# Patient Record
Sex: FEMALE | Race: WHITE | Employment: OTHER | URBAN - METROPOLITAN AREA
[De-identification: names, ages, dates, MRNs, and addresses within clinical notes are randomized per-mention and may not be internally consistent; named-entity substitution may affect disease eponyms.]

---

## 2017-07-21 ENCOUNTER — OFFICE VISIT (OUTPATIENT)
Dept: ORTHOPEDIC SURGERY | Age: 32
End: 2017-07-21

## 2017-07-21 VITALS
DIASTOLIC BLOOD PRESSURE: 85 MMHG | WEIGHT: 149.91 LBS | HEART RATE: 100 BPM | HEIGHT: 65 IN | BODY MASS INDEX: 24.98 KG/M2 | SYSTOLIC BLOOD PRESSURE: 122 MMHG

## 2017-07-21 DIAGNOSIS — M25.372 ANKLE INSTABILITY, LEFT: ICD-10-CM

## 2017-07-21 DIAGNOSIS — M25.572 ACUTE LEFT ANKLE PAIN: Primary | ICD-10-CM

## 2017-07-21 PROBLEM — M25.373 ANKLE INSTABILITY: Status: ACTIVE | Noted: 2017-07-21

## 2017-07-21 PROCEDURE — 99204 OFFICE O/P NEW MOD 45 MIN: CPT | Performed by: ORTHOPAEDIC SURGERY

## 2017-07-21 PROCEDURE — 73610 X-RAY EXAM OF ANKLE: CPT | Performed by: ORTHOPAEDIC SURGERY

## 2017-07-31 ENCOUNTER — TELEPHONE (OUTPATIENT)
Dept: ORTHOPEDIC SURGERY | Age: 32
End: 2017-07-31

## 2019-01-21 ENCOUNTER — HOSPITAL ENCOUNTER (EMERGENCY)
Age: 34
Discharge: HOME | End: 2019-01-21
Attending: EMERGENCY MEDICINE
Payer: MEDICAID

## 2019-01-21 ENCOUNTER — APPOINTMENT (OUTPATIENT)
Dept: GENERAL RADIOLOGY | Age: 34
End: 2019-01-21
Payer: MEDICAID

## 2019-01-21 ENCOUNTER — APPOINTMENT (OUTPATIENT)
Dept: CT IMAGING | Age: 34
End: 2019-01-21
Payer: MEDICAID

## 2019-01-21 VITALS
TEMPERATURE: 98 F | BODY MASS INDEX: 35.53 KG/M2 | RESPIRATION RATE: 16 BRPM | HEIGHT: 60 IN | HEART RATE: 86 BPM | SYSTOLIC BLOOD PRESSURE: 119 MMHG | OXYGEN SATURATION: 95 % | DIASTOLIC BLOOD PRESSURE: 90 MMHG | WEIGHT: 181 LBS

## 2019-01-21 DIAGNOSIS — M54.6 ACUTE MIDLINE THORACIC BACK PAIN: ICD-10-CM

## 2019-01-21 DIAGNOSIS — M79.661 PAIN IN RIGHT LOWER LEG: ICD-10-CM

## 2019-01-21 DIAGNOSIS — S16.1XXA STRAIN OF NECK MUSCLE, INITIAL ENCOUNTER: ICD-10-CM
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
## 2019-01-21 DIAGNOSIS — S09.90XA CLOSED HEAD INJURY, INITIAL ENCOUNTER: ICD-10-CM

## 2019-01-21 LAB
BILIRUBIN URINE: NEGATIVE
BLOOD, URINE: NEGATIVE
CLARITY: CLEAR
COLOR: YELLOW
GLUCOSE URINE: NEGATIVE MG/DL
HCG(URINE) PREGNANCY TEST: NEGATIVE
KETONES, URINE: NEGATIVE MG/DL
LEUKOCYTE ESTERASE, URINE: NEGATIVE
MICROSCOPIC EXAMINATION: NORMAL
NITRITE, URINE: NEGATIVE
PH UA: 7.5
PROTEIN UA: NEGATIVE MG/DL
SPECIFIC GRAVITY UA: <=1.005
URINE TYPE: NORMAL
UROBILINOGEN, URINE: 0.2 E.U./DL

## 2019-01-21 PROCEDURE — 73590 X-RAY EXAM OF LOWER LEG: CPT

## 2019-01-21 PROCEDURE — 72125 CT NECK SPINE W/O DYE: CPT

## 2019-01-21 PROCEDURE — 84703 CHORIONIC GONADOTROPIN ASSAY: CPT

## 2019-01-21 PROCEDURE — 81003 URINALYSIS AUTO W/O SCOPE: CPT

## 2019-01-21 PROCEDURE — 72128 CT CHEST SPINE W/O DYE: CPT

## 2019-01-21 PROCEDURE — 99284 EMERGENCY DEPT VISIT MOD MDM: CPT

## 2019-01-21 PROCEDURE — 6370000000 HC RX 637 (ALT 250 FOR IP): Performed by: NURSE PRACTITIONER

## 2019-01-21 PROCEDURE — 6360000002 HC RX W HCPCS: Performed by: NURSE PRACTITIONER

## 2019-01-21 PROCEDURE — 70450 CT HEAD/BRAIN W/O DYE: CPT

## 2019-01-21 PROCEDURE — 73600 X-RAY EXAM OF ANKLE: CPT

## 2019-01-21 RX ORDER — CYCLOBENZAPRINE HCL 10 MG
10 TABLET ORAL ONCE
Status: COMPLETED | OUTPATIENT
Start: 2019-01-21 | End: 2019-01-21

## 2019-01-21 RX ORDER — IBUPROFEN 800 MG/1
800 TABLET ORAL ONCE
Status: COMPLETED | OUTPATIENT
Start: 2019-01-21 | End: 2019-01-21

## 2019-01-21 RX ORDER — CYCLOBENZAPRINE HCL 10 MG
10 TABLET ORAL 3 TIMES DAILY PRN
Qty: 21 TABLET | Refills: 0 | Status: SHIPPED | OUTPATIENT
Start: 2019-01-21 | End: 2019-01-28

## 2019-01-21 RX ORDER — ONDANSETRON 4 MG/1
4 TABLET, ORALLY DISINTEGRATING ORAL ONCE
Status: COMPLETED | OUTPATIENT
Start: 2019-01-21 | End: 2019-01-21

## 2019-01-21 RX ADMIN — ONDANSETRON 4 MG: 4 TABLET, ORALLY DISINTEGRATING ORAL at 12:49

## 2019-01-21 RX ADMIN — CYCLOBENZAPRINE HYDROCHLORIDE 10 MG: 10 TABLET, FILM COATED ORAL at 12:49

## 2019-01-21 RX ADMIN — IBUPROFEN 800 MG: 800 TABLET ORAL at 15:19

## 2019-01-21 ASSESSMENT — ENCOUNTER SYMPTOMS
ABDOMINAL PAIN: 0
ABDOMINAL DISTENTION: 0
DIARRHEA: 0
EYES NEGATIVE: 1
VOMITING: 0
BACK PAIN: 1
CONSTIPATION: 0
SHORTNESS OF BREATH: 0
COUGH: 0
ALLERGIC/IMMUNOLOGIC NEGATIVE: 1
NAUSEA: 1
WHEEZING: 0

## 2019-01-21 ASSESSMENT — PAIN DESCRIPTION - ORIENTATION: ORIENTATION: RIGHT

## 2019-01-21 ASSESSMENT — PAIN DESCRIPTION - PAIN TYPE: TYPE: ACUTE PAIN

## 2019-01-21 ASSESSMENT — PAIN SCALES - GENERAL
PAINLEVEL_OUTOF10: 8
PAINLEVEL_OUTOF10: 9
PAINLEVEL_OUTOF10: 9

## 2019-01-21 ASSESSMENT — PAIN DESCRIPTION - PROGRESSION: CLINICAL_PROGRESSION: GRADUALLY WORSENING

## 2019-05-14 ENCOUNTER — HOSPITAL ENCOUNTER (INPATIENT)
Age: 34
LOS: 15 days | Discharge: ANOTHER HOSPITAL | DRG: 710 | End: 2019-05-29
Attending: EMERGENCY MEDICINE | Admitting: INTERNAL MEDICINE
Payer: MEDICAID

## 2019-05-14 ENCOUNTER — APPOINTMENT (OUTPATIENT)
Dept: GENERAL RADIOLOGY | Age: 34
DRG: 710 | End: 2019-05-14
Payer: MEDICAID

## 2019-05-14 ENCOUNTER — HOSPITAL ENCOUNTER (EMERGENCY)
Age: 34
Discharge: HOME | End: 2019-05-14
Attending: EMERGENCY MEDICINE
Payer: OTHER MISCELLANEOUS

## 2019-05-14 ENCOUNTER — APPOINTMENT (OUTPATIENT)
Dept: CT IMAGING | Age: 34
DRG: 710 | End: 2019-05-14
Payer: MEDICAID

## 2019-05-14 DIAGNOSIS — I46.9 CARDIAC ARREST WITH VENTRICULAR FIBRILLATION (HCC): ICD-10-CM

## 2019-05-14 DIAGNOSIS — J96.01 ACUTE RESPIRATORY FAILURE WITH HYPOXIA (HCC): Primary | ICD-10-CM

## 2019-05-14 DIAGNOSIS — I49.01 CARDIAC ARREST WITH VENTRICULAR FIBRILLATION (HCC): ICD-10-CM

## 2019-05-14 PROBLEM — J96.00 ACUTE RESPIRATORY FAILURE (HCC): Status: ACTIVE | Noted: 2019-05-14

## 2019-05-14 LAB
A/G RATIO: 1.1 (ref 1.1–2.2)
ACETAMINOPHEN LEVEL: <5 UG/ML (ref 10–30)
ALBUMIN SERPL-MCNC: 4.2 G/DL (ref 3.4–5)
ALP BLD-CCNC: 94 U/L (ref 40–129)
ALT SERPL-CCNC: 114 U/L (ref 10–40)
AMORPHOUS: ABNORMAL /HPF
AMPHETAMINE SCREEN, URINE: ABNORMAL
ANION GAP SERPL CALCULATED.3IONS-SCNC: 19 MMOL/L (ref 3–16)
AST SERPL-CCNC: 129 U/L (ref 15–37)
ATYPICAL LYMPHOCYTE RELATIVE PERCENT: 1 % (ref 0–6)
BACTERIA: ABNORMAL /HPF
BARBITURATE SCREEN URINE: ABNORMAL
BASE EXCESS ARTERIAL: -4.8 MMOL/L (ref -3–3)
BASOPHILS ABSOLUTE: 0 K/UL (ref 0–0.2)
BASOPHILS RELATIVE PERCENT: 0 %
BENZODIAZEPINE SCREEN, URINE: ABNORMAL
BILIRUB SERPL-MCNC: 0.6 MG/DL (ref 0–1)
BILIRUBIN URINE: NEGATIVE
BLOOD, URINE: ABNORMAL
BUN BLDV-MCNC: 9 MG/DL (ref 7–20)
CALCIUM SERPL-MCNC: 9.6 MG/DL (ref 8.3–10.6)
CANNABINOID SCREEN URINE: POSITIVE
CARBOXYHEMOGLOBIN ARTERIAL: 2.2 % (ref 0–1.5)
CHLORIDE BLD-SCNC: 96 MMOL/L (ref 99–110)
CLARITY: ABNORMAL
CO2: 18 MMOL/L (ref 21–32)
COCAINE METABOLITE SCREEN URINE: ABNORMAL
COLOR: YELLOW
CREAT SERPL-MCNC: 0.7 MG/DL (ref 0.6–1.1)
EKG ATRIAL RATE: 141 BPM
EKG ATRIAL RATE: 159 BPM
EKG DIAGNOSIS: NORMAL
EKG DIAGNOSIS: NORMAL
EKG Q-T INTERVAL: 314 MS
EKG Q-T INTERVAL: 340 MS
EKG QRS DURATION: 78 MS
EKG QRS DURATION: 82 MS
EKG QTC CALCULATION (BAZETT): 512 MS
EKG QTC CALCULATION (BAZETT): 538 MS
EKG R AXIS: 31 DEGREES
EKG R AXIS: 32 DEGREES
EKG T AXIS: 55 DEGREES
EKG T AXIS: 58 DEGREES
EKG VENTRICULAR RATE: 151 BPM
EKG VENTRICULAR RATE: 160 BPM
EOSINOPHILS ABSOLUTE: 0.2 K/UL (ref 0–0.6)
EOSINOPHILS RELATIVE PERCENT: 1 %
EPITHELIAL CELLS, UA: ABNORMAL /HPF
ETHANOL: NORMAL MG/DL (ref 0–0.08)
GFR AFRICAN AMERICAN: >60
GFR NON-AFRICAN AMERICAN: >60
GLOBULIN: 3.8 G/DL
GLUCOSE BLD-MCNC: 161 MG/DL (ref 70–99)
GLUCOSE BLD-MCNC: 184 MG/DL (ref 70–99)
GLUCOSE URINE: 100 MG/DL
HCO3 ARTERIAL: 19.9 MMOL/L (ref 21–29)
HCT VFR BLD CALC: 48.9 % (ref 36–48)
HEMATOLOGY PATH CONSULT: YES
HEMOGLOBIN, ART, EXTENDED: 16.7 G/DL (ref 12–16)
HEMOGLOBIN: 16 G/DL (ref 12–16)
KETONES, URINE: 15 MG/DL
LACTIC ACID, SEPSIS: 2.9 MMOL/L (ref 0.4–1.9)
LACTIC ACID: 6.8 MMOL/L (ref 0.4–2)
LEUKOCYTE ESTERASE, URINE: NEGATIVE
LYMPHOCYTES ABSOLUTE: 6.1 K/UL (ref 1–5.1)
LYMPHOCYTES RELATIVE PERCENT: 30 %
Lab: ABNORMAL
MAGNESIUM: 1.4 MG/DL (ref 1.8–2.4)
MCH RBC QN AUTO: 29.6 PG (ref 26–34)
MCHC RBC AUTO-ENTMCNC: 32.6 G/DL (ref 31–36)
MCV RBC AUTO: 90.6 FL (ref 80–100)
METHADONE SCREEN, URINE: ABNORMAL
METHEMOGLOBIN ARTERIAL: 0.2 %
MICROSCOPIC EXAMINATION: YES
MONOCYTES ABSOLUTE: 1.2 K/UL (ref 0–1.3)
MONOCYTES RELATIVE PERCENT: 6 %
MUCUS: ABNORMAL /LPF
MYELOCYTE PERCENT: 3 %
NEUTROPHILS ABSOLUTE: 12.2 K/UL (ref 1.7–7.7)
NEUTROPHILS RELATIVE PERCENT: 59 %
NITRITE, URINE: NEGATIVE
O2 CONTENT ARTERIAL: 21 ML/DL
O2 SAT, ARTERIAL: 88.1 %
O2 THERAPY: ABNORMAL
OPIATE SCREEN URINE: ABNORMAL
OXYCODONE URINE: ABNORMAL
PCO2 ARTERIAL: 36.4 MMHG (ref 35–45)
PDW BLD-RTO: 12.4 % (ref 12.4–15.4)
PERFORMED ON: ABNORMAL
PH ARTERIAL: 7.36 (ref 7.35–7.45)
PH UA: 7
PH UA: 7 (ref 5–8)
PHENCYCLIDINE SCREEN URINE: ABNORMAL
PLATELET # BLD: 315 K/UL (ref 135–450)
PLATELET SLIDE REVIEW: ABNORMAL
PMV BLD AUTO: 7.6 FL (ref 5–10.5)
PO2 ARTERIAL: 55.9 MMHG (ref 75–108)
POTASSIUM SERPL-SCNC: 3 MMOL/L (ref 3.5–5.1)
PROPOXYPHENE SCREEN: ABNORMAL
PROTEIN UA: >=300 MG/DL
RBC # BLD: 5.4 M/UL (ref 4–5.2)
RBC UA: ABNORMAL /HPF (ref 0–2)
SALICYLATE, SERUM: <0.3 MG/DL (ref 15–30)
SLIDE REVIEW: ABNORMAL
SODIUM BLD-SCNC: 133 MMOL/L (ref 136–145)
SPECIFIC GRAVITY UA: 1.02 (ref 1–1.03)
TCO2 ARTERIAL: 21 MMOL/L
TOTAL PROTEIN: 8 G/DL (ref 6.4–8.2)
TRICYCLIC, URINE: NORMAL
TROPONIN: 0.57 NG/ML
TROPONIN: <0.01 NG/ML
URINE REFLEX TO CULTURE: YES
URINE TYPE: ABNORMAL
UROBILINOGEN, URINE: 1 E.U./DL
WBC # BLD: 19.7 K/UL (ref 4–11)
WBC UA: ABNORMAL /HPF (ref 0–5)

## 2019-05-14 PROCEDURE — 70450 CT HEAD/BRAIN W/O DYE: CPT

## 2019-05-14 PROCEDURE — 6360000002 HC RX W HCPCS

## 2019-05-14 PROCEDURE — 4500000026 HC ED CRITICAL CARE PROCEDURE

## 2019-05-14 PROCEDURE — 96365 THER/PROPH/DIAG IV INF INIT: CPT

## 2019-05-14 PROCEDURE — 0BH17EZ INSERTION OF ENDOTRACHEAL AIRWAY INTO TRACHEA, VIA NATURAL OR ARTIFICIAL OPENING: ICD-10-PCS | Performed by: INTERNAL MEDICINE

## 2019-05-14 PROCEDURE — 2700000000 HC OXYGEN THERAPY PER DAY

## 2019-05-14 PROCEDURE — 87086 URINE CULTURE/COLONY COUNT: CPT

## 2019-05-14 PROCEDURE — 96366 THER/PROPH/DIAG IV INF ADDON: CPT

## 2019-05-14 PROCEDURE — 83605 ASSAY OF LACTIC ACID: CPT

## 2019-05-14 PROCEDURE — G0480 DRUG TEST DEF 1-7 CLASSES: HCPCS

## 2019-05-14 PROCEDURE — 81001 URINALYSIS AUTO W/SCOPE: CPT

## 2019-05-14 PROCEDURE — 71045 X-RAY EXAM CHEST 1 VIEW: CPT

## 2019-05-14 PROCEDURE — 82803 BLOOD GASES ANY COMBINATION: CPT

## 2019-05-14 PROCEDURE — 96375 TX/PRO/DX INJ NEW DRUG ADDON: CPT

## 2019-05-14 PROCEDURE — 87070 CULTURE OTHR SPECIMN AEROBIC: CPT

## 2019-05-14 PROCEDURE — 71260 CT THORAX DX C+: CPT

## 2019-05-14 PROCEDURE — 99292 CRITICAL CARE ADDL 30 MIN: CPT

## 2019-05-14 PROCEDURE — 99291 CRITICAL CARE FIRST HOUR: CPT

## 2019-05-14 PROCEDURE — 6360000002 HC RX W HCPCS: Performed by: EMERGENCY MEDICINE

## 2019-05-14 PROCEDURE — 84484 ASSAY OF TROPONIN QUANT: CPT

## 2019-05-14 PROCEDURE — 4500000002 HC ER NO CHARGE

## 2019-05-14 PROCEDURE — 83735 ASSAY OF MAGNESIUM: CPT

## 2019-05-14 PROCEDURE — 5A1955Z RESPIRATORY VENTILATION, GREATER THAN 96 CONSECUTIVE HOURS: ICD-10-PCS | Performed by: EMERGENCY MEDICINE

## 2019-05-14 PROCEDURE — 87040 BLOOD CULTURE FOR BACTERIA: CPT

## 2019-05-14 PROCEDURE — 36600 WITHDRAWAL OF ARTERIAL BLOOD: CPT

## 2019-05-14 PROCEDURE — 94750 HC PULMONARY COMPLIANCE STUDY: CPT

## 2019-05-14 PROCEDURE — 2000000000 HC ICU R&B

## 2019-05-14 PROCEDURE — 74176 CT ABD & PELVIS W/O CONTRAST: CPT

## 2019-05-14 PROCEDURE — 85025 COMPLETE CBC W/AUTO DIFF WBC: CPT

## 2019-05-14 PROCEDURE — 2500000003 HC RX 250 WO HCPCS: Performed by: EMERGENCY MEDICINE

## 2019-05-14 PROCEDURE — 96368 THER/DIAG CONCURRENT INF: CPT

## 2019-05-14 PROCEDURE — 6360000004 HC RX CONTRAST MEDICATION: Performed by: EMERGENCY MEDICINE

## 2019-05-14 PROCEDURE — 80307 DRUG TEST PRSMV CHEM ANLYZR: CPT

## 2019-05-14 PROCEDURE — 2580000003 HC RX 258: Performed by: EMERGENCY MEDICINE

## 2019-05-14 PROCEDURE — 6370000000 HC RX 637 (ALT 250 FOR IP): Performed by: EMERGENCY MEDICINE

## 2019-05-14 PROCEDURE — 80053 COMPREHEN METABOLIC PANEL: CPT

## 2019-05-14 PROCEDURE — 94002 VENT MGMT INPAT INIT DAY: CPT

## 2019-05-14 PROCEDURE — 87205 SMEAR GRAM STAIN: CPT

## 2019-05-14 PROCEDURE — 51702 INSERT TEMP BLADDER CATH: CPT

## 2019-05-14 PROCEDURE — 2500000003 HC RX 250 WO HCPCS

## 2019-05-14 PROCEDURE — 89220 SPUTUM SPECIMEN COLLECTION: CPT

## 2019-05-14 RX ORDER — MAGNESIUM SULFATE 1 G/100ML
1 INJECTION INTRAVENOUS
Status: DISPENSED | OUTPATIENT
Start: 2019-05-14 | End: 2019-05-14

## 2019-05-14 RX ORDER — AMIODARONE HYDROCHLORIDE 50 MG/ML
150 INJECTION, SOLUTION INTRAVENOUS ONCE
Status: COMPLETED | OUTPATIENT
Start: 2019-05-14 | End: 2019-05-14

## 2019-05-14 RX ORDER — ROCURONIUM BROMIDE 10 MG/ML
70 INJECTION, SOLUTION INTRAVENOUS ONCE
Status: COMPLETED | OUTPATIENT
Start: 2019-05-14 | End: 2019-05-14

## 2019-05-14 RX ORDER — METOPROLOL TARTRATE 5 MG/5ML
5 INJECTION INTRAVENOUS ONCE
Status: COMPLETED | OUTPATIENT
Start: 2019-05-14 | End: 2019-05-14

## 2019-05-14 RX ORDER — ASPIRIN 300 MG/1
300 SUPPOSITORY RECTAL ONCE
Status: COMPLETED | OUTPATIENT
Start: 2019-05-14 | End: 2019-05-14

## 2019-05-14 RX ORDER — FUROSEMIDE 10 MG/ML
40 INJECTION INTRAMUSCULAR; INTRAVENOUS ONCE
Status: COMPLETED | OUTPATIENT
Start: 2019-05-14 | End: 2019-05-14

## 2019-05-14 RX ORDER — POTASSIUM CHLORIDE 7.45 MG/ML
10 INJECTION INTRAVENOUS
Status: DISPENSED | OUTPATIENT
Start: 2019-05-14 | End: 2019-05-14

## 2019-05-14 RX ORDER — PROPOFOL 10 MG/ML
INJECTION, EMULSION INTRAVENOUS
Status: COMPLETED
Start: 2019-05-14 | End: 2019-05-14

## 2019-05-14 RX ORDER — MIDAZOLAM HYDROCHLORIDE 1 MG/ML
10 INJECTION INTRAMUSCULAR; INTRAVENOUS ONCE
Status: DISCONTINUED | OUTPATIENT
Start: 2019-05-14 | End: 2019-05-15 | Stop reason: HOSPADM

## 2019-05-14 RX ORDER — ROCURONIUM BROMIDE 10 MG/ML
40 INJECTION, SOLUTION INTRAVENOUS ONCE
Status: COMPLETED | OUTPATIENT
Start: 2019-05-14 | End: 2019-05-14

## 2019-05-14 RX ORDER — ETOMIDATE 2 MG/ML
20 INJECTION, SOLUTION INTRAVENOUS ONCE
Status: COMPLETED | OUTPATIENT
Start: 2019-05-14 | End: 2019-05-14

## 2019-05-14 RX ORDER — 0.9 % SODIUM CHLORIDE 0.9 %
2500 INTRAVENOUS SOLUTION INTRAVENOUS ONCE
Status: COMPLETED | OUTPATIENT
Start: 2019-05-14 | End: 2019-05-15

## 2019-05-14 RX ORDER — SODIUM CHLORIDE 0.9 % (FLUSH) 0.9 %
10 SYRINGE (ML) INJECTION EVERY 12 HOURS SCHEDULED
Status: DISCONTINUED | OUTPATIENT
Start: 2019-05-14 | End: 2019-05-29 | Stop reason: HOSPADM

## 2019-05-14 RX ORDER — PROPOFOL 10 MG/ML
10 INJECTION, EMULSION INTRAVENOUS
Status: DISCONTINUED | OUTPATIENT
Start: 2019-05-14 | End: 2019-05-15

## 2019-05-14 RX ORDER — MIDAZOLAM HYDROCHLORIDE 5 MG/ML
INJECTION INTRAMUSCULAR; INTRAVENOUS
Status: COMPLETED
Start: 2019-05-14 | End: 2019-05-14

## 2019-05-14 RX ORDER — SODIUM CHLORIDE 0.9 % (FLUSH) 0.9 %
10 SYRINGE (ML) INJECTION PRN
Status: DISCONTINUED | OUTPATIENT
Start: 2019-05-14 | End: 2019-05-29 | Stop reason: HOSPADM

## 2019-05-14 RX ADMIN — MAGNESIUM SULFATE HEPTAHYDRATE 1 G: 1 INJECTION, SOLUTION INTRAVENOUS at 19:50

## 2019-05-14 RX ADMIN — PROPOFOL 30 MCG/KG/MIN: 10 INJECTION, EMULSION INTRAVENOUS at 23:56

## 2019-05-14 RX ADMIN — FUROSEMIDE 40 MG: 10 INJECTION, SOLUTION INTRAMUSCULAR; INTRAVENOUS at 19:38

## 2019-05-14 RX ADMIN — ASPIRIN 300 MG: 300 SUPPOSITORY RECTAL at 22:45

## 2019-05-14 RX ADMIN — ROCURONIUM BROMIDE 40 MG: 10 SOLUTION INTRAVENOUS at 23:08

## 2019-05-14 RX ADMIN — SODIUM CHLORIDE 2500 ML: 9 INJECTION, SOLUTION INTRAVENOUS at 22:38

## 2019-05-14 RX ADMIN — METOPROLOL TARTRATE 5 MG: 5 INJECTION, SOLUTION INTRAVENOUS at 21:33

## 2019-05-14 RX ADMIN — ROCURONIUM BROMIDE 70 MG: 10 INJECTION INTRAVENOUS at 18:06

## 2019-05-14 RX ADMIN — AMIODARONE HYDROCHLORIDE 150 MG: 50 INJECTION, SOLUTION INTRAVENOUS at 19:38

## 2019-05-14 RX ADMIN — POTASSIUM CHLORIDE 10 MEQ: 7.46 INJECTION, SOLUTION INTRAVENOUS at 20:03

## 2019-05-14 RX ADMIN — VANCOMYCIN HYDROCHLORIDE 1.5 G: 1 INJECTION, POWDER, LYOPHILIZED, FOR SOLUTION INTRAVENOUS at 23:24

## 2019-05-14 RX ADMIN — AMIODARONE HYDROCHLORIDE 1 MG/MIN: 50 INJECTION, SOLUTION INTRAVENOUS at 19:51

## 2019-05-14 RX ADMIN — PROPOFOL 15 MCG/KG/MIN: 10 INJECTION, EMULSION INTRAVENOUS at 19:13

## 2019-05-14 RX ADMIN — ROCURONIUM BROMIDE 70 MG: 10 SOLUTION INTRAVENOUS at 20:06

## 2019-05-14 RX ADMIN — MIDAZOLAM HYDROCHLORIDE 10 MG: 5 INJECTION, SOLUTION INTRAMUSCULAR; INTRAVENOUS at 19:33

## 2019-05-14 RX ADMIN — PIPERACILLIN AND TAZOBACTAM 3.38 G: 3; .375 INJECTION, POWDER, FOR SOLUTION INTRAVENOUS at 21:41

## 2019-05-14 RX ADMIN — PROPOFOL 10 MCG/KG/MIN: 10 INJECTION, EMULSION INTRAVENOUS at 18:57

## 2019-05-14 RX ADMIN — ETOMIDATE 20 MG: 2 INJECTION, SOLUTION INTRAVENOUS at 18:06

## 2019-05-14 RX ADMIN — IOPAMIDOL 85 ML: 755 INJECTION, SOLUTION INTRAVENOUS at 20:54

## 2019-05-14 ASSESSMENT — PULMONARY FUNCTION TESTS
PIF_VALUE: 25
PIF_VALUE: 32

## 2019-05-14 NOTE — ED PROVIDER NOTES
Intubation  Date/Time: 5/14/2019 6:27 PM  Performed by: JAYLYN Manning CNP  Authorized by: Monse Solano MD     Consent:     Consent obtained:  Emergent situation    Risks discussed:  Aspiration, brain injury, hypoxia and death  Pre-procedure details:     Patient status:  Unresponsive    Pretreatment meds: Etomidate. Paralytics:  Rocuronium  Procedure details:     Preoxygenation:  Bag valve mask    CPR in progress: no      Intubation method:  Oral    Oral intubation technique:  Video-assisted    Laryngoscope blade: Mac 3    Tube size (mm):  7.5    Tube type:  Cuffed    Number of attempts:  1    Ventilation between attempts: no      Cricoid pressure: no      Tube visualized through cords: yes    Placement assessment:     ETT to lip:  23    Tube secured with:  ETT forde    Breath sounds:  Equal and absent over the epigastrium    Placement verification: chest rise, equal breath sounds and ETCO2 detector    Post-procedure details:     Patient tolerance of procedure:   Tolerated well, no immediate complications         JAYLYN Manning CNP  05/14/19 1825

## 2019-05-14 NOTE — ED NOTES
[facility] cardiology at [facility] [address] and Dr. Sonia George returned page at [facility]  46/83/43 1937

## 2019-05-14 NOTE — ED TRIAGE NOTES
Pt is unresponsive at arrival.   Per squad pt had agonal breathing. Pt stopped breathing and became pulseless with intubation. Pt was in vfib shocked at 200J  Patient became tachy at 179-200. Per squad fsbs 195. Pt was at home and was smoking weed per squad. (info came from pts mother)  IO and 2IV per squad was attempted and unsuccessful. Per squad last rhythm was SR.   2mg of narcan was given nasal.   Per squad patient was tonio and went into vfib and CPR was started. 1802 fsbs 161  1805 pt is vomiting and moaning at this time. Pt is being bagged by ambu bag via RT.   1806 20mg etomidate given  1806 70mg rocuronium given. 1808 7.5 et tube placed at 23 lip line  1808 20G r AC placed by [facility].  Basic labs, vbg, lactic drawn, BC1

## 2019-05-14 NOTE — ED PROVIDER NOTES
[address] ED      CHIEF COMPLAINT  Respiratory arrest       HISTORY OF PRESENT ILLNESS  Rubi Leon is a 26 y.o. female  who presents to the ED complaining of cardiopulmonary arrest.  EMS states that when they got there she was having trouble breathing with agonal respirations. She then went unresponsive and they noted V. fib on the monitor. Patient was shocked ×1 with return of spontaneous circulation. She was spontaneously breathing at that time although was still somewhat labored. She did not regain consciousness. Per the mom, mom had come home from work and patient was her normal self and stated to mom that they needed some toilet paper. Mom ran to the grocery store which is a short distance away and upon returning about 10-15 minutes later the patient was slumped to the side on her couch and had throbbing at the mouth. The mom removed the patient's pulse teeth and noted that the patient was not breathing right and so called EMS at that point. Fingerstick blood sugar noted to be in the 190s per EMS. They did administer 2 mg Narcan without improvement. 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 States that she does not currently follow the cardiologist nor with any providers at [facility] for sometimes and follows up early for her medical care [facility] [place] [facility].  States that she has cardiomegaly. She also has hallucinations for which she is on Zyprexa per mom and also is on neurontin. She has a history of chronic kidney and liver disease as well per mom. No other complaints, modifying factors or associated symptoms. I have reviewed the following from the nursing documentation.     Past Medical History:   Diagnosis Date    Arrhythmia     Arthritis     Bipolar disorder ([facility]     Cancer ([facility]     Chronic kidney disease     Depression     E. coli infection     Hepatitis C     Hyperlipidemia     [facility]     Rhabdomyosarcoma ([facility]     Scoliosis     Tachycardia      Past Surgical History:   Procedure Laterality Date    ACHILLES TENDON SURGERY      BONE MARROW TRANSPLANT      CHOLECYSTECTOMY  4/15/2014    Laparascopic    ENDOSCOPY, COLON, DIAGNOSTIC      GROWTH PLATE SURGERY      LEG SURGERY       Family History   Problem Relation Age of Onset    Emphysema Mother     Mental Illness Mother     Substance Abuse Mother     Heart Attack Father     Mental Illness Father     Arthritis Father     Heart Disease Father     High Blood Pressure Father     High Cholesterol Father      Social History     Socioeconomic History    Marital status: Legally      Spouse name: Not on file    Number of children: Not on file    Years of education: Not on file    Highest education level: Not on file   Occupational History    Not on file   Social Needs    Financial resource strain: Not on file    Food insecurity:     Worry: Not on file     Inability: Not on file    Transportation needs:     Medical: Not on file     Non-medical: Not on file   Tobacco Use    Smoking status: Current Every Day Smoker     Packs/day: 0.50     Years: 5.00     Pack years: 2.50     Types: Cigarettes    Smokeless tobacco: Never Used   Substance and Sexual Activity    Alcohol use: No     Comment: none for years    Drug use: Yes     Types: Marijuana     Comment: once month    Sexual activity: Yes     Partners: Male   Lifestyle    Physical activity:     Days per week: Not on file     Minutes per session: Not on file    Stress: Not on file   Relationships    Social connections:     Talks on phone: Not on file     Gets together: Not on file     Attends Islam service: Not on file     Active member of club or organization: Not on file     Attends meetings of clubs or organizations: Not on K/uL    RBC 5.40 (H) 4.00 - 5.20 M/uL    Hemoglobin 16.0 12.0 - 16.0 g/dL    Hematocrit 48.9 (H) 36.0 - 48.0 %    MCV 90.6 80.0 - 100.0 fL    MCH 29.6 26.0 - 34.0 pg    MCHC 32.6 31.0 - 36.0 g/dL    RDW 12.4 12.4 - 15.4 %    Platelets 247 585 - 406 K/uL    MPV 7.6 5.0 - 10.5 fL    PLATELET SLIDE REVIEW Clumped     SLIDE REVIEW see below     Path Consult Yes     Neutrophils % 59.0 %    Lymphocytes % 30.0 %    Monocytes % 6.0 %    Eosinophils % 1.0 %    Basophils % 0.0 %    Neutrophils # 12.2 (H) 1.7 - 7.7 K/uL    Lymphocytes # 6.1 (H) 1.0 - 5.1 K/uL    Monocytes # 1.2 0.0 - 1.3 K/uL    Eosinophils # 0.2 0.0 - 0.6 K/uL    Basophils # 0.0 0.0 - 0.2 K/uL    Atypical Lymphocytes Relative 1 0 - 6 %    Myelocytes Relative 3 (A) %   Comprehensive metabolic panel   Result Value Ref Range    Sodium 133 (L) 136 - 145 mmol/L    Potassium 3.0 (L) 3.5 - 5.1 mmol/L    Chloride 96 (L) 99 - 110 mmol/L    CO2 18 (L) 21 - 32 mmol/L    Anion Gap 19 (H) 3 - 16    Glucose 184 (H) 70 - 99 mg/dL    BUN 9 7 - 20 mg/dL    CREATININE 0.7 0.6 - 1.1 mg/dL    GFR Non-African American >60 >60    GFR African American >60 >60    Calcium 9.6 8.3 - 10.6 mg/dL    Total Protein 8.0 6.4 - 8.2 g/dL    Alb 4.2 3.4 - 5.0 g/dL    Albumin/Globulin Ratio 1.1 1.1 - 2.2    Total Bilirubin 0.6 0.0 - 1.0 mg/dL    Alkaline Phosphatase 94 40 - 129 U/L     (H) 10 - 40 U/L     (H) 15 - 37 U/L    Globulin 3.8 g/dL   Urinalysis Reflex to Culture   Result Value Ref Range    Color, UA Yellow Straw/Yellow    Clarity, UA SL CLOUDY (A) Clear    Glucose, Ur 100 (A) Negative mg/dL    Bilirubin Urine Negative Negative    Ketones, Urine 15 (A) Negative mg/dL    Specific Gravity, UA 1.025 1.005 - 1.030    Blood, Urine TRACE-INTACT (A) Negative    pH, UA 7.0 5.0 - 8.0    Protein, UA >=300 (A) Negative mg/dL    Urobilinogen, Urine 1.0 <2.0 E.U./dL    Nitrite, Urine Negative Negative    Leukocyte Esterase, Urine Negative Negative    Microscopic Examination YES Urine Reflex to Culture Yes     Urine Type Not Specified    Ethanol   Result Value Ref Range    Ethanol Lvl None Detected mg/dL   Drug screen multi urine   Result Value Ref Range    Amphetamine Screen, Urine Neg Negative <1000ng/mL    Barbiturate Screen, Ur Neg Negative <200 ng/mL    Benzodiazepine Screen, Urine Neg Negative <200 ng/mL    Cannabinoid Scrn, Ur POSITIVE (A) Negative <50 ng/mL    Cocaine Metabolite Screen, Urine Neg Negative <300 ng/mL    Opiate Scrn, Ur Neg Negative <300 ng/mL    PCP Screen, Urine Neg Negative <25 ng/mL    Methadone Screen, Urine Neg Negative <300 ng/mL    Propoxyphene Scrn, Ur Neg Negative <300 ng/mL    pH, UA 7.0     Drug Screen Comment: see below     Oxycodone Urine Neg Negative <100 ng/ml   Acetaminophen level   Result Value Ref Range    Acetaminophen Level <5 (L) 10 - 30 ug/mL   Salicylate   Result Value Ref Range    Salicylate, Serum <6.5 (L) 15.0 - 30.0 mg/dL   Drug screen, tricyclic   Result Value Ref Range    Tricyclic Neg Negative <6437 ng/mL   Troponin   Result Value Ref Range    Troponin <0.01 <0.01 ng/mL   Blood gas, arterial   Result Value Ref Range    pH, Arterial 7.356 7.350 - 7.450    pCO2, Arterial 36.4 35.0 - 45.0 mmHg    pO2, Arterial 55.9 (L) 75.0 - 108.0 mmHg    HCO3, Arterial 19.9 (L) 21.0 - 29.0 mmol/L    Base Excess, Arterial -4.8 (L) -3.0 - 3.0 mmol/L    Hemoglobin, Art, Extended 16.7 (H) 12.0 - 16.0 g/dL    O2 Sat, Arterial 88.1 (L) >92 %    Carboxyhgb, Arterial 2.2 (H) 0.0 - 1.5 %    Methemoglobin, Arterial 0.2 <1.5 %    TCO2, Arterial 21.0 Not Established mmol/L    O2 Content, Arterial 21 Not Established mL/dL    O2 Therapy Unknown    Magnesium   Result Value Ref Range    Magnesium 1.40 (L) 1.80 - 2.40 mg/dL   Microscopic Urinalysis   Result Value Ref Range    Mucus, UA Rare (A) /LPF    WBC, UA 6-10 (A) 0 - 5 /HPF    RBC, UA  (A) 0 - 2 /HPF    Epi Cells  /HPF    Bacteria, UA 2+ (A) /HPF    Amorphous, UA 3+ (A) /HPF   POCT Glucose   Result Value Ref Range    POC Glucose 161 (H) 70 - 99 mg/dl    Performed on ACCU-CHEK    EKG 12 Lead   Result Value Ref Range    Ventricular Rate 151 BPM    Atrial Rate 141 BPM    QRS Duration 82 ms    Q-T Interval 340 ms    QTc Calculation (Bazett) 538 ms    R Axis 31 degrees    T Axis 58 degrees    Diagnosis       Atrial fibrillation with rapid ventricular responseSeptal infarct , age undeterminedAbnormal ECGNo previous ECGs available   EKG 12 Lead   Result Value Ref Range    Ventricular Rate 160 BPM    Atrial Rate 159 BPM    QRS Duration 78 ms    Q-T Interval 314 ms    QTc Calculation (Bazett) 512 ms    R Axis 32 degrees    T Axis 55 degrees    Diagnosis       Atrial fibrillation with rapid ventricular responseAbnormal ECGNo previous ECGs available         ECG  The Ekg interpreted by me shows  afib, uttt=[id number] with RVR  Axis is   Normal  QTc is  538ms  Intervals and Durations are unremarkable. ST Segments: nonspecific changes  No prior ECG available for comparison. The Ekg interpreted by me shows (repeat)  atrial fibrillation with a rate of 160 and RVR  Axis is   Normal  QTc is  512ms  Intervals and Durations are unremarkable. ST Segments: nonspecific changes  No significant change from prior EKG dated earlier today      RADIOLOGY  Ct Abdomen Pelvis Wo Contrast Additional Contrast? None    Result Date: 5/14/2019  EXAMINATION: CTA OF THE CHEST; CT OF THE ABDOMEN AND PELVIS WITHOUT CONTRAST 5/14/2019 8:55 pm; 5/14/2019 8:56 pm TECHNIQUE: CTA of the chest was performed after the administration of intravenous contrast.  Multiplanar reformatted images are provided for review. MIP images are provided for review.  Dose modulation, iterative reconstruction, and/or weight based adjustment of the mA/kV was utilized to reduce the radiation dose to as low as reasonably achievable.; CT of the abdomen and pelvis was performed without the administration of intravenous contrast. Multiplanar reformatted images are provided for review. Dose modulation, iterative reconstruction, and/or weight based adjustment of the mA/kV was utilized to reduce the radiation dose to as low as reasonably achievable. COMPARISON: 05/14/2019 HISTORY: ORDERING SYSTEM PROVIDED HISTORY: resp arrest, concern for PE TECHNOLOGIST PROVIDED HISTORY: Ordering Physician Provided Reason for Exam: resp arrest, concern for PE Acuity: Acute Type of Exam: Initial; ORDERING SYSTEM PROVIDED HISTORY: ABDOMINAL PAIN TECHNOLOGIST PROVIDED HISTORY: Additional Contrast?->None Ordering Physician Provided Reason for Exam: Abd pain Acuity: Acute Type of Exam: Initial FINDINGS: Chest: Pulmonary Arteries: Motion artifact degrades image quality. There is no acute pulmonary thromboembolus. Mediastinum: The heart is unremarkable. There are no enlarged thoracic lymph nodes. Lungs/pleura: An endotracheal tube is in situ. The airway is patent. There is no pneumothorax or pleural effusion. There is enhancing consolidation involving the bilateral lungs due to atelectasis with complete collapse of the right lower lobe and near complete collapse of the left lower lobe. Soft Tissues/Bones: Degenerative changes involve the thoracic spine. There are acute nondisplaced fractures involving the anterior 2nd through 4th right ribs and anterior 3rd through 5th left ribs. Abdomen/Pelvis: Organs: The liver, spleen, pancreas, adrenal glands and kidneys are unremarkable. Status post cholecystectomy. GI/Bowel: The nasogastric tube terminates in the gastric fundus. There is no bowel obstruction. The appendix is within normal limits. Pelvis: The pelvic viscera are within normal limits. Peritoneum/Retroperitoneum: There is no free fluid or adenopathy. Bones/Soft Tissues: Degenerative changes involve the lumbar spine. 1. Bilateral lower lobe atelectasis, right greater than left.  2. Acute right 2nd through 4th and left 3rd through 5th rib fractures     Ct Head Wo Contrast    Result Date: placement Acuity: Acute Type of Exam: Initial FINDINGS: NG tube with tip in the stomach. Endotracheal tube with tip approximately 2.5 cm from the andrew. Cardiac silhouette is enlarged. No pneumothorax or pleural effusion. Pulmonary vascular congestion interstitial prominence throughout the lungs. Gaseous distention of the stomach. Findings as above which may be related to congestive heart failure and edema. Ct Chest Pulmonary Embolism W Contrast    Result Date: 5/14/2019  EXAMINATION: CTA OF THE CHEST; CT OF THE ABDOMEN AND PELVIS WITHOUT CONTRAST 5/14/2019 8:55 pm; 5/14/2019 8:56 pm TECHNIQUE: CTA of the chest was performed after the administration of intravenous contrast.  Multiplanar reformatted images are provided for review. MIP images are provided for review. Dose modulation, iterative reconstruction, and/or weight based adjustment of the mA/kV was utilized to reduce the radiation dose to as low as reasonably achievable.; CT of the abdomen and pelvis was performed without the administration of intravenous contrast. Multiplanar reformatted images are provided for review. Dose modulation, iterative reconstruction, and/or weight based adjustment of the mA/kV was utilized to reduce the radiation dose to as low as reasonably achievable. COMPARISON: 05/14/2019 HISTORY: ORDERING SYSTEM PROVIDED HISTORY: resp arrest, concern for PE TECHNOLOGIST PROVIDED HISTORY: Ordering Physician Provided Reason for Exam: resp arrest, concern for PE Acuity: Acute Type of Exam: Initial; ORDERING SYSTEM PROVIDED HISTORY: ABDOMINAL PAIN TECHNOLOGIST PROVIDED HISTORY: Additional Contrast?->None Ordering Physician Provided Reason for Exam: Abd pain Acuity: Acute Type of Exam: Initial FINDINGS: Chest: Pulmonary Arteries: Motion artifact degrades image quality. There is no acute pulmonary thromboembolus. Mediastinum: The heart is unremarkable. There are no enlarged thoracic lymph nodes. Lungs/pleura:  An endotracheal tube is in situ. The airway is patent. There is no pneumothorax or pleural effusion. There is enhancing consolidation involving the bilateral lungs due to atelectasis with complete collapse of the right lower lobe and near complete collapse of the left lower lobe. Soft Tissues/Bones: Degenerative changes involve the thoracic spine. There are acute nondisplaced fractures involving the anterior 2nd through 4th right ribs and anterior 3rd through 5th left ribs. Abdomen/Pelvis: Organs: The liver, spleen, pancreas, adrenal glands and kidneys are unremarkable. Status post cholecystectomy. GI/Bowel: The nasogastric tube terminates in the gastric fundus. There is no bowel obstruction. The appendix is within normal limits. Pelvis: The pelvic viscera are within normal limits. Peritoneum/Retroperitoneum: There is no free fluid or adenopathy. Bones/Soft Tissues: Degenerative changes involve the lumbar spine. 1. Bilateral lower lobe atelectasis, right greater than left. 2. Acute right 2nd through 4th and left 3rd through 5th rib fractures     Procedure Note - Central Line:  The benefits, risks, and alternatives of central venous access were discussed with the mother and Verbal consent was obtained for the procedure. [facility] and [facility] in standard bedside fashion in the left IJ area. Physical landmarks withultrasound guidance was used to locate the central vein. Seldinger technique was used for placement of the CVC in a non-pulsatile vasculature. All ports velma and flushed. The patient tolerated the procedure well and no acute complications occurred. ED COURSE/MDM  Patient seen and evaluated. Old records reviewed. Labs and imaging reviewed and results discussed with patient. Patient presenting for evaluation of what appears to be in a V. fib arrest.  She did have CPR that was performed and does have multiple rib fractures noted on CT but no evidence of pneumothorax.   She has significant lower lobe atelectasis with near complete collapse of the right lower lobe. This explains the difficulty in ventilating the patient as she has remained hypoxic despite high PEEP on the ventilator. She is in A. fib with RVR and was started on amiodarone drip at 1 mL/min for the first 6 hours followed by 0.5 mL per minute. She was loaded with 150 mg of amiodarone. This was in consultation with Dr. Diana Alarcon with cardiology team.  After the amiodarone did not seem to be rate controlled and the patient he did suggest Lopressor 5 mg every 6 hours. We did administer this times one and unfortunately her blood pressure significant drop despite this actually obtaining fairly improved pulse rate in the 120s. She is significantly overbreathing the vent nearly 40 times per minute and is not responding well to Versed or propofol. Patient was paralyzed to be able to ventilate better as her oxygen saturations have been very marginal.  She was started on broad-spectrum antibiotics due to concern for possible aspiration. She is a leukocytosis but at this time no evidence of acute infection. Her troponin has risen which I would expect with a V. fib arrest as well as the persistent hypoxia that we have been battling. Mom and family have been updated at length regarding patient's management. I spoke with Dr. Ira Fields. We thoroughly discussed the history, physical exam, laboratory and imaging studies, as well as, emergency department course. Based upon that discussion, we've decided to admit Giorgio Boyle for further observation and evaluation of Rubi Robb's vfib arrest.  As I have deemed necessary from their history, physical, and studies, I have considered and evaluated Giorgio Andrewss for the following diagnoses:  ACUTE OD, CVA, ICH, SEPSIS, ACUTE CORONARY SYNDROME, PERICARDIAL TAMPONADE, PNEUMOTHORAX, PULMONARY EMBOLISM, and THORACIC DISSECTION.     The total Critical Care time is 120 minutes which excludes separately billable procedures. During the patient's ED course, the patient was given:  Medications - No data to display     CLINICAL IMPRESSION  Vfib arrest with respiratory failure. Blood pressure (!) 115/93, pulse 116, temperature 98.6 °F (37 °C), temperature source Rectal, resp. rate 22, weight 230 lb (104.3 kg), SpO2 100 %. Jama Okeefe was admitted in critical condition. DISCLAIMER: This chart was created using Dragon dictation software. Efforts were made by me to ensure accuracy, however some errors may be present due to limitations of this technology and occasionally words are not transcribed correctly.           Renetta Stanley MD  05/14/19 9885 [facility] Tres Jensen MD  05/15/19 2482

## 2019-05-14 NOTE — ED NOTES
Propofol increased tp 20mcg/kg/min per V.O. Dr Yazan Willis.       Jennifer Gerardo RN  05/14/19 1924

## 2019-05-14 NOTE — ED NOTES
Pt's O2 sat dropping while on vent. Dr Elodia Pineda, Dr Michele Oseguera at the bedside. Currently bagging pt. RT called.       Lebron Kirby RN  05/14/19 1921

## 2019-05-15 ENCOUNTER — APPOINTMENT (OUTPATIENT)
Dept: GENERAL RADIOLOGY | Age: 34
DRG: 710 | End: 2019-05-15
Payer: MEDICAID

## 2019-05-15 LAB
A/G RATIO: 0.8 (ref 1.1–2.2)
ALBUMIN SERPL-MCNC: 3.5 G/DL (ref 3.4–5)
ALP BLD-CCNC: 78 U/L (ref 40–129)
ALT SERPL-CCNC: 106 U/L (ref 10–40)
ANION GAP SERPL CALCULATED.3IONS-SCNC: 12 MMOL/L (ref 3–16)
ANION GAP SERPL CALCULATED.3IONS-SCNC: 13 MMOL/L (ref 3–16)
ANION GAP SERPL CALCULATED.3IONS-SCNC: 13 MMOL/L (ref 3–16)
ANION GAP SERPL CALCULATED.3IONS-SCNC: 14 MMOL/L (ref 3–16)
APTT: 29.4 SEC (ref 26–36)
APTT: 33.1 SEC (ref 26–36)
APTT: 67 SEC (ref 26–36)
APTT: 73.4 SEC (ref 26–36)
AST SERPL-CCNC: 119 U/L (ref 15–37)
BANDED NEUTROPHILS RELATIVE PERCENT: 6 % (ref 0–7)
BASE EXCESS ARTERIAL: -6.2 MMOL/L (ref -3–3)
BASOPHILS ABSOLUTE: 0 K/UL (ref 0–0.2)
BASOPHILS RELATIVE PERCENT: 0 %
BILIRUB SERPL-MCNC: 1.4 MG/DL (ref 0–1)
BUN BLDV-MCNC: 11 MG/DL (ref 7–20)
BUN BLDV-MCNC: 12 MG/DL (ref 7–20)
CALCIUM SERPL-MCNC: 7.6 MG/DL (ref 8.3–10.6)
CALCIUM SERPL-MCNC: 7.6 MG/DL (ref 8.3–10.6)
CALCIUM SERPL-MCNC: 7.7 MG/DL (ref 8.3–10.6)
CALCIUM SERPL-MCNC: 7.7 MG/DL (ref 8.3–10.6)
CARBOXYHEMOGLOBIN ARTERIAL: 1.2 % (ref 0–1.5)
CHLORIDE BLD-SCNC: 100 MMOL/L (ref 99–110)
CHLORIDE BLD-SCNC: 101 MMOL/L (ref 99–110)
CHLORIDE BLD-SCNC: 96 MMOL/L (ref 99–110)
CHLORIDE BLD-SCNC: 98 MMOL/L (ref 99–110)
CO2: 18 MMOL/L (ref 21–32)
CO2: 19 MMOL/L (ref 21–32)
CO2: 20 MMOL/L (ref 21–32)
CO2: 21 MMOL/L (ref 21–32)
CREAT SERPL-MCNC: 0.7 MG/DL (ref 0.6–1.1)
CREAT SERPL-MCNC: 0.8 MG/DL (ref 0.6–1.1)
CREAT SERPL-MCNC: 0.8 MG/DL (ref 0.6–1.1)
CREAT SERPL-MCNC: 0.9 MG/DL (ref 0.6–1.1)
EOSINOPHILS ABSOLUTE: 0 K/UL (ref 0–0.6)
EOSINOPHILS RELATIVE PERCENT: 0 %
ESTIMATED AVERAGE GLUCOSE: 99.7 MG/DL
GFR AFRICAN AMERICAN: >60
GFR NON-AFRICAN AMERICAN: >60
GLOBULIN: 4.2 G/DL
GLUCOSE BLD-MCNC: 111 MG/DL (ref 70–99)
GLUCOSE BLD-MCNC: 129 MG/DL (ref 70–99)
GLUCOSE BLD-MCNC: 133 MG/DL (ref 70–99)
GLUCOSE BLD-MCNC: 156 MG/DL (ref 70–99)
GLUCOSE BLD-MCNC: 158 MG/DL (ref 70–99)
GLUCOSE BLD-MCNC: 162 MG/DL (ref 70–99)
GLUCOSE BLD-MCNC: 165 MG/DL (ref 70–99)
GLUCOSE BLD-MCNC: 172 MG/DL (ref 70–99)
GLUCOSE BLD-MCNC: 205 MG/DL (ref 70–99)
GLUCOSE BLD-MCNC: 211 MG/DL (ref 70–99)
HBA1C MFR BLD: 5.1 %
HCG(URINE) PREGNANCY TEST: NEGATIVE
HCO3 ARTERIAL: 17.5 MMOL/L (ref 21–29)
HCT VFR BLD CALC: 47.1 % (ref 36–48)
HEMATOLOGY PATH CONSULT: NO
HEMATOLOGY PATH CONSULT: NORMAL
HEMOGLOBIN, ART, EXTENDED: 16.4 G/DL (ref 12–16)
HEMOGLOBIN: 15.7 G/DL (ref 12–16)
INR BLD: 1.1 (ref 0.86–1.14)
LACTIC ACID: 2.1 MMOL/L (ref 0.4–2)
LACTIC ACID: 2.5 MMOL/L (ref 0.4–2)
LACTIC ACID: 3.2 MMOL/L (ref 0.4–2)
LV EF: 28 %
LVEF MODALITY: NORMAL
LYMPHOCYTES ABSOLUTE: 2.5 K/UL (ref 1–5.1)
LYMPHOCYTES RELATIVE PERCENT: 7 %
MAGNESIUM: 1.3 MG/DL (ref 1.8–2.4)
MAGNESIUM: 1.3 MG/DL (ref 1.8–2.4)
MAGNESIUM: 3 MG/DL (ref 1.8–2.4)
MCH RBC QN AUTO: 29.8 PG (ref 26–34)
MCHC RBC AUTO-ENTMCNC: 33.4 G/DL (ref 31–36)
MCV RBC AUTO: 89.5 FL (ref 80–100)
METHEMOGLOBIN ARTERIAL: 0.4 %
MONOCYTES ABSOLUTE: 0.4 K/UL (ref 0–1.3)
MONOCYTES RELATIVE PERCENT: 1 %
NEUTROPHILS ABSOLUTE: 33.1 K/UL (ref 1.7–7.7)
NEUTROPHILS RELATIVE PERCENT: 86 %
O2 CONTENT ARTERIAL: 20 ML/DL
O2 SAT, ARTERIAL: 88.1 %
O2 THERAPY: ABNORMAL
PCO2 ARTERIAL: 30.7 MMHG (ref 35–45)
PDW BLD-RTO: 12.7 % (ref 12.4–15.4)
PERFORMED ON: ABNORMAL
PH ARTERIAL: 7.38 (ref 7.35–7.45)
PLATELET # BLD: 328 K/UL (ref 135–450)
PLATELET SLIDE REVIEW: ADEQUATE
PMV BLD AUTO: 7.8 FL (ref 5–10.5)
PO2 ARTERIAL: 54.4 MMHG (ref 75–108)
POTASSIUM REFLEX MAGNESIUM: 2.9 MMOL/L (ref 3.5–5.1)
POTASSIUM REFLEX MAGNESIUM: 3.4 MMOL/L (ref 3.5–5.1)
POTASSIUM SERPL-SCNC: 3.3 MMOL/L (ref 3.5–5.1)
POTASSIUM SERPL-SCNC: 3.4 MMOL/L (ref 3.5–5.1)
POTASSIUM SERPL-SCNC: 3.5 MMOL/L (ref 3.5–5.1)
PROTHROMBIN TIME: 12.5 SEC (ref 9.8–13)
RAPID INFLUENZA  B AGN: NEGATIVE
RAPID INFLUENZA A AGN: NEGATIVE
RBC # BLD: 5.27 M/UL (ref 4–5.2)
SLIDE REVIEW: ABNORMAL
SODIUM BLD-SCNC: 128 MMOL/L (ref 136–145)
SODIUM BLD-SCNC: 130 MMOL/L (ref 136–145)
SODIUM BLD-SCNC: 133 MMOL/L (ref 136–145)
SODIUM BLD-SCNC: 134 MMOL/L (ref 136–145)
TCO2 ARTERIAL: 18.5 MMOL/L
TOTAL PROTEIN: 7.7 G/DL (ref 6.4–8.2)
TROPONIN: 0.17 NG/ML
TROPONIN: 0.26 NG/ML
TROPONIN: 0.49 NG/ML
TROPONIN: 1.49 NG/ML
TSH REFLEX FT4: 0.64 UIU/ML (ref 0.27–4.2)
WBC # BLD: 36 K/UL (ref 4–11)

## 2019-05-15 PROCEDURE — 87804 INFLUENZA ASSAY W/OPTIC: CPT

## 2019-05-15 PROCEDURE — 84484 ASSAY OF TROPONIN QUANT: CPT

## 2019-05-15 PROCEDURE — 6360000002 HC RX W HCPCS: Performed by: INTERNAL MEDICINE

## 2019-05-15 PROCEDURE — 84443 ASSAY THYROID STIM HORMONE: CPT

## 2019-05-15 PROCEDURE — 83605 ASSAY OF LACTIC ACID: CPT

## 2019-05-15 PROCEDURE — 36591 DRAW BLOOD OFF VENOUS DEVICE: CPT

## 2019-05-15 PROCEDURE — 2580000003 HC RX 258: Performed by: INTERNAL MEDICINE

## 2019-05-15 PROCEDURE — 2500000003 HC RX 250 WO HCPCS: Performed by: INTERNAL MEDICINE

## 2019-05-15 PROCEDURE — 94761 N-INVAS EAR/PLS OXIMETRY MLT: CPT

## 2019-05-15 PROCEDURE — 36592 COLLECT BLOOD FROM PICC: CPT

## 2019-05-15 PROCEDURE — 6370000000 HC RX 637 (ALT 250 FOR IP): Performed by: INTERNAL MEDICINE

## 2019-05-15 PROCEDURE — 37799 UNLISTED PX VASCULAR SURGERY: CPT

## 2019-05-15 PROCEDURE — 2000000000 HC ICU R&B

## 2019-05-15 PROCEDURE — 94640 AIRWAY INHALATION TREATMENT: CPT

## 2019-05-15 PROCEDURE — 80053 COMPREHEN METABOLIC PANEL: CPT

## 2019-05-15 PROCEDURE — 84132 ASSAY OF SERUM POTASSIUM: CPT

## 2019-05-15 PROCEDURE — 6360000004 HC RX CONTRAST MEDICATION: Performed by: INTERNAL MEDICINE

## 2019-05-15 PROCEDURE — 85610 PROTHROMBIN TIME: CPT

## 2019-05-15 PROCEDURE — 99291 CRITICAL CARE FIRST HOUR: CPT | Performed by: INTERNAL MEDICINE

## 2019-05-15 PROCEDURE — 99255 IP/OBS CONSLTJ NEW/EST HI 80: CPT | Performed by: INTERNAL MEDICINE

## 2019-05-15 PROCEDURE — 36415 COLL VENOUS BLD VENIPUNCTURE: CPT

## 2019-05-15 PROCEDURE — 84703 CHORIONIC GONADOTROPIN ASSAY: CPT

## 2019-05-15 PROCEDURE — 83735 ASSAY OF MAGNESIUM: CPT

## 2019-05-15 PROCEDURE — 85025 COMPLETE CBC W/AUTO DIFF WBC: CPT

## 2019-05-15 PROCEDURE — C8929 TTE W OR WO FOL WCON,DOPPLER: HCPCS

## 2019-05-15 PROCEDURE — 85730 THROMBOPLASTIN TIME PARTIAL: CPT

## 2019-05-15 PROCEDURE — 94750 HC PULMONARY COMPLIANCE STUDY: CPT

## 2019-05-15 PROCEDURE — 94003 VENT MGMT INPAT SUBQ DAY: CPT

## 2019-05-15 PROCEDURE — 82803 BLOOD GASES ANY COMBINATION: CPT

## 2019-05-15 PROCEDURE — 2700000000 HC OXYGEN THERAPY PER DAY

## 2019-05-15 PROCEDURE — 99232 SBSQ HOSP IP/OBS MODERATE 35: CPT | Performed by: INTERNAL MEDICINE

## 2019-05-15 PROCEDURE — 71045 X-RAY EXAM CHEST 1 VIEW: CPT

## 2019-05-15 PROCEDURE — C1751 CATH, INF, PER/CENT/MIDLINE: HCPCS

## 2019-05-15 PROCEDURE — 83036 HEMOGLOBIN GLYCOSYLATED A1C: CPT

## 2019-05-15 PROCEDURE — 6360000002 HC RX W HCPCS: Performed by: EMERGENCY MEDICINE

## 2019-05-15 PROCEDURE — 2580000003 HC RX 258: Performed by: EMERGENCY MEDICINE

## 2019-05-15 RX ORDER — TRAZODONE HYDROCHLORIDE 50 MG/1
TABLET ORAL NIGHTLY
COMMUNITY
End: 2020-01-09

## 2019-05-15 RX ORDER — MIDAZOLAM HYDROCHLORIDE 1 MG/ML
2 INJECTION INTRAMUSCULAR; INTRAVENOUS ONCE
Status: COMPLETED | OUTPATIENT
Start: 2019-05-15 | End: 2019-05-15

## 2019-05-15 RX ORDER — FENTANYL CITRATE 50 UG/ML
25 INJECTION, SOLUTION INTRAMUSCULAR; INTRAVENOUS
Status: DISCONTINUED | OUTPATIENT
Start: 2019-05-15 | End: 2019-05-23

## 2019-05-15 RX ORDER — FUROSEMIDE 10 MG/ML
40 INJECTION INTRAMUSCULAR; INTRAVENOUS ONCE
Status: COMPLETED | OUTPATIENT
Start: 2019-05-15 | End: 2019-05-15

## 2019-05-15 RX ORDER — POTASSIUM CHLORIDE 29.8 MG/ML
20 INJECTION INTRAVENOUS PRN
Status: DISCONTINUED | OUTPATIENT
Start: 2019-05-15 | End: 2019-05-29 | Stop reason: HOSPADM

## 2019-05-15 RX ORDER — GABAPENTIN 400 MG/1
400 CAPSULE ORAL 3 TIMES DAILY
COMMUNITY
End: 2022-06-22 | Stop reason: DRUGHIGH

## 2019-05-15 RX ORDER — CHOLECALCIFEROL (VITAMIN D3) 125 MCG
5 CAPSULE ORAL NIGHTLY
COMMUNITY
End: 2020-04-16

## 2019-05-15 RX ORDER — PROPOFOL 10 MG/ML
10 INJECTION, EMULSION INTRAVENOUS
Status: DISCONTINUED | OUTPATIENT
Start: 2019-05-15 | End: 2019-05-23

## 2019-05-15 RX ORDER — HEPARIN SODIUM 1000 [USP'U]/ML
3700 INJECTION, SOLUTION INTRAVENOUS; SUBCUTANEOUS PRN
Status: DISCONTINUED | OUTPATIENT
Start: 2019-05-15 | End: 2019-05-16

## 2019-05-15 RX ORDER — FENTANYL CITRATE 50 UG/ML
100 INJECTION, SOLUTION INTRAMUSCULAR; INTRAVENOUS ONCE
Status: COMPLETED | OUTPATIENT
Start: 2019-05-15 | End: 2019-05-15

## 2019-05-15 RX ORDER — MAGNESIUM SULFATE 1 G/100ML
1 INJECTION INTRAVENOUS PRN
Status: DISCONTINUED | OUTPATIENT
Start: 2019-05-15 | End: 2019-05-29 | Stop reason: HOSPADM

## 2019-05-15 RX ORDER — MIDAZOLAM HYDROCHLORIDE 1 MG/ML
2 INJECTION INTRAMUSCULAR; INTRAVENOUS
Status: DISCONTINUED | OUTPATIENT
Start: 2019-05-15 | End: 2019-05-23

## 2019-05-15 RX ORDER — DEXTROSE MONOHYDRATE 50 MG/ML
100 INJECTION, SOLUTION INTRAVENOUS PRN
Status: DISCONTINUED | OUTPATIENT
Start: 2019-05-15 | End: 2019-05-29 | Stop reason: HOSPADM

## 2019-05-15 RX ORDER — NICOTINE POLACRILEX 4 MG
15 LOZENGE BUCCAL PRN
Status: DISCONTINUED | OUTPATIENT
Start: 2019-05-15 | End: 2019-05-29 | Stop reason: HOSPADM

## 2019-05-15 RX ORDER — HEPARIN SODIUM 1000 [USP'U]/ML
3700 INJECTION, SOLUTION INTRAVENOUS; SUBCUTANEOUS ONCE
Status: COMPLETED | OUTPATIENT
Start: 2019-05-15 | End: 2019-05-15

## 2019-05-15 RX ORDER — ALBUTEROL SULFATE 90 UG/1
4 AEROSOL, METERED RESPIRATORY (INHALATION) EVERY 4 HOURS
Status: DISCONTINUED | OUTPATIENT
Start: 2019-05-15 | End: 2019-05-23

## 2019-05-15 RX ORDER — ASPIRIN 325 MG
325 TABLET ORAL ONCE
Status: COMPLETED | OUTPATIENT
Start: 2019-05-15 | End: 2019-05-15

## 2019-05-15 RX ORDER — ASPIRIN 81 MG/1
81 TABLET, CHEWABLE ORAL DAILY
Status: DISCONTINUED | OUTPATIENT
Start: 2019-05-15 | End: 2019-05-29 | Stop reason: HOSPADM

## 2019-05-15 RX ORDER — DEXTROSE MONOHYDRATE 25 G/50ML
12.5 INJECTION, SOLUTION INTRAVENOUS PRN
Status: DISCONTINUED | OUTPATIENT
Start: 2019-05-15 | End: 2019-05-29 | Stop reason: HOSPADM

## 2019-05-15 RX ORDER — PROPOFOL 10 MG/ML
10 INJECTION, EMULSION INTRAVENOUS
Status: DISCONTINUED | OUTPATIENT
Start: 2019-05-15 | End: 2019-05-15

## 2019-05-15 RX ORDER — FUROSEMIDE 10 MG/ML
20 INJECTION INTRAMUSCULAR; INTRAVENOUS 2 TIMES DAILY
Status: DISCONTINUED | OUTPATIENT
Start: 2019-05-15 | End: 2019-05-15

## 2019-05-15 RX ORDER — SODIUM CHLORIDE 9 MG/ML
INJECTION, SOLUTION INTRAVENOUS CONTINUOUS
Status: DISCONTINUED | OUTPATIENT
Start: 2019-05-15 | End: 2019-05-22

## 2019-05-15 RX ORDER — HEPARIN SODIUM 1000 [USP'U]/ML
1800 INJECTION, SOLUTION INTRAVENOUS; SUBCUTANEOUS PRN
Status: DISCONTINUED | OUTPATIENT
Start: 2019-05-15 | End: 2019-05-16

## 2019-05-15 RX ORDER — OLANZAPINE 15 MG/1
TABLET ORAL
COMMUNITY
End: 2022-07-12 | Stop reason: SDUPTHER

## 2019-05-15 RX ORDER — FUROSEMIDE 10 MG/ML
40 INJECTION INTRAMUSCULAR; INTRAVENOUS 2 TIMES DAILY
Status: DISCONTINUED | OUTPATIENT
Start: 2019-05-15 | End: 2019-05-16

## 2019-05-15 RX ADMIN — Medication 4 PUFF: at 15:28

## 2019-05-15 RX ADMIN — POTASSIUM CHLORIDE 20 MEQ: 400 INJECTION, SOLUTION INTRAVENOUS at 07:17

## 2019-05-15 RX ADMIN — MIDAZOLAM HYDROCHLORIDE 2 MG: 2 INJECTION, SOLUTION INTRAMUSCULAR; INTRAVENOUS at 23:59

## 2019-05-15 RX ADMIN — FAMOTIDINE 20 MG: 10 INJECTION, SOLUTION INTRAVENOUS at 20:33

## 2019-05-15 RX ADMIN — PROPOFOL 40 MCG/KG/MIN: 10 INJECTION, EMULSION INTRAVENOUS at 19:39

## 2019-05-15 RX ADMIN — PROPOFOL 40 MCG/KG/MIN: 10 INJECTION, EMULSION INTRAVENOUS at 15:49

## 2019-05-15 RX ADMIN — Medication 4 PUFF: at 11:27

## 2019-05-15 RX ADMIN — NOREPINEPHRINE BITARTRATE 6 MCG/MIN: 1 INJECTION INTRAVENOUS at 17:21

## 2019-05-15 RX ADMIN — FUROSEMIDE 40 MG: 10 INJECTION, SOLUTION INTRAMUSCULAR; INTRAVENOUS at 07:17

## 2019-05-15 RX ADMIN — PROPOFOL 35 MCG/KG/MIN: 10 INJECTION, EMULSION INTRAVENOUS at 05:23

## 2019-05-15 RX ADMIN — ASPIRIN 325 MG: 325 TABLET, COATED ORAL at 03:00

## 2019-05-15 RX ADMIN — PIPERACILLIN SODIUM,TAZOBACTAM SODIUM 3.38 G: 3; .375 INJECTION, POWDER, FOR SOLUTION INTRAVENOUS at 13:21

## 2019-05-15 RX ADMIN — Medication 4 PUFF: at 19:10

## 2019-05-15 RX ADMIN — Medication 4 PUFF: at 22:55

## 2019-05-15 RX ADMIN — POTASSIUM CHLORIDE 20 MEQ: 400 INJECTION, SOLUTION INTRAVENOUS at 13:42

## 2019-05-15 RX ADMIN — MAGNESIUM SULFATE HEPTAHYDRATE 1 G: 1 INJECTION, SOLUTION INTRAVENOUS at 03:42

## 2019-05-15 RX ADMIN — Medication 10 ML: at 08:15

## 2019-05-15 RX ADMIN — Medication 10 ML: at 22:51

## 2019-05-15 RX ADMIN — POTASSIUM CHLORIDE 20 MEQ: 400 INJECTION, SOLUTION INTRAVENOUS at 20:33

## 2019-05-15 RX ADMIN — SODIUM CHLORIDE: 9 INJECTION, SOLUTION INTRAVENOUS at 06:40

## 2019-05-15 RX ADMIN — HEPARIN SODIUM 7.48 ML/HR: 10000 INJECTION, SOLUTION INTRAVENOUS at 03:24

## 2019-05-15 RX ADMIN — IBUPROFEN 600 MG: 100 SUSPENSION ORAL at 20:57

## 2019-05-15 RX ADMIN — PROPOFOL 30 MCG/KG/MIN: 10 INJECTION, EMULSION INTRAVENOUS at 08:01

## 2019-05-15 RX ADMIN — PERFLUTREN 1.2 MG: 6.52 INJECTION, SUSPENSION INTRAVENOUS at 11:22

## 2019-05-15 RX ADMIN — MIDAZOLAM HYDROCHLORIDE 2 MG: 2 INJECTION, SOLUTION INTRAMUSCULAR; INTRAVENOUS at 08:03

## 2019-05-15 RX ADMIN — AMIODARONE HYDROCHLORIDE 0.5 MG/MIN: 50 INJECTION, SOLUTION INTRAVENOUS at 15:28

## 2019-05-15 RX ADMIN — FENTANYL CITRATE 25 MCG/HR: 50 INJECTION, SOLUTION INTRAMUSCULAR; INTRAVENOUS at 10:30

## 2019-05-15 RX ADMIN — FUROSEMIDE 40 MG: 10 INJECTION, SOLUTION INTRAMUSCULAR; INTRAVENOUS at 18:03

## 2019-05-15 RX ADMIN — MAGNESIUM SULFATE HEPTAHYDRATE 1 G: 1 INJECTION, SOLUTION INTRAVENOUS at 04:46

## 2019-05-15 RX ADMIN — ASPIRIN 81 MG 81 MG: 81 TABLET ORAL at 08:14

## 2019-05-15 RX ADMIN — MIDAZOLAM HYDROCHLORIDE 2 MG: 2 INJECTION, SOLUTION INTRAMUSCULAR; INTRAVENOUS at 13:42

## 2019-05-15 RX ADMIN — NOREPINEPHRINE BITARTRATE 2 MCG/MIN: 1 INJECTION INTRAVENOUS at 01:12

## 2019-05-15 RX ADMIN — FAMOTIDINE 20 MG: 10 INJECTION, SOLUTION INTRAVENOUS at 13:06

## 2019-05-15 RX ADMIN — MIDAZOLAM HYDROCHLORIDE 2 MG: 2 INJECTION, SOLUTION INTRAMUSCULAR; INTRAVENOUS at 10:25

## 2019-05-15 RX ADMIN — HEPARIN SODIUM 3700 UNITS: 1000 INJECTION, SOLUTION INTRAVENOUS; SUBCUTANEOUS at 17:20

## 2019-05-15 RX ADMIN — Medication 10 ML: at 20:33

## 2019-05-15 RX ADMIN — MUPIROCIN: 20 OINTMENT TOPICAL at 20:33

## 2019-05-15 RX ADMIN — MAGNESIUM SULFATE HEPTAHYDRATE 1 G: 1 INJECTION, SOLUTION INTRAVENOUS at 06:03

## 2019-05-15 RX ADMIN — FENTANYL CITRATE 100 MCG: 50 INJECTION, SOLUTION INTRAMUSCULAR; INTRAVENOUS at 10:25

## 2019-05-15 RX ADMIN — PIPERACILLIN SODIUM,TAZOBACTAM SODIUM 3.38 G: 3; .375 INJECTION, POWDER, FOR SOLUTION INTRAVENOUS at 22:51

## 2019-05-15 RX ADMIN — Medication 4 PUFF: at 11:26

## 2019-05-15 RX ADMIN — PIPERACILLIN SODIUM,TAZOBACTAM SODIUM 3.38 G: 3; .375 INJECTION, POWDER, FOR SOLUTION INTRAVENOUS at 06:46

## 2019-05-15 RX ADMIN — HEPARIN SODIUM 3700 UNITS: 1000 INJECTION, SOLUTION INTRAVENOUS; SUBCUTANEOUS at 03:25

## 2019-05-15 RX ADMIN — POTASSIUM CHLORIDE 20 MEQ: 400 INJECTION, SOLUTION INTRAVENOUS at 21:40

## 2019-05-15 RX ADMIN — PROPOFOL 35 MCG/KG/MIN: 10 INJECTION, EMULSION INTRAVENOUS at 10:43

## 2019-05-15 RX ADMIN — POTASSIUM CHLORIDE 20 MEQ: 400 INJECTION, SOLUTION INTRAVENOUS at 09:20

## 2019-05-15 RX ADMIN — MAGNESIUM SULFATE HEPTAHYDRATE 1 G: 1 INJECTION, SOLUTION INTRAVENOUS at 02:06

## 2019-05-15 RX ADMIN — AMIODARONE HYDROCHLORIDE 0.5 MG/MIN: 50 INJECTION, SOLUTION INTRAVENOUS at 02:38

## 2019-05-15 RX ADMIN — POTASSIUM CHLORIDE 20 MEQ: 400 INJECTION, SOLUTION INTRAVENOUS at 08:14

## 2019-05-15 RX ADMIN — PROPOFOL 30 MCG/KG/MIN: 10 INJECTION, EMULSION INTRAVENOUS at 01:15

## 2019-05-15 RX ADMIN — FENTANYL CITRATE 25 MCG: 50 INJECTION INTRAMUSCULAR; INTRAVENOUS at 06:14

## 2019-05-15 RX ADMIN — MIDAZOLAM HYDROCHLORIDE 2 MG: 2 INJECTION, SOLUTION INTRAMUSCULAR; INTRAVENOUS at 22:50

## 2019-05-15 RX ADMIN — MUPIROCIN: 20 OINTMENT TOPICAL at 13:06

## 2019-05-15 RX ADMIN — Medication 4 PUFF: at 15:29

## 2019-05-15 RX ADMIN — MIDAZOLAM HYDROCHLORIDE 2 MG: 2 INJECTION, SOLUTION INTRAMUSCULAR; INTRAVENOUS at 06:12

## 2019-05-15 RX ADMIN — FENTANYL CITRATE 100 MCG/HR: 50 INJECTION, SOLUTION INTRAMUSCULAR; INTRAVENOUS at 22:45

## 2019-05-15 RX ADMIN — SODIUM CHLORIDE: 9 INJECTION, SOLUTION INTRAVENOUS at 15:51

## 2019-05-15 ASSESSMENT — PULMONARY FUNCTION TESTS
PIF_VALUE: 35
PIF_VALUE: 33
PIF_VALUE: 34
PIF_VALUE: 34
PIF_VALUE: 35
PIF_VALUE: 28
PIF_VALUE: 39
PIF_VALUE: 29
PIF_VALUE: 20
PIF_VALUE: 37
PIF_VALUE: 30

## 2019-05-15 ASSESSMENT — PAIN SCALES - GENERAL
PAINLEVEL_OUTOF10: 0
PAINLEVEL_OUTOF10: 3
PAINLEVEL_OUTOF10: 0
PAINLEVEL_OUTOF10: 4
PAINLEVEL_OUTOF10: 3
PAINLEVEL_OUTOF10: 0
PAINLEVEL_OUTOF10: 0

## 2019-05-15 NOTE — PLAN OF CARE
Nutrition Problem: Inadequate oral intake  Intervention: Food and/or Nutrient Delivery: Continue NPO, Start Tube Feeding  Nutritional Goals: patient will remain in NPO status until medically cleared to receive nutrition therapy; once EN can be started, patient will tolerate Vital High-Protein at goal rate of 40 ml/hr x 20 hours without GI distress, without s/s of aspiration, and without additional lab/fluid disturbances; weight will remain stable during remainder of admission

## 2019-05-15 NOTE — ED NOTES
Pt transported to CT per stretcher with writer RN, Jesse Richardson RN. Pt was in stable condition on portable monitor and vent.       [facility]  05/14/19 2368

## 2019-05-15 NOTE — PROGRESS NOTES
Nutrition Assessment    Type and Reason for Visit: Initial(patient is intubated and in ICU)    Nutrition Recommendations:   1. TF recommendations - Vital High-Protein (\"low calorie, high-protein\" formula in Epic) with a goal rate of 40 ml/hr x 20 hours. Start with 20 ml/hr and increase by 20 ml every 4-6 hours, as tolerated by patient, until goal rate can be achieved and maintained. Water flushes, 30 ml every 6 hours or per MD guidance. Please administer one proteinex 2go once daily via feeding tube. 2. Monitor vent status, whether TF is started, and plan of care. 3. Monitor for in-patient weight changes. 4. Monitor nutrition-related labs and bowel function/regimen. Nutrition Assessment: patient is nutritionally compromised AEB intubation status and she is at risk for further compromise d/t need for enteral nutrition support and cardiopulmonary arrest with CPR prior to arrival at the hospital (in squad); will provide enteral nutrition support recommendations    Malnutrition Assessment:  · Malnutrition Status: At risk for malnutrition  · Context: Acute illness or injury  · Findings of the 6 clinical characteristics of malnutrition (Minimum of 2 out of 6 clinical characteristics is required to make the diagnosis of moderate or severe Protein Calorie Malnutrition based on AND/ASPEN Guidelines):  1. Energy Intake-Unable to assess, Unable to assess    2. Weight Loss-Unable to assess, unable to assess  3. Fat Loss-No significant subcutaneous fat loss,    4. Muscle Loss-No significant muscle mass loss,    5. Fluid Accumulation-No significant fluid accumulation,    6.   Strength-Not measured    Nutrition Risk Level: High    Nutrient Needs:  · Estimated Daily Total Kcal: 968 - 1232 kcals based on 11-14 kcals/kg/CBW  · Estimated Daily Protein (g): 90 - 99 g protein based on 2.0-2.2 g/kg/IBW  · Estimated Daily Total Fluid (ml/day): 1000 - 1200 ml     Nutrition Diagnosis:   · Problem: Inadequate oral intake  · Etiology: related to Impaired respiratory function-inability to consume food, Insufficient energy/nutrient consumption, Acute injury/trauma     Signs and symptoms:  as evidenced by NPO status due to medical condition, Intubation    Objective Information:  · Nutrition-Focused Physical Findings: patient's skin was pale and clammy this am; she became agitated on the vent during rounds + was coughing a lot; + abdominal distention and hypoactive bowel sounds; patient has a hx of rhabdomyosarcoma  · Wound Type: None  · Current Nutrition Therapies:  · Oral Diet Orders: NPO   · Oral Diet intake: NPO  · Oral Nutrition Supplement (ONS) Orders: None  · ONS intake: NPO  · Anthropometric Measures:  · Ht: 5' (152.4 cm)   · Current Body Wt: 193 lb 2 oz (87.6 kg)  · Admission Body Wt: 193 lb 2 oz (87.6 kg)  · Usual Body Wt: (unknown)  · Weight Change:  unknown  · Ideal Body Wt: 100 lb (45.4 kg), % Ideal Body 193%  · BMI Classification: BMI 35.0 - 39.9 Obese Class II    Nutrition Interventions:   Continue NPO, Start Tube Feeding  Continued Inpatient Monitoring, Coordination of Care, Coordination of Community Care    Nutrition Evaluation:   · Evaluation: Goals set   · Goals: patient will remain in NPO status until medically cleared to receive nutrition therapy; once EN can be started, patient will tolerate Vital High-Protein at goal rate of 40 ml/hr x 20 hours without GI distress, without s/s of aspiration, and without additional lab/fluid disturbances; weight will remain stable during remainder of admission    · Monitoring: Nutrition Progression, TF Intake, TF Tolerance, Skin Integrity, Mental Status/Confusion, Weight, Pertinent Labs, Monitor Bowel Function      Electronically signed by Kia Siegel RD, LD on 5/15/19 at 2:31 PM    Contact Number: [phone]

## 2019-05-15 NOTE — ED NOTES
Family remains at the bedside.       [facility]  05/14/19 3871

## 2019-05-15 NOTE — PROGRESS NOTES
APTT (16:35) = 33.1. Give Heparin 3700 unit bolus and increase Heparin infusion to 10 ml/hr.   Jg Sorensen R.Ph.5/15/90827:12 PM

## 2019-05-15 NOTE — ED NOTES
Pt to CT per stretcher with writer RN and Jesse Richardson RT. Pt on portable monitor.       [facility]  05/14/19 4205

## 2019-05-15 NOTE — CONSULTS
Pharmacy Note  Vancomycin Consult    Afshin Santiago is a 26 y.o. female started on Vancomycin for V.Fib arrest/aspiration; consult received from Dr. Rosmery Dinero to manage therapy. Also receiving the following antibiotics: zosyn. Patient Active Problem List   Diagnosis    Acute respiratory failure (HCC)       Allergies:  Patient has no known allergies. Temp max:     Recent Labs     05/14/19  1817 05/15/19  0115   BUN 9 11       Recent Labs     05/14/19  1817 05/15/19  0115   CREATININE 0.7 0.9       Recent Labs     05/14/19  1817   WBC 19.7*       No intake or output data in the 24 hours ending 05/15/19 0351    Culture Date      Source                       Results      Ht Readings from Last 1 Encounters:   05/15/19 5' (1.524 m)        Wt Readings from Last 1 Encounters:   05/15/19 193 lb 2 oz (87.6 kg)         Body mass index is 37.72 kg/m². Estimated Creatinine Clearance: 86 mL/min (based on SCr of 0.9 mg/dL). Goal Trough Level: 15-20 mcg/mL    Assessment/Plan:  Patient received vancomycin 1.5 grams ivpb x1 in ER at 2324 on 5-14-19. Please start vancomycin 1 gram ivpb q12h at 1100 on 5-15-19. Please check vancomycin trough 5-16-19 at 1030. Thank you for the consult. Will continue to follow. [facility]. Ph.  5/15/2019 3:57 AM

## 2019-05-15 NOTE — ED NOTES
Dr Chiara Bass and Nicolas López rn at bedside for procedure, pt having vent alarm and ineffective respiratory pattern. Vent disconnected, ambu bag manual resps provided per camille gruber and MD requested meds pulled up, shamar administered by md.  rtd paged to come to bedside and check vent circuit/settings.      Mendoza Brown RN  05/14/19 5349

## 2019-05-15 NOTE — CONSULTS
Patient is being seen at the request of Inge Davidson MD  for a consultation for respiratory failure and V. fib arrest    HISTORY OF PRESENT ILLNESS:   26years old presented to the emergency room after a cardiopulmonary arrest.  Patient was having trouble breathing with agonal respiration then she went unresponsive upon EMS arrival, noted to have V. fib on the monitor required CPR and was shocked once. Patient was last seen by her mom 10-15 minutes earlier and she was normal self. No improvement with Narcan. She takes Zyprexa and Neurontin. History of rhabdomyosarcoma. Hypotensive requiring levophed. Worsening hypoxemia currently applying 100% FiO2 and PEEP of 14. CT showed multiple rib fractures with no pneumothorax and no PE. A. fib with RVR started on amiodarone. Agitated on the ventilator moving extremities requiring propofol drip for sedation. PAST MEDICAL HISTORY:  Past Medical History:   Diagnosis Date    Cancer [facility])     rhabdomyosarcoma    Hyperlipidemia      PAST SURGICAL HISTORY:  Past Surgical History:   Procedure Laterality Date    CHOLECYSTECTOMY      FOOT SURGERY      LEG SURGERY         FAMILY HISTORY:  Intubated sedated not able to obtain     SOCIAL HISTORY:   reports that she has been smoking. She has been smoking about 2.00 packs per day.  She has never used smokeless tobacco.    Scheduled Meds:   aspirin  81 mg Oral Daily    vancomycin  1,000 mg Intravenous Q12H    piperacillin-tazobactam  3.375 g Intravenous Q8H    insulin lispro  0-6 Units Subcutaneous Q4H    midazolam  10 mg Intravenous Once    sodium chloride flush  10 mL Intravenous 2 times per day    sodium chloride  2,500 mL Intravenous Once     Continuous Infusions:   norepinephrine 6 mcg/min (05/15/19 0214)    amiodarone 450mg/250ml D5W infusion 0.5 mg/min (05/15/19 0238)    heparin (porcine) 7.48 mL/hr (05/15/19 0324)    sodium chloride 100 mL/hr at 05/15/19 0640    propofol      dextrose       PRN Meds:  magnesium sulfate, potassium chloride, heparin (porcine), heparin (porcine), midazolam, fentanNYL, glucose, dextrose, glucagon (rDNA), dextrose, sodium chloride flush    ALLERGIES:  Patient has No Known Allergies. REVIEW OF SYSTEMS: Sedated and intubated not able to obtain      PHYSICAL EXAM:  Blood pressure 90/62, pulse 118, temperature 101.1 °F (38.4 °C), temperature source Oral, resp. rate (!) 35, height 5' (1.524 m), weight 193 lb 2 oz (87.6 kg), SpO2 93 %.' on 14/450/+5 100%   Gen: No distress. Sedated and intubated   Eyes: PERRL. No sclera icterus. No conjunctival injection. ENT: No discharge. Pharynx clear. Neck: Trachea midline. No obvious mass. Resp: No accessory muscle use. + crackles. No wheezes. + rhonchi. No dullness on percussion. CV: Sinus tachycardia. Regular rhythm. No murmur or rub. No edema. GI: Non-tender. Non-distended. No hernia. Skin: Warm and dry. No nodule on exposed extremities. Lymph: No cervical LAD. No supraclavicular LAD. M/S: No cyanosis. No joint deformity. No clubbing. L calf muscle wasting   Neuro: Sedated and intubated. Responsive to painful stimuli.   Psych: No agitation     LABS:  CBC:   Recent Labs     05/14/19 1817   WBC 19.7*   HGB 16.0   HCT 48.9*   MCV 90.6        BMP:   Recent Labs     05/14/19  1817 05/15/19  0115   * 130*   K 3.0* 3.4*  3.4*   CL 96* 98*   CO2 18* 19*   BUN 9 11   CREATININE 0.7 0.9     LIVER PROFILE:   Recent Labs     05/14/19 1817   *   *   BILITOT 0.6   ALKPHOS 94     PT/INR:   Recent Labs     05/15/19  0625   PROTIME 12.5   INR 1.10     APTT:   Recent Labs     05/15/19  0315   APTT 29.4     UA:  Recent Labs     05/14/19 1812   COLORU Yellow   PHUR 7.0  7.0   WBCUA 6-10*   RBCUA *   MUCUS Rare*   BACTERIA 2+*   CLARITYU SL CLOUDY*   SPECGRAV 1.025   LEUKOCYTESUR Negative   UROBILINOGEN 1.0   BILIRUBINUR Negative   BLOODU TRACE-INTACT*   GLUCOSEU 100*   AMORPHOUS 3+*     Recent Labs 05/14/19 1930   PHART 7.356   XGM5BXY 36.4   PO2ART 55.9*       Chest x-ray 5/15 imaging was reviewed by me and showed bilateral airspace disease with pleural effusion. Satisfactory ET tube and CVC positioning    ASSESSMENT:  · Acute hypoxemic respiratory failure. DDx primary cardiac arrhythmias, aspiration, drug overdose  · V. fib arrest   · Aspiration pneumonitis/pneumonia  · A. fib with RVR- now sinus tach  · Electrolytes disturbance  · Hemodynamic shock- cardiogenic versus septic  · Transaminitis  · Hyperglycemia  · Fever and severe Leukocytosis  · Cannabinoid abuse       PLAN:  Mechanical ventilation as per my orders. The ventilator was adjusted by me at the bedside for unstable, life threatening respiratory failure. Follow ABG  IV Propofol for sedation, target RASS -2, with daily spontaneous awakening trial.    Versed and Fentanyl PRN pain, gtt as needed  Head of bed 30 degrees or higher at all times  Daily spontaneous breathing trial once PEEP less than 8, FiO2 less than 55%. Blood culture, sputum culture and urine culture  Check CVP - 10 this am  Broad spectrum antibiotics to include Zosyn  IV Levophed to keep MAP > 65 mmHg or SBP>90.  BC, UC and sputum GS&C. Insulin sliding scale  Amiodarone drip   Cardiology following  Follow troponin  Echocardiogram  Follow electrolytes and LFTs  SSI   Trial of diuresis to help with the oxygenation given high PEEP and FiO2 requirements  GI prophylaxis: Pepcid  DVT prophylaxis: Heparin drip per cardiology   MRSA prophylaxis: Bactroban  Discussed with cardiology              Total critical care time caring for this patient with life threatening, unstable organ failure, including direct patient contact, management of life support systems, review of data including imaging and labs, discussions with other team members and physicians is 45 minutes, excluding procedures.

## 2019-05-15 NOTE — PROGRESS NOTES
Admission note. . Pt found unresponsive and foaming at mouth. at home by mother. EMS called pt ws breathing but became unresponsive with V Tach on monitor. Pt shocked one time transported to ER. In ER was AFib with RVR on monitor amioodorone bolus and infusion started. Unrespodsive and intubated. Report received from   ER RN and MD. PT transported per stretcher and portable mechanical ventilation to ICU bed 7. Placed on monitor, CHG bath performed and 4 eyes skin assessment completed. No orders as of yes received. Propofol @ 30 mcg/kg/min infusing. Vancomycin antibiotic and  NS bolus. Pt restrained to prevent from pulling at lines and tubes. R IJ TLC and PIV in RAC # 20. Vail catheter. BP low awaiting orders. Will continue to monitor.

## 2019-05-15 NOTE — PROGRESS NOTES
Dr Jasso Re updated on status, Temp, and current lab results, including Lactic acid and troponin. Order for Ibuprofen, see MAR.

## 2019-05-15 NOTE — ED NOTES
Report given to ICU RN's at the bedside.       [facility]  05/15/19 6941

## 2019-05-15 NOTE — CARE COORDINATION
Pt intubated and on propofol drip for agitation and levophed for hypotension post cardiopulmonary arrest. Family at bedside with RN. Call received from [facility] VESTA SMILEY/counselor Nolio. He can be reached at [phone]. CM will follow and complete initial assessment when appropriate.

## 2019-05-15 NOTE — PROGRESS NOTES
05/15/19 0035   Vent Information   Vent Type 840   Vent Mode AC/VC   Vt Ordered 450 mL   Rate Set 14 bmp   Peak Flow 70 L/min   FiO2  100 %   Sensitivity 3   PEEP/CPAP 14   Vent Patient Data   Peak Inspiratory Pressure 35 cmH2O   Mean Airway Pressure 23 cmH20   Rate Measured 13 br/min   Vt Exhaled 465 mL   Minute Volume 6.22 Liters   I:E Ratio 1:1.1   Plateau Pressure 31 UYF25   Total PEEP 14 cmH20   Auto PEEP 0.2 cmH20   Alarm Settings   High Pressure Alarm 45 cmH2O   Low Minute Volume Alarm 4 L/min   Apnea (secs) 20 secs   High Respiratory Rate 40 br/min   Low Exhaled Vt  300 mL   Non-Surgical Airway Endo Tracheal Tube   Placement Date/Time: 05/14/19 1808   Timeout: Patient; Appropriate Equipment  Placed By: In ED  Inserted by: Eloy Deng NP   Insertion attempts: 1  Airway Device: Endo Tracheal Tube  Size: 7.5  Placement Verified By[de-identified] Auscultation; Chest X-ray;Colorimetric E...    Secured at 23 cm   Measured From [address] tube forde   Site Condition Dry

## 2019-05-15 NOTE — PROGRESS NOTES
4 Eyes Skin Assessment     The patient is being assess for   Admission    I agree that 2 RN's have performed a thorough Head to Toe Skin Assessment on the patient. ALL assessment sites listed below have been assessed. Areas assessed by both nurses:   [x]   Head, Face, and Ears   [x]   Shoulders, Back, and Chest, Abdomen  [x]   Arms, Elbows, and Hands   [x]   Coccyx, Sacrum, and Ischium  [x]   Legs, Feet, and Heels            **SHARE this note so that the co-signing nurse is able to place an eSignature**    Co-signer eSignature: Electronically signed by Camille Mobley RN on 5/15/19 at 6:01 AM    Does the Patient have Skin Breakdown?   No          Travis Prevention initiated:  Yes   Wound Care Orders initiated:  NA      [facility] nurse consulted for Pressure Injury (Stage 3,4, Unstageable, DTI, NWPT, Complex wounds)and New or Established Ostomies:  NA      Primary Nurse eSignature: Electronically signed by Whit Vang RN on 5/15/19 at 5:48 AM

## 2019-05-15 NOTE — PROGRESS NOTES
Pharmacy- Low Dose Heparin Drip  Ptt= 73.4 secs, goal 54-90 secs, continue with same rate of 7.5ml/hr  Next Ptt at 1600, continue to adjust to goal  Shakira TOLEDO 5/15/090688:23 AM  .

## 2019-05-15 NOTE — ED NOTES
Dr Magy Isidro at the bedside speaking with family.       [facility]  05/14/19 8155

## 2019-05-15 NOTE — CONSULTS
aPTT < 36    Heparin 60 units/kg bolus  Increase infusion by 4 units/kg/hr  aPTT 36-53.9    Heparin 30 units/kg bolus Increase infusion by 2 units/kg/hr  aPTT 54-90    No bolus   No change   aPTT 90.1-94    No bolus   Decrease infusion by 2 units/kg/hr  aPTT 94.1-98    Hold heparin for 1 hour Decrease infusion by 3 units/kg/hr  aPTT 98.1-103   Hold heparin for 1 hour Decrease infusion by 4 units/kg/hr  aPTT > 103    Hold heparin for 1 hour Decrease infusion by 6 units/kg/hr    Obtain aPTT 6 hours after bolus and 6 hours after any dose change until two consecutive therapeutic aPTT are achieved- then daily. [address] [place] [address]. .  5/15/2019 2:32 AM

## 2019-05-15 NOTE — PROGRESS NOTES
RESPIRATORY THERAPY ASSESSMENT    Name:  Brian Record Number:  [id number]  Age: 26 y.o. Gender: female  : 1985  Today's Date:  5/15/2019  Room:  [facility]3007-01    Assessment     Is the patient being admitted for a COPD or Asthma exacerbation? No   (If yes the patient will be seen every 4 hours for the first 24 hours and then reassessed)    Patient Admission Diagnosis      Allergies  No Known Allergies    Minimum Predicted Vital Capacity:     n/a          Actual Vital Capacity:      Pt on vent              Pulmonary History:No history  Home Oxygen Therapy:  room air  Home Respiratory Therapy:None   Current Respiratory Therapy:  Alb/Atr MDI  Treatment Type: MDI       Respiratory Severity Index(RSI)   Patients with orders for inhalation medications, oxygen, or any therapeutic treatment modality will be placed on Respiratory Protocol. They will be assessed with the first treatment and at least every 72 hours thereafter. The following severity scale will be used to determine frequency of treatment intervention. Smoking History: Pulmonary Disease or Smoking History, Greater than 15 pack year = 2    Social History  Social History     Tobacco Use    Smoking status: Current Every Day Smoker     Packs/day: 2.00    Smokeless tobacco: Never Used   Substance Use Topics    Alcohol use:  Yes    Drug use: Yes     Types: Marijuana       Recent Surgical History: None = 0  Past Surgical History  Past Surgical History:   Procedure Laterality Date    CHOLECYSTECTOMY      FOOT SURGERY      LEG SURGERY         Level of Consciousness: Comatose = 4    Level of Activity: Bedridden, unresponsive or quadriplegic = 4    Respiratory Pattern: Use of accessory muscles;prolonged expiration; or RR greater than 30 = 3    Breath Sounds: Absent bilaterally and/or with wheezes = 3    Sputum  Sputum Color: Red, Tenacity: Thin, Sputum How Obtained: Suctioned  Cough: Strong, productive = 1    Vital Signs   BP 98/68 HHN at Home        c. Unable to demonstrate proper use of MDI with spacer     d. RR > 30 bpm   5. Bronchodilators will be delivered via Metered Dose Inhaler (MDI), HHN, Aerogen to intubated patients on mechanical ventilation. 6. Inhalation medication orders will be delivered and/or substituted as outlined below. Aerosolized Medications Ordering and Administration Guidelines:    1. All Medications will be ordered by a physician, and their frequency and/or modality will be adjusted as defined by the patients Respiratory Severity Index (RSI) score. 2. If the patient does not have documented COPD, consider discontinuing anticholinergics when RSI is less than 9.  3. If the bronchospasm worsens (increased RSI), then the bronchodilator frequency can be increased to a maximum of every 4 hours. If greater than every 4 hours is required, the physician will be contacted. 4. If the bronchospasm improves, the frequency of the bronchodilator can be decreased, based on the patient's RSI, but not less than home treatment regimen frequency. 5. Bronchodilator(s) will be discontinued if patient has a RSI less than 9 and has received no scheduled or as needed treatment for 72  Hrs. Patients Ordered on a Mucolytic Agent:    1. Must always be administered with a bronchodilator. 2. Discontinue if patient experiences worsened bronchospasm, or secretions have lessened to the point that the patient is able to clear them with a cough. Anti-inflammatory and Combination Medications:    1. If the patient lacks prior history of lung disease, is not using inhaled anti-inflammatory medication at home, and lacks wheezing by examination or by history for at least 24 hours, contact physician for possible discontinuation.

## 2019-05-15 NOTE — ED NOTES
350cc of urine obtained from rashid catheter.       [facility]  05/15/19 4292

## 2019-05-15 NOTE — PROGRESS NOTES
05/15/19 1910   Vent Information   $Ventilation $Subsequent Day   Vent Type 840   Vent Mode AC/VC   Vt Ordered 450 mL   Rate Set 14 bmp   Peak Flow 80 L/min   Pressure Support 0 cmH20   FiO2  100 %   Sensitivity 3   PEEP/CPAP 14   I Time/ I Time % 0 s   Humidification Source Heated wire   Humidification Temp 37   Humidification Temp Measured 35.4   Circuit Condensation Drained   Vent Patient Data   High Peep/I Pressure 0   Peak Inspiratory Pressure 20 cmH2O   Mean Airway Pressure 14 cmH20   Rate Measured 36 br/min   Vt Exhaled 481 mL   Minute Volume 17.4 Liters   I:E Ratio 1:1.60   Plateau Pressure 26 DTG97   Static Compliance 40 mL/cmH2O   Dynamic Compliance 60 mL/cmH2O   Cough/Sputum   Sputum How Obtained Suctioned;Endotracheal   Cough Productive   Sputum Amount Moderate   Sputum Color Creamy   Tenacity Thick   Spontaneous Breathing Trial (SBT) RT Doc   Pulse 136   SpO2 95 %   Breath Sounds   Right Upper Lobe Rhonchi   Right Middle Lobe Diminished   Right Lower Lobe Diminished   Left Upper Lobe Rhonchi   Left Lower Lobe Diminished   Additional Respiratory  Assessments   Resp (!) 34   Position Semi-Triplett's   Oral Care Completed? Yes   Oral Care Mouth swabbed;Lip moisturizer applied;Mouth moisturizer;Mouth suctioned   Subglottic Suction Done? Yes   Alarm Settings   High Pressure Alarm 45 cmH2O   Low Minute Volume Alarm 4 L/min   Apnea (secs) 20 secs   High Respiratory Rate 50 br/min   Low Exhaled Vt  300 mL   Non-Surgical Airway Endo Tracheal Tube   Placement Date/Time: 05/14/19 1808   Timeout: Patient; Appropriate Equipment  Placed By: In ED  Inserted by: Orlando Naranjo NP   Insertion attempts: 1  Airway Device: Endo Tracheal Tube  Size: 7.5  Placement Verified By[de-identified] Auscultation; Chest X-ray;Colorimetric E...    Secured at 23 cm   Measured From [facility] [address] [employer] tube forde   Site Condition Dry

## 2019-05-15 NOTE — ED NOTES
Lab called with panic troponin. Dr Laury Triana made aware.       Priscella Kanner, [facility]  05/14/19 2225

## 2019-05-15 NOTE — PROGRESS NOTES
Versed 2 mg IVP for severe agitation- thrashing in bed, trying to grab med tubing, undirectable- RR 40s. Propofol IV drip at 30 mcg/kg/min. Soft BUE wrist restraints remain in place due to attempts to grab tubing, unable to follow direction.

## 2019-05-15 NOTE — H&P
for sometimes and follows up early for her medical care at [facility] [place] [facility].  States that she has cardiomegaly. She also has hallucinations for which she is on Zyprexa per mom and also is on neurontin. She has a history of chronic kidney and liver disease as well per mom. 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 No hx in EMR. Allergies:  Patient has no known allergies. Social History:    TOBACCO:   reports that she has been smoking. She has been smoking about 2.00 packs per day. She has never used smokeless tobacco.  ETOH:   reports that she drinks alcohol. Family History:  Reviewed in detail and negative for DM, Early CAD, Cancer (except as below). Positive as follows:    History reviewed. No pertinent family history. REVIEW OF SYSTEMS:   Unable to obtain 2/2 pt condition, intubated and sedated. PHYSICAL EXAM PERFORMED:    BP 86/69   Pulse 137   Temp 98.6 °F (37 °C) (Rectal)   Resp (!) 31   Wt 230 lb (104.3 kg)   SpO2 92%     GEN:  Intubated and sedated, NAD. HEENT:  NC/AT,EOMI, dry MM, no erythema/exudates or visible masses. CVS:  Normal S1,S2. Tachy ii. Without M/G/R.   LUNG:   Bilat rhonchi. no wheezes, rales. ABD:  Soft, ND/NT, BS+ x4. Without G/R.  EXT: 2+ pulses, no c/c/e. Brisk cap refill. PSY:  Thought process sedated, affect sedated. LISETTE:  Grossly CN III-XII intact, does not retract ext to noxious stimuli. Does not follow commands 2/2 sedation and possibly underlying condition. SKIN: No rash or lesions on visible skin.     Chart review shows recent radiographs:  Ct Abdomen Pelvis Wo Contrast Additional Contrast? None    Result Date: 5/14/2019  EXAMINATION: CTA OF THE CHEST; CT OF THE ABDOMEN AND PELVIS WITHOUT CONTRAST 5/14/2019 8:55 pm; 5/14/2019 8:56 pm TECHNIQUE: CTA of the chest was performed after the administration of intravenous contrast.  Multiplanar reformatted images are provided for review. MIP images are provided for review. Dose modulation, iterative reconstruction, and/or weight based adjustment of the mA/kV was utilized to reduce the radiation dose to as low as reasonably achievable.; CT of the abdomen and pelvis was performed without the administration of intravenous contrast. Multiplanar reformatted images are provided for review. Dose modulation, iterative reconstruction, and/or weight based adjustment of the mA/kV was utilized to reduce the radiation dose to as low as reasonably achievable. COMPARISON: 05/14/2019 HISTORY: ORDERING SYSTEM PROVIDED HISTORY: resp arrest, concern for PE TECHNOLOGIST PROVIDED HISTORY: Ordering Physician Provided Reason for Exam: resp arrest, concern for PE Acuity: Acute Type of Exam: Initial; ORDERING SYSTEM PROVIDED HISTORY: ABDOMINAL PAIN TECHNOLOGIST PROVIDED HISTORY: Additional Contrast?->None Ordering Physician Provided Reason for Exam: Abd pain Acuity: Acute Type of Exam: Initial FINDINGS: Chest: Pulmonary Arteries: Motion artifact degrades image quality. There is no acute pulmonary thromboembolus. Mediastinum: The heart is unremarkable. There are no enlarged thoracic lymph nodes. Lungs/pleura: An endotracheal tube is in situ. The airway is patent. There is no pneumothorax or pleural effusion. There is enhancing consolidation involving the bilateral lungs due to atelectasis with complete collapse of the right lower lobe and near complete collapse of the left lower lobe. Soft Tissues/Bones: Degenerative changes involve the thoracic spine. There are acute nondisplaced fractures involving the anterior 2nd through 4th right ribs and anterior 3rd through 5th left ribs. Abdomen/Pelvis: Organs:  The liver, spleen, pancreas, adrenal glands and kidneys are unremarkable. Status post cholecystectomy. GI/Bowel: The nasogastric tube terminates in the gastric fundus. There is no bowel obstruction. The appendix is within normal limits. Pelvis: The pelvic viscera are within normal limits. Peritoneum/Retroperitoneum: There is no free fluid or adenopathy. Bones/Soft Tissues: Degenerative changes involve the lumbar spine. 1. Bilateral lower lobe atelectasis, right greater than left. 2. Acute right 2nd through 4th and left 3rd through 5th rib fractures     Ct Head Wo Contrast    Result Date: 5/14/2019  EXAMINATION: CT OF THE HEAD WITHOUT CONTRAST  5/14/2019 3:43 pm TECHNIQUE: CT of the head was performed without the administration of intravenous contrast. Dose modulation, iterative reconstruction, and/or weight based adjustment of the mA/kV was utilized to reduce the radiation dose to as low as reasonably achievable. COMPARISON: None. HISTORY: ORDERING SYSTEM PROVIDED HISTORY: code TECHNOLOGIST PROVIDED HISTORY: Has a \"code stroke\" or \"stroke alert\" been called? ->No Ordering Physician Provided Reason for Exam: Respiratory Distress, pt. unresponsive Acuity: Acute Type of Exam: Initial FINDINGS: BRAIN/VENTRICLES: There is mild motion degradation. No acute loss of the gray-white matter differentiation is identified to suggest acute or subacute infarct. No masses or hemorrhages within the brain parenchyma are found. The ventricles are midline. The intracranial vasculature, including the dural venous sinuses, is unremarkable. ORBITS: The visualized portion of the orbits demonstrate no acute abnormality. SINUSES: Moderate mucosal thickening within the left maxillary sinus is found. Air-fluid level in the sphenoid sinus noted. Mild ethmoid sinus opacification is seen. No mastoid disease. SOFT TISSUES/SKULL:  Small soft tissue defect is identified within the right periorbital region, probably from previous piercing.   Otherwise, the TECHNOLOGIST PROVIDED HISTORY: Ordering Physician Provided Reason for Exam: resp arrest, concern for PE Acuity: Acute Type of Exam: Initial; ORDERING SYSTEM PROVIDED HISTORY: ABDOMINAL PAIN TECHNOLOGIST PROVIDED HISTORY: Additional Contrast?->None Ordering Physician Provided Reason for Exam: Abd pain Acuity: Acute Type of Exam: Initial FINDINGS: Chest: Pulmonary Arteries: Motion artifact degrades image quality. There is no acute pulmonary thromboembolus. Mediastinum: The heart is unremarkable. There are no enlarged thoracic lymph nodes. Lungs/pleura: An endotracheal tube is in situ. The airway is patent. There is no pneumothorax or pleural effusion. There is enhancing consolidation involving the bilateral lungs due to atelectasis with complete collapse of the right lower lobe and near complete collapse of the left lower lobe. Soft Tissues/Bones: Degenerative changes involve the thoracic spine. There are acute nondisplaced fractures involving the anterior 2nd through 4th right ribs and anterior 3rd through 5th left ribs. Abdomen/Pelvis: Organs: The liver, spleen, pancreas, adrenal glands and kidneys are unremarkable. Status post cholecystectomy. GI/Bowel: The nasogastric tube terminates in the gastric fundus. There is no bowel obstruction. The appendix is within normal limits. Pelvis: The pelvic viscera are within normal limits. Peritoneum/Retroperitoneum: There is no free fluid or adenopathy. Bones/Soft Tissues: Degenerative changes involve the lumbar spine. 1. Bilateral lower lobe atelectasis, right greater than left.  2. Acute right 2nd through 4th and left 3rd through 5th rib fractures     5/14/2019  7:14 PM - Jaxson, Tjh Incoming Muse Results     Component Value Ref Range & Units Status Collected Lab   Ventricular Rate 151  BPM Preliminary 05/14/2019  6:55 PM 14   Atrial Rate 141  BPM Preliminary 05/14/2019  6:55 PM 14   QRS Duration 82  ms Preliminary 05/14/2019  6:55 PM 14   Q-T Interval 340  ms Preliminary 05/14/2019  6:55 PM 14   QTc Calculation (Bazett) 538  ms Preliminary 05/14/2019  6:55 PM 14   R Axis 31  degrees Preliminary 05/14/2019  6:55 PM 14   T [place] 58  degrees Preliminary 05/14/2019  6:55 PM 14   Diagnosis   Preliminary 05/14/2019  6:55 PM 14   Atrial fibrillation with rapid ventricular responseSeptal infarct , age undeterminedAbnormal ECGNo previous ECGs available     5/14/2019  7:59 PM - Jaxson, Tjh Incoming Muse Results     Component Value Ref Range & Units Status Collected Lab   Ventricular Rate 160  BPM Preliminary 05/14/2019  7:28 PM 14   Atrial Rate 159  BPM Preliminary 05/14/2019  7:28 PM 14   QRS Duration 78  ms Preliminary 05/14/2019  7:28 PM 14   Q-T Interval 314  ms Preliminary 05/14/2019  7:28 PM 14   QTc Calculation (Bazett) 512  ms Preliminary 05/14/2019  7:28 PM 14   R Axis 32  degrees Preliminary 05/14/2019  7:28 PM 14   T Axis 55  degrees Preliminary 05/14/2019  7:28 PM 14   Diagnosis   Preliminary 05/14/2019  7:28 PM 14   Atrial fibrillation with rapid ventricular responseAbnormal ECGNo previous ECGs available      CBC:  Recent Labs     05/14/19 1817   WBC 19.7*   HGB 16.0   HCT 48.9*         RENAL  Recent Labs     05/14/19 1817   *   K 3.0*   CL 96*   CO2 18*   BUN 9   CREATININE 0.7   GLUCOSE 184*     LFT'S:  Recent Labs     05/14/19 1817   *   *   BILITOT 0.6   ALKPHOS 94     CARDIAC ENZYMES:   Recent Labs     05/14/19 1817 05/14/19 2154   TROPONINI <0.01 0.57*     LACTIC ACID:  Recent Labs     05/14/19 2154   LACTSEPSIS 2.9*     U/A:  Recent Labs     05/14/19 1812   LEUKOCYTESUR Negative   BACTERIA 2+*   WBCUA 6-10*   COLORU Yellow   RBCUA *   MUCUS Rare*   CLARITYU SL CLOUDY*   SPECGRAV 1.025   BLOODU TRACE-INTACT*   GLUCOSEU 100*   AMORPHOUS 3+*     Urine Tox Screen:  Recent Labs     05/14/19 1812   LABAMPH Neg   BARBSCNU Neg   LABBENZ Neg   CANSU POSITIVE*   COCAIMETSCRU Neg   OPIATESCREENURINE Neg   PHENCYCLIDINESCREENURINE Neg for the opportunity to be involved in this patient's care. If you have any questions or concerns please feel free to contact me via the [employer] at [phone]. This chart was generated using the [address] [place] [address] [address] dictation system. I created this record but it may contain dictation errors given the limitations of this technology.

## 2019-05-15 NOTE — ED NOTES
Marcio Hull, mom of pt, [phone], please call with any messages.       Luda Stafford RN  05/14/19 [address] [facility] [address], RN  05/14/19 1382

## 2019-05-15 NOTE — PROGRESS NOTES
Family in at bedside, upset about patient condition and  pre-hospitalization events. Status and POC reviewed. Remains agitated, RR 40s, moving head back and forth and pulling up agains restraints, coughing. Suctioned mulitiple times for thick tan, red mucus. T 101.5 axillary, ordered labwork  Including BMP, troponin sent. 1239. Fentanyl IV drip increased to 50 mcg/hr. RASS +2.

## 2019-05-15 NOTE — ED NOTES
Dr Doll Memory at the bedside for emergent  central line placement. Time out conducted.       Al[place]ia Summers RN  05/14/19 3992 Jarad Willis RN  05/14/19 1672

## 2019-05-15 NOTE — PLAN OF CARE
Problem: Risk for Impaired Skin Integrity  Goal: Tissue integrity - skin and mucous membranes  Description  Structural intactness and normal physiological function of skin and  mucous membranes.   Outcome: Ongoing     Problem: Restraint Use - Nonviolent/Non-Self-Destructive Behavior:  Goal: Absence of restraint indications  Description  Absence of restraint indications  Outcome: Ongoing  Goal: Absence of restraint-related injury  Description  Absence of restraint-related injury  Outcome: Ongoing     Problem: Infection:  Goal: Will remain free from infection  Description  Will remain free from infection  Outcome: Ongoing     Problem: Gas Exchange - Impaired:  Goal: Levels of oxygenation will improve  Description  Levels of oxygenation will improve  Outcome: Ongoing     Problem: Infection, Septic Shock:  Goal: Will show no infection signs and symptoms  Description  Will show no infection signs and symptoms  Outcome: Ongoing

## 2019-05-15 NOTE — PROGRESS NOTES
Progress Note    Admit Date:  5/14/2019      75-year-old female with nonischemic cardiomyopathy, presented with V. fib arrest.  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 She is mostly homebound. She's been ill with a respiratory  symptoms for a couple of months. Mother found her down. CPR initiated,  S/P defibrillation twice en route to hospital.  Admitted  to ICU , on mechanical vent     echocardiogram with ejection fraction low at 25%     Head CT was negative. Subjective:  Ms. Boogie Marvin is currently intubated ,  on mechanical vent . Unresponsive . Septic shock , hypotensive on Levophed . On IV Lasix for CHF . Broad-spectrum IV antibiotics , having increased respiratory secretions . Cultures sent     Objective:   /77   Pulse 122   Temp 100.2 °F (37.9 °C) (Axillary)   Resp (!) 32   Ht 5' (1.524 m)   Wt 193 lb 2 oz (87.6 kg)   SpO2 97%   BMI 37.72 kg/m²       Intake/Output Summary (Last 24 hours) at 5/15/2019 1810  Last data filed at 5/15/2019 0919  Gross per 24 hour   Intake 1538 ml   Output 1650 ml   Net -112 ml         Physical Exam:  General: Intubated ,on mechanical ventilation . Unresponsive    Skin:  Warm and dry  Neck:  JVD absent. Neck supple  Chest:  Diminished breath sounds . No wheezes, rales or rhonchi. Cardiovascular:  RRR ,S1S2 normal  Abdomen:  Soft, non tender, non distended, BS +  Extremities:  No edema. . Left lower extremity atrophy  Intact peripheral pulses.  Brisk cap refill, < 2 secs  Neuro: Cannot assess      Medications:   Scheduled Meds:   aspirin  81 mg Oral Daily    piperacillin-tazobactam  3.375 g Intravenous Q8H    insulin lispro  0-6 Units Subcutaneous Q4H    mupirocin   Nasal BID    famotidine (PEPCID) injection  20 mg Intravenous BID    albuterol sulfate HFA  4 puff Inhalation Q4H    ipratropium  4 puff Inhalation Q4H    furosemide  40 mg Intravenous BID    sodium chloride flush  10 mL Intravenous 2 times per day       Continuous Infusions:   norepinephrine 6 mcg/min (05/15/19 1721)    amiodarone 450mg/250ml D5W infusion 0.5 mg/min (05/15/19 1528)    heparin (porcine) 1,000 Units/hr (05/15/19 1718)    sodium chloride 100 mL/hr at 05/15/19 1551    propofol 40 mcg/kg/min (05/15/19 1549)    dextrose      fentaNYL (SUBLIMAZE) infusion 75 mcg/hr (05/15/19 1528)       Data:  CBC:   Recent Labs     05/14/19  1817 05/15/19  0625   WBC 19.7* 36.0*   RBC 5.40* 5.27*   HGB 16.0 15.7   HCT 48.9* 47.1   MCV 90.6 89.5   RDW 12.4 12.7    328     BMP:   Recent Labs     05/15/19  0115 05/15/19  0625 05/15/19  1240   * 128* 134*   K 3.4*  3.4* 2.9* 3.5   CL 98* 96* 100   CO2 19* 18* 21   BUN 11 12 11   CREATININE 0.9 0.8 0.8     BNP: No results for input(s): BNP in the last 72 hours. PT/INR:   Recent Labs     05/15/19  0625   PROTIME 12.5   INR 1.10     APTT:   Recent Labs     05/15/19  0907 05/15/19  1011 05/15/19  1635   APTT 67.0* 73.4* 33.1     CARDIAC ENZYMES:   Recent Labs     05/15/19  0115 05/15/19  0625 05/15/19  1240   TROPONINI 1.49* 0.49* 0.26*     FASTING LIPID PANEL:No results found for: CHOL, HDL, TRIG  LIVER PROFILE:   Recent Labs     05/14/19  1817 05/15/19  0625   * 119*   * 106*   BILITOT 0.6 1.4*   ALKPHOS 94 78      Urine hCG negative     Rapid flu antigen negative         Radiology  XR CHEST PORTABLE   Final Result   Interval improvement in pulmonary edema. XR CHEST PORTABLE   Final Result   Appropriate left IJ central venous catheter positioning. No pneumothorax. Appropriate endotracheal tube positioning. Layering bilateral effusions. Equivocal for a component of superimposed   pulmonary edema. Atelectasis likely present. CT ABDOMEN PELVIS WO CONTRAST Additional Contrast? None   Final Result   1. Bilateral lower lobe atelectasis, right greater than left.    2. Acute right 2nd through 4th and left 3rd through 5th rib fractures CT Chest Pulmonary Embolism W Contrast   Final Result   1. Bilateral lower lobe atelectasis, right greater than left. 2. Acute right 2nd through 4th and left 3rd through 5th rib fractures         CT Head WO Contrast   Final Result   No acute intracranial abnormality. Left maxillary sinus mucosal thickening. Air-fluid level in the sphenoid   sinus. Correlate with any clinical evidence of sinusitis. XR CHEST PORTABLE   Final Result   Findings as above which may be related to congestive heart failure and edema. XR CHEST PORTABLE    (Results Pending)       Echo  Summary   Definity contrast administered.   Left ventricular systolic function is reduced with ejection fraction   estimated at 25-30 %.   Elevated left ventricular diastolic filling pressure: Septal E/e'' = 12.6   (for sinus tachycardia) .   Right ventricular systolic function is moderately reduced .   Mild mitral regurgitation.   Unable to obtain SPAP due to lack of tricuspid regurgitation.         Assessment:  Active Problems:    Acute respiratory failure (HCC)    Aspiration pneumonitis (HCC)    Atrial fibrillation with RVR (HCC)    Electrolyte disturbance    Shock ([address]    Transaminitis    Hyperglycemia  Resolved Problems:    * No resolved hospital problems. *      Plan:    Cardiac V fib arrest   - status post successful resuscitation   - admitted to ICU, on mech vent . Intensivist and cardiology consulted   - Tele and repeat trops    Acute on chronic systolic CHF  Patient with severe nonischemic cardiomyopathy  - secondary to adriamycin treatment as a child . she had  chemotherapy   for  rhabdomyosarcoma.   - repeat echo shows ejection fraction of 25%   -  on IV Lasix  - seen  by cardiology      Acute respiratory failure  - continue mechanical ventilation   - seen by intensivist     [facility] fib   - pt in afib following defib for V fib arrest. Loaded with amiodarone   - cont  amiodarone drip, and heparin drip   - seen by cardiology Septic shock   Pneumonia - ? Aspiration    probable anaerobic/  gram negative infection , risk for methicillin resistant Staph aureus as well  - pt had resp symptoms for 2 months   - Continue broad-spectrum IV antibiotics  vanc and zosyn   -continue pressors -on Levophed   - Check sputum cultures . Hypokalemia, Hypomagnesemia  - on replacement protocol. Hyperglycemia  -  place on low dose ssi. Elevated LFT's, mild  - possibly related to shock. Repeat. Leukocytosis  - monitor.           DVT Prophylaxis: Heparin gtt  Diet: NPO  Code Status: Full Code  PT/OT Eval Status:  Will order if needed and as patient condition allows  Dispo - Admit to inpatient ICU          Diamante Garcia MD 5/15/2019 6:10 PM

## 2019-05-15 NOTE — CONSULTS
[facility]  [phone]        Reason for Consultation/Chief Complaint:   The patient is not able to give detailed information about the events of hospitalization due to their underlying medical illness. The majority of the information is taken from the chart, medical staff, and available family. She had resp distress at home all of a sudden. She was hypoxic when squad arrived but responsive. She went in to vfib and there was ROSC immediately. History of Present Illness:  Brittney Archer is a 26 y.o. patient who presented  as above. She was found down by mother when [place]. Patient was having agonal breathing. Squad arrived did a brief CPR shocked her and transported her to ED. In ED her BP was high. One EKG had sinus tach another afib RVR. She was put on ventilator and was hard to ventilate. She was tachycardiac and was put on amiodarone drip. She continued to remain tachycardiac. She was given IV metoprolol and her BP started to drop. She was placed on levophed and she remains on that. Currently she is intubated sedated with propofol. She has low K and Low Mg at admission. I have been asked to provide consultation regarding further management and testing. Past Medical History:   has a past medical history of Cancer ([address] and Hyperlipidemia. Surgical History:   has a past surgical history that includes Leg Surgery; Foot surgery; and Cholecystectomy. Social History: REYES   reports that she has been smoking. She has been smoking about 2.00 packs per day. She has never used smokeless tobacco. She reports that she drinks alcohol. She reports that she has current or past drug history. Drug: Marijuana. Family History: none available no family around    No history  in [facility] Medications:  No meds on review of history    Allergies:  Patient has no known allergies.      Review of Systems: NA    Physical Examination:    Vitals:    05/15/19 0900 BP: 112/74   Pulse: 133   Resp: (!) 44   Temp:    SpO2: 94%    Weight: 193 lb 2 oz (87.6 kg)         General Appearance:  Alert, cooperative, no distress, appears stated age   Head:  Normocephalic, without obvious abnormality, atraumatic   Eyes:  PERRL, conjunctiva/corneas clear       Nose: Nares normal, no drainage or sinus tenderness   Throat: Lips, mucosa, and tongue normal   Neck: Supple, symmetrical, trachea midline, no adenopathy, thyroid: not enlarged, symmetric, no tenderness/mass/nodules, no carotid bruit or JVD       Lungs:   Clear to auscultation bilaterally, respirations unlabored   Chest Wall:  No tenderness or deformity   Heart:  Regular rate and rhythm, S1, S2 normal, no murmur, rub or gallop   Abdomen:   Soft, non-tender, bowel sounds active all four quadrants,  no masses, no organomegaly           Extremities: Extremities normal, atraumatic, no cyanosis or edema   Pulses: 2+ and symmetric   Skin: Skin color, texture, turgor normal, no rashes or lesions   Pysch: Normal mood and affect   Neurologic: Normal gross motor and sensory exam.         Labs  CBC:   Lab Results   Component Value Date    WBC 36.0 05/15/2019    RBC 5.27 05/15/2019    HGB 15.7 05/15/2019    HCT 47.1 05/15/2019    MCV 89.5 05/15/2019    RDW 12.7 05/15/2019     05/15/2019     CMP:    Lab Results   Component Value Date     05/15/2019    K 2.9 05/15/2019    CL 96 05/15/2019    CO2 18 05/15/2019    BUN 12 05/15/2019    CREATININE 0.8 05/15/2019    GFRAA >60 05/15/2019    AGRATIO 0.8 05/15/2019    LABGLOM >60 05/15/2019    GLUCOSE 211 05/15/2019    PROT 7.7 05/15/2019    CALCIUM 7.7 05/15/2019    BILITOT 1.4 05/15/2019    ALKPHOS 78 05/15/2019     05/15/2019     05/15/2019     PT/INR:  No results found for: PTINR  Lab Results   Component Value Date    TROPONINI 0.49 (H) 05/15/2019           EKG 5/14/19    Atrial fibrillation with rapid ventricular responseAbnormal ECGNo previous ECGs available     Chest xray

## 2019-05-15 NOTE — PROGRESS NOTES
Dr Rae Meek rounding- updated on status, medications and labs. See MAR for Heparin IV drip orders and bolus r/t PTT results.

## 2019-05-15 NOTE — PROGRESS NOTES
Pt oxygen level started to decrease Suction patient medicated with Versed 2 mg and fentanyl 25 mcg IVP. Called RT to assess. RT used Ambu bag oxygen sats only increased to 84- 86%. Placing call to Dr Raji Reddy.

## 2019-05-16 ENCOUNTER — APPOINTMENT (OUTPATIENT)
Dept: GENERAL RADIOLOGY | Age: 34
DRG: 710 | End: 2019-05-16
Payer: MEDICAID

## 2019-05-16 LAB
ACETAMINOPHEN LEVEL: <5 UG/ML (ref 10–30)
ALBUMIN SERPL-MCNC: 3.3 G/DL (ref 3.4–5)
ALP BLD-CCNC: 76 U/L (ref 40–129)
ALT SERPL-CCNC: 83 U/L (ref 10–40)
ANION GAP SERPL CALCULATED.3IONS-SCNC: 11 MMOL/L (ref 3–16)
ANION GAP SERPL CALCULATED.3IONS-SCNC: 11 MMOL/L (ref 3–16)
APTT: 57.9 SEC (ref 26–36)
APTT: 69.6 SEC (ref 26–36)
AST SERPL-CCNC: 74 U/L (ref 15–37)
BANDED NEUTROPHILS RELATIVE PERCENT: 5 % (ref 0–7)
BASE EXCESS ARTERIAL: -1.1 MMOL/L (ref -3–3)
BASE EXCESS ARTERIAL: -3.5 MMOL/L (ref -3–3)
BASOPHILS ABSOLUTE: 0 K/UL (ref 0–0.2)
BASOPHILS RELATIVE PERCENT: 0 %
BILIRUB SERPL-MCNC: 1.1 MG/DL (ref 0–1)
BILIRUBIN DIRECT: 0.7 MG/DL (ref 0–0.3)
BILIRUBIN, INDIRECT: 0.4 MG/DL (ref 0–1)
BUN BLDV-MCNC: 10 MG/DL (ref 7–20)
BUN BLDV-MCNC: 11 MG/DL (ref 7–20)
CALCIUM SERPL-MCNC: 7.2 MG/DL (ref 8.3–10.6)
CALCIUM SERPL-MCNC: 7.2 MG/DL (ref 8.3–10.6)
CARBOXYHEMOGLOBIN ARTERIAL: 0.6 % (ref 0–1.5)
CARBOXYHEMOGLOBIN ARTERIAL: 0.7 % (ref 0–1.5)
CHLORIDE BLD-SCNC: 100 MMOL/L (ref 99–110)
CHLORIDE BLD-SCNC: 100 MMOL/L (ref 99–110)
CO2: 21 MMOL/L (ref 21–32)
CO2: 21 MMOL/L (ref 21–32)
CREAT SERPL-MCNC: 0.6 MG/DL (ref 0.6–1.1)
CREAT SERPL-MCNC: 0.7 MG/DL (ref 0.6–1.1)
CULTURE, RESPIRATORY: NORMAL
EOSINOPHILS ABSOLUTE: 0 K/UL (ref 0–0.6)
EOSINOPHILS RELATIVE PERCENT: 0 %
GFR AFRICAN AMERICAN: >60
GFR AFRICAN AMERICAN: >60
GFR NON-AFRICAN AMERICAN: >60
GFR NON-AFRICAN AMERICAN: >60
GLUCOSE BLD-MCNC: 120 MG/DL (ref 70–99)
GLUCOSE BLD-MCNC: 124 MG/DL (ref 70–99)
GLUCOSE BLD-MCNC: 125 MG/DL (ref 70–99)
GLUCOSE BLD-MCNC: 129 MG/DL (ref 70–99)
GLUCOSE BLD-MCNC: 143 MG/DL (ref 70–99)
GLUCOSE BLD-MCNC: 143 MG/DL (ref 70–99)
GRAM STAIN RESULT: NORMAL
HCO3 ARTERIAL: 20.2 MMOL/L (ref 21–29)
HCO3 ARTERIAL: 21.7 MMOL/L (ref 21–29)
HCT VFR BLD CALC: 41.7 % (ref 36–48)
HEMOGLOBIN, ART, EXTENDED: 14.3 G/DL (ref 12–16)
HEMOGLOBIN, ART, EXTENDED: 14.4 G/DL (ref 12–16)
HEMOGLOBIN: 14 G/DL (ref 12–16)
LYMPHOCYTES ABSOLUTE: 2.3 K/UL (ref 1–5.1)
LYMPHOCYTES RELATIVE PERCENT: 7 %
MAGNESIUM: 1.6 MG/DL (ref 1.8–2.4)
MCH RBC QN AUTO: 29.9 PG (ref 26–34)
MCHC RBC AUTO-ENTMCNC: 33.5 G/DL (ref 31–36)
MCV RBC AUTO: 89.3 FL (ref 80–100)
METHEMOGLOBIN ARTERIAL: 0.2 %
METHEMOGLOBIN ARTERIAL: 0.2 %
MONOCYTES ABSOLUTE: 1 K/UL (ref 0–1.3)
MONOCYTES RELATIVE PERCENT: 3 %
NEUTROPHILS ABSOLUTE: 29.6 K/UL (ref 1.7–7.7)
NEUTROPHILS RELATIVE PERCENT: 85 %
O2 CONTENT ARTERIAL: 19 ML/DL
O2 CONTENT ARTERIAL: 19 ML/DL
O2 SAT, ARTERIAL: 95.4 %
O2 SAT, ARTERIAL: 96 %
O2 THERAPY: ABNORMAL
O2 THERAPY: ABNORMAL
PCO2 ARTERIAL: 31.2 MMHG (ref 35–45)
PCO2 ARTERIAL: 32.7 MMHG (ref 35–45)
PDW BLD-RTO: 12.4 % (ref 12.4–15.4)
PERFORMED ON: ABNORMAL
PH ARTERIAL: 7.41 (ref 7.35–7.45)
PH ARTERIAL: 7.46 (ref 7.35–7.45)
PHOSPHORUS: 1.8 MG/DL (ref 2.5–4.9)
PLATELET # BLD: 251 K/UL (ref 135–450)
PLATELET SLIDE REVIEW: ADEQUATE
PMV BLD AUTO: 7.7 FL (ref 5–10.5)
PO2 ARTERIAL: 75.6 MMHG (ref 75–108)
PO2 ARTERIAL: 76 MMHG (ref 75–108)
POTASSIUM SERPL-SCNC: 3.5 MMOL/L (ref 3.5–5.1)
POTASSIUM SERPL-SCNC: 3.6 MMOL/L (ref 3.5–5.1)
RBC # BLD: 4.67 M/UL (ref 4–5.2)
SLIDE REVIEW: ABNORMAL
SODIUM BLD-SCNC: 132 MMOL/L (ref 136–145)
SODIUM BLD-SCNC: 132 MMOL/L (ref 136–145)
TCO2 ARTERIAL: 21.2 MMOL/L
TCO2 ARTERIAL: 22.6 MMOL/L
TOTAL CK: 351 U/L (ref 26–192)
TOTAL PROTEIN: 7 G/DL (ref 6.4–8.2)
TROPONIN: 0.17 NG/ML
URINE CULTURE, ROUTINE: NORMAL
WBC # BLD: 32.9 K/UL (ref 4–11)

## 2019-05-16 PROCEDURE — 6360000002 HC RX W HCPCS: Performed by: INTERNAL MEDICINE

## 2019-05-16 PROCEDURE — 82803 BLOOD GASES ANY COMBINATION: CPT

## 2019-05-16 PROCEDURE — 31624 DX BRONCHOSCOPE/LAVAGE: CPT | Performed by: INTERNAL MEDICINE

## 2019-05-16 PROCEDURE — 2700000000 HC OXYGEN THERAPY PER DAY

## 2019-05-16 PROCEDURE — 3609011500 HC BRONCHOSCOPY DIAGNOSTIC OR CELL WASH ONLY: Performed by: INTERNAL MEDICINE

## 2019-05-16 PROCEDURE — 3609010900 HC BRONCHOSCOPY THERAPUTIC ASPIRATION INITIAL: Performed by: INTERNAL MEDICINE

## 2019-05-16 PROCEDURE — 0WCQ8ZZ EXTIRPATION OF MATTER FROM RESPIRATORY TRACT, VIA NATURAL OR ARTIFICIAL OPENING ENDOSCOPIC: ICD-10-PCS | Performed by: INTERNAL MEDICINE

## 2019-05-16 PROCEDURE — 2000000000 HC ICU R&B

## 2019-05-16 PROCEDURE — 2500000003 HC RX 250 WO HCPCS: Performed by: INTERNAL MEDICINE

## 2019-05-16 PROCEDURE — 94761 N-INVAS EAR/PLS OXIMETRY MLT: CPT

## 2019-05-16 PROCEDURE — 99152 MOD SED SAME PHYS/QHP 5/>YRS: CPT | Performed by: INTERNAL MEDICINE

## 2019-05-16 PROCEDURE — 94003 VENT MGMT INPAT SUBQ DAY: CPT

## 2019-05-16 PROCEDURE — 87206 SMEAR FLUORESCENT/ACID STAI: CPT

## 2019-05-16 PROCEDURE — 87015 SPECIMEN INFECT AGNT CONCNTJ: CPT

## 2019-05-16 PROCEDURE — 87070 CULTURE OTHR SPECIMN AEROBIC: CPT

## 2019-05-16 PROCEDURE — 36592 COLLECT BLOOD FROM PICC: CPT

## 2019-05-16 PROCEDURE — 2580000003 HC RX 258: Performed by: EMERGENCY MEDICINE

## 2019-05-16 PROCEDURE — 6370000000 HC RX 637 (ALT 250 FOR IP): Performed by: INTERNAL MEDICINE

## 2019-05-16 PROCEDURE — 87116 MYCOBACTERIA CULTURE: CPT

## 2019-05-16 PROCEDURE — 31622 DX BRONCHOSCOPE/WASH: CPT

## 2019-05-16 PROCEDURE — 99291 CRITICAL CARE FIRST HOUR: CPT | Performed by: INTERNAL MEDICINE

## 2019-05-16 PROCEDURE — 86403 PARTICLE AGGLUT ANTBDY SCRN: CPT

## 2019-05-16 PROCEDURE — 84100 ASSAY OF PHOSPHORUS: CPT

## 2019-05-16 PROCEDURE — 71045 X-RAY EXAM CHEST 1 VIEW: CPT

## 2019-05-16 PROCEDURE — 36600 WITHDRAWAL OF ARTERIAL BLOOD: CPT

## 2019-05-16 PROCEDURE — 87102 FUNGUS ISOLATION CULTURE: CPT

## 2019-05-16 PROCEDURE — 2580000003 HC RX 258: Performed by: INTERNAL MEDICINE

## 2019-05-16 PROCEDURE — 80076 HEPATIC FUNCTION PANEL: CPT

## 2019-05-16 PROCEDURE — 87077 CULTURE AEROBIC IDENTIFY: CPT

## 2019-05-16 PROCEDURE — 99232 SBSQ HOSP IP/OBS MODERATE 35: CPT | Performed by: INTERNAL MEDICINE

## 2019-05-16 PROCEDURE — 82550 ASSAY OF CK (CPK): CPT

## 2019-05-16 PROCEDURE — 99233 SBSQ HOSP IP/OBS HIGH 50: CPT | Performed by: INTERNAL MEDICINE

## 2019-05-16 PROCEDURE — 31645 BRNCHSC W/THER ASPIR 1ST: CPT | Performed by: INTERNAL MEDICINE

## 2019-05-16 PROCEDURE — 94640 AIRWAY INHALATION TREATMENT: CPT

## 2019-05-16 PROCEDURE — 2709999900 HC NON-CHARGEABLE SUPPLY: Performed by: INTERNAL MEDICINE

## 2019-05-16 PROCEDURE — 85730 THROMBOPLASTIN TIME PARTIAL: CPT

## 2019-05-16 PROCEDURE — 83735 ASSAY OF MAGNESIUM: CPT

## 2019-05-16 PROCEDURE — 84484 ASSAY OF TROPONIN QUANT: CPT

## 2019-05-16 PROCEDURE — 85025 COMPLETE CBC W/AUTO DIFF WBC: CPT

## 2019-05-16 PROCEDURE — 0B9D8ZX DRAINAGE OF RIGHT MIDDLE LUNG LOBE, VIA NATURAL OR ARTIFICIAL OPENING ENDOSCOPIC, DIAGNOSTIC: ICD-10-PCS | Performed by: INTERNAL MEDICINE

## 2019-05-16 PROCEDURE — 87186 SC STD MICRODIL/AGAR DIL: CPT

## 2019-05-16 PROCEDURE — 87205 SMEAR GRAM STAIN: CPT

## 2019-05-16 PROCEDURE — 3609010800 HC BRONCHOSCOPY ALVEOLAR LAVAGE: Performed by: INTERNAL MEDICINE

## 2019-05-16 PROCEDURE — 80048 BASIC METABOLIC PNL TOTAL CA: CPT

## 2019-05-16 PROCEDURE — G0480 DRUG TEST DEF 1-7 CLASSES: HCPCS

## 2019-05-16 RX ORDER — MIDAZOLAM HYDROCHLORIDE 5 MG/ML
INJECTION INTRAMUSCULAR; INTRAVENOUS PRN
Status: DISCONTINUED | OUTPATIENT
Start: 2019-05-16 | End: 2019-05-16 | Stop reason: ALTCHOICE

## 2019-05-16 RX ORDER — IBUPROFEN 200 MG
800 TABLET ORAL EVERY 6 HOURS PRN
Status: ON HOLD | COMMUNITY
End: 2019-06-05 | Stop reason: HOSPADM

## 2019-05-16 RX ORDER — MAGNESIUM SULFATE 1 G/100ML
1 INJECTION INTRAVENOUS ONCE
Status: COMPLETED | OUTPATIENT
Start: 2019-05-16 | End: 2019-05-16

## 2019-05-16 RX ORDER — FUROSEMIDE 10 MG/ML
20 INJECTION INTRAMUSCULAR; INTRAVENOUS DAILY
Status: DISCONTINUED | OUTPATIENT
Start: 2019-05-16 | End: 2019-05-18

## 2019-05-16 RX ORDER — FENTANYL CITRATE 50 UG/ML
INJECTION, SOLUTION INTRAMUSCULAR; INTRAVENOUS PRN
Status: DISCONTINUED | OUTPATIENT
Start: 2019-05-16 | End: 2019-05-16 | Stop reason: ALTCHOICE

## 2019-05-16 RX ORDER — ALBUTEROL SULFATE 90 UG/1
2 AEROSOL, METERED RESPIRATORY (INHALATION) 3 TIMES DAILY
COMMUNITY
Start: 2019-05-09 | End: 2022-05-31 | Stop reason: ALTCHOICE

## 2019-05-16 RX ADMIN — Medication 4 PUFF: at 19:58

## 2019-05-16 RX ADMIN — NOREPINEPHRINE BITARTRATE 10 MCG/MIN: 1 INJECTION INTRAVENOUS at 05:36

## 2019-05-16 RX ADMIN — Medication 10 ML: at 20:17

## 2019-05-16 RX ADMIN — ENOXAPARIN SODIUM 40 MG: 40 INJECTION SUBCUTANEOUS at 08:25

## 2019-05-16 RX ADMIN — MIDAZOLAM HYDROCHLORIDE 2 MG: 2 INJECTION, SOLUTION INTRAMUSCULAR; INTRAVENOUS at 03:56

## 2019-05-16 RX ADMIN — PROPOFOL 50 MCG/KG/MIN: 10 INJECTION, EMULSION INTRAVENOUS at 13:46

## 2019-05-16 RX ADMIN — Medication 4 PUFF: at 11:59

## 2019-05-16 RX ADMIN — SODIUM CHLORIDE: 9 INJECTION, SOLUTION INTRAVENOUS at 17:31

## 2019-05-16 RX ADMIN — PROPOFOL 50 MCG/KG/MIN: 10 INJECTION, EMULSION INTRAVENOUS at 17:01

## 2019-05-16 RX ADMIN — FUROSEMIDE 20 MG: 10 INJECTION, SOLUTION INTRAVENOUS at 08:32

## 2019-05-16 RX ADMIN — FENTANYL CITRATE 125 MCG/HR: 50 INJECTION, SOLUTION INTRAMUSCULAR; INTRAVENOUS at 17:32

## 2019-05-16 RX ADMIN — Medication 4 PUFF: at 15:30

## 2019-05-16 RX ADMIN — NOREPINEPHRINE BITARTRATE 10 MCG/MIN: 1 INJECTION INTRAVENOUS at 17:02

## 2019-05-16 RX ADMIN — FENTANYL CITRATE 125 MCG/HR: 50 INJECTION, SOLUTION INTRAMUSCULAR; INTRAVENOUS at 08:33

## 2019-05-16 RX ADMIN — PROPOFOL 50 MCG/KG/MIN: 10 INJECTION, EMULSION INTRAVENOUS at 21:03

## 2019-05-16 RX ADMIN — DOXYCYCLINE 100 MG: 100 INJECTION, POWDER, LYOPHILIZED, FOR SOLUTION INTRAVENOUS at 20:16

## 2019-05-16 RX ADMIN — MIDAZOLAM HYDROCHLORIDE 2 MG: 2 INJECTION, SOLUTION INTRAMUSCULAR; INTRAVENOUS at 18:02

## 2019-05-16 RX ADMIN — MIDAZOLAM HYDROCHLORIDE 2 MG: 2 INJECTION, SOLUTION INTRAMUSCULAR; INTRAVENOUS at 11:04

## 2019-05-16 RX ADMIN — SODIUM CHLORIDE: 9 INJECTION, SOLUTION INTRAVENOUS at 02:33

## 2019-05-16 RX ADMIN — MIDAZOLAM HYDROCHLORIDE 2 MG: 2 INJECTION, SOLUTION INTRAMUSCULAR; INTRAVENOUS at 17:01

## 2019-05-16 RX ADMIN — PIPERACILLIN SODIUM,TAZOBACTAM SODIUM 3.38 G: 3; .375 INJECTION, POWDER, FOR SOLUTION INTRAVENOUS at 20:16

## 2019-05-16 RX ADMIN — PIPERACILLIN SODIUM,TAZOBACTAM SODIUM 3.38 G: 3; .375 INJECTION, POWDER, FOR SOLUTION INTRAVENOUS at 05:35

## 2019-05-16 RX ADMIN — MUPIROCIN: 20 OINTMENT TOPICAL at 20:19

## 2019-05-16 RX ADMIN — IBUPROFEN 600 MG: 100 SUSPENSION ORAL at 20:25

## 2019-05-16 RX ADMIN — Medication 4 PUFF: at 03:12

## 2019-05-16 RX ADMIN — ASPIRIN 81 MG 81 MG: 81 TABLET ORAL at 08:26

## 2019-05-16 RX ADMIN — FAMOTIDINE 20 MG: 10 INJECTION, SOLUTION INTRAVENOUS at 20:16

## 2019-05-16 RX ADMIN — PROPOFOL 40 MCG/KG/MIN: 10 INJECTION, EMULSION INTRAVENOUS at 05:36

## 2019-05-16 RX ADMIN — MIDAZOLAM HYDROCHLORIDE 2 MG: 2 INJECTION, SOLUTION INTRAMUSCULAR; INTRAVENOUS at 20:50

## 2019-05-16 RX ADMIN — MAGNESIUM SULFATE HEPTAHYDRATE 1 G: 1 INJECTION, SOLUTION INTRAVENOUS at 10:56

## 2019-05-16 RX ADMIN — AMIODARONE HYDROCHLORIDE 0.5 MG/MIN: 50 INJECTION, SOLUTION INTRAVENOUS at 05:36

## 2019-05-16 RX ADMIN — FAMOTIDINE 20 MG: 10 INJECTION, SOLUTION INTRAVENOUS at 08:26

## 2019-05-16 RX ADMIN — MIDAZOLAM HYDROCHLORIDE 2 MG: 2 INJECTION, SOLUTION INTRAMUSCULAR; INTRAVENOUS at 09:57

## 2019-05-16 RX ADMIN — Medication 4 PUFF: at 07:19

## 2019-05-16 RX ADMIN — Medication 10 ML: at 08:26

## 2019-05-16 RX ADMIN — MIDAZOLAM HYDROCHLORIDE 2 MG: 2 INJECTION, SOLUTION INTRAMUSCULAR; INTRAVENOUS at 06:00

## 2019-05-16 RX ADMIN — DOXYCYCLINE 100 MG: 100 INJECTION, POWDER, LYOPHILIZED, FOR SOLUTION INTRAVENOUS at 10:56

## 2019-05-16 RX ADMIN — MUPIROCIN: 20 OINTMENT TOPICAL at 08:25

## 2019-05-16 RX ADMIN — MAGNESIUM SULFATE HEPTAHYDRATE 1 G: 1 INJECTION, SOLUTION INTRAVENOUS at 07:23

## 2019-05-16 RX ADMIN — PIPERACILLIN SODIUM,TAZOBACTAM SODIUM 3.38 G: 3; .375 INJECTION, POWDER, FOR SOLUTION INTRAVENOUS at 13:46

## 2019-05-16 RX ADMIN — MIDAZOLAM HYDROCHLORIDE 2 MG: 2 INJECTION, SOLUTION INTRAMUSCULAR; INTRAVENOUS at 07:12

## 2019-05-16 RX ADMIN — PROPOFOL 40 MCG/KG/MIN: 10 INJECTION, EMULSION INTRAVENOUS at 09:56

## 2019-05-16 RX ADMIN — Medication 10 ML: at 20:18

## 2019-05-16 RX ADMIN — PROPOFOL 40 MCG/KG/MIN: 10 INJECTION, EMULSION INTRAVENOUS at 00:40

## 2019-05-16 RX ADMIN — POTASSIUM PHOSPHATE, MONOBASIC AND POTASSIUM PHOSPHATE, DIBASIC 30 MMOL: 224; 236 INJECTION, SOLUTION INTRAVENOUS at 10:56

## 2019-05-16 ASSESSMENT — PULMONARY FUNCTION TESTS
PIF_VALUE: 32
PIF_VALUE: 28
PIF_VALUE: 33
PIF_VALUE: 35
PIF_VALUE: 25
PIF_VALUE: 31
PIF_VALUE: 33
PIF_VALUE: 19
PIF_VALUE: 23
PIF_VALUE: 37
PIF_VALUE: 32
PIF_VALUE: 29
PIF_VALUE: 20
PIF_VALUE: 34
PIF_VALUE: 24
PIF_VALUE: 28
PIF_VALUE: 24
PIF_VALUE: 33
PIF_VALUE: 26
PIF_VALUE: 37
PIF_VALUE: 23
PIF_VALUE: 28
PIF_VALUE: 37

## 2019-05-16 ASSESSMENT — PAIN - FUNCTIONAL ASSESSMENT: PAIN_FUNCTIONAL_ASSESSMENT: CPOT

## 2019-05-16 ASSESSMENT — PAIN SCALES - GENERAL: PAINLEVEL_OUTOF10: 0

## 2019-05-16 NOTE — PROGRESS NOTES
Patient became very restless. Coughing against ventilator support. Medicated with versed 2 mg IVP and increased fentanyl infusion to 100 mcg/hr.  Petros Plasencia RN

## 2019-05-16 NOTE — PROGRESS NOTES
05/16/19 0720   Vent Information   Vent Type 840   Vent Mode AC/VC   Vt Ordered 450 mL   Rate Set 14 bmp   Peak Flow 80 L/min   Pressure Support 0 cmH20   FiO2  80 %   Sensitivity 3   PEEP/CPAP 14   I Time/ I Time % 0 s   Humidification Source Heated wire   Humidification Temp 37.5   Circuit Condensation Drained   Vent Patient Data   High Peep/I Pressure 0   Peak Inspiratory Pressure 26 cmH2O   Mean Airway Pressure 17 cmH20   Rate Measured 23 br/min   Vt Exhaled 491 mL   Minute Volume 11.4 Liters   I:E Ratio 1:3.10   Plateau Pressure 25 EXM20   Static Compliance 28 mL/cmH2O   Dynamic Compliance 41 mL/cmH2O   Cough/Sputum   Sputum How Obtained Suctioned;Endotracheal   Sputum Amount Small   Sputum Color Creamy;Green   Tenacity Thick   Spontaneous Breathing Trial (SBT) RT Doc   Pulse 112   SpO2 91 %   Breath Sounds   Right Upper Lobe Rhonchi   Right Middle Lobe Diminished   Right Lower Lobe Diminished   Left Upper Lobe Rhonchi   Left Lower Lobe Diminished   Additional Respiratory  Assessments   Resp 22   Position Semi-Triplett's   Oral Care Completed? Yes   Oral Care Mouth suctioned   Subglottic Suction Done? Yes   Alarm Settings   High Pressure Alarm 45 cmH2O   Low Minute Volume Alarm 4 L/min   Apnea (secs) 20 secs   High Respiratory Rate 50 br/min   Low Exhaled Vt  300 mL   Patient Observation   Observations water level good   Non-Surgical Airway Endo Tracheal Tube   Placement Date/Time: 05/14/19 1808   Timeout: Patient; Appropriate Equipment  Placed By: In ED  Inserted by: Ellis Galeana NP   Insertion attempts: 1  Airway Device: Endo Tracheal Tube  Size: 7.5  Placement Verified By[de-identified] Auscultation; Chest X-ray;Colorimetric E...    Secured at 23 cm   Measured From Lips   Secured Location Right   Secured By Commercial tube forde   Site Condition Dry

## 2019-05-16 NOTE — PROGRESS NOTES
Patient becoming very agitated and restless. She is coughing against ventilator support and not tolerating well. She is raising upper torso off bed causing increased tension on lines and tubing. Medicated with versed 2mg IVP. After 15 minutes patient is now resting more quietly and tolerating ventilator.  Damaso Mckinley RN

## 2019-05-16 NOTE — PROGRESS NOTES
Mother reports that patient takes approx'ly 4000 mg  Ibuprofen daily for leg pain. Also, patient is a recovering Heroin addict with prior history of last use 7 yrs ago. Medical summary letter from Oncologist received from family. Will place in chart for scan into medical records.

## 2019-05-16 NOTE — PROGRESS NOTES
Pharmacy - RE:  Low-dose Heparin drip  Current rate = 10 ml/h  (1000 units/h)  aPTT drawn @ 0000 = 69.6 sec. Goal aPTT = 54 - 90 sec. Per protocol, continue same rate and obtain another aPTT with AM lab draws.

## 2019-05-16 NOTE — PROGRESS NOTES
05/16/19 1534   Vent Information   Vent Type 840   Vent Mode AC/VC   Vt Ordered 410 mL   Rate Set 18 bmp   Peak Flow 80 L/min   Pressure Support 0 cmH20   FiO2  60 %   Sensitivity 3   PEEP/CPAP 10   I Time/ I Time % 0 s   Humidification Source Heated wire   Humidification Temp 36   Circuit Condensation Drained   Vent Patient Data   High Peep/I Pressure 0   Peak Inspiratory Pressure 25 cmH2O   Mean Airway Pressure 12 cmH20   Rate Measured 24 br/min   Vt Exhaled 449 mL   Minute Volume 10.9 Liters   I:E Ratio 1:3.50   Cough/Sputum   Sputum How Obtained Suctioned;Endotracheal   Sputum Amount Moderate   Sputum Color Creamy;Green   Tenacity Thick   Spontaneous Breathing Trial (SBT) RT Doc   Pulse 123   SpO2 95 %   Breath Sounds   Right Upper Lobe Rhonchi   Right Middle Lobe Diminished   Right Lower Lobe Diminished   Left Upper Lobe Rhonchi   Left Lower Lobe Diminished   Additional Respiratory  Assessments   Resp 23   Position Semi-Triplett's   Oral Care Completed? Yes   Alarm Settings   High Pressure Alarm 45 cmH2O   Low Minute Volume Alarm 4 L/min   Apnea (secs) 20 secs   High Respiratory Rate 50 br/min   Low Exhaled Vt  300 mL   Patient Observation   Observations water level good   Non-Surgical Airway Endo Tracheal Tube   Placement Date/Time: 05/14/19 2798   Timeout: Patient; Appropriate Equipment  Placed By: In ED  Inserted by: Monie Dickson NP   Insertion attempts: 1  Airway Device: Endo Tracheal Tube  Size: 7.5  Placement Verified By[de-identified] Auscultation; Chest X-ray;Colorimetric E...    Secured at 23 cm   Measured From Lips   Secured Location Right   Secured By Commercial tube forde   Site Condition Dry

## 2019-05-16 NOTE — PROGRESS NOTES
Poison control updated on LFT levels, tylenol level, chemotherapy hx. RN stated Poison Control closing case due to lack of toxin component.

## 2019-05-16 NOTE — PROGRESS NOTES
[facility] Daily Progress Note      Admit Date:  5/14/2019    Subjective:  Ms. Wilfred Contreras is seen for follow up  She is s/p resp arrest in field followed by cardiac arrest as it seems. She was shocked at home by EMS brought to ED in acute resp failure. She has h/o cardiomyopathy probably adriamycin. No cardiac follow up. She has been on vent. Heparin drip. Amio and levophed drips in last 36hrs. Overnight she has been trying to wake up when sedation is lowered. Fever last night. [place]:    History of Present Illness:  Doyle Torres is a 26 y.o. patient who presented  as above. She was found down by mother [place]. Patient was having agonal breathing. Squad arrived did a brief CPR shocked her and transported her to ED. In ED her BP was high. One EKG had sinus tach another afib RVR. She was put on ventilator and was hard to ventilate. She was tachycardiac and was put on amiodarone drip. She continued to remain tachycardiac. She was given IV metoprolol and her BP started to drop. She was placed on levophed and she remains on that. Currently she is intubated sedated with propofol. She has low K and Low Mg at admission. I have been asked to provide consultation regarding further management and testing. 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    Eyes:  Sclera nonicteric  Nose/Sinuses:  negative findings: nose shows no deformity, asymmetry, or inflammation, nasal mucosa normal, septum midline with no perforation or bleeding  Back:  no pain to palpation  Joint:  no active joint inflammation  Musculoskeletal:  negative  Skin:  Warm and dry  Neck:  Negative for JVD and Carotid Bruits. Chest:  Clear to auscultation, respiration easy  Cardiovascular:  RRR, S1S2 diminished, no murmur, no rub or thrill.   Abdomen:  Soft normal liver and spleen  Extremities:   No edema, clubbing, cyanosis,  Pulses: pedal pulses are normal.  Neuro: neg babinski    Medications:    aspirin  81 mg Oral Daily    piperacillin-tazobactam  3.375 g Intravenous Q8H    mupirocin   Nasal BID    famotidine (PEPCID) injection  20 mg Intravenous BID    albuterol sulfate HFA  4 puff Inhalation Q4H    ipratropium  4 puff Inhalation Q4H    furosemide  40 mg Intravenous BID    insulin lispro  0-6 Units Subcutaneous Q4H    sodium chloride flush  10 mL Intravenous 2 times per day      norepinephrine 8 mcg/min (05/16/19 0304)    amiodarone 450mg/250ml D5W infusion 0.5 mg/min (05/15/19 1528)    heparin (porcine) 1,000 Units/hr (05/15/19 1718)    sodium chloride 100 mL/hr at 05/16/19 0233    propofol 40 mcg/kg/min (05/16/19 0040)    dextrose      fentaNYL (SUBLIMAZE) infusion 100 mcg/hr (05/15/19 2245)     magnesium sulfate, potassium chloride, heparin (porcine), heparin (porcine), midazolam, fentanNYL, glucose, dextrose, glucagon (rDNA), dextrose, ibuprofen, sodium chloride flush    Lab Data:  CBC:   Recent Labs     05/14/19  1817 05/15/19  0625   WBC 19.7* 36.0*   HGB 16.0 15.7   HCT 48.9* 47.1   MCV 90.6 89.5    328     BMP:   Recent Labs     05/15/19  1240 05/15/19  1800 05/16/19  0000   * 133* 132*   K 3.5 3.3* 3.5    101 100   CO2 21 20* 21   BUN 11 11 10   CREATININE 0.8 0.7 0.7     LIVER PROFILE:   Recent Labs     05/14/19  1817 05/15/19  0625   * 119*   * 106*

## 2019-05-16 NOTE — PROGRESS NOTES
05/16/19 0313   Vent Information   Vent Type 840   Vent Mode AC/VC   Vt Ordered 450 mL   Rate Set 14 bmp   Pressure Support 0 cmH20   FiO2  90 %  (decreased fio2 to 80%)   Sensitivity 3   PEEP/CPAP 14   I Time/ I Time % 0 s   Vent Patient Data   High Peep/I Pressure 0   Peak Inspiratory Pressure 28 cmH2O   Mean Airway Pressure 19 cmH20   Rate Measured 21 br/min   Vt Exhaled 554 mL   Minute Volume 10.2 Liters   I:E Ratio 1:4.60   Cough/Sputum   Sputum How Obtained Endotracheal;Suctioned   Cough Productive   Sputum Amount Moderate   Sputum Color Creamy   Tenacity Thick   Spontaneous Breathing Trial (SBT) RT Doc   Pulse 104   SpO2 97 %   Breath Sounds   Right Upper Lobe Rhonchi   Right Middle Lobe Diminished   Right Lower Lobe Diminished   Left Upper Lobe Rhonchi   Left Lower Lobe Diminished   Additional Respiratory  Assessments   Resp 18   Position Semi-Triplett's   Alarm Settings   High Pressure Alarm 45 cmH2O   Low Minute Volume Alarm 4 L/min   Apnea (secs) 20 secs   High Respiratory Rate 50 br/min   Low Exhaled Vt  300 mL   Non-Surgical Airway Endo Tracheal Tube   Placement Date/Time: 05/14/19 1808   Timeout: Patient; Appropriate Equipment  Placed By: In ED  Inserted by: Maite Gross NP   Insertion attempts: 1  Airway Device: Endo Tracheal Tube  Size: 7.5  Placement Verified By[de-identified] Auscultation; Chest X-ray;Colorimetric E...    Secured at 23 cm   Measured From Lips   Secured Location Right   Secured By Commercial tube forde   Site Condition Dry

## 2019-05-16 NOTE — PROGRESS NOTES
While IV infusions held for blood draw, patient became extremely agitated with eyes opening, pulling up out of bed, and coughing against ventilator. Infusions were immediately restarted following blood draw. Patient was administered versed 2 mg IVP and after about 10 minutes she is resting more quietly with eyes closed.  Miguel Richardson RN

## 2019-05-16 NOTE — H&P
updated as needed in Epic     reports that she has been smoking. She has been smoking about 2.00 packs per day. She has never used smokeless tobacco.    family history is not on file. HENT: Airway patent and reviewed. Mallampati IV  Cardiovascular: Normal rate, regular rhythm, normal heart sounds. Pulmonary/Chest: No wheezes. Bilateral rhonchi. No rales. Abdominal: Soft. Bowel sounds are normal. No distension. ASA CLASS    I. Normal, healthy adult    II. Mild systemic disease  X  III. Severe Systemic Disease    IV. Severe Systemic Disease which is a threat to life. [place] Moribund      Sedation plan:   No sedation   Minimal   Moderate  X Sedation per anesthesia   Continue ICU sedation      Post Procedure Plan   Return to same level of care   ______________________       The risks and benefits of procedure, alternatives to the procedure and doing nothing have been discussed with patient including complications (collapse lung, bleed, mechanical ventilation and cardiopulmonary arrest). The patient understands and agrees to proceed.

## 2019-05-16 NOTE — PROGRESS NOTES
Spoke with Justine per phone [employer]). Suggestion to get prior history from family and possibly treat with acetylcysteine x 2 bags. Will update Dr. Gia De Santiago in rounds shortly.

## 2019-05-16 NOTE — PROGRESS NOTES
Advanced ETT to 23cm. Per Dr. Sorensen Nearing verbal order. Assisted with bedside bronchoscopy, placed in % fio2 during procedure. Tolerated well. Placed back to previous settings of A/C post procedure.

## 2019-05-16 NOTE — PROGRESS NOTES
05/15/19 2255   Vent Information   Vent Type 840   Vent Mode AC/VC   Vt Ordered 450 mL   Rate Set 14 bmp   Peak Flow 80 L/min   Pressure Support 0 cmH20   FiO2  100 %   Sensitivity 3   PEEP/CPAP 14   I Time/ I Time % 0 s   Humidification Source Heated wire   Humidification Temp 37   Humidification Temp Measured 34.4   Vent Patient Data   High Peep/I Pressure 0   Peak Inspiratory Pressure 30 cmH2O   Mean Airway Pressure 18 cmH20   Rate Measured 25 br/min   Vt Exhaled 483 mL   Minute Volume 12.2 Liters   I:E Ratio 1:2.50   Plateau Pressure 26 UBK89   Cough/Sputum   Sputum How Obtained Suctioned;Endotracheal   Cough Non-productive   Spontaneous Breathing Trial (SBT) RT Doc   Pulse 124   SpO2 96 %   Breath Sounds   Right Upper Lobe Rhonchi   Right Middle Lobe Diminished   Right Lower Lobe Diminished   Left Upper Lobe Rhonchi   Left Lower Lobe Diminished   Additional Respiratory  Assessments   Resp 25   Position Semi-Triplett's   Oral Care Completed? Yes   Oral Care Mouth suctioned   Subglottic Suction Done? Yes   Alarm Settings   High Pressure Alarm 45 cmH2O   Low Minute Volume Alarm 4 L/min   Apnea (secs) 20 secs   High Respiratory Rate 50 br/min   Low Exhaled Vt  300 mL   Non-Surgical Airway Endo Tracheal Tube   Placement Date/Time: 05/14/19 9308   Timeout: Patient; Appropriate Equipment  Placed By: In ED  Inserted by: Brayan Wesley NP   Insertion attempts: 1  Airway Device: Endo Tracheal Tube  Size: 7.5  Placement Verified By[de-identified] Auscultation; Chest X-ray;Colorimetric E...    Secured at 23 cm   Measured From Lips   Secured Location Right   Secured By Commercial tube forde   Site Condition Dry

## 2019-05-16 NOTE — PROGRESS NOTES
Pulmonary & Critical Care Medicine ICU Progress Note    CC: ARF    Events of Last 24 hours:   Propofol 32 mcg/kg/min   Fentanyl 125 mcg/hr   Levophed 10 mcg/min   CVP 11  Fever overnight 102   Large thick secretions    Invasive Lines: IV: L IJ     MV:    Vent Mode: AC/VC Rate Set: 14 bmp/Vt Ordered: 450 mL/ /FiO2 : 80 %  Recent Labs     05/15/19  0640 19  0530   PHART 7.375 7.408   [id number] 30.7* 32.7*   PO2ART 54.4* 75.6       IV:   norepinephrine 10 mcg/min (19)    sodium chloride 100 mL/hr at 19 0233    propofol 40 mcg/kg/min (19)    dextrose      fentaNYL (SUBLIMAZE) infusion 100 mcg/hr (05/15/19 2245)       Vitals:  Blood pressure 93/68, pulse 112, temperature 98.7 °F (37.1 °C), temperature source Axillary, resp. rate 22, height 5' (1.524 m), weight 193 lb 2 oz (87.6 kg), SpO2 91 %. on AC 14/450/+14 80%   Temp  Av.5 °F (38.1 °C)  Min: 98.7 °F (37.1 °C)  Max: 102.6 °F (39.2 °C)    Intake/Output Summary (Last 24 hours) at 2019 0749  Last data filed at 2019 0700  Gross per 24 hour   Intake 4729 ml   Output 3260 ml   Net 1469 ml     EXAM:  Gen: No distress. Sedated and intubated   Eyes: PERRL. No sclera icterus. No conjunctival injection. ENT: No discharge. Pharynx clear. Neck: Trachea midline. No obvious mass. Resp: No accessory muscle use. + crackles. No wheezes. + rhonchi. No dullness on percussion. CV: Sinus tachycardia. Regular rhythm. No murmur or rub. No edema. GI: Non-tender. Non-distended. No hernia. Skin: Warm and dry. No nodule on exposed extremities. Lymph: No cervical LAD. No supraclavicular LAD. M/S: No cyanosis. No joint deformity. No clubbing. L calf muscle wasting   Neuro: Sedated and intubated. Responsive to painful stimuli.  opened eyes to verbal commands   Psych: No agitation       Scheduled Meds:   furosemide  20 mg Intravenous Daily    enoxaparin  40 mg Subcutaneous Daily    aspirin  81 mg Oral Daily    piperacillin-tazobactam  3.375 g Intravenous Q8H    mupirocin   Nasal BID    famotidine (PEPCID) injection  20 mg Intravenous BID    albuterol sulfate HFA  4 puff Inhalation Q4H    ipratropium  4 puff Inhalation Q4H    insulin lispro  0-6 Units Subcutaneous Q4H    sodium chloride flush  10 mL Intravenous 2 times per day     PRN Meds:  magnesium sulfate, potassium chloride, heparin (porcine), heparin (porcine), midazolam, fentanNYL, glucose, dextrose, glucagon (rDNA), dextrose, ibuprofen, sodium chloride flush    Results:  CBC:   Recent Labs     05/14/19  1817 05/15/19  0625 05/16/19  0605   WBC 19.7* 36.0* 32.9*   HGB 16.0 15.7 14.0   HCT 48.9* 47.1 41.7   MCV 90.6 89.5 89.3    328 251     BMP:   Recent Labs     05/15/19  1800 05/16/19  0000 05/16/19  0605   * 132* 132*   K 3.3* 3.5 3.6    100 100   CO2 20* 21 21   PHOS  --   --  1.8*   BUN 11 10 11   CREATININE 0.7 0.7 0.6     LIVER PROFILE:   Recent Labs     05/14/19  1817 05/15/19  0625   * 119*   * 106*   BILITOT 0.6 1.4*   ALKPHOS 94 78       Cultures:  4/14 BCx NGTD  4/14 Resp Pending     Films:  Chest x-ray 5/16 imaging was reviewed by me and showed bilateral airspace disease with pleural effusion. High ET tube and satisfactory CVC positioning     ASSESSMENT:  · Acute hypoxemic respiratory failure. DDx primary cardiac arrhythmias, aspiration, drug overdose  · ARDS   · V. fib arrest   · Aspiration pneumonitis/pneumonia  · A. fib with RVR- now sinus tach  · Electrolytes disturbance  · Hemodynamic shock- cardiogenic versus septic  · Cardiomyopathy EF 25%   · Transaminitis  · Hyperglycemia  · Fever and severe Leukocytosis  · Cannabinoid abuse         PLAN:  · Mechanical ventilation as per my orders. The ventilator was adjusted by me at the bedside for unstable, life threatening respiratory failure.    · Decrease TV to 410 and increase RR 18 and ABG in 2 hrs   · Advance ETT 1.5 cm down   · Target tidal volume to 4-6 ml/kg

## 2019-05-16 NOTE — PROGRESS NOTES
Progress Note    Admit Date:  5/14/2019      49-year-old female with nonischemic cardiomyopathy, presented with V. fib arrest.  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 She is mostly homebound. She's been ill with a respiratory  symptoms for a couple of months. Mother found her down. CPR initiated,  S/P defibrillation twice en route to hospital.  Admitted  to ICU , on mechanical vent     echocardiogram with ejection fraction low at 25%     Head CT was negative. Subjective:  Ms. Shanell Thomason on mechanical ventilation  - patient is waking up now and following commands   -oxygenation improved FiO2 is down to 70% PEEP at 10   - status post bronchoscopy today, patient had significant amount of resp  secretions that were aspirated  - Off amiodarone drip and heparin drip,  currently on Lovenox. Objective:   BP 92/68   Pulse 119   Temp 98.3 °F (36.8 °C) (Axillary)   Resp 24   Ht 5' (1.524 m)   Wt 193 lb 2 oz (87.6 kg)   SpO2 95%   BMI 37.72 kg/m²       Intake/Output Summary (Last 24 hours) at 5/16/2019 1533  Last data filed at 5/16/2019 0700  Gross per 24 hour   Intake 1480 ml   Output 1800 ml   Net -320 ml         Physical Exam:  General: Intubated ,on mechanical ventilation . Sedated now    Skin:  Warm and dry  Neck:  JVD absent. Neck supple  Chest:  Diminished breath sounds . No wheezes, rales or rhonchi. Cardiovascular:  RRR ,S1S2 normal  Abdomen:  Soft, non tender, non distended, BS +  Extremities:  No edema. . Left lower extremity atrophy  Intact peripheral pulses.  Brisk cap refill, < 2 secs  Neuro:  Non focal . Moving all extremities spontaneosly when sedation decreased       Medications:   Scheduled Meds:   furosemide  20 mg Intravenous Daily    enoxaparin  40 mg Subcutaneous Daily    doxycycline (VIBRAMYCIN) IV  100 mg Intravenous Q12H    aspirin  81 mg Oral Daily    piperacillin-tazobactam  3.375 g Intravenous Q8H    mupirocin   Nasal BID    famotidine (PEPCID) injection  20 mg Intravenous BID    albuterol sulfate HFA  4 puff Inhalation Q4H    ipratropium  4 puff Inhalation Q4H    insulin lispro  0-6 Units Subcutaneous Q4H    sodium chloride flush  10 mL Intravenous 2 times per day       Continuous Infusions:   norepinephrine 10 mcg/min (05/16/19 0536)    sodium chloride 75 mL/hr at 05/16/19 0823    propofol 50 mcg/kg/min (05/16/19 1346)    dextrose      fentaNYL (SUBLIMAZE) infusion 125 mcg/hr (05/16/19 0833)       Data:  CBC:   Recent Labs     05/14/19  1817 05/15/19  0625 05/16/19  0605   WBC 19.7* 36.0* 32.9*   RBC 5.40* 5.27* 4.67   HGB 16.0 15.7 14.0   HCT 48.9* 47.1 41.7   MCV 90.6 89.5 89.3   RDW 12.4 12.7 12.4    328 251     BMP:   Recent Labs     05/15/19  1800 05/16/19  0000 05/16/19  0605   * 132* 132*   K 3.3* 3.5 3.6    100 100   CO2 20* 21 21   PHOS  --   --  1.8*   BUN 11 10 11   CREATININE 0.7 0.7 0.6     BNP: No results for input(s): BNP in the last 72 hours. PT/INR:   Recent Labs     05/15/19  0625   PROTIME 12.5   INR 1.10     APTT:   Recent Labs     05/15/19  1635 05/16/19  0000 05/16/19  0605   APTT 33.1 69.6* 57.9*     CARDIAC ENZYMES:   Recent Labs     05/15/19  1240 05/15/19  1800 05/16/19  0000   TROPONINI 0.26* 0.17* 0.17*     FASTING LIPID PANEL:No results found for: CHOL, HDL, TRIG  LIVER PROFILE:   Recent Labs     05/14/19  1817 05/15/19  0625 05/16/19  1120   * 119* 74*   * 106* 83*   BILIDIR  --   --  0.7*   BILITOT 0.6 1.4* 1.1*   ALKPHOS 94 78 76      Urine hCG negative     Rapid flu antigen negative         Radiology  XR CHEST PORTABLE   Final Result   Stable life support device positioning. Persistent perihilar predominant edema and small effusions. XR CHEST PORTABLE   Final Result   Interval improvement in pulmonary edema. XR CHEST PORTABLE   Final Result   Appropriate left IJ central venous catheter positioning.   No pneumothorax. Appropriate endotracheal tube positioning. Layering bilateral effusions. Equivocal for a component of superimposed   pulmonary edema. Atelectasis likely present. CT ABDOMEN PELVIS WO CONTRAST Additional Contrast? None   Final Result   1. Bilateral lower lobe atelectasis, right greater than left. 2. Acute right 2nd through 4th and left 3rd through 5th rib fractures         CT Chest Pulmonary Embolism W Contrast   Final Result   1. Bilateral lower lobe atelectasis, right greater than left. 2. Acute right 2nd through 4th and left 3rd through 5th rib fractures         CT Head WO Contrast   Final Result   No acute intracranial abnormality. Left maxillary sinus mucosal thickening. Air-fluid level in the sphenoid   sinus. Correlate with any clinical evidence of sinusitis. XR CHEST PORTABLE   Final Result   Findings as above which may be related to congestive heart failure and edema. Echo  Summary   Definity contrast administered.   Left ventricular systolic function is reduced with ejection fraction   estimated at 25-30 %.   Elevated left ventricular diastolic filling pressure: Septal E/e'' = 12.6   (for sinus tachycardia) .   Right ventricular systolic function is moderately reduced .   Mild mitral regurgitation.   Unable to obtain SPAP due to lack of tricuspid regurgitation.         Assessment:  Active Problems:    Acute respiratory failure (HCC)    Aspiration pneumonitis (HCC)    Atrial fibrillation with RVR (HCC)    Electrolyte disturbance    Shock ([address]    Transaminitis    Hyperglycemia    Pneumonia due to infectious organism    ARDS (adult respiratory distress syndrome) ([facility])    Mucus plugging of bronchi  Resolved Problems:    * No resolved hospital problems. *      Plan:    Cardiac V fib arrest   - status post successful resuscitation   - [facility] . Intensivist and cardiology consulted   - Tele and repeat trops   Ref.  Range 5/14/2019 18:17 5/14/2019 21:54 5/15/2019 01:15 5/15/2019 06:25 5/15/2019 12:40 5/15/2019 18:00 5/16/2019 00:00   Troponin Latest Ref Range: <0.01 ng/mL <0.01 0.57 (HH) 1.49 (HH) 0.49 (H) 0.26 (H) 0.17 (H) 0.17 (H)       Acute on chronic systolic CHF  Patient with severe nonischemic cardiomyopathy  - secondary to adriamycin treatment as a child . she had  chemotherapy   for  rhabdomyosarcoma. - repeat echo shows ejection fraction of 25%   -  on IV Lasix  - seen  by cardiology      Acute respiratory failure  - continue mechanical ventilation   - seen by intensivist   - hopefully SBT in AM    A. fib   - pt in afib following defib for V fib arrest. Loaded with amiodarone   - continued on  amiodarone drip, and heparin drip   - seen by cardiology  - off amio and hep gtt now . Septic shock   Pneumonia - ? Aspiration    probable anaerobic/  gram negative infection , risk for methicillin resistant Staph aureus as well  - pt had resp symptoms for 2 months   - Continue broad-spectrum IV antibiotics  vanc and zosyn   -continue pressors -on Levophed   - Check sputum cultures . Hypokalemia, Hypomagnesemia  - on replacement protocol. Hyperglycemia  -  place on low dose ssi. Elevated LFT's, mild  - possibly related to shock. Repeat.      Leukocytosis  - monitor.           DVT Prophylaxis: lovenox  Diet NPO Effective Now  Code Status: Full Code          Dayana Doan MD 5/16/2019 3:33 PM

## 2019-05-16 NOTE — PROGRESS NOTES
Dr Mason lockhart, see notes. Medical document received from family regarding  Pediatric Cardiology history from Cardiologist office NP at [facility] documents given to Dr Erica Wilder for review and placed in chart for scan into medical records.

## 2019-05-16 NOTE — PROGRESS NOTES
Oral temperature 102.6F. HR sinus tachycardia. Medicated with Ibuprofen 600 mg per OG.  Lia Cerna RN   Vitals:    05/15/19 2100   BP: 96/65   Pulse: 129   Resp: (!) 34   Temp:    SpO2: 96%

## 2019-05-17 ENCOUNTER — APPOINTMENT (OUTPATIENT)
Dept: GENERAL RADIOLOGY | Age: 34
DRG: 710 | End: 2019-05-17
Payer: MEDICAID

## 2019-05-17 LAB
ALBUMIN SERPL-MCNC: 2.8 G/DL (ref 3.4–5)
ALP BLD-CCNC: 73 U/L (ref 40–129)
ALT SERPL-CCNC: 71 U/L (ref 10–40)
ANION GAP SERPL CALCULATED.3IONS-SCNC: 9 MMOL/L (ref 3–16)
AST SERPL-CCNC: 52 U/L (ref 15–37)
BASE EXCESS ARTERIAL: -0.9 MMOL/L (ref -3–3)
BASE EXCESS ARTERIAL: -1.4 MMOL/L (ref -3–3)
BASOPHILS ABSOLUTE: 0 K/UL (ref 0–0.2)
BASOPHILS RELATIVE PERCENT: 0.2 %
BILIRUB SERPL-MCNC: 1.2 MG/DL (ref 0–1)
BILIRUBIN DIRECT: 0.8 MG/DL (ref 0–0.3)
BILIRUBIN, INDIRECT: 0.4 MG/DL (ref 0–1)
BUN BLDV-MCNC: 9 MG/DL (ref 7–20)
CALCIUM SERPL-MCNC: 7 MG/DL (ref 8.3–10.6)
CARBOXYHEMOGLOBIN ARTERIAL: 0.6 % (ref 0–1.5)
CARBOXYHEMOGLOBIN ARTERIAL: 0.7 % (ref 0–1.5)
CHLORIDE BLD-SCNC: 102 MMOL/L (ref 99–110)
CO2: 23 MMOL/L (ref 21–32)
CREAT SERPL-MCNC: <0.5 MG/DL (ref 0.6–1.1)
EKG ATRIAL RATE: 105 BPM
EKG DIAGNOSIS: NORMAL
EKG P AXIS: 44 DEGREES
EKG P-R INTERVAL: 156 MS
EKG Q-T INTERVAL: 302 MS
EKG QRS DURATION: 76 MS
EKG QTC CALCULATION (BAZETT): 399 MS
EKG R AXIS: 19 DEGREES
EKG T AXIS: 30 DEGREES
EKG VENTRICULAR RATE: 105 BPM
EOSINOPHILS ABSOLUTE: 0.1 K/UL (ref 0–0.6)
EOSINOPHILS RELATIVE PERCENT: 0.5 %
GFR AFRICAN AMERICAN: >60
GFR NON-AFRICAN AMERICAN: >60
GLUCOSE BLD-MCNC: 101 MG/DL (ref 70–99)
GLUCOSE BLD-MCNC: 113 MG/DL (ref 70–99)
GLUCOSE BLD-MCNC: 114 MG/DL (ref 70–99)
GLUCOSE BLD-MCNC: 114 MG/DL (ref 70–99)
GLUCOSE BLD-MCNC: 118 MG/DL (ref 70–99)
GLUCOSE BLD-MCNC: 120 MG/DL (ref 70–99)
GLUCOSE BLD-MCNC: 137 MG/DL (ref 70–99)
HCO3 ARTERIAL: 22.7 MMOL/L (ref 21–29)
HCO3 ARTERIAL: 24.3 MMOL/L (ref 21–29)
HCT VFR BLD CALC: 36.9 % (ref 36–48)
HEMOGLOBIN, ART, EXTENDED: 13 G/DL (ref 12–16)
HEMOGLOBIN, ART, EXTENDED: 13.7 G/DL (ref 12–16)
HEMOGLOBIN: 12.3 G/DL (ref 12–16)
LYMPHOCYTES ABSOLUTE: 1.2 K/UL (ref 1–5.1)
LYMPHOCYTES RELATIVE PERCENT: 4.7 %
MAGNESIUM: 1.7 MG/DL (ref 1.8–2.4)
MCH RBC QN AUTO: 29.3 PG (ref 26–34)
MCHC RBC AUTO-ENTMCNC: 33.3 G/DL (ref 31–36)
MCV RBC AUTO: 87.9 FL (ref 80–100)
METHEMOGLOBIN ARTERIAL: 0.1 %
METHEMOGLOBIN ARTERIAL: 0.2 %
MONOCYTES ABSOLUTE: 1.2 K/UL (ref 0–1.3)
MONOCYTES RELATIVE PERCENT: 4.9 %
NEUTROPHILS ABSOLUTE: 22.3 K/UL (ref 1.7–7.7)
NEUTROPHILS RELATIVE PERCENT: 89.7 %
O2 CONTENT ARTERIAL: 12 ML/DL
O2 CONTENT ARTERIAL: 17 ML/DL
O2 SAT, ARTERIAL: 60.8 %
O2 SAT, ARTERIAL: 95.2 %
O2 THERAPY: ABNORMAL
O2 THERAPY: ABNORMAL
PCO2 ARTERIAL: 34.3 MMHG (ref 35–45)
PCO2 ARTERIAL: 44.6 MMHG (ref 35–45)
PDW BLD-RTO: 12.9 % (ref 12.4–15.4)
PERFORMED ON: ABNORMAL
PH ARTERIAL: 7.35 (ref 7.35–7.45)
PH ARTERIAL: 7.44 (ref 7.35–7.45)
PHOSPHORUS: 1.7 MG/DL (ref 2.5–4.9)
PLATELET # BLD: 239 K/UL (ref 135–450)
PMV BLD AUTO: 7.5 FL (ref 5–10.5)
PO2 ARTERIAL: 33.2 MMHG (ref 75–108)
PO2 ARTERIAL: 72.6 MMHG (ref 75–108)
POTASSIUM SERPL-SCNC: 3.3 MMOL/L (ref 3.5–5.1)
RBC # BLD: 4.2 M/UL (ref 4–5.2)
SODIUM BLD-SCNC: 134 MMOL/L (ref 136–145)
TCO2 ARTERIAL: 23.8 MMOL/L
TCO2 ARTERIAL: 25.7 MMOL/L
TOTAL CK: 164 U/L (ref 26–192)
TOTAL PROTEIN: 6.4 G/DL (ref 6.4–8.2)
WBC # BLD: 24.9 K/UL (ref 4–11)

## 2019-05-17 PROCEDURE — 83735 ASSAY OF MAGNESIUM: CPT

## 2019-05-17 PROCEDURE — 87086 URINE CULTURE/COLONY COUNT: CPT

## 2019-05-17 PROCEDURE — 87040 BLOOD CULTURE FOR BACTERIA: CPT

## 2019-05-17 PROCEDURE — 6360000002 HC RX W HCPCS: Performed by: INTERNAL MEDICINE

## 2019-05-17 PROCEDURE — 2580000003 HC RX 258: Performed by: INTERNAL MEDICINE

## 2019-05-17 PROCEDURE — 99232 SBSQ HOSP IP/OBS MODERATE 35: CPT | Performed by: INTERNAL MEDICINE

## 2019-05-17 PROCEDURE — 36415 COLL VENOUS BLD VENIPUNCTURE: CPT

## 2019-05-17 PROCEDURE — 71045 X-RAY EXAM CHEST 1 VIEW: CPT

## 2019-05-17 PROCEDURE — 85025 COMPLETE CBC W/AUTO DIFF WBC: CPT

## 2019-05-17 PROCEDURE — 2500000003 HC RX 250 WO HCPCS: Performed by: INTERNAL MEDICINE

## 2019-05-17 PROCEDURE — 94761 N-INVAS EAR/PLS OXIMETRY MLT: CPT

## 2019-05-17 PROCEDURE — 82550 ASSAY OF CK (CPK): CPT

## 2019-05-17 PROCEDURE — 99291 CRITICAL CARE FIRST HOUR: CPT | Performed by: INTERNAL MEDICINE

## 2019-05-17 PROCEDURE — 80074 ACUTE HEPATITIS PANEL: CPT

## 2019-05-17 PROCEDURE — 2000000000 HC ICU R&B

## 2019-05-17 PROCEDURE — 84100 ASSAY OF PHOSPHORUS: CPT

## 2019-05-17 PROCEDURE — 6370000000 HC RX 637 (ALT 250 FOR IP): Performed by: INTERNAL MEDICINE

## 2019-05-17 PROCEDURE — 2700000000 HC OXYGEN THERAPY PER DAY

## 2019-05-17 PROCEDURE — 94003 VENT MGMT INPAT SUBQ DAY: CPT

## 2019-05-17 PROCEDURE — 82803 BLOOD GASES ANY COMBINATION: CPT

## 2019-05-17 PROCEDURE — 80076 HEPATIC FUNCTION PANEL: CPT

## 2019-05-17 PROCEDURE — 80048 BASIC METABOLIC PNL TOTAL CA: CPT

## 2019-05-17 PROCEDURE — 94640 AIRWAY INHALATION TREATMENT: CPT

## 2019-05-17 PROCEDURE — 2580000003 HC RX 258: Performed by: EMERGENCY MEDICINE

## 2019-05-17 PROCEDURE — 94750 HC PULMONARY COMPLIANCE STUDY: CPT

## 2019-05-17 PROCEDURE — 36600 WITHDRAWAL OF ARTERIAL BLOOD: CPT

## 2019-05-17 PROCEDURE — 93010 ELECTROCARDIOGRAM REPORT: CPT | Performed by: INTERNAL MEDICINE

## 2019-05-17 PROCEDURE — 93005 ELECTROCARDIOGRAM TRACING: CPT | Performed by: INTERNAL MEDICINE

## 2019-05-17 PROCEDURE — 2580000003 HC RX 258

## 2019-05-17 RX ORDER — SODIUM CHLORIDE 9 MG/ML
INJECTION, SOLUTION INTRAVENOUS
Status: COMPLETED
Start: 2019-05-17 | End: 2019-05-17

## 2019-05-17 RX ORDER — POTASSIUM CHLORIDE 29.8 MG/ML
20 INJECTION INTRAVENOUS
Status: COMPLETED | OUTPATIENT
Start: 2019-05-17 | End: 2019-05-17

## 2019-05-17 RX ORDER — MAGNESIUM SULFATE IN WATER 40 MG/ML
2 INJECTION, SOLUTION INTRAVENOUS ONCE
Status: COMPLETED | OUTPATIENT
Start: 2019-05-17 | End: 2019-05-17

## 2019-05-17 RX ADMIN — Medication 4 PUFF: at 11:23

## 2019-05-17 RX ADMIN — Medication 4 PUFF: at 07:15

## 2019-05-17 RX ADMIN — SODIUM CHLORIDE: 9 INJECTION, SOLUTION INTRAVENOUS at 08:13

## 2019-05-17 RX ADMIN — PROPOFOL 50 MCG/KG/MIN: 10 INJECTION, EMULSION INTRAVENOUS at 04:42

## 2019-05-17 RX ADMIN — NOREPINEPHRINE BITARTRATE 8 MCG/MIN: 1 INJECTION INTRAVENOUS at 19:19

## 2019-05-17 RX ADMIN — Medication 10 ML: at 08:02

## 2019-05-17 RX ADMIN — PROPOFOL 50 MCG/KG/MIN: 10 INJECTION, EMULSION INTRAVENOUS at 08:17

## 2019-05-17 RX ADMIN — MUPIROCIN: 20 OINTMENT TOPICAL at 21:45

## 2019-05-17 RX ADMIN — Medication 4 PUFF: at 00:04

## 2019-05-17 RX ADMIN — PIPERACILLIN SODIUM,TAZOBACTAM SODIUM 3.38 G: 3; .375 INJECTION, POWDER, FOR SOLUTION INTRAVENOUS at 21:45

## 2019-05-17 RX ADMIN — Medication 4 PUFF: at 23:16

## 2019-05-17 RX ADMIN — Medication 4 PUFF: at 03:59

## 2019-05-17 RX ADMIN — POTASSIUM CHLORIDE 20 MEQ: 400 INJECTION, SOLUTION INTRAVENOUS at 11:44

## 2019-05-17 RX ADMIN — Medication 4 PUFF: at 15:45

## 2019-05-17 RX ADMIN — ASPIRIN 81 MG 81 MG: 81 TABLET ORAL at 08:01

## 2019-05-17 RX ADMIN — POTASSIUM PHOSPHATE, MONOBASIC AND POTASSIUM PHOSPHATE, DIBASIC 10 MMOL: 224; 236 INJECTION, SOLUTION INTRAVENOUS at 12:41

## 2019-05-17 RX ADMIN — SODIUM CHLORIDE 250 ML: 9 INJECTION, SOLUTION INTRAVENOUS at 22:08

## 2019-05-17 RX ADMIN — FENTANYL CITRATE 150 MCG/HR: 50 INJECTION, SOLUTION INTRAMUSCULAR; INTRAVENOUS at 08:51

## 2019-05-17 RX ADMIN — FENTANYL CITRATE 200 MCG/HR: 50 INJECTION, SOLUTION INTRAMUSCULAR; INTRAVENOUS at 22:02

## 2019-05-17 RX ADMIN — FENTANYL CITRATE 175 MCG/HR: 50 INJECTION, SOLUTION INTRAMUSCULAR; INTRAVENOUS at 15:39

## 2019-05-17 RX ADMIN — DOXYCYCLINE 100 MG: 100 INJECTION, POWDER, LYOPHILIZED, FOR SOLUTION INTRAVENOUS at 21:44

## 2019-05-17 RX ADMIN — Medication 10 ML: at 21:44

## 2019-05-17 RX ADMIN — PROPOFOL 50 MCG/KG/MIN: 10 INJECTION, EMULSION INTRAVENOUS at 19:39

## 2019-05-17 RX ADMIN — MIDAZOLAM HYDROCHLORIDE 2 MG: 2 INJECTION, SOLUTION INTRAMUSCULAR; INTRAVENOUS at 19:51

## 2019-05-17 RX ADMIN — VANCOMYCIN HYDROCHLORIDE 1250 MG: 1 INJECTION, POWDER, LYOPHILIZED, FOR SOLUTION INTRAVENOUS at 22:46

## 2019-05-17 RX ADMIN — FUROSEMIDE 20 MG: 10 INJECTION, SOLUTION INTRAVENOUS at 08:02

## 2019-05-17 RX ADMIN — ENOXAPARIN SODIUM 40 MG: 40 INJECTION SUBCUTANEOUS at 08:02

## 2019-05-17 RX ADMIN — MIDAZOLAM HYDROCHLORIDE 2 MG: 2 INJECTION, SOLUTION INTRAMUSCULAR; INTRAVENOUS at 20:59

## 2019-05-17 RX ADMIN — FENTANYL CITRATE 150 MCG/HR: 50 INJECTION, SOLUTION INTRAMUSCULAR; INTRAVENOUS at 00:20

## 2019-05-17 RX ADMIN — FAMOTIDINE 20 MG: 10 INJECTION, SOLUTION INTRAVENOUS at 08:01

## 2019-05-17 RX ADMIN — PIPERACILLIN SODIUM,TAZOBACTAM SODIUM 3.38 G: 3; .375 INJECTION, POWDER, FOR SOLUTION INTRAVENOUS at 06:46

## 2019-05-17 RX ADMIN — MIDAZOLAM HYDROCHLORIDE 2 MG: 2 INJECTION, SOLUTION INTRAMUSCULAR; INTRAVENOUS at 04:43

## 2019-05-17 RX ADMIN — Medication 4 PUFF: at 19:37

## 2019-05-17 RX ADMIN — PIPERACILLIN SODIUM,TAZOBACTAM SODIUM 3.38 G: 3; .375 INJECTION, POWDER, FOR SOLUTION INTRAVENOUS at 15:01

## 2019-05-17 RX ADMIN — PROPOFOL 50 MCG/KG/MIN: 10 INJECTION, EMULSION INTRAVENOUS at 22:46

## 2019-05-17 RX ADMIN — DOXYCYCLINE 100 MG: 100 INJECTION, POWDER, LYOPHILIZED, FOR SOLUTION INTRAVENOUS at 08:02

## 2019-05-17 RX ADMIN — MIDAZOLAM HYDROCHLORIDE 1 MG/HR: 5 INJECTION, SOLUTION INTRAMUSCULAR; INTRAVENOUS at 23:35

## 2019-05-17 RX ADMIN — PROPOFOL 50 MCG/KG/MIN: 10 INJECTION, EMULSION INTRAVENOUS at 12:02

## 2019-05-17 RX ADMIN — FAMOTIDINE 20 MG: 10 INJECTION, SOLUTION INTRAVENOUS at 21:45

## 2019-05-17 RX ADMIN — VANCOMYCIN HYDROCHLORIDE 1250 MG: 1 INJECTION, POWDER, LYOPHILIZED, FOR SOLUTION INTRAVENOUS at 12:50

## 2019-05-17 RX ADMIN — POTASSIUM CHLORIDE 20 MEQ: 400 INJECTION, SOLUTION INTRAVENOUS at 10:30

## 2019-05-17 RX ADMIN — NOREPINEPHRINE BITARTRATE 10 MCG/MIN: 1 INJECTION INTRAVENOUS at 04:43

## 2019-05-17 RX ADMIN — MAGNESIUM SULFATE HEPTAHYDRATE 2 G: 40 INJECTION, SOLUTION INTRAVENOUS at 10:40

## 2019-05-17 RX ADMIN — MIDAZOLAM HYDROCHLORIDE 2 MG: 2 INJECTION, SOLUTION INTRAMUSCULAR; INTRAVENOUS at 22:30

## 2019-05-17 RX ADMIN — IBUPROFEN 600 MG: 100 SUSPENSION ORAL at 06:48

## 2019-05-17 RX ADMIN — PROPOFOL 50 MCG/KG/MIN: 10 INJECTION, EMULSION INTRAVENOUS at 17:15

## 2019-05-17 ASSESSMENT — PULMONARY FUNCTION TESTS
PIF_VALUE: 33
PIF_VALUE: 40
PIF_VALUE: 37
PIF_VALUE: 36
PIF_VALUE: 37
PIF_VALUE: 33
PIF_VALUE: 32
PIF_VALUE: 33
PIF_VALUE: 21
PIF_VALUE: 35
PIF_VALUE: 38
PIF_VALUE: 34
PIF_VALUE: 34
PIF_VALUE: 33
PIF_VALUE: 32
PIF_VALUE: 32
PIF_VALUE: 36
PIF_VALUE: 37
PIF_VALUE: 34
PIF_VALUE: 27
PIF_VALUE: 35
PIF_VALUE: 37
PIF_VALUE: 32
PIF_VALUE: 34
PIF_VALUE: 9.1
PIF_VALUE: 31
PIF_VALUE: 36

## 2019-05-17 ASSESSMENT — PAIN SCALES - GENERAL
PAINLEVEL_OUTOF10: 0
PAINLEVEL_OUTOF10: 0

## 2019-05-17 NOTE — PROGRESS NOTES
05/16/19 2245   Vent Information   Vent Type 840   Vent Mode AC/VC   Vt Ordered 410 mL   Rate Set 18 bmp   Peak Flow 80 L/min   Pressure Support 0 cmH20   FiO2  70 %   Sensitivity 3   PEEP/CPAP 10   I Time/ I Time % 0 s   Humidification Source Heated wire   Humidification Temp 37   Humidification Temp Measured 36.8   Circuit Condensation Drained   Vent Patient Data   High Peep/I Pressure 0   Peak Inspiratory Pressure 37 cmH2O   Mean Airway Pressure 18 cmH20   Rate Measured 25 br/min   Vt Exhaled 63 mL   Minute Volume 10.9 Liters   I:E Ratio 2.00:1   Plateau Pressure 22 DEI20   Cough/Sputum   Sputum Amount None   Spontaneous Breathing Trial (SBT) RT Doc   Pulse 115   SpO2 95 %   Breath Sounds   Right Upper Lobe Expiratory Wheezes   Right Middle Lobe Expiratory Wheezes   Right Lower Lobe Expiratory Wheezes   Left Upper Lobe Expiratory Wheezes   Left Lower Lobe Expiratory Wheezes   Additional Respiratory  Assessments   Resp 17   Position Semi-Triplett's   Alarm Settings   High Pressure Alarm 45 cmH2O   Low Minute Volume Alarm 4 L/min   High Respiratory Rate 50 br/min   Non-Surgical Airway Endo Tracheal Tube   Placement Date/Time: 05/14/19 1808   Timeout: Patient; Appropriate Equipment  Placed By: In ED  Inserted by: Rhona Diaz NP   Insertion attempts: 1  Airway Device: Endo Tracheal Tube  Size: 7.5  Placement Verified By[de-identified] Auscultation; Chest X-ray;Colorimetric E...    Secured at 23 cm   Measured From Lips   Secured Location Right   Secured By Commercial tube forde   Site Condition Dry

## 2019-05-17 NOTE — PROGRESS NOTES
Progress Note    Admit Date:  5/14/2019      25-year-old female with nonischemic cardiomyopathy, presented with V. fib arrest.  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 Hep C + . She is mostly homebound. She's been ill with  respiratory symptoms for a couple of months. Mother found her down. CPR initiated,  S/P defibrillation twice en route to hospital.  Admitted  to ICU , on mechanical vent     echocardiogram with ejection fraction low at 25%   Head CT was negative. Subjective:  Ms. Pushpa Goel on mechanical ventilation  - patient is waking up now and following commands   - oxygenation improved FiO2 is down to 60% PEEP at 8  - status post bronchoscopy 5/16. patient had significant amount of resp secretions that were aspirated  - Off amiodarone drip and heparin drip,  currently on Lovenox. - Remains on levophed. Objective:   BP 93/65   Pulse 101   Temp 98.8 °F (37.1 °C) (Axillary)   Resp 17   Ht 5' (1.524 m)   Wt 199 lb 12.8 oz (90.6 kg)   SpO2 94%   BMI 39.02 kg/m²       Intake/Output Summary (Last 24 hours) at 5/17/2019 1757  Last data filed at 5/17/2019 1749  Gross per 24 hour   Intake 4565.96 ml   Output 2015 ml   Net 2550.96 ml         Physical Exam:  General: Intubated ,on mechanical ventilation . Sedated now    Skin:  Warm and dry  Neck:  JVD absent. Neck supple  Chest:  Diminished breath sounds . No wheezes, rales or rhonchi. Cardiovascular:  RRR ,S1S2 normal  Abdomen:  Soft, non tender, non distended, BS +  Extremities:  No edema. . Left lower extremity atrophy  Intact peripheral pulses.  Brisk cap refill, < 2 secs  Neuro:  Non focal . Moving all extremities spontaneosly when sedation decreased       Medications:   Scheduled Meds:   vancomycin  1,250 mg Intravenous Q12H    furosemide  20 mg Intravenous Daily    enoxaparin  40 mg Subcutaneous Daily    doxycycline (VIBRAMYCIN) IV  100 mg Intravenous Q12H    aspirin  81

## 2019-05-17 NOTE — PROGRESS NOTES
Pts mother at bedside- updated on pt status. No changes noted since previous assessment. All lines WNL. Pt remains on Fentanyl, Propofol & levophed.   Jc Grewal RN, BSN

## 2019-05-17 NOTE — PROGRESS NOTES
Pharmacy Note  Vancomycin Consult    Tarah Rowley is a 26 y.o. female started on Vancomycin for PNA; consult received from Dr. Sugar Davis to manage therapy. Also receiving the following antibiotics: Zosyn, Doxy. Patient Active Problem List   Diagnosis    Acute respiratory failure (HCC)    Aspiration pneumonitis (HCC)    Atrial fibrillation with RVR (HCC)    Electrolyte disturbance    Shock ([address]    Transaminitis    Hyperglycemia    Pneumonia due to infectious organism    ARDS (adult respiratory distress syndrome) ([facility])    Mucus plugging of bronchi       Allergies:  Patient has no known allergies. Temp max:     Recent Labs     05/16/19  0605 05/17/19  0620   BUN 11 9       Recent Labs     05/16/19  0605 05/17/19  0620   CREATININE 0.6 <0.5*       Recent Labs     05/16/19  0605 05/17/19  0620   WBC 32.9* 24.9*         Intake/Output Summary (Last 24 hours) at 5/17/2019 1118  Last data filed at 5/17/2019 1040  Gross per 24 hour   Intake 4941.96 ml   Output 1540 ml   Net 3401.96 ml       Culture Date      Source                       Results  BAL= gm positive cocci    Ht Readings from Last 1 Encounters:   05/15/19 5' (1.524 m)        Wt Readings from Last 1 Encounters:   05/17/19 199 lb 12.8 oz (90.6 kg)         Body mass index is 39.02 kg/m². CrCl cannot be calculated (This lab value cannot be used to calculate CrCl because it is not a number: <0.5). Goal Trough Level: 15-18 mcg/mL    Assessment/Plan:  Will initiate vancomycin 1250 mg IV every 12 hours. Trough to be on 18th at 2230    Thank you for the consult. Will continue to follow.   Mira Correa Pharm D 1/28/087110:61 AM  .

## 2019-05-17 NOTE — PROGRESS NOTES
05/16/19 1956   Vent Information   $Ventilation $Subsequent Day   Vent Type 840   Vent Mode AC/VC   Vt Ordered 410 mL   Rate Set 18 bmp   Peak Flow 80 L/min   Pressure Support 0 cmH20   FiO2  70 %   Sensitivity 3   PEEP/CPAP 10   I Time/ I Time % 0 s   Humidification Source Heated wire   Humidification Temp 37   Humidification Temp Measured 37   Circuit Condensation Drained   Vent Patient Data   High Peep/I Pressure 0   Peak Inspiratory Pressure 35 cmH2O   Mean Airway Pressure 18 cmH20   Rate Measured 25 br/min   Vt Exhaled 509 mL   Minute Volume 11.3 Liters   I:E Ratio 1:5.00   Cough/Sputum   Sputum Amount None   Spontaneous Breathing Trial (SBT) RT Doc   Pulse 123   SpO2 97 %   Breath Sounds   Right Upper Lobe Rhonchi   Right Middle Lobe Rhonchi   Right Lower Lobe Rhonchi   Left Upper Lobe Rhonchi   Left Lower Lobe Rhonchi   Additional Respiratory  Assessments   Resp 19   Position Semi-Triplett's   Alarm Settings   High Pressure Alarm 45 cmH2O   Low Minute Volume Alarm 4 L/min   High Respiratory Rate 50 br/min   Patient Observation   Observations water level good, ambu bag present    Non-Surgical Airway Endo Tracheal Tube   Placement Date/Time: 05/14/19 1808   Timeout: Patient; Appropriate Equipment  Placed By: In ED  Inserted by: Mynor Turcios NP   Insertion attempts: 1  Airway Device: Endo Tracheal Tube  Size: 7.5  Placement Verified By[de-identified] Auscultation; Chest X-ray;Colorimetric E...    Secured at 23 cm   Measured From Lips   Secured Location Right   Secured By Commercial tube forde   Site Condition Dry

## 2019-05-17 NOTE — PROGRESS NOTES
05/17/19 1545   Vent Information   Vent Type 840   Vent Mode AC/VC   Vt Ordered 410 mL   Rate Set 18 bmp   Peak Flow 80 L/min   Pressure Support 0 cmH20   FiO2  60 %   Sensitivity 3   PEEP/CPAP 10   I Time/ I Time % 0 s   Humidification Source Heated wire   Humidification Temp 35.7   Circuit Condensation Drained   Vent Patient Data   High Peep/I Pressure 0   Peak Inspiratory Pressure 33 cmH2O   Mean Airway Pressure 15 cmH20   Rate Measured 20 br/min   Vt Exhaled 461 mL   Minute Volume 8.84 Liters   I:E Ratio 1:5.00   Cough/Sputum   Sputum How Obtained Endotracheal   Cough Productive   Sputum Amount Moderate   Sputum Color Brown   Tenacity Thick   Spontaneous Breathing Trial (SBT) RT Doc   Pulse 97   SpO2 96 %   Breath Sounds   Right Upper Lobe Inspiratory Wheezes   Right Middle Lobe Inspiratory Wheezes   Right Lower Lobe Inspiratory Wheezes   Left Upper Lobe Inspiratory Wheezes   Left Lower Lobe Inspiratory Wheezes   Additional Respiratory  Assessments   Resp 22   Position Semi-Triplett's   Subglottic Suction Done? Yes   Alarm Settings   High Pressure Alarm 45 cmH2O   Low Minute Volume Alarm 4 L/min   High Respiratory Rate 50 br/min   Patient Observation   Observations #7.5 ETT @ 24 lip line   Non-Surgical Airway Endo Tracheal Tube   Placement Date/Time: 05/14/19 1073   Timeout: Patient; Appropriate Equipment  Placed By: In ED  Inserted by: Laine Bryan NP   Insertion attempts: 1  Airway Device: Endo Tracheal Tube  Size: 7.5  Placement Verified By[de-identified] Auscultation; Chest X-ray;Colorimetric E...    Secured at 24 cm   Measured From Lips   Secured Location Right   Secured By Commercial tube forde   Site Condition Dry

## 2019-05-17 NOTE — PLAN OF CARE
Nutrition Problem: Inadequate oral intake  Intervention: Food and/or Nutrient Delivery: Continue NPO, Start Tube Feeding  Nutritional Goals: patient will tolerate Vital High-Protein at goal rate of 40 ml/hr x 20 hours without GI distress, without s/s of aspiration, and without additional lab/fluid disturbances; weight will remain stable during remainder of admission

## 2019-05-17 NOTE — FLOWSHEET NOTE
Met with Pt's grandmother at bedside. Listened and offered emotional support. Grandmother hopeful for Pt. Shared about Pt's history of illnesses. Prayed with grandmother for Pt's care and family's support. Will continue to follow as needed. [id number] [facility]       05/17/19 1129   Encounter Summary   Services provided to: Family   Referral/Consult From: [facility] Family members   Continue Visiting Yes  (5/17 initial, sppt and prayer w/ grandmother, FOLLOW)   Complexity of Encounter Moderate   Length of Encounter 15 minutes   Spiritual Assessment Completed Yes   Spiritual/Pentecostal   Type Spiritual support   Assessment Calm; Approachable; Anxious; Hopeful;Coping   Intervention Active listening;Explored feelings, thoughts, concerns;Prayer;Discussed illness/injury and it's impact   Outcome Comfort;Expressed gratitude;Engaged in conversation;Expressed feelings/needs/concerns

## 2019-05-17 NOTE — PROGRESS NOTES
Patient's  Mother called in for update. She informed this nurse that she and the patient are both recovering drug addicts. She said her drug of choice was crack and the patient's drug of choice was heroin and that they both have been drug free for seven years.  Reji Leone RN

## 2019-05-17 NOTE — PROGRESS NOTES
AM assessment completed. VSS. Pt on levophed, propofol & fentanyl- see MAR for titration. Vail in place for strict I/o & pt is on lasix. PIV WNL. LJ CVC WNL. Pt in bilateral soft wrist restraints for safety. AM labs reviewed. No new orders at present time. Awaiting care rounds.    Kaity Rosa RN, BSN

## 2019-05-17 NOTE — CARE COORDINATION
Case Management Assessment  Initial Evaluation    Date/Time of Evaluation: 5/17/2019 10:40 AM  Assessment Completed by: Dinora Perla    Patient Name: Michelle Hampton  YOB: 1985  Diagnosis: Acute respiratory failure ([address] [J96.00]  Date / Time: 5/14/2019  5:59 PM  Admission status/Date: Inpatient 5/14/2019  Chart Reviewed: Yes      Patient Interviewed: No- intubated/sedated  Family Interviewed:  Yes - mother/grandmother at bedside      Hospitalization in the last 30 days:  No    Contacts  :   Musa De Jesus  Relationship to Patient:  mother  Phone Number:   [phone]  Alternate Contact:   Anand Brown  Relationship to Patient:   spouse  Phone Number:   [phone]    Met with: mother/grandmother    [facility]: [facility] [address] required for SNF : Y, N        3 night stay required - Y, N    ADLS  Support Systems:    Transportation: family    Meal Preparation: self/family    Housing  Home Environment: Lives with mother  Steps:   Plans to Return to Present Housing: Yes  Other Identified Issues: Mother and grand mother express concerns for not having access to 24/7 supervision as mother works and other family members do not live nearby. Home Care Information  Currently active with [address] : No     Passport/Waiver : No  :                      Phone Number:    Passport/Waiver Services: NA          Durable Medical Equipment   DME Provider:   Equipment:   Walker__Cane__RTS__ BSC__Shower Chair__  02__ HHN__ CPAP__  BiPap__  Hospital Bed__ W/C___ Other__________      Has Home O2 in place on admit:  No  Informed of need to bring portable home O2 tank on day of discharge for nursing to connect prior to leaving:   No  Verbalized agreement/Understanding:   No    Community Service Affiliation  Dialysis:  No    · Name:  · Location  · Dialysis Schedule:  · Phone:   · Fax:     Outpatient PT/OT: No    Cancer Center: No     CHF Clinic: No Pulmonary Rehab: No  Pain Clinic: No  [facility]: No    Wound Clinic: No     Other: NA    DISCHARGE PLAN: Chart reviewed. Met with mother and grandmother at bedside and explained the role of the CM. Plan: TBD. Concerns for not having access to 24/7 supervision when pt is released from the hospital to home. Mother works and other family member live 1-2 hours away. + Active with [facility] and has at [facility] there, Nathaly Lindsey [phone]. Update called to Mindi Flowers 44. He will explore home support options. +CM following. Explained Case Management role/services.

## 2019-05-17 NOTE — PROGRESS NOTES
100 mg Intravenous Q12H    aspirin  81 mg Oral Daily    piperacillin-tazobactam  3.375 g Intravenous Q8H    mupirocin   Nasal BID    famotidine (PEPCID) injection  20 mg Intravenous BID    albuterol sulfate HFA  4 puff Inhalation Q4H    ipratropium  4 puff Inhalation Q4H    insulin lispro  0-6 Units Subcutaneous Q4H    sodium chloride flush  10 mL Intravenous 2 times per day     PRN Meds:  magnesium sulfate, potassium chloride, midazolam, fentanNYL, glucose, dextrose, glucagon (rDNA), dextrose, ibuprofen, sodium chloride flush    Results:  CBC:   Recent Labs     05/15/19  0625 05/16/19  0605 05/17/19  0620   WBC 36.0* 32.9* 24.9*   HGB 15.7 14.0 12.3   HCT 47.1 41.7 36.9   MCV 89.5 89.3 87.9    251 239     BMP:   Recent Labs     05/16/19  0000 05/16/19  0605 05/17/19  0620   * 132* 134*   K 3.5 3.6 3.3*    100 102   CO2 21 21 23   PHOS  --  1.8* 1.7*   BUN 10 11 9   CREATININE 0.7 0.6 <0.5*     LIVER PROFILE:   Recent Labs     05/15/19  0625 05/16/19  1120 05/17/19  0620   * 74* 52*   * 83* 71*   BILIDIR  --  0.7* 0.8*   BILITOT 1.4* 1.1* 1.2*   ALKPHOS 78 76 73       Cultures:  4/14 BCx NGTD  4/14 Resp Pending   4/16 BAL Pending       Films:  Chest x-ray 5/17 imaging was reviewed by me and showed low lung volume with worse R sided ASD with satisfactory ETT and CVC positions.        ASSESSMENT:  · Acute hypoxemic respiratory failure. DDx primary cardiac arrhythmias, aspiration, drug overdose  · ARDS   · V. fib arrest   · Aspiration pneumonitis/pneumonia  · A. fib with RVR- now sinus tach  · Electrolytes disturbance  · Hemodynamic shock- cardiogenic versus septic  · Cardiomyopathy EF 25%   · Transaminitis  · Hyperglycemia  · Fever and severe Leukocytosis  · Cannabinoid abuse - history of heroin abuse       PLAN:  · Mechanical ventilation as per my orders. The ventilator was adjusted by me at the bedside for unstable, life threatening respiratory failure.    · Target tidal volume to 4-6 ml/kg   · Target plateau pressures <75 cm H2O   · Follow ABG  · IV Propofol for sedation, target RASS -2, with daily spontaneous awakening trial.    · Versed and Fentanyl PRN pain, gtt as needed  · Head of bed 30 degrees or higher at all times  · Daily spontaneous breathing trial once PEEP less than 8, FiO2 less than 55%. · Follow up Cx   · Broad spectrum antibiotics to include Zosyn D#3 and Doxy D#2. Add Vanc IV  · Repeat Cx   · IV Levophed to keep MAP > 65 mmHg or SBP>90.  · Insulin sliding scale  · Amiodarone and Heparin drip - off per cardiology   · Follow electrolytes and LFTs  · Acute hepatitis panel   · SSI   · Lasix 20 IV daily   · Electrolytes replacement   · Off Zyprexa, Trazodone and Neurontin   · Tube feed today   · GI prophylaxis: Pepcid  · DVT prophylaxis: Lovenox    · MRSA prophylaxis: Bactroban  · Discussed with family at the bedside and multiple good questions were answered              Total critical care time caring for this patient with life threatening, unstable organ failure, including direct patient contact, management of life support systems, review of data including imaging and labs, discussions with other team members and physicians is 33 minutes so far today, excluding procedures.

## 2019-05-17 NOTE — PROGRESS NOTES
High risk vesicant drug infusing:  _____yes_____    Multiple incompatible medications infusing:  ____no_____    CVP Monitoring:  ___no______    Extremely difficult IV access challenge:  ____yes____    Continued need for central line access:  ____yes______    Addressed with physician:  __yes______    RIGHT PATIENT, RIGHT TIME, RIGHT LINE

## 2019-05-17 NOTE — PROGRESS NOTES
Summary (Last 24 hours) at 5/17/2019 0526  Last data filed at 5/17/2019 0018  Gross per 24 hour   Intake 4108.03 ml   Output 1900 ml   Net 2208.03 ml       TELEMETRY:sinus tachy    Physical Exam:  General: No Respiratory distress, appears well developed and well nourished. Eyes:  Sclera nonicteric  Nose/Sinuses:  negative findings: nose shows no deformity, asymmetry, or inflammation, nasal mucosa normal, septum midline with no perforation or bleeding  Back:  no pain to palpation  Joint:  no active joint inflammation  Musculoskeletal:  negative  Skin:  Warm and dry  Neck:  Negative for JVD and Carotid Bruits. Chest:  Clear to auscultation, respiration easy  Cardiovascular:  RRR, S1S2 diminished, no murmur, no rub or thrill.   Abdomen:  Soft normal liver and spleen, normal bowel sounds  Extremities:   No edema, clubbing, cyanosis,  Pulses: pedal pulses are normal.  Neuro: neg babinski    Medications:    furosemide  20 mg Intravenous Daily    enoxaparin  40 mg Subcutaneous Daily    doxycycline (VIBRAMYCIN) IV  100 mg Intravenous Q12H    aspirin  81 mg Oral Daily    piperacillin-tazobactam  3.375 g Intravenous Q8H    mupirocin   Nasal BID    famotidine (PEPCID) injection  20 mg Intravenous BID    albuterol sulfate HFA  4 puff Inhalation Q4H    ipratropium  4 puff Inhalation Q4H    insulin lispro  0-6 Units Subcutaneous Q4H    sodium chloride flush  10 mL Intravenous 2 times per day      norepinephrine 10 mcg/min (05/17/19 0443)    sodium chloride 75 mL/hr at 05/16/19 1731    propofol 50 mcg/kg/min (05/17/19 0442)    dextrose      fentaNYL (SUBLIMAZE) infusion 150 mcg/hr (05/17/19 0020)     magnesium sulfate, potassium chloride, midazolam, fentanNYL, glucose, dextrose, glucagon (rDNA), dextrose, ibuprofen, sodium chloride flush    Lab Data:  CBC:   Recent Labs     05/14/19  1817 05/15/19  0625 05/16/19  0605   WBC 19.7* 36.0* 32.9*   HGB 16.0 15.7 14.0   HCT 48.9* 47.1 41.7   MCV 90.6 89.5 89.3   PLT 315 328 251     BMP:   Recent Labs     05/15/19  1800 05/16/19  0000 05/16/19  0605   * 132* 132*   K 3.3* 3.5 3.6    100 100   CO2 20* 21 21   PHOS  --   --  1.8*   BUN 11 10 11   CREATININE 0.7 0.7 0.6     LIVER PROFILE:   Recent Labs     05/14/19  1817 05/15/19  0625 05/16/19  1120   * 119* 74*   * 106* 83*   BILIDIR  --   --  0.7*   BILITOT 0.6 1.4* 1.1*   ALKPHOS 94 78 76     PT/INR:   Recent Labs     05/15/19  0625   PROTIME 12.5   INR 1.10     APTT:   Recent Labs     05/15/19  1635 05/16/19  0000 05/16/19  0605   APTT 33.1 69.6* 57.9*     BNP:  No results for input(s): BNP in the last 72 hours. IMAGING:   Chest xray 5.17.19 bilateral diffuse infiltrates probable ARDS per radiology report   Chest xray 5.16.19 acute pulmonary edema and LLL infiltrate  Echo doppler of heart 5.15.19   Summary   Definity contrast administered.   Left ventricular systolic function is reduced with ejection fraction   estimated at 25-30 %.   Elevated left ventricular diastolic filling pressure: Septal E/e'' = 12.6   (for sinus tachycardia) .   Right ventricular systolic function is moderately reduced .   Mild mitral regurgitation.   Unable to obtain SPAP due to lack of tricuspid regurgitation.     Assessment:  S/p cardiopulmonary arrest  Hypotension shock   S/p arrest shock CPR bilat broken ribs  Fever likely LLL aspiration pneumonia gram +ve cocci in bronchoscopy specimen, Neg blood culture from admission. Acute pulmonary edema  Dilated cardiomyopathy    Patient Active Problem List    Diagnosis Date Noted    ARDS (adult respiratory distress syndrome) (HCC)     Mucus plugging of bronchi     Aspiration pneumonitis (HCC)     Atrial fibrillation with RVR (HCC)     Electrolyte disturbance     Shock ([address]     Transaminitis     Hyperglycemia     Pneumonia due to infectious organism     Acute respiratory failure ([facility] [address] 05/14/2019       Plan:  Consider repeat blood culture  Consider tube feedings  1.  Stop heparin  On 5.16.19 Troponin declining  2. EKG none recent  3. Stop amio no arrhythmia noted since admission done 5.16.19  4. Continue antibiotics  5. Ventilatory support  6. DVT prophylaxis  7. Asa  8. Beta blockers and ace inhibitors  when off levo  9.  Once off vent and no longer febrile  Arrange cath and EP eval    Core Measures:  · Discharge instructions:   · LVEF documented:   · ACEI for LV dysfunction:   · Smoking Cessation:  I have spent 25   minutes of face to face time with the patient with more than 50% spent counseling and coordinating care for this patient    [address] Kiya Hutton MD 5/17/2019 5:26 AM

## 2019-05-17 NOTE — PROGRESS NOTES
Hand off report given to DORIAN Diaz. Pt is in stable condition at this time. Care transferred.  Electronically signed by Marco Cuba RN on 5/17/2019 at 7:36 AM

## 2019-05-17 NOTE — PROGRESS NOTES
05/17/19 0300   Vent Information   Vent Type 840   Vent Mode AC/VC   Vt Ordered 410 mL   Rate Set 18 bmp   Peak Flow 80 L/min   Pressure Support 0 cmH20   FiO2  70 %   Sensitivity 3   PEEP/CPAP 10   I Time/ I Time % 0 s   Humidification Source Heated wire   Humidification Temp 37   Humidification Temp Measured 37   Circuit Condensation Drained   Vent Patient Data   High Peep/I Pressure 0   Peak Inspiratory Pressure 36 cmH2O   Mean Airway Pressure 17 cmH20   Rate Measured 22 br/min   Vt Exhaled 84 mL   Minute Volume 8.99 Liters   I:E Ratio 1.80:1   Cough/Sputum   Sputum Amount Moderate   Sputum Color Brown;Creamy;Dark red   Tenacity Thick   Spontaneous Breathing Trial (SBT) RT Doc   Pulse 110   SpO2 94 %   Breath Sounds   Right Upper Lobe Rhonchi   Right Middle Lobe Rhonchi   Right Lower Lobe Rhonchi   Left Upper Lobe Rhonchi   Left Lower Lobe Rhonchi   Additional Respiratory  Assessments   Resp 19   Position Semi-Triplett's   Alarm Settings   High Pressure Alarm 45 cmH2O   Low Minute Volume Alarm 4 L/min   High Respiratory Rate 50 br/min   Non-Surgical Airway Endo Tracheal Tube   Placement Date/Time: 05/14/19 1808   Timeout: Patient; Appropriate Equipment  Placed By: In ED  Inserted by: Varun Lawrence NP   Insertion attempts: 1  Airway Device: Endo Tracheal Tube  Size: 7.5  Placement Verified By[de-identified] Auscultation; Chest X-ray;Colorimetric E...    Secured at 23 cm   Measured From Lips   Secured Location Right   Secured By Commercial tube forde   Site Condition Dry

## 2019-05-17 NOTE — PROGRESS NOTES
05/17/19 1830   Vent Information   $Ventilation $Subsequent Day   Vent Type 840   Vent Mode AC/VC   Vt Ordered 410 mL   Rate Set 18 bmp   Peak Flow 80 L/min   Pressure Support 0 cmH20   FiO2  60 %   Sensitivity 3   PEEP/CPAP 10   I Time/ I Time % 0 s   Humidification Source Heated wire   Humidification Temp 37   Humidification Temp Measured 36.9   Circuit Condensation Drained   Vent Patient Data   High Peep/I Pressure 0   Peak Inspiratory Pressure 32 cmH2O   Mean Airway Pressure 14 cmH20   Rate Measured 18 br/min   Vt Exhaled 448 mL   Minute Volume 8.09 Liters   I:E Ratio 1:5.00   Cough/Sputum   Sputum Amount Moderate   Sputum Color Creamy;Dark red   Tenacity Thick   Spontaneous Breathing Trial (SBT) RT Doc   Pulse 96   SpO2 97 %   Breath Sounds   Right Upper Lobe Rhonchi   Right Middle Lobe Rhonchi   Right Lower Lobe Rhonchi   Left Upper Lobe Rhonchi   Left Lower Lobe Rhonchi   Additional Respiratory  Assessments   Resp 17   Position Semi-Triplett's   Oral Care Completed? Yes   Oral Care Mouth suctioned   Alarm Settings   High Pressure Alarm 45 cmH2O   Low Minute Volume Alarm 4 L/min   High Respiratory Rate 50 br/min   Patient Observation   Observations water level good    Non-Surgical Airway Endo Tracheal Tube   Placement Date/Time: 05/14/19 1808   Timeout: Patient; Appropriate Equipment  Placed By: In ED  Inserted by: Juan Marley NP   Insertion attempts: 1  Airway Device: Endo Tracheal Tube  Size: 7.5  Placement Verified By[de-identified] Auscultation; Chest X-ray;Colorimetric E...    Secured at 25 cm   Measured From Lips   Secured Location Right   Secured By Commercial tube forde   Site Condition Dry

## 2019-05-17 NOTE — PROGRESS NOTES
199 lb 13 oz (90.6 kg)  · Admission Body Wt: 193 lb 2 oz (87.6 kg)  · Usual Body Wt: (unknown)  · Weight Change: + 6# weight gain x 3 days admission   · Ideal Body Wt: 100 lb (45.4 kg), % Ideal Body 199%  · BMI Classification: BMI 35.0 - 39.9 Obese Class II    Nutrition Interventions:   Continue NPO, Start Tube Feeding  Continued Inpatient Monitoring, Coordination of Care, Coordination of Community Care    Nutrition Evaluation:   · Evaluation: Goals set   · Goals: patient will tolerate Vital High-Protein at goal rate of 40 ml/hr x 20 hours without GI distress, without s/s of aspiration, and without additional lab/fluid disturbances; weight will remain stable during remainder of admission   · Monitoring: TF Intake, TF Tolerance, Skin Integrity, Mental Status/Confusion, Weight, Pertinent Labs, Monitor Bowel Function      Electronically signed by Summer Ocampo RD, LD on 5/17/19 at 2:05 PM    Contact Number: [phone]

## 2019-05-17 NOTE — PROGRESS NOTES
Pt coughing & alarming the ventilator. O2 sat 79 % RT at bedside PRN versed given & pt suctioned large thick amounts.    Janell Navarro RN, BSN

## 2019-05-18 ENCOUNTER — APPOINTMENT (OUTPATIENT)
Dept: GENERAL RADIOLOGY | Age: 34
DRG: 710 | End: 2019-05-18
Payer: MEDICAID

## 2019-05-18 LAB
ALBUMIN SERPL-MCNC: 2.7 G/DL (ref 3.4–5)
ALP BLD-CCNC: 107 U/L (ref 40–129)
ALT SERPL-CCNC: 56 U/L (ref 10–40)
ANION GAP SERPL CALCULATED.3IONS-SCNC: 9 MMOL/L (ref 3–16)
ANION GAP SERPL CALCULATED.3IONS-SCNC: 9 MMOL/L (ref 3–16)
ANISOCYTOSIS: ABNORMAL
AST SERPL-CCNC: 35 U/L (ref 15–37)
BANDED NEUTROPHILS RELATIVE PERCENT: 2 % (ref 0–7)
BASE EXCESS ARTERIAL: 1.1 MMOL/L (ref -3–3)
BASOPHILS ABSOLUTE: 0.2 K/UL (ref 0–0.2)
BASOPHILS RELATIVE PERCENT: 1 %
BILIRUB SERPL-MCNC: 1 MG/DL (ref 0–1)
BILIRUBIN DIRECT: 0.6 MG/DL (ref 0–0.3)
BILIRUBIN, INDIRECT: 0.4 MG/DL (ref 0–1)
BUN BLDV-MCNC: 11 MG/DL (ref 7–20)
BUN BLDV-MCNC: 6 MG/DL (ref 7–20)
CALCIUM SERPL-MCNC: 7.5 MG/DL (ref 8.3–10.6)
CALCIUM SERPL-MCNC: 8 MG/DL (ref 8.3–10.6)
CARBOXYHEMOGLOBIN ARTERIAL: 0.7 % (ref 0–1.5)
CHLORIDE BLD-SCNC: 103 MMOL/L (ref 99–110)
CHLORIDE BLD-SCNC: 99 MMOL/L (ref 99–110)
CO2: 25 MMOL/L (ref 21–32)
CO2: 30 MMOL/L (ref 21–32)
CREAT SERPL-MCNC: <0.5 MG/DL (ref 0.6–1.1)
CREAT SERPL-MCNC: <0.5 MG/DL (ref 0.6–1.1)
CULTURE, RESPIRATORY: ABNORMAL
EOSINOPHILS ABSOLUTE: 0.4 K/UL (ref 0–0.6)
EOSINOPHILS RELATIVE PERCENT: 2 %
GFR AFRICAN AMERICAN: >60
GFR AFRICAN AMERICAN: >60
GFR NON-AFRICAN AMERICAN: >60
GFR NON-AFRICAN AMERICAN: >60
GLUCOSE BLD-MCNC: 101 MG/DL (ref 70–99)
GLUCOSE BLD-MCNC: 103 MG/DL (ref 70–99)
GLUCOSE BLD-MCNC: 106 MG/DL (ref 70–99)
GLUCOSE BLD-MCNC: 108 MG/DL (ref 70–99)
GLUCOSE BLD-MCNC: 115 MG/DL (ref 70–99)
GLUCOSE BLD-MCNC: 120 MG/DL (ref 70–99)
GLUCOSE BLD-MCNC: 133 MG/DL (ref 70–99)
GLUCOSE BLD-MCNC: 87 MG/DL (ref 70–99)
GRAM STAIN RESULT: ABNORMAL
GRAM STAIN RESULT: ABNORMAL
HAV IGM SER IA-ACNC: ABNORMAL
HCO3 ARTERIAL: 26.3 MMOL/L (ref 21–29)
HCT VFR BLD CALC: 37.6 % (ref 36–48)
HEMATOLOGY PATH CONSULT: NO
HEMOGLOBIN, ART, EXTENDED: 12.6 G/DL (ref 12–16)
HEMOGLOBIN: 12.5 G/DL (ref 12–16)
HEPATITIS B CORE IGM ANTIBODY: ABNORMAL
HEPATITIS B SURFACE ANTIGEN INTERPRETATION: ABNORMAL
HEPATITIS C ANTIBODY INTERPRETATION: REACTIVE
LYMPHOCYTES ABSOLUTE: 1 K/UL (ref 1–5.1)
LYMPHOCYTES RELATIVE PERCENT: 5 %
MAGNESIUM: 1.8 MG/DL (ref 1.8–2.4)
MCH RBC QN AUTO: 29.8 PG (ref 26–34)
MCHC RBC AUTO-ENTMCNC: 33.2 G/DL (ref 31–36)
MCV RBC AUTO: 89.6 FL (ref 80–100)
METHEMOGLOBIN ARTERIAL: 0.3 %
MONOCYTES ABSOLUTE: 1.9 K/UL (ref 0–1.3)
MONOCYTES RELATIVE PERCENT: 9 %
NEUTROPHILS ABSOLUTE: 17.1 K/UL (ref 1.7–7.7)
NEUTROPHILS RELATIVE PERCENT: 81 %
O2 CONTENT ARTERIAL: 17 ML/DL
O2 SAT, ARTERIAL: 95.5 %
O2 THERAPY: NORMAL
ORGANISM: ABNORMAL
ORGANISM: ABNORMAL
PCO2 ARTERIAL: 44 MMHG (ref 35–45)
PDW BLD-RTO: 12.5 % (ref 12.4–15.4)
PERFORMED ON: ABNORMAL
PERFORMED ON: NORMAL
PH ARTERIAL: 7.39 (ref 7.35–7.45)
PHOSPHORUS: 2 MG/DL (ref 2.5–4.9)
PLATELET # BLD: 238 K/UL (ref 135–450)
PLATELET SLIDE REVIEW: ADEQUATE
PMV BLD AUTO: 7.8 FL (ref 5–10.5)
PO2 ARTERIAL: 78.7 MMHG (ref 75–108)
POIKILOCYTES: ABNORMAL
POTASSIUM SERPL-SCNC: 2.6 MMOL/L (ref 3.5–5.1)
POTASSIUM SERPL-SCNC: 3.7 MMOL/L (ref 3.5–5.1)
RBC # BLD: 4.2 M/UL (ref 4–5.2)
SLIDE REVIEW: ABNORMAL
SODIUM BLD-SCNC: 137 MMOL/L (ref 136–145)
SODIUM BLD-SCNC: 138 MMOL/L (ref 136–145)
TCO2 ARTERIAL: 27.6 MMOL/L
TOTAL CK: 118 U/L (ref 26–192)
TOTAL PROTEIN: 6.5 G/DL (ref 6.4–8.2)
TRIGL SERPL-MCNC: 129 MG/DL (ref 0–150)
URINE CULTURE, ROUTINE: NORMAL
VACUOLATED NEUTROPHILS: PRESENT
WBC # BLD: 20.6 K/UL (ref 4–11)

## 2019-05-18 PROCEDURE — 94640 AIRWAY INHALATION TREATMENT: CPT

## 2019-05-18 PROCEDURE — 71045 X-RAY EXAM CHEST 1 VIEW: CPT

## 2019-05-18 PROCEDURE — 99291 CRITICAL CARE FIRST HOUR: CPT | Performed by: INTERNAL MEDICINE

## 2019-05-18 PROCEDURE — 83735 ASSAY OF MAGNESIUM: CPT

## 2019-05-18 PROCEDURE — 2700000000 HC OXYGEN THERAPY PER DAY

## 2019-05-18 PROCEDURE — 2580000003 HC RX 258: Performed by: EMERGENCY MEDICINE

## 2019-05-18 PROCEDURE — 82803 BLOOD GASES ANY COMBINATION: CPT

## 2019-05-18 PROCEDURE — 2500000003 HC RX 250 WO HCPCS: Performed by: INTERNAL MEDICINE

## 2019-05-18 PROCEDURE — 94761 N-INVAS EAR/PLS OXIMETRY MLT: CPT

## 2019-05-18 PROCEDURE — 2580000003 HC RX 258: Performed by: INTERNAL MEDICINE

## 2019-05-18 PROCEDURE — 6360000002 HC RX W HCPCS: Performed by: INTERNAL MEDICINE

## 2019-05-18 PROCEDURE — 36592 COLLECT BLOOD FROM PICC: CPT

## 2019-05-18 PROCEDURE — 84100 ASSAY OF PHOSPHORUS: CPT

## 2019-05-18 PROCEDURE — 6370000000 HC RX 637 (ALT 250 FOR IP): Performed by: INTERNAL MEDICINE

## 2019-05-18 PROCEDURE — 80076 HEPATIC FUNCTION PANEL: CPT

## 2019-05-18 PROCEDURE — 94750 HC PULMONARY COMPLIANCE STUDY: CPT

## 2019-05-18 PROCEDURE — 80048 BASIC METABOLIC PNL TOTAL CA: CPT

## 2019-05-18 PROCEDURE — 85025 COMPLETE CBC W/AUTO DIFF WBC: CPT

## 2019-05-18 PROCEDURE — 84478 ASSAY OF TRIGLYCERIDES: CPT

## 2019-05-18 PROCEDURE — 99232 SBSQ HOSP IP/OBS MODERATE 35: CPT | Performed by: INTERNAL MEDICINE

## 2019-05-18 PROCEDURE — 82550 ASSAY OF CK (CPK): CPT

## 2019-05-18 PROCEDURE — 94003 VENT MGMT INPAT SUBQ DAY: CPT

## 2019-05-18 PROCEDURE — 2000000000 HC ICU R&B

## 2019-05-18 PROCEDURE — 36600 WITHDRAWAL OF ARTERIAL BLOOD: CPT

## 2019-05-18 RX ORDER — FUROSEMIDE 10 MG/ML
20 INJECTION INTRAMUSCULAR; INTRAVENOUS ONCE
Status: COMPLETED | OUTPATIENT
Start: 2019-05-18 | End: 2019-05-18

## 2019-05-18 RX ORDER — FUROSEMIDE 10 MG/ML
40 INJECTION INTRAMUSCULAR; INTRAVENOUS 2 TIMES DAILY
Status: DISCONTINUED | OUTPATIENT
Start: 2019-05-18 | End: 2019-05-21

## 2019-05-18 RX ORDER — GABAPENTIN 100 MG/1
200 CAPSULE ORAL 3 TIMES DAILY
Status: DISCONTINUED | OUTPATIENT
Start: 2019-05-18 | End: 2019-05-24

## 2019-05-18 RX ADMIN — IBUPROFEN 600 MG: 100 SUSPENSION ORAL at 16:11

## 2019-05-18 RX ADMIN — POTASSIUM CHLORIDE 20 MEQ: 400 INJECTION, SOLUTION INTRAVENOUS at 21:52

## 2019-05-18 RX ADMIN — Medication 4 PUFF: at 15:22

## 2019-05-18 RX ADMIN — PROPOFOL 50 MCG/KG/MIN: 10 INJECTION, EMULSION INTRAVENOUS at 02:16

## 2019-05-18 RX ADMIN — Medication 4 PUFF: at 07:56

## 2019-05-18 RX ADMIN — OLANZAPINE 15 MG: 10 TABLET, FILM COATED ORAL at 20:12

## 2019-05-18 RX ADMIN — NOREPINEPHRINE BITARTRATE 2 MCG/MIN: 1 INJECTION INTRAVENOUS at 19:17

## 2019-05-18 RX ADMIN — POTASSIUM PHOSPHATE, MONOBASIC AND POTASSIUM PHOSPHATE, DIBASIC 15 MMOL: 224; 236 INJECTION, SOLUTION INTRAVENOUS at 10:08

## 2019-05-18 RX ADMIN — FENTANYL CITRATE 175 MCG/HR: 50 INJECTION, SOLUTION INTRAMUSCULAR; INTRAVENOUS at 09:05

## 2019-05-18 RX ADMIN — DOXYCYCLINE 100 MG: 100 INJECTION, POWDER, LYOPHILIZED, FOR SOLUTION INTRAVENOUS at 20:11

## 2019-05-18 RX ADMIN — MUPIROCIN: 20 OINTMENT TOPICAL at 08:46

## 2019-05-18 RX ADMIN — PROPOFOL 50 MCG/KG/MIN: 10 INJECTION, EMULSION INTRAVENOUS at 10:09

## 2019-05-18 RX ADMIN — MUPIROCIN: 20 OINTMENT TOPICAL at 20:14

## 2019-05-18 RX ADMIN — FAMOTIDINE 20 MG: 10 INJECTION, SOLUTION INTRAVENOUS at 08:45

## 2019-05-18 RX ADMIN — PROPOFOL 45 MCG/KG/MIN: 10 INJECTION, EMULSION INTRAVENOUS at 19:23

## 2019-05-18 RX ADMIN — Medication 4 PUFF: at 11:44

## 2019-05-18 RX ADMIN — PIPERACILLIN SODIUM,TAZOBACTAM SODIUM 3.38 G: 3; .375 INJECTION, POWDER, FOR SOLUTION INTRAVENOUS at 06:00

## 2019-05-18 RX ADMIN — ASPIRIN 81 MG 81 MG: 81 TABLET ORAL at 08:45

## 2019-05-18 RX ADMIN — Medication 4 PUFF: at 19:41

## 2019-05-18 RX ADMIN — ENOXAPARIN SODIUM 40 MG: 40 INJECTION SUBCUTANEOUS at 08:45

## 2019-05-18 RX ADMIN — Medication 10 ML: at 20:11

## 2019-05-18 RX ADMIN — DOXYCYCLINE 100 MG: 100 INJECTION, POWDER, LYOPHILIZED, FOR SOLUTION INTRAVENOUS at 08:44

## 2019-05-18 RX ADMIN — Medication 4 PUFF: at 23:57

## 2019-05-18 RX ADMIN — FUROSEMIDE 20 MG: 10 INJECTION, SOLUTION INTRAVENOUS at 08:45

## 2019-05-18 RX ADMIN — Medication 10 ML: at 08:45

## 2019-05-18 RX ADMIN — POTASSIUM CHLORIDE 20 MEQ: 400 INJECTION, SOLUTION INTRAVENOUS at 22:55

## 2019-05-18 RX ADMIN — PROPOFOL 45 MCG/KG/MIN: 10 INJECTION, EMULSION INTRAVENOUS at 22:54

## 2019-05-18 RX ADMIN — PROPOFOL 50 MCG/KG/MIN: 10 INJECTION, EMULSION INTRAVENOUS at 06:45

## 2019-05-18 RX ADMIN — FAMOTIDINE 20 MG: 10 INJECTION, SOLUTION INTRAVENOUS at 20:11

## 2019-05-18 RX ADMIN — PIPERACILLIN SODIUM,TAZOBACTAM SODIUM 3.38 G: 3; .375 INJECTION, POWDER, FOR SOLUTION INTRAVENOUS at 20:13

## 2019-05-18 RX ADMIN — Medication 4 PUFF: at 11:45

## 2019-05-18 RX ADMIN — SODIUM CHLORIDE: 9 INJECTION, SOLUTION INTRAVENOUS at 02:16

## 2019-05-18 RX ADMIN — OLANZAPINE 15 MG: 10 TABLET, FILM COATED ORAL at 10:09

## 2019-05-18 RX ADMIN — GABAPENTIN 200 MG: 100 CAPSULE ORAL at 14:41

## 2019-05-18 RX ADMIN — Medication 4 PUFF: at 03:10

## 2019-05-18 RX ADMIN — GABAPENTIN 200 MG: 100 CAPSULE ORAL at 10:09

## 2019-05-18 RX ADMIN — FENTANYL CITRATE 175 MCG/HR: 50 INJECTION, SOLUTION INTRAMUSCULAR; INTRAVENOUS at 21:41

## 2019-05-18 RX ADMIN — FENTANYL CITRATE 175 MCG/HR: 50 INJECTION, SOLUTION INTRAMUSCULAR; INTRAVENOUS at 03:29

## 2019-05-18 RX ADMIN — MIDAZOLAM HYDROCHLORIDE 2 MG: 2 INJECTION, SOLUTION INTRAMUSCULAR; INTRAVENOUS at 15:24

## 2019-05-18 RX ADMIN — PIPERACILLIN SODIUM,TAZOBACTAM SODIUM 3.38 G: 3; .375 INJECTION, POWDER, FOR SOLUTION INTRAVENOUS at 14:40

## 2019-05-18 RX ADMIN — GABAPENTIN 200 MG: 100 CAPSULE ORAL at 20:12

## 2019-05-18 RX ADMIN — PROPOFOL 45 MCG/KG/MIN: 10 INJECTION, EMULSION INTRAVENOUS at 14:40

## 2019-05-18 RX ADMIN — FUROSEMIDE 20 MG: 10 INJECTION, SOLUTION INTRAVENOUS at 10:09

## 2019-05-18 RX ADMIN — FENTANYL CITRATE 175 MCG/HR: 50 INJECTION, SOLUTION INTRAMUSCULAR; INTRAVENOUS at 15:32

## 2019-05-18 ASSESSMENT — PULMONARY FUNCTION TESTS
PIF_VALUE: 32
PIF_VALUE: 30
PIF_VALUE: 30
PIF_VALUE: 28
PIF_VALUE: 34
PIF_VALUE: 29
PIF_VALUE: 36
PIF_VALUE: 36
PIF_VALUE: 37
PIF_VALUE: 32
PIF_VALUE: 29
PIF_VALUE: 33
PIF_VALUE: 33
PIF_VALUE: 35
PIF_VALUE: 36
PIF_VALUE: 35
PIF_VALUE: 32
PIF_VALUE: 17
PIF_VALUE: 32
PIF_VALUE: 32
PIF_VALUE: 38
PIF_VALUE: 28

## 2019-05-18 ASSESSMENT — PAIN SCALES - GENERAL
PAINLEVEL_OUTOF10: 0
PAINLEVEL_OUTOF10: 0

## 2019-05-18 NOTE — PROGRESS NOTES
Patient report given to DORIAN Smith. Patient resting comfortably at present, Propofol at 50mcg/kg/min, Fentanyl at 175mcg/hr, and Versed at 1mg/hr. Levophed has been weaned to 3mcg/min overnight. Care transferred.

## 2019-05-18 NOTE — PROGRESS NOTES
Vitals:    05/18/19 0400 05/18/19 0500 05/18/19 0600 05/18/19 0700   BP: 87/60 (!) 81/58 111/78 97/67   Pulse: 96 108 110 111   Resp: 18 20 24 28   Temp: 98.5 °F (36.9 °C)      TempSrc: Oral      SpO2: 98% 93% 97% 98%   Weight: 192 lb 11.2 oz (87.4 kg)      Height:           BMP  Recent Labs     05/16/19  0605 05/17/19  0620 05/18/19  0530   * 134* 137   K 3.6 3.3* 3.7   CO2 21 23 25   BUN 11 9 6*   CREATININE 0.6 <0.5* <0.5*   MG 1.60* 1.70* 1.80   PHOS 1.8* 1.7* 2.0*   CALCIUM 7.2* 7.0* 7.5*   GLUCOSE 129* 118* 106*        CBC  Recent Labs     05/16/19  0605 05/17/19  0620 05/18/19  0530   WBC 32.9* 24.9* 20.6*   HGB 14.0 12.3 12.5   HCT 41.7 36.9 37.6    239 238          Intake/Output Summary (Last 24 hours) at 5/18/2019 0756  Last data filed at 5/18/2019 0530  Gross per 24 hour   Intake 3925 ml   Output 2345 ml   Net 1580 ml         midazolam Last Rate: 1 mg/hr (05/17/19 2335)    norepinephrine Last Rate: 3 mcg/min (05/18/19 0600)    sodium chloride Last Rate: 75 mL/hr at 05/18/19 0216    propofol Last Rate: 50 mcg/kg/min (05/18/19 0645)    dextrose    fentaNYL (SUBLIMAZE) infusion Last Rate: 175 mcg/hr (05/18/19 0329)    Pt is seen lying in bed. They are sedated on a ventilator settings rate 18, , FIO2 60% and 10 PEEP. Speech is REYES. RASS is -1  Pain is rated as 0/10. Pt's tolerable level of pain is 0/10. The pt stated their pain is located REYES. Pain is described as REYES  IV access is located in the pt's CVC R IJ. See EMAR for current infusions and rates. The pt is using [place]  to void with STAT lock in place. See flowsheets for assessment. AM assessment completed at this time. Bilateral soft wrist restraints in place, bed in low locked position with rails 3/4 in place.  Door open with ICU monitoring in place, will continue to monitor closely Robert Baldwin RN

## 2019-05-18 NOTE — PROGRESS NOTES
Dr. Lloyd Almonte @ bedside. Discussed labs, meds, VS, I/O's, vent settings, U/O, assessment, & plan of care for today. Please see progress note & new orders for details.    Janee Reyez RN on 5/18/19 at 9:22 AM

## 2019-05-18 NOTE — PROGRESS NOTES
Daily    enoxaparin  40 mg Subcutaneous Daily    doxycycline (VIBRAMYCIN) IV  100 mg Intravenous Q12H    aspirin  81 mg Oral Daily    piperacillin-tazobactam  3.375 g Intravenous Q8H    mupirocin   Nasal BID    famotidine (PEPCID) injection  20 mg Intravenous BID    albuterol sulfate HFA  4 puff Inhalation Q4H    ipratropium  4 puff Inhalation Q4H    insulin lispro  0-6 Units Subcutaneous Q4H    sodium chloride flush  10 mL Intravenous 2 times per day     PRN Meds:  magnesium sulfate, potassium chloride, midazolam, fentanNYL, glucose, dextrose, glucagon (rDNA), dextrose, ibuprofen, sodium chloride flush    Results:  CBC:   Recent Labs     05/16/19  0605 05/17/19  0620 05/18/19  0530   WBC 32.9* 24.9* 20.6*   HGB 14.0 12.3 12.5   HCT 41.7 36.9 37.6   MCV 89.3 87.9 89.6    239 238     BMP:   Recent Labs     05/16/19  0605 05/17/19  0620 05/18/19  0530   * 134* 137   K 3.6 3.3* 3.7    102 103   CO2 21 23 25   PHOS 1.8* 1.7* 2.0*   BUN 11 9 6*   CREATININE 0.6 <0.5* <0.5*     LIVER PROFILE:   Recent Labs     05/16/19  1120 05/17/19  0620 05/18/19  0530   AST 74* 52* 35   ALT 83* 71* 56*   BILIDIR 0.7* 0.8* 0.6*   BILITOT 1.1* 1.2* 1.0   ALKPHOS 76 73 107       Cultures:  4/14 BCx NGTD  4/16 BAL Staphylococcus aureus      Films:  Chest x-ray 5/18 imaging was reviewed by me and showed RML/RLL consolidation with satisfactory ETT and CVC positions.        ASSESSMENT:  · Acute hypoxemic respiratory failure. DDx primary cardiac arrhythmias, aspiration, drug overdose  · ARDS   · V. fib arrest   · Aspiration/MSSA pneumonia  · A. fib with RVR- now sinus tach  · Electrolytes disturbance  · Hemodynamic shock- cardiogenic versus septic  · Cardiomyopathy EF 25%   · Transaminitis- improving   · Hyperglycemia  · Fever and severe Leukocytosis  · Cannabinoid abuse - history of heroin abuse       PLAN:  · Mechanical ventilation as per my orders.   The ventilator was adjusted by me at the bedside for unstable, life threatening respiratory failure. · Target tidal volume to 4-6 ml/kg   · Target plateau pressures <47 cm H2O   · Follow ABG  · IV Propofol for sedation, target RASS -2, with daily spontaneous awakening trial.    · Versed and Fentanyl PRN pain, gtt as needed  · Head of bed 30 degrees or higher at all times  · Daily spontaneous breathing trial once PEEP less than 8, FiO2 less than 55%. · Broad spectrum antibiotics to include Zosyn D#4 and Doxy D#3  · Repeat Cx   · IV Levophed to keep MAP > 65 mmHg or SBP>90.  · Insulin sliding scale  · Amiodarone and Heparin drip - off per cardiology   · Follow electrolytes and LFTs  · Acute hepatitis panel   · SSI   · Increase Lasix 40 IV BID   · BMP Q 12 hrs   · Electrolytes replacement   · Resume  Zyprexa and Neurontin at lower doses   · Resume   · Tube feed   · GI prophylaxis: Pepcid  · DVT prophylaxis: Lovenox    · MRSA prophylaxis: Bactroban  · Discussed with family at the bedside and multiple good questions were answered              Total critical care time caring for this patient with life threatening, unstable organ failure, including direct patient contact, management of life support systems, review of data including imaging and labs, discussions with other team members and physicians is 34 minutes so far today, excluding procedures.

## 2019-05-18 NOTE — PROGRESS NOTES
Afternoon assessment completed at this time (see flowsheets) - Sedation gradually decreased to check pt's agitation/response (taking a lot of pain/pressure for extremities to move). Pt has corneal and gag reflex intact (gag engaged with deep suction), PERRLA. Breath Sounds improved with Lasix dose increased. Will continue to monitor closely and notify Dr. Suzanne Christiansen of any concerns.

## 2019-05-18 NOTE — PROGRESS NOTES
Patient care assumed, assessment completed as charted. Patient continues on ventilator, #7.5 at the 24 lip line. FiO2 60%, PEEP 10, , A/C 18. Patient tachycardic, tachypneic, coughing, alarming vent, SpO2 to 77%. Versed 2mg administered per PRN order, with immediate calming effect. Patient ET suctioned for large amount thick creamy pink secretions, with improvement in SpO2 to 91%. Propofol infusing at 50mcg/kg/min, Fentanyl at 175mcg/hr, Levophed at 6mcg/min, and NS at 75mL/hr through intact left IJ CVC. OG in place, Vail catheter patent, bilateral soft wrist restraints continued for patient safety. No further needs assessed at this time. Will monitor.

## 2019-05-18 NOTE — PROGRESS NOTES
05/18/19 1600   Vent Information   $Ventilation $Subsequent Day   Vent Type 840   Vent Mode AC/VC   Vt Ordered 410 mL   Rate Set 18 bmp   Peak Flow 80 L/min   Pressure Support 0 cmH20   FiO2  50 %   Sensitivity 3   PEEP/CPAP 5   Humidification Source Heated wire   Humidification Temp 37   Humidification Temp Measured 37.2   Circuit Condensation Drained   Vent Patient Data   Peak Inspiratory Pressure 30 cmH2O   Mean Airway Pressure 9.6 cmH20   Rate Measured 20 br/min   Vt Exhaled 631 mL   Minute Volume 8.84 Liters   I:E Ratio 1:5.0   Cough/Sputum   Sputum How Obtained Endotracheal;Suctioned   Cough Productive   Sputum Amount Moderate   Sputum Color Dark red;Yellow   Tenacity Thick   Spontaneous Breathing Trial (SBT) RT Doc   Pulse 122   SpO2 94 %   Breath Sounds   Right Upper Lobe Rhonchi   Right Middle Lobe Rhonchi   Right Lower Lobe Diminished   Left Upper Lobe Rhonchi   Left Lower Lobe Diminished   Additional Respiratory  Assessments   Resp 24   Oral Care Mouth suctioned;Mouth moisturizer;Mouth swabbed;Lip moisturizer applied   Alarm Settings   High Pressure Alarm 45 cmH2O   Low Minute Volume Alarm 4 L/min   Apnea (secs) 20 secs   High Respiratory Rate 50 br/min   Non-Surgical Airway Endo Tracheal Tube   Placement Date/Time: 05/14/19 0698   Timeout: Patient; Appropriate Equipment  Placed By: In ED  Inserted by: Ricco Hammonds NP   Insertion attempts: 1  Airway Device: Endo Tracheal Tube  Size: 7.5  Placement Verified By[de-identified] Auscultation; Chest X-ray;Colorimetric E...    Secured at 24 cm   Measured From Lips   Secured Location Right   Secured By Commercial tube forde   Site Condition Dry

## 2019-05-18 NOTE — PLAN OF CARE
Patient's EF (Ejection Fraction) is less than 40%    Patient has a past medical history of Cancer ([address] and Hyperlipidemia. Comorbidities reviewed and education provided as appropriate. Patient and/or family's stated goal of care: reduce shortness of breath prior to discharge, increase activity tolerance prior to discharge, better understand heart failure and disease management prior to discharge, reduce lower extremity edema prior to discharge, comfort care measures only and unable to assess at this time    Pt is currently intubated on a ventilator. Pt without lower extremity edema. Patient's weights and intake/output reviewed:    Patient Vitals for the past 96 hrs (Last 3 readings):   Weight   05/18/19 0400 192 lb 11.2 oz (87.4 kg)   05/17/19 0648 199 lb 12.8 oz (90.6 kg)   05/15/19 0035 193 lb 2 oz (87.6 kg)       Intake/Output Summary (Last 24 hours) at 5/18/2019 0541  Last data filed at 5/18/2019 0530  Gross per 24 hour   Intake 4758.93 ml   Output 2595 ml   Net 2163.93 ml         Patient's current functional capacity:  Sedated on ventilator      >> For CHF and Comorbidity Education Time and Topics, please see Education Tab.

## 2019-05-18 NOTE — PROGRESS NOTES
05/18/19 0312   Vent Information   Vent Type 840   Vent Mode AC/VC   Vt Ordered 410 mL   Rate Set 18 bmp   Peak Flow 80 L/min   Pressure Support 0 cmH20   FiO2  60 %   Sensitivity 3   PEEP/CPAP 10   I Time/ I Time % 0 s   Humidification Source Heated wire   Humidification Temp 37   Humidification Temp Measured 37   Circuit Condensation Drained   Vent Patient Data   High Peep/I Pressure 0   Peak Inspiratory Pressure 32 cmH2O   Mean Airway Pressure 14 cmH20   Rate Measured 18 br/min   Vt Exhaled 450 mL   Minute Volume 8.09 Liters   I:E Ratio 1:5.00   Cough/Sputum   Sputum Amount None   Spontaneous Breathing Trial (SBT) RT Doc   Pulse 97   SpO2 98 %   Breath Sounds   Right Upper Lobe Rhonchi   Right Middle Lobe Rhonchi   Right Lower Lobe Rhonchi   Left Upper Lobe Rhonchi   Left Lower Lobe Rhonchi   Additional Respiratory  Assessments   Resp 21   Position Semi-Triplett's   Alarm Settings   High Pressure Alarm 45 cmH2O   Low Minute Volume Alarm 4 L/min   High Respiratory Rate 50 br/min   Non-Surgical Airway Endo Tracheal Tube   Placement Date/Time: 05/14/19 1808   Timeout: Patient; Appropriate Equipment  Placed By: In ED  Inserted by: Lucia Gregg NP   Insertion attempts: 1  Airway Device: Endo Tracheal Tube  Size: 7.5  Placement Verified By[de-identified] Auscultation; Chest X-ray;Colorimetric E...    Secured at 25 cm   Measured From [address] By Commercial tube forde   Site Condition Dry

## 2019-05-18 NOTE — PROGRESS NOTES
05/17/19 2215   Vent Information   Vent Type 840   Vent Mode AC/VC   Vt Ordered 410 mL   Rate Set 18 bmp   Peak Flow 80 L/min   Pressure Support 0 cmH20   FiO2  60 %   Sensitivity 3   PEEP/CPAP 10   I Time/ I Time % 0 s   Cuff Pressure (cm H2O) 34 cm H2O   Humidification Source Heated wire   Humidification Temp 37   Humidification Temp Measured 37   Circuit Condensation Drained   Vent Patient Data   High Peep/I Pressure 0   Peak Inspiratory Pressure 27 cmH2O   Mean Airway Pressure 18 cmH20   Rate Measured 29 br/min   Vt Exhaled 921 mL   Minute Volume 13.5 Liters   I:E Ratio 1:5.00   Cough/Sputum   Sputum How Obtained Endotracheal;Suctioned   Cough Productive   Sputum Amount Small   Sputum Color Creamy;Dark red   Tenacity Thick   Spontaneous Breathing Trial (SBT) RT Doc   Pulse 117   SpO2 96 %   Breath Sounds   Right Upper Lobe Rhonchi   Right Middle Lobe Rhonchi   Right Lower Lobe Rhonchi   Left Upper Lobe Rhonchi   Left Lower Lobe Rhonchi   Additional Respiratory  Assessments   Resp 20   Position Semi-Triplett's   Alarm Settings   High Pressure Alarm 45 cmH2O   Low Minute Volume Alarm 4 L/min   High Respiratory Rate 50 br/min   Non-Surgical Airway Endo Tracheal Tube   Placement Date/Time: 05/14/19 1915   Timeout: Patient; Appropriate Equipment  Placed By: In ED  Inserted by: Mynor Turcios NP   Insertion attempts: 1  Airway Device: Endo Tracheal Tube  Size: 7.5  Placement Verified By[de-identified] Auscultation; Chest X-ray;Colorimetric E...    Secured at 24 cm   Measured From Lips   Secured Location Right   Secured By Commercial tube forde   Site Condition Dry

## 2019-05-18 NOTE — PROGRESS NOTES
Progress Note    Admit Date:  5/14/2019      64-year-old female with nonischemic dilated cardiomyopathy, presented with V. fib arrest.  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 Hep C + . She is mostly homebound. She has been ill with  respiratory symptoms for a couple of months. Mother found her down. CPR initiated,  S/P defibrillation twice en route to hospital.  Admitted  to ICU , on mechanical vent   Echocardiogram with ejection fraction low at 25%   Head CT was negative. Subjective:  Ms. Marly Peter on mechanical ventilation  - patient is waking up now and following commands   - oxygenation improved,  FiO2 is down to 40% PEEP at 10 - > 8  - status post bronchoscopy 5/16. Patient had significant amount of resp secretions that were aspirated  - Off amiodarone drip and heparin drip,  currently on Lovenox. - off levophed. Objective:   BP 99/74   Pulse 123   Temp 99.6 °F (37.6 °C) (Oral)   Resp 18   Ht 5' (1.524 m)   Wt 192 lb 11.2 oz (87.4 kg)   SpO2 95%   BMI 37.63 kg/m²       Intake/Output Summary (Last 24 hours) at 5/18/2019 1413  Last data filed at 5/18/2019 1200  Gross per 24 hour   Intake 4286 ml   Output 5005 ml   Net -719 ml         Physical Exam:  General: Intubated ,on mechanical ventilation .   - Sedation has been decreased patient is opening eyes and responding a little now . Skin:  Warm and dry  Neck:  JVD absent. Neck supple  Chest:  Diminished breath sounds . No wheezes, rales or rhonchi. Cardiovascular:  RRR ,S1S2 normal  Abdomen:  Soft, non tender, non distended, BS +  Extremities:  Trace edema of upper extremity . No edema of lower extremity. . Left lower extremity atrophy present  Intact peripheral pulses.  Brisk cap refill, < 2 secs  Neuro:  Non focal . Moving all extremities spontaneosly when sedation decreased       Medications:   Scheduled Meds:   furosemide  40 mg Intravenous BID    OLANZapine  15 mg Oral BID    gabapentin  200 mg Oral TID    enoxaparin  40 mg Subcutaneous Daily    doxycycline (VIBRAMYCIN) IV  100 mg Intravenous Q12H    aspirin  81 mg Oral Daily    piperacillin-tazobactam  3.375 g Intravenous Q8H    mupirocin   Nasal BID    famotidine (PEPCID) injection  20 mg Intravenous BID    albuterol sulfate HFA  4 puff Inhalation Q4H    ipratropium  4 puff Inhalation Q4H    insulin lispro  0-6 Units Subcutaneous Q4H    sodium chloride flush  10 mL Intravenous 2 times per day       Continuous Infusions:   midazolam Stopped (05/18/19 1005)    norepinephrine Stopped (05/18/19 1003)    sodium chloride 75 mL/hr at 05/18/19 0216    propofol 45 mcg/kg/min (05/18/19 1243)    dextrose      fentaNYL (SUBLIMAZE) infusion 175 mcg/hr (05/18/19 0905)       Data:  CBC:   Recent Labs     05/16/19  0605 05/17/19  0620 05/18/19  0530   WBC 32.9* 24.9* 20.6*   RBC 4.67 4.20 4.20   HGB 14.0 12.3 12.5   HCT 41.7 36.9 37.6   MCV 89.3 87.9 89.6   RDW 12.4 12.9 12.5    239 238     BMP:   Recent Labs     05/16/19  0605 05/17/19  0620 05/18/19  0530   * 134* 137   K 3.6 3.3* 3.7    102 103   CO2 21 23 25   PHOS 1.8* 1.7* 2.0*   BUN 11 9 6*   CREATININE 0.6 <0.5* <0.5*     BNP: No results for input(s): BNP in the last 72 hours. PT/INR:   No results for input(s): PROTIME, INR in the last 72 hours.   APTT:   Recent Labs     05/15/19  1635 05/16/19  0000 05/16/19  0605   APTT 33.1 69.6* 57.9*     CARDIAC ENZYMES:   Recent Labs     05/15/19  1800 05/16/19  0000   TROPONINI 0.17* 0.17*     FASTING LIPID PANEL:No results found for: CHOL, HDL, TRIG  LIVER PROFILE:   Recent Labs     05/16/19  1120 05/17/19  0620 05/18/19  0530   AST 74* 52* 35   ALT 83* 71* 56*   BILIDIR 0.7* 0.8* 0.6*   BILITOT 1.1* 1.2* 1.0   ALKPHOS 76 73 107          Urine hCG negative     Rapid flu antigen negative         Radiology  XR CHEST PORTABLE   Final Result      XR CHEST PORTABLE   Final Result   Worsening bilateral airspace opacities in a pattern that would favor ARDS. XR CHEST PORTABLE   Final Result   Stable life support device positioning. Persistent perihilar predominant edema and small effusions. XR CHEST PORTABLE   Final Result   Interval improvement in pulmonary edema. XR CHEST PORTABLE   Final Result   Appropriate left IJ central venous catheter positioning. No pneumothorax. Appropriate endotracheal tube positioning. Layering bilateral effusions. Equivocal for a component of superimposed   pulmonary edema. Atelectasis likely present. CT ABDOMEN PELVIS WO CONTRAST Additional Contrast? None   Final Result   1. Bilateral lower lobe atelectasis, right greater than left. 2. Acute right 2nd through 4th and left 3rd through 5th rib fractures         CT Chest Pulmonary Embolism W Contrast   Final Result   1. Bilateral lower lobe atelectasis, right greater than left. 2. Acute right 2nd through 4th and left 3rd through 5th rib fractures         CT Head WO Contrast   Final Result   No acute intracranial abnormality. Left maxillary sinus mucosal thickening. Air-fluid level in the sphenoid   sinus. Correlate with any clinical evidence of sinusitis. XR CHEST PORTABLE   Final Result   Findings as above which may be related to congestive heart failure and edema.          XR CHEST PORTABLE    (Results Pending)       Echo  Summary   Definity contrast administered.   Left ventricular systolic function is reduced with ejection fraction   estimated at 25-30 %.   Elevated left ventricular diastolic filling pressure: Septal E/e'' = 12.6   (for sinus tachycardia) .   Right ventricular systolic function is moderately reduced .   Mild mitral regurgitation.   Unable to obtain SPAP due to lack of tricuspid regurgitation.       Cultures  Respiratory cultures heavy growth staph - MSSA      Assessment:  Active Problems:    Acute respiratory failure (HCC)    Aspiration pneumonitis (HCC)

## 2019-05-18 NOTE — PROGRESS NOTES
Spoke with Mother about home meds - she had provided bottles to RN (?ER) who wrote down and confirmed - observed completed med list listed in Epic.

## 2019-05-18 NOTE — PROGRESS NOTES
[address] Daily Progress Note      Admit Date:  5/14/2019    Subjective:  Ms. Adriana Santoro is seen for follow up  She is s/p resp arrest in field followed by cardiac arrest as it seems. She was shocked at home by EMS brought to ED in acute resp failure. She has h/o cardiomyopathy probably adriamycin. No cardiac follow up. She has been on vent. Heparin drip. Amio and levophed drips in last 36hrs. Off amio no arrhythmia but remains on levophed. She is on propofol and versed drips. Wakes up when sedation lightened. She remains on ventilator  Bronchial secretions is growing staph aureus. History of Present Illness:  Kala Alcocer is a 26 y.o. patient who presented  as above. She was found down by [place]. Patient was having agonal breathing. Squad arrived did a brief CPR shocked her and transported her to ED. In ED her BP was high. One EKG had sinus tach another afib RVR. She was put on ventilator and was hard to ventilate. She was tachycardiac and was put on amiodarone drip. She continued to remain tachycardiac. She was given IV metoprolol and her BP started to drop. She was placed on levophed and she remains on that. Currently she is intubated sedated with propofol. She has low K and Low Mg at admission. I have been asked to provide consultation regarding further management and testing.         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 [address]      LEG SURGERY         Objective:   BP 97/67   Pulse 111   Temp 98.5 °F (36.9 °C) (Oral)   Resp 28   Ht 5' (1.524 m)   Wt 192 lb 11.2 oz (87.4 kg)   SpO2 98%   BMI 37.63 kg/m²       Intake/Output Summary (Last 24 hours) at 5/18/2019 0718  Last data filed at 5/18/2019 0530  Gross per 24 hour   Intake 3925 ml   Output 2345 ml   Net 1580 ml       TELEMETRY:sinus tachy    Physical Exam:  General: No Respiratory distress, appears well developed and well nourished. Eyes:  Sclera nonicteric  Nose/Sinuses:  negative findings: nose shows no deformity, asymmetry, or inflammation, nasal mucosa normal, septum midline with no perforation or bleeding  Back:  no pain to palpation  Joint:  no active joint inflammation  Musculoskeletal:  negative  Skin:  Warm and dry  Neck:  Negative for JVD and Carotid Bruits. Chest:  Clear to auscultation, respiration easy  Cardiovascular:  RRR, S1S2 diminished, no murmur, no rub or thrill.   Abdomen:  Soft normal liver and spleen, normal bowel sounds  Extremities:   No edema, clubbing, cyanosis,  Pulses: pedal pulses are normal.  Neuro: neg babinski    Medications:    furosemide  20 mg Intravenous Daily    enoxaparin  40 mg Subcutaneous Daily    doxycycline (VIBRAMYCIN) IV  100 mg Intravenous Q12H    aspirin  81 mg Oral Daily    piperacillin-tazobactam  3.375 g Intravenous Q8H    mupirocin   Nasal BID    famotidine (PEPCID) injection  20 mg Intravenous BID    albuterol sulfate HFA  4 puff Inhalation Q4H    ipratropium  4 puff Inhalation Q4H    insulin lispro  0-6 Units Subcutaneous Q4H    sodium chloride flush  10 mL Intravenous 2 times per day      midazolam 1 mg/hr (05/17/19 2335)    norepinephrine 3 mcg/min (05/18/19 0600)    sodium chloride 75 mL/hr at 05/18/19 0216    propofol 50 mcg/kg/min (05/18/19 0645)    dextrose      fentaNYL (SUBLIMAZE) infusion 175 mcg/hr (05/18/19 0329)     magnesium sulfate, potassium chloride, midazolam, fentanNYL, glucose, dextrose, glucagon (rDNA), dextrose, ibuprofen, sodium chloride flush    Lab Data:  CBC:   Recent Labs     05/16/19  0605 05/17/19  0620 05/18/19  0530   WBC 32.9* 24.9* 20.6*   HGB 14.0 12.3 12.5   HCT 41.7 36.9 37.6   MCV 89.3 87.9 89.6    239 238     BMP:   Recent Labs     05/16/19  0605 05/17/19  0620 05/18/19  0530   * 134* 137   K 3.6 3.3* 3.7    102 103   CO2 21 23 25   PHOS 1.8* 1.7* 2.0*   BUN 11 9 6*   CREATININE 0.6 <0.5* <0.5*     LIVER PROFILE:   Recent Labs     05/16/19  1120 05/17/19  0620 05/18/19  0530   AST 74* 52* 35   ALT 83* 71* 56*   BILIDIR 0.7* 0.8* 0.6*   BILITOT 1.1* 1.2* 1.0   ALKPHOS 76 73 107     PT/INR:   No results for input(s): PROTIME, INR in the last 72 hours. APTT:   Recent Labs     05/15/19  1635 05/16/19  0000 05/16/19 0605   APTT 33.1 69.6* 57.9*     BNP:  No results for input(s): BNP in the last 72 hours. IMAGING:   Chest xray 5.17.19 bilateral diffuse infiltrates probable ARDS per radiology report   Chest xray 5.16.19 acute pulmonary edema and LLL infiltrate  Echo doppler of heart 5.15.19   Summary   Definity contrast administered.   Left ventricular systolic function is reduced with ejection fraction   estimated at 25-30 %.   Elevated left ventricular diastolic filling pressure: Septal E/e'' = 12.6   (for sinus tachycardia) .   Right ventricular systolic function is moderately reduced .   Mild mitral regurgitation.   Unable to obtain SPAP due to lack of tricuspid regurgitation.     Assessment:  No fever in last 24 hrs  S/p cardiopulmonary arrest  Hypotension shock   S/p arrest shock CPR bilat broken ribs  Fever likely LLL aspiration pneumonia gram +ve cocci in bronchoscopy specimen, growing  Staph aureus  Neg blood culture from admission.   Acute pulmonary edema  Dilated cardiomyopathy    Patient Active Problem List    Diagnosis Date Noted    ARDS (adult respiratory distress syndrome) (HCC)     Mucus plugging of bronchi     Aspiration pneumonitis (HCC)     [facility] RVR ([facility])     Electrolyte disturbance     Shock ([address]     Transaminitis     Hyperglycemia     Pneumonia due to infectious

## 2019-05-18 NOTE — PROGRESS NOTES
05/18/19 0756   Vent Information   Vt Ordered 410 mL   Rate Set 18 bmp   Peak Flow 80 L/min   FiO2  60 %   PEEP/CPAP 10   Vent Patient Data   Peak Inspiratory Pressure 36 cmH2O   Mean Airway Pressure 14 cmH20   Rate Measured 18 br/min   Vt Exhaled 448 mL   Minute Volume 8.69 Liters   I:E Ratio 1:5.00   Plateau Pressure 23 NVX61   Static Compliance 34 mL/cmH2O   Dynamic Compliance 17 mL/cmH2O   Cough/Sputum   Cough Productive   Sputum Amount Small   Sputum Color Creamy   Tenacity Thick   Spontaneous Breathing Trial (SBT) RT Doc   Pulse 113   SpO2 98 %

## 2019-05-18 NOTE — PROGRESS NOTES
Report given to [facility] for transfer of pt care.   Janell Navarro RN, BSN

## 2019-05-18 NOTE — PROGRESS NOTES
4    Respiratory Pattern: Regular Pattern; RR 8-20 = 0    Breath Sounds: Absent bilaterally and/or with wheezes = 3    Sputum  Sputum Color: Yellow, Red, Tenacity: Thick, Sputum How Obtained: Suctioned  Cough: Strong, productive = 1    Vital Signs   BP 97/70   Pulse 122   Temp 100.4 °F (38 °C) (Axillary)   Resp 24   Ht 5' (1.524 m)   Wt 192 lb 11.2 oz (87.4 kg)   SpO2 94%   BMI 37.63 kg/m²   SPO2 (COPD values may differ): Less than 86% on room air or greater than 92% on FiO2 greater than 50% = 4    Peak Flow (asthma only): not applicable = 0    RSI: Greater than 17 = Contact physician        Plan       Goals:  Medication delivery    Patient/caregiver was educated on the proper method of use for Respiratory Care Devices:  No: on vent      Level of patient/caregiver understanding able to:   ? Verbalize understanding   ? Demonstrate understanding       ? Teach back        ? Needs reinforcement       ? No available caregiver               ? Other:     Response to education:  NA     Is patient being placed on Home Treatment Regimen? No     Does the patient have everything they need prior to discharge? NA     Comments:  Chart reviewed, patient assessed    Plan of Care: Albuterol/ Atrovent Q4    Electronically signed by Anne Baca RCP on 5/18/2019 at 4:49 PM    Respiratory Protocol Guidelines     1. Assessment and treatment by Respiratory Therapy will be initiated for medication and therapeutic interventions upon initiation of aerosolized medication. 2. Physician will be contacted for respiratory rate (RR) greater than 35 breaths per minute. Therapy will be held for heart rate (HR) greater than 140 beats per minute, pending direction from physician. 3. Bronchodilators will be administered via Metered Dose Inhaler (MDI) with spacer when the following criteria are met:  a.  Alert and cooperative     b. HR < 140 bpm  c. RR < 30 bpm                d. Can demonstrate a 2-3 second inspiratory hold  4. Bronchodilators will be administered via Hand Held Nebulizer [facility]) to patients when ANY of the following criteria are met  a. Incognizant or uncooperative          b. Patients treated with HHN at Home        c. Unable to demonstrate proper use of MDI with spacer     d. RR > 30 bpm   5. Bronchodilators will be delivered via Metered Dose Inhaler (MDI), HHN, Aerogen to intubated patients on mechanical ventilation. 6. Inhalation medication orders will be delivered and/or substituted as outlined below. Aerosolized Medications Ordering and Administration Guidelines:    1. All Medications will be ordered by a physician, and their frequency and/or modality will be adjusted as defined by the patients Respiratory Severity Index (RSI) score. 2. If the patient does not have documented COPD, consider discontinuing anticholinergics when RSI is less than 9.  3. If the bronchospasm worsens (increased RSI), then the bronchodilator frequency can be increased to a maximum of every 4 hours. If greater than every 4 hours is required, the physician will be contacted. 4. If the bronchospasm improves, the frequency of the bronchodilator can be decreased, based on the patient's RSI, but not less than home treatment regimen frequency. 5. Bronchodilator(s) will be discontinued if patient has a RSI less than 9 and has received no scheduled or as needed treatment for 72  Hrs. Patients Ordered on a Mucolytic Agent:    1. Must always be administered with a bronchodilator. 2. Discontinue if patient experiences worsened bronchospasm, or secretions have lessened to the point that the patient is able to clear them with a cough. Anti-inflammatory and Combination Medications:    1. If the patient lacks prior history of lung disease, is not using inhaled anti-inflammatory medication at home, and lacks wheezing by examination or by history for at least 24 hours, contact physician for possible discontinuation.

## 2019-05-18 NOTE — PLAN OF CARE
Problem: Risk for Impaired Skin Integrity  Goal: Tissue integrity - skin and mucous membranes  Description  Structural intactness and normal physiological function of skin and  mucous membranes. Outcome: Ongoing  Note:   Bilateral soft wrist restraints remain in place for patient safety, and to protect all lines and airways. Problem: Restraint Use - Nonviolent/Non-Self-Destructive Behavior:  Goal: Absence of restraint indications  Description  Absence of restraint indications  Outcome: Ongoing  Note:   Bilateral soft wrist restraints remain in place for patient safety, and to protect all lines and airways.    Goal: Absence of restraint-related injury  Description  Absence of restraint-related injury  Outcome: Ongoing     Problem: Infection:  Goal: Will remain free from infection  Description  Will remain free from infection  Outcome: Ongoing     Problem: Gas Exchange - Impaired:  Goal: Levels of oxygenation will improve  Description  Levels of oxygenation will improve  Outcome: Ongoing     Problem: Infection, Septic Shock:  Goal: Will show no infection signs and symptoms  Description  Will show no infection signs and symptoms  Outcome: Ongoing     Problem: Airway Clearance - Ineffective:  Goal: Clear lung sounds  Description  Clear lung sounds  Outcome: Ongoing  Goal: Ability to maintain a clear airway will improve  Description  Ability to maintain a clear airway will improve  Outcome: Ongoing     Problem: Fluid Volume - Deficit:  Goal: Achieves intake and output within specified parameters  Description  Achieves intake and output within specified parameters  Outcome: Ongoing     Problem: Nutrition  Goal: Optimal nutrition therapy  5/18/2019 0230 by Jarocho Flowers RN  Outcome: Ongoing  5/17/2019 1405 by Tristen Harrington RD, LD  Outcome: Not Met This Shift  Goal: Understanding of nutritional guidelines  5/18/2019 0230 by Jarocho Flowers RN  Outcome: Ongoing  5/17/2019 1405 by Tristen Harrington RD, LD  Outcome: Not Met This Shift     Problem: Falls - Risk of:  Goal: Will remain free from falls  Description  Will remain free from falls  Outcome: Ongoing  Note:   [place] fall-risk light on outside door, fall precautions in place.    Goal: Absence of physical injury  Description  Absence of physical injury  Outcome: Ongoing

## 2019-05-19 ENCOUNTER — APPOINTMENT (OUTPATIENT)
Dept: GENERAL RADIOLOGY | Age: 34
DRG: 710 | End: 2019-05-19
Payer: MEDICAID

## 2019-05-19 LAB
ALBUMIN SERPL-MCNC: 2.4 G/DL (ref 3.4–5)
ALP BLD-CCNC: 108 U/L (ref 40–129)
ALT SERPL-CCNC: 38 U/L (ref 10–40)
ANION GAP SERPL CALCULATED.3IONS-SCNC: 7 MMOL/L (ref 3–16)
ANISOCYTOSIS: ABNORMAL
AST SERPL-CCNC: 32 U/L (ref 15–37)
BANDED NEUTROPHILS RELATIVE PERCENT: 1 % (ref 0–7)
BASE EXCESS ARTERIAL: 5.4 MMOL/L (ref -3–3)
BASE EXCESS ARTERIAL: 7.7 MMOL/L (ref -3–3)
BASOPHILS ABSOLUTE: 0 K/UL (ref 0–0.2)
BASOPHILS RELATIVE PERCENT: 0 %
BILIRUB SERPL-MCNC: 1 MG/DL (ref 0–1)
BILIRUBIN DIRECT: 0.6 MG/DL (ref 0–0.3)
BILIRUBIN, INDIRECT: 0.4 MG/DL (ref 0–1)
BLOOD CULTURE, ROUTINE: NORMAL
BUN BLDV-MCNC: 9 MG/DL (ref 7–20)
CALCIUM SERPL-MCNC: 7.1 MG/DL (ref 8.3–10.6)
CARBOXYHEMOGLOBIN ARTERIAL: 0.7 % (ref 0–1.5)
CARBOXYHEMOGLOBIN ARTERIAL: 1 % (ref 0–1.5)
CHLORIDE BLD-SCNC: 104 MMOL/L (ref 99–110)
CO2: 26 MMOL/L (ref 21–32)
CREAT SERPL-MCNC: <0.5 MG/DL (ref 0.6–1.1)
EOSINOPHILS ABSOLUTE: 0.2 K/UL (ref 0–0.6)
EOSINOPHILS RELATIVE PERCENT: 1 %
GFR AFRICAN AMERICAN: >60
GFR NON-AFRICAN AMERICAN: >60
GLUCOSE BLD-MCNC: 100 MG/DL (ref 70–99)
GLUCOSE BLD-MCNC: 102 MG/DL (ref 70–99)
GLUCOSE BLD-MCNC: 104 MG/DL (ref 70–99)
GLUCOSE BLD-MCNC: 114 MG/DL (ref 70–99)
GLUCOSE BLD-MCNC: 115 MG/DL (ref 70–99)
GLUCOSE BLD-MCNC: 118 MG/DL (ref 70–99)
GLUCOSE BLD-MCNC: 95 MG/DL (ref 70–99)
HCO3 ARTERIAL: 29.5 MMOL/L (ref 21–29)
HCO3 ARTERIAL: 31.5 MMOL/L (ref 21–29)
HCT VFR BLD CALC: 35.5 % (ref 36–48)
HEMATOLOGY PATH CONSULT: NO
HEMOGLOBIN, ART, EXTENDED: 12.5 G/DL (ref 12–16)
HEMOGLOBIN, ART, EXTENDED: 13.1 G/DL (ref 12–16)
HEMOGLOBIN: 11.8 G/DL (ref 12–16)
LYMPHOCYTES ABSOLUTE: 1.2 K/UL (ref 1–5.1)
LYMPHOCYTES RELATIVE PERCENT: 8 %
MAGNESIUM: 1.3 MG/DL (ref 1.8–2.4)
MCH RBC QN AUTO: 29.6 PG (ref 26–34)
MCHC RBC AUTO-ENTMCNC: 33.3 G/DL (ref 31–36)
MCV RBC AUTO: 88.8 FL (ref 80–100)
METAMYELOCYTES RELATIVE PERCENT: 1 %
METHEMOGLOBIN ARTERIAL: 0.2 %
METHEMOGLOBIN ARTERIAL: 0.2 %
MONOCYTES ABSOLUTE: 1.4 K/UL (ref 0–1.3)
MONOCYTES RELATIVE PERCENT: 9 %
NEUTROPHILS ABSOLUTE: 12.5 K/UL (ref 1.7–7.7)
NEUTROPHILS RELATIVE PERCENT: 80 %
O2 CONTENT ARTERIAL: 17 ML/DL
O2 CONTENT ARTERIAL: 17 ML/DL
O2 SAT, ARTERIAL: 94.3 %
O2 SAT, ARTERIAL: 96.2 %
O2 THERAPY: ABNORMAL
O2 THERAPY: ABNORMAL
PCO2 ARTERIAL: 40.6 MMHG (ref 35–45)
PCO2 ARTERIAL: 41.4 MMHG (ref 35–45)
PDW BLD-RTO: 12.7 % (ref 12.4–15.4)
PERFORMED ON: ABNORMAL
PERFORMED ON: NORMAL
PH ARTERIAL: 7.47 (ref 7.35–7.45)
PH ARTERIAL: 7.51 (ref 7.35–7.45)
PHOSPHORUS: 2 MG/DL (ref 2.5–4.9)
PLATELET # BLD: 235 K/UL (ref 135–450)
PLATELET SLIDE REVIEW: ADEQUATE
PMV BLD AUTO: 7.6 FL (ref 5–10.5)
PO2 ARTERIAL: 64.7 MMHG (ref 75–108)
PO2 ARTERIAL: 77.7 MMHG (ref 75–108)
POIKILOCYTES: ABNORMAL
POTASSIUM SERPL-SCNC: 3.3 MMOL/L (ref 3.5–5.1)
POTASSIUM SERPL-SCNC: 3.4 MMOL/L (ref 3.5–5.1)
RBC # BLD: 3.99 M/UL (ref 4–5.2)
SLIDE REVIEW: ABNORMAL
SODIUM BLD-SCNC: 137 MMOL/L (ref 136–145)
TCO2 ARTERIAL: 30.8 MMOL/L
TCO2 ARTERIAL: 32.7 MMOL/L
TOTAL CK: 82 U/L (ref 26–192)
TOTAL PROTEIN: 6.2 G/DL (ref 6.4–8.2)
VACUOLATED NEUTROPHILS: PRESENT
WBC # BLD: 15.2 K/UL (ref 4–11)

## 2019-05-19 PROCEDURE — 80048 BASIC METABOLIC PNL TOTAL CA: CPT

## 2019-05-19 PROCEDURE — 2580000003 HC RX 258: Performed by: INTERNAL MEDICINE

## 2019-05-19 PROCEDURE — 71045 X-RAY EXAM CHEST 1 VIEW: CPT

## 2019-05-19 PROCEDURE — 31645 BRNCHSC W/THER ASPIR 1ST: CPT | Performed by: INTERNAL MEDICINE

## 2019-05-19 PROCEDURE — 99291 CRITICAL CARE FIRST HOUR: CPT | Performed by: INTERNAL MEDICINE

## 2019-05-19 PROCEDURE — 3609010900 HC BRONCHOSCOPY THERAPUTIC ASPIRATION INITIAL: Performed by: INTERNAL MEDICINE

## 2019-05-19 PROCEDURE — 87205 SMEAR GRAM STAIN: CPT

## 2019-05-19 PROCEDURE — 99232 SBSQ HOSP IP/OBS MODERATE 35: CPT | Performed by: INTERNAL MEDICINE

## 2019-05-19 PROCEDURE — 82550 ASSAY OF CK (CPK): CPT

## 2019-05-19 PROCEDURE — 99152 MOD SED SAME PHYS/QHP 5/>YRS: CPT | Performed by: INTERNAL MEDICINE

## 2019-05-19 PROCEDURE — 2580000003 HC RX 258: Performed by: EMERGENCY MEDICINE

## 2019-05-19 PROCEDURE — 82803 BLOOD GASES ANY COMBINATION: CPT

## 2019-05-19 PROCEDURE — 94003 VENT MGMT INPAT SUBQ DAY: CPT

## 2019-05-19 PROCEDURE — 6360000002 HC RX W HCPCS: Performed by: INTERNAL MEDICINE

## 2019-05-19 PROCEDURE — 84132 ASSAY OF SERUM POTASSIUM: CPT

## 2019-05-19 PROCEDURE — 6370000000 HC RX 637 (ALT 250 FOR IP): Performed by: INTERNAL MEDICINE

## 2019-05-19 PROCEDURE — 84100 ASSAY OF PHOSPHORUS: CPT

## 2019-05-19 PROCEDURE — 2000000000 HC ICU R&B

## 2019-05-19 PROCEDURE — 83735 ASSAY OF MAGNESIUM: CPT

## 2019-05-19 PROCEDURE — 0B9F8ZX DRAINAGE OF RIGHT LOWER LUNG LOBE, VIA NATURAL OR ARTIFICIAL OPENING ENDOSCOPIC, DIAGNOSTIC: ICD-10-PCS | Performed by: INTERNAL MEDICINE

## 2019-05-19 PROCEDURE — 0BH17EZ INSERTION OF ENDOTRACHEAL AIRWAY INTO TRACHEA, VIA NATURAL OR ARTIFICIAL OPENING: ICD-10-PCS | Performed by: INTERNAL MEDICINE

## 2019-05-19 PROCEDURE — 94750 HC PULMONARY COMPLIANCE STUDY: CPT

## 2019-05-19 PROCEDURE — 2700000000 HC OXYGEN THERAPY PER DAY

## 2019-05-19 PROCEDURE — 0BC68ZZ EXTIRPATION OF MATTER FROM RIGHT LOWER LOBE BRONCHUS, VIA NATURAL OR ARTIFICIAL OPENING ENDOSCOPIC: ICD-10-PCS | Performed by: INTERNAL MEDICINE

## 2019-05-19 PROCEDURE — 31624 DX BRONCHOSCOPE/LAVAGE: CPT | Performed by: INTERNAL MEDICINE

## 2019-05-19 PROCEDURE — 94761 N-INVAS EAR/PLS OXIMETRY MLT: CPT

## 2019-05-19 PROCEDURE — 94640 AIRWAY INHALATION TREATMENT: CPT

## 2019-05-19 PROCEDURE — 85025 COMPLETE CBC W/AUTO DIFF WBC: CPT

## 2019-05-19 PROCEDURE — 2500000003 HC RX 250 WO HCPCS: Performed by: INTERNAL MEDICINE

## 2019-05-19 PROCEDURE — 2709999900 HC NON-CHARGEABLE SUPPLY: Performed by: INTERNAL MEDICINE

## 2019-05-19 PROCEDURE — 87070 CULTURE OTHR SPECIMN AEROBIC: CPT

## 2019-05-19 PROCEDURE — 3609010800 HC BRONCHOSCOPY ALVEOLAR LAVAGE: Performed by: INTERNAL MEDICINE

## 2019-05-19 PROCEDURE — 80076 HEPATIC FUNCTION PANEL: CPT

## 2019-05-19 RX ORDER — POTASSIUM CHLORIDE 750 MG/1
40 TABLET, EXTENDED RELEASE ORAL ONCE
Status: COMPLETED | OUTPATIENT
Start: 2019-05-19 | End: 2019-05-19

## 2019-05-19 RX ORDER — ACETAZOLAMIDE 500 MG/5ML
500 INJECTION, POWDER, LYOPHILIZED, FOR SOLUTION INTRAVENOUS DAILY
Status: COMPLETED | OUTPATIENT
Start: 2019-05-19 | End: 2019-05-21

## 2019-05-19 RX ADMIN — MUPIROCIN: 20 OINTMENT TOPICAL at 21:56

## 2019-05-19 RX ADMIN — Medication 4 PUFF: at 15:51

## 2019-05-19 RX ADMIN — PROPOFOL 50 MCG/KG/MIN: 10 INJECTION, EMULSION INTRAVENOUS at 20:51

## 2019-05-19 RX ADMIN — PIPERACILLIN SODIUM,TAZOBACTAM SODIUM 3.38 G: 3; .375 INJECTION, POWDER, FOR SOLUTION INTRAVENOUS at 05:36

## 2019-05-19 RX ADMIN — Medication 4 PUFF: at 19:38

## 2019-05-19 RX ADMIN — MAGNESIUM SULFATE HEPTAHYDRATE 1 G: 1 INJECTION, SOLUTION INTRAVENOUS at 10:05

## 2019-05-19 RX ADMIN — MIDAZOLAM HYDROCHLORIDE 2 MG: 2 INJECTION, SOLUTION INTRAMUSCULAR; INTRAVENOUS at 08:57

## 2019-05-19 RX ADMIN — PROPOFOL 50 MCG/KG/MIN: 10 INJECTION, EMULSION INTRAVENOUS at 21:30

## 2019-05-19 RX ADMIN — FUROSEMIDE 40 MG: 10 INJECTION, SOLUTION INTRAMUSCULAR; INTRAVENOUS at 10:34

## 2019-05-19 RX ADMIN — PROPOFOL 45 MCG/KG/MIN: 10 INJECTION, EMULSION INTRAVENOUS at 03:46

## 2019-05-19 RX ADMIN — Medication 4 PUFF: at 03:35

## 2019-05-19 RX ADMIN — POTASSIUM CHLORIDE 40 MEQ: 10 TABLET, EXTENDED RELEASE ORAL at 12:24

## 2019-05-19 RX ADMIN — FENTANYL CITRATE 25 MCG: 50 INJECTION INTRAMUSCULAR; INTRAVENOUS at 15:51

## 2019-05-19 RX ADMIN — POTASSIUM CHLORIDE 20 MEQ: 400 INJECTION, SOLUTION INTRAVENOUS at 00:15

## 2019-05-19 RX ADMIN — PIPERACILLIN SODIUM,TAZOBACTAM SODIUM 3.38 G: 3; .375 INJECTION, POWDER, FOR SOLUTION INTRAVENOUS at 15:15

## 2019-05-19 RX ADMIN — MIDAZOLAM HYDROCHLORIDE 2 MG: 2 INJECTION, SOLUTION INTRAMUSCULAR; INTRAVENOUS at 03:34

## 2019-05-19 RX ADMIN — MUPIROCIN: 20 OINTMENT TOPICAL at 10:34

## 2019-05-19 RX ADMIN — MIDAZOLAM HYDROCHLORIDE 2 MG: 2 INJECTION, SOLUTION INTRAMUSCULAR; INTRAVENOUS at 02:21

## 2019-05-19 RX ADMIN — MAGNESIUM SULFATE HEPTAHYDRATE 1 G: 1 INJECTION, SOLUTION INTRAVENOUS at 09:11

## 2019-05-19 RX ADMIN — POTASSIUM PHOSPHATE, MONOBASIC AND POTASSIUM PHOSPHATE, DIBASIC 15 MMOL: 224; 236 INJECTION, SOLUTION INTRAVENOUS at 11:47

## 2019-05-19 RX ADMIN — POTASSIUM CHLORIDE 20 MEQ: 400 INJECTION, SOLUTION INTRAVENOUS at 03:50

## 2019-05-19 RX ADMIN — FUROSEMIDE 40 MG: 10 INJECTION, SOLUTION INTRAMUSCULAR; INTRAVENOUS at 18:46

## 2019-05-19 RX ADMIN — ASPIRIN 81 MG 81 MG: 81 TABLET ORAL at 10:28

## 2019-05-19 RX ADMIN — MIDAZOLAM HYDROCHLORIDE 2 MG: 2 INJECTION, SOLUTION INTRAMUSCULAR; INTRAVENOUS at 11:34

## 2019-05-19 RX ADMIN — OLANZAPINE 15 MG: 10 TABLET, FILM COATED ORAL at 21:29

## 2019-05-19 RX ADMIN — MAGNESIUM SULFATE HEPTAHYDRATE 1 G: 1 INJECTION, SOLUTION INTRAVENOUS at 08:07

## 2019-05-19 RX ADMIN — Medication 4 PUFF: at 23:44

## 2019-05-19 RX ADMIN — Medication 10 ML: at 10:34

## 2019-05-19 RX ADMIN — PROPOFOL 50 MCG/KG/MIN: 10 INJECTION, EMULSION INTRAVENOUS at 16:33

## 2019-05-19 RX ADMIN — Medication 10 ML: at 21:57

## 2019-05-19 RX ADMIN — Medication 4 PUFF: at 11:56

## 2019-05-19 RX ADMIN — GABAPENTIN 200 MG: 100 CAPSULE ORAL at 15:15

## 2019-05-19 RX ADMIN — PROPOFOL 50 MCG/KG/MIN: 10 INJECTION, EMULSION INTRAVENOUS at 08:45

## 2019-05-19 RX ADMIN — ACETAZOLAMIDE 500 MG: 500 INJECTION, POWDER, LYOPHILIZED, FOR SOLUTION INTRAVENOUS at 22:12

## 2019-05-19 RX ADMIN — ENOXAPARIN SODIUM 40 MG: 40 INJECTION SUBCUTANEOUS at 10:17

## 2019-05-19 RX ADMIN — Medication 4 PUFF: at 07:30

## 2019-05-19 RX ADMIN — OLANZAPINE 15 MG: 10 TABLET, FILM COATED ORAL at 10:28

## 2019-05-19 RX ADMIN — MIDAZOLAM HYDROCHLORIDE 2 MG: 2 INJECTION, SOLUTION INTRAMUSCULAR; INTRAVENOUS at 04:52

## 2019-05-19 RX ADMIN — SODIUM CHLORIDE 0.2 MCG/KG/HR: 9 INJECTION, SOLUTION INTRAVENOUS at 11:42

## 2019-05-19 RX ADMIN — DOXYCYCLINE 100 MG: 100 INJECTION, POWDER, LYOPHILIZED, FOR SOLUTION INTRAVENOUS at 10:18

## 2019-05-19 RX ADMIN — IBUPROFEN 600 MG: 100 SUSPENSION ORAL at 04:03

## 2019-05-19 RX ADMIN — POTASSIUM CHLORIDE 20 MEQ: 400 INJECTION, SOLUTION INTRAVENOUS at 02:18

## 2019-05-19 RX ADMIN — POTASSIUM CHLORIDE 20 MEQ: 400 INJECTION, SOLUTION INTRAVENOUS at 09:14

## 2019-05-19 RX ADMIN — PIPERACILLIN SODIUM,TAZOBACTAM SODIUM 3.38 G: 3; .375 INJECTION, POWDER, FOR SOLUTION INTRAVENOUS at 21:45

## 2019-05-19 RX ADMIN — DOXYCYCLINE 100 MG: 100 INJECTION, POWDER, LYOPHILIZED, FOR SOLUTION INTRAVENOUS at 21:29

## 2019-05-19 RX ADMIN — POTASSIUM CHLORIDE 20 MEQ: 400 INJECTION, SOLUTION INTRAVENOUS at 08:04

## 2019-05-19 RX ADMIN — Medication 4 PUFF: at 19:37

## 2019-05-19 RX ADMIN — FENTANYL CITRATE 175 MCG/HR: 50 INJECTION, SOLUTION INTRAMUSCULAR; INTRAVENOUS at 23:17

## 2019-05-19 RX ADMIN — GABAPENTIN 200 MG: 100 CAPSULE ORAL at 21:45

## 2019-05-19 RX ADMIN — MAGNESIUM SULFATE HEPTAHYDRATE 1 G: 1 INJECTION, SOLUTION INTRAVENOUS at 11:00

## 2019-05-19 RX ADMIN — FAMOTIDINE 20 MG: 10 INJECTION, SOLUTION INTRAVENOUS at 10:18

## 2019-05-19 RX ADMIN — FENTANYL CITRATE 175 MCG/HR: 50 INJECTION, SOLUTION INTRAMUSCULAR; INTRAVENOUS at 10:21

## 2019-05-19 RX ADMIN — GABAPENTIN 200 MG: 100 CAPSULE ORAL at 10:28

## 2019-05-19 RX ADMIN — FENTANYL CITRATE 175 MCG/HR: 50 INJECTION, SOLUTION INTRAMUSCULAR; INTRAVENOUS at 03:53

## 2019-05-19 RX ADMIN — PROPOFOL 50 MCG/KG/MIN: 10 INJECTION, EMULSION INTRAVENOUS at 12:24

## 2019-05-19 RX ADMIN — MIDAZOLAM HYDROCHLORIDE 2 MG: 2 INJECTION, SOLUTION INTRAMUSCULAR; INTRAVENOUS at 15:51

## 2019-05-19 RX ADMIN — FENTANYL CITRATE 25 MCG: 50 INJECTION INTRAMUSCULAR; INTRAVENOUS at 12:02

## 2019-05-19 RX ADMIN — FAMOTIDINE 20 MG: 10 INJECTION, SOLUTION INTRAVENOUS at 21:30

## 2019-05-19 ASSESSMENT — PULMONARY FUNCTION TESTS
PIF_VALUE: 30
PIF_VALUE: 24
PIF_VALUE: 31
PIF_VALUE: 30
PIF_VALUE: 28
PIF_VALUE: 26
PIF_VALUE: 27
PIF_VALUE: 31
PIF_VALUE: 29
PIF_VALUE: 24
PIF_VALUE: 30
PIF_VALUE: 41
PIF_VALUE: 28
PIF_VALUE: 28
PIF_VALUE: 29
PIF_VALUE: 27
PIF_VALUE: 33
PIF_VALUE: 28
PIF_VALUE: 29

## 2019-05-19 ASSESSMENT — PAIN - FUNCTIONAL ASSESSMENT: PAIN_FUNCTIONAL_ASSESSMENT: CPOT

## 2019-05-19 ASSESSMENT — PAIN SCALES - GENERAL
PAINLEVEL_OUTOF10: 0

## 2019-05-19 NOTE — PROGRESS NOTES
[address] Daily Progress Note      Admit Date:  5/14/2019    Subjective:  Ms. Courtney Poole is seen for follow up. Running low grade fever in last 24 hrs. She is on lasix IV. On levophed propofol Fentanyl drips. She is s/p resp arrest in field followed by cardiac arrest as it seems. She was shocked at home by EMS brought to ED in acute resp failure. She has h/o cardiomyopathy probably adriamycin. No cardiac follow up. She has been on vent. Wakes up when sedation lightened. Bronchial secretions is growing staph aureus. History of Present Illness:  Liz Oropeza is a 26 y.o. patient who presented  as above. She was found down by mother when [place]. Patient was having agonal breathing. Squad arrived did a brief CPR shocked her and transported her to ED. In ED her BP was high. One EKG had sinus tach another afib RVR. She was put on ventilator and was hard to ventilate. She was tachycardiac and was put on amiodarone drip. She continued to remain tachycardiac. She was given IV metoprolol and her BP started to drop. She was placed on levophed and she remains on that. Currently she is intubated sedated with propofol. She has low K and Low Mg at admission. I have been asked to provide consultation regarding further management and testing.         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      LEG SURGERY         Objective:   BP (!) 92/59   Pulse 121   Temp 100.7 °F (38.2 °C) (Oral)   Resp 20   Ht 5' (1.524 m)   Wt 199 lb (90.3 kg)   SpO2 92%   BMI 38.86 kg/m²       Intake/Output Summary (Last 24 hours) at 5/19/2019 0817  Last data filed at 5/19/2019 0400  Gross per 24 hour   Intake 2725.33 ml   Output 4480 ml   Net -1754.67 ml       TELEMETRY:sinus tachy    Physical Exam:  General: No Respiratory distress, appears well developed and well nourished. Eyes:  Sclera nonicteric  Nose/Sinuses:  negative findings: nose shows no deformity, asymmetry, or inflammation, nasal mucosa normal, septum midline with no perforation or bleeding  Back:  no pain to palpation  Joint:  no active joint inflammation  Musculoskeletal:  negative  Skin:  Warm and dry  Neck:  Negative for JVD and Carotid Bruits. Chest:  Clear to auscultation, respiration easy  Cardiovascular:  RRR, S1S2 diminished, no murmur, no rub or thrill. 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 Pulses: pedal pulses are normal in rt foot neg in left foot.   Neuro: neg babinski    Medications:    furosemide  40 mg Intravenous BID    OLANZapine  15 mg Oral BID    gabapentin  200 mg Oral TID    enoxaparin  40 mg Subcutaneous Daily    doxycycline (VIBRAMYCIN) IV  100 mg Intravenous Q12H    aspirin  81 mg Oral Daily    piperacillin-tazobactam  3.375 g Intravenous Q8H    mupirocin   Nasal BID    famotidine (PEPCID) injection  20 mg Intravenous BID    albuterol sulfate HFA  4 puff Inhalation Q4H    ipratropium  4 puff Inhalation Q4H    insulin lispro  0-6 Units Subcutaneous Q4H    sodium chloride flush  10 mL Intravenous 2 times per day      midazolam Stopped (05/18/19 1005)    norepinephrine 2 mcg/min (05/18/19 1917)    sodium chloride 75 mL/hr at 05/18/19 0216    propofol 45 mcg/kg/min (05/19/19 0346)    dextrose      fentaNYL (SUBLIMAZE) infusion 175 mcg/hr (05/19/19 0353)     magnesium sulfate, potassium chloride, midazolam, fentanNYL, glucose, dextrose, glucagon (rDNA), dextrose, ibuprofen, sodium chloride flush    Lab Data:  CBC:   Recent Labs     05/17/19 0620 05/18/19  0530 05/19/19  0528   WBC 24.9* 20.6* 15.2*   HGB 12.3 12.5 11.8*   HCT 36.9 37.6 35.5*   MCV 87.9 89.6 88.8    238 235     BMP:   Recent Labs     05/17/19 0620 05/18/19  0530 05/18/19  2000 05/19/19  0135 05/19/19  0528 05/19/19  0600   * 137 138  --  137  --    K 3.3* 3.7 2.6* 3.4* 3.3*  --     103 99  --  104  --    CO2 23 25 30  --  26  --    PHOS 1.7* 2.0*  --   --   --  2.0*   BUN 9 6* 11  --  9  --    CREATININE <0.5* <0.5* <0.5*  --  <0.5*  --      LIVER PROFILE:   Recent Labs     05/17/19 0620 05/18/19  0530 05/19/19  0600   AST 52* 35 32   ALT 71* 56* 38   BILIDIR 0.8* 0.6* 0.6*   BILITOT 1.2* 1.0 1.0   ALKPHOS 73 107 108     PT/INR:   No results for input(s): PROTIME, INR in the last 72 hours. APTT:   No results for input(s): APTT in the last 72 hours. BNP:  No results for input(s): BNP in the last 72 hours. IMAGING:   Chest xray 5.17.19 bilateral diffuse infiltrates probable ARDS per radiology report   Chest xray 5.16.19 acute pulmonary edema and LLL infiltrate  Echo doppler of heart 5.15.19   Summary   Definity contrast administered.   Left ventricular systolic function is reduced with ejection fraction   estimated at 25-30 %.   Elevated left ventricular diastolic filling pressure: Septal E/e'' = 12.6   (for sinus tachycardia) .   Right ventricular systolic function is moderately reduced .   Mild mitral regurgitation.   Unable to obtain SPAP due to lack of tricuspid regurgitation.     Assessment:  S/p cardiopulmonary arrest  Hypotension shock   S/p arrest shock CPR bilat broken ribs  Fever likely LLL aspiration pneumonia gram +ve cocci in bronchoscopy specimen, growing  Staph aureus  Neg blood culture from admission. ARDS most likely  Acute pulmonary edema  Dilated cardiomyopathy    Patient Active Problem List    Diagnosis Date Noted    [facility] [address]     ARDS (adult respiratory distress syndrome) (HCC)     Mucus plugging of bronchi     Aspiration pneumonitis (HCC)     Atrial fibrillation with RVR (HCC)     Electrolyte disturbance     Shock ([facility] [address]     Transaminitis     Hyperglycemia     Pneumonia due to infectious organism     Acute respiratory failure ([facility] [address] 05/14/2019       Plan:  1. She is being diuresed   2. EKG on 5.17.19 no ischemia or acute infarction  3. Continue antibiotics  4. Ventilatory support  5. DVT prophylaxis  6. Asa  7. Beta blockers and ace inhibitors  when off levo  8.  Once off vent and no longer infected   Arrange cath and EP eval    Core Measures:  · Discharge instructions:   · LVEF documented:   · ACEI for LV dysfunction:   · Smoking Cessation:  I have spent 25   minutes of face to face time with the patient with more than 50% spent counseling and coordinating care for this patient    Vidya King MD 5/19/2019 8:17 AM

## 2019-05-19 NOTE — PROGRESS NOTES
05/19/19 1935   Vent Information   $Ventilation $Subsequent Day   Vent Type 840   Vent Mode AC/VC   Vt Ordered 410 mL   Rate Set 18 bmp   Peak Flow 80 L/min   Pressure Support 0 cmH20   FiO2  60 %   Sensitivity 3   PEEP/CPAP 12   I Time/ I Time % 0 s   Vent Patient Data   Peak Inspiratory Pressure 24 cmH2O   Mean Airway Pressure 15 cmH20   Rate Measured 19 br/min   Vt Exhaled 442 mL   Minute Volume 8.55 Liters   I:E Ratio 1:5.0   Cough/Sputum   Sputum How Obtained Endotracheal;Suctioned   Sputum Amount None   Spontaneous Breathing Trial (SBT) RT Doc   Pulse 99   SpO2 92 %   Breath Sounds   Right Upper Lobe Rhonchi   Right Middle Lobe Rhonchi   Right Lower Lobe Rhonchi   Left Upper Lobe Rhonchi   Left Lower Lobe Rhonchi   Additional Respiratory  Assessments   Resp 17   Position Semi-Triplett's   Alarm Settings   High Pressure Alarm 45 cmH2O   Low Minute Volume Alarm 4 L/min   High Respiratory Rate 50 br/min   Non-Surgical Airway Endo Tracheal Tube   Placement Date/Time: 05/14/19 1808   Timeout: Patient; Appropriate Equipment  Placed By: In ED  [facility]: [facility]   Insertion attempts: 1  Airway Device: Endo Tracheal Tube  Size: 7.5  Placement Verified By[de-identified] Auscultation; Chest X-ray;Colorimetric E...    Secured at 23 cm   Measured From [address] By [employer] tube [address]de   Site Condition Dry

## 2019-05-19 NOTE — PROGRESS NOTES
05/18/19 8329   Vent Information   Vent Type 840   Vent Mode AC/VC   Vt Ordered 410 mL   Rate Set 18 bmp   Peak Flow 80 L/min   Pressure Support 0 cmH20   FiO2  50 %   Sensitivity 3   PEEP/CPAP 5   I Time/ I Time % 0 s   Vent Patient Data   Peak Inspiratory Pressure 28 cmH2O   Mean Airway Pressure 9.5 cmH20   Rate Measured 18 br/min   Vt Exhaled 435 mL   Minute Volume 8.05 Liters   I:E Ratio 1:5   Cough/Sputum   Sputum Amount Moderate   Sputum Color Brown;Yellow   Tenacity Thick   Spontaneous Breathing Trial (SBT) RT Doc   SpO2 91 %   Breath Sounds   Right Upper Lobe Rhonchi   Right Middle Lobe Rhonchi   Right Lower Lobe Rhonchi   Left Upper Lobe Rhonchi   Left Lower Lobe Rhonchi   Additional Respiratory  Assessments   Resp 18   Alarm Settings   High Pressure Alarm 45 cmH2O   Low Minute Volume Alarm 4 L/min   High Respiratory Rate 50 br/min   Non-Surgical Airway Endo Tracheal Tube   Placement Date/Time: 05/14/19 9281   Timeout: Patient; Appropriate Equipment  Placed By: In ED  Inserted by: Juan Marley NP   Insertion attempts: 1  Airway Device: Endo Tracheal Tube  Size: 7.5  Placement Verified By[de-identified] Auscultation; Chest X-ray;Colorimetric E...    Secured at 24 cm   Measured From Lips   Secured Location Right   Secured By [employer] tube for[place]   Site Condition Dry

## 2019-05-19 NOTE — PROGRESS NOTES
Progress Note    Admit Date:  5/14/2019      59-year-old female with nonischemic dilated cardiomyopathy, presented with V. fib arrest.  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 Hep C + . She is mostly homebound. She has been ill with  respiratory symptoms for a couple of months. Mother found her down. CPR initiated,  S/P defibrillation twice en route to hospital.  Admitted  to ICU , on mechanical vent   Echocardiogram with ejection fraction low at 25%   Head CT was negative. Subjective:  Ms. Shanell Thomason on mechanical ventilation  - status post bronchoscopy 5/16. Patient had significant amount of resp secretions that were aspirated   - patient is waking up now and following commands  - Her oxygenation had improved yesterday  - Increased respiratory secretions again today status post repeat bronchoscopy- 5/19. Again she had increased respiratory secretions that were aspirated. Hypoxia worse now FiO2 up to 70% PEEP is up to 12  - Febrile  - Off amiodarone drip and heparin drip,  currently on Lovenox.  -Had been off  levophed. the back on Levophed at 6 mics     Objective:   BP (!) 91/59   Pulse 93   Temp 98.6 °F (37 °C) (Core)   Resp 23   Ht 5' (1.524 m)   Wt 199 lb (90.3 kg)   SpO2 91%   BMI 38.86 kg/m²       Intake/Output Summary (Last 24 hours) at 5/19/2019 1630  Last data filed at 5/19/2019 1527  Gross per 24 hour   Intake 3380.33 ml   Output 4325 ml   Net -944.67 ml         Physical Exam:  General: Intubated ,on mechanical ventilation .   - Sedation has been decreased patient is opening eyes and responding a little now . Skin:  Warm and dry  Neck:  JVD absent. Neck supple  Chest:  Diminished breath sounds . No wheezes, rales or rhonchi. Cardiovascular:  RRR ,S1S2 normal  Abdomen:  Soft, non tender, non distended, BS +  Extremities:  Trace edema of upper extremity . No edema of lower extremity. . Left lower extremity atrophy present  Intact peripheral pulses. Brisk cap refill, < 2 secs  Neuro:  Non focal . Moving all extremities spontaneosly when sedation decreased       Medications:   Scheduled Meds:   furosemide  40 mg Intravenous BID    OLANZapine  15 mg Oral BID    gabapentin  200 mg Oral TID    enoxaparin  40 mg Subcutaneous Daily    doxycycline (VIBRAMYCIN) IV  100 mg Intravenous Q12H    aspirin  81 mg Oral Daily    piperacillin-tazobactam  3.375 g Intravenous Q8H    mupirocin   Nasal BID    famotidine (PEPCID) injection  20 mg Intravenous BID    albuterol sulfate HFA  4 puff Inhalation Q4H    ipratropium  4 puff Inhalation Q4H    insulin lispro  0-6 Units Subcutaneous Q4H    sodium chloride flush  10 mL Intravenous 2 times per day       Continuous Infusions:   dexmedetomidine (PRECEDEX) IV infusion Stopped (05/19/19 1559)    midazolam Stopped (05/18/19 1005)    norepinephrine 6 mcg/kg/min (05/19/19 1527)    sodium chloride 75 mL/hr at 05/18/19 0216    propofol 50 mcg/kg/min (05/19/19 1625)    dextrose      fentaNYL (SUBLIMAZE) infusion 175 mcg/hr (05/19/19 1628)       Data:  CBC:   Recent Labs     05/17/19  0620 05/18/19  0530 05/19/19  0528   WBC 24.9* 20.6* 15.2*   RBC 4.20 4.20 3.99*   HGB 12.3 12.5 11.8*   HCT 36.9 37.6 35.5*   MCV 87.9 89.6 88.8   RDW 12.9 12.5 12.7    238 235     BMP:   Recent Labs     05/17/19  0620 05/18/19  0530 05/18/19 2000 05/19/19  0135 05/19/19  0528 05/19/19  0600   * 137 138  --  137  --    K 3.3* 3.7 2.6* 3.4* 3.3*  --     103 99  --  104  --    CO2 23 25 30  --  26  --    PHOS 1.7* 2.0*  --   --   --  2.0*   BUN 9 6* 11  --  9  --    CREATININE <0.5* <0.5* <0.5*  --  <0.5*  --      BNP: No results for input(s): BNP in the last 72 hours. PT/INR:   No results for input(s): PROTIME, INR in the last 72 hours. APTT:   No results for input(s): APTT in the last 72 hours.   CARDIAC ENZYMES:   No results for input(s): CKMB, CKMBINDEX, TROPONINI in the last 72 hours.    Invalid input(s): CKTOTAL;3  FASTING LIPID PANEL:  Lab Results   Component Value Date    TRIG 129 05/18/2019     LIVER PROFILE:   Recent Labs     05/17/19  0620 05/18/19  0530 05/19/19  0600   AST 52* 35 32   ALT 71* 56* 38   BILIDIR 0.8* 0.6* 0.6*   BILITOT 1.2* 1.0 1.0   ALKPHOS 73 107 108          Urine hCG negative     Rapid flu antigen negative         Radiology  XR CHEST PORTABLE   Final Result   Bilateral pulmonary congestion with right basilar consolidation concerning   for pneumonia. Stable support tubes. XR CHEST PORTABLE   Final Result   Grossly stable bilateral airspace disease. XR CHEST PORTABLE   Final Result      XR CHEST PORTABLE   Final Result   Worsening bilateral airspace opacities in a pattern that would favor ARDS. XR CHEST PORTABLE   Final Result   Stable life support device positioning. Persistent perihilar predominant edema and small effusions. XR CHEST PORTABLE   Final Result   Interval improvement in pulmonary edema. XR CHEST PORTABLE   Final Result   Appropriate left IJ central venous catheter positioning. No pneumothorax. Appropriate endotracheal tube positioning. Layering bilateral effusions. Equivocal for a component of superimposed   pulmonary edema. Atelectasis likely present. CT ABDOMEN PELVIS WO CONTRAST Additional Contrast? None   Final Result   1. Bilateral lower lobe atelectasis, right greater than left. 2. Acute right 2nd through 4th and left 3rd through 5th rib fractures         CT Chest Pulmonary Embolism W Contrast   Final Result   1. Bilateral lower lobe atelectasis, right greater than left. 2. Acute right 2nd through 4th and left 3rd through 5th rib fractures         CT Head WO Contrast   Final Result   No acute intracranial abnormality. Left maxillary sinus mucosal thickening. Air-fluid level in the sphenoid   sinus. Correlate with any clinical evidence of sinusitis.          XR CHEST PORTABLE   Final Result   Findings as above which may be related to congestive heart failure and edema. XR CHEST PORTABLE    (Results Pending)       Echo  Summary   Definity contrast administered.   Left ventricular systolic function is reduced with ejection fraction   estimated at 25-30 %.   Elevated left ventricular diastolic filling pressure: Septal E/e'' = 12.6   (for sinus tachycardia) .   Right ventricular systolic function is moderately reduced .   Mild mitral regurgitation.   Unable to obtain SPAP due to lack of tricuspid regurgitation.       Cultures  Respiratory cultures heavy growth staph - MSSA      Assessment:  Active Problems:    Acute respiratory failure (HCC)    Aspiration pneumonitis (HCC)    Atrial fibrillation with RVR ([facility])    Electrolyte disturbance    Shock ([facility] [address]    Transaminitis    Hyperglycemia    Pneumonia due to infectious organism    ARDS (adult respiratory distress syndrome) ([facility])    Mucus plugging of bronchi    Cardiac arrest with ventricular fibrillation ([facility])    Multiple tracheobronchial mucus plugs  Resolved Problems:    * No resolved hospital problems. *      Plan:    Cardiac V fib arrest   - status post successful resuscitation   - admitted to ICU, on mech vent . Intensivist and cardiology consulted   - Tele and repeat trops. Initial troponin less than 0.0 1 repeat troponin up to 0.57, and 1.49.  now trending down 0.49, 0.26 0. 17. Acute on chronic systolic CHF  Patient with severe nonischemic dilated cardiomyopathy  - secondary to adriamycin treatment as a child . she had chemotherapy for rhabdomyosarcoma.   - repeat echo shows ejection fraction of 25%   -  on IV Lasix - dose increased on 5/18/19 to 40 mg IV twice daily   - seen  by cardiology      Acute respiratory failure  - continue mechanical ventilation   - seen by intensivist   - Status post bronchoscopy  twice due to increased respiratory secretions  -Worsening hypoxia today    A. fib   - pt in afib following defib for V fib arrest. Loaded with amiodarone   - continued on  amiodarone drip, and heparin drip   - seen by cardiology  - off amio and hep gtt now . In Normal sinus rhythm    Septic shock   Pneumonia   - ? Aspiration - pt had resp symptoms for 2 months   - Secondary to staph aureus infection.   - Status post bronchoscopy cultures positive for MSSA  - Continue  IV antibiotics , was on vanc and zosyn . Off Vanco now , continue Zosyn  - she was weaned off of pressors transiently back on Levophed now       Hypokalemia, Hypomagnesemia  - on replacement protocol. Hyperglycemia  -  on low dose ssi. Elevated LFT's, mild  - possibly related to shock. Repeat. Monitor. LFTs trending down now     Leukocytosis  - monitor.   - White count improving .  19.7-36-32.9-24.9-20.6-15.2  today          DVT Prophylaxis:  Lovenox  DIET TUBE FEED CONTINUOUS/CYCLIC NPO; Low Calorie High Protein (Vital High-Protein ); Orogastric; Continuous; 20; 40; 20  Dietary Nutrition Supplements: Protein Modular  Code Status: Full Code          Fred Hernandez MD 5/19/2019 4:30 PM

## 2019-05-19 NOTE — PROGRESS NOTES
Pt failed SBT HR increased to 140-150, SP02 87-90%, constantly coughing d/t secretions, temp elevated to 100.2 with cooling blanket in place.  Propofol and Precedex started at this time

## 2019-05-19 NOTE — PROGRESS NOTES
90 mL of residual Versed gtt collected measured and wasted with Pebbles Glynn RN per hospital policy

## 2019-05-19 NOTE — PROGRESS NOTES
Pt coughing, alarming vent, suctioned with large amount of secretions and mucus plug out. Pt continues to cough and alarm vent. Gave prn versed per order, see MAR. Pt now resting comfortably .

## 2019-05-19 NOTE — PROCEDURES
PROCEDURE:  THERAPEUTIC BRONCHOSCOPY BRONCHOALVEOLAR LAVAGE    PRE-PROCEDURE EVALUATION:  Nursing History and Physical reviewed and agreed upon     Allergies and medications have been reviewed    HENT: ETT in place. Mallampati appears to be class IV  Cardiovascular: Normal rate, regular rhythm, normal heart sounds. Pulmonary/Chest: No wheezes. + rhonchi. No rales. Abdominal: Soft. Bowel sounds are normal. No distension. ASA CLASS    IV. Severe Systemic Disease which is a threat to life. Sedation plan:   Continue ICU sedation    Post Procedure Plan   Continue ICU care     The risks and benefits as well as alternatives to the procedure have been discussed with the patient's family. The patient's next of kin understands and agrees to proceed. DESCRIPTION OF PROCEDURE: A time out was taken. ICU sedation continued. The scope was passed with ease via the endotracheal tube. A complete airway inspection was performed. No endobronchial lesions were identified. There were moderate thick, more like clear with some purulent secretions, moderate mucous plugs in the lower lobes. Saline injection, hypertonic saline injection followed by therapeutic aspiration were performed. Washings were obtained throughout the airways. A Bronchoalveolar lavage was obtained from the right lower lobe with good return. The patient tolerated the procedure well. Estimated blood loss was less than 5 ml. Recovery will be per endoscopy protocol.     FOLLOW UP:  Cultures

## 2019-05-19 NOTE — PROGRESS NOTES
Assisted M.D. with bronchoscopy. Pt placed on PCV 18 and 100% Fio2 during procedure. Pt. shelia well and without complication. Returned to previous settings after procedure.

## 2019-05-19 NOTE — PROGRESS NOTES
Shift assessment complete. Pt continues on current vent settings; tolerating well. Medications given per order. Moderate amounts of thick brown/yellow secretions noted during suctioning. Propofol infusing at 45 mcg/kg/min. Fentanyl infusing at 175 mcg/h. Patient currently intubated with ETT at 24. Vent settings are 18 RR, 410 VT, 5 of PEEP and  50% FiO2.        Vitals:    05/18/19 2000   BP: (!) 89/57   Pulse: 115   Resp: 18   Temp: 100.5 °F (38.1 °C)   SpO2: 90%       Electronically signed by Kathrine Partida RN on 5/18/2019 at 8:46 PM

## 2019-05-19 NOTE — PLAN OF CARE
Problem: Restraint Use - Nonviolent/Non-Self-Destructive Behavior:  Goal: Absence of restraint indications  Description  Absence of restraint indications  5/18/2019 2158 by Brina Spann RN  Outcome: Met This Shift  5/18/2019 0935 by Marly Saldana RN  Outcome: Ongoing  Goal: Absence of restraint-related injury  Description  Absence of restraint-related injury  5/18/2019 2158 by Brina Spann RN  Outcome: Met This Shift  5/18/2019 0935 by Marly Saldana RN  Outcome: Ongoing     Problem: Nutrition  Goal: Optimal nutrition therapy  5/18/2019 2158 by Brina Spann RN  Outcome: Met This Shift  5/18/2019 0935 by Marly Saldana RN  Outcome: Ongoing  Goal: Understanding of nutritional guidelines  5/18/2019 2158 by Brina Spann RN  Outcome: Met This Shift  5/18/2019 0935 by Marly Saldana RN  Outcome: Ongoing     Problem: Falls - Risk of:  Goal: Will remain free from falls  Description  Will remain free from falls  5/18/2019 2158 by Brina Spann RN  Outcome: Met This Shift  5/18/2019 0935 by Marly Saldana RN  Outcome: Ongoing  Goal: Absence of physical injury  Description  Absence of physical injury  5/18/2019 2158 by Brina Spann RN  Outcome: Met This Shift  5/18/2019 0935 by Marly Saldana RN  Outcome: Ongoing     Problem: Risk for Impaired Skin Integrity  Goal: Tissue integrity - skin and mucous membranes  Description  Structural intactness and normal physiological function of skin and  mucous membranes.   5/18/2019 2158 by Brina Spann RN  Outcome: Ongoing  5/18/2019 0935 by Marly Saldana RN  Outcome: Ongoing     Problem: Infection:  Goal: Will remain free from infection  Description  Will remain free from infection  5/18/2019 2158 by Brina Spann RN  Outcome: Ongoing  5/18/2019 0935 by Marly Saldana RN  Outcome: Ongoing     Problem: Gas Exchange - Impaired:  Goal: Levels of oxygenation will improve  Description  Levels of oxygenation will improve  5/18/2019 2158 by Brina Spann RN  Outcome: Ongoing  5/18/2019 0935 by Arden Sanchez RN  Outcome: Ongoing     Problem: Infection, Septic Shock:  Goal: Will show no infection signs and symptoms  Description  Will show no infection signs and symptoms  5/18/2019 2158 by Andra Pierre RN  Outcome: Ongoing  5/18/2019 0935 by Arden Sanchez RN  Outcome: Ongoing     Problem: Airway Clearance - Ineffective:  Goal: Clear lung sounds  Description  Clear lung sounds  5/18/2019 2158 by Andra Pierre RN  Outcome: Ongoing  5/18/2019 0935 by Arden Sanchez RN  Outcome: Ongoing  Goal: Ability to maintain a clear airway will improve  Description  Ability to maintain a clear airway will improve  5/18/2019 2158 by Andra Pierre RN  Outcome: Ongoing  5/18/2019 0935 by Arden Sanchez RN  Outcome: Ongoing     Problem: Fluid Volume - Deficit:  Goal: Achieves intake and output within specified parameters  Description  Achieves intake and output within specified parameters  5/18/2019 2158 by Andra Pierre RN  Outcome: Ongoing  5/18/2019 0935 by Arden Sanchez RN  Outcome: Ongoing

## 2019-05-19 NOTE — PROGRESS NOTES
Dr. Lloyd Almonte @ bedside. Discussed labs, meds, VS, I/O's, vent settings, U/O, assessment, & plan of care for today. Please see progress note & new orders for details. Janee Reyez RN on 5/19/19 at 10:40 AM      10:50 Jacobo Fulton RT at bedside - SBT started, sedation and TF held at this time.

## 2019-05-19 NOTE — PROGRESS NOTES
Vitals:    05/19/19 1000 05/19/19 1100 05/19/19 1156 05/19/19 1200   BP: (!) 86/58 111/78  110/78   Pulse: 105 146 114 125   Resp: 20 17 20 22   Temp:  99.3 °F (37.4 °C)     TempSrc:  Rectal     SpO2: 93% (!) 89% (!) 89% 98%   Weight:       Height:           BMP  Recent Labs     05/17/19  0620 05/18/19  0530 05/18/19  2000 05/19/19  0135 05/19/19  0528 05/19/19  0600   * 137 138  --  137  --    K 3.3* 3.7 2.6* 3.4* 3.3*  --    CO2 23 25 30  --  26  --    BUN 9 6* 11  --  9  --    CREATININE <0.5* <0.5* <0.5*  --  <0.5*  --    MG 1.70* 1.80  --   --   --  1.30*   PHOS 1.7* 2.0*  --   --   --  2.0*   CALCIUM 7.0* 7.5* 8.0*  --  7.1*  --    GLUCOSE 118* 106* 103*  --  102*  --         CBC  Recent Labs     05/17/19  0620 05/18/19  0530 05/19/19  0528   WBC 24.9* 20.6* 15.2*   HGB 12.3 12.5 11.8*   HCT 36.9 37.6 35.5*    238 235          Intake/Output Summary (Last 24 hours) at 5/19/2019 1306  Last data filed at 5/19/2019 1156  Gross per 24 hour   Intake 3413.33 ml   Output 3670 ml   Net -256.67 ml       dexmedetomidine (PRECEDEX) IV infusion Last Rate: 0.4 mcg/kg/hr (05/19/19 1206)    midazolam Last Rate: Stopped (05/18/19 1005)    norepinephrine Last Rate: 1 mcg/min (05/19/19 1305)    sodium chloride Last Rate: 75 mL/hr at 05/18/19 0216    propofol Last Rate: 50 mcg/kg/min (05/19/19 1224)    dextrose    fentaNYL (SUBLIMAZE) infusion Last Rate: 175 mcg/hr (05/19/19 1021)    Pt is seen lying in bed. They are sedated on a ventilator settings rate 18, , FIO2 50% and 5 PEEP. Speech is REYES. RASS is -1  IV access is located in the pt's Left CVC. See EMAR for current infusions and rates. The pt is using [place]  to void. See flowsheets for assessment.  Bilateral soft wrist restraints in place, bed in low locked position, ICU monitoring in place - will continue to monitor closely Yvonne Fung RN

## 2019-05-19 NOTE — PROGRESS NOTES
Switched back to assist control per Dr. Gia De Santiago due to increased HR, increased WOB and agitation.

## 2019-05-19 NOTE — PROGRESS NOTES
Pulmonary & Critical Care Medicine ICU Progress Note    CC: ARF    Events of Last 24 hours:   Propofol 50 mcg/kg/min   Fentanyl 175 mcg/hr   Versed drip is off   Levophed 1 mcg/min   CVP 11  Moderate thick secretions- worse    FiO2 50%  PEEP down to 5   Waking up and following commands  Agitated off sedation      Invasive Lines: IV: L IJ     MV:    Vent Mode: AC/VC Rate Set: 18 bmp/Vt Ordered: 410 mL/ /FiO2 : 50 %  Recent Labs     19  0557 19  0530   PHART 7.394 7.471*   GUK5JLC 44.0 41.4   PO2ART 78.7 77.7       IV:   midazolam Stopped (19 100)    norepinephrine 2 mcg/min (19)    sodium chloride 75 mL/hr at 19 0216    propofol 45 mcg/kg/min (19 034)    dextrose      fentaNYL (SUBLIMAZE) infusion 175 mcg/hr (19 0353)       Vitals:  Blood pressure (!) 92/59, pulse 114, temperature 100.7 °F (38.2 °C), temperature source Oral, resp. rate 23, height 5' (1.524 m), weight 199 lb (90.3 kg), SpO2 90 %. on AC 18/410/+10 50%   Temp  Av °F (37.8 °C)  Min: 98.5 °F (36.9 °C)  Max: 100.8 °F (38.2 °C)    Intake/Output Summary (Last 24 hours) at 2019 0734  Last data filed at 2019 0400  Gross per 24 hour   Intake 3200.33 ml   Output 4670 ml   Net -1469.67 ml     EXAM:  Gen: No distress. Sedated and intubated   Eyes: PERRL. No sclera icterus. No conjunctival injection. ENT: No discharge. Pharynx clear. Neck: Trachea midline. No obvious mass. Resp: No accessory muscle use. Few crackles. No wheezes. Bilateral rhonchi. No dullness on percussion. CV: Sinus tachycardia. Regular rhythm. No murmur or rub. No edema. GI: Non-tender. Non-distended. No hernia. Skin: Warm and dry. No nodule on exposed extremities. Lymph: No cervical LAD. No supraclavicular LAD. M/S: No cyanosis. No joint deformity. No clubbing. L calf muscle wasting   Neuro: Alert and awake. Followed commands. Moves extremities. Responsive to painful stimuli.    Psych: No agitation Scheduled Meds:   furosemide  40 mg Intravenous BID    OLANZapine  15 mg Oral BID    gabapentin  200 mg Oral TID    enoxaparin  40 mg Subcutaneous Daily    doxycycline (VIBRAMYCIN) IV  100 mg Intravenous Q12H    aspirin  81 mg Oral Daily    piperacillin-tazobactam  3.375 g Intravenous Q8H    mupirocin   Nasal BID    famotidine (PEPCID) injection  20 mg Intravenous BID    albuterol sulfate HFA  4 puff Inhalation Q4H    ipratropium  4 puff Inhalation Q4H    insulin lispro  0-6 Units Subcutaneous Q4H    sodium chloride flush  10 mL Intravenous 2 times per day     PRN Meds:  magnesium sulfate, potassium chloride, midazolam, fentanNYL, glucose, dextrose, glucagon (rDNA), dextrose, ibuprofen, sodium chloride flush    Results:  CBC:   Recent Labs     05/17/19 0620 05/18/19 0530 05/19/19  0528   WBC 24.9* 20.6* 15.2*   HGB 12.3 12.5 11.8*   HCT 36.9 37.6 35.5*   MCV 87.9 89.6 88.8    238 235     BMP:   Recent Labs     05/17/19 0620 05/18/19 0530 05/18/19  2000 05/19/19  0135 05/19/19  0528 05/19/19  0600   * 137 138  --  137  --    K 3.3* 3.7 2.6* 3.4* 3.3*  --     103 99  --  104  --    CO2 23 25 30  --  26  --    PHOS 1.7* 2.0*  --   --   --  2.0*   BUN 9 6* 11  --  9  --    CREATININE <0.5* <0.5* <0.5*  --  <0.5*  --      LIVER PROFILE:   Recent Labs     05/17/19 0620 05/18/19 0530 05/19/19  0600   AST 52* 35 32   ALT 71* 56* 38   BILIDIR 0.8* 0.6* 0.6*   BILITOT 1.2* 1.0 1.0   ALKPHOS 73 107 108       Cultures:  5/14 BCx NGTD  5/16 BAL Staphylococcus aureus  5/17 BCx NGTD   5/17 UCx NGTD     Films:  Chest x-ray 5/19 imaging was reviewed by me and showed RML/RLL consolidation with high ETT and satisfactory CVC positions.        ASSESSMENT:  · Acute hypoxemic respiratory failure.  DDx primary cardiac arrhythmias, aspiration, drug overdose  · ARDS   · V. fib arrest   · Aspiration/MSSA pneumonia  · A. fib with RVR- now sinus tach  · Electrolytes disturbance  · Hemodynamic shock- cardiogenic versus septic  · Cardiomyopathy EF 25%   · Transaminitis- improving   · Hyperglycemia  · Fever and severe Leukocytosis  · Cannabinoid abuse - history of heroin abuse  · Hepatitis C        PLAN:  · Mechanical ventilation as per my orders. The ventilator was adjusted by me at the bedside for unstable, life threatening respiratory failure. · Target tidal volume to 4-6 ml/kg   · Target plateau pressures <63 cm H2O   · Follow ABG  · IV Propofol for sedation, target RASS -2, with daily spontaneous awakening trial.    · Versed and Fentanyl PRN pain, gtt as needed  · Head of bed 30 degrees or higher at all times  · Daily spontaneous breathing trial - could use Precedex for the trial  · Broad spectrum antibiotics to include Zosyn D#5 and Doxy D#4  · Therapeutic bronchoscopy today given worsening secretions. · IV Levophed to keep MAP > 65 mmHg or SBP>90.  · Insulin sliding scale  · Amiodarone and Heparin drip - off per cardiology   · Follow electrolytes and LFTs  · Acute hepatitis panel - Positive Hep C   · SSI   · Continue Lasix 40 IV BID   · BMP Q 12 hrs   · Electrolytes replacement   · Resumed  Zyprexa and Neurontin at lower doses   · Tube feed   · GI prophylaxis: Pepcid  · DVT prophylaxis: Lovenox    · MRSA prophylaxis: Bactroban  · Discussed with mother over the bedside                Total critical care time caring for this patient with life threatening, unstable organ failure, including direct patient contact, management of life support systems, review of data including imaging and labs, discussions with other team members and physicians is 34 minutes so far today, excluding procedures.

## 2019-05-20 ENCOUNTER — APPOINTMENT (OUTPATIENT)
Dept: GENERAL RADIOLOGY | Age: 34
DRG: 710 | End: 2019-05-20
Payer: MEDICAID

## 2019-05-20 LAB
ALBUMIN SERPL-MCNC: 2.9 G/DL (ref 3.4–5)
ALP BLD-CCNC: 125 U/L (ref 40–129)
ALT SERPL-CCNC: 34 U/L (ref 10–40)
ANION GAP SERPL CALCULATED.3IONS-SCNC: 13 MMOL/L (ref 3–16)
ANION GAP SERPL CALCULATED.3IONS-SCNC: 14 MMOL/L (ref 3–16)
ANION GAP SERPL CALCULATED.3IONS-SCNC: 15 MMOL/L (ref 3–16)
AST SERPL-CCNC: 35 U/L (ref 15–37)
BANDED NEUTROPHILS RELATIVE PERCENT: 2 % (ref 0–7)
BASE EXCESS ARTERIAL: 1.4 MMOL/L (ref -3–3)
BASOPHILS ABSOLUTE: 0 K/UL (ref 0–0.2)
BASOPHILS RELATIVE PERCENT: 0 %
BILIRUB SERPL-MCNC: 0.9 MG/DL (ref 0–1)
BILIRUBIN DIRECT: 0.5 MG/DL (ref 0–0.3)
BILIRUBIN, INDIRECT: 0.4 MG/DL (ref 0–1)
BUN BLDV-MCNC: 13 MG/DL (ref 7–20)
BUN BLDV-MCNC: 14 MG/DL (ref 7–20)
BUN BLDV-MCNC: 16 MG/DL (ref 7–20)
CALCIUM SERPL-MCNC: 9 MG/DL (ref 8.3–10.6)
CALCIUM SERPL-MCNC: 9.3 MG/DL (ref 8.3–10.6)
CALCIUM SERPL-MCNC: 9.7 MG/DL (ref 8.3–10.6)
CARBOXYHEMOGLOBIN ARTERIAL: 1.1 % (ref 0–1.5)
CHLORIDE BLD-SCNC: 101 MMOL/L (ref 99–110)
CHLORIDE BLD-SCNC: 95 MMOL/L (ref 99–110)
CHLORIDE BLD-SCNC: 97 MMOL/L (ref 99–110)
CO2: 25 MMOL/L (ref 21–32)
CO2: 27 MMOL/L (ref 21–32)
CO2: 31 MMOL/L (ref 21–32)
CREAT SERPL-MCNC: <0.5 MG/DL (ref 0.6–1.1)
CULTURE, BLOOD 2: NORMAL
EOSINOPHILS ABSOLUTE: 0.8 K/UL (ref 0–0.6)
EOSINOPHILS RELATIVE PERCENT: 4 %
GFR AFRICAN AMERICAN: >60
GFR NON-AFRICAN AMERICAN: >60
GLUCOSE BLD-MCNC: 101 MG/DL (ref 70–99)
GLUCOSE BLD-MCNC: 106 MG/DL (ref 70–99)
GLUCOSE BLD-MCNC: 109 MG/DL (ref 70–99)
GLUCOSE BLD-MCNC: 113 MG/DL (ref 70–99)
GLUCOSE BLD-MCNC: 115 MG/DL (ref 70–99)
GLUCOSE BLD-MCNC: 120 MG/DL (ref 70–99)
GLUCOSE BLD-MCNC: 123 MG/DL (ref 70–99)
GLUCOSE BLD-MCNC: 93 MG/DL (ref 70–99)
GLUCOSE BLD-MCNC: 94 MG/DL (ref 70–99)
GLUCOSE BLD-MCNC: 95 MG/DL (ref 70–99)
HCO3 ARTERIAL: 26.1 MMOL/L (ref 21–29)
HCT VFR BLD CALC: 38.7 % (ref 36–48)
HEMATOLOGY PATH CONSULT: NO
HEMOGLOBIN, ART, EXTENDED: 13.8 G/DL (ref 12–16)
HEMOGLOBIN: 13 G/DL (ref 12–16)
LYMPHOCYTES ABSOLUTE: 3.1 K/UL (ref 1–5.1)
LYMPHOCYTES RELATIVE PERCENT: 16 %
MAGNESIUM: 0.9 MG/DL (ref 1.8–2.4)
MAGNESIUM: 1.1 MG/DL (ref 1.8–2.4)
MCH RBC QN AUTO: 30.1 PG (ref 26–34)
MCHC RBC AUTO-ENTMCNC: 33.6 G/DL (ref 31–36)
MCV RBC AUTO: 89.5 FL (ref 80–100)
METHEMOGLOBIN ARTERIAL: 0.2 %
MONOCYTES ABSOLUTE: 1.6 K/UL (ref 0–1.3)
MONOCYTES RELATIVE PERCENT: 8 %
NEUTROPHILS ABSOLUTE: 14.1 K/UL (ref 1.7–7.7)
NEUTROPHILS RELATIVE PERCENT: 70 %
O2 CONTENT ARTERIAL: 19 ML/DL
O2 SAT, ARTERIAL: 96.2 %
O2 THERAPY: NORMAL
PCO2 ARTERIAL: 41.3 MMHG (ref 35–45)
PDW BLD-RTO: 12.9 % (ref 12.4–15.4)
PERFORMED ON: ABNORMAL
PERFORMED ON: NORMAL
PH ARTERIAL: 7.42 (ref 7.35–7.45)
PHOSPHORUS: 5.5 MG/DL (ref 2.5–4.9)
PLATELET # BLD: 306 K/UL (ref 135–450)
PLATELET SLIDE REVIEW: ADEQUATE
PMV BLD AUTO: 8.1 FL (ref 5–10.5)
PO2 ARTERIAL: 82 MMHG (ref 75–108)
POTASSIUM SERPL-SCNC: 2.7 MMOL/L (ref 3.5–5.1)
POTASSIUM SERPL-SCNC: 3 MMOL/L (ref 3.5–5.1)
POTASSIUM SERPL-SCNC: 3.2 MMOL/L (ref 3.5–5.1)
RBC # BLD: 4.32 M/UL (ref 4–5.2)
SLIDE REVIEW: ABNORMAL
SODIUM BLD-SCNC: 137 MMOL/L (ref 136–145)
SODIUM BLD-SCNC: 140 MMOL/L (ref 136–145)
SODIUM BLD-SCNC: 141 MMOL/L (ref 136–145)
TCO2 ARTERIAL: 27.3 MMOL/L
TOTAL CK: 55 U/L (ref 26–192)
TOTAL PROTEIN: 7.4 G/DL (ref 6.4–8.2)
WBC # BLD: 19.6 K/UL (ref 4–11)

## 2019-05-20 PROCEDURE — 36600 WITHDRAWAL OF ARTERIAL BLOOD: CPT

## 2019-05-20 PROCEDURE — 94750 HC PULMONARY COMPLIANCE STUDY: CPT

## 2019-05-20 PROCEDURE — 2580000003 HC RX 258: Performed by: INTERNAL MEDICINE

## 2019-05-20 PROCEDURE — 83735 ASSAY OF MAGNESIUM: CPT

## 2019-05-20 PROCEDURE — 2580000003 HC RX 258

## 2019-05-20 PROCEDURE — 71045 X-RAY EXAM CHEST 1 VIEW: CPT

## 2019-05-20 PROCEDURE — 36592 COLLECT BLOOD FROM PICC: CPT

## 2019-05-20 PROCEDURE — 2700000000 HC OXYGEN THERAPY PER DAY

## 2019-05-20 PROCEDURE — 99232 SBSQ HOSP IP/OBS MODERATE 35: CPT | Performed by: INTERNAL MEDICINE

## 2019-05-20 PROCEDURE — 84100 ASSAY OF PHOSPHORUS: CPT

## 2019-05-20 PROCEDURE — 6370000000 HC RX 637 (ALT 250 FOR IP): Performed by: INTERNAL MEDICINE

## 2019-05-20 PROCEDURE — 6360000002 HC RX W HCPCS: Performed by: INTERNAL MEDICINE

## 2019-05-20 PROCEDURE — 2500000003 HC RX 250 WO HCPCS: Performed by: INTERNAL MEDICINE

## 2019-05-20 PROCEDURE — 2580000003 HC RX 258: Performed by: EMERGENCY MEDICINE

## 2019-05-20 PROCEDURE — 82550 ASSAY OF CK (CPK): CPT

## 2019-05-20 PROCEDURE — 82803 BLOOD GASES ANY COMBINATION: CPT

## 2019-05-20 PROCEDURE — 99291 CRITICAL CARE FIRST HOUR: CPT | Performed by: INTERNAL MEDICINE

## 2019-05-20 PROCEDURE — 94640 AIRWAY INHALATION TREATMENT: CPT

## 2019-05-20 PROCEDURE — 85025 COMPLETE CBC W/AUTO DIFF WBC: CPT

## 2019-05-20 PROCEDURE — 80076 HEPATIC FUNCTION PANEL: CPT

## 2019-05-20 PROCEDURE — 94003 VENT MGMT INPAT SUBQ DAY: CPT

## 2019-05-20 PROCEDURE — 94761 N-INVAS EAR/PLS OXIMETRY MLT: CPT

## 2019-05-20 PROCEDURE — 2000000000 HC ICU R&B

## 2019-05-20 PROCEDURE — 80048 BASIC METABOLIC PNL TOTAL CA: CPT

## 2019-05-20 RX ORDER — SODIUM CHLORIDE 9 MG/ML
INJECTION, SOLUTION INTRAVENOUS
Status: COMPLETED
Start: 2019-05-20 | End: 2019-05-20

## 2019-05-20 RX ADMIN — POTASSIUM CHLORIDE 20 MEQ: 400 INJECTION, SOLUTION INTRAVENOUS at 06:32

## 2019-05-20 RX ADMIN — PROPOFOL 50 MCG/KG/MIN: 10 INJECTION, EMULSION INTRAVENOUS at 09:42

## 2019-05-20 RX ADMIN — Medication 10 ML: at 09:05

## 2019-05-20 RX ADMIN — NOREPINEPHRINE BITARTRATE 6 MCG/MIN: 1 INJECTION INTRAVENOUS at 09:43

## 2019-05-20 RX ADMIN — MAGNESIUM SULFATE HEPTAHYDRATE 1 G: 1 INJECTION, SOLUTION INTRAVENOUS at 21:51

## 2019-05-20 RX ADMIN — FAMOTIDINE 20 MG: 10 INJECTION, SOLUTION INTRAVENOUS at 09:56

## 2019-05-20 RX ADMIN — MIDAZOLAM HYDROCHLORIDE 2 MG: 2 INJECTION, SOLUTION INTRAMUSCULAR; INTRAVENOUS at 09:06

## 2019-05-20 RX ADMIN — CEFTRIAXONE 2 G: 2 INJECTION, POWDER, FOR SOLUTION INTRAMUSCULAR; INTRAVENOUS at 10:56

## 2019-05-20 RX ADMIN — FUROSEMIDE 40 MG: 10 INJECTION, SOLUTION INTRAMUSCULAR; INTRAVENOUS at 18:18

## 2019-05-20 RX ADMIN — Medication 10 ML: at 07:53

## 2019-05-20 RX ADMIN — MAGNESIUM SULFATE HEPTAHYDRATE 1 G: 1 INJECTION, SOLUTION INTRAVENOUS at 20:46

## 2019-05-20 RX ADMIN — PROPOFOL 50 MCG/KG/MIN: 10 INJECTION, EMULSION INTRAVENOUS at 15:44

## 2019-05-20 RX ADMIN — MIDAZOLAM HYDROCHLORIDE 2 MG: 2 INJECTION, SOLUTION INTRAMUSCULAR; INTRAVENOUS at 01:11

## 2019-05-20 RX ADMIN — MIDAZOLAM HYDROCHLORIDE 2 MG: 2 INJECTION, SOLUTION INTRAMUSCULAR; INTRAVENOUS at 07:53

## 2019-05-20 RX ADMIN — Medication 10 ML: at 21:11

## 2019-05-20 RX ADMIN — POTASSIUM CHLORIDE 20 MEQ: 400 INJECTION, SOLUTION INTRAVENOUS at 23:27

## 2019-05-20 RX ADMIN — Medication 4 PUFF: at 11:39

## 2019-05-20 RX ADMIN — Medication 4 PUFF: at 07:44

## 2019-05-20 RX ADMIN — POTASSIUM CHLORIDE 20 MEQ: 400 INJECTION, SOLUTION INTRAVENOUS at 10:11

## 2019-05-20 RX ADMIN — OLANZAPINE 15 MG: 10 TABLET, FILM COATED ORAL at 10:07

## 2019-05-20 RX ADMIN — PIPERACILLIN SODIUM,TAZOBACTAM SODIUM 3.38 G: 3; .375 INJECTION, POWDER, FOR SOLUTION INTRAVENOUS at 07:30

## 2019-05-20 RX ADMIN — POTASSIUM CHLORIDE 20 MEQ: 400 INJECTION, SOLUTION INTRAVENOUS at 09:46

## 2019-05-20 RX ADMIN — POTASSIUM CHLORIDE 20 MEQ: 400 INJECTION, SOLUTION INTRAVENOUS at 21:50

## 2019-05-20 RX ADMIN — Medication 4 PUFF: at 19:10

## 2019-05-20 RX ADMIN — DOXYCYCLINE 100 MG: 100 INJECTION, POWDER, LYOPHILIZED, FOR SOLUTION INTRAVENOUS at 21:11

## 2019-05-20 RX ADMIN — OLANZAPINE 15 MG: 10 TABLET, FILM COATED ORAL at 21:11

## 2019-05-20 RX ADMIN — FUROSEMIDE 40 MG: 10 INJECTION, SOLUTION INTRAMUSCULAR; INTRAVENOUS at 10:13

## 2019-05-20 RX ADMIN — Medication 4 PUFF: at 11:40

## 2019-05-20 RX ADMIN — POTASSIUM CHLORIDE 20 MEQ: 400 INJECTION, SOLUTION INTRAVENOUS at 03:40

## 2019-05-20 RX ADMIN — ENOXAPARIN SODIUM 40 MG: 40 INJECTION SUBCUTANEOUS at 09:56

## 2019-05-20 RX ADMIN — MIDAZOLAM HYDROCHLORIDE 2 MG: 2 INJECTION, SOLUTION INTRAMUSCULAR; INTRAVENOUS at 11:12

## 2019-05-20 RX ADMIN — MIDAZOLAM HYDROCHLORIDE 2 MG: 2 INJECTION, SOLUTION INTRAMUSCULAR; INTRAVENOUS at 04:11

## 2019-05-20 RX ADMIN — PROPOFOL 50 MCG/KG/MIN: 10 INJECTION, EMULSION INTRAVENOUS at 04:58

## 2019-05-20 RX ADMIN — PROPOFOL 50 MCG/KG/MIN: 10 INJECTION, EMULSION INTRAVENOUS at 12:46

## 2019-05-20 RX ADMIN — Medication 4 PUFF: at 03:27

## 2019-05-20 RX ADMIN — WATER 5 ML: 1 INJECTION INTRAMUSCULAR; INTRAVENOUS; SUBCUTANEOUS at 10:13

## 2019-05-20 RX ADMIN — DOXYCYCLINE 100 MG: 100 INJECTION, POWDER, LYOPHILIZED, FOR SOLUTION INTRAVENOUS at 10:07

## 2019-05-20 RX ADMIN — MIDAZOLAM HYDROCHLORIDE 2 MG: 2 INJECTION, SOLUTION INTRAMUSCULAR; INTRAVENOUS at 20:38

## 2019-05-20 RX ADMIN — ACETAZOLAMIDE 500 MG: 500 INJECTION, POWDER, LYOPHILIZED, FOR SOLUTION INTRAVENOUS at 09:56

## 2019-05-20 RX ADMIN — PROPOFOL 50 MCG/KG/MIN: 10 INJECTION, EMULSION INTRAVENOUS at 18:20

## 2019-05-20 RX ADMIN — Medication 4 PUFF: at 23:14

## 2019-05-20 RX ADMIN — PROPOFOL 50 MCG/KG/MIN: 10 INJECTION, EMULSION INTRAVENOUS at 00:39

## 2019-05-20 RX ADMIN — Medication 4 PUFF: at 15:46

## 2019-05-20 RX ADMIN — GABAPENTIN 200 MG: 100 CAPSULE ORAL at 15:02

## 2019-05-20 RX ADMIN — MIDAZOLAM HYDROCHLORIDE 2 MG: 2 INJECTION, SOLUTION INTRAMUSCULAR; INTRAVENOUS at 18:18

## 2019-05-20 RX ADMIN — MIDAZOLAM HYDROCHLORIDE 2 MG: 2 INJECTION, SOLUTION INTRAMUSCULAR; INTRAVENOUS at 02:28

## 2019-05-20 RX ADMIN — MIDAZOLAM HYDROCHLORIDE 2 MG: 2 INJECTION, SOLUTION INTRAMUSCULAR; INTRAVENOUS at 10:03

## 2019-05-20 RX ADMIN — MAGNESIUM SULFATE HEPTAHYDRATE 1 G: 1 INJECTION, SOLUTION INTRAVENOUS at 23:27

## 2019-05-20 RX ADMIN — SODIUM CHLORIDE 250 ML: 9 INJECTION, SOLUTION INTRAVENOUS at 20:55

## 2019-05-20 RX ADMIN — POTASSIUM CHLORIDE 20 MEQ: 400 INJECTION, SOLUTION INTRAVENOUS at 20:46

## 2019-05-20 RX ADMIN — FENTANYL CITRATE 200 MCG/HR: 50 INJECTION, SOLUTION INTRAMUSCULAR; INTRAVENOUS at 09:40

## 2019-05-20 RX ADMIN — FAMOTIDINE 20 MG: 10 INJECTION, SOLUTION INTRAVENOUS at 21:11

## 2019-05-20 RX ADMIN — FENTANYL CITRATE 200 MCG/HR: 50 INJECTION, SOLUTION INTRAMUSCULAR; INTRAVENOUS at 05:35

## 2019-05-20 RX ADMIN — FENTANYL CITRATE 200 MCG/HR: 50 INJECTION, SOLUTION INTRAMUSCULAR; INTRAVENOUS at 21:10

## 2019-05-20 RX ADMIN — FENTANYL CITRATE 200 MCG/HR: 50 INJECTION, SOLUTION INTRAMUSCULAR; INTRAVENOUS at 15:04

## 2019-05-20 RX ADMIN — ASPIRIN 81 MG 81 MG: 81 TABLET ORAL at 10:06

## 2019-05-20 RX ADMIN — MIDAZOLAM HYDROCHLORIDE 2 MG: 2 INJECTION, SOLUTION INTRAMUSCULAR; INTRAVENOUS at 00:07

## 2019-05-20 RX ADMIN — GABAPENTIN 200 MG: 100 CAPSULE ORAL at 10:07

## 2019-05-20 RX ADMIN — GABAPENTIN 200 MG: 100 CAPSULE ORAL at 21:11

## 2019-05-20 ASSESSMENT — PULMONARY FUNCTION TESTS
PIF_VALUE: 38
PIF_VALUE: 38
PIF_VALUE: 33
PIF_VALUE: 23
PIF_VALUE: 26
PIF_VALUE: 40
PIF_VALUE: 45
PIF_VALUE: 29
PIF_VALUE: 41
PIF_VALUE: 25
PIF_VALUE: 45
PIF_VALUE: 28
PIF_VALUE: 27
PIF_VALUE: 36
PIF_VALUE: 27
PIF_VALUE: 42
PIF_VALUE: 26
PIF_VALUE: 37
PIF_VALUE: 34
PIF_VALUE: 45
PIF_VALUE: 31
PIF_VALUE: 28
PIF_VALUE: 25
PIF_VALUE: 35
PIF_VALUE: 38
PIF_VALUE: 38
PIF_VALUE: 37
PIF_VALUE: 37
PIF_VALUE: 29

## 2019-05-20 ASSESSMENT — PAIN SCALES - GENERAL
PAINLEVEL_OUTOF10: 6
PAINLEVEL_OUTOF10: 5

## 2019-05-20 NOTE — FLOWSHEET NOTE
05/20/19 1600   Assessment   Charting Type Reassessment   Neurological   Neuro (WDL) X  (Sedated on ventilator)   Level of Consciousness 1   HEENT   HEENT (WDL) X   Throat Intact; Other (Comment)  (ETT/OGT in place)   Teeth Missing teeth   Respiratory   Respiratory (WDL) X  (Mechanical ventilation)   Respiratory Pattern Regular; Tachypneic   Respiratory Depth Normal   Respiratory Quality/Effort Unlabored   Chest Assessment Chest expansion symmetrical   Breath Sounds   Right Upper Lobe Rhonchi   Right Middle Lobe Rhonchi   Right Lower Lobe Diminished   Left Upper Lobe Rhonchi   Left Lower Lobe Diminished   Cough/Sputum   Cough Productive   Cough Description   Sputum Amount Small   Sputum Color Yellow   Tenacity Thick   Sputum How Obtained Endotracheal   Non-Surgical Airway Endo Tracheal Tube   Placement Date/Time: 05/14/19 1808   Timeout: Patient; Appropriate Equipment  Placed By: In ED  Inserted by: Jane Ballesteros NP   Insertion attempts: 1  Airway Device: Endo Tracheal Tube  Size: 7.5  Placement Verified By[de-identified] Auscultation; Chest X-ray;Colorimetric E...    Secured at 23 cm   Measured From Lips   Secured Location Left   Secured By Commercial tube forde   Site Condition Dry   Cardiac   Cardiac (WDL) X   Cardiac Regularity Regular   Heart Sounds S1, S2   Cardiac Rhythm ST   Rhythm Interpretation   Pulse 109   Cardiac Monitor   Telemetry Monitor On Yes   Telemetry Audible Yes   Telemetry Alarms Set Yes   Telemetry Box Number ICU 7   Telemetry Monitor Alarm Parameters Bedside   Gastrointestinal   Abdominal (WDL) WDL   Abdomen Inspection Soft;Rounded   Tenderness Nontender   RUQ Bowel Sounds Active   LUQ Bowel Sounds Active   RLQ Bowel Sounds Active   LLQ Bowel Sounds Active   Peripheral Vascular   Peripheral Vascular (WDL) WDL   Edema Left upper extremity;Right upper extremity   RUE Edema Non-pitting   LUE Edema Non-pitting   Skin Color/Condition   Skin Color/Condition (WDL) WDL   Skin Integrity   Skin Integrity (WDL) X Musculoskeletal   Musculoskeletal (WDL) X  (PT not following commands)   Genitourinary   Genitourinary (WDL) X  ([place], menses)   Flank Tenderness REYES   Suprapubic Tenderness REYES   Dysuria REYES   Anus/Rectum   Anus/Rectum (WDL) WDL   Urethral Catheter Straight-tip 16 fr   Placement Date/Time: 05/14/19 1818   Urethral Catheter Timeout: Patient; Appropriate Equipment;Sterile technique  Inserted by: camille gruber  Catheter Type: Straight-tip  Tube Size (fr): 16 fr  Catheter Balloon Size: 10 mL  Collection Container: Standard  Sec. .. Catheter Indications Need for fluid management in critically ill patients in a critical care setting not able to be managed by other means such as BSC with hat, bedpan, urinal, condom catheter, or short term intermittent urethral catherization   Site Assessment Pink;Moist   Urine Color Yellow   Urine Appearance Clear   Psychosocial   Psychosocial (WDL) X  (REYES - sedated on ventilator)     Resting well, Has not required additional prn versed IVP. RASS -2. No changes in status.

## 2019-05-20 NOTE — PROGRESS NOTES
05/20/19 1911   Vent Information   Vt Ordered 410 mL   Rate Set 18 bmp   Peak Flow 80 L/min   FiO2  50 %   PEEP/CPAP 8   Vent Patient Data   Peak Inspiratory Pressure 28 cmH2O   Mean Airway Pressure 13 cmH20   Rate Measured 22 br/min   Vt Exhaled 741 mL   Minute Volume 10.6 Liters   I:E Ratio 1:5.00   Plateau Pressure 18 GPC94   Static Compliance 74 mL/cmH2O   Dynamic Compliance 37 mL/cmH2O   Cough/Sputum   Cough Productive   Sputum Amount Small   Sputum Color Yellow   Tenacity Thick   Spontaneous Breathing Trial (SBT) RT Doc   Pulse 103   SpO2 94 %

## 2019-05-20 NOTE — FLOWSHEET NOTE
Color/Condition   Skin Color/Condition (WDL) WDL   Skin Integrity   Skin Integrity (WDL) X   Musculoskeletal   Musculoskeletal (WDL) X  (PT not following commands)   Genitourinary   Genitourinary (WDL) X  ([place], menses)   Flank Tenderness REYES   Suprapubic Tenderness REYES   Dysuria REYES   Anus/Rectum   Anus/Rectum (WDL) WDL   Urethral Catheter Straight-tip 16 fr   Placement Date/Time: 05/14/19 1818   Urethral Catheter Timeout: Patient; Appropriate Equipment;Sterile technique  Inserted by: camille gruber  Catheter Type: Straight-tip  Tube Size (fr): 16 fr  Catheter Balloon Size: 10 mL  Collection Container: Standard  Sec. .. Catheter Indications Need for fluid management in critically ill patients in a critical care setting not able to be managed by other means such as BSC with hat, bedpan, urinal, condom catheter, or short term intermittent urethral catherization   Site Assessment Pink;Moist   Urine Color Yellow   Urine Appearance Clear   Output (mL) 50 mL   Psychosocial   Psychosocial (WDL) X  (REYES - sedated on ventilator)   Pain Assessment   RASS Score (Ventilated) +3    Agitated, RASS +3. Trying to grab ETT, OG tubes- unable to verbally redirect. Versed 2 mg IVP. PHysical exam completed, ALL ICU monitoring lines intact. [place] patent, LIJ CVC site WNL. Repositioned up in bed,. Call light in reach, see flowsheet data through shift . Propofol and Fentanyl IV drips continue for sedation, RASS goal -2. Levophed IV drip for BP support. See MAR for med admin record.

## 2019-05-20 NOTE — PROGRESS NOTES
ABG drawn from left radial. x1 attempt(s). Site prepped per policy and procedure. Modified Saleem's test positive. Pressure held to site after procedure for five minutes. No hematoma present. Pulse present after procedure. Pt tolerated procedure well. Specimen sent to lab.

## 2019-05-20 NOTE — PROGRESS NOTES
Patient assessment completed, see flow sheet. Medications being  administered per MAR. All equipment checked and functioning appropriately at this time. Patient in bed, bed in lowest, locked position. Patient resting comfortably at this time. Will continue to monitor and assess.

## 2019-05-20 NOTE — PROGRESS NOTES
Patient has been frequently agitated throughout the night. She has been observed attempting to sit up in bed and pulling on her restraints in attempt to reach for her ETT. Verbal redirection has been unsuccessful and fentanyl drip increased to 200mcs/hr per orders and is now maxed along with her propofol at 50mcs/kg/hr (see MAR). Hourly doses of versed since midnight have been administered due to patients agitation and regular double stacking of the vent, mild to minimal effect has been seen with each dose. Vital signs remain stable and monitoring and assessment of the patient needs are ongoing at this time.   Electronically signed by Concepción Richardson RN on 5/20/2019 at 2:51 AM

## 2019-05-20 NOTE — PROGRESS NOTES
[address] Daily Progress Note      Admit Date:  5/14/2019    Subjective:  Ms. Crow Mosher is seen for follow up. Running low grade fever in last 24 hrs. She is on lasix IV. On levophed propofol Fentanyl drips. She is s/p resp arrest in field followed by cardiac arrest as it seems. She was shockedx2 at home by EMS brought to ED in acute resp failure. She has h/o cardiomyopathy probably adriamycin. No cardiac follow up. She has been on vent. Wakes up when sedation lightened. Bronchial secretions is growing staph aureus. History of Present Illness:  Thelma Rodriguez is a 26 y.o. patient who presented  as above. She was found down by [place] when [place]. Patient was having agonal breathing. Squad arrived did a brief CPR shocked her and transported her to ED. In ED her BP was high. One EKG had sinus tach another afib RVR. She was put on ventilator and was hard to ventilate. She was tachycardiac and was put on amiodarone drip. She continued to remain tachycardiac. She was given IV metoprolol and her BP started to drop. She was placed on levophed and she remains on that. Currently she is intubated sedated with propofol. She has low K and Low Mg at admission. I have been asked to provide consultation regarding further management and testing.         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      LEG SURGERY         Objective:   BP (!) 91/59   Pulse 109   Temp 98.9 °F (37.2 °C) (Core)   Resp 16   Ht 5' (1.524 m)   Wt 199 lb (90.3 kg)   SpO2 97% BMI 38.86 kg/m²       Intake/Output Summary (Last 24 hours) at 5/20/2019 0505  Last data filed at 5/20/2019 0100  Gross per 24 hour   Intake 3898 ml   Output 7035 ml   Net -3137 ml       TELEMETRY:sinus tachy    Physical Exam:  General: No Respiratory distress, appears well developed and well nourished. Eyes:  Sclera nonicteric  Nose/Sinuses:  negative findings: nose shows no deformity, asymmetry, or inflammation, nasal mucosa normal, septum midline with no perforation or bleeding  Back:  no pain to palpation  Joint:  no active joint inflammation  Musculoskeletal:  negative  Skin:  Warm and dry  Neck:  Negative for JVD and Carotid Bruits. Chest:  Clear to auscultation, respiration easy  Cardiovascular:  RRR, S1S2 diminished, no murmur, no rub or thrill. 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 Pulses: pedal pulses are normal in rt foot neg in left foot.   Neuro: neg babinski    Medications:    acetaZOLAMIDE  500 mg Intravenous Daily    furosemide  40 mg Intravenous BID    OLANZapine  15 mg Oral BID    gabapentin  200 mg Oral TID    enoxaparin  40 mg Subcutaneous Daily    doxycycline (VIBRAMYCIN) IV  100 mg Intravenous Q12H    aspirin  81 mg Oral Daily    piperacillin-tazobactam  3.375 g Intravenous Q8H    mupirocin   Nasal BID    famotidine (PEPCID) injection  20 mg Intravenous BID    albuterol sulfate HFA  4 puff Inhalation Q4H    ipratropium  4 puff Inhalation Q4H    insulin lispro  0-6 Units Subcutaneous Q4H    sodium chloride flush  10 mL Intravenous 2 times per day      dexmedetomidine (PRECEDEX) IV infusion Stopped (05/19/19 8279)    midazolam Stopped (05/18/19 1005)    norepinephrine 6 mcg/kg/min (05/19/19 1527)    sodium chloride 75 mL/hr at 05/18/19 0216    propofol 50 mcg/kg/min (05/20/19 0458)    dextrose      fentaNYL (SUBLIMAZE) infusion 200 mcg/hr Staph aureus  Neg blood culture from admission  Acute pulmonary edema  Dilated cardiomyopathy    Patient Active Problem List    Diagnosis Date Noted    Multiple tracheobronchial mucus plugs     Cardiac arrest with ventricular fibrillation (HCC)     [facility] distress [facility]     Mucus plugging of bronchi     Aspiration pneumonitis (HCC)     Atrial fibrillation with RVR (HCC)     Electrolyte disturbance     Shock ([facility] [address]     Transaminitis     Hyperglycemia     Pneumonia due to infectious organism     [facility] failure ([facility] 05/14/2019       Plan:  1. She is being diuresed   2. EKG on 5.17.19 no ischemia or acute infarction  3. Continue antibiotics  4. Ventilatory support  5. DVT prophylaxis  6. Asa  7. Beta blockers and ace inhibitors  when off levo  8.  Once off vent and no longer infected   Arrange cath and EP eval    Core Measures:  · Discharge instructions:   · LVEF documented:   · ACEI for LV dysfunction:   · Smoking Cessation:  I have spent 25   minutes of face to face time with the family with more than 50% spent counseling and coordinating care for this patient    Vidya King MD 5/20/2019 5:05 AM

## 2019-05-20 NOTE — PROGRESS NOTES
Progress Note    Admit Date:  5/14/2019      68-year-old female with nonischemic dilated cardiomyopathy, presented with V. fib arrest.  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 Hep C + . She is mostly homebound. She has been ill with  respiratory symptoms for a couple of months. Mother found her down. CPR initiated,  S/P defibrillation twice en route to hospital.  Admitted  to ICU , on mechanical vent   Echocardiogram with ejection fraction low at 25%   Head CT was negative. status post bronchoscopy 5/16. Patient had significant amount of resp secretions that were aspirated       Subjective:  Ms. Katia Clancy on mechanical ventilation, sedated   - Her oxygenation had improved       Objective:   /65   Pulse 129   Temp 98.9 °F (37.2 °C) (Core)   Resp 12   Ht 5' (1.524 m)   Wt 195 lb 14.4 oz (88.9 kg)   SpO2 91%   BMI 38.26 kg/m²       Intake/Output Summary (Last 24 hours) at 5/20/2019 0755  Last data filed at 5/20/2019 0700  Gross per 24 hour   Intake 3898 ml   Output 7485 ml   Net -3587 ml         Physical Exam:  General: young female,  Intubated ,on mechanical ventilation . Oral ETT and OG noted  Skin:  Warm and dry  Neck:  JVD absent. Neck supple  Chest:  Diminished breath sounds . No wheezes, rales or rhonchi. Cardiovascular:  RRR ,S1S2 normal  Abdomen:  Soft, non tender, non distended, BS +  Extremities:  Trace edema of upper extremity . No edema of lower extremity. . Left lower extremity atrophy present  Intact peripheral pulses.  Brisk cap refill, < 2 secs  Neuro:  Non focal . Moving all extremities spontaneosly when sedation decreased       Medications:   Scheduled Meds:   sterile water        acetaZOLAMIDE  500 mg Intravenous Daily    furosemide  40 mg Intravenous BID    OLANZapine  15 mg Oral BID    gabapentin  200 mg Oral TID    enoxaparin  40 mg Subcutaneous Daily    doxycycline (VIBRAMYCIN) IV  100 mg Intravenous Q12H    aspirin  81 mg Oral Daily    piperacillin-tazobactam  3.375 g Intravenous Q8H    mupirocin   Nasal BID    famotidine (PEPCID) injection  20 mg Intravenous BID    albuterol sulfate HFA  4 puff Inhalation Q4H    ipratropium  4 puff Inhalation Q4H    insulin lispro  0-6 Units Subcutaneous Q4H    sodium chloride flush  10 mL Intravenous 2 times per day       Continuous Infusions:   dexmedetomidine (PRECEDEX) IV infusion Stopped (05/19/19 1559)    midazolam Stopped (05/18/19 1005)    norepinephrine 6 mcg/kg/min (05/19/19 1527)    sodium chloride 75 mL/hr at 05/18/19 0216    propofol 50 mcg/kg/min (05/20/19 0458)    dextrose      fentaNYL (SUBLIMAZE) infusion 200 mcg/hr (05/20/19 0535)       Data:  CBC:   Recent Labs     05/18/19  0530 05/19/19  0528 05/20/19  0640   WBC 20.6* 15.2* 19.6*   RBC 4.20 3.99* 4.32   HGB 12.5 11.8* 13.0   HCT 37.6 35.5* 38.7   MCV 89.6 88.8 89.5   RDW 12.5 12.7 12.9    235 306     BMP:   Recent Labs     05/18/19  0530  05/19/19  0528 05/19/19  0600 05/20/19  0015 05/20/19  0640      < > 137  --  141 140   K 3.7   < > 3.3*  --  3.0* 3.2*      < > 104  --  97* 101   CO2 25   < > 26  --  31 25   PHOS 2.0*  --   --  2.0*  --  5.5*   BUN 6*   < > 9  --  13 14   CREATININE <0.5*   < > <0.5*  --  <0.5* <0.5*    < > = values in this interval not displayed. LIVER PROFILE:   Recent Labs     05/18/19  0530 05/19/19  0600 05/20/19  0640   AST 35 32 35   ALT 56* 38 34   BILIDIR 0.6* 0.6* 0.5*   BILITOT 1.0 1.0 0.9   ALKPHOS 107 108 125          Urine hCG negative     Rapid flu antigen negative         Radiology  XR CHEST PORTABLE   Final Result   Stable right basilar and improving left basilar atelectasis and/or pneumonia. XR CHEST PORTABLE   Final Result   Bilateral pulmonary congestion with right basilar consolidation concerning   for pneumonia. Stable support tubes.          XR CHEST PORTABLE   Final Result   Grossly stable bilateral growth staph - MSSA      Assessment:  Active Problems:    Acute respiratory failure (HCC)    Aspiration pneumonitis (HCC)    Atrial fibrillation with RVR ([facility])    Electrolyte disturbance    Shock ([address]    Transaminitis    Hyperglycemia    Pneumonia due to infectious organism    ARDS (adult respiratory distress syndrome) (HCC)    Mucus plugging of bronchi    Cardiac arrest with ventricular fibrillation (HCC)    Multiple tracheobronchial mucus plugs  Resolved Problems:    * No resolved hospital problems. *      Plan:    Cardiac V fib arrest   - status post successful resuscitation   - admitted to ICU, on mech vent . Intensivist and cardiology consulted   - Tele and repeat trops. Initial troponin less than 0.0 1 repeat troponin up to 0.57, and 1.49.  now trending down 0.49, 0.26 0. 17. Acute on chronic systolic CHF  Patient with severe nonischemic dilated cardiomyopathy  - secondary to adriamycin treatment as a child . she had chemotherapy for rhabdomyosarcoma. - repeat echo shows ejection fraction of 25%   -  on IV Lasix - dose increased on 5/18/19 to 40 mg IV twice daily   - seen  by cardiology      Acute respiratory failure  - continue mechanical ventilation   - seen by intensivist   - Status post bronchoscopy  twice due to increased respiratory secretions  -Worsening hypoxia today    A. fib   - pt in afib following defib for V fib arrest. Loaded with amiodarone   - continued on  amiodarone drip, and heparin drip   - seen by cardiology  - off amio and hep gtt now . In Normal sinus rhythm    Septic shock   Pneumonia   - ? Aspiration - pt had resp symptoms for 2 months   - Secondary to staph aureus infection.   - Status post bronchoscopy cultures positive for MSSA  - Continue  IV antibiotics , was on vanc and zosyn . Off Vanco now , continue Zosyn  - she was weaned off of pressors transiently back on Levophed now       Hypokalemia, Hypomagnesemia  - on replacement protocol.     Hyperglycemia  -  on low dose

## 2019-05-20 NOTE — PROGRESS NOTES
Handoff report to Stacey Sanchez RN for transfer of care. All ICU monitoring lines in place, call light in reach. IV drips reviewed for handoff.

## 2019-05-20 NOTE — PROGRESS NOTES
05/20/19 0325   Vent Information   Vent Type 840   Vent Mode AC/VC   Vt Ordered 410 mL   Rate Set 18 bmp   Pressure Support 0 cmH20   FiO2  50 %   Sensitivity 3   PEEP/CPAP 10   Humidification Source Heated wire   Humidification Temp 37   Humidification Temp Measured 37.3   Circuit Condensation Drained   Vent Patient Data   Peak Inspiratory Pressure 38 cmH2O   Mean Airway Pressure 19 cmH20   Rate Measured 24 br/min   Vt Exhaled 680 mL   Minute Volume 10.7 Liters   I:E Ratio 1:4.7   Cough/Sputum   Sputum Amount None   Spontaneous Breathing Trial (SBT) RT Doc   Pulse 109   SpO2 97 %   Breath Sounds   Right Upper Lobe Rhonchi   Right Middle Lobe Rhonchi   Right Lower Lobe Rhonchi   Left Upper Lobe Rhonchi   Left Lower Lobe Rhonchi   Additional Respiratory  Assessments   Resp 18   Position Semi-Triplett's   Alarm Settings   High Pressure Alarm 45 cmH2O   Low Minute Volume Alarm 4 L/min   High Respiratory Rate 50 br/min   Non-Surgical Airway Endo Tracheal Tube   Placement Date/Time: 05/14/19 0929   Timeout: Patient; Appropriate Equipment  Placed By: In ED  Inserted by: Carlos Lagunas NP   Insertion attempts: 1  Airway Device: Endo Tracheal Tube  Size: 7.5  Placement Verified By[de-identified] Auscultation; Chest X-ray;Colorimetric E...    Secured at 24 cm   Measured From Lips   Secured Location Left   Secured By Commercial tube forde   Site Condition Dry

## 2019-05-20 NOTE — FLOWSHEET NOTE
Musculoskeletal   Musculoskeletal (WDL) X  (PT not following commands)   Genitourinary   Genitourinary (WDL) X  ([place], menses)   Flank Tenderness REYES   Suprapubic Tenderness REYES   Dysuria REYES   Anus/Rectum   Anus/Rectum (WDL) WDL   Urethral Catheter Straight-tip 16 fr   Placement Date/Time: 05/14/19 1818   Urethral Catheter Timeout: Patient; Appropriate Equipment;Sterile technique  Inserted by: camille gruber  Catheter Type: Straight-tip  Tube Size (fr): 16 fr  Catheter Balloon Size: 10 mL  Collection Container: Standard  Sec. .. Catheter Indications Need for fluid management in critically ill patients in a critical care setting not able to be managed by other means such as BSC with hat, bedpan, urinal, condom catheter, or short term intermittent urethral catherization   Site Assessment Pink;Moist   Urine Color Yellow   Urine Appearance Clear   Psychosocial   Psychosocial (WDL) X  (REYES - sedated on ventilator)   Reassessed, family at bedside, continues to wake and requires frequent suctioning of thick tan secretions.

## 2019-05-21 ENCOUNTER — APPOINTMENT (OUTPATIENT)
Dept: GENERAL RADIOLOGY | Age: 34
DRG: 710 | End: 2019-05-21
Payer: MEDICAID

## 2019-05-21 LAB
ALBUMIN SERPL-MCNC: 2.8 G/DL (ref 3.4–5)
ALP BLD-CCNC: 118 U/L (ref 40–129)
ALT SERPL-CCNC: 25 U/L (ref 10–40)
ANION GAP SERPL CALCULATED.3IONS-SCNC: 12 MMOL/L (ref 3–16)
ANION GAP SERPL CALCULATED.3IONS-SCNC: 12 MMOL/L (ref 3–16)
ANISOCYTOSIS: ABNORMAL
AST SERPL-CCNC: 31 U/L (ref 15–37)
BASE EXCESS ARTERIAL: 3.3 MMOL/L (ref -3–3)
BASOPHILS ABSOLUTE: 0 K/UL (ref 0–0.2)
BASOPHILS RELATIVE PERCENT: 0 %
BILIRUB SERPL-MCNC: 0.6 MG/DL (ref 0–1)
BILIRUBIN DIRECT: 0.3 MG/DL (ref 0–0.3)
BILIRUBIN, INDIRECT: 0.3 MG/DL (ref 0–1)
BUN BLDV-MCNC: 15 MG/DL (ref 7–20)
BUN BLDV-MCNC: 17 MG/DL (ref 7–20)
CALCIUM SERPL-MCNC: 9.2 MG/DL (ref 8.3–10.6)
CALCIUM SERPL-MCNC: 9.4 MG/DL (ref 8.3–10.6)
CARBOXYHEMOGLOBIN ARTERIAL: 1.3 % (ref 0–1.5)
CHLORIDE BLD-SCNC: 91 MMOL/L (ref 99–110)
CHLORIDE BLD-SCNC: 97 MMOL/L (ref 99–110)
CO2: 26 MMOL/L (ref 21–32)
CO2: 30 MMOL/L (ref 21–32)
CREAT SERPL-MCNC: <0.5 MG/DL (ref 0.6–1.1)
CREAT SERPL-MCNC: <0.5 MG/DL (ref 0.6–1.1)
CULTURE, RESPIRATORY: NORMAL
EOSINOPHILS ABSOLUTE: 0.7 K/UL (ref 0–0.6)
EOSINOPHILS RELATIVE PERCENT: 4 %
GFR AFRICAN AMERICAN: >60
GFR AFRICAN AMERICAN: >60
GFR NON-AFRICAN AMERICAN: >60
GFR NON-AFRICAN AMERICAN: >60
GLUCOSE BLD-MCNC: 100 MG/DL (ref 70–99)
GLUCOSE BLD-MCNC: 109 MG/DL (ref 70–99)
GLUCOSE BLD-MCNC: 114 MG/DL (ref 70–99)
GLUCOSE BLD-MCNC: 117 MG/DL (ref 70–99)
GLUCOSE BLD-MCNC: 120 MG/DL (ref 70–99)
GLUCOSE BLD-MCNC: 121 MG/DL (ref 70–99)
GLUCOSE BLD-MCNC: 94 MG/DL (ref 70–99)
GRAM STAIN RESULT: NORMAL
HCO3 ARTERIAL: 28.3 MMOL/L (ref 21–29)
HCT VFR BLD CALC: 36.7 % (ref 36–48)
HEMOGLOBIN, ART, EXTENDED: 14.6 G/DL (ref 12–16)
HEMOGLOBIN: 12.4 G/DL (ref 12–16)
LYMPHOCYTES ABSOLUTE: 2.8 K/UL (ref 1–5.1)
LYMPHOCYTES RELATIVE PERCENT: 17 %
MAGNESIUM: 1.4 MG/DL (ref 1.8–2.4)
MAGNESIUM: 1.5 MG/DL (ref 1.8–2.4)
MCH RBC QN AUTO: 30 PG (ref 26–34)
MCHC RBC AUTO-ENTMCNC: 33.8 G/DL (ref 31–36)
MCV RBC AUTO: 88.7 FL (ref 80–100)
METAMYELOCYTES RELATIVE PERCENT: 1 %
METHEMOGLOBIN ARTERIAL: 0.2 %
MONOCYTES ABSOLUTE: 1.6 K/UL (ref 0–1.3)
MONOCYTES RELATIVE PERCENT: 10 %
MYELOCYTE PERCENT: 1 %
NEUTROPHILS ABSOLUTE: 11.2 K/UL (ref 1.7–7.7)
NEUTROPHILS RELATIVE PERCENT: 67 %
O2 CONTENT ARTERIAL: 20 ML/DL
O2 SAT, ARTERIAL: 97 %
O2 THERAPY: ABNORMAL
PCO2 ARTERIAL: 44.4 MMHG (ref 35–45)
PDW BLD-RTO: 12.8 % (ref 12.4–15.4)
PERFORMED ON: ABNORMAL
PERFORMED ON: NORMAL
PH ARTERIAL: 7.42 (ref 7.35–7.45)
PHOSPHORUS: 4.9 MG/DL (ref 2.5–4.9)
PLATELET # BLD: 360 K/UL (ref 135–450)
PLATELET SLIDE REVIEW: ADEQUATE
PMV BLD AUTO: 7.9 FL (ref 5–10.5)
PO2 ARTERIAL: 89.5 MMHG (ref 75–108)
POIKILOCYTES: ABNORMAL
POTASSIUM SERPL-SCNC: 3.1 MMOL/L (ref 3.5–5.1)
POTASSIUM SERPL-SCNC: 3.1 MMOL/L (ref 3.5–5.1)
RBC # BLD: 4.13 M/UL (ref 4–5.2)
SLIDE REVIEW: ABNORMAL
SODIUM BLD-SCNC: 133 MMOL/L (ref 136–145)
SODIUM BLD-SCNC: 135 MMOL/L (ref 136–145)
TCO2 ARTERIAL: 29.6 MMOL/L
TOTAL CK: 32 U/L (ref 26–192)
TOTAL PROTEIN: 7 G/DL (ref 6.4–8.2)
WBC # BLD: 16.3 K/UL (ref 4–11)

## 2019-05-21 PROCEDURE — 83735 ASSAY OF MAGNESIUM: CPT

## 2019-05-21 PROCEDURE — 82803 BLOOD GASES ANY COMBINATION: CPT

## 2019-05-21 PROCEDURE — 2580000003 HC RX 258: Performed by: INTERNAL MEDICINE

## 2019-05-21 PROCEDURE — 99291 CRITICAL CARE FIRST HOUR: CPT | Performed by: INTERNAL MEDICINE

## 2019-05-21 PROCEDURE — 2500000003 HC RX 250 WO HCPCS: Performed by: INTERNAL MEDICINE

## 2019-05-21 PROCEDURE — 82550 ASSAY OF CK (CPK): CPT

## 2019-05-21 PROCEDURE — 99232 SBSQ HOSP IP/OBS MODERATE 35: CPT | Performed by: INTERNAL MEDICINE

## 2019-05-21 PROCEDURE — 71045 X-RAY EXAM CHEST 1 VIEW: CPT

## 2019-05-21 PROCEDURE — 94750 HC PULMONARY COMPLIANCE STUDY: CPT

## 2019-05-21 PROCEDURE — 6360000002 HC RX W HCPCS: Performed by: INTERNAL MEDICINE

## 2019-05-21 PROCEDURE — 2700000000 HC OXYGEN THERAPY PER DAY

## 2019-05-21 PROCEDURE — 94761 N-INVAS EAR/PLS OXIMETRY MLT: CPT

## 2019-05-21 PROCEDURE — 2000000000 HC ICU R&B

## 2019-05-21 PROCEDURE — 80076 HEPATIC FUNCTION PANEL: CPT

## 2019-05-21 PROCEDURE — 85025 COMPLETE CBC W/AUTO DIFF WBC: CPT

## 2019-05-21 PROCEDURE — 94003 VENT MGMT INPAT SUBQ DAY: CPT

## 2019-05-21 PROCEDURE — 80048 BASIC METABOLIC PNL TOTAL CA: CPT

## 2019-05-21 PROCEDURE — 6370000000 HC RX 637 (ALT 250 FOR IP): Performed by: INTERNAL MEDICINE

## 2019-05-21 PROCEDURE — 2580000003 HC RX 258: Performed by: EMERGENCY MEDICINE

## 2019-05-21 PROCEDURE — 94640 AIRWAY INHALATION TREATMENT: CPT

## 2019-05-21 PROCEDURE — 84100 ASSAY OF PHOSPHORUS: CPT

## 2019-05-21 RX ORDER — FUROSEMIDE 10 MG/ML
40 INJECTION INTRAMUSCULAR; INTRAVENOUS 3 TIMES DAILY
Status: DISCONTINUED | OUTPATIENT
Start: 2019-05-21 | End: 2019-05-29 | Stop reason: HOSPADM

## 2019-05-21 RX ADMIN — MIDAZOLAM HYDROCHLORIDE 2 MG: 2 INJECTION, SOLUTION INTRAMUSCULAR; INTRAVENOUS at 19:19

## 2019-05-21 RX ADMIN — FENTANYL CITRATE 25 MCG: 50 INJECTION INTRAMUSCULAR; INTRAVENOUS at 17:36

## 2019-05-21 RX ADMIN — MIDAZOLAM HYDROCHLORIDE 2 MG: 2 INJECTION, SOLUTION INTRAMUSCULAR; INTRAVENOUS at 03:09

## 2019-05-21 RX ADMIN — Medication 4 PUFF: at 15:07

## 2019-05-21 RX ADMIN — VANCOMYCIN HYDROCHLORIDE 1000 MG: 1 INJECTION, POWDER, LYOPHILIZED, FOR SOLUTION INTRAVENOUS at 12:22

## 2019-05-21 RX ADMIN — GABAPENTIN 200 MG: 100 CAPSULE ORAL at 13:59

## 2019-05-21 RX ADMIN — FENTANYL CITRATE 25 MCG: 50 INJECTION INTRAMUSCULAR; INTRAVENOUS at 20:12

## 2019-05-21 RX ADMIN — Medication 4 PUFF: at 22:58

## 2019-05-21 RX ADMIN — FAMOTIDINE 20 MG: 10 INJECTION, SOLUTION INTRAVENOUS at 20:17

## 2019-05-21 RX ADMIN — MAGNESIUM SULFATE HEPTAHYDRATE 1 G: 1 INJECTION, SOLUTION INTRAVENOUS at 09:57

## 2019-05-21 RX ADMIN — Medication 10 ML: at 03:09

## 2019-05-21 RX ADMIN — MAGNESIUM SULFATE HEPTAHYDRATE 1 G: 1 INJECTION, SOLUTION INTRAVENOUS at 21:33

## 2019-05-21 RX ADMIN — VANCOMYCIN HYDROCHLORIDE 1000 MG: 1 INJECTION, POWDER, LYOPHILIZED, FOR SOLUTION INTRAVENOUS at 20:23

## 2019-05-21 RX ADMIN — Medication 4 PUFF: at 07:03

## 2019-05-21 RX ADMIN — Medication 4 PUFF: at 18:54

## 2019-05-21 RX ADMIN — GABAPENTIN 200 MG: 100 CAPSULE ORAL at 07:27

## 2019-05-21 RX ADMIN — PROPOFOL 50 MCG/KG/MIN: 10 INJECTION, EMULSION INTRAVENOUS at 07:39

## 2019-05-21 RX ADMIN — MIDAZOLAM HYDROCHLORIDE 2 MG: 2 INJECTION, SOLUTION INTRAMUSCULAR; INTRAVENOUS at 20:28

## 2019-05-21 RX ADMIN — Medication 10 ML: at 05:38

## 2019-05-21 RX ADMIN — MIDAZOLAM HYDROCHLORIDE 2 MG: 2 INJECTION, SOLUTION INTRAMUSCULAR; INTRAVENOUS at 05:38

## 2019-05-21 RX ADMIN — FENTANYL CITRATE 200 MCG/HR: 50 INJECTION, SOLUTION INTRAMUSCULAR; INTRAVENOUS at 08:25

## 2019-05-21 RX ADMIN — MIDAZOLAM HYDROCHLORIDE 2 MG: 2 INJECTION, SOLUTION INTRAMUSCULAR; INTRAVENOUS at 04:39

## 2019-05-21 RX ADMIN — MAGNESIUM SULFATE HEPTAHYDRATE 1 G: 1 INJECTION, SOLUTION INTRAVENOUS at 11:05

## 2019-05-21 RX ADMIN — Medication 4 PUFF: at 11:18

## 2019-05-21 RX ADMIN — MIDAZOLAM HYDROCHLORIDE 2 MG: 2 INJECTION, SOLUTION INTRAMUSCULAR; INTRAVENOUS at 11:20

## 2019-05-21 RX ADMIN — POTASSIUM CHLORIDE 20 MEQ: 400 INJECTION, SOLUTION INTRAVENOUS at 07:32

## 2019-05-21 RX ADMIN — Medication 10 ML: at 07:27

## 2019-05-21 RX ADMIN — FUROSEMIDE 40 MG: 10 INJECTION, SOLUTION INTRAMUSCULAR; INTRAVENOUS at 20:22

## 2019-05-21 RX ADMIN — FENTANYL CITRATE 200 MCG/HR: 50 INJECTION, SOLUTION INTRAMUSCULAR; INTRAVENOUS at 20:03

## 2019-05-21 RX ADMIN — ACETAZOLAMIDE 500 MG: 500 INJECTION, POWDER, LYOPHILIZED, FOR SOLUTION INTRAVENOUS at 07:27

## 2019-05-21 RX ADMIN — OLANZAPINE 15 MG: 10 TABLET, FILM COATED ORAL at 20:17

## 2019-05-21 RX ADMIN — DOXYCYCLINE 100 MG: 100 INJECTION, POWDER, LYOPHILIZED, FOR SOLUTION INTRAVENOUS at 08:58

## 2019-05-21 RX ADMIN — PROPOFOL 50 MCG/KG/MIN: 10 INJECTION, EMULSION INTRAVENOUS at 11:23

## 2019-05-21 RX ADMIN — GABAPENTIN 200 MG: 100 CAPSULE ORAL at 20:21

## 2019-05-21 RX ADMIN — ENOXAPARIN SODIUM 40 MG: 40 INJECTION SUBCUTANEOUS at 07:28

## 2019-05-21 RX ADMIN — MIDAZOLAM HYDROCHLORIDE 2 MG: 2 INJECTION, SOLUTION INTRAMUSCULAR; INTRAVENOUS at 07:27

## 2019-05-21 RX ADMIN — POTASSIUM CHLORIDE 20 MEQ: 400 INJECTION, SOLUTION INTRAVENOUS at 08:58

## 2019-05-21 RX ADMIN — MEROPENEM 1 G: 1 INJECTION, POWDER, FOR SOLUTION INTRAVENOUS at 09:56

## 2019-05-21 RX ADMIN — FUROSEMIDE 40 MG: 10 INJECTION, SOLUTION INTRAMUSCULAR; INTRAVENOUS at 13:59

## 2019-05-21 RX ADMIN — NOREPINEPHRINE BITARTRATE 4 MCG/MIN: 1 INJECTION INTRAVENOUS at 06:38

## 2019-05-21 RX ADMIN — FUROSEMIDE 40 MG: 10 INJECTION, SOLUTION INTRAMUSCULAR; INTRAVENOUS at 07:28

## 2019-05-21 RX ADMIN — MIDAZOLAM HYDROCHLORIDE 2 MG: 2 INJECTION, SOLUTION INTRAMUSCULAR; INTRAVENOUS at 17:09

## 2019-05-21 RX ADMIN — Medication 10 ML: at 04:39

## 2019-05-21 RX ADMIN — POTASSIUM CHLORIDE 20 MEQ: 400 INJECTION, SOLUTION INTRAVENOUS at 20:53

## 2019-05-21 RX ADMIN — PROPOFOL 50 MCG/KG/MIN: 10 INJECTION, EMULSION INTRAVENOUS at 00:33

## 2019-05-21 RX ADMIN — FENTANYL CITRATE 200 MCG/HR: 50 INJECTION, SOLUTION INTRAMUSCULAR; INTRAVENOUS at 03:09

## 2019-05-21 RX ADMIN — FENTANYL CITRATE 200 MCG/HR: 50 INJECTION, SOLUTION INTRAMUSCULAR; INTRAVENOUS at 14:19

## 2019-05-21 RX ADMIN — MIDAZOLAM HYDROCHLORIDE 2 MG: 2 INJECTION, SOLUTION INTRAMUSCULAR; INTRAVENOUS at 18:08

## 2019-05-21 RX ADMIN — MIDAZOLAM HYDROCHLORIDE 2 MG: 2 INJECTION, SOLUTION INTRAMUSCULAR; INTRAVENOUS at 12:28

## 2019-05-21 RX ADMIN — FAMOTIDINE 20 MG: 10 INJECTION, SOLUTION INTRAVENOUS at 07:27

## 2019-05-21 RX ADMIN — OLANZAPINE 15 MG: 10 TABLET, FILM COATED ORAL at 07:27

## 2019-05-21 RX ADMIN — MEROPENEM 1 G: 1 INJECTION, POWDER, FOR SOLUTION INTRAVENOUS at 17:05

## 2019-05-21 RX ADMIN — Medication 4 PUFF: at 02:40

## 2019-05-21 RX ADMIN — ASPIRIN 81 MG 81 MG: 81 TABLET ORAL at 07:27

## 2019-05-21 RX ADMIN — PROPOFOL 50 MCG/KG/MIN: 10 INJECTION, EMULSION INTRAVENOUS at 18:12

## 2019-05-21 RX ADMIN — PROPOFOL 50 MCG/KG/MIN: 10 INJECTION, EMULSION INTRAVENOUS at 03:22

## 2019-05-21 RX ADMIN — PROPOFOL 60 MCG/KG/MIN: 10 INJECTION, EMULSION INTRAVENOUS at 15:06

## 2019-05-21 RX ADMIN — Medication 10 ML: at 20:18

## 2019-05-21 ASSESSMENT — PULMONARY FUNCTION TESTS
PIF_VALUE: 38
PIF_VALUE: 32
PIF_VALUE: 29
PIF_VALUE: 33
PIF_VALUE: 26
PIF_VALUE: 27
PIF_VALUE: 28
PIF_VALUE: 33
PIF_VALUE: 45

## 2019-05-21 ASSESSMENT — PAIN SCALES - GENERAL
PAINLEVEL_OUTOF10: 5
PAINLEVEL_OUTOF10: 6
PAINLEVEL_OUTOF10: 3
PAINLEVEL_OUTOF10: 7

## 2019-05-21 NOTE — PROGRESS NOTES
[facility] Daily Progress Note      Admit Date:  5/14/2019    Subjective:  Ms. Beba Williamson is seen for follow up. Running low grade fever in last 24 hrs. She is on lasix IV  40 mg TID. On levophed propofol Fentanyl drips. She is s/p resp arrest in field followed by cardiac arrest as it seems. She was shockedx2 at home by EMS brought to ED in acute resp failure. She has h/o cardiomyopathy probably adriamycin. No cardiac follow up. She has been on vent. Wakes up when sedation lightened. Bronchial secretions is growing staph aureus. History of Present Illness:  Afshin Santiago is a 26 y.o. patient who presented  as above. She was found down by [place]. Patient was having agonal breathing. Squad arrived did a brief CPR shocked her and transported her to ED. In ED her BP was high. One EKG had sinus tach another afib RVR. She was put on ventilator and was hard to ventilate. She was tachycardiac and was put on amiodarone drip. She continued to remain tachycardiac. She was given IV metoprolol and her BP started to drop. She was placed on levophed and she remains on that. Currently she is intubated sedated with propofol. She has low K and Low Mg at admission. I have been asked to provide consultation regarding further management and testing.         ROS:NA    Past Medical History:   Diagnosis Date    Cancer [facility])     rhabdomyosarcoma    Hepatitis C antibody positive in blood 05/17/2019    Hyperlipidemia      Past Surgical History:   Procedure Laterality Date    BRONCHOSCOPY N/A 5/16/2019    BRONCHOSCOPY ALVEOLAR LAVAGE performed by Ruslan El MD at [address]  5/16/2019    BRONCHOSCOPY THERAPUTIC ASPIRATION INITIAL performed by Ruslan El MD at [address] N/A 5/19/2019    BRONCHOSCOPY ALVEOLAR LAVAGE performed by Ruslan El MD at [address]  5/19/2019    BRONCHOSCOPY THERAPUTIC ASPIRATION INITIAL performed by Jd Lozada MD at . [facility]      LEG SURGERY         Objective:   BP 86/61   Pulse 104   Temp 98.6 °F (37 °C) (Oral)   Resp 18   Ht 5' (1.524 m)   Wt 186 lb 6.4 oz (84.6 kg)   SpO2 92%   BMI 36.40 kg/m²       Intake/Output Summary (Last 24 hours) at 5/21/2019 1055  Last data filed at 5/21/2019 0933  Gross per 24 hour   Intake 4322 ml   Output 4045 ml   Net 277 ml       TELEMETRY:sinus tachy    Physical Exam:  General: No Respiratory distress, appears well developed and well nourished. Eyes:  Sclera nonicteric  Nose/Sinuses:  negative findings: nose shows no deformity, asymmetry, or inflammation, nasal mucosa normal, septum midline with no perforation or bleeding  Back:  no pain to palpation  Joint:  no active joint inflammation  Musculoskeletal:  negative  Skin:  Warm and dry  Neck:  Negative for JVD and Carotid Bruits. Chest:  Clear to auscultation, respiration easy  Cardiovascular:  RRR, S1S2 diminished, no murmur, no rub or thrill. 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 Pulses: pedal pulses are normal in rt foot neg in left foot.   Neuro: neg babinski    Medications:    furosemide  40 mg Intravenous TID    meropenem (MERREM) 1 g IVPB (mini-bag)  1 g Intravenous Q8H    vancomycin  1,000 mg Intravenous Q8H    OLANZapine  15 mg Oral BID    gabapentin  200 mg Oral TID    enoxaparin  40 mg Subcutaneous Daily    aspirin  81 mg Oral Daily    famotidine (PEPCID) injection  20 mg Intravenous BID    albuterol sulfate HFA  4 puff Inhalation Q4H    ipratropium  4 puff Inhalation Q4H    insulin lispro  0-6 Units Subcutaneous Q4H    sodium chloride flush  10 mL Intravenous 2 times per day      norepinephrine 2 mcg/min (05/21/19 0741)    sodium chloride 75 mL/hr at 05/18/19 0216    propofol 50 mcg/kg/min (05/21/19 6820)    dextrose      fentaNYL (SUBLIMAZE) infusion 200 mcg/hr (05/21/19 0864)     magnesium sulfate, potassium chloride, midazolam, fentanNYL, glucose, dextrose, glucagon (rDNA), dextrose, ibuprofen, sodium chloride flush    Lab Data:  CBC:   Recent Labs     05/19/19  0528 05/20/19  0640 05/21/19  0620   WBC 15.2* 19.6* 16.3*   HGB 11.8* 13.0 12.4   HCT 35.5* 38.7 36.7   MCV 88.8 89.5 88.7    306 360     BMP:   Recent Labs     05/19/19  0600 05/20/19  0640 05/20/19 2012 05/21/19  0620   NA  --    < > 140 137 135*   K  --    < > 3.2* 2.7* 3.1*   CL  --    < > 101 95* 97*   CO2  --    < > 25 27 26   PHOS 2.0*  --  5.5*  --  4.9   BUN  --    < > 14 16 17   CREATININE  --    < > <0.5* <0.5* <0.5*    < > = values in this interval not displayed. LIVER PROFILE:   Recent Labs     05/19/19  0600 05/20/19 0640 05/21/19  0620   AST 32 35 31   ALT 38 34 25   BILIDIR 0.6* 0.5* 0.3   BILITOT 1.0 0.9 0.6   ALKPHOS 108 125 118     PT/INR:   No results for input(s): PROTIME, INR in the last 72 hours. APTT:   No results for input(s): APTT in the last 72 hours. BNP:  No results for input(s): BNP in the last 72 hours.   IMAGING:   Cxray 5.20.19  RLL infiltrate cardiomegaly  Chest xray 5.17.19 bilateral diffuse infiltrates probable ARDS per radiology report   Chest xray 5.16.19 acute pulmonary edema and LLL infiltrate  Echo doppler of heart 5.15.19   Summary   Definity contrast administered.   Left ventricular systolic function is reduced with ejection fraction   estimated at 25-30 %.   Elevated left ventricular diastolic filling pressure: Septal E/e'' = 12.6   (for sinus tachycardia) .   Right ventricular systolic function is moderately reduced .   Mild mitral regurgitation.   Unable to obtain SPAP due to lack of tricuspid regurgitation.     Assessment:  S/p cardiopulmonary arrest  Hypotension shock   S/p arrest shock CPR bilat broken ribs  Fever likely LLL aspiration pneumonia gram +ve cocci in bronchoscopy specimen, growing  Staph aureus  Neg blood culture from admission  Acute pulmonary edema less likely  Infiltrate is unilateral just on right side  Dilated cardiomyopathy    Patient Active Problem List    Diagnosis Date Noted    Multiple tracheobronchial mucus plugs     Cardiac arrest with ventricular fibrillation (HCC)     ARDS (adult respiratory distress syndrome) (HCC)     Mucus plugging of bronchi     Aspiration pneumonitis (HCC)     Atrial fibrillation with RVR (HCC)     Electrolyte disturbance     [facility]     Transaminitis     Hyperglycemia     Pneumonia due to infectious organism     Acute respiratory failure ([facility] 05/14/2019       Plan:  1. She is being diuresed I dont think this is acute pulmonary edema BP is borderline low while on levophed. 2. Recommend not diuresing her too much  3. EKG on 5.17.19 no ischemia or acute infarction  4. Continue antibiotics  5. Ventilatory support  6. DVT prophylaxis  7. Asa  8. Beta blockers and ace inhibitors  when off levo  9.  Once off vent and no longer infected   Arrange cath and EP eval    Core Measures:  · Discharge instructions:   · LVEF documented:   · ACEI for LV dysfunction:   · Smoking Cessation:  I have spent 25   minutes of face to face time with the family with more than 50% spent counseling and coordinating care for this patient    [address] Kiya Hutton MD 5/21/2019 10:55 AM

## 2019-05-21 NOTE — PROGRESS NOTES
Progress Note    Admit Date:  5/14/2019      56-year-old female with nonischemic dilated cardiomyopathy, presented with V. fib arrest.  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 Hep C + . She is mostly homebound. She has been ill with  respiratory symptoms for a couple of months. Mother found her down. CPR initiated,  S/P defibrillation twice en route to hospital.  Admitted  to ICU , on mechanical vent   Echocardiogram with ejection fraction low at 25%   Head CT was negative. status post bronchoscopy 5/16. Patient had significant amount of resp secretions that were aspirated     Still with increased secretions, failed SBT        Subjective:    Ms. Cerrato Record on mechanical ventilation, sedated   - off pressors       Objective:   BP 97/65   Pulse 101   Temp 98.6 °F (37 °C) (Oral)   Resp 20   Ht 5' (1.524 m)   Wt 186 lb 6.4 oz (84.6 kg)   SpO2 94%   BMI 36.40 kg/m²       Intake/Output Summary (Last 24 hours) at 5/21/2019 3845  Last data filed at 5/21/2019 0600  Gross per 24 hour   Intake 4352 ml   Output 3115 ml   Net 1237 ml       Physical Exam:-  General: young female,  Intubated ,on mechanical ventilation . Oral ETT and OG noted  Skin:  Warm and dry  Neck:  JVD absent. Neck supple  Chest:  Diminished breath sounds . No wheezes, rales or rhonchi. Cardiovascular:  RRR ,S1S2 normal  Abdomen:  Soft, non tender, non distended, BS +  Extremities:  Trace edema of upper extremity . No edema of lower extremity. . Left lower extremity atrophy present  Intact peripheral pulses.  Brisk cap refill, < 2 secs  Neuro:  Non focal . Moving all extremities spontaneosly when sedation decreased       Medications:   Scheduled Meds:   cefTRIAXone (ROCEPHIN) IV  2 g Intravenous Q24H    furosemide  40 mg Intravenous BID    OLANZapine  15 mg Oral BID    gabapentin  200 mg Oral TID    enoxaparin  40 mg Subcutaneous Daily    doxycycline (VIBRAMYCIN) IV 100 mg Intravenous Q12H    aspirin  81 mg Oral Daily    famotidine (PEPCID) injection  20 mg Intravenous BID    albuterol sulfate HFA  4 puff Inhalation Q4H    ipratropium  4 puff Inhalation Q4H    insulin lispro  0-6 Units Subcutaneous Q4H    sodium chloride flush  10 mL Intravenous 2 times per day       Continuous Infusions:   norepinephrine 4 mcg/min (05/21/19 8361)    sodium chloride 75 mL/hr at 05/18/19 0216    propofol 50 mcg/kg/min (05/21/19 0322)    dextrose      fentaNYL (SUBLIMAZE) infusion 200 mcg/hr (05/21/19 0309)       Data:  CBC:   Recent Labs     05/19/19  0528 05/20/19 0640 05/21/19 0620   WBC 15.2* 19.6* 16.3*   RBC 3.99* 4.32 4.13   HGB 11.8* 13.0 12.4   HCT 35.5* 38.7 36.7   MCV 88.8 89.5 88.7   RDW 12.7 12.9 12.8    306 360     BMP:   Recent Labs     05/19/19  0600 05/20/19  0640 05/20/19 2012 05/21/19  0620   NA  --    < > 140 137 135*   K  --    < > 3.2* 2.7* 3.1*   CL  --    < > 101 95* 97*   CO2  --    < > 25 27 26   PHOS 2.0*  --  5.5*  --  4.9   BUN  --    < > 14 16 17   CREATININE  --    < > <0.5* <0.5* <0.5*    < > = values in this interval not displayed. LIVER PROFILE:   Recent Labs     05/19/19  0600 05/20/19 0640 05/21/19 0620   AST 32 35 31   ALT 38 34 25   BILIDIR 0.6* 0.5* 0.3   BILITOT 1.0 0.9 0.6   ALKPHOS 108 125 118          Urine hCG negative     Rapid flu antigen negative       Cultures  Respiratory cultures heavy growth staph - MSSA    Blood - NGTD       Radiology  XR CHEST PORTABLE   Final Result   Worsening right lung airspace disease likely represents pneumonia. XR CHEST PORTABLE   Final Result   Stable right basilar and improving left basilar atelectasis and/or pneumonia. XR CHEST PORTABLE   Final Result   Bilateral pulmonary congestion with right basilar consolidation concerning   for pneumonia. Stable support tubes. XR CHEST PORTABLE   Final Result   Grossly stable bilateral airspace disease.          XR CHEST PORTABLE   Final Result      XR CHEST PORTABLE   Final Result   Worsening bilateral airspace opacities in a pattern that would favor ARDS. XR CHEST PORTABLE   Final Result   Stable life support device positioning. Persistent perihilar predominant edema and small effusions. XR CHEST PORTABLE   Final Result   Interval improvement in pulmonary edema. XR CHEST PORTABLE   Final Result   Appropriate left IJ central venous catheter positioning. No pneumothorax. Appropriate endotracheal tube positioning. Layering bilateral effusions. Equivocal for a component of superimposed   pulmonary edema. Atelectasis likely present. CT ABDOMEN PELVIS WO CONTRAST Additional Contrast? None   Final Result   1. Bilateral lower lobe atelectasis, right greater than left. 2. Acute right 2nd through 4th and left 3rd through 5th rib fractures         CT Chest Pulmonary Embolism W Contrast   Final Result   1. Bilateral lower lobe atelectasis, right greater than left. 2. Acute right 2nd through 4th and left 3rd through 5th rib fractures         CT Head WO Contrast   Final Result   No acute intracranial abnormality. Left maxillary sinus mucosal thickening. Air-fluid level in the sphenoid   sinus. Correlate with any clinical evidence of sinusitis. XR CHEST PORTABLE   Final Result   Findings as above which may be related to congestive heart failure and edema. XR CHEST PORTABLE    (Results Pending)       Echo  Summary   Definity contrast administered.   Left ventricular systolic function is reduced with ejection fraction   estimated at 25-30 %.   Elevated left ventricular diastolic filling pressure: Septal E/e'' = 12.6   (for sinus tachycardia) .   Right ventricular systolic function is moderately reduced .   Mild mitral regurgitation.   Unable to obtain SPAP due to lack of tricuspid regurgitation.         Assessment:  Active Problems:    Acute respiratory failure ([address] Aspiration pneumonitis (HCC)    Atrial fibrillation with RVR (HCC)    Electrolyte disturbance    Shock ([address]    Transaminitis    Hyperglycemia    Pneumonia due to infectious organism    ARDS (adult respiratory distress syndrome) (HCC)    Mucus plugging of bronchi    Cardiac arrest with ventricular fibrillation (HCC)    Multiple tracheobronchial mucus plugs  Resolved Problems:    * No resolved hospital problems. *      Plan:    Cardiac V fib arrest   - status post successful resuscitation   - admitted to ICU, on mech vent . Intensivist and cardiology consulted   - Tele and repeat trops. Initial troponin less than 0.0 1 repeat troponin up to 0.57, and 1.49.  now trending down 0.49, 0.26 0.17.   - EP eval once stable and off vent      Acute on chronic systolic CHF  Patient with severe nonischemic dilated cardiomyopathy  - secondary to adriamycin treatment as a child . she had chemotherapy for rhabdomyosarcoma. - repeat echo shows ejection fraction of 25%   -  on IV Lasix -TID by cardiology  - consider digoxin or low dose coreg       Acute respiratory failure  - continue mechanical ventilation   - seen by intensivist   - Status post bronchoscopy  twice due to increased respiratory secretions  - unable to wean SBT    A. fib   - pt in afib following defib for V fib arrest. Loaded with amiodarone   - continued on  amiodarone drip, and heparin drip   - seen by cardiology  - off amio and hep gtt now . In Normal sinus rhythm    Septic shock   Pneumonia   - ? Aspiration - pt had resp symptoms for 2 months   - Secondary to staph aureus infection.   - Status post bronchoscopy cultures positive for MSSA  - Continue  IV antibiotics , was on vanc and merrem . - she was weaned off of pressors       Hypokalemia, Hypomagnesemia  - on replacement protocol. Hyperglycemia  -  on low dose ssi. Elevated LFT's, mild  - possibly related to shock. Repeat. Monitor.   LFTs trending down now     Leukocytosis  - monitor.   - White count improving .  19.7-36-32.9-24.9-20.6-15.2  today       Angiogram and EP studies when hemodynamically stable and off vent     DVT Prophylaxis:  Lovenox  DIET TUBE FEED CONTINUOUS/CYCLIC NPO; Low Calorie High Protein (Vital High-Protein ); Orogastric; Continuous; 20; 40; 20  Dietary Nutrition Supplements: Protein Modular  Code Status: Full Code          Tim Delgado MD 5/21/2019 7:33 AM

## 2019-05-21 NOTE — PROGRESS NOTES
Pulmonary & Critical Care Medicine ICU Progress Note  CC:Acute respiratory failure with hypoxemia    Events of Last 24 hours: Levophed at 2. Agitated at times. She is on fentanyl and propofol. PEEP is 5. Thick secretions. Invasive Lines:   IV Line: [facility] 5/14    MV:  5/14  Vent Mode: AC/VC Rate Set: 18 bmp/Vt Ordered: 410 mL/ /FiO2 : 50 %  Recent Labs     05/20/19  0650 05/21/19  0620   PHART 7.418 7.422   [id number] 41.3 44.4   PO2ART 82.0 89.5       IV:   norepinephrine 2 mcg/min (05/21/19 0741)    sodium chloride 75 mL/hr at 05/18/19 0216    propofol 50 mcg/kg/min (05/21/19 0739)    dextrose      fentaNYL (SUBLIMAZE) infusion 200 mcg/hr (05/21/19 0825)       EXAM:  BP 92/64   Pulse 106   Temp 98.6 °F (37 °C) (Oral)   Resp 17   Ht 5' (1.524 m)   Wt 186 lb 6.4 oz (84.6 kg)   SpO2 94%   BMI 36.40 kg/m²  on vent  Tmax: 100.5  CVP: CVP (Mean): 11 mmHg    Intake/Output Summary (Last 24 hours) at 5/21/2019 0926  Last data filed at 5/21/2019 0828  Gross per 24 hour   Intake 4322 ml   Output 3645 ml   Net 677 ml     General: No distress. Normocephalic, atraumatic. Eyes: PERRL. No sclera icterus. No conjunctival injection. ENT: No discharge. Pharynx clear. Neck: Trachea midline. Normal thyroid. Resp: No accessory muscle use. No crackles. No wheezing. bilateral rhonchi. No dullness on percussion. CV: Regular rate. Regular rhythm. No mumur or rub. No edema. GI: Non-tender. Non-distended. No masses. No organmegaly. Normal bowel sounds. No hernia. Skin: Warm and dry. No nodule on exposed extremities. No rash on exposed extremities. Lymph: No cervical LAD. No supraclavicular LAD. M/S: No cyanosis. No joint deformity. No clubbing.    Neuro:  Somnolent, not moving extremities, + pupillary response      Medications:   cefTRIAXone (ROCEPHIN) IV  2 g Intravenous Q24H    furosemide  40 mg Intravenous BID    OLANZapine  15 mg Oral BID    gabapentin  200 mg Oral TID    enoxaparin  40 mg Subcutaneous Daily    doxycycline (VIBRAMYCIN) IV  100 mg Intravenous Q12H    aspirin  81 mg Oral Daily    famotidine (PEPCID) injection  20 mg Intravenous BID    albuterol sulfate HFA  4 puff Inhalation Q4H    ipratropium  4 puff Inhalation Q4H    insulin lispro  0-6 Units Subcutaneous Q4H    sodium chloride flush  10 mL Intravenous 2 times per day     PRN Meds:  magnesium sulfate, potassium chloride, midazolam, fentanNYL, glucose, dextrose, glucagon (rDNA), dextrose, ibuprofen, sodium chloride flush    Results:  CBC:   Recent Labs     05/19/19  0528 05/20/19  0640 05/21/19  0620   WBC 15.2* 19.6* 16.3*   HGB 11.8* 13.0 12.4   HCT 35.5* 38.7 36.7   MCV 88.8 89.5 88.7    306 360     BMP:   Recent Labs     05/19/19  0600 05/20/19 0640 05/20/19 2012 05/21/19  0620   NA  --    < > 140 137 135*   K  --    < > 3.2* 2.7* 3.1*   CL  --    < > 101 95* 97*   CO2  --    < > 25 27 26   PHOS 2.0*  --  5.5*  --  4.9   BUN  --    < > 14 16 17   CREATININE  --    < > <0.5* <0.5* <0.5*    < > = values in this interval not displayed. LIVER PROFILE:   Recent Labs     05/19/19  0600 05/20/19 0640 05/21/19 0620   AST 32 35 31   ALT 38 34 25   BILIDIR 0.6* 0.5* 0.3   BILITOT 1.0 0.9 0.6   ALKPHOS 108 125 118     PT/INR: No results for input(s): PROTIME, INR in the last 72 hours. APTT: No results for input(s): APTT in the last 72 hours. UA:No results for input(s): NITRITE, COLORU, PHUR, LABCAST, WBCUA, RBCUA, MUCUS, TRICHOMONAS, YEAST, BACTERIA, CLARITYU, SPECGRAV, LEUKOCYTESUR, UROBILINOGEN, BILIRUBINUR, BLOODU, GLUCOSEU, AMORPHOUS in the last 72 hours. Invalid input(s): Pippa Myron    Cultures:  5/14 BCx NGTD  5/16 BAL Staphylococcus aureus  5/17 BCx NGTD   5/17 UCx NGTD   5/19 BAL pending      Films:  CXR 5/21 reviewed by me and it showed : Increase in dense right sided pneumonia; left side stable    Assessment:  · Acute respiratory failure with hypoxemia  · ARDS  · V fib arrest  · Aspiration pneumonia  · Hx of paroxysmal Afib  · Shock  · Cardiomyopathy EF 25%  · Hx of polysubstance abuse  · Hepatitis C  · Probable Healthcare associated pneumonia - probable gram negative, risk for methicillin resistant Staph aureus as well       Plan:  Mechanical ventilation per my orders. The ventilator was adjusted by me at the bedside for unstable, life threatening respiratory failure. HOB greater than 30 degrees at all times  Daily SBT once FIO2<60% and PEEP = 5, patient arousable, hemodynamically stable  IV Sedation with propofol, targeted RASS -2  Abx:  CTX D2; had Zosyn D6, Doxy D5- broaden to vanc and merrem  levophed gtt adjust as needed for MAP <65   · F/u bronch results  · SSI  · Lasix 40 mg iv change to tid  · Replace electrolytes  · Tube feed at goal  · ICU care: lovenox, pepcid and bactroban  · D/w mother and grandmother at bedside      Critical care time spent reviewing labs/films, examining patient, collaborating with other physicians but excluding procedures for life threatening organ failure is 33 minutes.

## 2019-05-21 NOTE — PROGRESS NOTES
Pt awake & coughing.  RR 44- suctioning pt frequently- large amounts. Pt given PRN versed. Propofol gtt at 50 mcg & fentanyl gtt at 200 mcg. Pt remains in bilateral wrist restraints.   Argelia Mayer RN, BSN

## 2019-05-21 NOTE — PROGRESS NOTES
05/21/19 0705   Vent Information   Vent Type 840   Vent Mode AC/VC   Vt Ordered 410 mL   Rate Set 18 bmp   Peak Flow 80 L/min   Pressure Support 0 cmH20   FiO2  50 %   Sensitivity 3   PEEP/CPAP 5   I Time/ I Time % 0 s   Humidification Source Heated wire   Humidification Temp 36.4   Circuit Condensation Drained   Vent Patient Data   High Peep/I Pressure 0   Peak Inspiratory Pressure 45 cmH2O   Mean Airway Pressure 14 cmH20   Rate Measured 46 br/min   Vt Exhaled 9 mL   Minute Volume 4.84 Liters   I:E Ratio 1:1.20   Plateau Pressure 19 [id number]   Static Compliance 35 mL/cmH2O   Dynamic Compliance 22 mL/cmH2O   Cough/Sputum   Sputum How Obtained Endotracheal   Cough Productive   Sputum Amount Small   Sputum Color Creamy   Tenacity Thick   Spontaneous Breathing Trial (SBT) RT Doc   Pulse 101   SpO2 94 %   Breath Sounds   Right Upper Lobe Rhonchi   Right Middle Lobe Diminished   Right Lower Lobe Diminished   Left Upper Lobe Rhonchi   Left Lower Lobe Diminished   Additional Respiratory  Assessments   Resp 20   Position Semi-Triplett's   Subglottic Suction Done? Yes   Alarm Settings   High Pressure Alarm 45 cmH2O   Low Minute Volume Alarm 4 L/min   High Respiratory Rate 50 br/min   Patient Observation   Observations #7.5 ETT @ 23 lip line   Non-Surgical Airway Endo Tracheal Tube   Placement Date/Time: 05/14/19 1808   Timeout: Patient; Appropriate Equipment  Placed By: In ED  Inserted by: GALI ALANIS NP   Insertion attempts: 1  Airway Device: Endo Tracheal Tube  Size: 7.5  Placement Verified By[de-identified] Auscultation; Chest X-ray;Colorimetric E...    Secured at 23 cm   Measured From Lips   Secured Location Left   Secured By Commercial tube forde   Site Condition Dry

## 2019-05-21 NOTE — PLAN OF CARE
Nutrition Problem: Inadequate oral intake  Intervention: Food and/or Nutrient Delivery: Continue NPO, Continue current Tube Feeding  Nutritional Goals: patient will tolerate Vital High-Protein at goal rate of 40 ml/hr x 20 hours without GI distress, without s/s of aspiration, and without lab/fluid disturbances; weight will remain stable during remainder of admission

## 2019-05-21 NOTE — PROGRESS NOTES
Pt continues to alarm ventilator despite PRN fentanyl given /74 (86) 's RR 38 Will administer PRN versed.

## 2019-05-21 NOTE — PROGRESS NOTES
05/21/19 1507   Vent Information   Vent Type 840   Vent Mode AC/VC   Vt Ordered 410 mL   Rate Set 18 bmp   Peak Flow 80 L/min   Pressure Support 0 cmH20   FiO2  50 %   Sensitivity 3   PEEP/CPAP 5   I Time/ I Time % 0 s   Humidification Source Heated wire   Humidification Temp 37   Circuit Condensation Drained   Vent Patient Data   High Peep/I Pressure 0   Peak Inspiratory Pressure 26 cmH2O   Mean Airway Pressure 8.6 cmH20   Rate Measured 18 br/min   Vt Exhaled 438 mL   Minute Volume 7.87 Liters   I:E Ratio 1:5.00   Cough/Sputum   Sputum How Obtained Endotracheal   Cough Productive   Sputum Amount Moderate   Sputum Color Creamy   Tenacity Thick   Spontaneous Breathing Trial (SBT) RT Doc   Pulse 98   SpO2 93 %   Breath Sounds   Right Upper Lobe Rhonchi   Right Middle Lobe Diminished   Right Lower Lobe Diminished   Left Upper Lobe Rhonchi   Left Lower Lobe Diminished   Additional Respiratory  Assessments   Resp 18   Position Semi-Triplett's   Subglottic Suction Done? Yes   Alarm Settings   High Pressure Alarm 45 cmH2O   Low Minute Volume Alarm 4 L/min   High Respiratory Rate 50 br/min   Patient Observation   Observations #7.5 ETT @ 23 lip line   Non-Surgical Airway Endo Tracheal Tube   Placement Date/Time: 05/14/19 1808   Timeout: Patient; Appropriate Equipment  Placed By: In ED  Inserted by: Wesly Andrew NP   Insertion attempts: 1  Airway Device: Endo Tracheal Tube  Size: 7.5  Placement Verified By[de-identified] Auscultation; Chest X-ray;Colorimetric E...    Secured at 23 cm   Measured From Lips   Secured Location Left   Secured By Commercial tube forde   Site Condition Dry

## 2019-05-21 NOTE — PROGRESS NOTES
Patient coughing against ventilator, trying to sit up, and attempting to bite on ETT. Propofol max at 50 mcg/kg/min and fentanyl max at 200 mcg/hr. Medicated with versed 2 mg IVP. After about 20 minutes patient was more calm and resting quietly.  Brooklynn Alvares RN

## 2019-05-21 NOTE — PROGRESS NOTES
High risk vesicant drug infusing:  _____yes_____    Multiple incompatible medications infusing:  _____yes____    CVP Monitoring:  ___no______    Extremely difficult IV access challenge:  ____yes____    Continued need for central line access:  ___yes_______    Addressed with physician:  ___yes_____    RIGHT PATIENT, RIGHT TIME, RIGHT LINE

## 2019-05-21 NOTE — PROGRESS NOTES
Patient has been requiring PRN sedation hourly since 3 am this morning. She wakes up suddenly and begins coughing forcefully. She has require ETT suction often through night with moderate amount thick, yellow secretions returned. At this time she is resting more quietly with eyes close.  Lo Hartmann RN

## 2019-05-21 NOTE — PROGRESS NOTES
Nutrition Assessment (Enteral Nutrition)    Type and Reason for Visit: Reassess    Nutrition Recommendations:   1. Continue Vital High-Protein at goal rate of 40 ml/hr x 20 hours + administer one proteinex 2go once daily via feeding tube + 30 ml water flushes every 6 hours. 2. Monitor TF rate, intake, and tolerance + administration of one proteinex 2go once daily + water flushes of 30 ml every 6 hours. 3. Monitor vent status, sedation type/amount, and TG results on 5/22/19. 4. Patient has had weight loss during this admission - will monitor for additional in-patient weight changes. 5. Monitor nutrition-related labs and bowel function/regimen. Nutrition Assessment: patient is improving from a nutritional standpoint AEB tolerating TF and TF meeting 100% of patient's current nutrition needs, however, she remains at risk for further compromise d/t possible pneumonia in R lung and excessive secretions, mental status is slow to improve, and weight loss during this admission; will continue Vital High-Protein TF at goal rate of 40 ml/hr x 20 hours + one proteinex 2go once daily + continue water flushes of 30 ml every 6 hours    Malnutrition Assessment:  · Malnutrition Status: At risk for malnutrition  · Context: Acute illness or injury  · Findings of the 6 clinical characteristics of malnutrition (Minimum of 2 out of 6 clinical characteristics is required to make the diagnosis of moderate or severe Protein Calorie Malnutrition based on AND/ASPEN Guidelines):  1. Energy Intake-(> 75% of estimated nutrition needs), (x 4 days admission)    2. Weight Loss-2% loss or greater(- 7# or 3.6% weight loss ), in 1 week  3. Fat Loss-No significant subcutaneous fat loss,    4. Muscle Loss-No significant muscle mass loss,    5. Fluid Accumulation-No significant fluid accumulation,    6.  Strength-Not measured    Nutrition Risk Level:  Moderate    Nutrition Needs:  · Estimated Daily Total Kcal: 968 - 1232 kcals based on 11-14 kcals/kg/CBW  · Estimated Daily Protein (g): 90 - 99 g protein based on 2.0-2.2 g/kg/IBW  · Estimated Daily Fluid (ml/day): 1000 - 1200 ml     Nutrition Diagnosis:   · Problem: Inadequate oral intake  · Etiology: related to Impaired respiratory function-inability to consume food, Insufficient energy/nutrient consumption, Acute injury/trauma     Signs and symptoms:  as evidenced by NPO status due to medical condition, Intubation    Objective Information:  · Nutrition-Focused Physical Findings: patient was calm and not in any acute distress this am; she remains intubated and sedated on 50 mcg propofol; patient did not arouse when Dr. Wes Harris performed his assessment this am; patient's mother and grandmother were at bedside during rounds; + TG check on 5/22/19; last bronch was on 5/18 - awaiting results  · Wound Type: None  · Current Nutrition Therapies:  · Oral Diet Orders: NPO   · Oral Diet intake: NPO  · Oral Nutrition Supplement (ONS) Orders: None  · ONS intake: NPO  · Tube Feeding (TF) Orders:   · Feeding Route: Orogastric  · Formula: Low Calorie, High Protein  · Rate (ml/hr):40 ml/hr     · Volume (ml/day): 800 ml   · Duration: Continuous  · Additives/Modulars: Protein(one proteinex 2go once daily)  · Water Flushes: RD recommended water flushes of 30 ml every 6 hours; TF pump has water flushes set at 40 ml every 6 hours  · Current TF & Flush Orders Provides: TF infusing at goal rate  · Goal TF & Flush Orders Provides: Vital High-Protein with a goal rate of 40 ml/hr x 20 hours = 800 ml TV, 800 kcals, 70 g protein, and 669 ml free water + 26 g protein and 104 kcals from one proteinex 2go once daily (96 g protein and 904 kcals total) + 30 ml water flushes every 6 hours   · Additional Calories: propofol at 50 mcg x 24 hours = 672 kcals from lipids  · Anthropometric Measures:  · Ht: 5' (152.4 cm)   · Current Body Wt: 186 lb 6 oz (84.5 kg)  · Admission Body Wt: 193 lb 2 oz (87.6 kg)  · Usual Body Wt: (unknown)  · Weight Change: - 7# or 3.6% weight loss x 7 days admission   · Ideal Body Wt: 100 lb (45.4 kg), % Ideal Body 186%  · BMI Classification: BMI 35.0 - 39.9 Obese Class II    Nutrition Interventions:   Continue NPO, Continue current Tube Feeding  Continued Inpatient Monitoring, Coordination of Care, Coordination of Community Care    Nutrition Evaluation:   · Evaluation: Goals set   · Goals: patient will tolerate Vital High-Protein at goal rate of 40 ml/hr x 20 hours without GI distress, without s/s of aspiration, and without lab/fluid disturbances; weight will remain stable during remainder of admission   · Monitoring: TF Intake, TF Tolerance, Skin Integrity, Mental Status/Confusion, Weight, Pertinent Labs, Monitor Bowel Function      Electronically signed by Sidney Umaña RD, LD on 5/21/19 at 11:24 AM    Contact Number: [phone]

## 2019-05-21 NOTE — PROGRESS NOTES
Pharmacy note:  Vancomycin Day #  1  Patient is a 27 yo female with pneumonia. Her antibiotics were broadened today. Pt was started on Merrem to cover gram negative organisms, including Pseudomonas. Pt was also started on Vancomycin to cover gram positive organisms, including MRSA. WBC           BUN/SCr       Ht. Wt.   16.3           17/<0.5           5'              84.6 kg. Adjusted dosing weight = 61.1 kg. Based on patient's age, weight, and renal function will start Vancomycin 1000 mg IVPB q8h and check trough at steady state. Will adjust as necessary. Shaun Trejo Pharm. D. 5/21/2019 10:47 AM

## 2019-05-21 NOTE — PROGRESS NOTES
05/21/19 1854   Vent Information   Vt Ordered 410 mL   Rate Set 18 bmp   Peak Flow 80 L/min   FiO2  50 %   PEEP/CPAP 8   Vent Patient Data   Peak Inspiratory Pressure 33 cmH2O   Mean Airway Pressure 12 cmH20   Rate Measured 18 br/min   Vt Exhaled 433 mL   Minute Volume 7.56 Liters   I:E Ratio 1:5.00   Plateau Pressure 25 [id number]   Static Compliance 25 mL/cmH2O   Dynamic Compliance 17 mL/cmH2O   Cough/Sputum   Cough Productive   Sputum Amount Small   Sputum Color Yellow;Creamy   Tenacity Thick   Spontaneous Breathing Trial (SBT) RT Doc   Pulse 109   SpO2 91 %

## 2019-05-21 NOTE — PROGRESS NOTES
performed by Kyle Garcia MD at . [address]      LEG SURGERY         Level of Consciousness: Comatose = 4    Level of Activity: Bedridden, unresponsive or quadriplegic = 4    Respiratory Pattern: Regular Pattern; RR 8-20 = 0    Breath Sounds: Absent bilaterally and/or with wheezes = 3    Sputum  Sputum Color: Cloudy, Tenacity: Thick, Sputum How Obtained: Endotracheal  Cough: Strong, productive = 1    Vital Signs   /67   Pulse 110   Temp 98.8 °F (37.1 °C) (Oral)   Resp 18   Ht 5' (1.524 m)   Wt 186 lb 6.4 oz (84.6 kg)   SpO2 92%   BMI 36.40 kg/m²   SPO2 (COPD values may differ): 86-87% on room air or greater than 92% on FiO2 35- 50% = 3    Peak Flow (asthma only): not applicable = 0    RSI: 68-56 = Q4 (every four hours)        Plan       Goals: medication delivery    Patient/caregiver was educated on the proper method of use for Respiratory Care Devices:  Yes      Level of patient/caregiver understanding able to:   ? Verbalize understanding   ? Demonstrate understanding       ? Teach back        ? Needs reinforcement       ? No available caregiver               ? Other:     Response to education:  Excellent     Is patient being placed on Home Treatment Regimen? No     Does the patient have everything they need prior to discharge? NA     Comments: Chart and home meds reviewed    Plan of Care: Cont the patient on the current therapy    Electronically signed by Brad Frazier RCP on 5/21/2019 at 1:15 PM    Respiratory Protocol Guidelines     1. Assessment and treatment by Respiratory Therapy will be initiated for medication and therapeutic interventions upon initiation of aerosolized medication. 2. Physician will be contacted for respiratory rate (RR) greater than 35 breaths per minute. Therapy will be held for heart rate (HR) greater than 140 beats per minute, pending direction from physician.   3. Bronchodilators will be administered via Metered Dose inhaled anti-inflammatory medication at home, and lacks wheezing by examination or by history for at least 24 hours, contact physician for possible discontinuation.

## 2019-05-22 ENCOUNTER — APPOINTMENT (OUTPATIENT)
Dept: GENERAL RADIOLOGY | Age: 34
DRG: 710 | End: 2019-05-22
Payer: MEDICAID

## 2019-05-22 LAB
ALBUMIN SERPL-MCNC: 2.8 G/DL (ref 3.4–5)
ALP BLD-CCNC: 104 U/L (ref 40–129)
ALT SERPL-CCNC: 23 U/L (ref 10–40)
ANION GAP SERPL CALCULATED.3IONS-SCNC: 11 MMOL/L (ref 3–16)
ANION GAP SERPL CALCULATED.3IONS-SCNC: 14 MMOL/L (ref 3–16)
ANISOCYTOSIS: ABNORMAL
AST SERPL-CCNC: 37 U/L (ref 15–37)
BANDED NEUTROPHILS RELATIVE PERCENT: 1 % (ref 0–7)
BASE EXCESS ARTERIAL: 6.2 MMOL/L (ref -3–3)
BASOPHILS ABSOLUTE: 0 K/UL (ref 0–0.2)
BASOPHILS RELATIVE PERCENT: 0 %
BILIRUB SERPL-MCNC: 0.4 MG/DL (ref 0–1)
BILIRUBIN DIRECT: <0.2 MG/DL (ref 0–0.3)
BILIRUBIN, INDIRECT: ABNORMAL MG/DL (ref 0–1)
BUN BLDV-MCNC: 16 MG/DL (ref 7–20)
BUN BLDV-MCNC: 22 MG/DL (ref 7–20)
CALCIUM SERPL-MCNC: 9 MG/DL (ref 8.3–10.6)
CALCIUM SERPL-MCNC: 9.8 MG/DL (ref 8.3–10.6)
CARBOXYHEMOGLOBIN ARTERIAL: 0.7 % (ref 0–1.5)
CHLORIDE BLD-SCNC: 90 MMOL/L (ref 99–110)
CHLORIDE BLD-SCNC: 93 MMOL/L (ref 99–110)
CO2: 28 MMOL/L (ref 21–32)
CO2: 30 MMOL/L (ref 21–32)
CREAT SERPL-MCNC: <0.5 MG/DL (ref 0.6–1.1)
CREAT SERPL-MCNC: <0.5 MG/DL (ref 0.6–1.1)
CULTURE, BLOOD 2: NORMAL
EOSINOPHILS ABSOLUTE: 0.8 K/UL (ref 0–0.6)
EOSINOPHILS RELATIVE PERCENT: 5 %
GFR AFRICAN AMERICAN: >60
GFR AFRICAN AMERICAN: >60
GFR NON-AFRICAN AMERICAN: >60
GFR NON-AFRICAN AMERICAN: >60
GLUCOSE BLD-MCNC: 109 MG/DL (ref 70–99)
GLUCOSE BLD-MCNC: 118 MG/DL (ref 70–99)
GLUCOSE BLD-MCNC: 122 MG/DL (ref 70–99)
GLUCOSE BLD-MCNC: 124 MG/DL (ref 70–99)
GLUCOSE BLD-MCNC: 129 MG/DL (ref 70–99)
GLUCOSE BLD-MCNC: 132 MG/DL (ref 70–99)
GLUCOSE BLD-MCNC: 167 MG/DL (ref 70–99)
GLUCOSE BLD-MCNC: 98 MG/DL (ref 70–99)
HCO3 ARTERIAL: 29.7 MMOL/L (ref 21–29)
HCT VFR BLD CALC: 37 % (ref 36–48)
HEMOGLOBIN, ART, EXTENDED: 12.9 G/DL (ref 12–16)
HEMOGLOBIN: 12.4 G/DL (ref 12–16)
LV EF: 40 %
LVEF MODALITY: NORMAL
LYMPHOCYTES ABSOLUTE: 2.6 K/UL (ref 1–5.1)
LYMPHOCYTES RELATIVE PERCENT: 16 %
MAGNESIUM: 1.5 MG/DL (ref 1.8–2.4)
MAGNESIUM: 1.6 MG/DL (ref 1.8–2.4)
MCH RBC QN AUTO: 29.1 PG (ref 26–34)
MCHC RBC AUTO-ENTMCNC: 33.6 G/DL (ref 31–36)
MCV RBC AUTO: 86.6 FL (ref 80–100)
METHEMOGLOBIN ARTERIAL: 0.1 %
MONOCYTES ABSOLUTE: 1.4 K/UL (ref 0–1.3)
MONOCYTES RELATIVE PERCENT: 9 %
MYELOCYTE PERCENT: 2 %
NEUTROPHILS ABSOLUTE: 11.3 K/UL (ref 1.7–7.7)
NEUTROPHILS RELATIVE PERCENT: 67 %
O2 CONTENT ARTERIAL: 18 ML/DL
O2 SAT, ARTERIAL: 97.7 %
O2 THERAPY: ABNORMAL
PCO2 ARTERIAL: 39.1 MMHG (ref 35–45)
PDW BLD-RTO: 12.7 % (ref 12.4–15.4)
PERFORMED ON: ABNORMAL
PERFORMED ON: NORMAL
PH ARTERIAL: 7.5 (ref 7.35–7.45)
PHOSPHORUS: 2.8 MG/DL (ref 2.5–4.9)
PLATELET # BLD: 400 K/UL (ref 135–450)
PLATELET SLIDE REVIEW: ADEQUATE
PMV BLD AUTO: 7.2 FL (ref 5–10.5)
PO2 ARTERIAL: 94.5 MMHG (ref 75–108)
POTASSIUM SERPL-SCNC: 2.9 MMOL/L (ref 3.5–5.1)
POTASSIUM SERPL-SCNC: 3 MMOL/L (ref 3.5–5.1)
POTASSIUM SERPL-SCNC: 3 MMOL/L (ref 3.5–5.1)
RBC # BLD: 4.27 M/UL (ref 4–5.2)
SLIDE REVIEW: ABNORMAL
SODIUM BLD-SCNC: 132 MMOL/L (ref 136–145)
SODIUM BLD-SCNC: 134 MMOL/L (ref 136–145)
TCO2 ARTERIAL: 30.9 MMOL/L
TOTAL CK: 46 U/L (ref 26–192)
TOTAL PROTEIN: 7.1 G/DL (ref 6.4–8.2)
TRIGL SERPL-MCNC: 232 MG/DL (ref 0–150)
VANCOMYCIN TROUGH: 14.9 UG/ML (ref 10–20)
WBC # BLD: 16.1 K/UL (ref 4–11)

## 2019-05-22 PROCEDURE — 94761 N-INVAS EAR/PLS OXIMETRY MLT: CPT

## 2019-05-22 PROCEDURE — 2500000003 HC RX 250 WO HCPCS: Performed by: INTERNAL MEDICINE

## 2019-05-22 PROCEDURE — 82550 ASSAY OF CK (CPK): CPT

## 2019-05-22 PROCEDURE — 84100 ASSAY OF PHOSPHORUS: CPT

## 2019-05-22 PROCEDURE — 71045 X-RAY EXAM CHEST 1 VIEW: CPT

## 2019-05-22 PROCEDURE — 2580000003 HC RX 258: Performed by: EMERGENCY MEDICINE

## 2019-05-22 PROCEDURE — 36415 COLL VENOUS BLD VENIPUNCTURE: CPT

## 2019-05-22 PROCEDURE — 84478 ASSAY OF TRIGLYCERIDES: CPT

## 2019-05-22 PROCEDURE — 94640 AIRWAY INHALATION TREATMENT: CPT

## 2019-05-22 PROCEDURE — 6360000002 HC RX W HCPCS: Performed by: INTERNAL MEDICINE

## 2019-05-22 PROCEDURE — 2700000000 HC OXYGEN THERAPY PER DAY

## 2019-05-22 PROCEDURE — 2000000000 HC ICU R&B

## 2019-05-22 PROCEDURE — 93308 TTE F-UP OR LMTD: CPT

## 2019-05-22 PROCEDURE — 80076 HEPATIC FUNCTION PANEL: CPT

## 2019-05-22 PROCEDURE — 80202 ASSAY OF VANCOMYCIN: CPT

## 2019-05-22 PROCEDURE — 6370000000 HC RX 637 (ALT 250 FOR IP): Performed by: INTERNAL MEDICINE

## 2019-05-22 PROCEDURE — 2580000003 HC RX 258: Performed by: INTERNAL MEDICINE

## 2019-05-22 PROCEDURE — 99291 CRITICAL CARE FIRST HOUR: CPT | Performed by: INTERNAL MEDICINE

## 2019-05-22 PROCEDURE — 80048 BASIC METABOLIC PNL TOTAL CA: CPT

## 2019-05-22 PROCEDURE — 83735 ASSAY OF MAGNESIUM: CPT

## 2019-05-22 PROCEDURE — 85025 COMPLETE CBC W/AUTO DIFF WBC: CPT

## 2019-05-22 PROCEDURE — 84132 ASSAY OF SERUM POTASSIUM: CPT

## 2019-05-22 PROCEDURE — 82803 BLOOD GASES ANY COMBINATION: CPT

## 2019-05-22 PROCEDURE — 36592 COLLECT BLOOD FROM PICC: CPT

## 2019-05-22 PROCEDURE — 94003 VENT MGMT INPAT SUBQ DAY: CPT

## 2019-05-22 PROCEDURE — 99232 SBSQ HOSP IP/OBS MODERATE 35: CPT | Performed by: INTERNAL MEDICINE

## 2019-05-22 RX ORDER — METOLAZONE 2.5 MG/1
5 TABLET ORAL DAILY
Status: COMPLETED | OUTPATIENT
Start: 2019-05-22 | End: 2019-05-24

## 2019-05-22 RX ORDER — POTASSIUM CHLORIDE 750 MG/1
40 TABLET, EXTENDED RELEASE ORAL 2 TIMES DAILY WITH MEALS
Status: DISCONTINUED | OUTPATIENT
Start: 2019-05-22 | End: 2019-05-29 | Stop reason: HOSPADM

## 2019-05-22 RX ORDER — SENNA AND DOCUSATE SODIUM 50; 8.6 MG/1; MG/1
2 TABLET, FILM COATED ORAL DAILY PRN
Status: DISCONTINUED | OUTPATIENT
Start: 2019-05-22 | End: 2019-05-29 | Stop reason: HOSPADM

## 2019-05-22 RX ADMIN — PROPOFOL 50 MCG/KG/MIN: 10 INJECTION, EMULSION INTRAVENOUS at 08:30

## 2019-05-22 RX ADMIN — MAGNESIUM SULFATE HEPTAHYDRATE 1 G: 1 INJECTION, SOLUTION INTRAVENOUS at 00:31

## 2019-05-22 RX ADMIN — MAGNESIUM SULFATE HEPTAHYDRATE 1 G: 1 INJECTION, SOLUTION INTRAVENOUS at 08:48

## 2019-05-22 RX ADMIN — POTASSIUM CHLORIDE 20 MEQ: 400 INJECTION, SOLUTION INTRAVENOUS at 23:18

## 2019-05-22 RX ADMIN — Medication 4 PUFF: at 02:57

## 2019-05-22 RX ADMIN — PROPOFOL 50 MCG/KG/MIN: 10 INJECTION, EMULSION INTRAVENOUS at 01:31

## 2019-05-22 RX ADMIN — MEROPENEM 1 G: 1 INJECTION, POWDER, FOR SOLUTION INTRAVENOUS at 02:26

## 2019-05-22 RX ADMIN — MIDAZOLAM HYDROCHLORIDE 2 MG: 2 INJECTION, SOLUTION INTRAMUSCULAR; INTRAVENOUS at 05:38

## 2019-05-22 RX ADMIN — GABAPENTIN 200 MG: 100 CAPSULE ORAL at 08:01

## 2019-05-22 RX ADMIN — ENOXAPARIN SODIUM 40 MG: 40 INJECTION SUBCUTANEOUS at 08:01

## 2019-05-22 RX ADMIN — Medication 4 PUFF: at 19:23

## 2019-05-22 RX ADMIN — FAMOTIDINE 20 MG: 10 INJECTION, SOLUTION INTRAVENOUS at 08:01

## 2019-05-22 RX ADMIN — PROPOFOL 40 MCG/KG/MIN: 10 INJECTION, EMULSION INTRAVENOUS at 14:57

## 2019-05-22 RX ADMIN — PROPOFOL 50 MCG/KG/MIN: 10 INJECTION, EMULSION INTRAVENOUS at 23:20

## 2019-05-22 RX ADMIN — MAGNESIUM SULFATE HEPTAHYDRATE 1 G: 1 INJECTION, SOLUTION INTRAVENOUS at 06:52

## 2019-05-22 RX ADMIN — MEROPENEM 1 G: 1 INJECTION, POWDER, FOR SOLUTION INTRAVENOUS at 16:53

## 2019-05-22 RX ADMIN — FUROSEMIDE 40 MG: 10 INJECTION, SOLUTION INTRAMUSCULAR; INTRAVENOUS at 08:01

## 2019-05-22 RX ADMIN — POTASSIUM CHLORIDE 20 MEQ: 400 INJECTION, SOLUTION INTRAVENOUS at 05:38

## 2019-05-22 RX ADMIN — PROPOFOL 175 MCG/KG/MIN: 10 INJECTION, EMULSION INTRAVENOUS at 20:05

## 2019-05-22 RX ADMIN — SODIUM CHLORIDE 1 MCG/KG/HR: 9 INJECTION, SOLUTION INTRAVENOUS at 10:45

## 2019-05-22 RX ADMIN — PROPOFOL 50 MCG/KG/MIN: 10 INJECTION, EMULSION INTRAVENOUS at 04:54

## 2019-05-22 RX ADMIN — MIDAZOLAM HYDROCHLORIDE 2 MG: 2 INJECTION, SOLUTION INTRAMUSCULAR; INTRAVENOUS at 06:52

## 2019-05-22 RX ADMIN — MIDAZOLAM HYDROCHLORIDE 2 MG: 2 INJECTION, SOLUTION INTRAMUSCULAR; INTRAVENOUS at 15:41

## 2019-05-22 RX ADMIN — FENTANYL CITRATE 25 MCG: 50 INJECTION INTRAMUSCULAR; INTRAVENOUS at 20:05

## 2019-05-22 RX ADMIN — FENTANYL CITRATE 25 MCG: 50 INJECTION INTRAMUSCULAR; INTRAVENOUS at 16:02

## 2019-05-22 RX ADMIN — FENTANYL CITRATE 175 MCG/HR: 50 INJECTION, SOLUTION INTRAMUSCULAR; INTRAVENOUS at 07:10

## 2019-05-22 RX ADMIN — MIDAZOLAM HYDROCHLORIDE 2 MG: 2 INJECTION, SOLUTION INTRAMUSCULAR; INTRAVENOUS at 04:07

## 2019-05-22 RX ADMIN — MIDAZOLAM HYDROCHLORIDE 2 MG: 2 INJECTION, SOLUTION INTRAMUSCULAR; INTRAVENOUS at 02:43

## 2019-05-22 RX ADMIN — MIDAZOLAM HYDROCHLORIDE 2 MG: 2 INJECTION, SOLUTION INTRAMUSCULAR; INTRAVENOUS at 12:29

## 2019-05-22 RX ADMIN — POTASSIUM CHLORIDE 40 MEQ: 10 TABLET, EXTENDED RELEASE ORAL at 16:54

## 2019-05-22 RX ADMIN — OLANZAPINE 15 MG: 10 TABLET, FILM COATED ORAL at 20:28

## 2019-05-22 RX ADMIN — Medication 4 PUFF: at 15:53

## 2019-05-22 RX ADMIN — IBUPROFEN 600 MG: 100 SUSPENSION ORAL at 20:28

## 2019-05-22 RX ADMIN — FAMOTIDINE 20 MG: 10 INJECTION, SOLUTION INTRAVENOUS at 20:28

## 2019-05-22 RX ADMIN — MIDAZOLAM HYDROCHLORIDE 2 MG: 2 INJECTION, SOLUTION INTRAMUSCULAR; INTRAVENOUS at 16:36

## 2019-05-22 RX ADMIN — FUROSEMIDE 40 MG: 10 INJECTION, SOLUTION INTRAMUSCULAR; INTRAVENOUS at 13:42

## 2019-05-22 RX ADMIN — MIDAZOLAM HYDROCHLORIDE 2 MG: 2 INJECTION, SOLUTION INTRAMUSCULAR; INTRAVENOUS at 18:54

## 2019-05-22 RX ADMIN — MAGNESIUM SULFATE HEPTAHYDRATE 1 G: 1 INJECTION, SOLUTION INTRAVENOUS at 22:09

## 2019-05-22 RX ADMIN — MIDAZOLAM HYDROCHLORIDE 2 MG: 2 INJECTION, SOLUTION INTRAMUSCULAR; INTRAVENOUS at 17:52

## 2019-05-22 RX ADMIN — Medication 10 ML: at 20:29

## 2019-05-22 RX ADMIN — Medication 4 PUFF: at 11:45

## 2019-05-22 RX ADMIN — OLANZAPINE 15 MG: 10 TABLET, FILM COATED ORAL at 08:00

## 2019-05-22 RX ADMIN — FENTANYL CITRATE 25 MCG: 50 INJECTION INTRAMUSCULAR; INTRAVENOUS at 17:52

## 2019-05-22 RX ADMIN — GABAPENTIN 200 MG: 100 CAPSULE ORAL at 13:42

## 2019-05-22 RX ADMIN — FENTANYL CITRATE 25 MCG: 50 INJECTION INTRAMUSCULAR; INTRAVENOUS at 05:38

## 2019-05-22 RX ADMIN — POTASSIUM CHLORIDE 20 MEQ: 400 INJECTION, SOLUTION INTRAVENOUS at 00:30

## 2019-05-22 RX ADMIN — FUROSEMIDE 40 MG: 10 INJECTION, SOLUTION INTRAMUSCULAR; INTRAVENOUS at 20:29

## 2019-05-22 RX ADMIN — MAGNESIUM SULFATE HEPTAHYDRATE 1 G: 1 INJECTION, SOLUTION INTRAVENOUS at 23:18

## 2019-05-22 RX ADMIN — MIDAZOLAM HYDROCHLORIDE 2 MG: 2 INJECTION, SOLUTION INTRAMUSCULAR; INTRAVENOUS at 13:58

## 2019-05-22 RX ADMIN — Medication 4 PUFF: at 23:20

## 2019-05-22 RX ADMIN — Medication 4 PUFF: at 07:45

## 2019-05-22 RX ADMIN — FENTANYL CITRATE 200 MCG/HR: 50 INJECTION, SOLUTION INTRAMUSCULAR; INTRAVENOUS at 01:08

## 2019-05-22 RX ADMIN — FENTANYL CITRATE 25 MCG: 50 INJECTION INTRAMUSCULAR; INTRAVENOUS at 08:20

## 2019-05-22 RX ADMIN — VANCOMYCIN HYDROCHLORIDE 1000 MG: 1 INJECTION, POWDER, LYOPHILIZED, FOR SOLUTION INTRAVENOUS at 13:00

## 2019-05-22 RX ADMIN — MIDAZOLAM HYDROCHLORIDE 2 MG: 2 INJECTION, SOLUTION INTRAMUSCULAR; INTRAVENOUS at 08:05

## 2019-05-22 RX ADMIN — GABAPENTIN 200 MG: 100 CAPSULE ORAL at 20:29

## 2019-05-22 RX ADMIN — POTASSIUM CHLORIDE 20 MEQ: 400 INJECTION, SOLUTION INTRAVENOUS at 08:06

## 2019-05-22 RX ADMIN — Medication 10 ML: at 08:01

## 2019-05-22 RX ADMIN — POTASSIUM CHLORIDE 40 MEQ: 10 TABLET, EXTENDED RELEASE ORAL at 08:01

## 2019-05-22 RX ADMIN — POTASSIUM CHLORIDE 20 MEQ: 400 INJECTION, SOLUTION INTRAVENOUS at 22:09

## 2019-05-22 RX ADMIN — VANCOMYCIN HYDROCHLORIDE 1000 MG: 1 INJECTION, POWDER, LYOPHILIZED, FOR SOLUTION INTRAVENOUS at 05:04

## 2019-05-22 RX ADMIN — ASPIRIN 81 MG 81 MG: 81 TABLET ORAL at 08:01

## 2019-05-22 RX ADMIN — MEROPENEM 1 G: 1 INJECTION, POWDER, FOR SOLUTION INTRAVENOUS at 09:56

## 2019-05-22 RX ADMIN — Medication 4 PUFF: at 15:54

## 2019-05-22 RX ADMIN — FENTANYL CITRATE 175 MCG/HR: 50 INJECTION, SOLUTION INTRAMUSCULAR; INTRAVENOUS at 21:33

## 2019-05-22 RX ADMIN — METOLAZONE 5 MG: 2.5 TABLET ORAL at 08:01

## 2019-05-22 RX ADMIN — FENTANYL CITRATE 150 MCG/HR: 50 INJECTION, SOLUTION INTRAMUSCULAR; INTRAVENOUS at 15:44

## 2019-05-22 RX ADMIN — Medication 4 PUFF: at 07:46

## 2019-05-22 RX ADMIN — VANCOMYCIN HYDROCHLORIDE 1000 MG: 1 INJECTION, POWDER, LYOPHILIZED, FOR SOLUTION INTRAVENOUS at 20:29

## 2019-05-22 RX ADMIN — FENTANYL CITRATE 25 MCG: 50 INJECTION INTRAMUSCULAR; INTRAVENOUS at 04:09

## 2019-05-22 RX ADMIN — FENTANYL CITRATE 25 MCG: 50 INJECTION INTRAMUSCULAR; INTRAVENOUS at 06:52

## 2019-05-22 RX ADMIN — MIDAZOLAM HYDROCHLORIDE 2 MG: 2 INJECTION, SOLUTION INTRAMUSCULAR; INTRAVENOUS at 20:05

## 2019-05-22 RX ADMIN — POTASSIUM CHLORIDE 20 MEQ: 400 INJECTION, SOLUTION INTRAVENOUS at 06:52

## 2019-05-22 ASSESSMENT — PULMONARY FUNCTION TESTS
PIF_VALUE: 37
PIF_VALUE: 45
PIF_VALUE: 29
PIF_VALUE: 32
PIF_VALUE: 28
PIF_VALUE: 29
PIF_VALUE: 34
PIF_VALUE: 40
PIF_VALUE: 26
PIF_VALUE: 29
PIF_VALUE: 28
PIF_VALUE: 30
PIF_VALUE: 39
PIF_VALUE: 46
PIF_VALUE: .9
PIF_VALUE: 10
PIF_VALUE: 28
PIF_VALUE: 27
PIF_VALUE: 30
PIF_VALUE: 32
PIF_VALUE: 27
PIF_VALUE: 28
PIF_VALUE: 29
PIF_VALUE: 11
PIF_VALUE: 32

## 2019-05-22 ASSESSMENT — PAIN SCALES - GENERAL
PAINLEVEL_OUTOF10: 7
PAINLEVEL_OUTOF10: 7
PAINLEVEL_OUTOF10: 0
PAINLEVEL_OUTOF10: 6
PAINLEVEL_OUTOF10: 0
PAINLEVEL_OUTOF10: 7
PAINLEVEL_OUTOF10: 0
PAINLEVEL_OUTOF10: 7

## 2019-05-22 NOTE — PROGRESS NOTES
Pulmonary & Critical Care Medicine ICU Progress Note  CC:Acute respiratory failure with hypoxemia    Events of Last 24 hours: Levophed weaned off. PEEP went from 8 to 5. She is net positive 2.3 L over 24 hours. Invasive Lines:   IV Line: [facility] 5/14    MV:  5/14  Vent Mode: AC/VC Rate Set: 18 bmp/Vt Ordered: 410 mL/ /FiO2 : 50 %  Recent Labs     05/21/19  0620 05/22/19  0600   PHART 7.422 7.499*   JOA8CIC 44.4 39.1   PO2ART 89.5 94.5       IV:   norepinephrine Stopped (05/21/19 1402)    sodium chloride 75 mL/hr at 05/18/19 0216    propofol 50 mcg/kg/min (05/22/19 0454)    dextrose      fentaNYL (SUBLIMAZE) infusion 175 mcg/hr (05/22/19 0212)       EXAM:  BP (!) 82/55   Pulse 116   Temp 99.5 °F (37.5 °C) (Oral)   Resp 17   Ht 5' (1.524 m)   Wt 184 lb 14.4 oz (83.9 kg)   SpO2 94%   BMI 36.11 kg/m²  on vent  Tmax: 99.5  CVP: CVP (Mean): 11 mmHg    Intake/Output Summary (Last 24 hours) at 5/22/2019 0636  Last data filed at 5/22/2019 0600  Gross per 24 hour   Intake 5340.73 ml   Output 3085 ml   Net 2255.73 ml     General: Mild distress. Normocephalic, atraumatic. Eyes: PERRL. No sclera icterus. No conjunctival injection. ENT: No discharge. Pharynx clear. Neck: Trachea midline. Normal thyroid. Resp: No accessory muscle use. No crackles. No wheezing. bilateral rhonchi. No dullness on percussion. CV: Regular rate. Regular rhythm. No mumur or rub. No edema. GI: Non-tender. Non-distended. No masses. No organmegaly. Normal bowel sounds. No hernia. Skin: Warm and dry. No nodule on exposed extremities. No rash on exposed extremities. Lymph: No cervical LAD. No supraclavicular LAD. M/S: No cyanosis. No joint deformity. No clubbing.  L leg muscle wasting  Neuro:  awake, she is moving extremities, + pupillary response      Medications:   potassium chloride  40 mEq Oral BID WC    furosemide  40 mg Intravenous TID    meropenem (MERREM) 1 g IVPB (mini-bag)  1 g Intravenous Q8H    vancomycin  1,000 mg Intravenous Q8H    OLANZapine  15 mg Oral BID    gabapentin  200 mg Oral TID    enoxaparin  40 mg Subcutaneous Daily    aspirin  81 mg Oral Daily    famotidine (PEPCID) injection  20 mg Intravenous BID    albuterol sulfate HFA  4 puff Inhalation Q4H    ipratropium  4 puff Inhalation Q4H    insulin lispro  0-6 Units Subcutaneous Q4H    sodium chloride flush  10 mL Intravenous 2 times per day     PRN Meds:  magnesium sulfate, potassium chloride, midazolam, fentanNYL, glucose, dextrose, glucagon (rDNA), dextrose, ibuprofen, sodium chloride flush    Results:  CBC:   Recent Labs     05/20/19 0640 05/21/19 0620   WBC 19.6* 16.3*   HGB 13.0 12.4   HCT 38.7 36.7   MCV 89.5 88.7    360     BMP:   Recent Labs     05/20/19 0640 05/21/19 0620 05/21/19  2000 05/22/19  0230 05/22/19  0553      < > 135* 133*  --  132*   K 3.2*   < > 3.1* 3.1* 3.0* 2.9*      < > 97* 91*  --  93*   CO2 25   < > 26 30  --  28   PHOS 5.5*  --  4.9  --   --  2.8   BUN 14   < > 17 15  --  16   CREATININE <0.5*   < > <0.5* <0.5*  --  <0.5*    < > = values in this interval not displayed. LIVER PROFILE:   Recent Labs     05/20/19 0640 05/21/19 0620 05/22/19  0553   AST 35 31 37   ALT 34 25 23   BILIDIR 0.5* 0.3 <0.2   BILITOT 0.9 0.6 0.4   ALKPHOS 125 118 104     PT/INR: No results for input(s): PROTIME, INR in the last 72 hours. APTT: No results for input(s): APTT in the last 72 hours. UA:No results for input(s): NITRITE, COLORU, PHUR, LABCAST, WBCUA, RBCUA, MUCUS, TRICHOMONAS, YEAST, BACTERIA, CLARITYU, SPECGRAV, LEUKOCYTESUR, UROBILINOGEN, BILIRUBINUR, BLOODU, GLUCOSEU, AMORPHOUS in the last 72 hours.     Invalid input(s): [place]    Cultures:  5/14 BCx NGTD  5/16 BAL Staphylococcus aureus  5/17 BCx NGTD   5/17 UCx NGTD   5/19 BAL NGTD      Films:  CXR 5/22 reviewed by me and it showed: increase in dense right sided pneumonia; left side stable    Assessment:  · Acute respiratory failure with

## 2019-05-22 NOTE — PLAN OF CARE
Problem: Risk for Impaired Skin Integrity  Goal: Tissue integrity - skin and mucous membranes  Description  Structural intactness and normal physiological function of skin and  mucous membranes.   Outcome: Ongoing     Problem: Restraint Use - Nonviolent/Non-Self-Destructive Behavior:  Goal: Absence of restraint indications  Description  Absence of restraint indications  Outcome: Ongoing  Goal: Absence of restraint-related injury  Description  Absence of restraint-related injury  Outcome: Ongoing     Problem: Gas Exchange - Impaired:  Goal: Levels of oxygenation will improve  Description  Levels of oxygenation will improve  Outcome: Ongoing

## 2019-05-22 NOTE — PROGRESS NOTES
Shift assessment complete. Patient currently intubated with ETT at 22 LL. Vent settings are 18 RR, 410 VT, 5 of PEEP and 50% FiO2. RT reduced PEEP from 8 to 5 this am.   RN holding on SBT until assessed by Dr. Amandeep Brizuela. Lung sounds appear to have some rhonchi in upper and diminished in lowers. Tolerating VENT right now. Breathing over set RR. Approximately 24 RR. PRN VERSED and FENTANYL given regularly throughout the night. Fentanyl infusing at 175 mcg//h. Propofol infusing at 50 mcg/kg/min. She is alert with a RASS of -1. Follows commands as evidenced by hand grasps and eye tracking. Bowels active x4. Abdomen is rounded but soft. TF infusing at goal of 40 ml/h. Central line dressing is clean, dry and intact with no signs of drainage. All tubing is current and dated. IPA caps applied to all access hubs. [place] in place with good urine output. BUE soft wrist restraints in place due to attempts of pulling at ETT and lines. No signs of injury noted and PROM performed.      Electronically signed by Tracy Carpio RN on 5/22/2019 at 8:41 AM    Vitals:    05/22/19 0800   BP: 102/85   Pulse: 124   Resp: 24   Temp: 100 °F (37.8 °C)   SpO2: 96%

## 2019-05-22 NOTE — PROGRESS NOTES
Perfect serve sent to MD regarding outgoing potassium and magnesium levels. Replacements being given. See new orders.  Electronically signed by Kaveh England RN on 5/22/2019 at 7:26 AM

## 2019-05-22 NOTE — PROGRESS NOTES
RN asked RT to switch patient back to [facility] on vent. Patient frequently had HR up to 150-160. RR 30-34. Severe coughing episodes. Low VT at times with High RSBI. Copious amounts of secretions. SBT lasted 45 minutes. SAT was 65 minutes. RN to address with Dr. Krsihna Boogie whether he prefers PRECEDEX over Propofol.      Electronically signed by Santhosh Vickers RN on 5/22/2019 at 10:25 AM

## 2019-05-22 NOTE — PROGRESS NOTES
Shift assessment complete. Pt continues on current vent settings; has required multiple PRN's to be able to tolerate. Patient currently intubated with ETT at 23 @ lip line. Vent settings are 18 RR, 410 VT, 8 of PEEP and 50% FiO2. Fentanyl infusing at 200 mcg//h. Propofol infusing at 50 mcg/kg/min. TF running at goal with 60 residual; tolerating well. Potassium 3.1 at this time; this RN will follow up with replacement protocol.  Electronically signed by Chacho Malin RN on 5/21/2019 at 8:48 PM

## 2019-05-22 NOTE — PROGRESS NOTES
Patient reassessed. Patient currently intubated with ETT at 22 LL. Vent settings are 18 RR, 410 VT, 5 of PEEP and 50% FiO2. Patient is now on precedex. Precedex infusing at 1.2 mcg/kg/h. PROPOFOL and FENTANYL off again. RN and RT to trial patient for the second time today per Dr. Amandeep Brizuela. She appears anxious and restless frequently as evidenced by coughing and raising arms. Still copious amounts of secretions through ETT and patient is coughing up deandre amounts onto gown as well. ST on monitor. Bowels active x4. TF off for SBT. Central line dressing is clean, dry and intact with no signs of drainage. All tubing is current and dated. IPA caps applied to all access hubs. BUE soft wrist restraints in place due to attempts of pulling at ETT and lines. No signs of injury noted and PROM performed.      Electronically signed by Tracy Carpio RN on 5/22/2019 at 11:41 AM      Vitals:    05/22/19 1100   BP: 98/69   Pulse: 131   Resp: 18   Temp: 100.5 °F (38.1 °C)   SpO2: 94%

## 2019-05-22 NOTE — PROGRESS NOTES
Hand off report given to DORIAN Ying. Pt is in stable condition at this time. Care transferred.  Electronically signed by Lyssa Manzano RN on 5/22/2019 at 7:26 AM

## 2019-05-22 NOTE — PROGRESS NOTES
CHG bath provided for patient.      Electronically signed by Dewayne Lind RN on 5/22/2019 at 4:35 PM

## 2019-05-22 NOTE — CARE COORDINATION
INTERDISCIPLINARY PLAN OF CARE CONFERENCE    Date/Time: 5/22/2019 1:35 PM  Completed by: Mica Gongora, Case Management      Patient Name:  Liz Oropeza  YOB: 1985  Admitting Diagnosis: Acute respiratory failure ([facility] [address] [J96.00]     Admit Date/Time:  5/14/2019  5:59 PM    Chart reviewed. Interdisciplinary team met to discuss patient progress and discharge plans. Disciplines included Case Management, Nursing, and Dietitian. Current Status:Inpt status    PT/OT recommendation:n/a    Anticipated Discharge Date: TBD  Expected D/C Disposition:  ?  Confirmed plan with patient and/or family Yes  Discharge Plan Comments:   On vent. Plan: TBD. Concerns for not having access to 24/7 supervision when pt is released from the hospital to home. Mother works and other family member live 1-2 hours away. + Active with [facility] and has at [facility], Josiah Addis [phone]. Update called to [facility]. He will explore home support options.  +CM following.       Home O2 in place on admit: No  Pt informed of need to bring portable home O2 tank on day of discharge for nursing to connect prior to leaving:  Not Indicated  Verbalized agreement/Understanding:  Not Indicated

## 2019-05-22 NOTE — PLAN OF CARE
Problem: Nutrition  Goal: Optimal nutrition therapy  5/21/2019 2124 by Sukh Duque RN  Outcome: Met This Shift  5/21/2019 1123 by Tristen Harrington RD, GRACIELA  Outcome: Met This Shift     Problem: Falls - Risk of:  Goal: Will remain free from falls  Description  Will remain free from falls  Outcome: Met This Shift  Goal: Absence of physical injury  Description  Absence of physical injury  Outcome: Met This Shift     Problem: Risk for Impaired Skin Integrity  Goal: Tissue integrity - skin and mucous membranes  Description  Structural intactness and normal physiological function of skin and  mucous membranes.   Outcome: Ongoing     Problem: Restraint Use - Nonviolent/Non-Self-Destructive Behavior:  Goal: Absence of restraint indications  Description  Absence of restraint indications  Outcome: Ongoing  Goal: Absence of restraint-related injury  Description  Absence of restraint-related injury  Outcome: Ongoing     Problem: Infection:  Goal: Will remain free from infection  Description  Will remain free from infection  Outcome: Ongoing     Problem: Gas Exchange - Impaired:  Goal: Levels of oxygenation will improve  Description  Levels of oxygenation will improve  Outcome: Ongoing     Problem: Infection, Septic Shock:  Goal: Will show no infection signs and symptoms  Description  Will show no infection signs and symptoms  Outcome: Ongoing     Problem: Airway Clearance - Ineffective:  Goal: Clear lung sounds  Description  Clear lung sounds  Outcome: Ongoing     Problem: Fluid Volume - Deficit:  Goal: Achieves intake and output within specified parameters  Description  Achieves intake and output within specified parameters  Outcome: Ongoing     Problem: Nutrition  Goal: Understanding of nutritional guidelines  5/21/2019 2124 by Sukh Duque RN  Outcome: Ongoing  5/21/2019 1123 by Tristen Harrington RD, GRACIELA  Outcome: Met This Shift

## 2019-05-22 NOTE — PROGRESS NOTES
Changed patient back to A/C previous settings d/t increased RR of 32, RSBI's mostly in the 100's and large amounts of secretions. sp02 hanging around 90%.

## 2019-05-22 NOTE — PROGRESS NOTES
SAT ongoing for approximately 20 minutes. Several coughing episodes.  right now. FENTANYL off and PROPOFOL off.

## 2019-05-22 NOTE — PLAN OF CARE
Problem: OXYGENATION/RESPIRATORY FUNCTION  Goal: Patient will maintain patent airway  Outcome: Ongoing     Patient's EF (Ejection Fraction) is less than 40%    Patient's weights and intake/output reviewed:    Patient's Last Weight: 83.9 kg obtained by bed scale. Today's weight is noted to be same  than last documented weight. Intake/Output Summary (Last 24 hours) at 5/22/2019 1429  Last data filed at 5/22/2019 1425  Gross per 24 hour   Intake 4701.73 ml   Output 3930 ml   Net 771.73 ml       Patient's current functional capacity:  Unable to carry on any physical activity without discomfort. Symptoms of heart failure at rest.    Pt resting in bed at this time on VENT . Pt with complaints of shortness of breath. Pt without lower extremity edema. Patient and family's stated goal of care: reduce shortness of breath, increase activity tolerance, be more comfortable and reduce lower extremity edema prior to discharge        Patient has a past medical history of Cancer ([address] Hepatitis C antibody positive in blood, and Hyperlipidemia. Comorbidities reviewed and education provided.     >>For CHF and Comorbidity documentation on Education Time and Topics, please see Education Tab

## 2019-05-22 NOTE — PROGRESS NOTES
Progress Note    Admit Date:  5/14/2019      40-year-old female with nonischemic dilated cardiomyopathy, presented with V. fib arrest.  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 Hep C + . She is mostly homebound. She has been ill with  respiratory symptoms for a couple of months. Mother found her down. CPR initiated,  S/P defibrillation twice en route to hospital.  Admitted  to ICU , on mechanical vent   Echocardiogram with ejection fraction low at 25%   Head CT was negative. status post bronchoscopy 5/16. Patient had significant amount of resp secretions that were aspirated     Good diuresis with lasix, off pressors  Improved EF on echo       Subjective:    Ms. Walker Ni on mechanical ventilation,arousable today  Ongoing weaning      Objective:   BP 98/62   Pulse 120   Temp 99.5 °F (37.5 °C) (Oral)   Resp 18   Ht 5' (1.524 m)   Wt 184 lb 14.4 oz (83.9 kg)   SpO2 95%   BMI 36.11 kg/m²       Intake/Output Summary (Last 24 hours) at 5/22/2019 3462  Last data filed at 5/22/2019 0600  Gross per 24 hour   Intake 5340.73 ml   Output 3085 ml   Net 2255.73 ml       Physical Exam:-  General: young female,  Intubated ,arousable on mechanical ventilation . Oral ETT and OG noted  Skin:  Warm and dry  Neck:  JVD absent. Neck supple  Chest:  Diminished breath sounds . No wheezes, rales or rhonchi. Cardiovascular:  RRR ,S1S2 normal  Abdomen:  Soft, non tender, non distended, BS +  Extremities:  Trace edema of upper extremity . No edema of lower extremity. . Left lower extremity atrophy present  Intact peripheral pulses.  Brisk cap refill, < 2 secs  Neuro:  Non focal . Moving all extremities spontaneosly when sedation decreased       Medications:   Scheduled Meds:   potassium chloride  40 mEq Oral BID WC    metolazone  5 mg Oral Daily    furosemide  40 mg Intravenous TID    meropenem (MERREM) 1 g IVPB (mini-bag)  1 g Intravenous Q8H    concerning   for pneumonia. Stable support tubes. XR CHEST PORTABLE   Final Result   Grossly stable bilateral airspace disease. XR CHEST PORTABLE   Final Result      XR CHEST PORTABLE   Final Result   Worsening bilateral airspace opacities in a pattern that would favor ARDS. XR CHEST PORTABLE   Final Result   Stable life support device positioning. Persistent perihilar predominant edema and small effusions. XR CHEST PORTABLE   Final Result   Interval improvement in pulmonary edema. XR CHEST PORTABLE   Final Result   Appropriate left IJ central venous catheter positioning. No pneumothorax. Appropriate endotracheal tube positioning. Layering bilateral effusions. Equivocal for a component of superimposed   pulmonary edema. Atelectasis likely present. CT ABDOMEN PELVIS WO CONTRAST Additional Contrast? None   Final Result   1. Bilateral lower lobe atelectasis, right greater than left. 2. Acute right 2nd through 4th and left 3rd through 5th rib fractures         CT Chest Pulmonary Embolism W Contrast   Final Result   1. Bilateral lower lobe atelectasis, right greater than left. 2. Acute right 2nd through 4th and left 3rd through 5th rib fractures         CT Head WO Contrast   Final Result   No acute intracranial abnormality. Left maxillary sinus mucosal thickening. Air-fluid level in the sphenoid   sinus. Correlate with any clinical evidence of sinusitis. XR CHEST PORTABLE   Final Result   Findings as above which may be related to congestive heart failure and edema.          XR CHEST PORTABLE    (Results Pending)   XR CHEST PORTABLE    (Results Pending)       Echo  Summary   Definity contrast administered.   Left ventricular systolic function is reduced with ejection fraction   estimated at 25-30 %.   Elevated left ventricular diastolic filling pressure: Septal E/e'' = 12.6   (for sinus tachycardia) .   Right ventricular systolic function is moderately reduced .   Mild mitral regurgitation.   Unable to obtain SPAP due to lack of tricuspid regurgitation. Repeat echo 5/22       This is a limited study for EF follow up.   Left ventricular systolic function is reduced with ejection fraction   estimated at 40 %.   There is mild to moderate global hypokinesis present.  Doreene Darby is mild concentric left ventricular hypertrophy. Assessment:  Active Problems:    Acute respiratory failure (HCC)    Aspiration pneumonitis (HCC)    Atrial fibrillation with RVR (HCC)    Electrolyte disturbance    Shock (HCC)    Transaminitis    Hyperglycemia    Pneumonia due to infectious organism    ARDS (adult respiratory distress syndrome) (HCC)    Mucus plugging of bronchi    Cardiac arrest with ventricular fibrillation (HCC)    Multiple tracheobronchial mucus plugs  Resolved Problems:    * No resolved hospital problems. *      Plan:    Cardiac V fib arrest   - status post successful resuscitation   - admitted to ICU, on mech vent . Intensivist and cardiology consulted   - Tele and repeat trops. Initial troponin less than 0.0 1 repeat troponin up to 0.57, and 1.49.   trending down 0.49, 0.26 0.17.   - EP eval once stable and off vent      Acute on chronic systolic CHF  Patient with severe nonischemic dilated cardiomyopathy  - secondary to adriamycin treatment as a child . she had chemotherapy for rhabdomyosarcoma. - repeat echo shows ejection fraction of 25%   -  on IV Lasix -TID by cardiology  - consider digoxin or low dose coreg   - improving EF with diuresis and medical mx on repeat echo       Acute respiratory failure  - continue mechanical ventilation   - seen by intensivist   - Status post bronchoscopy  twice due to increased respiratory secretions  - ongoing weaning. A. fib   - pt in afib following defib for V fib arrest. Loaded with amiodarone   - continued on  amiodarone drip, and heparin drip   - seen by cardiology  - off amio and hep gtt now . In Normal sinus rhythm    Septic shock   Pneumonia   - ? Aspiration - pt had resp symptoms for 2 months   - Secondary to staph aureus infection.   - Status post bronchoscopy cultures positive for MSSA  - Continue  IV antibiotics , was on vanc and merrem . - she was weaned off of pressors       Hypokalemia, Hypomagnesemia  - on replacement protocol. Hyperglycemia  -  on low dose ssi. Elevated LFT's, mild  - possibly related to shock. Repeat. Monitor. LFTs trending down now     Leukocytosis  - monitor.   - White count improving .  19.7-36-32.9-24.9-20.6-15.2  today       Angiogram and EP studies when hemodynamically stable and off vent     DVT Prophylaxis:  Lovenox  DIET TUBE FEED CONTINUOUS/CYCLIC NPO; Low Calorie High Protein (Vital High-Protein ); Orogastric; Continuous; 20; 40; 20  Dietary Nutrition Supplements: Protein Modular  Code Status: Full Code          Chandrika Smith MD 5/22/2019 7:23 AM

## 2019-05-22 NOTE — PROGRESS NOTES
05/22/19 1555   Vent Information   Vent Type 840   Vent Mode AC/VC   Vt Ordered 410 mL   Rate Set 18 bmp   Peak Flow 80 L/min   Pressure Support 0 cmH20   FiO2  50 %   Sensitivity 3   PEEP/CPAP 5   I Time/ I Time % 0 s   Humidification Source Heated wire   Humidification Temp 37   Circuit Condensation Drained   Vent Patient Data   High Peep/I Pressure 0   Peak Inspiratory Pressure 30 cmH2O   Mean Airway Pressure 9 cmH20   Rate Measured 18 br/min   Vt Exhaled 436 mL   Minute Volume 7.8 Liters   I:E Ratio 1:5.00   Spontaneous Breathing Trial (SBT) RT Doc   Pulse 100   SpO2 93 %   Breath Sounds   Right Upper Lobe Diminished   Right Middle Lobe Diminished   Right Lower Lobe Diminished   Left Upper Lobe Diminished   Left Lower Lobe Diminished   Additional Respiratory  Assessments   Resp 16   Position Semi-Triplett's   Alarm Settings   High Pressure Alarm 45 cmH2O   Low Minute Volume Alarm 4 L/min   Apnea (secs) 20 secs   High Respiratory Rate 50 br/min   Low Exhaled Vt  300 mL   Patient Observation   Observations #7.5 ETT @ 22 lip line   Non-Surgical Airway Endo Tracheal Tube   Placement Date/Time: 05/14/19 1808   Timeout: Patient; Appropriate Equipment  Placed By: In ED  Inserted by: Fatmata Rivers NP   Insertion attempts: 1  Airway Device: Endo Tracheal Tube  Size: 7.5  Placement Verified By[de-[email or web address]] Auscultation; Chest X-ray;Colorimetric E...    Secured at 22 cm   Measured From Lips   Secured Location Right   Site Condition Dry

## 2019-05-22 NOTE — PROGRESS NOTES
Pharmacy Vancomycin Consult     Vancomycin Day: 2  Current Dosinmg q8h    Temp max: 100.5    Recent Labs     19  0553   BUN 15 16       Recent Labs     19  0553   CREATININE <0.5* <0.5*       Recent Labs     19  0620 19  0630   WBC 16.3* 16.1*         Intake/Output Summary (Last 24 hours) at 2019 1200  Last data filed at 2019 0945  Gross per 24 hour   Intake 4730.73 ml   Output 3380 ml   Net 1350.73 ml       Culture Date      Source                       Results  Resp: Staph Aur    Ht Readings from Last 1 Encounters:   19 5' (1.524 m)        Wt Readings from Last 1 Encounters:   19 184 lb 14.4 oz (83.9 kg)         Body mass index is 36.11 kg/m². CrCl cannot be calculated (This lab value cannot be used to calculate CrCl because it is not a number: <0.5).     Trough: 14.9mcg/ml    Assessment/Plan:  Continue with same rx  eBlle TOLEDO [id number] PM  .

## 2019-05-22 NOTE — PROGRESS NOTES
05/22/19 1148   Vent Information   Vent Type 840   Vent Mode PS   Vt Ordered 410 mL   Rate Set 0 bmp   Peak Flow 80 L/min   Pressure Support 5 cmH20   FiO2  50 %   Sensitivity 3   PEEP/CPAP 5   I Time/ I Time % 0 s   Humidification Source Heated wire   Humidification Temp 37   Circuit Condensation Drained   Vent Patient Data   High Peep/I Pressure 0   Peak Inspiratory Pressure 10 cmH2O   Mean Airway Pressure 7.4 cmH20   Rate Measured 24 br/min   Vt Exhaled 105 mL   Spontaneous  mL   Minute Volume 6.86 Liters   I:E Ratio 1:5.30   Cough/Sputum   Sputum How Obtained Endotracheal   Cough Productive   Frequency Frequent   Sputum Amount Large   Sputum Color Yellow;Dark red   Tenacity Thick   Spontaneous Breathing Trial (SBT) RT Doc   Pulse 107   SpO2 94 %   RSBI Calculated 74.07   Additional Respiratory  Assessments   Resp 21   Position Semi-Triplett's   Oral Care Mouth suctioned   Alarm Settings   High Pressure Alarm 45 cmH2O   Low Minute Volume Alarm 4 L/min   High Respiratory Rate 50 br/min

## 2019-05-22 NOTE — PROGRESS NOTES
05/22/19 0258   Vent Information   Vent Type 840   Vent Mode AC/VC   Vt Ordered 410 mL   Rate Set 18 bmp   Peak Flow 80 L/min   Pressure Support 0 cmH20   FiO2  50 %   Sensitivity 3   PEEP/CPAP 8   I Time/ I Time % 0 s   Vent Patient Data   High Peep/I Pressure 0   Peak Inspiratory Pressure 26 cmH2O   Mean Airway Pressure 13 cmH20   Rate Measured 24 br/min   Vt Exhaled 568 mL   Minute Volume 10.3 Liters   I:E Ratio 1:3.10   Cough/Sputum   Sputum How Obtained Endotracheal;Suctioned   Cough Productive   Sputum Amount Small   Sputum Color Yellow   Tenacity Thick   Spontaneous Breathing Trial (SBT) RT Doc   Pulse 116   SpO2 94 %   Breath Sounds   Right Upper Lobe Diminished   Right Middle Lobe Diminished   Right Lower Lobe Diminished   Left Upper Lobe Diminished   Left Lower Lobe Diminished   Additional Respiratory  Assessments   Resp 17   Position Semi-Triplett's   Alarm Settings   High Pressure Alarm 45 cmH2O   Low Minute Volume Alarm 4 L/min   Apnea (secs) 20 secs   High Respiratory Rate 50 br/min   Low Exhaled Vt  300 mL   Non-Surgical Airway Endo Tracheal Tube   Placement Date/Time: 05/14/19 1808   Timeout: Patient; Appropriate Equipment  Placed By: In ED  Inserted by: Gabriella JASON   Insertion attempts: 1  Airway Device: Endo Tracheal Tube  Size: 7.5  Placement Verified By[de-[email or web address]] Auscultation; Chest X-ray;Colorimetric E...    Secured at 23 cm   Measured From Lips   Secured Location Left   Secured By Commercial tube forde   Site Condition Dry

## 2019-05-23 ENCOUNTER — APPOINTMENT (OUTPATIENT)
Dept: GENERAL RADIOLOGY | Age: 34
DRG: 710 | End: 2019-05-23
Payer: MEDICAID

## 2019-05-23 PROBLEM — E44.1 MILD PROTEIN-CALORIE MALNUTRITION (HCC): Status: ACTIVE | Noted: 2019-05-23

## 2019-05-23 LAB
ALBUMIN SERPL-MCNC: 3.4 G/DL (ref 3.4–5)
ALP BLD-CCNC: 115 U/L (ref 40–129)
ALT SERPL-CCNC: 31 U/L (ref 10–40)
ANION GAP SERPL CALCULATED.3IONS-SCNC: 13 MMOL/L (ref 3–16)
ANION GAP SERPL CALCULATED.3IONS-SCNC: 14 MMOL/L (ref 3–16)
ANISOCYTOSIS: ABNORMAL
AST SERPL-CCNC: 51 U/L (ref 15–37)
BASE EXCESS ARTERIAL: 7.2 MMOL/L (ref -3–3)
BASOPHILS ABSOLUTE: 0 K/UL (ref 0–0.2)
BASOPHILS RELATIVE PERCENT: 0 %
BILIRUB SERPL-MCNC: 0.5 MG/DL (ref 0–1)
BILIRUBIN DIRECT: <0.2 MG/DL (ref 0–0.3)
BILIRUBIN, INDIRECT: ABNORMAL MG/DL (ref 0–1)
BUN BLDV-MCNC: 16 MG/DL (ref 7–20)
BUN BLDV-MCNC: 19 MG/DL (ref 7–20)
CALCIUM SERPL-MCNC: 8.8 MG/DL (ref 8.3–10.6)
CALCIUM SERPL-MCNC: 9.9 MG/DL (ref 8.3–10.6)
CARBOXYHEMOGLOBIN ARTERIAL: 0.9 % (ref 0–1.5)
CHLORIDE BLD-SCNC: 90 MMOL/L (ref 99–110)
CHLORIDE BLD-SCNC: 94 MMOL/L (ref 99–110)
CO2: 28 MMOL/L (ref 21–32)
CO2: 30 MMOL/L (ref 21–32)
CREAT SERPL-MCNC: <0.5 MG/DL (ref 0.6–1.1)
CREAT SERPL-MCNC: <0.5 MG/DL (ref 0.6–1.1)
EOSINOPHILS ABSOLUTE: 0 K/UL (ref 0–0.6)
EOSINOPHILS RELATIVE PERCENT: 0 %
GFR AFRICAN AMERICAN: >60
GFR AFRICAN AMERICAN: >60
GFR NON-AFRICAN AMERICAN: >60
GFR NON-AFRICAN AMERICAN: >60
GLUCOSE BLD-MCNC: 109 MG/DL (ref 70–99)
GLUCOSE BLD-MCNC: 112 MG/DL (ref 70–99)
GLUCOSE BLD-MCNC: 113 MG/DL (ref 70–99)
GLUCOSE BLD-MCNC: 134 MG/DL (ref 70–99)
GLUCOSE BLD-MCNC: 146 MG/DL (ref 70–99)
GLUCOSE BLD-MCNC: 151 MG/DL (ref 70–99)
HCO3 ARTERIAL: 30.5 MMOL/L (ref 21–29)
HCT VFR BLD CALC: 37.4 % (ref 36–48)
HEMATOLOGY PATH CONSULT: NO
HEMOGLOBIN, ART, EXTENDED: 13 G/DL (ref 12–16)
HEMOGLOBIN: 12.7 G/DL (ref 12–16)
LYMPHOCYTES ABSOLUTE: 2.4 K/UL (ref 1–5.1)
LYMPHOCYTES RELATIVE PERCENT: 15 %
MAGNESIUM: 1.8 MG/DL (ref 1.8–2.4)
MCH RBC QN AUTO: 29.6 PG (ref 26–34)
MCHC RBC AUTO-ENTMCNC: 34 G/DL (ref 31–36)
MCV RBC AUTO: 87 FL (ref 80–100)
METAMYELOCYTES RELATIVE PERCENT: 1 %
METHEMOGLOBIN ARTERIAL: 0.1 %
MONOCYTES ABSOLUTE: 1.3 K/UL (ref 0–1.3)
MONOCYTES RELATIVE PERCENT: 8 %
MYELOCYTE PERCENT: 2 %
NEUTROPHILS ABSOLUTE: 12.2 K/UL (ref 1.7–7.7)
NEUTROPHILS RELATIVE PERCENT: 73 %
O2 CONTENT ARTERIAL: 18 ML/DL
O2 SAT, ARTERIAL: 97.4 %
O2 THERAPY: ABNORMAL
PCO2 ARTERIAL: 38.3 MMHG (ref 35–45)
PDW BLD-RTO: 12.7 % (ref 12.4–15.4)
PERFORMED ON: ABNORMAL
PH ARTERIAL: 7.52 (ref 7.35–7.45)
PHOSPHORUS: 3.7 MG/DL (ref 2.5–4.9)
PLATELET # BLD: 421 K/UL (ref 135–450)
PLATELET SLIDE REVIEW: ABNORMAL
PMV BLD AUTO: 7.7 FL (ref 5–10.5)
PO2 ARTERIAL: 87.2 MMHG (ref 75–108)
POIKILOCYTES: ABNORMAL
POTASSIUM SERPL-SCNC: 2.8 MMOL/L (ref 3.5–5.1)
POTASSIUM SERPL-SCNC: 3.1 MMOL/L (ref 3.5–5.1)
POTASSIUM SERPL-SCNC: 3.1 MMOL/L (ref 3.5–5.1)
PROMYELOCYTES PERCENT: 1 %
RBC # BLD: 4.31 M/UL (ref 4–5.2)
SLIDE REVIEW: ABNORMAL
SODIUM BLD-SCNC: 134 MMOL/L (ref 136–145)
SODIUM BLD-SCNC: 135 MMOL/L (ref 136–145)
TCO2 ARTERIAL: 31.7 MMOL/L
TOTAL CK: 116 U/L (ref 26–192)
TOTAL PROTEIN: 8.6 G/DL (ref 6.4–8.2)
VACUOLATED NEUTROPHILS: PRESENT
WBC # BLD: 15.8 K/UL (ref 4–11)

## 2019-05-23 PROCEDURE — 6370000000 HC RX 637 (ALT 250 FOR IP): Performed by: INTERNAL MEDICINE

## 2019-05-23 PROCEDURE — 80076 HEPATIC FUNCTION PANEL: CPT

## 2019-05-23 PROCEDURE — 2500000003 HC RX 250 WO HCPCS: Performed by: INTERNAL MEDICINE

## 2019-05-23 PROCEDURE — 2580000003 HC RX 258: Performed by: EMERGENCY MEDICINE

## 2019-05-23 PROCEDURE — 2000000000 HC ICU R&B

## 2019-05-23 PROCEDURE — 94761 N-INVAS EAR/PLS OXIMETRY MLT: CPT

## 2019-05-23 PROCEDURE — 6360000002 HC RX W HCPCS: Performed by: INTERNAL MEDICINE

## 2019-05-23 PROCEDURE — 85025 COMPLETE CBC W/AUTO DIFF WBC: CPT

## 2019-05-23 PROCEDURE — 84132 ASSAY OF SERUM POTASSIUM: CPT

## 2019-05-23 PROCEDURE — 84100 ASSAY OF PHOSPHORUS: CPT

## 2019-05-23 PROCEDURE — 2580000003 HC RX 258: Performed by: INTERNAL MEDICINE

## 2019-05-23 PROCEDURE — 82550 ASSAY OF CK (CPK): CPT

## 2019-05-23 PROCEDURE — 99291 CRITICAL CARE FIRST HOUR: CPT | Performed by: INTERNAL MEDICINE

## 2019-05-23 PROCEDURE — 89220 SPUTUM SPECIMEN COLLECTION: CPT

## 2019-05-23 PROCEDURE — 94003 VENT MGMT INPAT SUBQ DAY: CPT

## 2019-05-23 PROCEDURE — 99232 SBSQ HOSP IP/OBS MODERATE 35: CPT | Performed by: INTERNAL MEDICINE

## 2019-05-23 PROCEDURE — 80048 BASIC METABOLIC PNL TOTAL CA: CPT

## 2019-05-23 PROCEDURE — 94640 AIRWAY INHALATION TREATMENT: CPT

## 2019-05-23 PROCEDURE — 36415 COLL VENOUS BLD VENIPUNCTURE: CPT

## 2019-05-23 PROCEDURE — 82803 BLOOD GASES ANY COMBINATION: CPT

## 2019-05-23 PROCEDURE — 94750 HC PULMONARY COMPLIANCE STUDY: CPT

## 2019-05-23 PROCEDURE — 87205 SMEAR GRAM STAIN: CPT

## 2019-05-23 PROCEDURE — 2700000000 HC OXYGEN THERAPY PER DAY

## 2019-05-23 PROCEDURE — 71045 X-RAY EXAM CHEST 1 VIEW: CPT

## 2019-05-23 PROCEDURE — 83735 ASSAY OF MAGNESIUM: CPT

## 2019-05-23 PROCEDURE — 87070 CULTURE OTHR SPECIMN AEROBIC: CPT

## 2019-05-23 RX ORDER — LANOLIN ALCOHOL/MO/W.PET/CERES
3 CREAM (GRAM) TOPICAL NIGHTLY PRN
Status: DISCONTINUED | OUTPATIENT
Start: 2019-05-23 | End: 2019-05-29 | Stop reason: HOSPADM

## 2019-05-23 RX ORDER — MAGNESIUM SULFATE IN WATER 40 MG/ML
2 INJECTION, SOLUTION INTRAVENOUS ONCE
Status: COMPLETED | OUTPATIENT
Start: 2019-05-23 | End: 2019-05-23

## 2019-05-23 RX ORDER — IPRATROPIUM BROMIDE AND ALBUTEROL SULFATE 2.5; .5 MG/3ML; MG/3ML
1 SOLUTION RESPIRATORY (INHALATION) EVERY 4 HOURS
Status: DISCONTINUED | OUTPATIENT
Start: 2019-05-23 | End: 2019-05-24

## 2019-05-23 RX ORDER — TRAZODONE HYDROCHLORIDE 50 MG/1
50 TABLET ORAL NIGHTLY PRN
Status: DISCONTINUED | OUTPATIENT
Start: 2019-05-23 | End: 2019-05-29 | Stop reason: HOSPADM

## 2019-05-23 RX ADMIN — OLANZAPINE 15 MG: 10 TABLET, FILM COATED ORAL at 21:17

## 2019-05-23 RX ADMIN — VANCOMYCIN HYDROCHLORIDE 1000 MG: 1 INJECTION, POWDER, LYOPHILIZED, FOR SOLUTION INTRAVENOUS at 04:46

## 2019-05-23 RX ADMIN — ENOXAPARIN SODIUM 40 MG: 40 INJECTION SUBCUTANEOUS at 08:53

## 2019-05-23 RX ADMIN — VANCOMYCIN HYDROCHLORIDE 1000 MG: 1 INJECTION, POWDER, LYOPHILIZED, FOR SOLUTION INTRAVENOUS at 13:40

## 2019-05-23 RX ADMIN — Medication 4 PUFF: at 03:20

## 2019-05-23 RX ADMIN — OLANZAPINE 15 MG: 10 TABLET, FILM COATED ORAL at 08:52

## 2019-05-23 RX ADMIN — MAGNESIUM SULFATE HEPTAHYDRATE 2 G: 40 INJECTION, SOLUTION INTRAVENOUS at 13:40

## 2019-05-23 RX ADMIN — IPRATROPIUM BROMIDE AND ALBUTEROL SULFATE 1 AMPULE: .5; 3 SOLUTION RESPIRATORY (INHALATION) at 14:58

## 2019-05-23 RX ADMIN — Medication 10 ML: at 03:17

## 2019-05-23 RX ADMIN — Medication 10 ML: at 08:52

## 2019-05-23 RX ADMIN — MIDAZOLAM HYDROCHLORIDE 2 MG: 2 INJECTION, SOLUTION INTRAMUSCULAR; INTRAVENOUS at 02:17

## 2019-05-23 RX ADMIN — FAMOTIDINE 20 MG: 10 INJECTION, SOLUTION INTRAVENOUS at 21:17

## 2019-05-23 RX ADMIN — METOLAZONE 5 MG: 2.5 TABLET ORAL at 08:52

## 2019-05-23 RX ADMIN — FUROSEMIDE 40 MG: 10 INJECTION, SOLUTION INTRAMUSCULAR; INTRAVENOUS at 21:17

## 2019-05-23 RX ADMIN — MIDAZOLAM HYDROCHLORIDE 2 MG: 2 INJECTION, SOLUTION INTRAMUSCULAR; INTRAVENOUS at 04:46

## 2019-05-23 RX ADMIN — FENTANYL CITRATE 25 MCG: 50 INJECTION INTRAMUSCULAR; INTRAVENOUS at 07:16

## 2019-05-23 RX ADMIN — IPRATROPIUM BROMIDE AND ALBUTEROL SULFATE 1 AMPULE: .5; 3 SOLUTION RESPIRATORY (INHALATION) at 19:20

## 2019-05-23 RX ADMIN — POTASSIUM CHLORIDE 20 MEQ: 400 INJECTION, SOLUTION INTRAVENOUS at 00:45

## 2019-05-23 RX ADMIN — DEXMEDETOMIDINE HYDROCHLORIDE 0.6 MCG/KG/HR: 100 INJECTION, SOLUTION INTRAVENOUS at 21:52

## 2019-05-23 RX ADMIN — FAMOTIDINE 20 MG: 10 INJECTION, SOLUTION INTRAVENOUS at 08:52

## 2019-05-23 RX ADMIN — VANCOMYCIN HYDROCHLORIDE 1000 MG: 1 INJECTION, POWDER, LYOPHILIZED, FOR SOLUTION INTRAVENOUS at 21:18

## 2019-05-23 RX ADMIN — POTASSIUM CHLORIDE 20 MEQ: 400 INJECTION, SOLUTION INTRAVENOUS at 17:59

## 2019-05-23 RX ADMIN — IPRATROPIUM BROMIDE AND ALBUTEROL SULFATE 1 AMPULE: .5; 3 SOLUTION RESPIRATORY (INHALATION) at 11:32

## 2019-05-23 RX ADMIN — FENTANYL CITRATE 175 MCG/HR: 50 INJECTION, SOLUTION INTRAMUSCULAR; INTRAVENOUS at 04:29

## 2019-05-23 RX ADMIN — PROPOFOL 50 MCG/KG/MIN: 10 INJECTION, EMULSION INTRAVENOUS at 03:36

## 2019-05-23 RX ADMIN — POTASSIUM CHLORIDE 20 MEQ: 400 INJECTION, SOLUTION INTRAVENOUS at 16:20

## 2019-05-23 RX ADMIN — POTASSIUM CHLORIDE 20 MEQ: 400 INJECTION, SOLUTION INTRAVENOUS at 06:18

## 2019-05-23 RX ADMIN — FUROSEMIDE 40 MG: 10 INJECTION, SOLUTION INTRAMUSCULAR; INTRAVENOUS at 13:40

## 2019-05-23 RX ADMIN — Medication 10 ML: at 21:18

## 2019-05-23 RX ADMIN — ASPIRIN 81 MG 81 MG: 81 TABLET ORAL at 08:52

## 2019-05-23 RX ADMIN — POTASSIUM CHLORIDE 40 MEQ: 10 TABLET, EXTENDED RELEASE ORAL at 16:20

## 2019-05-23 RX ADMIN — FUROSEMIDE 40 MG: 10 INJECTION, SOLUTION INTRAMUSCULAR; INTRAVENOUS at 08:52

## 2019-05-23 RX ADMIN — IPRATROPIUM BROMIDE AND ALBUTEROL SULFATE 1 AMPULE: .5; 3 SOLUTION RESPIRATORY (INHALATION) at 22:41

## 2019-05-23 RX ADMIN — MELATONIN 3 MG ORAL TABLET 3 MG: 3 TABLET ORAL at 21:17

## 2019-05-23 RX ADMIN — Medication 4 PUFF: at 07:19

## 2019-05-23 RX ADMIN — MIDAZOLAM HYDROCHLORIDE 2 MG: 2 INJECTION, SOLUTION INTRAMUSCULAR; INTRAVENOUS at 07:16

## 2019-05-23 RX ADMIN — MEROPENEM 1 G: 1 INJECTION, POWDER, FOR SOLUTION INTRAVENOUS at 02:26

## 2019-05-23 RX ADMIN — GABAPENTIN 200 MG: 100 CAPSULE ORAL at 21:17

## 2019-05-23 RX ADMIN — FENTANYL CITRATE 25 MCG: 50 INJECTION INTRAMUSCULAR; INTRAVENOUS at 02:17

## 2019-05-23 RX ADMIN — GABAPENTIN 200 MG: 100 CAPSULE ORAL at 08:52

## 2019-05-23 RX ADMIN — DEXMEDETOMIDINE HYDROCHLORIDE 0.2 MCG/KG/HR: 100 INJECTION, SOLUTION INTRAVENOUS at 17:59

## 2019-05-23 RX ADMIN — POTASSIUM CHLORIDE 20 MEQ: 400 INJECTION, SOLUTION INTRAVENOUS at 08:34

## 2019-05-23 RX ADMIN — MEROPENEM 1 G: 1 INJECTION, POWDER, FOR SOLUTION INTRAVENOUS at 10:51

## 2019-05-23 RX ADMIN — SODIUM CHLORIDE 1 MCG/KG/HR: 9 INJECTION, SOLUTION INTRAVENOUS at 08:19

## 2019-05-23 RX ADMIN — INSULIN LISPRO 1 UNITS: 100 INJECTION, SOLUTION INTRAVENOUS; SUBCUTANEOUS at 21:19

## 2019-05-23 RX ADMIN — MIDAZOLAM HYDROCHLORIDE 2 MG: 2 INJECTION, SOLUTION INTRAMUSCULAR; INTRAVENOUS at 03:17

## 2019-05-23 RX ADMIN — POTASSIUM CHLORIDE 40 MEQ: 10 TABLET, EXTENDED RELEASE ORAL at 08:52

## 2019-05-23 RX ADMIN — MEROPENEM 1 G: 1 INJECTION, POWDER, FOR SOLUTION INTRAVENOUS at 18:36

## 2019-05-23 RX ADMIN — POTASSIUM CHLORIDE 20 MEQ: 400 INJECTION, SOLUTION INTRAVENOUS at 07:30

## 2019-05-23 RX ADMIN — FENTANYL CITRATE 25 MCG: 50 INJECTION INTRAMUSCULAR; INTRAVENOUS at 04:46

## 2019-05-23 ASSESSMENT — PULMONARY FUNCTION TESTS
PIF_VALUE: 15
PIF_VALUE: 31
PIF_VALUE: 31
PIF_VALUE: 11
PIF_VALUE: 26
PIF_VALUE: 28

## 2019-05-23 ASSESSMENT — PAIN SCALES - GENERAL
PAINLEVEL_OUTOF10: 7
PAINLEVEL_OUTOF10: 6
PAINLEVEL_OUTOF10: 6

## 2019-05-23 NOTE — PROGRESS NOTES
[address] Daily Progress Note      Admit Date:  5/14/2019    Subjective:  Ms. Eloy Lorenzo is seen for follow up. Running low grade fever in last 24 hrs. She is on lasix IV  40 mg TID. On  propofol Fentanyl drips. She is s/p resp arrest in field followed by cardiac arrest as it seems. She was shockedx2 at home by EMS brought to ED in acute resp failure. She has h/o cardiomyopathy probably adriamycin. No cardiac follow up. She has been on vent. Wakes up when sedation lightened. Bronchial secretions is growing staph aureus. She appears in general swollen all over. History of Present Illness:  Melissa Rodriguez is a 26 y.o. patient who presented  as above. She was found down by mother when [place]. Patient was having agonal breathing. Squad arrived did a brief CPR shocked her and transported her to ED. In ED her BP was high. One EKG had sinus tach another afib RVR. She was put on ventilator and was hard to ventilate. She was tachycardiac and was put on amiodarone drip. She continued to remain tachycardiac. She was given IV metoprolol and her BP started to drop. She was placed on levophed and she remains on that. Currently she is intubated sedated with propofol. She has low K and Low Mg at admission. I have been asked to provide consultation regarding further management and testing.         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      LEG SURGERY         Objective:   /69   Pulse 102   Temp 99.6 °F (37.6 °C) (Axillary)   Resp 18   Ht 5' (1.524 m)   Wt 180 lb 8 oz (81.9 kg)   SpO2 95%   BMI 35.25 kg/m²       Intake/Output Summary (Last 24 hours) at 5/23/2019 0716  Last data filed at 5/23/2019 0600  Gross per 24 hour   Intake 3824 ml   Output 3950 ml   Net -126 ml       TELEMETRY:sinus tachy    Physical Exam:  General: No Respiratory distress, appears well developed and well nourished. Eyes:  Sclera nonicteric  Nose/Sinuses:  negative findings: nose shows no deformity, asymmetry, or inflammation, nasal mucosa normal, septum midline with no perforation or bleeding  Back:  no pain to palpation  Joint:  no active joint inflammation  Musculoskeletal:  negative  Skin:  Warm and dry  Neck:  Negative for JVD and Carotid Bruits. Chest:  Clear to auscultation, respiration easy  Cardiovascular:  RRR, S1S2 diminished, no murmur, no rub or thrill. 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 Pulses: pedal pulses are normal in rt foot neg in left foot.   Neuro: neg babinski    Medications:    potassium chloride  40 mEq Oral BID WC    metolazone  5 mg Oral Daily    furosemide  40 mg Intravenous TID    meropenem (MERREM) 1 g IVPB (mini-bag)  1 g Intravenous Q8H    vancomycin  1,000 mg Intravenous Q8H    OLANZapine  15 mg Oral BID    gabapentin  200 mg Oral TID    enoxaparin  40 mg Subcutaneous Daily    aspirin  81 mg Oral Daily    famotidine (PEPCID) injection  20 mg Intravenous BID    albuterol sulfate HFA  4 puff Inhalation Q4H    ipratropium  4 puff Inhalation Q4H    insulin lispro  0-6 Units Subcutaneous Q4H    sodium chloride flush  10 mL Intravenous 2 times per day      dexmedetomidine (PRECEDEX) IV infusion Stopped (05/22/19 1234)    norepinephrine Stopped (05/21/19 1402)    propofol 50 mcg/kg/min (05/23/19 4226)    dextrose      fentaNYL (SUBLIMAZE) infusion 175 mcg/hr (05/23/19 9399)     sennosides-docusate sodium, magnesium sulfate, potassium chloride, midazolam, fentanNYL, glucose, dextrose, glucagon (rDNA), dextrose, ibuprofen, sodium chloride flush    Lab Data:  CBC:   Recent Labs     05/21/19 0620 05/22/19  0630 05/23/19  0500   WBC 16.3* 16.1* 15.8*   HGB 12.4 12.4 12.7   HCT 36.7 37.0 37.4   MCV 88.7 86.6 87.0    400 421     BMP:   Recent Labs     05/21/19 0620 05/21/19 2000 05/22/19  0553 05/22/19  2030 05/23/19  0500   * 133*  --  132* 134*  --    K 3.1* 3.1*   < > 2.9* 3.0* 2.8*   CL 97* 91*  --  93* 90*  --    CO2 26 30  --  28 30  --    PHOS 4.9  --   --  2.8  --  3.7   BUN 17 15  --  16 22*  --    CREATININE <0.5* <0.5*  --  <0.5* <0.5*  --     < > = values in this interval not displayed. LIVER PROFILE:   Recent Labs     05/21/19 0620 05/22/19  0553   AST 31 37   ALT 25 23   BILIDIR 0.3 <0.2   BILITOT 0.6 0.4   ALKPHOS 118 104     PT/INR:   No results for input(s): PROTIME, INR in the last 72 hours. APTT:   No results for input(s): APTT in the last 72 hours. BNP:  No results for input(s): BNP in the last 72 hours.   IMAGING:   Cxray 5.20.19  RLL infiltrate cardiomegaly  Chest xray 5.17.19 bilateral diffuse infiltrates probable ARDS per radiology report   Chest xray 5.16.19 acute pulmonary edema and LLL infiltrate  Echo doppler of heart 5.15.19   Summary   Definity contrast administered.   Left ventricular systolic function is reduced with ejection fraction   estimated at 25-30 %.   Elevated left ventricular diastolic filling pressure: Septal E/e'' = 12.6   (for sinus tachycardia) .   Right ventricular systolic function is moderately reduced .   Mild mitral regurgitation.   Unable to obtain SPAP due to lack of tricuspid

## 2019-05-23 NOTE — PROGRESS NOTES
AM assessment completed. See flowsheet. Sedated on vent. Agitated prior to assessment. Lungs sounds rhonchi throughout. ST on bedside monitor. Bowel sounds active. Trace BUE edema noted. Urinary catheter in place draining clear yellow urine. Bilateral soft wrist restraints in place for safety. Labs reviewed. Cont to monitor.

## 2019-05-23 NOTE — PROGRESS NOTES
Nutrition Assessment    Type and Reason for Visit: Reassess    Nutrition Recommendations:   1. Discontinued EN regimen since patient was extubated this am after rounds. 2. Monitor results of swallow evaluation (if it can be completed today), nutrition progression, and appetite/desire to consume po intake. 3. Monitor respiratory function, mental status, and plan of care. 4. Monitor for additional in-patient weight changes - she has lost some weight during this admission. 5. Monitor nutrition-related labs and bowel function/regimen (no BM documented x 9 days admission - Senokot started on 5/22). Nutrition Assessment: patient's nutritional status remains unchanged at this time except for she was intubated this am and may be appropriate for swallow evaluation later this afternoon and she remains at risk for further compromise d/t weight loss during admission, long intubation period, and she is no longer receiving EN; will continue NPO status and monitor whether patient's diet is advanced this afternoon    Malnutrition Assessment:  · Malnutrition Status: Mild Malnutrition  · Context: Acute illness or injury  · Findings of the 6 clinical characteristics of malnutrition (Minimum of 2 out of 6 clinical characteristics is required to make the diagnosis of moderate or severe Protein Calorie Malnutrition based on AND/ASPEN Guidelines):  1. Energy Intake-Less than or equal to 75% of estimated energy requirement, Greater than or equal to 5 days    2. Weight Loss-5% loss or greater(- 13# or 6.7% weight loss ), in 1 week(x 9 days admission)  3. Fat Loss-No significant subcutaneous fat loss,    4. Muscle Loss-No significant muscle mass loss,    5. Fluid Accumulation-No significant fluid accumulation,    6.   Strength-Not measured    Nutrition Risk Level: High    Nutrient Needs:  · Estimated Daily Total Kcal: 968 - 1232 kcals based on 11-14 kcals/kg/CBW  · Estimated Daily Protein (g): 90 - 99 g protein based on 2.0-2.2 g/kg/IBW  · Estimated Daily Total Fluid (ml/day): 1000 - 1200 ml     Nutrition Diagnosis:   · Problem: Inadequate oral intake  · Etiology: related to Impaired respiratory function-inability to consume food, Insufficient energy/nutrient consumption, Psychological cause/life stress, Nutrition support - EN     Signs and symptoms:  as evidenced by NPO status due to medical condition, Weight loss greater than or equal to 2% in 1 week, Lab values    Objective Information:  · Nutrition-Focused Physical Findings: patient was awake this am and calmer on the vent than previous admission days; she had glasses on this am; patient's mother and grand-mother at bedside during rounds; patient was extubated after rounds today and is on 8L high flow nasal cannula at this time; patient may be able to have a swallow evaluation later today, depending on her respiratory status; no BM x 9 days, per flow sheets - Senokot was started yesterday  · Wound Type: None  · Current Nutrition Therapies:  · Oral Diet Orders: NPO   · Oral Diet intake: NPO  · Oral Nutrition Supplement (ONS) Orders: None  · ONS intake: NPO  · Anthropometric Measures:  · Ht: 5' (152.4 cm)   · Current Body Wt: 180 lb 8 oz (81.9 kg)  · Admission Body Wt: 193 lb 2 oz (87.6 kg)  · Usual Body Wt: (unknown)  · Weight Change:  - 13# or 6.7% weight loss (using admission weight and CBW)  · Ideal Body Wt: 100 lb (45.4 kg), % Ideal Body 180%  · BMI Classification: BMI 35.0 - 39.9 Obese Class II    Nutrition Interventions:   Continue NPO, Discontinue Tube Feeding  Continued Inpatient Monitoring, Coordination of Care, Coordination of Community Care, Swallow Evaluation, Speech Therapy    Nutrition Evaluation:   · Evaluation: Goals set   · Goals: patient will adhere to NPO status until medically cleared/evaluated by SLP and appropriate po consistencies are recommended; weight will remain stable during remainder of admission    · Monitoring: Nutrition Progression, Skin Integrity, Mental Status/Confusion, Weight, Pertinent Labs, Chewing/Swallowing, Monitor Bowel Function, Constipation      Electronically signed by Talisha Martinez RD, GRACIELA on 5/23/19 at 12:35 PM    Contact Number: [phone]

## 2019-05-23 NOTE — PROGRESS NOTES
Hand off report given to Alfredo Telles RN. Pt is in stable condition at this time. Care transferred.  Electronically signed by Graciela Elizondo RN on 5/23/2019 at 7:20 AM

## 2019-05-23 NOTE — PROGRESS NOTES
Reassessment complete. Pt has required multiple PRN medication throughout the night r/t copious amounts of secretions, restlessness, and agitation.

## 2019-05-23 NOTE — PLAN OF CARE
Nutrition Problem: Inadequate oral intake  Intervention: Food and/or Nutrient Delivery: Continue NPO, Discontinue Tube Feeding  Nutritional Goals: patient will adhere to NPO status until medically cleared/evaluated by SLP and appropriate po consistencies are recommended; weight will remain stable during remainder of admission

## 2019-05-23 NOTE — PROGRESS NOTES
Pulmonary & Critical Care Medicine ICU Progress Note    CC: Acute respiratory failure, V. fib arrest, worsening dilated cardiomyopathy    Events of Last 24 hours:   Copious secretions  Failed SBT with increased respiratory rate, increased heart rate, low tidal volumes  Fever  Propofol/fentanyl    Invasive Lines:  Left IJ CVC 19    MV: 19  Vent Mode: AC/VC Rate Set: 18 bmp/Vt Ordered: 410 mL/ /FiO2 : 50 %  Recent Labs     19  0600 19  0500   PHART 7.499* 7.519*   [id number] 39.1 38.3   PO2ART 94.5 87.2       IV:   dexmedetomidine (PRECEDEX) IV infusion Stopped (19 1234)    norepinephrine Stopped (19 1402)    propofol 50 mcg/kg/min (19 0336)    dextrose      fentaNYL (SUBLIMAZE) infusion 175 mcg/hr (19 0429)       Vitals:  Blood pressure 103/69, pulse 102, temperature 99.6 °F (37.6 °C), temperature source Axillary, resp. rate 18, height 5' (1.524 m), weight 180 lb 8 oz (81.9 kg), SpO2 95 %. on 50%  Temp  Av.1 °F (37.8 °C)  Min: 98.9 °F (37.2 °C)  Max: 101.5 °F (38.6 °C)    Intake/Output Summary (Last 24 hours) at 2019 8411  Last data filed at 2019 0600  Gross per 24 hour   Intake 3824 ml   Output 3950 ml   Net -126 ml     EXAM:  General: intubated, ill appearing    Eyes: PERRL. No sclera icterus. No conjunctival injection. ENT: No discharge. Pharynx with ETT. Neck: Trachea midline. Normal thyroid. Resp: No accessory muscle use. No crackles. No wheezing. No rhonchi. No dullness on percussion. CV: Regular rate. Regular rhythm. No mumur or rub. No edema. Peripheral pulses are 2+. Capillary refill is less than 3 seconds. GI: Non-tender. Non-distended. No masses. No organomegaly. Normal bowel sounds. No hernia. Skin: Warm and dry. No nodule on exposed extremities. No rash on exposed extremities. Lymph: No cervical LAD. No supraclavicular LAD. M/S: No cyanosis. No joint deformity. No clubbing. Neuro: Awake and following commands.  Patellar reflexes are symmetric  Psych: Unable to obtain because the patient is non-communicative      Scheduled Meds:   potassium chloride  40 mEq Oral BID WC    metolazone  5 mg Oral Daily    furosemide  40 mg Intravenous TID    meropenem (MERREM) 1 g IVPB (mini-bag)  1 g Intravenous Q8H    vancomycin  1,000 mg Intravenous Q8H    OLANZapine  15 mg Oral BID    gabapentin  200 mg Oral TID    enoxaparin  40 mg Subcutaneous Daily    aspirin  81 mg Oral Daily    famotidine (PEPCID) injection  20 mg Intravenous BID    albuterol sulfate HFA  4 puff Inhalation Q4H    ipratropium  4 puff Inhalation Q4H    insulin lispro  0-6 Units Subcutaneous Q4H    sodium chloride flush  10 mL Intravenous 2 times per day     PRN Meds:  sennosides-docusate sodium, magnesium sulfate, potassium chloride, midazolam, fentanNYL, glucose, dextrose, glucagon (rDNA), dextrose, ibuprofen, sodium chloride flush    Results:  CBC:   Recent Labs     05/21/19  0620 05/22/19  0630 05/23/19  0500   WBC 16.3* 16.1* 15.8*   HGB 12.4 12.4 12.7   HCT 36.7 37.0 37.4   MCV 88.7 86.6 87.0    400 421     BMP:   Recent Labs     05/21/19  0620 05/21/19 2000 05/22/19  0553 05/22/19  2030 05/23/19  0500   * 133*  --  132* 134*  --    K 3.1* 3.1*   < > 2.9* 3.0* 2.8*   CL 97* 91*  --  93* 90*  --    CO2 26 30  --  28 30  --    PHOS 4.9  --   --  2.8  --  3.7   BUN 17 15  --  16 22*  --    CREATININE <0.5* <0.5*  --  <0.5* <0.5*  --     < > = values in this interval not displayed.      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 The ventilator was adjusted by me at the bedside for unstable, life threatening respiratory failure. IV Propofol for sedation, target RASS -2, with daily spontaneous awakening trial.  Fentanyl PRN pain, gtt as needed  Head of bed 30 degrees or higher at all times  · Daily spontaneous breathing trial once PEEP less than 8, FiO2 less than 55%. · Merrem and IV vancomycin day #3, received 6 days Zosyn and doxycycline  · Conservative fluid strategy, goal is to keep even to slightly negative (1 L)  · We discussed possible trial of extubation and possible need for reintubation with mother and other family was at the bedside. They understand the rationale, risks and benefits  · Prophylaxis: Lovenox and Pepcid  · Speech therapy to see later today     Total critical care time caring for this patient with life threatening, unstable organ failure, including direct patient contact, management of life support systems, review of data including imaging and labs, discussions with other team members and physicians is 33 minutes so far today, excluding procedures.

## 2019-05-23 NOTE — PROGRESS NOTES
Evening assessment completed. See flowsheet. A/O x 2 at this time. Mother bedside. Lungs sounds rhonchi, diminished in bases. ST on bedside monitor. Bowel sounds active. Trace BUE edema noted. Kayla Car in place draining clear yellow urine. Labs reviewed.  Cont to monitor.

## 2019-05-23 NOTE — PROGRESS NOTES
05/22/19 2320   Vent Information   Vent Type 840   Vent Mode AC/VC   Vt Ordered 410 mL   Rate Set 18 bmp   Peak Flow 80 L/min   Pressure Support 0 cmH20   FiO2  50 %   Sensitivity 3   PEEP/CPAP 5   I Time/ I Time % 0 s   Humidification Source Heated wire   Humidification Temp 37   Vent Patient Data   High Peep/I Pressure 0   Peak Inspiratory Pressure 28 cmH2O   Mean Airway Pressure 9.6 cmH20   Rate Measured 18 br/min   Vt Exhaled 443 mL   Minute Volume 7.69 Liters   I:E Ratio 1:5.0   Plateau Pressure 22 IMQ14   Static Compliance 26 mL/cmH2O   Dynamic Compliance 17 mL/cmH2O   Cough/Sputum   Sputum How Obtained Endotracheal   Sputum Amount None   Spontaneous Breathing Trial (SBT) RT Doc   Pulse 95  (Simultaneous filing. User may not have seen previous data.)   SpO2 95 %   Breath Sounds   Right Upper Lobe Rhonchi   Right Middle Lobe Rhonchi   Right Lower Lobe Rhonchi   Left Upper Lobe Rhonchi   Left Lower Lobe Rhonchi   Additional Respiratory  Assessments   Resp 24   Position Semi-Triplett's   Alarm Settings   High Pressure Alarm 45 cmH2O   Low Minute Volume Alarm 4 L/min   High Respiratory Rate 50 br/min   Non-Surgical Airway Endo Tracheal Tube   Placement Date/Time: 05/14/19 5640   Timeout: Patient; Appropriate Equipment  Placed By: In ED  Inserted by: Maite Gross NP   Insertion attempts: 1  Airway Device: Endo Tracheal Tube  Size: 7.5  Placement Verified By[[email or web address]] Auscultation; Chest X-ray;Colorimetric E...    Secured at 22 cm   Measured From Lips   Secured Location Right   Secured By Commercial tube forde   Site Condition Dry

## 2019-05-23 NOTE — PROGRESS NOTES
05/23/19 0320   Vent Information   Vent Type 840   Vent Mode AC/VC   Vt Ordered 410 mL   Rate Set 18 bmp   Peak Flow 80 L/min   Pressure Support 0 cmH20   FiO2  50 %   Sensitivity 3   PEEP/CPAP 5   I Time/ I Time % 0 s   Humidification Source Heated wire   Humidification Temp 37   Circuit Condensation Drained   Vent Patient Data   Peak Inspiratory Pressure 26 cmH2O   Mean Airway Pressure 10 cmH20   Rate Measured 22 br/min   Vt Exhaled 369 mL   Minute Volume 11.3 Liters   I:E Ratio 1:5.0   Cough/Sputum   Sputum How Obtained Endotracheal;Suctioned   Sputum Amount Small   Sputum Color Yellow   Tenacity Thick   Spontaneous Breathing Trial (SBT) RT Doc   Pulse 114   SpO2 97 %   Breath Sounds   Right Upper Lobe Rhonchi   Right Middle Lobe Rhonchi   Right Lower Lobe Rhonchi   Left Upper Lobe Rhonchi   Left Lower Lobe Rhonchi   Additional Respiratory  Assessments   Resp 19   Position Semi-Triplett's   Alarm Settings   High Pressure Alarm 45 cmH2O   Low Minute Volume Alarm 4 L/min   High Respiratory Rate 50 br/min   Non-Surgical Airway Endo Tracheal Tube   Placement Date/Time: 05/14/19 1808   Timeout: Patient; Appropriate Equipment  Placed By: In ED  Inserted by: Lucia Gregg NP   Insertion attempts: 1  Airway Device: Endo Tracheal Tube  Size: 7.5  Placement Verified By[[email or web address]-identified] Auscultation; Chest X-ray;Colorimetric E...    Secured at 22 cm   Measured From [address] By Commercial tube forde   Site Condition Dry

## 2019-05-24 ENCOUNTER — APPOINTMENT (OUTPATIENT)
Dept: GENERAL RADIOLOGY | Age: 34
DRG: 710 | End: 2019-05-24
Payer: MEDICAID

## 2019-05-24 LAB
ANION GAP SERPL CALCULATED.3IONS-SCNC: 14 MMOL/L (ref 3–16)
BANDED NEUTROPHILS RELATIVE PERCENT: 1 % (ref 0–7)
BASOPHILS ABSOLUTE: 0 K/UL (ref 0–0.2)
BASOPHILS RELATIVE PERCENT: 0 %
BUN BLDV-MCNC: 21 MG/DL (ref 7–20)
CALCIUM SERPL-MCNC: 10 MG/DL (ref 8.3–10.6)
CHLORIDE BLD-SCNC: 92 MMOL/L (ref 99–110)
CO2: 29 MMOL/L (ref 21–32)
CREAT SERPL-MCNC: <0.5 MG/DL (ref 0.6–1.1)
EOSINOPHILS ABSOLUTE: 0 K/UL (ref 0–0.6)
EOSINOPHILS RELATIVE PERCENT: 0 %
GFR AFRICAN AMERICAN: >60
GFR NON-AFRICAN AMERICAN: >60
GLUCOSE BLD-MCNC: 123 MG/DL (ref 70–99)
GLUCOSE BLD-MCNC: 140 MG/DL (ref 70–99)
GLUCOSE BLD-MCNC: 140 MG/DL (ref 70–99)
GLUCOSE BLD-MCNC: 150 MG/DL (ref 70–99)
HCT VFR BLD CALC: 40.1 % (ref 36–48)
HEMOGLOBIN: 13.7 G/DL (ref 12–16)
INR BLD: 1.14 (ref 0.86–1.14)
LYMPHOCYTES ABSOLUTE: 3.1 K/UL (ref 1–5.1)
LYMPHOCYTES RELATIVE PERCENT: 13 %
MAGNESIUM: 1.6 MG/DL (ref 1.8–2.4)
MCH RBC QN AUTO: 29.9 PG (ref 26–34)
MCHC RBC AUTO-ENTMCNC: 34.2 G/DL (ref 31–36)
MCV RBC AUTO: 87.6 FL (ref 80–100)
MONOCYTES ABSOLUTE: 1.2 K/UL (ref 0–1.3)
MONOCYTES RELATIVE PERCENT: 5 %
NEUTROPHILS ABSOLUTE: 19.7 K/UL (ref 1.7–7.7)
NEUTROPHILS RELATIVE PERCENT: 81 %
PDW BLD-RTO: 13.1 % (ref 12.4–15.4)
PERFORMED ON: ABNORMAL
PHOSPHORUS: 3.6 MG/DL (ref 2.5–4.9)
PLATELET # BLD: 503 K/UL (ref 135–450)
PLATELET SLIDE REVIEW: ABNORMAL
PMV BLD AUTO: 8 FL (ref 5–10.5)
POTASSIUM SERPL-SCNC: 2.9 MMOL/L (ref 3.5–5.1)
PROTHROMBIN TIME: 13 SEC (ref 9.8–13)
RBC # BLD: 4.58 M/UL (ref 4–5.2)
SLIDE REVIEW: ABNORMAL
SODIUM BLD-SCNC: 135 MMOL/L (ref 136–145)
WBC # BLD: 24 K/UL (ref 4–11)

## 2019-05-24 PROCEDURE — 02HV33Z INSERTION OF INFUSION DEVICE INTO SUPERIOR VENA CAVA, PERCUTANEOUS APPROACH: ICD-10-PCS | Performed by: INTERNAL MEDICINE

## 2019-05-24 PROCEDURE — 36573 INSJ PICC RS&I 5 YR+: CPT

## 2019-05-24 PROCEDURE — 2700000000 HC OXYGEN THERAPY PER DAY

## 2019-05-24 PROCEDURE — 6360000002 HC RX W HCPCS: Performed by: INTERNAL MEDICINE

## 2019-05-24 PROCEDURE — 2580000003 HC RX 258: Performed by: INTERNAL MEDICINE

## 2019-05-24 PROCEDURE — 99232 SBSQ HOSP IP/OBS MODERATE 35: CPT | Performed by: INTERNAL MEDICINE

## 2019-05-24 PROCEDURE — 2500000003 HC RX 250 WO HCPCS: Performed by: INTERNAL MEDICINE

## 2019-05-24 PROCEDURE — 99233 SBSQ HOSP IP/OBS HIGH 50: CPT | Performed by: INTERNAL MEDICINE

## 2019-05-24 PROCEDURE — 6370000000 HC RX 637 (ALT 250 FOR IP): Performed by: INTERNAL MEDICINE

## 2019-05-24 PROCEDURE — C1751 CATH, INF, PER/CENT/MIDLINE: HCPCS

## 2019-05-24 PROCEDURE — 2580000003 HC RX 258: Performed by: EMERGENCY MEDICINE

## 2019-05-24 PROCEDURE — 85025 COMPLETE CBC W/AUTO DIFF WBC: CPT

## 2019-05-24 PROCEDURE — 2000000000 HC ICU R&B

## 2019-05-24 PROCEDURE — 80048 BASIC METABOLIC PNL TOTAL CA: CPT

## 2019-05-24 PROCEDURE — 84100 ASSAY OF PHOSPHORUS: CPT

## 2019-05-24 PROCEDURE — 71045 X-RAY EXAM CHEST 1 VIEW: CPT

## 2019-05-24 PROCEDURE — 92610 EVALUATE SWALLOWING FUNCTION: CPT

## 2019-05-24 PROCEDURE — 83735 ASSAY OF MAGNESIUM: CPT

## 2019-05-24 PROCEDURE — 94761 N-INVAS EAR/PLS OXIMETRY MLT: CPT

## 2019-05-24 PROCEDURE — 94640 AIRWAY INHALATION TREATMENT: CPT

## 2019-05-24 PROCEDURE — 85610 PROTHROMBIN TIME: CPT

## 2019-05-24 PROCEDURE — 92526 ORAL FUNCTION THERAPY: CPT

## 2019-05-24 RX ORDER — GABAPENTIN 400 MG/1
400 CAPSULE ORAL 3 TIMES DAILY
Status: DISCONTINUED | OUTPATIENT
Start: 2019-05-24 | End: 2019-05-29 | Stop reason: HOSPADM

## 2019-05-24 RX ORDER — SODIUM CHLORIDE 0.9 % (FLUSH) 0.9 %
10 SYRINGE (ML) INJECTION PRN
Status: DISCONTINUED | OUTPATIENT
Start: 2019-05-24 | End: 2019-05-26 | Stop reason: SDUPTHER

## 2019-05-24 RX ORDER — OLANZAPINE 10 MG/1
10 INJECTION, POWDER, LYOPHILIZED, FOR SOLUTION INTRAMUSCULAR EVERY 4 HOURS PRN
Status: DISCONTINUED | OUTPATIENT
Start: 2019-05-24 | End: 2019-05-29 | Stop reason: HOSPADM

## 2019-05-24 RX ORDER — IPRATROPIUM BROMIDE AND ALBUTEROL SULFATE 2.5; .5 MG/3ML; MG/3ML
1 SOLUTION RESPIRATORY (INHALATION) 3 TIMES DAILY
Status: DISCONTINUED | OUTPATIENT
Start: 2019-05-24 | End: 2019-05-29 | Stop reason: HOSPADM

## 2019-05-24 RX ORDER — MAGNESIUM SULFATE IN WATER 40 MG/ML
2 INJECTION, SOLUTION INTRAVENOUS ONCE
Status: COMPLETED | OUTPATIENT
Start: 2019-05-24 | End: 2019-05-24

## 2019-05-24 RX ORDER — SODIUM CHLORIDE 0.9 % (FLUSH) 0.9 %
10 SYRINGE (ML) INJECTION EVERY 12 HOURS SCHEDULED
Status: DISCONTINUED | OUTPATIENT
Start: 2019-05-24 | End: 2019-05-26 | Stop reason: SDUPTHER

## 2019-05-24 RX ORDER — LIDOCAINE HYDROCHLORIDE 10 MG/ML
5 INJECTION, SOLUTION EPIDURAL; INFILTRATION; INTRACAUDAL; PERINEURAL ONCE
Status: DISCONTINUED | OUTPATIENT
Start: 2019-05-24 | End: 2019-05-26 | Stop reason: SDUPTHER

## 2019-05-24 RX ADMIN — VANCOMYCIN HYDROCHLORIDE 1000 MG: 1 INJECTION, POWDER, LYOPHILIZED, FOR SOLUTION INTRAVENOUS at 12:07

## 2019-05-24 RX ADMIN — Medication 10 ML: at 10:44

## 2019-05-24 RX ADMIN — MAGNESIUM SULFATE HEPTAHYDRATE 2 G: 40 INJECTION, SOLUTION INTRAVENOUS at 11:59

## 2019-05-24 RX ADMIN — GABAPENTIN 200 MG: 100 CAPSULE ORAL at 08:11

## 2019-05-24 RX ADMIN — DEXMEDETOMIDINE HYDROCHLORIDE 1.6 MCG/KG/HR: 100 INJECTION, SOLUTION INTRAVENOUS at 12:14

## 2019-05-24 RX ADMIN — IPRATROPIUM BROMIDE AND ALBUTEROL SULFATE 1 AMPULE: .5; 3 SOLUTION RESPIRATORY (INHALATION) at 11:21

## 2019-05-24 RX ADMIN — GABAPENTIN 400 MG: 400 CAPSULE ORAL at 20:35

## 2019-05-24 RX ADMIN — OLANZAPINE 15 MG: 10 TABLET, FILM COATED ORAL at 20:35

## 2019-05-24 RX ADMIN — OLANZAPINE 10 MG: 10 INJECTION, POWDER, FOR SOLUTION INTRAMUSCULAR at 10:36

## 2019-05-24 RX ADMIN — FUROSEMIDE 40 MG: 10 INJECTION, SOLUTION INTRAMUSCULAR; INTRAVENOUS at 14:32

## 2019-05-24 RX ADMIN — POTASSIUM CHLORIDE 20 MEQ: 400 INJECTION, SOLUTION INTRAVENOUS at 16:16

## 2019-05-24 RX ADMIN — FAMOTIDINE 20 MG: 10 INJECTION, SOLUTION INTRAVENOUS at 20:35

## 2019-05-24 RX ADMIN — POTASSIUM BICARBONATE 60 MEQ: 782 TABLET, EFFERVESCENT ORAL at 11:59

## 2019-05-24 RX ADMIN — FUROSEMIDE 40 MG: 10 INJECTION, SOLUTION INTRAMUSCULAR; INTRAVENOUS at 20:35

## 2019-05-24 RX ADMIN — DEXMEDETOMIDINE HYDROCHLORIDE 2 MCG/KG/HR: 100 INJECTION, SOLUTION INTRAVENOUS at 16:58

## 2019-05-24 RX ADMIN — Medication 10 ML: at 21:00

## 2019-05-24 RX ADMIN — METOLAZONE 5 MG: 2.5 TABLET ORAL at 08:11

## 2019-05-24 RX ADMIN — DEXMEDETOMIDINE HYDROCHLORIDE 1.2 MCG/KG/HR: 100 INJECTION, SOLUTION INTRAVENOUS at 07:44

## 2019-05-24 RX ADMIN — IPRATROPIUM BROMIDE AND ALBUTEROL SULFATE 1 AMPULE: .5; 3 SOLUTION RESPIRATORY (INHALATION) at 07:08

## 2019-05-24 RX ADMIN — ASPIRIN 81 MG 81 MG: 81 TABLET ORAL at 08:11

## 2019-05-24 RX ADMIN — MEROPENEM 1 G: 1 INJECTION, POWDER, FOR SOLUTION INTRAVENOUS at 10:43

## 2019-05-24 RX ADMIN — IPRATROPIUM BROMIDE AND ALBUTEROL SULFATE 1 AMPULE: .5; 3 SOLUTION RESPIRATORY (INHALATION) at 19:24

## 2019-05-24 RX ADMIN — Medication 10 ML: at 20:36

## 2019-05-24 RX ADMIN — OLANZAPINE 10 MG: 10 INJECTION, POWDER, FOR SOLUTION INTRAMUSCULAR at 21:45

## 2019-05-24 RX ADMIN — Medication 10 ML: at 08:13

## 2019-05-24 RX ADMIN — POTASSIUM CHLORIDE 20 MEQ: 400 INJECTION, SOLUTION INTRAVENOUS at 00:47

## 2019-05-24 RX ADMIN — GABAPENTIN 400 MG: 400 CAPSULE ORAL at 14:33

## 2019-05-24 RX ADMIN — Medication 10 ML: at 08:12

## 2019-05-24 RX ADMIN — DEXMEDETOMIDINE HYDROCHLORIDE 2 MCG/KG/HR: 100 INJECTION, SOLUTION INTRAVENOUS at 14:58

## 2019-05-24 RX ADMIN — ENOXAPARIN SODIUM 40 MG: 40 INJECTION SUBCUTANEOUS at 08:12

## 2019-05-24 RX ADMIN — MEROPENEM 1 G: 1 INJECTION, POWDER, FOR SOLUTION INTRAVENOUS at 02:35

## 2019-05-24 RX ADMIN — POTASSIUM CHLORIDE 20 MEQ: 400 INJECTION, SOLUTION INTRAVENOUS at 09:27

## 2019-05-24 RX ADMIN — TRAZODONE HYDROCHLORIDE 50 MG: 50 TABLET ORAL at 20:35

## 2019-05-24 RX ADMIN — DEXMEDETOMIDINE HYDROCHLORIDE 1 MCG/KG/HR: 100 INJECTION, SOLUTION INTRAVENOUS at 03:18

## 2019-05-24 RX ADMIN — FUROSEMIDE 40 MG: 10 INJECTION, SOLUTION INTRAMUSCULAR; INTRAVENOUS at 08:12

## 2019-05-24 RX ADMIN — FAMOTIDINE 20 MG: 10 INJECTION, SOLUTION INTRAVENOUS at 08:12

## 2019-05-24 RX ADMIN — MEROPENEM 1 G: 1 INJECTION, POWDER, FOR SOLUTION INTRAVENOUS at 17:05

## 2019-05-24 RX ADMIN — VANCOMYCIN HYDROCHLORIDE 1000 MG: 1 INJECTION, POWDER, LYOPHILIZED, FOR SOLUTION INTRAVENOUS at 05:58

## 2019-05-24 RX ADMIN — DEXMEDETOMIDINE HYDROCHLORIDE 2 MCG/KG/HR: 100 INJECTION, SOLUTION INTRAVENOUS at 19:15

## 2019-05-24 RX ADMIN — VANCOMYCIN HYDROCHLORIDE 1000 MG: 1 INJECTION, POWDER, LYOPHILIZED, FOR SOLUTION INTRAVENOUS at 20:36

## 2019-05-24 RX ADMIN — INSULIN LISPRO 1 UNITS: 100 INJECTION, SOLUTION INTRAVENOUS; SUBCUTANEOUS at 20:37

## 2019-05-24 RX ADMIN — OLANZAPINE 15 MG: 10 TABLET, FILM COATED ORAL at 08:11

## 2019-05-24 RX ADMIN — POTASSIUM CHLORIDE 20 MEQ: 400 INJECTION, SOLUTION INTRAVENOUS at 08:12

## 2019-05-24 ASSESSMENT — PAIN SCALES - GENERAL: PAINLEVEL_OUTOF10: 0

## 2019-05-24 NOTE — PROGRESS NOTES
Speech Language Pathology  SPEECH BRIEF NOTE: Suggest Dysphagia Level I and Nectar thick liquid as tolerated, no straw. She is not a candidate for instrumental swallow assessment secondary to her behavior. She should have 1:1 supervision and assist with meals, feeding only when awake and alert. Hold oral feeding if there is worsening respiratory status and/or if signs of aspiration develop. Meds crushed with puree. Case discussed with patient, her mother, and attending nurse. HR, BP, 02 sat WNL on nasal cannula. Time 7528-4789. Full report to follow. Zakia Arceo CCC-SLP. D BCS-S  5-24-19

## 2019-05-24 NOTE — PROGRESS NOTES
Speech Language Pathology  Facility/Department: [facility]   CLINICAL BEDSIDE SWALLOW EVALUATION    NAME: Giorgio Boyle  : 1985  MRN: [id number]    ADMISSION DATE: 2019  ADMITTING DIAGNOSIS: has Acute respiratory failure ([address] Aspiration pneumonitis ([address] Atrial fibrillation with RVR ([address] Electrolyte disturbance; Shock ([address] Transaminitis; Hyperglycemia; Pneumonia due to infectious organism; ARDS (adult respiratory distress syndrome) ([address] Mucus plugging of bronchi; Cardiac arrest with ventricular fibrillation ([address] Multiple tracheobronchial mucus plugs; and Mild protein-calorie malnutrition ([address] on their problem list.  ONSET DATE:  19        Date of Eval: 2019  Evaluating Therapist: Dre Emerson CCC-SLP. [place] [employer]    Current Diet level:  Current Diet : Regular  Current Liquid Diet : Thin      Primary Complaint       Pain:  NONE REPORTED     Reason for Referral  Giorgio Boyle was referred for a bedside swallow evaluation to assess the efficiency of her swallow function, identify signs and symptoms of aspiration and make recommendations regarding safe dietary consistencies, effective compensatory strategies, and safe eating environment. Impression  Dysphagia Impression : Suggest Dysphagia Level I and Nectar thick liquid as tolerated, no straw. She is not a candidate for instrumental swallow assessment secondary to her behavior. She should have 1:1 supervision and assist with meals, feeding only when awake and alert. Hold oral feeding if there is worsening respiratory status and/or if signs of aspiration develop. Meds crushed with puree. Case discussed with patient, her mother, and attending nurse. Treatment Plan  Requires SLP Intervention: Yes  Duration/Frequency of Treatment: 3-5x/wk LOS  D/C Recommendations:  To be determined       Recommended Diet and Intervention  Diet Solids Recommendation: Dysphagia I Pureed as tolerated  Liquid Consistency Recommendation: Nectar Simple  Dentition: Some missing teeth  Patient Positioning: Upright in bed  Baseline Vocal Quality: Dysphonic;Weak  Volitional Cough: Weak  Volitional Swallow: Delayed    ASSESSMENT: She was confused, tearful at times, off task, has temporal and spatial disorientation. Frequent re-direction was provided. Neurology follow up suggested as she has cognitive communication deficits. She had 5-19-19 CXR per MD reporting bilateral pulmonary congestion with right basilar consolidation concerning for pneumonia. She was intubated from 5-14 until 5-23. She has hoarse and breathy phonation. Nurse notes from yesterday afternoon report stridor. She has weak cough on cue, more of a hard glottal attack. Suggest ENT airway assessment. She has regular consistency diet orders. Both she and her mother reports that the patient has no pre-hospital dysphagia. She had fair oral ROM on command. She has reduction to dentition at baseline. She went to place her dentures on maxillary arch. She reports that she does not like the denture adhesive and won't use it. It is then noted that her dentures are loose and on the verge of falling out. SLP asked for her to remove them which was done. The patient had no oral asymmetry. She had reduction to gag reflex response. There was swallow weakness and delay throughout as per palpable assessment of the areas of the mandible, hyoid bone, and thyroid cartilage. She had heart rate 88-95, 02 sat 92-93% and /71 throughout. There was no increased work of breathing and her color remained good. Speech was intelligible with slow rate at times. She denies having throat discomfort with voice and swallow. 5-14-19 CT Chest per MD reports bilateral lower lobe atelectasis right greater then left. 5-16-19 bronchoscopy reports per MD of thick secretions. She takes Pepcid. She was given thin liquids 4 oz, nectar thick 4 oz, puree 4 tsp, and mech soft 2 tsp.  She reports mech soft was difficult for her to chew and making her full. There was prolonged mastication noted with mech soft. She had audible bolus loss to throat with thin liquids, 2-3 rapid repeat swallows per sip of thin liquid. She had best tolerance of puree and nectar thick liquid. She reports feeling of the NTL being \"easier to swallow\". She had no cough, no choke, no wet voice, no throat clearing, no increased work of breathing, her color remained good, no clinical signs of aspiration noted. She denies aerophagia, odynophagia, and globus sensation. Attending nurse reports patient was consuming ice chips and meds crushed in pudding. SLP explained scope of practice to the patient and her mother, along with safe feeding precautions, and aspiration precautions. SLP explained the purpose of the visit. SLP explained limitations of clinical assessment. SLP explained proposed plan of care. Patient and her mother voiced agreement with the dietary consistency recommendations. Sticky note left to MD. SLP explained all findings to the attending nurse. Prognosis  Prognosis  Prognosis for safe diet advancement: fair  Individuals consulted  Consulted and agree with results and recommendations: Patient; Family member;RN    Education  Patient Education Response: Verbalizes understanding;Demonstrated understanding;Needs reinforcement  Safety Devices in place: Yes  Type of devices: Left in bed;Call light within reach;Nurse notified                Therapy Time  SLP Individual Minutes  Time In: 5944  Time Out: 7848  Minutes: 1520 [facility] SLPD BCSS  5/24/2019 10:10 AM

## 2019-05-24 NOTE — PROGRESS NOTES
Pt continues on Precedex drip. Much more calm since her mother is at bedside but still exhibiting behaviors. Monitoring closely.

## 2019-05-24 NOTE — PROGRESS NOTES
[address] Daily Progress Note      Admit Date:  5/14/2019    Subjective:  Ms. Wilfred Contreras is seen for follow up s/p cardiac arrest at home. C/o chest pain soreness. Mother at bedside says patient has mental illness and needs to be on meds. She may have aspirated at home. Running low grade fever in last 24 hrs. Highest 100.5 F  She was shockedx2 at home by EMS brought to ED in acute resp failure. She has h/o cardiomyopathy probably adriamycin. No cardiac follow up. She has been on vent until yesterday now on nasal O2. History of Present Illness:  Doyle Torres is a 26 y.o. patient who presented  as above. She was found down by [place]. Patient was having agonal breathing. Squad arrived did a brief CPR shocked her and transported her to ED. In ED her BP was high. One EKG had sinus tach another afib RVR. She was put on ventilator and was hard to ventilate. She was tachycardiac and was put on amiodarone drip. She continued to remain tachycardiac. She was given IV metoprolol and her BP started to drop. She was placed on levophed and she remains on that. Currently she is intubated sedated with propofol. She has low K and Low Mg at admission. I have been asked to provide consultation regarding further management and testing.         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    Hepatitis C antibody positive in blood 05/17/2019    Hyperlipidemia      Past Surgical History:   Procedure Laterality Date    BRONCHOSCOPY N/A 5/16/2019    BRONCHOSCOPY ALVEOLAR LAVAGE performed by Calli Velásquez MD at [address]  5/16/2019    BRONCHOSCOPY THERAPUTIC ASPIRATION INITIAL performed by Calli Velásquez MD at [address] [address] insulin lispro  0-6 Units Subcutaneous Q4H    sodium chloride flush  10 mL Intravenous 2 times per day      dexmedetomidine (PRECEDEX) IV infusion 1 mcg/kg/hr (05/24/19 0318)    dextrose       melatonin, traZODone, sennosides-docusate sodium, magnesium sulfate, potassium chloride, glucose, dextrose, glucagon (rDNA), dextrose, ibuprofen, sodium chloride flush    Lab Data:  CBC:   Recent Labs     05/21/19  0620 05/22/19  0630 05/23/19  0500   WBC 16.3* 16.1* 15.8*   HGB 12.4 12.4 12.7   HCT 36.7 37.0 37.4   MCV 88.7 86.6 87.0    400 421     BMP:   Recent Labs     05/21/19  0620  05/22/19  0553 05/22/19  2030 05/23/19  0500 05/23/19  1222 05/23/19 2025   *   < > 132* 134*  --  134* 135*   K 3.1*   < > 2.9* 3.0* 2.8* 3.1* 3.1*   CL 97*   < > 93* 90*  --  90* 94*   CO2 26   < > 28 30  --  30 28   PHOS 4.9  --  2.8  --  3.7  --   --    BUN 17   < > 16 22*  --  19 16   CREATININE <0.5*   < > <0.5* <0.5*  --  <0.5* <0.5*    < > = values in this interval not displayed. LIVER PROFILE:   Recent Labs     05/21/19  0620 05/22/19  0553 05/23/19  1222   AST 31 37 51*   ALT 25 23 31   BILIDIR 0.3 <0.2 <0.2   BILITOT 0.6 0.4 0.5   ALKPHOS 118 104 115     PT/INR:   No results for input(s): PROTIME, INR in the last 72 hours. APTT:   No results for input(s): APTT in the last 72 hours. BNP:  No results for input(s): BNP in the last 72 hours. IMAGING:   I have reviewed the following tests and documented in this encounter as follows:     Chest Xray 5/23/19  1. Improving multifocal right lung airspace disease. 2. Increasing left basilar atelectasis or pneumonia.    Summary echo 5.22.19   This is a limited study for EF follow up.   Left ventricular systolic function is reduced with ejection fraction   estimated at 40 %.   There is mild to moderate global hypokinesis present.   There is mild concentric left ventricular hypertrophy.   Cxray 5.20.19  RLL infiltrate cardiomegaly  Chest xray 5.17.19 bilateral diffuse infiltrates probable ARDS per radiology report   Chest xray 5.16.19 acute pulmonary edema and LLL infiltrate  Echo doppler of heart 5.15.19   Summary   Definity contrast administered.   Left ventricular systolic function is reduced with ejection fraction   estimated at 25-30 %.   Elevated left ventricular diastolic filling pressure: Septal E/e'' = 12.6   (for sinus tachycardia) .   Right ventricular systolic function is moderately reduced .   Mild mitral regurgitation.   Unable to obtain SPAP due to lack of tricuspid regurgitation.     Assessment:  S/p cardiopulmonary arrest  Hypotension shock recovered EF improved from 25-30 % to 40%  S/p arrest shock CPR bilat broken ribs  Fever likely LLL aspiration pneumonia gram +ve cocci in bronchoscopy specimen, growing  Staph aureus  Neg blood culture from admission  Acute pulmonary edema less likely  Infiltrate is unilateral just on right side  Dilated cardiomyopathy repeat echo shows improved EF now 40% as per echo 5.22.19     Patient Active Problem List    Diagnosis Date Noted    Mild protein-calorie malnutrition ([facility] 05/23/2019    Multiple tracheobronchial mucus plugs     Cardiac arrest with ventricular fibrillation (HCC)     [facility]DS (adult [facility] distress syndrome) [facility]     Mucus plugging of bronchi     Aspiration pneumonitis (HCC)     Atrial fibrillation with RVR (HCC)     Electrolyte disturbance     Shock ([address]     Transaminitis     Hyperglycemia     Pneumonia due to infectious organism     Acute respiratory failure ([facility] 05/14/2019       Plan:  1. Replacement of electrolytes  2. Continue antibiotics  3. DVT prophylaxis  4. Asa  5. Beta blockers and ace inhibitors  when BP supports  6.  When no longer infected  And pneumonia is cleared  Arrange cath and EP eval    Core Measures:  · Discharge instructions:   · LVEF documented: 40%  · ACEI for LV dysfunction:   · Smoking Cessation:  I have spent 35   minutes of face to face time with the family with more than 50% spent counseling and coordinating care for this patient  Discussed with mother  And updated her progress. Nicko Valdes MD 5/24/2019 5:21 AM

## 2019-05-24 NOTE — PROGRESS NOTES
Pulmonary & Critical Care Medicine ICU Progress Note    CC: Acute respiratory failure, V. fib arrest, worsening dilated cardiomyopathy    Events of Last 24 hours:   Extubated  Delirium with agitation  Precedex infusion    Invasive Lines:  Left IJ CVC 19    MV: 19  Vent Mode: AC/VC Rate Set: 0 bmp/Vt Ordered: 410 mL/ /FiO2 : 50 %  Recent Labs     19  0600 19  0500   PHART 7.499* 7.519*   SEP4ESW 39.1 38.3   PO2ART 94.5 87.2       IV:   dexmedetomidine (PRECEDEX) IV infusion 1 mcg/kg/hr (19 0318)    dextrose         Vitals:  Blood pressure 94/64, pulse 91, temperature 97.9 °F (36.6 °C), temperature source Oral, resp. rate 19, height 5' (1.524 m), weight 180 lb 8 oz (81.9 kg), SpO2 93 %. on 4 L  Temp  Av.7 °F (37.1 °C)  Min: 97.5 °F (36.4 °C)  Max: 100.5 °F (38.1 °C)    Intake/Output Summary (Last 24 hours) at 2019 0734  Last data filed at 2019 7400  Gross per 24 hour   Intake 1280 ml   Output 3400 ml   Net -2120 ml     EXAM:  General: intubated, ill appearing    Eyes: PERRL. No sclera icterus. No conjunctival injection. ENT: No discharge. Pharynx with ETT. Neck: Trachea midline. Normal thyroid. Resp: No accessory muscle use. No crackles. No wheezing. No rhonchi. No dullness on percussion. CV: Regular rate. Regular rhythm. No mumur or rub. ++ edema. GI: Non-tender. Non-distended. No masses. No organomegaly. Normal bowel sounds. No hernia. Skin: Warm and dry. No nodule on exposed extremities. No rash on exposed extremities. Lymph: No cervical LAD. No supraclavicular LAD. M/S: No cyanosis. No joint deformity. No clubbing. Neuro: Awake and following commands.  Patellar reflexes are symmetric  Psych: Unable to obtain because the patient is non-communicative      Scheduled Meds:   ipratropium-albuterol  1 ampule Inhalation TID    potassium chloride  40 mEq Oral BID WC    metolazone  5 mg Oral Daily    furosemide  40 mg Intravenous TID    meropenem (MERREM) 1 g IVPB (mini-bag)  1 g Intravenous Q8H    vancomycin  1,000 mg Intravenous Q8H    OLANZapine  15 mg Oral BID    gabapentin  200 mg Oral TID    enoxaparin  40 mg Subcutaneous Daily    aspirin  81 mg Oral Daily    famotidine (PEPCID) injection  20 mg Intravenous BID    insulin lispro  0-6 Units Subcutaneous Q4H    sodium chloride flush  10 mL Intravenous 2 times per day     PRN Meds:  melatonin, traZODone, sennosides-docusate sodium, magnesium sulfate, potassium chloride, glucose, dextrose, glucagon (rDNA), dextrose, ibuprofen, sodium chloride flush    Results:  CBC:   Recent Labs     05/22/19  0630 05/23/19  0500 05/24/19  0600   WBC 16.1* 15.8* 24.0*   HGB 12.4 12.7 13.7   HCT 37.0 37.4 40.1   MCV 86.6 87.0 87.6    421 503*     BMP:   Recent Labs     05/22/19  0553 05/22/19  2030 05/23/19  0500 05/23/19  1222 05/23/19 2025   * 134*  --  134* 135*   K 2.9* 3.0* 2.8* 3.1* 3.1*   CL 93* 90*  --  90* 94*   CO2 28 30  --  30 28   PHOS 2.8  --  3.7  --   --    BUN 16 22*  --  19 16   CREATININE <0.5* <0.5*  --  <0.5* <0.5*     LIVER PROFILE:   Recent Labs     05/22/19  0553 05/23/19  1222   AST 37 51*   ALT 23 31   BILIDIR <0.2 <0.2   BILITOT 0.4 0.5   ALKPHOS 104 115       Cultures:  5/23/19 tracheal aspirate pending   5/19/19 BAL no growth  5/17/19 blood no growth  5/17/19 urine no growth  5/16/19 BAL and washings pansensitive Staph aureus    Films:  CXR was reviewed by me and it showed   5/23/19 worsening left basilar airspace disease, improving airspace disease on the right    ASSESSMENT:  · Acute hypoxemic respiratory failure   · ARDS  · Aspiration pneumonia  · 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

## 2019-05-24 NOTE — PROGRESS NOTES
shift assessment complete. Patient is alert. However, she appears to be experiencing hallucinations and delusions as evidenced by stating \" you're trying to kill me\" and \"the [employer] is outside looking for me, I saw them\". Patient has attempted to get out of bed multiple times. Nearly successful. She has been attempting to roll over top of the rail. Otherwise, she is on 4L NC. Lungs are clear in uppers and diminished in lowers. NSR on monitor. Precedex infusing at 1.6 mcg/kg/h. Patient refused lunch tray.      Electronically signed by Sonali Tyler RN on 5/24/2019 at 12:27 PM

## 2019-05-24 NOTE — PROGRESS NOTES
Shift assessment completed, see flowsheet. Pt A to person. Lung sounds coarse diminished through the bases, Heart Sounds s1s2, NSR  on the monitor, Bowel Sounds +x4 , Pulses +2x4 , rashid patent to bsd, incontinent of bowel. Denies pain or discomfort. IV's infusing per md order . Call light within reach, bed in lowest position, side rails x 3 . Will continue to monitor. Will titrate Precedex for effect.

## 2019-05-24 NOTE — PROGRESS NOTES
5/24/19 8751 informed Michelle Oliva RN of needing a PICC.  Verdia Schlatter [facility]

## 2019-05-24 NOTE — PROGRESS NOTES
RESPIRATORY THERAPY ASSESSMENT    Name:  Brian Record Number:  [id number]  Age: 26 y.o. Gender: female  : 1985  Today's Date:  2019  Room:  [facility]    Assessment     Is the patient being admitted for a COPD or Asthma exacerbation? No   (If yes the patient will be seen every 4 hours for the first 24 hours and then reassessed)    Patient Admission Diagnosis      Allergies  No Known Allergies    Minimum Predicted Vital Capacity:     850          Actual Vital Capacity:      Pt unable to preform              Pulmonary History:Smoker  Home Oxygen Therapy:  room air  Home Respiratory Therapy:Albuterol   Current Respiratory Therapy:  Duoneb Q4H  Treatment Type: HHN  Medications: Albuterol/Ipratropium    Respiratory Severity Index(RSI)   Patients with orders for inhalation medications, oxygen, or any therapeutic treatment modality will be placed on Respiratory Protocol. They will be assessed with the first treatment and at least every 72 hours thereafter. The following severity scale will be used to determine frequency of treatment intervention. Smoking History: Smoking History Less than 1ppd or less than 15 pack year = 1    Social History  Social History     Tobacco Use    Smoking status: Current Every Day Smoker     Packs/day: 2.00    Smokeless tobacco: Never Used   Substance Use Topics    Alcohol use:  Yes    Drug use: Yes     Types: Marijuana       Recent Surgical History: None = 0  Past Surgical History  Past Surgical History:   Procedure Laterality Date    BRONCHOSCOPY N/A 2019    BRONCHOSCOPY ALVEOLAR LAVAGE performed by Monique Narvaez MD at  [address]  2019    BRONCHOSCOPY THERAPUTIC ASPIRATION INITIAL performed by Monique Narvaez MD at  [address] N/A 2019    BRONCHOSCOPY ALVEOLAR LAVAGE performed by Monique Narvaez MD at  [address]  2019    BRONCHOSCOPY THERAPUTIC ASPIRATION INITIAL performed by Jd Lozada MD at [address]      LEG SURGERY         Level of Consciousness: Alert, Follows Commands but Disoriented = 1    Level of Activity: Mostly sedentary, minimal walking = 2    Respiratory Pattern: Regular Pattern; RR 8-20 = 0    Breath Sounds: Diminshed bilaterally and/or crackles = 2    Sputum  Sputum Color: Dark red, Dark Yellow, Tenacity: Thick, Sputum How Obtained: None  Cough: Strong, spontaneous, non-productive = 0    Vital Signs   BP 98/64   Pulse 111   Temp 100.5 °F (38.1 °C) (Oral)   Resp 20   Ht 5' (1.524 m)   Wt 180 lb 8 oz (81.9 kg)   SpO2 94%   BMI 35.25 kg/m²   SPO2 (COPD values may differ): 88-89% on room air or greater than 92% on FiO2 28- 35% = 2    Peak Flow (asthma only): not applicable = 0    RSI: 7-8 = BID and Q4HPRN (every four hours as needed) for dyspnea        Plan       Goals: medication delivery, mobilize retained secretions and improve oxygenation    Patient/caregiver was educated on the proper method of use for Respiratory Care Devices:  Yes      Level of patient/caregiver understanding able to:   ? Verbalize understanding   ? Demonstrate understanding       ? Teach back        ? Needs reinforcement       ? No available caregiver               ? Other:     Response to education:  [address]     Is patient being placed on Home Treatment Regimen? NA     Does the patient have everything they need prior to discharge? NA     Comments: chart reviewed and pt assessed    Plan of Care: duoneb changed to tid    Electronically signed by Segundo Noe RCP on 5/24/2019 at 12:07 AM    Respiratory Protocol Guidelines     1. Assessment and treatment by Respiratory Therapy will be initiated for medication and therapeutic interventions upon initiation of aerosolized medication. 2. Physician will be contacted for respiratory rate (RR) greater than 35 breaths per minute.  Therapy will be held for heart rate (HR) greater than 140 beats per minute, pending direction from physician. 3. Bronchodilators will be administered via Metered Dose Inhaler (MDI) with spacer when the following criteria are met:  a. Alert and cooperative     b. HR < 140 bpm  c. RR < 30 bpm                d. Can demonstrate a 2-3 second inspiratory hold  4. Bronchodilators will be administered via Hand Held Nebulizer [facility]) to patients when ANY of the following criteria are met  a. Incognizant or uncooperative          b. Patients treated with HHN at Home        c. Unable to demonstrate proper use of MDI with spacer     d. RR > 30 bpm   5. Bronchodilators will be delivered via Metered Dose Inhaler (MDI), HHN, Aerogen to intubated patients on mechanical ventilation. 6. Inhalation medication orders will be delivered and/or substituted as outlined below. Aerosolized Medications Ordering and Administration Guidelines:    1. All Medications will be ordered by a physician, and their frequency and/or modality will be adjusted as defined by the patients Respiratory Severity Index (RSI) score. 2. If the patient does not have documented COPD, consider discontinuing anticholinergics when RSI is less than 9.  3. If the bronchospasm worsens (increased RSI), then the bronchodilator frequency can be increased to a maximum of every 4 hours. If greater than every 4 hours is required, the physician will be contacted. 4. If the bronchospasm improves, the frequency of the bronchodilator can be decreased, based on the patient's RSI, but not less than home treatment regimen frequency. 5. Bronchodilator(s) will be discontinued if patient has a RSI less than 9 and has received no scheduled or as needed treatment for 72  Hrs. Patients Ordered on a Mucolytic Agent:    1. Must always be administered with a bronchodilator.     2. Discontinue if patient experiences worsened bronchospasm, or secretions have lessened to the point that the patient is able to clear them with a cough.    Anti-inflammatory and Combination Medications:    1. If the patient lacks prior history of lung disease, is not using inhaled anti-inflammatory medication at home, and lacks wheezing by examination or by history for at least 24 hours, contact physician for possible discontinuation.

## 2019-05-24 NOTE — PROGRESS NOTES
Progress Note    Admit Date:  5/14/2019      67-year-old female with nonischemic dilated cardiomyopathy, presented with V. fib arrest.  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 Hep C + . She is mostly homebound. She has been ill with  respiratory symptoms for a couple of months. Mother found her down. CPR initiated,  S/P defibrillation twice en route to hospital.  Admitted  to ICU , on mechanical vent   Echocardiogram with ejection fraction low at 25%   Head CT was negative. status post bronchoscopy 5/16. Patient had significant amount of resp secretions that were aspirated     Good diuresis with lasix, off pressors  Improved EF on echo   Extubated on 5/23    More anxious and had a fall       Subjective:    Ms. Bradford Luna seen up in bed, very anxious and emotional   Asking for her mother      Objective:   BP 94/64   Pulse 91   Temp 97.9 °F (36.6 °C) (Oral)   Resp 19   Ht 5' (1.524 m)   Wt 180 lb 8 oz (81.9 kg)   SpO2 93%   BMI 35.25 kg/m²       Intake/Output Summary (Last 24 hours) at 5/24/2019 0741  Last data filed at 5/24/2019 6070  Gross per 24 hour   Intake 1280 ml   Output 3400 ml   Net -2120 ml       Physical Exam:-  General: young female,  Awake alert and anxious  Skin:  Warm and dry  Neck:  JVD absent. Neck supple  Chest:  Diminished breath sounds . No wheezes, rales or rhonchi. Cardiovascular:  RRR ,S1S2 normal  Abdomen:  Soft, non tender, non distended, BS +  Extremities:  Trace edema of upper extremity . No edema of lower extremity. . Left lower extremity atrophy present  Intact peripheral pulses.  Brisk cap refill, < 2 secs  Neuro:  Non focal . Moving all extremities      Medications:   Scheduled Meds:   ipratropium-albuterol  1 ampule Inhalation TID    potassium chloride  40 mEq Oral BID WC    metolazone  5 mg Oral Daily    furosemide  40 mg Intravenous TID    meropenem (MERREM) 1 g IVPB (mini-bag)  1 g Intravenous Q8H    vancomycin  1,000 mg Intravenous Q8H    OLANZapine  15 mg Oral BID    gabapentin  200 mg Oral TID    enoxaparin  40 mg Subcutaneous Daily    aspirin  81 mg Oral Daily    famotidine (PEPCID) injection  20 mg Intravenous BID    insulin lispro  0-6 Units Subcutaneous Q4H    sodium chloride flush  10 mL Intravenous 2 times per day       Continuous Infusions:   dexmedetomidine (PRECEDEX) IV infusion 1 mcg/kg/hr (05/24/19 0318)    dextrose         Data:  CBC:   Recent Labs     05/22/19  0630 05/23/19  0500 05/24/19  0600   WBC 16.1* 15.8* 24.0*   RBC 4.27 4.31 4.58   HGB 12.4 12.7 13.7   HCT 37.0 37.4 40.1   MCV 86.6 87.0 87.6   RDW 12.7 12.7 13.1    421 503*     BMP:   Recent Labs     05/22/19  0553 05/22/19  2030 05/23/19  0500 05/23/19  1222 05/23/19 2025   * 134*  --  134* 135*   K 2.9* 3.0* 2.8* 3.1* 3.1*   CL 93* 90*  --  90* 94*   CO2 28 30  --  30 28   PHOS 2.8  --  3.7  --   --    BUN 16 22*  --  19 16   CREATININE <0.5* <0.5*  --  <0.5* <0.5*     LIVER PROFILE:   Recent Labs     05/22/19  0553 05/23/19  1222   AST 37 51*   ALT 23 31   BILIDIR <0.2 <0.2   BILITOT 0.4 0.5   ALKPHOS 104 115          Urine hCG negative     Rapid flu antigen negative       Cultures  Respiratory cultures heavy growth staph - MSSA    Blood - NGTD       Radiology  XR CHEST PORTABLE   Final Result   1. Improving multifocal right lung airspace disease. 2. Increasing left basilar atelectasis or pneumonia. XR CHEST PORTABLE   Final Result   Stable support apparatus. New left perihilar airspace disease and persistent airspace disease   throughout the right lung. Findings may be related to edema and/or pneumonia. XR CHEST PORTABLE   Final Result   Worsening right lung airspace disease likely represents pneumonia. XR CHEST PORTABLE   Final Result   Stable right basilar and improving left basilar atelectasis and/or pneumonia.          XR CHEST PORTABLE   Final Result   Bilateral pulmonary congestion with right basilar consolidation concerning   for pneumonia. Stable support tubes. XR CHEST PORTABLE   Final Result   Grossly stable bilateral airspace disease. XR CHEST PORTABLE   Final Result      XR CHEST PORTABLE   Final Result   Worsening bilateral airspace opacities in a pattern that would favor ARDS. XR CHEST PORTABLE   Final Result   Stable life support device positioning. Persistent perihilar predominant edema and small effusions. XR CHEST PORTABLE   Final Result   Interval improvement in pulmonary edema. XR CHEST PORTABLE   Final Result   Appropriate left IJ central venous catheter positioning. No pneumothorax. Appropriate endotracheal tube positioning. Layering bilateral effusions. Equivocal for a component of superimposed   pulmonary edema. Atelectasis likely present. CT ABDOMEN PELVIS WO CONTRAST Additional Contrast? None   Final Result   1. Bilateral lower lobe atelectasis, right greater than left. 2. Acute right 2nd through 4th and left 3rd through 5th rib fractures         CT Chest Pulmonary Embolism W Contrast   Final Result   1. Bilateral lower lobe atelectasis, right greater than left. 2. Acute right 2nd through 4th and left 3rd through 5th rib fractures         CT Head WO Contrast   Final Result   No acute intracranial abnormality. Left maxillary sinus mucosal thickening. Air-fluid level in the sphenoid   sinus. Correlate with any clinical evidence of sinusitis. XR CHEST PORTABLE   Final Result   Findings as above which may be related to congestive heart failure and edema.              Echo  Summary   Definity contrast administered.   Left ventricular systolic function is reduced with ejection fraction   estimated at 25-30 %.   Elevated left ventricular diastolic filling pressure: Septal E/e'' = 12.6   (for sinus tachycardia) .   Right ventricular systolic function is moderately reduced .  Anh Lindsey following defib for V fib arrest. Loaded with amiodarone   - continued on  amiodarone drip, and heparin drip   - seen by cardiology  - off amio and hep gtt now . In Normal sinus rhythm    Septic shock   Pneumonia   - ? Aspiration - pt had resp symptoms for 2 months   - Secondary to staph aureus infection.   - Status post bronchoscopy cultures positive for MSSA  - Continue  IV antibiotics , was on vanc and merrem . - she was weaned off of pressors   - can down grade abx      Hypokalemia, Hypomagnesemia  - on replacement protocol. Hyperglycemia  -  on low dose ssi. Elevated LFT's, mild  - possibly related to shock. Repeat. Monitor.   LFTs trending down now     Leukocytosis  - monitor.   - White count improving .      Angiogram and EP studies when hemodynamically stable and off vent     DVT Prophylaxis:  Lovenox  DIET GENERAL;  Code Status: Full Code          Enio Jean MD 5/24/2019 7:41 AM

## 2019-05-24 NOTE — PROGRESS NOTES
AM assessment completed. VSS. Pt on precedex gtt- see MAR. Pts mother at bedside. L IJ CVC WNL. Vancomycin completed. No needs expressed. Will monitor.   Georgia Pedraza RN, BSN

## 2019-05-24 NOTE — CARE COORDINATION
INTERDISCIPLINARY PLAN OF CARE CONFERENCE    Date/Time: 5/24/2019 9:40 AM  Completed by: Renea Lindsay Case Management      Patient Name:  Thelma Floor  YOB: 1985  Admitting Diagnosis: Acute respiratory failure ([address] [J96.00]     Admit Date/Time:  5/14/2019  5:59 PM    Chart reviewed. Interdisciplinary team met to discuss patient progress and discharge plans. Disciplines included Case Management, Nursing, and Dietitian. Current Status:Inpt status    PT/OT recommendation:n/a    Anticipated Discharge Date: TBD  Expected D/C Disposition:  Transfer for Cath and EP eval.   Confirmed plan with patient and/or family Yes  Discharge Plan Comments: Plan is to transfer for Cath and EP evaluation.  CM following    Home O2 in place on admit: No  Pt informed of need to bring portable home O2 tank on day of discharge for nursing to connect prior to leaving:  Not Indicated  Verbalized agreement/Understanding:  Not Indicated

## 2019-05-24 NOTE — PROGRESS NOTES
Vancomycin Day: 4    Patient's labs, cultures, vitals, and vancomycin regimen reviewed. No changes today.   Yogesh TOLEDO 8/25/829303:87 PM  .

## 2019-05-24 NOTE — PROGRESS NOTES
1945 - Pt was found on her knees by bed, pt stated \"there is a man in my bed and im scared. ... There is a man. .. In my bed!!! I was trying to get away he scared me. \" Redirected pt, assisted pt back to bed with the ast of other staff. Pt denied hitting her head. Perfect serve Dr Fatou Bowling, came to assess pt, no further orders noted. Pt has no injuries noted. Pt is having hallucinates and needs to be reoriented frequently. VSS No signs of injury.

## 2019-05-24 NOTE — PROGRESS NOTES
Reassessment completed as documented. No changes, pt continues with behaviors, continues to want to leave facility. Redirection unsuccessful. Pt having loose stool.

## 2019-05-24 NOTE — PLAN OF CARE
Problem: Risk for Impaired Skin Integrity  Goal: Tissue integrity - skin and mucous membranes  Description  Structural intactness and normal physiological function of skin and  mucous membranes.   Outcome: Ongoing     Problem: Infection:  Goal: Will remain free from infection  Description  Will remain free from infection  Outcome: Ongoing     Problem: Infection, Septic Shock:  Goal: Will show no infection signs and symptoms  Description  Will show no infection signs and symptoms  Outcome: Ongoing     Problem: Nutrition  Goal: Optimal nutrition therapy  Outcome: Ongoing  Goal: Understanding of nutritional guidelines  Outcome: Ongoing

## 2019-05-24 NOTE — PROGRESS NOTES
While pt was taking a drink of water she became chocked. Meds given by crushing and placing in pudding.

## 2019-05-25 PROBLEM — G93.40 ACUTE ENCEPHALOPATHY: Status: ACTIVE | Noted: 2019-05-25

## 2019-05-25 LAB
ALBUMIN SERPL-MCNC: 3.7 G/DL (ref 3.4–5)
ANION GAP SERPL CALCULATED.3IONS-SCNC: 16 MMOL/L (ref 3–16)
ANION GAP SERPL CALCULATED.3IONS-SCNC: 17 MMOL/L (ref 3–16)
ANISOCYTOSIS: ABNORMAL
ATYPICAL LYMPHOCYTE RELATIVE PERCENT: 2 % (ref 0–6)
BASOPHILS ABSOLUTE: 0 K/UL (ref 0–0.2)
BASOPHILS RELATIVE PERCENT: 0 %
BUN BLDV-MCNC: 20 MG/DL (ref 7–20)
BUN BLDV-MCNC: 22 MG/DL (ref 7–20)
CALCIUM SERPL-MCNC: 10.3 MG/DL (ref 8.3–10.6)
CALCIUM SERPL-MCNC: 9.8 MG/DL (ref 8.3–10.6)
CHLORIDE BLD-SCNC: 91 MMOL/L (ref 99–110)
CHLORIDE BLD-SCNC: 94 MMOL/L (ref 99–110)
CO2: 26 MMOL/L (ref 21–32)
CO2: 27 MMOL/L (ref 21–32)
CREAT SERPL-MCNC: <0.5 MG/DL (ref 0.6–1.1)
CREAT SERPL-MCNC: <0.5 MG/DL (ref 0.6–1.1)
CULTURE, RESPIRATORY: NORMAL
EOSINOPHILS ABSOLUTE: 0 K/UL (ref 0–0.6)
EOSINOPHILS RELATIVE PERCENT: 0 %
GFR AFRICAN AMERICAN: >60
GFR AFRICAN AMERICAN: >60
GFR NON-AFRICAN AMERICAN: >60
GFR NON-AFRICAN AMERICAN: >60
GLUCOSE BLD-MCNC: 103 MG/DL (ref 70–99)
GLUCOSE BLD-MCNC: 108 MG/DL (ref 70–99)
GLUCOSE BLD-MCNC: 112 MG/DL (ref 70–99)
GLUCOSE BLD-MCNC: 121 MG/DL (ref 70–99)
GLUCOSE BLD-MCNC: 124 MG/DL (ref 70–99)
GLUCOSE BLD-MCNC: 128 MG/DL (ref 70–99)
GLUCOSE BLD-MCNC: 151 MG/DL (ref 70–99)
GRAM STAIN RESULT: NORMAL
HCT VFR BLD CALC: 39.3 % (ref 36–48)
HEMOGLOBIN: 13.4 G/DL (ref 12–16)
LYMPHOCYTES ABSOLUTE: 3.3 K/UL (ref 1–5.1)
LYMPHOCYTES RELATIVE PERCENT: 12 %
MAGNESIUM: 1.5 MG/DL (ref 1.8–2.4)
MCH RBC QN AUTO: 29.9 PG (ref 26–34)
MCHC RBC AUTO-ENTMCNC: 34.1 G/DL (ref 31–36)
MCV RBC AUTO: 87.6 FL (ref 80–100)
MONOCYTES ABSOLUTE: 2.3 K/UL (ref 0–1.3)
MONOCYTES RELATIVE PERCENT: 10 %
NEUTROPHILS ABSOLUTE: 17.8 K/UL (ref 1.7–7.7)
NEUTROPHILS RELATIVE PERCENT: 76 %
PDW BLD-RTO: 12.6 % (ref 12.4–15.4)
PERFORMED ON: ABNORMAL
PHOSPHORUS: 3.7 MG/DL (ref 2.5–4.9)
PHOSPHORUS: 4.7 MG/DL (ref 2.5–4.9)
PLATELET # BLD: 517 K/UL (ref 135–450)
PLATELET SLIDE REVIEW: ABNORMAL
PMV BLD AUTO: 7.9 FL (ref 5–10.5)
POIKILOCYTES: ABNORMAL
POTASSIUM SERPL-SCNC: 2.9 MMOL/L (ref 3.5–5.1)
POTASSIUM SERPL-SCNC: 3.1 MMOL/L (ref 3.5–5.1)
RBC # BLD: 4.49 M/UL (ref 4–5.2)
SLIDE REVIEW: ABNORMAL
SODIUM BLD-SCNC: 134 MMOL/L (ref 136–145)
SODIUM BLD-SCNC: 137 MMOL/L (ref 136–145)
VANCOMYCIN TROUGH: 15.2 UG/ML (ref 10–20)
WBC # BLD: 23.4 K/UL (ref 4–11)

## 2019-05-25 PROCEDURE — 36415 COLL VENOUS BLD VENIPUNCTURE: CPT

## 2019-05-25 PROCEDURE — 6370000000 HC RX 637 (ALT 250 FOR IP): Performed by: INTERNAL MEDICINE

## 2019-05-25 PROCEDURE — 2580000003 HC RX 258: Performed by: EMERGENCY MEDICINE

## 2019-05-25 PROCEDURE — 6370000000 HC RX 637 (ALT 250 FOR IP): Performed by: PSYCHIATRY & NEUROLOGY

## 2019-05-25 PROCEDURE — 94640 AIRWAY INHALATION TREATMENT: CPT

## 2019-05-25 PROCEDURE — 97530 THERAPEUTIC ACTIVITIES: CPT

## 2019-05-25 PROCEDURE — 6360000002 HC RX W HCPCS: Performed by: INTERNAL MEDICINE

## 2019-05-25 PROCEDURE — 2500000003 HC RX 250 WO HCPCS: Performed by: INTERNAL MEDICINE

## 2019-05-25 PROCEDURE — 83735 ASSAY OF MAGNESIUM: CPT

## 2019-05-25 PROCEDURE — 97166 OT EVAL MOD COMPLEX 45 MIN: CPT

## 2019-05-25 PROCEDURE — 80202 ASSAY OF VANCOMYCIN: CPT

## 2019-05-25 PROCEDURE — 2000000000 HC ICU R&B

## 2019-05-25 PROCEDURE — 84100 ASSAY OF PHOSPHORUS: CPT

## 2019-05-25 PROCEDURE — 97162 PT EVAL MOD COMPLEX 30 MIN: CPT

## 2019-05-25 PROCEDURE — 80069 RENAL FUNCTION PANEL: CPT

## 2019-05-25 PROCEDURE — 94761 N-INVAS EAR/PLS OXIMETRY MLT: CPT

## 2019-05-25 PROCEDURE — 2700000000 HC OXYGEN THERAPY PER DAY

## 2019-05-25 PROCEDURE — 85025 COMPLETE CBC W/AUTO DIFF WBC: CPT

## 2019-05-25 PROCEDURE — 99255 IP/OBS CONSLTJ NEW/EST HI 80: CPT | Performed by: PSYCHIATRY & NEUROLOGY

## 2019-05-25 PROCEDURE — 2580000003 HC RX 258: Performed by: INTERNAL MEDICINE

## 2019-05-25 PROCEDURE — 99291 CRITICAL CARE FIRST HOUR: CPT | Performed by: INTERNAL MEDICINE

## 2019-05-25 PROCEDURE — 99233 SBSQ HOSP IP/OBS HIGH 50: CPT | Performed by: INTERNAL MEDICINE

## 2019-05-25 RX ORDER — POTASSIUM CHLORIDE 7.45 MG/ML
10 INJECTION INTRAVENOUS PRN
Status: DISCONTINUED | OUTPATIENT
Start: 2019-05-25 | End: 2019-05-29 | Stop reason: HOSPADM

## 2019-05-25 RX ORDER — OLANZAPINE 5 MG/1
7.5 TABLET ORAL 2 TIMES DAILY
Status: DISCONTINUED | OUTPATIENT
Start: 2019-05-25 | End: 2019-05-29 | Stop reason: HOSPADM

## 2019-05-25 RX ORDER — IBUPROFEN 600 MG/1
600 TABLET ORAL EVERY 6 HOURS PRN
Status: DISCONTINUED | OUTPATIENT
Start: 2019-05-25 | End: 2019-05-29 | Stop reason: HOSPADM

## 2019-05-25 RX ADMIN — MEROPENEM 1 G: 1 INJECTION, POWDER, FOR SOLUTION INTRAVENOUS at 03:02

## 2019-05-25 RX ADMIN — Medication 10 MEQ: at 06:14

## 2019-05-25 RX ADMIN — IPRATROPIUM BROMIDE AND ALBUTEROL SULFATE 1 AMPULE: .5; 3 SOLUTION RESPIRATORY (INHALATION) at 13:32

## 2019-05-25 RX ADMIN — DEXMEDETOMIDINE HYDROCHLORIDE 2 MCG/KG/HR: 100 INJECTION, SOLUTION INTRAVENOUS at 05:15

## 2019-05-25 RX ADMIN — MAGNESIUM SULFATE HEPTAHYDRATE 1 G: 1 INJECTION, SOLUTION INTRAVENOUS at 05:51

## 2019-05-25 RX ADMIN — POTASSIUM CHLORIDE 40 MEQ: 10 TABLET, EXTENDED RELEASE ORAL at 09:33

## 2019-05-25 RX ADMIN — TRAZODONE HYDROCHLORIDE 50 MG: 50 TABLET ORAL at 20:39

## 2019-05-25 RX ADMIN — Medication 10 ML: at 20:39

## 2019-05-25 RX ADMIN — MEROPENEM 1 G: 1 INJECTION, POWDER, FOR SOLUTION INTRAVENOUS at 09:35

## 2019-05-25 RX ADMIN — VANCOMYCIN HYDROCHLORIDE 1000 MG: 1 INJECTION, POWDER, LYOPHILIZED, FOR SOLUTION INTRAVENOUS at 22:58

## 2019-05-25 RX ADMIN — FUROSEMIDE 40 MG: 10 INJECTION, SOLUTION INTRAMUSCULAR; INTRAVENOUS at 20:39

## 2019-05-25 RX ADMIN — IPRATROPIUM BROMIDE AND ALBUTEROL SULFATE 1 AMPULE: .5; 3 SOLUTION RESPIRATORY (INHALATION) at 07:06

## 2019-05-25 RX ADMIN — FUROSEMIDE 40 MG: 10 INJECTION, SOLUTION INTRAMUSCULAR; INTRAVENOUS at 09:33

## 2019-05-25 RX ADMIN — Medication 10 MEQ: at 17:34

## 2019-05-25 RX ADMIN — Medication 10 MEQ: at 11:30

## 2019-05-25 RX ADMIN — MEROPENEM 1 G: 1 INJECTION, POWDER, FOR SOLUTION INTRAVENOUS at 17:33

## 2019-05-25 RX ADMIN — Medication 10 MEQ: at 09:15

## 2019-05-25 RX ADMIN — DEXMEDETOMIDINE HYDROCHLORIDE 2 MCG/KG/HR: 100 INJECTION, SOLUTION INTRAVENOUS at 07:47

## 2019-05-25 RX ADMIN — IPRATROPIUM BROMIDE AND ALBUTEROL SULFATE 1 AMPULE: .5; 3 SOLUTION RESPIRATORY (INHALATION) at 19:28

## 2019-05-25 RX ADMIN — Medication 10 MEQ: at 10:37

## 2019-05-25 RX ADMIN — FAMOTIDINE 20 MG: 10 INJECTION, SOLUTION INTRAVENOUS at 09:34

## 2019-05-25 RX ADMIN — VANCOMYCIN HYDROCHLORIDE 1000 MG: 1 INJECTION, POWDER, LYOPHILIZED, FOR SOLUTION INTRAVENOUS at 05:40

## 2019-05-25 RX ADMIN — POTASSIUM CHLORIDE 20 MEQ: 400 INJECTION, SOLUTION INTRAVENOUS at 05:50

## 2019-05-25 RX ADMIN — Medication 10 MEQ: at 20:39

## 2019-05-25 RX ADMIN — OLANZAPINE 15 MG: 10 TABLET, FILM COATED ORAL at 10:35

## 2019-05-25 RX ADMIN — FAMOTIDINE 20 MG: 10 INJECTION, SOLUTION INTRAVENOUS at 20:39

## 2019-05-25 RX ADMIN — GABAPENTIN 400 MG: 400 CAPSULE ORAL at 14:45

## 2019-05-25 RX ADMIN — FUROSEMIDE 40 MG: 10 INJECTION, SOLUTION INTRAMUSCULAR; INTRAVENOUS at 14:45

## 2019-05-25 RX ADMIN — ENOXAPARIN SODIUM 40 MG: 40 INJECTION SUBCUTANEOUS at 09:32

## 2019-05-25 RX ADMIN — VANCOMYCIN HYDROCHLORIDE 1000 MG: 1 INJECTION, POWDER, LYOPHILIZED, FOR SOLUTION INTRAVENOUS at 12:59

## 2019-05-25 RX ADMIN — Medication 10 ML: at 09:34

## 2019-05-25 RX ADMIN — Medication 10 MEQ: at 19:05

## 2019-05-25 RX ADMIN — GABAPENTIN 400 MG: 400 CAPSULE ORAL at 20:38

## 2019-05-25 RX ADMIN — IBUPROFEN 600 MG: 600 TABLET ORAL at 14:19

## 2019-05-25 RX ADMIN — GABAPENTIN 400 MG: 400 CAPSULE ORAL at 09:33

## 2019-05-25 RX ADMIN — ASPIRIN 81 MG 81 MG: 81 TABLET ORAL at 09:32

## 2019-05-25 RX ADMIN — Medication 10 MEQ: at 07:22

## 2019-05-25 RX ADMIN — Medication 10 ML: at 21:00

## 2019-05-25 RX ADMIN — OLANZAPINE 10 MG: 10 INJECTION, POWDER, FOR SOLUTION INTRAMUSCULAR at 04:00

## 2019-05-25 RX ADMIN — OLANZAPINE 7.5 MG: 5 TABLET, FILM COATED ORAL at 17:35

## 2019-05-25 ASSESSMENT — PAIN SCALES - GENERAL: PAINLEVEL_OUTOF10: 5

## 2019-05-25 NOTE — PROGRESS NOTES
Speech Language Pathology  Attempt Note    SLP reviewed chart and spoke with RN who ok'd entry of SLP. Upon entry, pt agreeable to session, then stating she was in terrible pain from recent shot. Pt refused PO trials at this time d/t pain. RN alerted and she reported pt is tolerating current diet level of dysphagia 1 with nectar thick liquids at this time. SLP to continue to follow for dysphagia tx. Thank you. No charge.     Marcial Diaz, [facility], [address] [facility]  Speech-Language Pathologist

## 2019-05-25 NOTE — PROGRESS NOTES
Progress Note    Date:  2019   Patient: Wyatt Mortensen  Age:  26 y.o. Admission:  2019  5:59 PM  Admit DX: Acute respiratory failure ([address] [J96.00]   LOS: 11 days     Reason for follow up:Cardiac arrest     SUBJECTIVE:    The patient was seen and examined. Notes reviewed. There were not complications over night. The patient is being seen for arrhythmia and cardiac arrest.  Patient's cardiac review of systems: positive for fatigue. The patient is generally feeling unchanged. Review of Systems    OBJECTIVE:    Telemetry: Sinus  BP 98/74   Pulse 92   Temp 97.1 °F (36.2 °C) (Axillary)   Resp 16   Ht 5' (1.524 m)   Wt 179 lb 9.6 oz (81.5 kg)   SpO2 93%   BMI 35.08 kg/m²   Vital Signs:           Blood pressure 98/74, pulse 92, temperature 97.1 °F (36.2 °C), temperature source Axillary, resp. rate 16, height 5' (1.524 m), weight 179 lb 9.6 oz (81.5 kg), SpO2 93 %. Temp  Av.5 °F (36.9 °C)  Min: 97.1 °F (36.2 °C)  Max: 100 °F (37.8 °C)  Pulse  Av.2  Min: 80  Max: 121  BP  Min: 83/60  Max: 130/118  SpO2  Av.8 %  Min: 90 %  Max: 97 %      Admission Weight:  Weight: 230 lb (104.3 kg)      I/O:     Intake/Output Summary (Last 24 hours) at 2019 1048  Last data filed at 2019 0706  Gross per 24 hour   Intake 1660 ml   Output 2110 ml   Net -450 ml       Vent Settings:  Vent Information  $Ventilation: $Subsequent Day  Vent Type: 840  Vent Mode: AC/VC  Vt Ordered: 410 mL  Rate Set: 0 bmp  Peak Flow: 80 L/min  Pressure Support: 5 cmH20  FiO2 : 50 %  Sensitivity: 3  PEEP/CPAP: 5  I Time/ I Time %: 0 s  Cuff Pressure (cm H2O): 25 cm H2O  Humidification Source: Heated wire  Humidification Temp: 37.4  Humidification Temp Measured: 34.8  Circuit Condensation: Drained     EXAM:   CONSTITUTIONAL:  awake, alert, cooperative, no apparent distress, and appears stated age. Slightly confused  HEENT:Normal jugular venous pulsations, no carotid bruits. Head is atraumatic, normocephalic. 05/23/19 2025 05/24/19  0655 05/25/19  0430   NA  --    < > 135* 135* 137   K 2.8*   < > 3.1* 2.9* 2.9*   CL  --    < > 94* 92* 94*   CO2  --    < > 28 29 27   PHOS 3.7  --   --  3.6 4.7   BUN  --    < > 16 21* 22*   CREATININE  --    < > <0.5* <0.5* <0.5*    < > = values in this interval not displayed. BNP: No results for input(s): BNP in the last 72 hours. PT/INR:   Recent Labs     05/23/19  1222 05/24/19  1128   PROT 8.6*  --    INR  --  1.14     APTT:No results for input(s): APTT in the last 72 hours. CARDIAC ENZYMES:No results for input(s): CKMB, CKMBINDEX, TROPONINI in the last 72 hours. Invalid input(s): CKTOTAL;3  FASTING LIPID PANEL:  Lab Results   Component Value Date    TRIG 232 05/22/2019     LIVER PROFILE:  Recent Labs     05/23/19  1222   AST 51*   ALT 31   BILIDIR <0.2   BILITOT 0.5   ALKPHOS 115       ASSESSMENT AND PLAN:    Patient Active Problem List   Diagnosis    Acute respiratory failure (HCC)    Aspiration pneumonitis (HCC)    Atrial fibrillation with RVR (HCC)    Electrolyte disturbance    [address]    Transaminitis    Hyperglycemia    Pneumonia due to infectious organism    ARDS (adult respiratory distress syndrome) (HCC)    Mucus plugging of bronchi    Cardiac arrest with ventricular fibrillation (HCC)    Multiple tracheobronchial mucus plugs    Mild protein-calorie malnutrition ([address]       1.  Cardiac arrest   -patient will need secondary prevention Implantable cardioverter defibrillator     -Risk, benefit, alternative, rationale of the procedure were discussed with the patient which included but were not limited to allergic reaction to the medications, pain, bleeding, infection, nerve injury, injury to diaphragm(breathing muscle), pulmonary embolus(blood clot in lungs), deep vein blood clot, pneumothorax, hemothorax, acute renal failure, cardiac perforation,  tamponade, need for emergent surgery (open heart), need for any future surgery, pulmonary vein stenosis requiring stent or angioplasty, left atrial to esophageal fistula requiring emergent surgery, stroke, myocardial infarction, need for permanent pacemaker, lead dislodgement and death.     -will plan once neurologically stable and infection free    2. Aspiration pneumonia   -as per primary team      Please see orders. Discussed with patient and nursing. Thank you for asking me to assist in the care of this patient. Please contact me if you have any questions regarding her evaluation. All questions and concerns were addressed to the patient/family. Alternatives to my treatment were discussed. The note was completed using EMR. Every effortwas made to ensure accuracy; however, inadvertent computerized transcription errors may be present. YUN Mccord F.A.C.C.   [address]  ([address] office

## 2019-05-25 NOTE — PROGRESS NOTES
Inpatient Occupational Therapy  Evaluation and Treatment    Unit: ICU  Date:  5/25/2019  Patient Name:    Ernestina Elena  Admitting diagnosis:  Acute respiratory failure ([facility] [address] [J96.00]  Admit Date:  5/14/2019  Precautions/Restrictions/WB Status/ Lines/ Wounds/ Oxygen: fall risk, IV, bed/chair alarm, rashid catheter , supplemental o2 (3L), confusion, ICU monitoring and telesitter      RN cleared pt to sit at EOB only. Treatment Time:  9427-4160  Treatment Number: 1   Billable Treatment Time: 30 minutes   Total Treatment Time:   40   minutes    Patient Goals for Therapy:  None stated at this time      Discharge Recommendations: Acute Rehab (ARU)/ Inpatient [facility] ([facility])  DME needs for discharge: defer to facility       Therapy recommendations for staff:   Assist of 2 to sit at EOB, as pt tolerates      Home Health S4 Level Recommendation:  NA  AM-PAC Score: 16    Preadmission Environment    Pt. Lives With Family, mother  Home environment:            apartment   Steps to enter first floor: No steps  Steps to second floor:         N/A  Bathroom: Bath Tub Shower and Grab bars  Equipment owned: [facility], lifts for shoe (doesn't wear)     Preadmission Status:  Pt. Able to drive: Unknown  Pt Fully independent with ADLs: Yes  Pt. Required assistance from family for: Independent PTA  Pt. Fully independent for transfers and gait and walked with HEXIO inconsistently  History of falls          None recently    Pain   Yes  Location: neck, back,leg  Rating: severe /10  Pain Medicine Status: RN notified    Cognition    A&O Person    Able to follow 1 step commands    Subjective  Patient lying supine in bed with family present (mom)   Pt agreeable to this OT eval & tx. Upper Extremity ROM:    WFL,  pt able to perform all bed mobility, transfers, and gait without ROM limitation.     Upper Extremity Strength:    BUE strength WFL, but not formally assessed w/ MMT    Upper Extremity Sensation    [facility]    Upper Extremity Proprioception:   WFL    Coordination and Tone  WFL    Balance  Functional Sitting Balance:  Impaired  Functional Standing Balance:NT    Bed mobility:    Supine to sit:   Mod A of 2  Sit to supine: Mod A of 2  Scooting to head of bed:   Max A of 2  Scooting in sitting: Mod A of 2  Rolling:   Not Tested  Bridging:   Not Tested    Transfers:    Sit to stand:  Not Tested  Stand to sit:  Not Tested  Bed to [facility]:  Not Tested  Bed to chair:   Not Tested  Standard toilet: Not Tested    Activity Tolerance   Pt completed therapy session with No nausea, dizziness, pain or SOB noted w/activity  Elevated HR     Pt reports ADL needs are met at this time. Continue to assess. Positioning Needs: In bed, call light and needs in reach. Exercise / Activities Initiated:   N/A    Patient/Family Education:   Role of OT    Assessment of Deficits: Pt seen for Occupational therapy evaluation in acute care setting. Pt demonstrated decreased Activity Tolerance, ADL's, Balance, Bed Mobility, Safety Awareness and Transfers. Pt functioning below baseline and will likely benefit from skilled occupational therapy services to maximize safety and independence. Goal(s) : To be met in 3 Visits:  1). Bed to toilet/BSC: Mod A    To be met in 5 Visits:  1). Supine to Sit: Min A  2). Upper Body Bathing:  Min A  3). Lower Body Bathing: Mod A  4). Upper Body Dressing: Min A  5). Lower Body Dressing: Mod A  6). Pt to shelia UE exs x 15 reps    Rehabilitation Potential:  Good for goals listed above. Strengths for achieving goals include: PLOF  Barriers to achieving goals include:  Complexity of condition and Pain      Plan: To be seen 5 x/wk while in acute care setting for therapeutic exercises, bed mobility, transfers, dressing, bathing, family/patient education with adaptive equipment, breathing technique instruction.      Debra Lee, GISSELL, OTR/L   [id number]            If patient discharges from this facility prior to next visit, this note will serve as the Discharge Summary

## 2019-05-25 NOTE — PROGRESS NOTES
Staff sitting in room with patient, continuing with frequent hallucinations. POC reviewed, reoriented. Cooperative with care. Call light in reach. HR 140s. ST. Denies chest pain currently. 02 on @ 2lpm/NC.

## 2019-05-25 NOTE — PROGRESS NOTES
Pulmonary & Critical Care Medicine ICU Progress Note  CC:Acute respiratory failure with hypoxemia    Events of Last 24 hours: Precedex at 2. Delirium with agitation. PICC line partially pulled out. Invasive Lines:   IV Line: PICC 5/24     MV:  none  Vent Mode: AC/VC Rate Set: 0 bmp/Vt Ordered: 410 mL/ /FiO2 : 50 %  Recent Labs     05/23/19  0500   PHART 7.519*   ZAB3MVI 38.3   PO2ART 87.2       IV:   dexmedetomidine (PRECEDEX) IV infusion 2 mcg/kg/hr (05/25/19 0515)    dextrose         EXAM:  /74   Pulse 86   Temp 100 °F (37.8 °C) (Oral)   Resp 23   Ht 5' (1.524 m)   Wt 179 lb 9.6 oz (81.5 kg)   SpO2 93%   BMI 35.08 kg/m²  on 4l NC  Tmax: 100  CVP: CVP (Mean): 11 mmHg    Intake/Output Summary (Last 24 hours) at 5/25/2019 0706  Last data filed at 5/24/2019 1742  Gross per 24 hour   Intake 1360 ml   Output 910 ml   Net 450 ml     General: Mild distress. Normocephalic, atraumatic. Eyes: PERRL. No sclera icterus. No conjunctival injection. ENT: No discharge. Pharynx clear. Neck: Trachea midline. Normal thyroid. Resp: No accessory muscle use. Few crackles. No wheezing. bilateral rhonchi. No dullness on percussion. CV: Regular rate. Regular rhythm. No mumur or rub. No edema. GI: Non-tender. Non-distended. No masses. No organmegaly. Normal bowel sounds. No hernia. Skin: Warm and dry. No nodule on exposed extremities. No rash on exposed extremities. Lymph: No cervical LAD. No supraclavicular LAD. M/S: No cyanosis. No joint deformity. No clubbing.  L leg muscle wasting  Neuro:  awake, she is moving extremities, + pupillary response, follows some commands, disoriented      Medications:   ipratropium-albuterol  1 ampule Inhalation TID    lidocaine 1 % injection  5 mL Intradermal Once    sodium chloride flush  10 mL Intravenous 2 times per day    gabapentin  400 mg Oral TID    potassium chloride  40 mEq Oral BID WC    furosemide  40 mg Intravenous TID    meropenem (MERREM) 1 g IVPB (mini-bag)  1 g Intravenous Q8H    vancomycin  1,000 mg Intravenous Q8H    OLANZapine  15 mg Oral BID    enoxaparin  40 mg Subcutaneous Daily    aspirin  81 mg Oral Daily    famotidine (PEPCID) injection  20 mg Intravenous BID    insulin lispro  0-6 Units Subcutaneous Q4H    sodium chloride flush  10 mL Intravenous 2 times per day     PRN Meds:  potassium chloride, sodium chloride flush, OLANZapine, melatonin, traZODone, sennosides-docusate sodium, magnesium sulfate, potassium chloride, glucose, dextrose, glucagon (rDNA), dextrose, ibuprofen, sodium chloride flush    Results:  CBC:   Recent Labs     05/23/19  0500 05/24/19  0600 05/25/19  0430   WBC 15.8* 24.0* 23.4*   HGB 12.7 13.7 13.4   HCT 37.4 40.1 39.3   MCV 87.0 87.6 87.6    503* 517*     BMP:   Recent Labs     05/23/19  0500  05/23/19  2025 05/24/19  0655 05/25/19  0430   NA  --    < > 135* 135* 137   K 2.8*   < > 3.1* 2.9* 2.9*   CL  --    < > 94* 92* 94*   CO2  --    < > 28 29 27   PHOS 3.7  --   --  3.6 4.7   BUN  --    < > 16 21* 22*   CREATININE  --    < > <0.5* <0.5* <0.5*    < > = values in this interval not displayed. LIVER PROFILE:   Recent Labs     05/23/19  1222   AST 51*   ALT 31   BILIDIR <0.2   BILITOT 0.5   ALKPHOS 115     PT/INR:   Recent Labs     05/24/19  1128   PROTIME 13.0   INR 1.14     APTT: No results for input(s): APTT in the last 72 hours. UA:No results for input(s): NITRITE, COLORU, PHUR, LABCAST, WBCUA, RBCUA, MUCUS, TRICHOMONAS, YEAST, BACTERIA, CLARITYU, SPECGRAV, LEUKOCYTESUR, UROBILINOGEN, BILIRUBINUR, BLOODU, GLUCOSEU, AMORPHOUS in the last 72 hours.     Invalid input(s): Thalia Cohen    Cultures:  5/14 BCx NGTD  5/16 BAL Staphylococcus aureus  5/17 BCx NGTD   5/17 UCx NGTD   5/19 BAL NGTD  5/23 trach asp pending    Films:  CXR 5/24 reviewed by me and it showed:Right upper extremity PICC line is seen, extending to the mid right infraclavicular region.  Heterogeneous bilateral pulmonary opacity is again

## 2019-05-25 NOTE — PROGRESS NOTES
Inpatient Physical Therapy Evaluation and Treatment    Unit: ICU  Date:  5/25/2019  Patient Name:    Lolly Councilman  Admitting diagnosis:  Acute respiratory failure ([facility] [address] [J96.00]  Admit Date:  5/14/2019  Precautions/Restrictions/WB Status/ Lines/ Wounds/ Oxygen: fall risk, IV, bed/chair alarm, rashid catheter , supplemental o2 (3 LL), confusion and ICU monitoring, Intubated 5/14-5/23    Treatment Time:  11:35-12:13   Treatment Number:  1   Timed Code Treatment Minutes: 28 minutes  Total Treatment Minutes:  38  minutes    Patient Goals for Therapy: \" to get moving \"          Discharge Recommendations: Acute Rehab (ARU)/ Inpatient [facility] ([facility])  DME needs for discharge: defer to facility       Therapy recommendations for staff:   Assist of 2 to sit at [address] S4 Level Recommendation:  NA  AM-PAC Mobility Score    AM-PAC Inpatient Mobility Raw Score : 9     Patient was admitted 5/14 in cardiopulmonary arrest (v.. Fib). 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 Lives With Family, mother  Home environment:  apartment   Steps to enter first floor: No steps  Steps to second floor: N/A  Bathroom: Bath Tub Shower and Grab bars  Equipment owned: [employer], lifts for shoe (doesn't wear)    Preadmission Status:  Pt. Able to drive: Unknown  Pt Fully independent with ADLs: Yes  Pt. Required assistance from family for: Independent PTA  Pt. Fully independent for transfers and gait and walked with [employer] inconsistently  History of falls None recently    Pain   Yes  Location: neck, back,leg  Rating: severe /10  Pain Medicine Status: RN notified    Cognition    A&O Person    Able to follow 1 step commands inconsistently    Subjective  Patient lying supine in bed with family at bedside (mother)  Pt agreeable to this PT eval & tx. Upper Extremity ROM/Strength  Please see OT evaluation. Lower Extremity ROM / Strength    AROM WFL: Yes  ROM limitations: L foot    Grossly 3/5 BLE    Lower Extremity Sensation    NT    Lower Extremity Proprioception:   NT    Coordination and Tone  NT    Balance  Static Sitting; Poor-fair, heavy posterior lean, decreased safety awareness, no foot support   Tolerance: sat ~3 minutes  Dynamic Sitting:  Poor  Static Standing: Not tested   Tolerance:   Dynamic Standing: Not tested    Bed Mobility   Supine to Sit:   Mod A of 2  Sit to Supine: Mod A of 2  Rolling: Mod A  Scooting at EOB: Mod A of 2  Scooting to [facility]:  Total A of 2    Transfer Training  DNT,cleared for EOB activity only     Gait   Cleared for EOB activities only  Activity Tolerance   Pt completed therapy session with Pain  SpO2: 98% on 3 L  HR:110-130  BP: 113/  sitting    Positioning Needs   Pt returned to bed, call light and needs in reach    Exercises Initiated    N/A    Other  None. Patient/Family Education   Pt educated on role of inpatient PT, POC, importance of continued activity, calling for assist with mobility. Assessment  Pt seen for Physical Therapy evaluation in acute care setting. Pt demonstrated decreased Activity tolerance, Balance, Safety and Strength and decreased independence with Ambulation, Bed Mobility  and Transfers. Goals : To be met in 3 visits:  1). Independent with LE Ex x 10 reps    To be met in 6 visits:  1). Supine to/from sit: Min A  2). Sit to/from stand: tolerate assessment  3). Bed to chair: tolerate assessment  4). Gait: Ambulate tolerate assessment  5).   Tolerate B LE exercises 3 sets of 10-15 reps      Rehabilitation Potential: Good  Strengths for achieving goals include:   PLOF and Family Support  Barriers to achieving goals include:    Complexity of condition    Plan    To be seen 3-5 x / week  while in acute care setting for therapeutic exercises, bed mobility, transfers, progressive gait

## 2019-05-25 NOTE — PROGRESS NOTES
Juan Daviday called and stated PICC was not in place.  PICC was secured and will request IR to alysha PICC in am.

## 2019-05-25 NOTE — PROGRESS NOTES
Remains in bed, requiring reorientation d/t frequent hallucinations. Mother updated per phone. Call light in reach.

## 2019-05-25 NOTE — PROGRESS NOTES
Pt behaviors have continued to escalate. Pt is not easily redirected. In attempt of getting out of bed, pt rolled  In her posey, pulling rashid cath and causing hematuria, Pt also pulled her picc line at this time. IV fluids placed on hold, Xray was obtained to determine placement. When staff was trying to straighten pt up, she started hitting and kicking at the staff. Pt could not be redirected. Ronen wrist restraints applied, Clinical  notified.

## 2019-05-25 NOTE — PROGRESS NOTES
Progress Note    Admit Date:  5/14/2019      64-year-old female with nonischemic dilated cardiomyopathy, presented with V. fib arrest.  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 Hep C + . She is mostly homebound. She has been ill with  respiratory symptoms for a couple of months. Mother found her down. CPR initiated,  S/P defibrillation twice en route to hospital.  Admitted  to ICU , on mechanical vent   Echocardiogram with ejection fraction low at 25%   Head CT was negative. status post bronchoscopy 5/16. Patient had significant amount of resp secretions that were aspirated       Diuresed Improved EF on echo   Extubated on 5/23    Doing better   Sat up at the side of bed  Subjective:    Ms. Yaw Montejo seen up in bed,  No new c/o  Cough ,throat is sore   Has afoley    Objective:   /89   Pulse 117   Temp 100.2 °F (37.9 °C) (Axillary)   Resp 23   Ht 5' (1.524 m)   Wt 179 lb 9.6 oz (81.5 kg)   SpO2 94%   BMI 35.08 kg/m²       Intake/Output Summary (Last 24 hours) at 5/25/2019 1455  Last data filed at 5/25/2019 1400  Gross per 24 hour   Intake 2640 ml   Output 3235 ml   Net -595 ml       Physical Exam:-  General: young female,  Awake alert and anxious  Skin:  Warm and dry  Neck:  JVD absent. Neck supple  Chest:  Diminished breath sounds . No wheezes, rales or rhonchi. Cardiovascular:  RRR ,S1S2 normal  Abdomen:  Soft, non tender, non distended, BS +  Extremities:  Trace edema of upper extremity . No edema of lower extremity. . Left lower extremity atrophy present  Intact peripheral pulses.  Brisk cap refill, < 2 secs  Neuro:  Non focal . Moving all extremities      Medications:   Scheduled Meds:   ipratropium-albuterol  1 ampule Inhalation TID    lidocaine 1 % injection  5 mL Intradermal Once    sodium chloride flush  10 mL Intravenous 2 times per day    gabapentin  400 mg Oral TID    potassium chloride  40 mEq Oral BID WC    furosemide 40 mg Intravenous TID    meropenem (MERREM) 1 g IVPB (mini-bag)  1 g Intravenous Q8H    vancomycin  1,000 mg Intravenous Q8H    OLANZapine  15 mg Oral BID    enoxaparin  40 mg Subcutaneous Daily    aspirin  81 mg Oral Daily    famotidine (PEPCID) injection  20 mg Intravenous BID    insulin lispro  0-6 Units Subcutaneous Q4H    sodium chloride flush  10 mL Intravenous 2 times per day       Continuous Infusions:   dexmedetomidine (PRECEDEX) IV infusion 2 mcg/kg/hr (05/25/19 0762)    dextrose         Data:  CBC:   Recent Labs     05/23/19  0500 05/24/19  0600 05/25/19  0430   WBC 15.8* 24.0* 23.4*   RBC 4.31 4.58 4.49   HGB 12.7 13.7 13.4   HCT 37.4 40.1 39.3   MCV 87.0 87.6 87.6   RDW 12.7 13.1 12.6    503* 517*     BMP:   Recent Labs     05/24/19  0655 05/25/19  0430 05/25/19  1421   * 137 134*   K 2.9* 2.9* 3.1*   CL 92* 94* 91*   CO2 29 27 26   PHOS 3.6 4.7 3.7   BUN 21* 22* 20   CREATININE <0.5* <0.5* <0.5*     LIVER PROFILE:   Recent Labs     05/23/19  1222   AST 51*   ALT 31   BILIDIR <0.2   BILITOT 0.5   ALKPHOS 115          Urine hCG negative     Rapid flu antigen negative       Cultures  Respiratory cultures heavy growth staph - MSSA    Blood - NGTD       Radiology  XR CHEST PORTABLE   Final Result   Right upper extremity PICC line has not yet reached the inferior vena cava. Its tip is at the expected location of the mid right subclavian vein. Bilateral airspace disease, improving on the right as compared to prior. Findings were called by the radiology call center. IR PICC WO SQ PORT/PUMP > 5 YEARS   Final Result   Successful placement of PICC line. XR CHEST PORTABLE   Final Result   1. Improving multifocal right lung airspace disease. 2. Increasing left basilar atelectasis or pneumonia. XR CHEST PORTABLE   Final Result   Stable support apparatus. New left perihilar airspace disease and persistent airspace disease   throughout the right lung. Findings may be related to edema and/or pneumonia. XR CHEST PORTABLE   Final Result   Worsening right lung airspace disease likely represents pneumonia. XR CHEST PORTABLE   Final Result   Stable right basilar and improving left basilar atelectasis and/or pneumonia. XR CHEST PORTABLE   Final Result   Bilateral pulmonary congestion with right basilar consolidation concerning   for pneumonia. Stable support tubes. XR CHEST PORTABLE   Final Result   Grossly stable bilateral airspace disease. XR CHEST PORTABLE   Final Result      XR CHEST PORTABLE   Final Result   Worsening bilateral airspace opacities in a pattern that would favor ARDS. XR CHEST PORTABLE   Final Result   Stable life support device positioning. Persistent perihilar predominant edema and small effusions. XR CHEST PORTABLE   Final Result   Interval improvement in pulmonary edema. XR CHEST PORTABLE   Final Result   Appropriate left IJ central venous catheter positioning. No pneumothorax. Appropriate endotracheal tube positioning. Layering bilateral effusions. Equivocal for a component of superimposed   pulmonary edema. Atelectasis likely present. CT ABDOMEN PELVIS WO CONTRAST Additional Contrast? None   Final Result   1. Bilateral lower lobe atelectasis, right greater than left. 2. Acute right 2nd through 4th and left 3rd through 5th rib fractures         CT Chest Pulmonary Embolism W Contrast   Final Result   1. Bilateral lower lobe atelectasis, right greater than left. 2. Acute right 2nd through 4th and left 3rd through 5th rib fractures         CT Head WO Contrast   Final Result   No acute intracranial abnormality. Left maxillary sinus mucosal thickening. Air-fluid level in the sphenoid   sinus. Correlate with any clinical evidence of sinusitis.          XR CHEST PORTABLE   Final Result   Findings as above which may be related to congestive heart yesterdaydoing well  - Status post bronchoscopy  twice due to increased respiratory secretions  - sputum with MSSA        A. fib   - pt in afib following defib for V fib arrest. Loaded with amiodarone   - continued on  amiodarone drip, and heparin drip off AMIO/heparin      Septic shock   Pneumonia   - ? Aspiration - pt had resp symptoms for 2 months   - Secondary to staph aureus infection.   - Status post bronchoscopy cultures positive for MSSA  - Continue  IV antibiotics , D5 on vanc and merrem . [facility] Pt/ot     Angiogram and EP studies and AICDper cards whe n stable       DVT Prophylaxis:  Lovenox  DIET GENERAL; Dysphagia I Pureed;  Nectar Thick  Code Status: Full Code          Lia Garrett MD 5/25/2019 2:55 PM
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
## 2019-05-26 ENCOUNTER — APPOINTMENT (OUTPATIENT)
Dept: GENERAL RADIOLOGY | Age: 34
DRG: 710 | End: 2019-05-26
Payer: MEDICAID

## 2019-05-26 PROBLEM — E44.0 MODERATE PROTEIN-CALORIE MALNUTRITION (HCC): Status: ACTIVE | Noted: 2019-05-23

## 2019-05-26 LAB
ANION GAP SERPL CALCULATED.3IONS-SCNC: 17 MMOL/L (ref 3–16)
BANDED NEUTROPHILS RELATIVE PERCENT: 5 % (ref 0–7)
BASOPHILS ABSOLUTE: 0.3 K/UL (ref 0–0.2)
BASOPHILS RELATIVE PERCENT: 1 %
BUN BLDV-MCNC: 21 MG/DL (ref 7–20)
CALCIUM SERPL-MCNC: 10.2 MG/DL (ref 8.3–10.6)
CHLORIDE BLD-SCNC: 90 MMOL/L (ref 99–110)
CO2: 26 MMOL/L (ref 21–32)
CREAT SERPL-MCNC: <0.5 MG/DL (ref 0.6–1.1)
EOSINOPHILS ABSOLUTE: 0 K/UL (ref 0–0.6)
EOSINOPHILS RELATIVE PERCENT: 0 %
GFR AFRICAN AMERICAN: >60
GFR NON-AFRICAN AMERICAN: >60
GLUCOSE BLD-MCNC: 130 MG/DL (ref 70–99)
GLUCOSE BLD-MCNC: 138 MG/DL (ref 70–99)
HCT VFR BLD CALC: 41.4 % (ref 36–48)
HEMOGLOBIN: 13.9 G/DL (ref 12–16)
INR BLD: 1.3 (ref 0.86–1.14)
LYMPHOCYTES ABSOLUTE: 1.6 K/UL (ref 1–5.1)
LYMPHOCYTES RELATIVE PERCENT: 6 %
MAGNESIUM: 1.4 MG/DL (ref 1.8–2.4)
MCH RBC QN AUTO: 29.3 PG (ref 26–34)
MCHC RBC AUTO-ENTMCNC: 33.6 G/DL (ref 31–36)
MCV RBC AUTO: 87.3 FL (ref 80–100)
MONOCYTES ABSOLUTE: 1.3 K/UL (ref 0–1.3)
MONOCYTES RELATIVE PERCENT: 5 %
NEUTROPHILS ABSOLUTE: 22.9 K/UL (ref 1.7–7.7)
NEUTROPHILS RELATIVE PERCENT: 83 %
NUCLEATED RED BLOOD CELLS: 1 /100 WBC
PDW BLD-RTO: 13.1 % (ref 12.4–15.4)
PERFORMED ON: ABNORMAL
PHOSPHORUS: 4.7 MG/DL (ref 2.5–4.9)
PLATELET # BLD: 626 K/UL (ref 135–450)
PLATELET SLIDE REVIEW: ABNORMAL
PMV BLD AUTO: 8 FL (ref 5–10.5)
POTASSIUM SERPL-SCNC: 2.9 MMOL/L (ref 3.5–5.1)
PROTHROMBIN TIME: 14.8 SEC (ref 9.8–13)
RBC # BLD: 4.75 M/UL (ref 4–5.2)
SLIDE REVIEW: ABNORMAL
SODIUM BLD-SCNC: 133 MMOL/L (ref 136–145)
WBC # BLD: 26 K/UL (ref 4–11)

## 2019-05-26 PROCEDURE — 2580000003 HC RX 258: Performed by: INTERNAL MEDICINE

## 2019-05-26 PROCEDURE — 6360000002 HC RX W HCPCS: Performed by: INTERNAL MEDICINE

## 2019-05-26 PROCEDURE — 2700000000 HC OXYGEN THERAPY PER DAY

## 2019-05-26 PROCEDURE — 36415 COLL VENOUS BLD VENIPUNCTURE: CPT

## 2019-05-26 PROCEDURE — 2500000003 HC RX 250 WO HCPCS: Performed by: INTERNAL MEDICINE

## 2019-05-26 PROCEDURE — 6370000000 HC RX 637 (ALT 250 FOR IP): Performed by: INTERNAL MEDICINE

## 2019-05-26 PROCEDURE — 85025 COMPLETE CBC W/AUTO DIFF WBC: CPT

## 2019-05-26 PROCEDURE — 6370000000 HC RX 637 (ALT 250 FOR IP): Performed by: PSYCHIATRY & NEUROLOGY

## 2019-05-26 PROCEDURE — 94761 N-INVAS EAR/PLS OXIMETRY MLT: CPT

## 2019-05-26 PROCEDURE — 99291 CRITICAL CARE FIRST HOUR: CPT | Performed by: INTERNAL MEDICINE

## 2019-05-26 PROCEDURE — 02HV33Z INSERTION OF INFUSION DEVICE INTO SUPERIOR VENA CAVA, PERCUTANEOUS APPROACH: ICD-10-PCS | Performed by: INTERNAL MEDICINE

## 2019-05-26 PROCEDURE — 94150 VITAL CAPACITY TEST: CPT

## 2019-05-26 PROCEDURE — 2000000000 HC ICU R&B

## 2019-05-26 PROCEDURE — 84100 ASSAY OF PHOSPHORUS: CPT

## 2019-05-26 PROCEDURE — C1769 GUIDE WIRE: HCPCS

## 2019-05-26 PROCEDURE — 83735 ASSAY OF MAGNESIUM: CPT

## 2019-05-26 PROCEDURE — 80048 BASIC METABOLIC PNL TOTAL CA: CPT

## 2019-05-26 PROCEDURE — 94640 AIRWAY INHALATION TREATMENT: CPT

## 2019-05-26 PROCEDURE — 2580000003 HC RX 258: Performed by: EMERGENCY MEDICINE

## 2019-05-26 PROCEDURE — 36573 INSJ PICC RS&I 5 YR+: CPT

## 2019-05-26 PROCEDURE — 99233 SBSQ HOSP IP/OBS HIGH 50: CPT | Performed by: INTERNAL MEDICINE

## 2019-05-26 PROCEDURE — 85610 PROTHROMBIN TIME: CPT

## 2019-05-26 RX ORDER — SODIUM CHLORIDE 0.9 % (FLUSH) 0.9 %
10 SYRINGE (ML) INJECTION EVERY 12 HOURS SCHEDULED
Status: DISCONTINUED | OUTPATIENT
Start: 2019-05-26 | End: 2019-05-29 | Stop reason: HOSPADM

## 2019-05-26 RX ORDER — SODIUM CHLORIDE 0.9 % (FLUSH) 0.9 %
10 SYRINGE (ML) INJECTION PRN
Status: DISCONTINUED | OUTPATIENT
Start: 2019-05-26 | End: 2019-05-29 | Stop reason: HOSPADM

## 2019-05-26 RX ORDER — METOPROLOL SUCCINATE 25 MG/1
25 TABLET, EXTENDED RELEASE ORAL DAILY
Status: DISCONTINUED | OUTPATIENT
Start: 2019-05-26 | End: 2019-05-27

## 2019-05-26 RX ORDER — ONDANSETRON 2 MG/ML
4 INJECTION INTRAMUSCULAR; INTRAVENOUS EVERY 6 HOURS PRN
Status: DISCONTINUED | OUTPATIENT
Start: 2019-05-26 | End: 2019-05-29 | Stop reason: HOSPADM

## 2019-05-26 RX ORDER — METOLAZONE 2.5 MG/1
5 TABLET ORAL DAILY
Status: DISCONTINUED | OUTPATIENT
Start: 2019-05-26 | End: 2019-05-29 | Stop reason: HOSPADM

## 2019-05-26 RX ORDER — LIDOCAINE HYDROCHLORIDE 10 MG/ML
5 INJECTION, SOLUTION EPIDURAL; INFILTRATION; INTRACAUDAL; PERINEURAL ONCE
Status: DISCONTINUED | OUTPATIENT
Start: 2019-05-26 | End: 2019-05-29 | Stop reason: HOSPADM

## 2019-05-26 RX ADMIN — MEROPENEM 1 G: 1 INJECTION, POWDER, FOR SOLUTION INTRAVENOUS at 17:36

## 2019-05-26 RX ADMIN — Medication 10 ML: at 08:19

## 2019-05-26 RX ADMIN — OLANZAPINE 10 MG: 10 INJECTION, POWDER, FOR SOLUTION INTRAMUSCULAR at 10:10

## 2019-05-26 RX ADMIN — VANCOMYCIN HYDROCHLORIDE 1000 MG: 1 INJECTION, POWDER, LYOPHILIZED, FOR SOLUTION INTRAVENOUS at 12:38

## 2019-05-26 RX ADMIN — ENOXAPARIN SODIUM 40 MG: 40 INJECTION SUBCUTANEOUS at 08:52

## 2019-05-26 RX ADMIN — MEROPENEM 1 G: 1 INJECTION, POWDER, FOR SOLUTION INTRAVENOUS at 02:28

## 2019-05-26 RX ADMIN — FAMOTIDINE 20 MG: 10 INJECTION, SOLUTION INTRAVENOUS at 08:47

## 2019-05-26 RX ADMIN — MEROPENEM 1 G: 1 INJECTION, POWDER, FOR SOLUTION INTRAVENOUS at 08:51

## 2019-05-26 RX ADMIN — METOPROLOL SUCCINATE 25 MG: 25 TABLET, FILM COATED, EXTENDED RELEASE ORAL at 14:20

## 2019-05-26 RX ADMIN — Medication 10 MEQ: at 14:19

## 2019-05-26 RX ADMIN — ONDANSETRON 4 MG: 2 INJECTION INTRAMUSCULAR; INTRAVENOUS at 09:50

## 2019-05-26 RX ADMIN — VANCOMYCIN HYDROCHLORIDE 1000 MG: 1 INJECTION, POWDER, LYOPHILIZED, FOR SOLUTION INTRAVENOUS at 04:03

## 2019-05-26 RX ADMIN — Medication 10 MEQ: at 06:19

## 2019-05-26 RX ADMIN — GABAPENTIN 400 MG: 400 CAPSULE ORAL at 20:58

## 2019-05-26 RX ADMIN — Medication 10 ML: at 20:59

## 2019-05-26 RX ADMIN — FUROSEMIDE 40 MG: 10 INJECTION, SOLUTION INTRAMUSCULAR; INTRAVENOUS at 20:58

## 2019-05-26 RX ADMIN — IPRATROPIUM BROMIDE AND ALBUTEROL SULFATE 1 AMPULE: .5; 3 SOLUTION RESPIRATORY (INHALATION) at 07:00

## 2019-05-26 RX ADMIN — OLANZAPINE 15 MG: 10 TABLET, FILM COATED ORAL at 20:58

## 2019-05-26 RX ADMIN — IPRATROPIUM BROMIDE AND ALBUTEROL SULFATE 1 AMPULE: .5; 3 SOLUTION RESPIRATORY (INHALATION) at 13:52

## 2019-05-26 RX ADMIN — FAMOTIDINE 20 MG: 10 INJECTION, SOLUTION INTRAVENOUS at 20:59

## 2019-05-26 RX ADMIN — FUROSEMIDE 40 MG: 10 INJECTION, SOLUTION INTRAMUSCULAR; INTRAVENOUS at 14:20

## 2019-05-26 RX ADMIN — Medication 10 MEQ: at 16:07

## 2019-05-26 RX ADMIN — FUROSEMIDE 40 MG: 10 INJECTION, SOLUTION INTRAMUSCULAR; INTRAVENOUS at 08:51

## 2019-05-26 RX ADMIN — Medication 10 MEQ: at 17:38

## 2019-05-26 RX ADMIN — GABAPENTIN 400 MG: 400 CAPSULE ORAL at 14:19

## 2019-05-26 RX ADMIN — VANCOMYCIN HYDROCHLORIDE 1000 MG: 1 INJECTION, POWDER, LYOPHILIZED, FOR SOLUTION INTRAVENOUS at 20:59

## 2019-05-26 RX ADMIN — Medication 10 MEQ: at 13:05

## 2019-05-26 RX ADMIN — Medication 10 ML: at 21:02

## 2019-05-26 RX ADMIN — IPRATROPIUM BROMIDE AND ALBUTEROL SULFATE 1 AMPULE: .5; 3 SOLUTION RESPIRATORY (INHALATION) at 19:54

## 2019-05-26 RX ADMIN — OLANZAPINE 7.5 MG: 5 TABLET, FILM COATED ORAL at 17:35

## 2019-05-26 RX ADMIN — IBUPROFEN 600 MG: 600 TABLET ORAL at 02:27

## 2019-05-26 ASSESSMENT — PAIN SCALES - GENERAL
PAINLEVEL_OUTOF10: 0
PAINLEVEL_OUTOF10: 3
PAINLEVEL_OUTOF10: 0
PAINLEVEL_OUTOF10: 0

## 2019-05-26 NOTE — PROGRESS NOTES
[facility]   Cardiology Progress Note     Admit Date: 2019     Reason for follow up: Cardiac arrest     HPI and Interval History: 26 y.o. female presented with cardiac arrest. Extubated. History of  Adriamycin therapy as a child for sarcoma of the left leg with resultant cardiomyopathy. Hep C + . She is mostly homebound. She has had hallucinations. Complains of some right shoulder pain. Patient seen and examined. Clinical notes reviewed. Telemetry reviewed. No new complaint today. No major events overnight. Review of System:  All other systems reviewed except for that noted above. Pertinent negatives and positives are:     · General: negative for fever, chills   · Ophthalmic ROS: negative for - eye pain or loss of vision  · ENT ROS: negative for - headaches, sore throat   · Respiratory: negative for - cough, sputum  · Cardiovascular: Reviewed in HPI  · Gastrointestinal: negative for - abdominal pain, diarrhea, N/V  · Hematology: negative for - bleeding, blood clots, bruising or jaundice  · Genito-Urinary:  negative for - Dysuria or incontinence  · Musculoskeletal: + shoulder pain. · Neurological: negative for - confusion, dizziness, headaches   · Psychiatric: No anxiety, no depression. · Dermatological: negative for - rash      Physical Examination:  Vitals:    19 0702   BP:    Pulse:    Resp: 20   Temp:    SpO2: 93%      In: 2250   Out: -    Wt Readings from Last 3 Encounters:   19 179 lb 9.6 oz (81.5 kg)     Temp  Av.5 °F (37.5 °C)  Min: 98.9 °F (37.2 °C)  Max: 100.2 °F (37.9 °C)  Pulse  Av.1  Min: 81  Max: 143  BP  Min: 93/75  Max: 136/86  SpO2  Av.3 %  Min: 88 %  Max: 98 %    Intake/Output Summary (Last 24 hours) at 2019 0761  Last data filed at 2019 0503  Gross per 24 hour   Intake 3810 ml   Output 1350 ml   Net 2460 ml       · Telemetry: Sinus tachycardia   · Constitutional: No distress. · Head: Normocephalic and atraumatic. · Mouth/Throat: Oropharynx is clear and moist.   · Eyes: Conjunctivae normal. EOM are normal.   · Neck: Neck supple. No rigidity. No JVD present. · Cardiovascular: Tachycardic, regular rhythm, S1&S2. · Pulmonary/Chest: Scattered crackles. · Abdominal: Soft. Bowel sounds present. · Musculoskeletal: No tenderness. No edema    · Lymphadenopathy: Has no cervical adenopathy. · Neurological: Alert and oriented. No Gross deficit   · Skin: Skin is warm and dry. No rash noted. · Psychiatric: Appears anxious     Labs, diagnostic and imaging results reviewed. Reviewed. Recent Labs     05/25/19  0430 05/25/19  1421 05/26/19  0417    134* 133*   K 2.9* 3.1* 2.9*   CL 94* 91* 90*   CO2 27 26 26   PHOS 4.7 3.7 4.7   BUN 22* 20 21*   CREATININE <0.5* <0.5* <0.5*     Recent Labs     05/24/19  0600 05/25/19  0430 05/26/19  0417   WBC 24.0* 23.4* 26.0*   HGB 13.7 13.4 13.9   HCT 40.1 39.3 41.4   MCV 87.6 87.6 87.3   * 517* 626*     Lab Results   Component Value Date    CKTOTAL 116 05/23/2019    TROPONINI 0.17 05/16/2019     CrCl cannot be calculated (This lab value cannot be used to calculate CrCl because it is not a number: <0.5).    No results found for: BNP  Lab Results   Component Value Date    PROTIME 13.0 05/24/2019    PROTIME 12.5 05/15/2019    INR 1.14 05/24/2019    INR 1.10 05/15/2019     Lab Results   Component Value Date    TRIG 232 05/22/2019       Scheduled Meds:   metolazone  5 mg Oral Daily    OLANZapine  15 mg Oral Nightly    OLANZapine  7.5 mg Oral BID    ipratropium-albuterol  1 ampule Inhalation TID    lidocaine 1 % injection  5 mL Intradermal Once    sodium chloride flush  10 mL Intravenous 2 times per day    gabapentin  400 mg Oral TID    potassium chloride  40 mEq Oral BID WC    furosemide  40 mg Intravenous TID    meropenem (MERREM) 1 g IVPB (mini-bag)  1 g Intravenous Q8H    vancomycin  1,000 mg Intravenous Q8H    enoxaparin  40 mg Subcutaneous Daily    aspirin  81 mg Oral Daily    famotidine (PEPCID) injection  20 mg Intravenous BID    insulin lispro  0-6 Units Subcutaneous Q4H    sodium chloride flush  10 mL Intravenous 2 times per day     Continuous Infusions:   dexmedetomidine (PRECEDEX) IV infusion 2 mcg/kg/hr (05/25/19 0747)    dextrose       PRN Meds:potassium chloride, ibuprofen, sodium chloride flush, OLANZapine, melatonin, traZODone, sennosides-docusate sodium, magnesium sulfate, potassium chloride, glucose, dextrose, glucagon (rDNA), dextrose, sodium chloride flush     Patient Active Problem List    Diagnosis Date Noted    Acute encephalopathy 05/25/2019    Mild protein-calorie malnutrition ([facility] [address] 05/23/2019    Multiple tracheobronchial mucus plugs     Cardiac arrest with ventricular fibrillation (HCC)     ARDS (adult respiratory distress syndrome) (HCC)     Mucus plugging of bronchi     Aspiration pneumonitis (HCC)     Atrial fibrillation with RVR (HCC)     Electrolyte disturbance     Shock ([address]     Transaminitis     Hyperglycemia     Pneumonia due to infectious organism     Acute respiratory failure ([address] 05/14/2019      Active Hospital Problems    Diagnosis Date Noted    Acute encephalopathy [G93.40] 05/25/2019    Mild protein-calorie malnutrition ([address] [E44.1] 05/23/2019    Multiple tracheobronchial mucus plugs [J98.09]     Cardiac arrest with ventricular fibrillation (HCC) [I46.9, I49.01]     ARDS (adult respiratory distress syndrome) (HCC) [J80]     Mucus plugging of bronchi [J98.09]     Aspiration pneumonitis (HCC) [J69.0]     Atrial fibrillation with RVR (HCC) [I48.91]     Electrolyte disturbance [E87.8]     Shock ([address] [R57.9]     Transaminitis [R74.0]     Hyperglycemia [R73.9]     Pneumonia due to infectious organism [J18.9]     Acute respiratory failure ([address] [J96.00] 05/14/2019     Echo:    This is a limited study for EF follow up.   Left ventricular systolic function is reduced with ejection fraction   estimated at 40 %.   There is mild to moderate global hypokinesis present.   There is mild concentric left ventricular hypertrophy. Assessment:   - V- fib arrest    Rhythm stable. Keep K > 4 and Mg > 2    Avoid QT prolongation medications. Needs secondary prevention ICD when condition improves. - Non-ischemic cardiomyopathy:    Start low dose Toprol XL today. Will add ACE-I  If BP tolerates. On IV diuretics. I/Os and weight daily       - Aspiration pneumonia: on antibiotic therapy. - Hypokalemia: Aggressive K replacement. Keep > 4        Multiple medical conditions with risk of decompensation. All questions and concerns were addressed to the patient/family. Alternatives to my treatment were discussed. I have discussed the above stated plan and the patient verbalized understanding and agreed with the plan. NOTE: This report was transcribed using voice recognition software. Every effort was made to ensure accuracy, however, inadvertent computerized transcription errors may be present.      Nasir Dominguez MD, MPH  [place] [id number]   Office: [phone]

## 2019-05-26 NOTE — PROGRESS NOTES
C/O nausea, then had emesis of zyprexa pills and mac pudding. L hand IV pulled out on accident by patient, dressing applied. LFA IV placed, IVP famotidine administered. Unable to tolerated po meds currently, other meds not given, Dr Krishna Boogie made aware. Will need PICC line place for IVPB K+ replacements due to sensitivity of veins to K+ IV.

## 2019-05-26 NOTE — PROGRESS NOTES
Pt resting with eyes closed. VSS pt has mild hallucinations but not having the behaviors she has had earlier in the shift. Pt seems to be very calm and not attempting to get out of bed. Call light in reach.

## 2019-05-26 NOTE — PROGRESS NOTES
zofran 4 mg IVP for nausea. Bathed with linen change. Mother here at bedside to assist. Repositioned up in bed for comfort.
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
## 2019-05-26 NOTE — PROGRESS NOTES
Nutrition Assessment    Type and Reason for Visit: Reassess    Nutrition Recommendations:   1. Continue general, dysphagia I, and NTL diet order - consistency changes, per SLP. 2. Added Ensure Compact with meals - this ONS is NT consistency when chilled. 3. Monitor appetite, po intake, and for s/s of GI distress. 4. Monitor mental status and plan of care. 5. Monitor for additional in-patient weight changes. 6. Monitor nutrition-related labs and bowel function. Nutrition Assessment: patient is declining from a nutritional standpoint AEB N/V, refusal of solid food/meals, and intermittent hallucinations/anxiety/delirium and she remains at risk for further compromise d/t only consuming sips of soda, need for altered po consistencies, and altered mental status; will continue dysphagia I + NTL diet order and add Ensure Compact with meals (has to be chilled for NT consistency)    Malnutrition Assessment:  · Malnutrition Status: Meets the criteria for moderate malnutrition  · Context: Acute illness or injury  · Findings of the 6 clinical characteristics of malnutrition (Minimum of 2 out of 6 clinical characteristics is required to make the diagnosis of moderate or severe Protein Calorie Malnutrition based on AND/ASPEN Guidelines):  1. Energy Intake-Less than or equal to 75% of estimated energy requirement, Greater than or equal to 7 days    2. Weight Loss-5% loss or greater(- 14# or 7.3% weight loss ), (x 12 days admission)  3. Fat Loss-No significant subcutaneous fat loss,    4. Muscle Loss-No significant muscle mass loss,    5. Fluid Accumulation-No significant fluid accumulation,    6.   Strength-Not measured    Nutrition Risk Level: High    Nutrient Needs:  · Estimated Daily Total Kcal: 968 - 1232 kcals based on 11-14 kcals/kg/CBW  · Estimated Daily Protein (g): 90 - 99 g protein based on 2.0-2.2 g/kg/IBW  · Estimated Daily Total Fluid (ml/day): 1000 - 1200 ml     Nutrition Diagnosis:   · Problem: PM    Contact Number: [phone]

## 2019-05-26 NOTE — PROGRESS NOTES
Progress Note    Admit Date:  5/14/2019      38-year-old female with nonischemic dilated cardiomyopathy, presented with V. fib arrest.  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 Hep C + . She is mostly homebound. She has been ill with  respiratory symptoms for a couple of months. Mother found her down. CPR initiated,  S/P defibrillation twice en route to hospital.  Admitted  to ICU , on mechanical vent   Echocardiogram with ejection fraction low at 25%   Head CT was negative. status post bronchoscopy 5/16. Patient had significant amount of resp secretions that were aspirated       Diuresed on IV diuretics and zaroxylyn  Extubated on 5/23    Doing better    Subjective:    Ms. Nickolas Manueler seen up in bed,  No new c/o  Family at bedode    Objective:   /85   Pulse 117   Temp 98.7 °F (37.1 °C) (Oral)   Resp 18   Ht 5' (1.524 m)   Wt 179 lb 9.6 oz (81.5 kg)   SpO2 93%   BMI 35.08 kg/m²       Intake/Output Summary (Last 24 hours) at 5/26/2019 1523  Last data filed at 5/26/2019 1000  Gross per 24 hour   Intake 2250 ml   Output 700 ml   Net 1550 ml       Physical Exam:-  General: young female,  Awake alert and anxious  Skin:  Warm and dry  Neck:  JVD absent. Neck supple  Chest:  Diminished breath sounds . No wheezes, rales or rhonchi. Cardiovascular:  RRR ,S1S2 normal  Abdomen:  Soft, non tender, non distended, BS +  Extremities:  Trace edema of upper extremity . No edema of lower extremity. . Left lower extremity atrophy present  Intact peripheral pulses.  Brisk cap refill, < 2 secs  Neuro:  Non focal . Moving all extremities      Medications:   Scheduled Meds:   metolazone  5 mg Oral Daily    metoprolol succinate  25 mg Oral Daily    lidocaine 1 % injection  5 mL Intradermal Once    sodium chloride flush  10 mL Intravenous 2 times per day    OLANZapine  15 mg Oral Nightly    OLANZapine  7.5 mg Oral BID    ipratropium-albuterol  1 ampule Inhalation TID    gabapentin  400 mg Oral TID    potassium chloride  40 mEq Oral BID WC    furosemide  40 mg Intravenous TID    meropenem (MERREM) 1 g IVPB (mini-bag)  1 g Intravenous Q8H    vancomycin  1,000 mg Intravenous Q8H    enoxaparin  40 mg Subcutaneous Daily    aspirin  81 mg Oral Daily    famotidine (PEPCID) injection  20 mg Intravenous BID    sodium chloride flush  10 mL Intravenous 2 times per day       Continuous Infusions:   dexmedetomidine (PRECEDEX) IV infusion 2 mcg/kg/hr (05/25/19 0714)    dextrose         Data:  CBC:   Recent Labs     05/24/19  0600 05/25/19  0430 05/26/19  0417   WBC 24.0* 23.4* 26.0*   RBC 4.58 4.49 4.75   HGB 13.7 13.4 13.9   HCT 40.1 39.3 41.4   MCV 87.6 87.6 87.3   RDW 13.1 12.6 13.1   * 517* 626*     BMP:   Recent Labs     05/25/19  0430 05/25/19  1421 05/26/19 0417    134* 133*   K 2.9* 3.1* 2.9*   CL 94* 91* 90*   CO2 27 26 26   PHOS 4.7 3.7 4.7   BUN 22* 20 21*   CREATININE <0.5* <0.5* <0.5*     LIVER PROFILE:   No results for input(s): AST, ALT, ALB, BILIDIR, BILITOT, ALKPHOS in the last 72 hours. Urine hCG negative     Rapid flu antigen negative       Cultures  Respiratory cultures heavy growth staph - MSSA    Blood - NGTD       Radiology  XR CHEST PORTABLE   Final Result   Right upper extremity PICC line has not yet reached the inferior vena cava. Its tip is at the expected location of the mid right subclavian vein. Bilateral airspace disease, improving on the right as compared to prior. Findings were called by the radiology call center. IR PICC WO SQ PORT/PUMP > 5 YEARS   Final Result   Successful placement of PICC line. XR CHEST PORTABLE   Final Result   1. Improving multifocal right lung airspace disease. 2. Increasing left basilar atelectasis or pneumonia. XR CHEST PORTABLE   Final Result   Stable support apparatus.       New left perihilar airspace disease and persistent airspace disease throughout the right lung. Findings may be related to edema and/or pneumonia. XR CHEST PORTABLE   Final Result   Worsening right lung airspace disease likely represents pneumonia. XR CHEST PORTABLE   Final Result   Stable right basilar and improving left basilar atelectasis and/or pneumonia. XR CHEST PORTABLE   Final Result   Bilateral pulmonary congestion with right basilar consolidation concerning   for pneumonia. Stable support tubes. XR CHEST PORTABLE   Final Result   Grossly stable bilateral airspace disease. XR CHEST PORTABLE   Final Result      XR CHEST PORTABLE   Final Result   Worsening bilateral airspace opacities in a pattern that would favor ARDS. XR CHEST PORTABLE   Final Result   Stable life support device positioning. Persistent perihilar predominant edema and small effusions. XR CHEST PORTABLE   Final Result   Interval improvement in pulmonary edema. XR CHEST PORTABLE   Final Result   Appropriate left IJ central venous catheter positioning. No pneumothorax. Appropriate endotracheal tube positioning. Layering bilateral effusions. Equivocal for a component of superimposed   pulmonary edema. Atelectasis likely present. CT ABDOMEN PELVIS WO CONTRAST Additional Contrast? None   Final Result   1. Bilateral lower lobe atelectasis, right greater than left. 2. Acute right 2nd through 4th and left 3rd through 5th rib fractures         CT Chest Pulmonary Embolism W Contrast   Final Result   1. Bilateral lower lobe atelectasis, right greater than left. 2. Acute right 2nd through 4th and left 3rd through 5th rib fractures         CT Head WO Contrast   Final Result   No acute intracranial abnormality. Left maxillary sinus mucosal thickening. Air-fluid level in the sphenoid   sinus. Correlate with any clinical evidence of sinusitis.          XR CHEST PORTABLE   Final Result   Findings as above which may be related to congestive heart failure and edema. IR PICC WO SQ PORT/PUMP > 5 YEARS    (Results Pending)       Echo  Summary   Definity contrast administered.   Left ventricular systolic function is reduced with ejection fraction   estimated at 25-30 %.   Elevated left ventricular diastolic filling pressure: Septal E/e'' = 12.6   (for sinus tachycardia) .   Right ventricular systolic function is moderately reduced .   Mild mitral regurgitation.   Unable to obtain SPAP due to lack of tricuspid regurgitation. Repeat echo 5/22       This is a limited study for EF follow up.   Left ventricular systolic function is reduced with ejection fraction   estimated at 40 %.   There is mild to moderate global hypokinesis present.  Genie Cal is mild concentric left ventricular hypertrophy. Assessment:  Active Problems:    Acute respiratory failure (HCC)    Aspiration pneumonitis (HCC)    Atrial fibrillation with RVR (HCC)    Electrolyte disturbance    Shock ([address]    Transaminitis    Hyperglycemia    Pneumonia due to infectious organism    ARDS (adult respiratory distress syndrome) (HCC)    Mucus plugging of bronchi    Cardiac arrest with ventricular fibrillation (HCC)    Multiple tracheobronchial mucus plugs    Mild protein-calorie malnutrition (HCC)    Acute encephalopathy  Resolved Problems:    * No resolved hospital problems. *      Plan:    Cardiac V fib arrest   - status post successful resuscitation ,trop elevation  - likely related to CPR and demand ischemia  - EP eval once stable and off vent      Acute on chronic systolic CHF  Patient with severe nonischemic dilated cardiomyopathy    - secondary to adriamycin treatment as a child . she had chemotherapy for rhabdomyosarcoma.  Supposedly missed f/w from [facility]     - echo shows ejection fraction of 25%   - getting zaroxylyn  IV Lasix -TID   - consider digoxin or low dose coreg   - improving EF with diuresis and medical mx on repeat echo to 40 %  - need close f/w as outpt    Acute respiratory failure  - sec to cardiac arrest   - extubated 5/24/19  doing well  - Status post bronchoscopy  twice due to increased respiratory secretions  - sputum with MSSA        A. fib   - pt in afib following defib for V fib arrest. Loaded with amiodarone   - continued on  amiodarone drip, and heparin drip off AMIO/heparin      Septic shock   Pneumonia   - ? Aspiration - pt had resp symptoms for 2 months   - Secondary to staph aureus infection.   - Status post bronchoscopy cultures positive for MSSA  - Continue  IV antibiotics , D6 on vanc and merrem . Christianne Lerma Pt/ot     Angiogram and EP studies and AICDper cards whe n stable       DVT Prophylaxis:  Lovenox  DIET GENERAL; Dysphagia I Pureed;  Nectar Thick  Code Status: Full Code          Tonia Justin MD 5/26/2019 3:23 PM

## 2019-05-26 NOTE — FLOWSHEET NOTE
05/26/19 1130   Vitals   Temp 98.7 °F (37.1 °C)   Temp Source Oral   Pulse 117   Resp 23   /89   MAP (mmHg) 102   Level of Consciousness 0   MEWS Score 4   Cardiac Rhythm ST   Oxygen Therapy   SpO2 94 %   Pain Assessment   Pain Assessment 0-10   Pain Level 0   Reassessed. No changes in status, other than nausea has subsided, had xlg soft brn BM on bedpan. Repositioned up in bed. Mom at bedside, soft ball for hand strengthening. Has loss of Fine dexterity Bilateral hands. IV site L FA WNL, dressing reinforced with coban wrap. Consent for PICC line signed per mother per patient request at bedside,    Call light in reach, all ICU lines in place. Continues to require Rollbelt restraint due to intermittent hallucinations and anxiety. Refusing food still, taking sips of soda.

## 2019-05-26 NOTE — PROGRESS NOTES
Shift assessment completed, see flowsheet. Pt A/O X person, pt having hallucinations but is easily redirected. Lung sounds cta, diminished, Heart Sounds s1s2,  NS rhythm on the monitor, Bowel Sounds active , Pulses +2x4 , rashid patent to bsd. Denies pain or discomfort. IV's infusing . Call light within reach, bed in lowest position, side rails x 3 . Will continue to monitor.

## 2019-05-26 NOTE — PROGRESS NOTES
Pulmonary & Critical Care Medicine ICU Progress Note    CC:Acute respiratory failure with hypoxemia    Events of Last 24 hours: Precedex remains at 2. Delirium with agitation- having hallucinations. Had some nausea and vomiting this am. Difficulty taking oral meds. Invasive Lines:   IV Line: Pivs    MV:  none  Vent Mode: AC/VC Rate Set: 0 bmp/Vt Ordered: 410 mL/ /FiO2 : 50 %  No results for input(s): PHART, DEK7FWR, PO2ART in the last 72 hours. IV:   dexmedetomidine (PRECEDEX) IV infusion 2 mcg/kg/hr (05/25/19 0747)    dextrose         EXAM:  /75   Pulse 117   Temp 98.9 °F (37.2 °C) (Axillary)   Resp (!) 33   Ht 5' (1.524 m)   Wt 179 lb 9.6 oz (81.5 kg)   SpO2 91%   BMI 35.08 kg/m²  on 4l NC  Tmax: 100  CVP: CVP (Mean): 11 mmHg    Intake/Output Summary (Last 24 hours) at 5/26/2019 0654  Last data filed at 5/26/2019 0503  Gross per 24 hour   Intake 4110 ml   Output 2550 ml   Net 1560 ml     General: Mild distress. Normocephalic, atraumatic. Eyes: PERRL. No sclera icterus. No conjunctival injection. ENT: No discharge. Pharynx clear. Neck: Trachea midline. Normal thyroid. Resp: No accessory muscle use. Few crackles. No wheezing. bilateral rhonchi. No dullness on percussion. CV: Regular rate. Regular rhythm. No mumur or rub. No edema. GI: Non-tender. Non-distended. No masses. No organmegaly. Normal bowel sounds. No hernia. Skin: Warm and dry. No nodule on exposed extremities. No rash on exposed extremities. Lymph: No cervical LAD. No supraclavicular LAD. M/S: No cyanosis. No joint deformity. No clubbing.  L leg muscle wasting  Neuro:  awake, she is moving extremities, + pupillary response, follows commands, agitated      Medications:   OLANZapine  15 mg Oral Nightly    OLANZapine  7.5 mg Oral BID    ipratropium-albuterol  1 ampule Inhalation TID    lidocaine 1 % injection  5 mL Intradermal Once    sodium chloride flush  10 mL Intravenous 2 times per day    gabapentin  400 mg Oral TID    potassium chloride  40 mEq Oral BID WC    furosemide  40 mg Intravenous TID    meropenem (MERREM) 1 g IVPB (mini-bag)  1 g Intravenous Q8H    vancomycin  1,000 mg Intravenous Q8H    enoxaparin  40 mg Subcutaneous Daily    aspirin  81 mg Oral Daily    famotidine (PEPCID) injection  20 mg Intravenous BID    insulin lispro  0-6 Units Subcutaneous Q4H    sodium chloride flush  10 mL Intravenous 2 times per day     PRN Meds:  potassium chloride, ibuprofen, sodium chloride flush, OLANZapine, melatonin, traZODone, sennosides-docusate sodium, magnesium sulfate, potassium chloride, glucose, dextrose, glucagon (rDNA), dextrose, sodium chloride flush    Results:  CBC:   Recent Labs     05/24/19  0600 05/25/19  0430 05/26/19  0417   WBC 24.0* 23.4* 26.0*   HGB 13.7 13.4 13.9   HCT 40.1 39.3 41.4   MCV 87.6 87.6 87.3   * 517* 626*     BMP:   Recent Labs     05/25/19  0430 05/25/19  1421 05/26/19  0417    134* 133*   K 2.9* 3.1* 2.9*   CL 94* 91* 90*   CO2 27 26 26   PHOS 4.7 3.7 4.7   BUN 22* 20 21*   CREATININE <0.5* <0.5* <0.5*     LIVER PROFILE:   Recent Labs     05/23/19  1222   AST 51*   ALT 31   BILIDIR <0.2   BILITOT 0.5   ALKPHOS 115     PT/INR:   Recent Labs     05/24/19  1128   PROTIME 13.0   INR 1.14     APTT: No results for input(s): APTT in the last 72 hours. UA:No results for input(s): NITRITE, COLORU, PHUR, LABCAST, WBCUA, RBCUA, MUCUS, TRICHOMONAS, YEAST, BACTERIA, CLARITYU, SPECGRAV, LEUKOCYTESUR, UROBILINOGEN, BILIRUBINUR, BLOODU, GLUCOSEU, AMORPHOUS in the last 72 hours.     Invalid input(s): Lalit Ragsdale    Cultures:  5/14 BCx NGTD  5/16 BAL Staphylococcus aureus  5/17 BCx NGTD   5/17 UCx NGTD   5/19 BAL NGTD  5/23 trach asp pending    Films:  CXR 5/24 reviewed by me and it showed:Right upper extremity PICC line is seen, extending to the mid right infraclavicular region.  Heterogeneous bilateral pulmonary opacity is again demonstrated, slightly improved on the right as compared to prior.  No  pleural effusion.  No pneumothorax.  Cardiac and mediastinal silhouettes are  within normal limits. Assessment:  · Acute respiratory failure with hypoxemia  · ARDS  · V fib arrest  · Hx of paroxysmal Afib  · Shock  · Cardiomyopathy EF 25% on echo 5/14/19  · Hx of polysubstance abuse  · Hepatitis C  · Probable Healthcare associated pneumonia - probable gram negative, risk for methicillin resistant Staph aureus as well   · Nausea and vomiting      Plan:  Supplemental oxygen to maintain SaO2 >92%; wean as tolerated   precedex infusion if needed  Abx:  D6 vanc and merrem  · SSI  · Lasix  40 mg iv tid, add metolazone- goal net neg 1-2L  · Cardiology planning for AICD placement when infection free  · ICU care: lovenox, pepcid   · Replace K  · PICC line  · zofran  · Psychiatry consulted- appreciate help with zyprexa dosing      Critical care time spent reviewing labs/films, examining patient, collaborating with other physicians but excluding procedures for life threatening organ failure is 32 minutes.

## 2019-05-27 LAB
ANION GAP SERPL CALCULATED.3IONS-SCNC: 16 MMOL/L (ref 3–16)
BANDED NEUTROPHILS RELATIVE PERCENT: 1 % (ref 0–7)
BASOPHILS ABSOLUTE: 0 K/UL (ref 0–0.2)
BASOPHILS RELATIVE PERCENT: 0 %
BUN BLDV-MCNC: 21 MG/DL (ref 7–20)
CALCIUM SERPL-MCNC: 10.2 MG/DL (ref 8.3–10.6)
CHLORIDE BLD-SCNC: 85 MMOL/L (ref 99–110)
CO2: 31 MMOL/L (ref 21–32)
CREAT SERPL-MCNC: <0.5 MG/DL (ref 0.6–1.1)
EOSINOPHILS ABSOLUTE: 0 K/UL (ref 0–0.6)
EOSINOPHILS RELATIVE PERCENT: 0 %
GFR AFRICAN AMERICAN: >60
GFR NON-AFRICAN AMERICAN: >60
GLUCOSE BLD-MCNC: 131 MG/DL (ref 70–99)
HCT VFR BLD CALC: 41.6 % (ref 36–48)
HEMATOLOGY PATH CONSULT: YES
HEMOGLOBIN: 14.2 G/DL (ref 12–16)
LYMPHOCYTES ABSOLUTE: 1.7 K/UL (ref 1–5.1)
LYMPHOCYTES RELATIVE PERCENT: 9 %
MAGNESIUM: 1.3 MG/DL (ref 1.8–2.4)
MAGNESIUM: 2 MG/DL (ref 1.8–2.4)
MAGNESIUM: 2.9 MG/DL (ref 1.8–2.4)
MCH RBC QN AUTO: 29.7 PG (ref 26–34)
MCHC RBC AUTO-ENTMCNC: 34 G/DL (ref 31–36)
MCV RBC AUTO: 87.4 FL (ref 80–100)
MONOCYTES ABSOLUTE: 2.9 K/UL (ref 0–1.3)
MONOCYTES RELATIVE PERCENT: 15 %
NEUTROPHILS ABSOLUTE: 14.6 K/UL (ref 1.7–7.7)
NEUTROPHILS RELATIVE PERCENT: 75 %
PDW BLD-RTO: 13 % (ref 12.4–15.4)
PHOSPHORUS: 3.4 MG/DL (ref 2.5–4.9)
PLATELET # BLD: 546 K/UL (ref 135–450)
PLATELET SLIDE REVIEW: ABNORMAL
PMV BLD AUTO: 7.8 FL (ref 5–10.5)
POTASSIUM SERPL-SCNC: 2.7 MMOL/L (ref 3.5–5.1)
POTASSIUM SERPL-SCNC: 2.8 MMOL/L (ref 3.5–5.1)
POTASSIUM SERPL-SCNC: 3.1 MMOL/L (ref 3.5–5.1)
POTASSIUM SERPL-SCNC: 4 MMOL/L (ref 3.5–5.1)
RBC # BLD: 4.77 M/UL (ref 4–5.2)
SLIDE REVIEW: ABNORMAL
SODIUM BLD-SCNC: 132 MMOL/L (ref 136–145)
TOXIC GRANULATION: PRESENT
TROPONIN: <0.01 NG/ML
VACUOLATED NEUTROPHILS: PRESENT
WBC # BLD: 19.2 K/UL (ref 4–11)

## 2019-05-27 PROCEDURE — 94761 N-INVAS EAR/PLS OXIMETRY MLT: CPT

## 2019-05-27 PROCEDURE — 36415 COLL VENOUS BLD VENIPUNCTURE: CPT

## 2019-05-27 PROCEDURE — 6370000000 HC RX 637 (ALT 250 FOR IP): Performed by: INTERNAL MEDICINE

## 2019-05-27 PROCEDURE — 85025 COMPLETE CBC W/AUTO DIFF WBC: CPT

## 2019-05-27 PROCEDURE — 2500000003 HC RX 250 WO HCPCS: Performed by: INTERNAL MEDICINE

## 2019-05-27 PROCEDURE — 83735 ASSAY OF MAGNESIUM: CPT

## 2019-05-27 PROCEDURE — 6370000000 HC RX 637 (ALT 250 FOR IP): Performed by: PSYCHIATRY & NEUROLOGY

## 2019-05-27 PROCEDURE — 6360000002 HC RX W HCPCS: Performed by: INTERNAL MEDICINE

## 2019-05-27 PROCEDURE — 84132 ASSAY OF SERUM POTASSIUM: CPT

## 2019-05-27 PROCEDURE — 94640 AIRWAY INHALATION TREATMENT: CPT

## 2019-05-27 PROCEDURE — 99233 SBSQ HOSP IP/OBS HIGH 50: CPT | Performed by: INTERNAL MEDICINE

## 2019-05-27 PROCEDURE — 80048 BASIC METABOLIC PNL TOTAL CA: CPT

## 2019-05-27 PROCEDURE — 92526 ORAL FUNCTION THERAPY: CPT

## 2019-05-27 PROCEDURE — 84484 ASSAY OF TROPONIN QUANT: CPT

## 2019-05-27 PROCEDURE — 93005 ELECTROCARDIOGRAM TRACING: CPT | Performed by: HOSPITALIST

## 2019-05-27 PROCEDURE — 84100 ASSAY OF PHOSPHORUS: CPT

## 2019-05-27 PROCEDURE — 36592 COLLECT BLOOD FROM PICC: CPT

## 2019-05-27 PROCEDURE — 2000000000 HC ICU R&B

## 2019-05-27 PROCEDURE — 2700000000 HC OXYGEN THERAPY PER DAY

## 2019-05-27 PROCEDURE — 2580000003 HC RX 258: Performed by: INTERNAL MEDICINE

## 2019-05-27 RX ORDER — MAGNESIUM SULFATE IN WATER 40 MG/ML
4 INJECTION, SOLUTION INTRAVENOUS ONCE
Status: COMPLETED | OUTPATIENT
Start: 2019-05-27 | End: 2019-05-27

## 2019-05-27 RX ORDER — METOPROLOL SUCCINATE 25 MG/1
25 TABLET, EXTENDED RELEASE ORAL 2 TIMES DAILY
Status: DISCONTINUED | OUTPATIENT
Start: 2019-05-27 | End: 2019-05-29 | Stop reason: HOSPADM

## 2019-05-27 RX ORDER — OLANZAPINE 10 MG/1
15 TABLET ORAL ONCE
Status: DISCONTINUED | OUTPATIENT
Start: 2019-05-27 | End: 2019-05-29 | Stop reason: HOSPADM

## 2019-05-27 RX ADMIN — POTASSIUM CHLORIDE 20 MEQ: 400 INJECTION, SOLUTION INTRAVENOUS at 15:41

## 2019-05-27 RX ADMIN — VANCOMYCIN HYDROCHLORIDE 1000 MG: 1 INJECTION, POWDER, LYOPHILIZED, FOR SOLUTION INTRAVENOUS at 20:20

## 2019-05-27 RX ADMIN — VANCOMYCIN HYDROCHLORIDE 1000 MG: 1 INJECTION, POWDER, LYOPHILIZED, FOR SOLUTION INTRAVENOUS at 04:45

## 2019-05-27 RX ADMIN — MEROPENEM 1 G: 1 INJECTION, POWDER, FOR SOLUTION INTRAVENOUS at 01:56

## 2019-05-27 RX ADMIN — IBUPROFEN 600 MG: 600 TABLET ORAL at 03:13

## 2019-05-27 RX ADMIN — ASPIRIN 81 MG 81 MG: 81 TABLET ORAL at 07:51

## 2019-05-27 RX ADMIN — POTASSIUM CHLORIDE 20 MEQ: 400 INJECTION, SOLUTION INTRAVENOUS at 12:21

## 2019-05-27 RX ADMIN — FUROSEMIDE 40 MG: 10 INJECTION, SOLUTION INTRAMUSCULAR; INTRAVENOUS at 07:52

## 2019-05-27 RX ADMIN — OLANZAPINE 7.5 MG: 5 TABLET, FILM COATED ORAL at 17:18

## 2019-05-27 RX ADMIN — OLANZAPINE 15 MG: 10 TABLET, FILM COATED ORAL at 21:30

## 2019-05-27 RX ADMIN — GABAPENTIN 400 MG: 400 CAPSULE ORAL at 20:37

## 2019-05-27 RX ADMIN — MEROPENEM 1 G: 1 INJECTION, POWDER, FOR SOLUTION INTRAVENOUS at 17:18

## 2019-05-27 RX ADMIN — ENOXAPARIN SODIUM 40 MG: 40 INJECTION SUBCUTANEOUS at 07:52

## 2019-05-27 RX ADMIN — METOPROLOL SUCCINATE 25 MG: 25 TABLET, FILM COATED, EXTENDED RELEASE ORAL at 20:37

## 2019-05-27 RX ADMIN — IPRATROPIUM BROMIDE AND ALBUTEROL SULFATE 1 AMPULE: .5; 3 SOLUTION RESPIRATORY (INHALATION) at 19:12

## 2019-05-27 RX ADMIN — POTASSIUM CHLORIDE 20 MEQ: 400 INJECTION, SOLUTION INTRAVENOUS at 13:17

## 2019-05-27 RX ADMIN — GABAPENTIN 400 MG: 400 CAPSULE ORAL at 07:51

## 2019-05-27 RX ADMIN — POTASSIUM CHLORIDE 20 MEQ: 400 INJECTION, SOLUTION INTRAVENOUS at 16:52

## 2019-05-27 RX ADMIN — FUROSEMIDE 40 MG: 10 INJECTION, SOLUTION INTRAMUSCULAR; INTRAVENOUS at 13:48

## 2019-05-27 RX ADMIN — GABAPENTIN 400 MG: 400 CAPSULE ORAL at 13:48

## 2019-05-27 RX ADMIN — MAGNESIUM SULFATE HEPTAHYDRATE 4 G: 40 INJECTION, SOLUTION INTRAVENOUS at 08:11

## 2019-05-27 RX ADMIN — IPRATROPIUM BROMIDE AND ALBUTEROL SULFATE 1 AMPULE: .5; 3 SOLUTION RESPIRATORY (INHALATION) at 06:53

## 2019-05-27 RX ADMIN — POTASSIUM CHLORIDE 20 MEQ: 400 INJECTION, SOLUTION INTRAVENOUS at 17:55

## 2019-05-27 RX ADMIN — POTASSIUM CHLORIDE 20 MEQ: 400 INJECTION, SOLUTION INTRAVENOUS at 07:15

## 2019-05-27 RX ADMIN — OLANZAPINE 7.5 MG: 5 TABLET, FILM COATED ORAL at 07:51

## 2019-05-27 RX ADMIN — FAMOTIDINE 20 MG: 10 INJECTION, SOLUTION INTRAVENOUS at 20:32

## 2019-05-27 RX ADMIN — VANCOMYCIN HYDROCHLORIDE 1000 MG: 1 INJECTION, POWDER, LYOPHILIZED, FOR SOLUTION INTRAVENOUS at 11:21

## 2019-05-27 RX ADMIN — METOPROLOL SUCCINATE 25 MG: 25 TABLET, FILM COATED, EXTENDED RELEASE ORAL at 07:51

## 2019-05-27 RX ADMIN — POTASSIUM CHLORIDE 40 MEQ: 10 TABLET, EXTENDED RELEASE ORAL at 16:07

## 2019-05-27 RX ADMIN — POTASSIUM CHLORIDE 20 MEQ: 400 INJECTION, SOLUTION INTRAVENOUS at 08:07

## 2019-05-27 RX ADMIN — MEROPENEM 1 G: 1 INJECTION, POWDER, FOR SOLUTION INTRAVENOUS at 09:11

## 2019-05-27 RX ADMIN — POTASSIUM CHLORIDE 40 MEQ: 10 TABLET, EXTENDED RELEASE ORAL at 07:51

## 2019-05-27 RX ADMIN — MELATONIN 3 MG ORAL TABLET 3 MG: 3 TABLET ORAL at 21:31

## 2019-05-27 RX ADMIN — FUROSEMIDE 40 MG: 10 INJECTION, SOLUTION INTRAMUSCULAR; INTRAVENOUS at 20:37

## 2019-05-27 RX ADMIN — Medication 10 ML: at 07:52

## 2019-05-27 RX ADMIN — IPRATROPIUM BROMIDE AND ALBUTEROL SULFATE 1 AMPULE: .5; 3 SOLUTION RESPIRATORY (INHALATION) at 13:20

## 2019-05-27 RX ADMIN — METOLAZONE 5 MG: 2.5 TABLET ORAL at 07:51

## 2019-05-27 RX ADMIN — FAMOTIDINE 20 MG: 10 INJECTION, SOLUTION INTRAVENOUS at 07:52

## 2019-05-27 ASSESSMENT — PAIN SCALES - GENERAL
PAINLEVEL_OUTOF10: 5
PAINLEVEL_OUTOF10: 0
PAINLEVEL_OUTOF10: 3
PAINLEVEL_OUTOF10: 4

## 2019-05-27 ASSESSMENT — PAIN DESCRIPTION - FREQUENCY
FREQUENCY: INTERMITTENT
FREQUENCY: INTERMITTENT

## 2019-05-27 ASSESSMENT — PAIN DESCRIPTION - ONSET
ONSET: GRADUAL
ONSET: GRADUAL

## 2019-05-27 ASSESSMENT — PAIN DESCRIPTION - PROGRESSION
CLINICAL_PROGRESSION: GRADUALLY WORSENING
CLINICAL_PROGRESSION: GRADUALLY IMPROVING

## 2019-05-27 ASSESSMENT — PAIN DESCRIPTION - PAIN TYPE
TYPE: CHRONIC PAIN
TYPE: ACUTE PAIN

## 2019-05-27 ASSESSMENT — PAIN - FUNCTIONAL ASSESSMENT: PAIN_FUNCTIONAL_ASSESSMENT: PREVENTS OR INTERFERES SOME ACTIVE ACTIVITIES AND ADLS

## 2019-05-27 ASSESSMENT — PAIN DESCRIPTION - DESCRIPTORS
DESCRIPTORS: DISCOMFORT;PRESSURE
DESCRIPTORS: CONSTANT;ACHING

## 2019-05-27 ASSESSMENT — PAIN DESCRIPTION - LOCATION
LOCATION: LEG
LOCATION: CHEST

## 2019-05-27 ASSESSMENT — PAIN DESCRIPTION - ORIENTATION
ORIENTATION: RIGHT;LEFT
ORIENTATION: MID

## 2019-05-27 NOTE — PROGRESS NOTES
Progress Note    Admit Date:  5/14/2019      64-year-old female with nonischemic dilated cardiomyopathy, presented with V. fib arrest.  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 Hep C + . She is mostly homebound. She has been ill with  respiratory symptoms for a couple of months. Mother found her down. CPR initiated,  S/P defibrillation twice en route to hospital.  Admitted  to ICU , on mechanical vent   Echocardiogram with ejection fraction low at 25%   Head CT was negative. status post bronchoscopy 5/16. Patient had significant amount of resp secretions that were aspirated     Diuresed on IV diuretics and zaroxylyn  Extubated on 5/23    Doing better  ,less sob  Still on iV diuretics  In bed  weak  Subjective:    Ms. Lyons Members seen up in bed,  No new c/o  Family at bedode    Objective:   BP (!) 121/94   Pulse 115   Temp 98.8 °F (37.1 °C) (Oral)   Resp 13   Ht 5' (1.524 m)   Wt 178 lb 12.8 oz (81.1 kg)   SpO2 95%   BMI 34.92 kg/m²       Intake/Output Summary (Last 24 hours) at 5/27/2019 1602  Last data filed at 5/27/2019 1451  Gross per 24 hour   Intake 2143 ml   Output 3225 ml   Net -1082 ml       Physical Exam:-  General: young female,  Awake alert and anxious  Skin:  Warm and dry  Neck:  JVD absent. Neck supple  Chest:  Diminished breath sounds . No wheezes, rales or rhonchi. Cardiovascular:  RRR ,S1S2 normal  Abdomen:  Soft, non tender, non distended, BS +  Extremities:  Trace edema of upper extremity . No edema of lower extremity. . Left lower extremity atrophy present  Intact peripheral pulses.  Brisk cap refill, < 2 secs  Neuro:  Non focal . Moving all extremities lef tleg smalelr      Medications:   Scheduled Meds:   metoprolol succinate  25 mg Oral BID    metolazone  5 mg Oral Daily    lidocaine 1 % injection  5 mL Intradermal Once    sodium chloride flush  10 mL Intravenous 2 times per day    OLANZapine  15 mg Oral Nightly    1. Improving multifocal right lung airspace disease. 2. Increasing left basilar atelectasis or pneumonia. XR CHEST PORTABLE   Final Result   Stable support apparatus. New left perihilar airspace disease and persistent airspace disease   throughout the right lung. Findings may be related to edema and/or pneumonia. XR CHEST PORTABLE   Final Result   Worsening right lung airspace disease likely represents pneumonia. XR CHEST PORTABLE   Final Result   Stable right basilar and improving left basilar atelectasis and/or pneumonia. XR CHEST PORTABLE   Final Result   Bilateral pulmonary congestion with right basilar consolidation concerning   for pneumonia. Stable support tubes. XR CHEST PORTABLE   Final Result   Grossly stable bilateral airspace disease. XR CHEST PORTABLE   Final Result      XR CHEST PORTABLE   Final Result   Worsening bilateral airspace opacities in a pattern that would favor ARDS. XR CHEST PORTABLE   Final Result   Stable life support device positioning. Persistent perihilar predominant edema and small effusions. XR CHEST PORTABLE   Final Result   Interval improvement in pulmonary edema. XR CHEST PORTABLE   Final Result   Appropriate left IJ central venous catheter positioning. No pneumothorax. Appropriate endotracheal tube positioning. Layering bilateral effusions. Equivocal for a component of superimposed   pulmonary edema. Atelectasis likely present. CT ABDOMEN PELVIS WO CONTRAST Additional Contrast? None   Final Result   1. Bilateral lower lobe atelectasis, right greater than left. 2. Acute right 2nd through 4th and left 3rd through 5th rib fractures         CT Chest Pulmonary Embolism W Contrast   Final Result   1. Bilateral lower lobe atelectasis, right greater than left.    2. Acute right 2nd through 4th and left 3rd through 5th rib fractures         CT Head WO Contrast   Final Result No acute intracranial abnormality. Left maxillary sinus mucosal thickening. Air-fluid level in the sphenoid   sinus. Correlate with any clinical evidence of sinusitis. XR CHEST PORTABLE   Final Result   Findings as above which may be related to congestive heart failure and edema. Echo  Summary   Definity contrast administered.   Left ventricular systolic function is reduced with ejection fraction   estimated at 25-30 %.   Elevated left ventricular diastolic filling pressure: Septal E/e'' = 12.6   (for sinus tachycardia) .   Right ventricular systolic function is moderately reduced .   Mild mitral regurgitation.   Unable to obtain SPAP due to lack of tricuspid regurgitation. Repeat echo 5/22       This is a limited study for EF follow up.   Left ventricular systolic function is reduced with ejection fraction   estimated at 40 %.   There is mild to moderate global hypokinesis present.  Javi Laundry is mild concentric left ventricular hypertrophy. Assessment:  Active Problems:    Acute respiratory failure (HCC)    Aspiration pneumonitis (HCC)    Atrial fibrillation with RVR (HCC)    Electrolyte disturbance    Shock (HCC)    Transaminitis    Hyperglycemia    Pneumonia due to infectious organism    ARDS (adult respiratory distress syndrome) (HCC)    Mucus plugging of bronchi    Cardiac arrest with ventricular fibrillation (HCC)    Multiple tracheobronchial mucus plugs    Moderate protein-calorie malnutrition (HCC)    Acute encephalopathy  Resolved Problems:    * No resolved hospital problems. *      Plan:    Cardiac V fib arrest   - status post successful resuscitation ,trop elevation  - likely related to CPR and demand ischemia  - EP eval once stable and off vent      Acute on chronic systolic CHF  Patient with severe nonischemic dilated cardiomyopathy    - secondary to adriamycin treatment as a child . she had chemotherapy for rhabdomyosarcoma.  [facility]   - echo shows ejection fraction of 25%   - getting zaroxylyn  IV Lasix -TID   - consider digoxin or low dose coreg   - improving EF with diuresis and medical mx on repeat echo to 40 %  - need close f/w as outpt    Acute respiratory failure  - sec to cardiac arrest   - extubated 5/24/19  doing well  - Status post bronchoscopy  twice due to increased respiratory secretions  - sputum with MSSA        A. fib   - pt in afib following defib for V fib arrest. Loaded with amiodarone   -  off AMIO/heparin      Septic shock   Pneumonia   - ? Aspiration - pt had resp symptoms for 2 months   - Secondary to staph aureus infection.   - Status post bronchoscopy cultures positive for MSSA  - Continue  IV antibiotics , D7 on vanc and merrem .        encephaloapathy  On zyprexa per psych remmendation  Pt remains forgetfull     Angiogram and EP studies and AICDper cards when  stable       DVT Prophylaxis:  Lovenox  Dietary Nutrition Supplements: Low Volume Supplement  DIET DYSPHAGIA II MECHANICALLY ALTERED; Dysphagia II Mechanically Altered  Code Status: Full Code          Rick Church MD 5/27/2019 4:02 PM

## 2019-05-27 NOTE — PROGRESS NOTES
Patient reassessed. Unchanged. Appears well.      Electronically signed by Ashleigh Dupont RN on 5/27/2019 at 4:20 PM

## 2019-05-27 NOTE — PLAN OF CARE
Problem: Risk for Impaired Skin Integrity  Goal: Tissue integrity - skin and mucous membranes  Description  Structural intactness and normal physiological function of skin and  mucous membranes.   Outcome: Ongoing     Problem: Gas Exchange - Impaired:  Goal: Levels of oxygenation will improve  Description  Levels of oxygenation will improve  Outcome: Ongoing     Problem: Airway Clearance - Ineffective:  Goal: Clear lung sounds  Description  Clear lung sounds  Outcome: Ongoing     Problem: Falls - Risk of:  Goal: Will remain free from falls  Description  Will remain free from falls  Outcome: Ongoing     Problem: OXYGENATION/RESPIRATORY FUNCTION  Goal: Patient will maintain patent airway  Outcome: Ongoing

## 2019-05-27 NOTE — PROGRESS NOTES
High risk vesicant drug infusing:  ______no____    Multiple incompatible medications infusing:  ____no_____    CVP Monitoring:  ___no______    Extremely difficult IV access challenge:  ___no_____    Continued need for central line access:  __yes_______    Addressed with physician:  ____yes____    RIGHT PATIENT, RIGHT TIME, RIGHT LINE

## 2019-05-27 NOTE — PROGRESS NOTES
RESPIRATORY THERAPY ASSESSMENT    Name:  Brian Record Number:  [id number]  Age: 26 y.o. Gender: female  : 1985  Today's Date:  2019  Room:  [facility]-    Assessment     Is the patient being admitted for a COPD or Asthma exacerbation? No   (If yes the patient will be seen every 4 hours for the first 24 hours and then reassessed)    Patient Admission Diagnosis      Allergies  No Known Allergies    Minimum Predicted Vital Capacity:     850          Actual Vital Capacity:      850              Pulmonary History:smoker  Home Oxygen Therapy:  room air  Home Respiratory Therapy:Albuterol   Current Respiratory Therapy:  duoneb tid  Treatment Type: HHN  Medications: Albuterol/Ipratropium    Respiratory Severity Index(RSI)   Patients with orders for inhalation medications, oxygen, or any therapeutic treatment modality will be placed on Respiratory Protocol. They will be assessed with the first treatment and at least every 72 hours thereafter. The following severity scale will be used to determine frequency of treatment intervention. Smoking History: Smoking History Less than 1ppd or less than 15 pack year = 1    Social History  Social History     Tobacco Use    Smoking status: Current Every Day Smoker     Packs/day: 2.00    Smokeless tobacco: Never Used   Substance Use Topics    Alcohol use:  Yes    Drug use: Yes     Types: Marijuana       Recent Surgical History: None = 0  Past Surgical History  Past Surgical History:   Procedure Laterality Date    BRONCHOSCOPY N/A 2019    BRONCHOSCOPY ALVEOLAR LAVAGE performed by Efe Lucas MD at [address] [place] [address]  2019    BRONCHOSCOPY THERAPUTIC ASPIRATION INITIAL performed by Efe Lucas MD at [address] [place] [address] N/A 2019    BRONCHOSCOPY ALVEOLAR LAVAGE performed by Efe Lucas MD at [address] [place] [address]  2019    BRONCHOSCOPY THERAPUTIC ASPIRATION INITIAL performed by Allan Hood MD at [address]      LEG SURGERY         Level of Consciousness: Alert, Follows Commands but Disoriented = 1    Level of Activity: Mostly sedentary, minimal walking = 2    Respiratory Pattern: Regular Pattern; RR 8-20 = 0    Breath Sounds: Diminshed bilaterally and/or crackles = 2    Sputum  Sputum Color: Dark red, Dark Yellow, Tenacity: Thick, Sputum How Obtained: Spontaneous cough  Cough: Strong, spontaneous, non-productive = 0    Vital Signs   /79   Pulse 110   Temp 101.1 °F (38.4 °C) (Axillary)   Resp 20   Ht 5' (1.524 m)   Wt 179 lb 9.6 oz (81.5 kg)   SpO2 94%   BMI 35.08 kg/m²   SPO2 (COPD values may differ): 86-87% on room air or greater than 92% on FiO2 35- 50% = 3    Peak Flow (asthma only): not applicable = 0    RSI: 7-03 = TID (three times daily) and Q4hr PRN for dyspnea        Plan       Goals: medication delivery and improve oxygenation    Patient/caregiver was educated on the proper method of use for Respiratory Care Devices:  Yes      Level of patient/caregiver understanding able to:   ? Verbalize understanding   ? Demonstrate understanding       ? Teach back        ? Needs reinforcement       ? No available caregiver               ? Other:     Response to education:  Good     Is patient being placed on Home Treatment Regimen? No     Does the patient have everything they need prior to discharge? NA     Comments: chart reviewed and patient assessed    Plan of Care: keep duoneb scheduled tid per assessment    Electronically signed by Woody Blankenship RCP on 5/26/2019 at 8:47 PM    Respiratory Protocol Guidelines     1. Assessment and treatment by Respiratory Therapy will be initiated for medication and therapeutic interventions upon initiation of aerosolized medication. 2. Physician will be contacted for respiratory rate (RR) greater than 35 breaths per minute.  Therapy will be held for heart rate (HR) greater than 140 beats per minute, pending direction from physician. 3. Bronchodilators will be administered via Metered Dose Inhaler (MDI) with spacer when the following criteria are met:  a. Alert and cooperative     b. HR < 140 bpm  c. RR < 30 bpm                d. Can demonstrate a 2-3 second inspiratory hold  4. Bronchodilators will be administered via Hand Held Nebulizer [facility] to patients when ANY of the following criteria are met  a. Incognizant or uncooperative          b. Patients treated with HHN at Home        c. Unable to demonstrate proper use of MDI with spacer     d. RR > 30 bpm   5. Bronchodilators will be delivered via Metered Dose Inhaler (MDI), HHN, Aerogen to intubated patients on mechanical ventilation. 6. Inhalation medication orders will be delivered and/or substituted as outlined below. Aerosolized Medications Ordering and Administration Guidelines:    1. All Medications will be ordered by a physician, and their frequency and/or modality will be adjusted as defined by the patients Respiratory Severity Index (RSI) score. 2. If the patient does not have documented COPD, consider discontinuing anticholinergics when RSI is less than 9.  3. If the bronchospasm worsens (increased RSI), then the bronchodilator frequency can be increased to a maximum of every 4 hours. If greater than every 4 hours is required, the physician will be contacted. 4. If the bronchospasm improves, the frequency of the bronchodilator can be decreased, based on the patient's RSI, but not less than home treatment regimen frequency. 5. Bronchodilator(s) will be discontinued if patient has a RSI less than 9 and has received no scheduled or as needed treatment for 72  Hrs. Patients Ordered on a Mucolytic Agent:    1. Must always be administered with a bronchodilator. 2. Discontinue if patient experiences worsened bronchospasm, or secretions have lessened to the point that the patient is able to clear them with a cough.     Anti-inflammatory and Combination Medications:    1. If the patient lacks prior history of lung disease, is not using inhaled anti-inflammatory medication at home, and lacks wheezing by examination or by history for at least 24 hours, contact physician for possible discontinuation.

## 2019-05-27 NOTE — PROGRESS NOTES
Oral BID WC    furosemide  40 mg Intravenous TID    meropenem (MERREM) 1 g IVPB (mini-bag)  1 g Intravenous Q8H    vancomycin  1,000 mg Intravenous Q8H    enoxaparin  40 mg Subcutaneous Daily    aspirin  81 mg Oral Daily    famotidine (PEPCID) injection  20 mg Intravenous BID    sodium chloride flush  10 mL Intravenous 2 times per day     PRN Meds:  ondansetron, sodium chloride flush, potassium chloride, ibuprofen, OLANZapine, melatonin, traZODone, sennosides-docusate sodium, magnesium sulfate, potassium chloride, glucose, dextrose, glucagon (rDNA), dextrose, sodium chloride flush    Results:  CBC:   Recent Labs     05/25/19  0430 05/26/19  0417 05/27/19  0455   WBC 23.4* 26.0* 19.2*   HGB 13.4 13.9 14.2   HCT 39.3 41.4 41.6   MCV 87.6 87.3 87.4   * 626* 546*     BMP:   Recent Labs     05/25/19  1421 05/26/19  0417 05/27/19  0455   * 133* 132*   K 3.1* 2.9* 2.7*   CL 91* 90* 85*   CO2 26 26 31   PHOS 3.7 4.7 3.4   BUN 20 21* 21*   CREATININE <0.5* <0.5* <0.5*     LIVER PROFILE:   No results for input(s): AST, ALT, LIPASE, BILIDIR, BILITOT, ALKPHOS in the last 72 hours. Invalid input(s): AMYLASE,  ALB  PT/INR:   Recent Labs     05/24/19  1128 05/26/19  1021   PROTIME 13.0 14.8*   INR 1.14 1.30*     APTT: No results for input(s): APTT in the last 72 hours. UA:No results for input(s): NITRITE, COLORU, PHUR, LABCAST, WBCUA, RBCUA, MUCUS, TRICHOMONAS, YEAST, BACTERIA, CLARITYU, SPECGRAV, LEUKOCYTESUR, UROBILINOGEN, BILIRUBINUR, BLOODU, GLUCOSEU, AMORPHOUS in the last 72 hours. Invalid input(s): [facility]    Cultures:  5/14 BCx NGTD  5/16 BAL Staphylococcus aureus  5/17 BCx NGTD   5/17 UCx NGTD   5/19 BAL NGTD  5/23 trach asp NGTD    Films:  CXR 5/24 reviewed by me and it showed:Right upper extremity PICC line is seen, extending to the mid right infraclavicular region.  Heterogeneous bilateral pulmonary opacity is again demonstrated, slightly improved on the right as compared to prior.  No  pleural effusion.  No pneumothorax.  Cardiac and mediastinal silhouettes are  within normal limits.           Assessment:  · Acute respiratory failure with hypoxemia  · ARDS  · V fib arrest  · Hx of paroxysmal Afib  · Shock  · Cardiomyopathy EF 25% on echo 5/14/19  · Hx of polysubstance abuse  · Hepatitis C  · Probable Healthcare associated pneumonia - probable gram negative, risk for methicillin resistant Staph aureus as well   · Nausea and vomiting      Plan:  Supplemental oxygen to maintain SaO2 >92%; wean as tolerated   precedex infusion if needed  Abx:  D7 vanc and merrem  · SSI  · Lasix  40 mg iv tid, added metolazone- goal net neg 1-2L  · Cardiology planning for AICD placement when infection free  · ICU care: lovenox, pepcid   · Replace K  · zofran  · Psychiatry consulted- appreciate help with zyprexa dosing  · PT/OT  · SLT eval

## 2019-05-27 NOTE — PLAN OF CARE
Problem: Risk for Impaired Skin Integrity  Goal: Tissue integrity - skin and mucous membranes  Description  Structural intactness and normal physiological function of skin and  mucous membranes. 5/27/2019 0904 by Dewayne Lind RN  Outcome: Ongoing  5/26/2019 2143 by Malachi Bianchi RN  Outcome: Ongoing     Problem: Infection:  Goal: Will remain free from infection  Description  Will remain free from infection  Outcome: Ongoing     Problem: Gas Exchange - Impaired:  Goal: Levels of oxygenation will improve  Description  Levels of oxygenation will improve  5/27/2019 0904 by Dewayne Lind RN  Outcome: Ongoing  5/26/2019 2143 by Malachi Bianchi RN  Outcome: Ongoing     Problem: Airway Clearance - Ineffective:  Goal: Clear lung sounds  Description  Clear lung sounds  5/27/2019 0904 by Dewayne Lind RN  Outcome: Ongoing  5/26/2019 2143 by Malachi Bianchi RN  Outcome: Ongoing  Goal: Ability to maintain a clear airway will improve  Description  Ability to maintain a clear airway will improve  Outcome: Ongoing     Problem: Falls - Risk of:  Goal: Will remain free from falls  Description  Will remain free from falls  5/26/2019 2143 by Malachi Bianchi RN  Outcome: Ongoing     Problem: OXYGENATION/RESPIRATORY FUNCTION  Goal: Patient will maintain patent airway  5/27/2019 0904 by Dewayne Lind RN  Outcome: Ongoing      Patient's EF (Ejection Fraction) is greater than 40%    Patient's weights and intake/output reviewed:    Patient's Last Weight: 81.5 kg obtained by bed scale. Today's weight is noted to be same as   last documented weight. Intake/Output Summary (Last 24 hours) at 5/27/2019 0905  Last data filed at 5/27/2019 0600  Gross per 24 hour   Intake 2310 ml   Output 2550 ml   Net -240 ml       Patient's current functional capacity:  Marked limitation of physical activity. Comfortable at rest. Less than ordinary activity causes fatigue, palpitation, or dyspnea.     Pt sitting in bed at this time on 4 L O2. Pt denies shortness of breath. Pt without lower extremity edema. Patient and family's stated goal of care: increase activity tolerance, better understand heart failure and disease management and be more comfortable prior to discharge        Patient has a past medical history of Cancer ([address] Hepatitis C antibody positive in blood, and Hyperlipidemia. Comorbidities reviewed and education provided.     >>For CHF and Comorbidity documentation on Education Time and Topics, please see Education Tab      5/26/2019 2143 by Ernesto Ardon RN  Outcome: Ongoing     Problem: Infection - Central Venous Catheter-Associated Bloodstream Infection:  Goal: Will show no infection signs and symptoms  Description  Will show no infection signs and symptoms  Outcome: Ongoing

## 2019-05-27 NOTE — PROGRESS NOTES
Shift assessment complete. Patient appears much improved compared to this RN's assessment on Friday. A&O to place, self and time. Disoriented to situation and why she was originally hospitalized. No hallucinations noted right now. Delusions appear improved. ST on monitor. HR as high as 140s at times. Mainly in 110s. MAG and K+ low. Per cardiology, goal is to keep K+ greater than 4 and Mg greater than 2. Replacements infusing now. RN to discuss diuresis this AM with Intensivist.     Lungs are clear in uppers and diminished in lowers. RR are easy and even. Unlabored. Trace edema noted. [place] in place. Bowels active x4. Abdomen is soft and non tender. RN to address diet with MD. She appears improved. Possibly could progress diet? Patient denies any further needs at this time. Call light and table within reach. Central line dressing is clean, dry and intact with no signs of drainage. All tubing is current and dated. IPA caps applied to all access hubs. POSEY belt removed from patient.      Electronically signed by Enedelia Cedillo RN on 5/27/2019 at 8:23 AM    Vitals:    05/27/19 0800   BP: 128/88   Pulse: 108   Resp: 13   Temp: 98.8 °F (37.1 °C)   SpO2: 96%

## 2019-05-27 NOTE — PROGRESS NOTES
Patient reassessed. Pretty well unchanged. On 4L NC. Overall appearance, improved.      Electronically signed by Ashleigh Dupont RN on 5/27/2019 at 11:38 AM

## 2019-05-27 NOTE — PROGRESS NOTES
Shift assessment completed. Patient in bed awake, oriented to self only. Confused and hallucinating at times. Denies any discomfort at this time, patient changed,repoistioned and bathed with wipes. Evening medications given per [facility] order. Vancomycin infusing. Posey belt on patient due to confusion and patient trying to get out of bed at times. Patient denies any other needs at this time,will continue to monitor,call light within reach.

## 2019-05-27 NOTE — PROGRESS NOTES
Patient in bed resting with eyes closed. Reassessment completed, No changes noted, see flow sheet. Vitals obtained, will continue to monitor,call light within reach.

## 2019-05-27 NOTE — PROGRESS NOTES
Progress Note    Date:  2019   Patient: Roderick Reno  Age:  26 y.o. Admission:  2019  5:59 PM  [facility] [J96.00]   LOS: 13 days     Reason for follow up:Cardiac arrest    SUBJECTIVE:    The patient was seen and examined. Notes reviewed. There were not complications over night. No ectopy, no NSVT   The patient is being seen for arrhythmia and cardiac arrest.  Patient's cardiac review of systems: positive for fatigue. Patient concerned about memory loss  The patient is generally feeling better. Review of Systems    OBJECTIVE:    Telemetry: Sinus  /67   Pulse 112   Temp 98.8 °F (37.1 °C) (Oral)   Resp 20   Ht 5' (1.524 m)   Wt 178 lb 12.8 oz (81.1 kg)   SpO2 94%   BMI 34.92 kg/m²   Vital Signs:           Blood pressure 128/88, pulse 108, temperature 98.9 °F (37.2 °C), temperature source Oral, resp. rate 13, height 5' (1.524 m), weight 179 lb 9.6 oz (81.5 kg), SpO2 96 %. Temp  Av.3 °F (37.4 °C)  Min: 98.6 °F (37 °C)  Max: 101.1 °F (38.4 °C)  Pulse  Av.2  Min: 100  Max: 130  BP  Min: 90/79  Max: 130/89  SpO2  Av.2 %  Min: 84 %  Max: 97 %      Admission Weight:  Weight: 230 lb (104.3 kg)      I/O:     Intake/Output Summary (Last 24 hours) at 2019 0819  Last data filed at 2019 0600  Gross per 24 hour   Intake 2310 ml   Output 2550 ml   Net -240 ml        EXAM:   CONSTITUTIONAL:  awake, alert, cooperative, no apparent distress, and appears stated age. Slightly confused  HEENT:Normal jugular venous pulsations, no carotid bruits. Head is atraumatic, normocephalic. Eyes and oral mucosa are normal.  LUNGS:  No increased work of breathing, good air exchange, clear to auscultation bilaterally, nocrackles or wheezing. CARDIOVASCULAR:  Normal apical impulse, regular rate and rhythm, normal S1 and S2, no S3 or S4, and no murmur noted. ABDOMEN: Soft, nontender, nondistended. Bowel soundspresent. No masses or tenderness.   SKIN: Warm 05/24/19  1128 05/26/19  1021   INR 1.14 1.30*     APTT:No results for input(s): APTT in the last 72 hours. CARDIAC ENZYMES:No results for input(s): CKMB, CKMBINDEX, TROPONINI in the last 72 hours. Invalid input(s): CKTOTAL;3  FASTING LIPID PANEL:  Lab Results   Component Value Date    TRIG 232 05/22/2019     LIVER PROFILE:  No results for input(s): AST, ALT, ALB, BILIDIR, BILITOT, ALKPHOS in the last 72 hours. ASSESSMENT AND PLAN:    Patient Active Problem List   Diagnosis    [facility] failure [facility]    Aspiration pneumonitis (HCC)    Atrial fibrillation with RVR (HCC)    Electrolyte disturbance    Shock ([facility]    Transaminitis    Hyperglycemia    Pneumonia due to infectious organism    ARDS (adult respiratory distress syndrome) (HCC)    Mucus plugging of bronchi    Cardiac arrest with ventricular fibrillation (HCC)    Multiple tracheobronchial mucus plugs    Moderate protein-calorie malnutrition (HCC)    Acute encephalopathy       1. Cardiac arrest   -patient will need secondary prevention Implantable cardioverter defibrillator    -no Non sustained ventricular tachycardia/Vt since admission    -Risk, benefit, alternative, rationale of the procedure were discussed with the patient which included but were not limited to allergic reaction to the medications, pain, bleeding, infection, nerve injury, injury to diaphragm(breathing muscle), pulmonary embolus(blood clot in lungs), deep vein blood clot, pneumothorax, hemothorax, acute renal failure, cardiac perforation,  tamponade, need for emergent surgery (open heart), need for any future surgery, pulmonary vein stenosis requiring stent or angioplasty, left atrial to esophageal fistula requiring emergent surgery, stroke, myocardial infarction, need for permanent pacemaker, lead dislodgement and death.     -plan ICD Tuesday or Wednesday   -increase Toprol to 25 mg bid   -keep potassium between 3.5- 4 and magnesium >2       2.  Aspiration pneumonia   -elevated WBC, ID input regarding timing of Implantable cardioverter defibrillator        Please see orders. Discussed with patient and nursing. Thank you for asking me to assist in the care of this patient. Please contact me if you have any questions regarding her evaluation. All questions and concerns were addressed to the patient/family. Alternatives to my treatment were discussed. The note was completed using EMR. Every effortwas made to ensure accuracy; however, inadvertent computerized transcription errors may be present. YUN Mccord F.A.C.C.   [address]  ([address] office

## 2019-05-28 LAB
ALBUMIN SERPL-MCNC: 3.8 G/DL (ref 3.4–5)
ANION GAP SERPL CALCULATED.3IONS-SCNC: 13 MMOL/L (ref 3–16)
ANION GAP SERPL CALCULATED.3IONS-SCNC: 16 MMOL/L (ref 3–16)
ANISOCYTOSIS: ABNORMAL
BASOPHILS ABSOLUTE: 0.3 K/UL (ref 0–0.2)
BASOPHILS RELATIVE PERCENT: 2 %
BUN BLDV-MCNC: 22 MG/DL (ref 7–20)
BUN BLDV-MCNC: 23 MG/DL (ref 7–20)
CALCIUM SERPL-MCNC: 10.2 MG/DL (ref 8.3–10.6)
CALCIUM SERPL-MCNC: 10.5 MG/DL (ref 8.3–10.6)
CHLORIDE BLD-SCNC: 88 MMOL/L (ref 99–110)
CHLORIDE BLD-SCNC: 89 MMOL/L (ref 99–110)
CO2: 26 MMOL/L (ref 21–32)
CO2: 30 MMOL/L (ref 21–32)
CREAT SERPL-MCNC: 0.6 MG/DL (ref 0.6–1.1)
CREAT SERPL-MCNC: <0.5 MG/DL (ref 0.6–1.1)
EKG ATRIAL RATE: 126 BPM
EKG DIAGNOSIS: NORMAL
EKG P AXIS: 32 DEGREES
EKG P-R INTERVAL: 140 MS
EKG Q-T INTERVAL: 300 MS
EKG QRS DURATION: 74 MS
EKG QTC CALCULATION (BAZETT): 434 MS
EKG R AXIS: -1 DEGREES
EKG T AXIS: 53 DEGREES
EKG VENTRICULAR RATE: 126 BPM
EOSINOPHILS ABSOLUTE: 0.2 K/UL (ref 0–0.6)
EOSINOPHILS RELATIVE PERCENT: 1 %
GFR AFRICAN AMERICAN: >60
GFR AFRICAN AMERICAN: >60
GFR NON-AFRICAN AMERICAN: >60
GFR NON-AFRICAN AMERICAN: >60
GLUCOSE BLD-MCNC: 116 MG/DL (ref 70–99)
GLUCOSE BLD-MCNC: 119 MG/DL (ref 70–99)
HCT VFR BLD CALC: 40.9 % (ref 36–48)
HEMATOLOGY PATH CONSULT: NO
HEMATOLOGY PATH CONSULT: NORMAL
HEMOGLOBIN: 13.8 G/DL (ref 12–16)
HYPOCHROMIA: ABNORMAL
LYMPHOCYTES ABSOLUTE: 1.4 K/UL (ref 1–5.1)
LYMPHOCYTES RELATIVE PERCENT: 8 %
MAGNESIUM: 1.7 MG/DL (ref 1.8–2.4)
MCH RBC QN AUTO: 29.7 PG (ref 26–34)
MCHC RBC AUTO-ENTMCNC: 33.7 G/DL (ref 31–36)
MCV RBC AUTO: 88.2 FL (ref 80–100)
MONOCYTES ABSOLUTE: 1.2 K/UL (ref 0–1.3)
MONOCYTES RELATIVE PERCENT: 7 %
NEUTROPHILS ABSOLUTE: 14.1 K/UL (ref 1.7–7.7)
NEUTROPHILS RELATIVE PERCENT: 82 %
OVALOCYTES: ABNORMAL
PDW BLD-RTO: 12.8 % (ref 12.4–15.4)
PHOSPHORUS: 2.8 MG/DL (ref 2.5–4.9)
PHOSPHORUS: 3.3 MG/DL (ref 2.5–4.9)
PLATELET # BLD: 608 K/UL (ref 135–450)
PLATELET SLIDE REVIEW: ABNORMAL
PMV BLD AUTO: 7.7 FL (ref 5–10.5)
POIKILOCYTES: ABNORMAL
POTASSIUM SERPL-SCNC: 3.5 MMOL/L (ref 3.5–5.1)
POTASSIUM SERPL-SCNC: 4.1 MMOL/L (ref 3.5–5.1)
RBC # BLD: 4.64 M/UL (ref 4–5.2)
SLIDE REVIEW: ABNORMAL
SODIUM BLD-SCNC: 130 MMOL/L (ref 136–145)
SODIUM BLD-SCNC: 132 MMOL/L (ref 136–145)
STOMATOCYTES: ABNORMAL
TEAR DROP CELLS: ABNORMAL
WBC # BLD: 17.2 K/UL (ref 4–11)

## 2019-05-28 PROCEDURE — 97535 SELF CARE MNGMENT TRAINING: CPT

## 2019-05-28 PROCEDURE — 2580000003 HC RX 258: Performed by: INTERNAL MEDICINE

## 2019-05-28 PROCEDURE — 99233 SBSQ HOSP IP/OBS HIGH 50: CPT | Performed by: INTERNAL MEDICINE

## 2019-05-28 PROCEDURE — 84100 ASSAY OF PHOSPHORUS: CPT

## 2019-05-28 PROCEDURE — 6370000000 HC RX 637 (ALT 250 FOR IP): Performed by: PSYCHIATRY & NEUROLOGY

## 2019-05-28 PROCEDURE — 2580000003 HC RX 258: Performed by: EMERGENCY MEDICINE

## 2019-05-28 PROCEDURE — 99232 SBSQ HOSP IP/OBS MODERATE 35: CPT | Performed by: INTERNAL MEDICINE

## 2019-05-28 PROCEDURE — 6370000000 HC RX 637 (ALT 250 FOR IP): Performed by: INTERNAL MEDICINE

## 2019-05-28 PROCEDURE — 2500000003 HC RX 250 WO HCPCS: Performed by: INTERNAL MEDICINE

## 2019-05-28 PROCEDURE — 92526 ORAL FUNCTION THERAPY: CPT

## 2019-05-28 PROCEDURE — 94761 N-INVAS EAR/PLS OXIMETRY MLT: CPT

## 2019-05-28 PROCEDURE — 85025 COMPLETE CBC W/AUTO DIFF WBC: CPT

## 2019-05-28 PROCEDURE — 2700000000 HC OXYGEN THERAPY PER DAY

## 2019-05-28 PROCEDURE — 97530 THERAPEUTIC ACTIVITIES: CPT

## 2019-05-28 PROCEDURE — 93010 ELECTROCARDIOGRAM REPORT: CPT | Performed by: INTERNAL MEDICINE

## 2019-05-28 PROCEDURE — 83735 ASSAY OF MAGNESIUM: CPT

## 2019-05-28 PROCEDURE — 6360000002 HC RX W HCPCS: Performed by: INTERNAL MEDICINE

## 2019-05-28 PROCEDURE — 80069 RENAL FUNCTION PANEL: CPT

## 2019-05-28 PROCEDURE — 2060000000 HC ICU INTERMEDIATE R&B

## 2019-05-28 PROCEDURE — 94640 AIRWAY INHALATION TREATMENT: CPT

## 2019-05-28 RX ORDER — IBUPROFEN 600 MG/1
600 TABLET ORAL EVERY 6 HOURS PRN
Status: CANCELLED | OUTPATIENT
Start: 2019-05-28

## 2019-05-28 RX ORDER — FUROSEMIDE 10 MG/ML
40 INJECTION INTRAMUSCULAR; INTRAVENOUS 3 TIMES DAILY
Status: CANCELLED | OUTPATIENT
Start: 2019-05-28

## 2019-05-28 RX ORDER — IPRATROPIUM BROMIDE AND ALBUTEROL SULFATE 2.5; .5 MG/3ML; MG/3ML
1 SOLUTION RESPIRATORY (INHALATION) 3 TIMES DAILY
Status: CANCELLED | OUTPATIENT
Start: 2019-05-28

## 2019-05-28 RX ORDER — NICOTINE POLACRILEX 4 MG
15 LOZENGE BUCCAL PRN
Status: CANCELLED | OUTPATIENT
Start: 2019-05-28

## 2019-05-28 RX ORDER — OLANZAPINE 10 MG/1
10 INJECTION, POWDER, LYOPHILIZED, FOR SOLUTION INTRAMUSCULAR EVERY 4 HOURS PRN
Status: CANCELLED | OUTPATIENT
Start: 2019-05-28

## 2019-05-28 RX ORDER — DEXTROSE MONOHYDRATE 50 MG/ML
100 INJECTION, SOLUTION INTRAVENOUS PRN
Status: CANCELLED | OUTPATIENT
Start: 2019-05-28

## 2019-05-28 RX ORDER — ASPIRIN 81 MG/1
81 TABLET, CHEWABLE ORAL DAILY
Status: CANCELLED | OUTPATIENT
Start: 2019-05-29

## 2019-05-28 RX ORDER — MAGNESIUM SULFATE IN WATER 40 MG/ML
2 INJECTION, SOLUTION INTRAVENOUS ONCE
Status: COMPLETED | OUTPATIENT
Start: 2019-05-28 | End: 2019-05-28

## 2019-05-28 RX ORDER — SODIUM CHLORIDE 0.9 % (FLUSH) 0.9 %
10 SYRINGE (ML) INJECTION EVERY 12 HOURS SCHEDULED
Status: CANCELLED | OUTPATIENT
Start: 2019-05-28

## 2019-05-28 RX ORDER — LANOLIN ALCOHOL/MO/W.PET/CERES
3 CREAM (GRAM) TOPICAL NIGHTLY PRN
Status: CANCELLED | OUTPATIENT
Start: 2019-05-28

## 2019-05-28 RX ORDER — METOPROLOL SUCCINATE 25 MG/1
25 TABLET, EXTENDED RELEASE ORAL 2 TIMES DAILY
Status: CANCELLED | OUTPATIENT
Start: 2019-05-28

## 2019-05-28 RX ORDER — POTASSIUM CHLORIDE 7.45 MG/ML
10 INJECTION INTRAVENOUS PRN
Status: CANCELLED | OUTPATIENT
Start: 2019-05-28

## 2019-05-28 RX ORDER — DEXTROSE MONOHYDRATE 25 G/50ML
12.5 INJECTION, SOLUTION INTRAVENOUS PRN
Status: CANCELLED | OUTPATIENT
Start: 2019-05-28

## 2019-05-28 RX ORDER — POTASSIUM CHLORIDE 20 MEQ/1
40 TABLET, EXTENDED RELEASE ORAL 2 TIMES DAILY WITH MEALS
Status: CANCELLED | OUTPATIENT
Start: 2019-05-28

## 2019-05-28 RX ORDER — SODIUM CHLORIDE 0.9 % (FLUSH) 0.9 %
10 SYRINGE (ML) INJECTION PRN
Status: CANCELLED | OUTPATIENT
Start: 2019-05-28

## 2019-05-28 RX ORDER — GABAPENTIN 400 MG/1
400 CAPSULE ORAL 3 TIMES DAILY
Status: CANCELLED | OUTPATIENT
Start: 2019-05-28

## 2019-05-28 RX ORDER — ONDANSETRON 2 MG/ML
4 INJECTION INTRAMUSCULAR; INTRAVENOUS EVERY 6 HOURS PRN
Status: CANCELLED | OUTPATIENT
Start: 2019-05-28

## 2019-05-28 RX ORDER — SENNA AND DOCUSATE SODIUM 50; 8.6 MG/1; MG/1
2 TABLET, FILM COATED ORAL DAILY PRN
Status: CANCELLED | OUTPATIENT
Start: 2019-05-28

## 2019-05-28 RX ORDER — OLANZAPINE 5 MG/1
7.5 TABLET ORAL 2 TIMES DAILY
Status: CANCELLED | OUTPATIENT
Start: 2019-05-28

## 2019-05-28 RX ORDER — POTASSIUM CHLORIDE 29.8 MG/ML
20 INJECTION INTRAVENOUS PRN
Status: CANCELLED | OUTPATIENT
Start: 2019-05-28

## 2019-05-28 RX ORDER — MAGNESIUM SULFATE 1 G/100ML
1 INJECTION INTRAVENOUS PRN
Status: CANCELLED | OUTPATIENT
Start: 2019-05-28

## 2019-05-28 RX ORDER — TRAZODONE HYDROCHLORIDE 50 MG/1
50 TABLET ORAL NIGHTLY PRN
Status: CANCELLED | OUTPATIENT
Start: 2019-05-28

## 2019-05-28 RX ADMIN — FUROSEMIDE 40 MG: 10 INJECTION, SOLUTION INTRAMUSCULAR; INTRAVENOUS at 21:49

## 2019-05-28 RX ADMIN — POTASSIUM CHLORIDE 20 MEQ: 400 INJECTION, SOLUTION INTRAVENOUS at 06:54

## 2019-05-28 RX ADMIN — METOLAZONE 5 MG: 2.5 TABLET ORAL at 07:21

## 2019-05-28 RX ADMIN — Medication 10 ML: at 07:22

## 2019-05-28 RX ADMIN — POTASSIUM CHLORIDE 40 MEQ: 10 TABLET, EXTENDED RELEASE ORAL at 07:22

## 2019-05-28 RX ADMIN — GABAPENTIN 400 MG: 400 CAPSULE ORAL at 21:49

## 2019-05-28 RX ADMIN — ENOXAPARIN SODIUM 40 MG: 40 INJECTION SUBCUTANEOUS at 07:22

## 2019-05-28 RX ADMIN — METOPROLOL SUCCINATE 25 MG: 25 TABLET, FILM COATED, EXTENDED RELEASE ORAL at 21:48

## 2019-05-28 RX ADMIN — Medication 10 ML: at 21:50

## 2019-05-28 RX ADMIN — IPRATROPIUM BROMIDE AND ALBUTEROL SULFATE 1 AMPULE: .5; 3 SOLUTION RESPIRATORY (INHALATION) at 07:00

## 2019-05-28 RX ADMIN — OLANZAPINE 7.5 MG: 5 TABLET, FILM COATED ORAL at 07:21

## 2019-05-28 RX ADMIN — VANCOMYCIN HYDROCHLORIDE 1000 MG: 1 INJECTION, POWDER, LYOPHILIZED, FOR SOLUTION INTRAVENOUS at 03:48

## 2019-05-28 RX ADMIN — POTASSIUM CHLORIDE 20 MEQ: 400 INJECTION, SOLUTION INTRAVENOUS at 07:56

## 2019-05-28 RX ADMIN — IPRATROPIUM BROMIDE AND ALBUTEROL SULFATE 1 AMPULE: .5; 3 SOLUTION RESPIRATORY (INHALATION) at 14:00

## 2019-05-28 RX ADMIN — MEROPENEM 1 G: 1 INJECTION, POWDER, FOR SOLUTION INTRAVENOUS at 01:31

## 2019-05-28 RX ADMIN — FUROSEMIDE 40 MG: 10 INJECTION, SOLUTION INTRAMUSCULAR; INTRAVENOUS at 07:22

## 2019-05-28 RX ADMIN — MAGNESIUM SULFATE HEPTAHYDRATE 2 G: 40 INJECTION, SOLUTION INTRAVENOUS at 10:39

## 2019-05-28 RX ADMIN — MEROPENEM 1 G: 1 INJECTION, POWDER, FOR SOLUTION INTRAVENOUS at 09:04

## 2019-05-28 RX ADMIN — Medication 10 ML: at 21:49

## 2019-05-28 RX ADMIN — FUROSEMIDE 40 MG: 10 INJECTION, SOLUTION INTRAMUSCULAR; INTRAVENOUS at 17:52

## 2019-05-28 RX ADMIN — OLANZAPINE 15 MG: 10 TABLET, FILM COATED ORAL at 21:49

## 2019-05-28 RX ADMIN — METOPROLOL SUCCINATE 25 MG: 25 TABLET, FILM COATED, EXTENDED RELEASE ORAL at 07:20

## 2019-05-28 RX ADMIN — IPRATROPIUM BROMIDE AND ALBUTEROL SULFATE 1 AMPULE: .5; 3 SOLUTION RESPIRATORY (INHALATION) at 19:56

## 2019-05-28 RX ADMIN — OLANZAPINE 7.5 MG: 5 TABLET, FILM COATED ORAL at 17:52

## 2019-05-28 RX ADMIN — GABAPENTIN 400 MG: 400 CAPSULE ORAL at 17:51

## 2019-05-28 RX ADMIN — ASPIRIN 81 MG 81 MG: 81 TABLET ORAL at 07:20

## 2019-05-28 RX ADMIN — FAMOTIDINE 20 MG: 10 INJECTION, SOLUTION INTRAVENOUS at 21:49

## 2019-05-28 RX ADMIN — FAMOTIDINE 20 MG: 10 INJECTION, SOLUTION INTRAVENOUS at 07:21

## 2019-05-28 RX ADMIN — POTASSIUM CHLORIDE 40 MEQ: 10 TABLET, EXTENDED RELEASE ORAL at 17:51

## 2019-05-28 RX ADMIN — GABAPENTIN 400 MG: 400 CAPSULE ORAL at 07:21

## 2019-05-28 ASSESSMENT — PAIN DESCRIPTION - DESCRIPTORS: DESCRIPTORS: PRESSURE

## 2019-05-28 ASSESSMENT — PAIN SCALES - GENERAL
PAINLEVEL_OUTOF10: 0
PAINLEVEL_OUTOF10: 7
PAINLEVEL_OUTOF10: 0

## 2019-05-28 ASSESSMENT — PAIN DESCRIPTION - LOCATION: LOCATION: CHEST

## 2019-05-28 ASSESSMENT — PAIN DESCRIPTION - ORIENTATION: ORIENTATION: MID

## 2019-05-28 ASSESSMENT — PAIN DESCRIPTION - PAIN TYPE: TYPE: ACUTE PAIN

## 2019-05-28 NOTE — PROGRESS NOTES
Progress Note    Admit Date:  5/14/2019      53-year-old female with nonischemic dilated cardiomyopathy, presented with V. fib arrest.  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 Hep C + . She is mostly homebound. She has been ill with  respiratory symptoms for a couple of months. Mother found her down. CPR initiated,  S/P defibrillation twice en route to hospital.  Admitted  to ICU , on mechanical vent   Echocardiogram with ejection fraction low at 25%   Head CT was negative. status post bronchoscopy 5/16. Patient had significant amount of resp secretions that were aspirated     Diuresed on IV diuretics and zaroxylyn  Extubated on 5/23    Doing better  ,less sob  Still on iV diuretics  In bed  weak  Subjective:    Ms. Yasmine Diego seen up in bed,  No new c/o  Family at bedode    Objective:   /89   Pulse 131   Temp 98.8 °F (37.1 °C) (Oral)   Resp 27   Ht 5' (1.524 m)   Wt 178 lb 12.8 oz (81.1 kg)   SpO2 90%   BMI 34.92 kg/m²       Intake/Output Summary (Last 24 hours) at 5/28/2019 1148  Last data filed at 5/28/2019 0849  Gross per 24 hour   Intake 1355 ml   Output 1975 ml   Net -620 ml       Physical Exam:-  General: young female,  Awake alert and anxious  Skin:  Warm and dry  Neck:  JVD absent. Neck supple  Chest:  Diminished breath sounds . No wheezes, rales or rhonchi. Cardiovascular:  RRR ,S1S2 normal  Abdomen:  Soft, non tender, non distended, BS +  Extremities:  Trace edema of upper extremity . No edema of lower extremity. . Left lower extremity atrophy present  Intact peripheral pulses.  Brisk cap refill, < 2 secs  Neuro:  Alert,oriented       Medications:   Scheduled Meds:   magnesium sulfate  2 g Intravenous Once    metoprolol succinate  25 mg Oral BID    OLANZapine  15 mg Oral Once    metolazone  5 mg Oral Daily    lidocaine 1 % injection  5 mL Intradermal Once    sodium chloride flush  10 mL Intravenous 2 times per day    OLANZapine  15 mg Oral Nightly    OLANZapine  7.5 mg Oral BID    ipratropium-albuterol  1 ampule Inhalation TID    gabapentin  400 mg Oral TID    potassium chloride  40 mEq Oral BID WC    furosemide  40 mg Intravenous TID    enoxaparin  40 mg Subcutaneous Daily    aspirin  81 mg Oral Daily    famotidine (PEPCID) injection  20 mg Intravenous BID    sodium chloride flush  10 mL Intravenous 2 times per day       Continuous Infusions:   dexmedetomidine (PRECEDEX) IV infusion 2 mcg/kg/hr (05/25/19 0747)    dextrose         Data:  CBC:   Recent Labs     05/26/19 0417 05/27/19 0455 05/28/19  0600   WBC 26.0* 19.2* 17.2*   RBC 4.75 4.77 4.64   HGB 13.9 14.2 13.8   HCT 41.4 41.6 40.9   MCV 87.3 87.4 88.2   RDW 13.1 13.0 12.8   * 546* 608*     BMP:   Recent Labs     05/26/19 0417 05/27/19 0455 05/27/19  1515 05/27/19  2033 05/28/19  0600   * 132*  --   --   --  132*   K 2.9* 2.7*   < > 2.8* 4.0 3.5   CL 90* 85*  --   --   --  89*   CO2 26 31  --   --   --  30   PHOS 4.7 3.4  --   --   --  3.3   BUN 21* 21*  --   --   --  22*   CREATININE <0.5* <0.5*  --   --   --  0.6    < > = values in this interval not displayed. LIVER PROFILE:   No results for input(s): AST, ALT, ALB, BILIDIR, BILITOT, ALKPHOS in the last 72 hours. Urine hCG negative     Rapid flu antigen negative       Cultures  Respiratory cultures heavy growth staph - MSSA    Blood - NGTD       Radiology  IR PICC WO SQ PORT/PUMP > 5 YEARS   Final Result   Successful placement of PICC line. XR CHEST PORTABLE   Final Result   Right upper extremity PICC line has not yet reached the inferior vena cava. Its tip is at the expected location of the mid right subclavian vein. Bilateral airspace disease, improving on the right as compared to prior. Findings were called by the radiology call center. IR PICC WO SQ PORT/PUMP > 5 YEARS   Final Result   Successful placement of PICC line.          XR CHEST PORTABLE Final Result   No acute intracranial abnormality. Left maxillary sinus mucosal thickening. Air-fluid level in the sphenoid   sinus. Correlate with any clinical evidence of sinusitis. XR CHEST PORTABLE   Final Result   Findings as above which may be related to congestive heart failure and edema. Echo  Summary   Definity contrast administered.   Left ventricular systolic function is reduced with ejection fraction   estimated at 25-30 %.   Elevated left ventricular diastolic filling pressure: Septal E/e'' = 12.6   (for sinus tachycardia) .   Right ventricular systolic function is moderately reduced .   Mild mitral regurgitation.   Unable to obtain SPAP due to lack of tricuspid regurgitation. Repeat echo 5/22       This is a limited study for EF follow up.   Left ventricular systolic function is reduced with ejection fraction   estimated at 40 %.   There is mild to moderate global hypokinesis present.  Carson Racer is mild concentric left ventricular hypertrophy. Assessment:  Active Problems:    Acute respiratory failure (HCC)    Aspiration pneumonitis (HCC)    Atrial fibrillation with RVR (HCC)    Electrolyte disturbance    Shock (HCC)    Transaminitis    Hyperglycemia    Pneumonia due to infectious organism    ARDS (adult respiratory distress syndrome) ([facility])    Mucus plugging of bronchi    Cardiac arrest with ventricular fibrillation (HCC)    Multiple tracheobronchial mucus plugs    Moderate protein-calorie malnutrition (HCC)    Acute encephalopathy    Non-ischemic cardiomyopathy ([facility] [address]  Resolved Problems:    * No resolved hospital problems.  *      Plan:    Cardiac V fib arrest   - status post successful resuscitation ,trop elevation  - likely related to CPR and demand ischemia  - EP eval once stable and off vent  Continue beta blocker   Now in sinus tach    Acute on chronic systolic CHF  Patient with severe nonischemic dilated cardiomyopathy    - secondary to adriamycin treatment as a

## 2019-05-28 NOTE — PROGRESS NOTES
Pulmonary & Critical Care Medicine ICU Progress Note    CC: Acute respiratory failure, V. fib arrest, worsening dilated cardiomyopathy    Events of Last 24 hours:   Extubated  Delirium with agitation, improving  Precedex infusion    Invasive Lines:  PICC 19    MV: 19  Vent Mode: AC/VC Rate Set: 0 bmp/Vt Ordered: 410 mL/ /FiO2 : 50 %  No results for input(s): PHART, BZY4QJE, PO2ART in the last 72 hours. IV:   dexmedetomidine (PRECEDEX) IV infusion 2 mcg/kg/hr (19 0747)    dextrose         Vitals:  Blood pressure 95/73, pulse 109, temperature 98.6 °F (37 °C), temperature source Oral, resp. rate 23, height 5' (1.524 m), weight 178 lb 12.8 oz (81.1 kg), SpO2 96 %. on 3 L  Temp  Av.7 °F (37.1 °C)  Min: 98.2 °F (36.8 °C)  Max: 99.1 °F (37.3 °C)    Intake/Output Summary (Last 24 hours) at 2019 0604  Last data filed at 2019 0201  Gross per 24 hour   Intake 1520 ml   Output 2175 ml   Net -655 ml     EXAM:  General: ill appearing. Eyes: PERRL. No sclera icterus. No conjunctival injection. ENT: No discharge. Pharynx clear. Neck: Trachea midline. Normal thyroid. Resp: No accessory muscle use. No crackles. No wheezing. No rhonchi. No dullness on percussion. CV: Regular rate. Regular rhythm. No mumur or rub. No edema. Peripheral pulses are 2+. Capillary refill is less than 3 seconds. GI: Non-tender. Non-distended. No masses. No organomegaly. Normal bowel sounds. No hernia. Skin: Warm and dry. No nodule on exposed extremities. No rash on exposed extremities. Lymph: No cervical LAD. No supraclavicular LAD. M/S: No cyanosis. No joint deformity. No clubbing. Neuro: Alert and oriented to person and place, not year. Patellar reflexes are symmetric. Psych: No agitation, no anxiety, affect is full.      Scheduled Meds:   metoprolol succinate  25 mg Oral BID    OLANZapine  15 mg Oral Once    metolazone  5 mg Oral Daily    lidocaine 1 % injection  5 mL Intradermal Once    sodium chloride flush  10 mL Intravenous 2 times per day    OLANZapine  15 mg Oral Nightly    OLANZapine  7.5 mg Oral BID    ipratropium-albuterol  1 ampule Inhalation TID    gabapentin  400 mg Oral TID    potassium chloride  40 mEq Oral BID WC    furosemide  40 mg Intravenous TID    meropenem (MERREM) 1 g IVPB (mini-bag)  1 g Intravenous Q8H    vancomycin  1,000 mg Intravenous Q8H    enoxaparin  40 mg Subcutaneous Daily    aspirin  81 mg Oral Daily    famotidine (PEPCID) injection  20 mg Intravenous BID    sodium chloride flush  10 mL Intravenous 2 times per day     PRN Meds:  ondansetron, sodium chloride flush, potassium chloride, ibuprofen, OLANZapine, melatonin, traZODone, sennosides-docusate sodium, magnesium sulfate, potassium chloride, glucose, dextrose, glucagon (rDNA), dextrose, sodium chloride flush    Results:  CBC:   Recent Labs     05/26/19  0417 05/27/19  0455   WBC 26.0* 19.2*   HGB 13.9 14.2   HCT 41.4 41.6   MCV 87.3 87.4   * 546*     BMP:   Recent Labs     05/25/19  1421 05/26/19  0417 05/27/19  0455 05/27/19  1155 05/27/19  1515 05/27/19  2033   * 133* 132*  --   --   --    K 3.1* 2.9* 2.7* 3.1* 2.8* 4.0   CL 91* 90* 85*  --   --   --    CO2 26 26 31  --   --   --    PHOS 3.7 4.7 3.4  --   --   --    BUN 20 21* 21*  --   --   --    CREATININE <0.5* <0.5* <0.5*  --   --   --      LIVER PROFILE:   No results for input(s): AST, ALT, LIPASE, BILIDIR, BILITOT, ALKPHOS in the last 72 hours. Invalid input(s):   AMYLASE,  ALB    Cultures:  5/23/19 tracheal aspirate no growth  5/19/19 BAL no growth  5/17/19 blood no growth  5/17/19 urine no growth  5/16/19 BAL and washings pansensitive Staph aureus    Films:  CXR was reviewed by me and it showed   5/23/19 worsening left basilar airspace disease, improving airspace disease on the right    ASSESSMENT:  · Hypokalemia   · Acute hypoxemic respiratory failure   · ARDS  · Aspiration pneumonia  · Nonischemic dilated cardiomyopathy 2/2 Adriamycin for childhood sarcoma  · Ventricular fibrillation cardiac arrest  · History of paroxysmal atrial fibrillation  · Hepatitis C    PLAN:  · Supplemental oxygen to maintain SaO2 >92%; wean as tolerated    · Precedex infusion is off  · Replace potassium and follow level  · SSI  · Merrem and IV vancomycin day #8, received 6 days Zosyn and doxycycline.   Stop antibiotics today  · Conservative fluid strategy, goal is to keep even to slightly negative (1 L)  · Psychiatry following  · AICD per cardiology  · Prophylaxis: Lovenox and Pepcid  · Okay for PCU

## 2019-05-28 NOTE — PLAN OF CARE
Problem: Risk for Impaired Skin Integrity  Goal: Tissue integrity - skin and mucous membranes  Description  Structural intactness and normal physiological function of skin and  mucous membranes.   Outcome: Ongoing     Problem: Infection:  Goal: Will remain free from infection  Description  Will remain free from infection  Outcome: Ongoing     Problem: Gas Exchange - Impaired:  Goal: Levels of oxygenation will improve  Description  Levels of oxygenation will improve  Outcome: Ongoing     Problem: OXYGENATION/RESPIRATORY FUNCTION  Goal: Patient will maintain patent airway  Outcome: Ongoing  Goal: Patient will achieve/maintain normal respiratory rate/effort  Description  Respiratory rate and effort will be within normal limits for the patient  Outcome: Ongoing     Problem: ACTIVITY INTOLERANCE/IMPAIRED MOBILITY  Goal: Mobility/activity is maintained at optimum level for patient  Outcome: Ongoing     Problem: Infection - Central Venous Catheter-Associated Bloodstream Infection:  Goal: Will show no infection signs and symptoms  Description  Will show no infection signs and symptoms  Outcome: Ongoing

## 2019-05-28 NOTE — PROGRESS NOTES
Spoke with Tres from [address] re transport to [facility] cath lab for IVCD placement with Dr Harris Carrillo will be at [phone] 5-29-19 Aixa Mistry 5-28-19

## 2019-05-28 NOTE — PROGRESS NOTES
Physical Therapy/Occupational Therapy  Chart reviewed. Noted physicians are requesting for pt to transfer to [facility] for further cardiac treatment. PT/OT to HOLD treatment today given need for higher level of care than can be provided here. Will follow up with pt tomorrow if the current plan changes. Addendum (15:20): patient transferred out of ICU to PCU. Spoke with pt's nurse, Rani Santiago, who states pt is appropriate to participate in therapy this date despite likely transfer to [facility] tomorrow. RN states she will remove pt's bedrest order and it is ok for pt to be up OOB.      Didi Lee, PT, DPT #[id number]  Lara Zhang OTR/L #[id number]

## 2019-05-28 NOTE — PROGRESS NOTES
Occupational Therapy Daily Treatment Note    Unit: PCU  Date:  5/28/2019  Patient Name:    Enid York  Admitting diagnosis:  Acute respiratory failure ([facility] [address] [J96.00]  Admit Date:  5/14/2019  Precautions/Restrictions:  fall risk, IV, bed/chair alarm, rashid catheter , supplemental o2 (3L), confusion, ICU monitoring and telesitter       Discharge Recommendations: Acute Rehab (ARU)/ Inpatient [facility] (IRF)  DME needs for discharge: defer to facility       Therapy recommendations for staff:   Assist of 1 with use of rolling walker (RW) for all transfers to/from chair    AM-PAC Score: [address] S4 Level: NA       Treatment Time:  15:50-15:20  Treatment number:  2   Total Treatment Time:   30 minutes      Subjective:  Patient supine in bed and agreeable to treatment. Pain   No  Rating: NA  Location:  Pain Medicine Status: Denies need      Bed Mobility:   Supine to Sit:  CGA  Sit to Supine:  Not Tested  Rolling:           Not Tested  Scooting:        CGA    Transfer Training:   Sit to stand:   CGA  Stand to sit:  CGA  Bed to Chair:  Min A and with use of RW  Bed to [facility]:   Not Tested  Standard toilet:   Not Tested    Activity Tolerance   Pt completed therapy session with No nausea, dizziness, pain or SOB noted w/activity  SpO2: mid 90s on 3L of O2  HR: 125-140  BP:     ADL Training:   Upper body dressing:  SBA  Upper body bathing:  SBA  Lower body dressing:  Min A  Lower body bathing:  Min A  Toileting:   Not Tested  Grooming/Hygiene:  Not Tested    Therapeutic Exercise:   N/A    Patient Education:   Role of OT  Recommendations for DC    Positioning Needs:   Reclined in chair with call light and needs in reach. Alarm Set    Family Present:  No    Assessment: Patient continues to have intermittent confusion and weakness throughout. Patient would benefit from intensive inpatient rehabilitation to return to [facility]. GOALS  To be met in 3 Visits:  1).  Bed to toilet/BSC: Mod A     To be met in 5 Visits:  1). Supine to Sit: Min A  2). Upper Body Bathing:  Min A  3). Lower Body Bathing: Mod A  4). Upper Body Dressing: Min A  5). Lower Body Dressing: Mod A  6).  Pt to shelia UE exs x 15 reps        Plan: cont with [facility], OTR/L #[id number]      If patient discharges from this facility prior to next visit, this note will serve as the Discharge Summary

## 2019-05-28 NOTE — PROGRESS NOTES
High risk vesicant drug infusing:  ____no______    Multiple incompatible medications infusing:  _no________    CVP Monitoring:  ____no_____    Extremely difficult IV access challenge:  __no______    Continued need for central line access:  __yes________    Addressed with physician:  ___yes_____    RIGHT PATIENT, RIGHT TIME, RIGHT LINE    Electronically signed by Jonathan Anton RN on 5/28/2019 at 8:29 AM

## 2019-05-28 NOTE — PROGRESS NOTES
[address] Daily Progress Note      Admit Date:  5/14/2019    Subjective:  Ms. Nasim Vargas is being seen for f/u non-ischemic CM and s/p V fib arrest. She c/o chest wall pain at site of chest compression. No other complaints.      Objective:   /82   Pulse 114   Temp 98.8 °F (37.1 °C) (Oral)   Resp 17   Ht 5' (1.524 m)   Wt 178 lb 12.8 oz (81.1 kg)   SpO2 92%   BMI 34.92 kg/m²       Intake/Output Summary (Last 24 hours) at 5/28/2019 5986  Last data filed at 5/28/2019 2089  Gross per 24 hour   Intake 2155 ml   Output 2750 ml   Net -595 ml       TELEMETRY: Sinus tachycardia    Physical Exam:  General:  Awake, alert, NAD  Skin:  Warm and dry  Neck:  JVD none  Chest:  Clear to auscultation, respiration easy  Cardiovascular:  +regular and tachy;  S1S2  Abdomen:  Soft NT  Extremities:  No edema; LLE atrophied    Medications:    metoprolol succinate  25 mg Oral BID    OLANZapine  15 mg Oral Once    metolazone  5 mg Oral Daily    lidocaine 1 % injection  5 mL Intradermal Once    sodium chloride flush  10 mL Intravenous 2 times per day    OLANZapine  15 mg Oral Nightly    OLANZapine  7.5 mg Oral BID    ipratropium-albuterol  1 ampule Inhalation TID    gabapentin  400 mg Oral TID    potassium chloride  40 mEq Oral BID WC    furosemide  40 mg Intravenous TID    meropenem (MERREM) 1 g IVPB (mini-bag)  1 g Intravenous Q8H    vancomycin  1,000 mg Intravenous Q8H    enoxaparin  40 mg Subcutaneous Daily    aspirin  81 mg Oral Daily    famotidine (PEPCID) injection  20 mg Intravenous BID    sodium chloride flush  10 mL Intravenous 2 times per day      dexmedetomidine (PRECEDEX) IV infusion 2 mcg/kg/hr (05/25/19 6359)    dextrose       ondansetron, sodium chloride flush, potassium chloride, ibuprofen, OLANZapine, melatonin, traZODone, sennosides-docusate sodium, magnesium sulfate, potassium chloride, glucose, dextrose, glucagon (rDNA), dextrose, sodium chloride flush    Lab Data:  CBC: Recent Labs     05/26/19  0417 05/27/19  0455 05/28/19  0600   WBC 26.0* 19.2* 17.2*   HGB 13.9 14.2 13.8   HCT 41.4 41.6 40.9   MCV 87.3 87.4 88.2   * 546* 608*     BMP:   Recent Labs     05/26/19  0417 05/27/19  0455  05/27/19  1515 05/27/19  2033 05/28/19  0600   * 132*  --   --   --  132*   K 2.9* 2.7*   < > 2.8* 4.0 3.5   CL 90* 85*  --   --   --  89*   CO2 26 31  --   --   --  30   PHOS 4.7 3.4  --   --   --  3.3   BUN 21* 21*  --   --   --  22*   CREATININE <0.5* <0.5*  --   --   --  0.6    < > = values in this interval not displayed. LIVER PROFILE: No results for input(s): AST, ALT, LIPASE, BILIDIR, BILITOT, ALKPHOS in the last 72 hours. Invalid input(s): AMYLASE,  ALB  PT/INR:   Recent Labs     05/26/19  1021   PROTIME 14.8*   INR 1.30*       5/14/19 ECHO Summary   This is a limited study for EF follow up.   Left ventricular systolic function is reduced with EF estimated at 40 %.   There is mild to moderate global hypokinesis present.   There is mild concentric left ventricular hypertrophy. Assessment:    1. S/P Vfib arrest:   -ICD placement possibly today. Awaiting to hear from EP partner   -Continue beta blocker   -Replete K+ 3.5 this AM; will give 20mEq KDur po x 1   -avoid QT interval prolonging drugs     2. Non-ischemic CM:   -See ECHO results above; EF=40%   -hx adriamycin chemotx for sarcoma of leg as child and Hep C +   -Continue toprol XL 25mg BID     -Continue IV lasix 40mg BID and metolazone 5mg qd   -Will start ACE-I soon but avoid starting today if ICD will be placed. Do not want to start new medication just prior.     Patient Active Problem List    Diagnosis Date Noted    Acute encephalopathy 05/25/2019    Moderate protein-calorie malnutrition ([facility] 05/23/2019    Multiple tracheobronchial mucus plugs     Cardiac arrest with ventricular fibrillation (HCC)     ARDS (adult respiratory distress syndrome) (HCC)     Mucus plugging of bronchi     Aspiration pneumonitis

## 2019-05-28 NOTE — PROGRESS NOTES
Speech Language Pathology  SLP Dysphagia Daily Treatment Note      Recommendations:  Upgrade to Dysphagia III with thin liquids, Continue aspiration precautions. Meds whole with puree    Date:   5/28/2019    Patient Name:    Neeta Troncoso      Medical Diagnosis:    1. Acute respiratory failure with hypoxia (HCC)    2. Cardiac arrest with ventricular fibrillation ([address]      Treatment Diagnosis:  Dysphagia    Impressions:  Dysphagia Impression : Patient demonstrates increased alertness today, reports she has no appetite, voice is hoarse, weak, and breathy. Reports feeling short of breath with sentences. Trials today with mild oropharyngeal dysphagia characterized by decreased hyolaryngeal elevation and suspect general swallow mechanism weakness. No overt clinical s/s of aspiration or penetration. Patient O2 via nasal canula SPO2 remained at 96%. She was unable to recall previous SLP visits for swallowing. (cracker, puree and 6 oz of water by cup)    Recommend Dysphagia Level III and Thin liquid as tolerated, no straw. She is not a candidate for instrumental swallow assessment secondary to her behavior. She should have distant supervision and assist with meals, feeding only when awake and alert. Hold oral feeding if there is worsening respiratory status and/or if signs of aspiration develop. Meds whole with puree. Case discussed with patient. Subjective:    RN/oked SLP entry. Patient seen upright in bed and was alert and agreeable to therapy. Alert and oriented to place, month, year. Reports she has surgery pending for tomorrow. Pain:  None reported. Current Diet:  Dysphagia II Puree with thin Liquids      Goals/Treatment:    Short-term Goals  Timeframe for Short-term Goals: 2-3 days  Goal 1: Patient will tolerate PO intake without signs of aspiration. Ongoing -  5/28P see above.    Long-term Goals  Timeframe for Long-term Goals: 1 week  Goal 1: Most functional oral diet for current setting while maintaining adequate nutrition and hydration without signs of aspiration  Ongoing 5/28 see above  Dysphagia Goals: The patient will tolerate recommended diet without observed clinical signs of aspiration Ongoing 5/28 see above      Patient/Family/Caregiver Education: Patient and nursing education regarding diet change to Dysphagia II with thin liquids and safe swallow procedures. Compensatory Strategies:  · Upright for all oral intake  · Small bites/sips  · Eat slowly  · Alternate solids/liquids  · Oral care     Plan:    Diet change to Dysphagia II with thin liquids. Continued dysphagia tx with goals per plan of care. Discharge Recommendations: If pt discharges from hospital prior to Speech/Swallowing discharge, this note serves as tx and discharge summary.      Total Treatment Time:  Time in/out: 8990-4232  Dysphagia tx minutes:  16 minutes    Bobbi Carmona MS CCC-SLP  SP .[id number]  Speech Language Pathologist

## 2019-05-28 NOTE — PROGRESS NOTES
Sluggish blood return noted from pt's PICC line when attempting to draw pt's labs. Lab called to draw pt's blood. Spoke to Dr. Richard Oliveira. MD states to place a peripheral line and DC central line.

## 2019-05-28 NOTE — PROGRESS NOTES
Shift assessment complete. Patient is A&O x4. Memory improved compared to yesterday. Able to state why she is here and what she is going to [facility] for. On 3L NC.   RR are easy and even. Lungs appear clear. ST on monitor. Bowels active x4. Abdomen is soft and non tender. No edema noted. Complains of pain across the chest. 7/10. Appears to be the same pain from yesterday. States \"it feels like someone is punching me\". Trop and EKG negative yesterday when pain occurred.      Electronically signed by Deinsa Soria RN on 5/28/2019 at 7:39 AM    Vitals:    05/28/19 0700   BP: 101/79   Pulse: 119   Resp: 17   Temp: 98.9 °F (37.2 °C)   SpO2: 94%

## 2019-05-28 NOTE — PROGRESS NOTES
Report given to Tina Jacob RN.     Electronically signed by Ashleigh Dupont RN on 5/28/2019 at 1:59 PM

## 2019-05-28 NOTE — PROGRESS NOTES
Inpatient Physical Therapy Daily Treatment Note    Unit: PCU  Date:  2019  Patient Name:    Samina Richards  Admitting diagnosis:  Acute respiratory failure ([facility] [address] [J96.00]  Admit Date:  2019  Precautions/Restrictions:  fall risk, IV, bed/chair alarm, rashid catheter , supplemental o2 (3L), telemetry and telesitter      Discharge Recommendations: Acute Rehab (ARU)/ Inpatient [facility] ([facility])  DME needs for discharge: defer to facility       Therapy recommendations for staff:   Assist of 1 with use of rolling walker (RW) for all transfers to/from chair. Home Health S4 Level Recommendation: NA  AM-PAC Mobility Score   AM-PAC Inpatient Mobility Raw Score : 14       Treatment Time:  4515-3430  Treatment number: 2  Timed Code Treatment Minutes: 30 minutes  Total Treatment Minutes:  30  minutes    Cognition    A&O x4   Able to follow 1 step commands. Subjective  Patient lying supine in bed with no family present  Pt agreeable to this PT tx. Pain   No  Location:   Rating:  NA/10  Pain Medicine Status: Denies need     Bed Mobility   Supine to Sit:   CGA  Sit to Supine:  Not Tested  Rolling:   Not Tested  Scooting:   CGA    Transfer Training     Sit to stand:   CGA  Stand to sit:   CGA  Bed to Chair:  Min A with use of rolling walker (RW)    Gait Training gait deferred due to fatigue  Distance:   ft  Deviations (firm surface/linoleum): N/A   Assistive Device Used:  N/A  Level of Assist: N/A  Comment:      Stair Training deferred, pt unsafe/not appropriate to complete stairs at this time    Therapeutic Exercise Tim not completed this date  Ankle pumps:  Quad sets:   Glut sets:   Heel slides:   SLR:   Hip abd/add:   LAQ:   Marching:     Balance  Static Sitting:  Good    Tolerance: WFL  Dynamic Sitting:  Good -   Static Standing: Good -    Tolerance: ~60 sec.  x4  Dynamic Standin [address]     Patient Education      Role of PT, POC, Discharge recommendations, safety awareness, transfer techniques, pacing activity and calling for assist with mobility. Positioning Needs       Pt reclined in chair, call light and needs in reach and alarm set. ROM Measurements N/A  Knee Flexion:  Knee Extension:     Activity Tolerance   Pt completed therapy session with SOB noted w/activity. SpO2: 98%  HR: 120-140bpm with activity   BP:     Other  None. Assessment :  Patient participating well today and showing good progress since initial evaluation. Pt tolerated transfer OOB to chair and required no more than min A through the session. Continue to recommend ARU upon dc. Goals (all goals ongoing unless otherwise indicated)  To be met in 3 visits:  1). Independent with LE Ex x 10 reps     To be met in 6 visits:  1). Supine to/from sit: Min A - Met 5/28  2). Sit to/from stand: tolerate assessment - Met 5/28  3). Bed to chair: tolerate assessment - Met 5/28  4). Gait: Ambulate tolerate assessment  5). Tolerate B LE exercises 3 sets of 10-15 reps    Plan   Continue with plan of care. Marcin Jacobson, PT, DPT #[id number]     If patient discharges from this facility prior to next visit, this note will serve as the Discharge Summary.

## 2019-05-29 ENCOUNTER — HOSPITAL ENCOUNTER (INPATIENT)
Dept: CARDIAC CATH/INVASIVE PROCEDURES | Age: 34
LOS: 7 days | Discharge: HOME | DRG: 161 | End: 2019-06-05
Attending: INTERNAL MEDICINE | Admitting: INTERNAL MEDICINE
Payer: MEDICAID

## 2019-05-29 VITALS
SYSTOLIC BLOOD PRESSURE: 140 MMHG | OXYGEN SATURATION: 93 % | HEIGHT: 60 IN | RESPIRATION RATE: 16 BRPM | WEIGHT: 171.1 LBS | BODY MASS INDEX: 33.59 KG/M2 | TEMPERATURE: 98.8 F | HEART RATE: 110 BPM | DIASTOLIC BLOOD PRESSURE: 95 MMHG

## 2019-05-29 DIAGNOSIS — I46.9 CARDIAC ARREST (HCC): Primary | ICD-10-CM

## 2019-05-29 PROCEDURE — 87040 BLOOD CULTURE FOR BACTERIA: CPT

## 2019-05-29 PROCEDURE — 6360000002 HC RX W HCPCS: Performed by: INTERNAL MEDICINE

## 2019-05-29 PROCEDURE — 6360000002 HC RX W HCPCS

## 2019-05-29 PROCEDURE — 94761 N-INVAS EAR/PLS OXIMETRY MLT: CPT

## 2019-05-29 PROCEDURE — 94150 VITAL CAPACITY TEST: CPT

## 2019-05-29 PROCEDURE — 6370000000 HC RX 637 (ALT 250 FOR IP): Performed by: INTERNAL MEDICINE

## 2019-05-29 PROCEDURE — 94640 AIRWAY INHALATION TREATMENT: CPT

## 2019-05-29 PROCEDURE — 6370000000 HC RX 637 (ALT 250 FOR IP)

## 2019-05-29 PROCEDURE — 2060000000 HC ICU INTERMEDIATE R&B

## 2019-05-29 PROCEDURE — 2580000003 HC RX 258: Performed by: INTERNAL MEDICINE

## 2019-05-29 PROCEDURE — 36415 COLL VENOUS BLD VENIPUNCTURE: CPT

## 2019-05-29 PROCEDURE — 2500000003 HC RX 250 WO HCPCS: Performed by: INTERNAL MEDICINE

## 2019-05-29 PROCEDURE — 2700000000 HC OXYGEN THERAPY PER DAY

## 2019-05-29 PROCEDURE — 2500000003 HC RX 250 WO HCPCS

## 2019-05-29 RX ORDER — SODIUM CHLORIDE 0.9 % (FLUSH) 0.9 %
10 SYRINGE (ML) INJECTION EVERY 12 HOURS SCHEDULED
Status: DISCONTINUED | OUTPATIENT
Start: 2019-05-29 | End: 2019-05-29

## 2019-05-29 RX ORDER — MAGNESIUM SULFATE 1 G/100ML
1 INJECTION INTRAVENOUS PRN
Status: DISCONTINUED | OUTPATIENT
Start: 2019-05-29 | End: 2019-06-05 | Stop reason: HOSPADM

## 2019-05-29 RX ORDER — ASPIRIN 81 MG/1
81 TABLET, CHEWABLE ORAL DAILY
Status: DISCONTINUED | OUTPATIENT
Start: 2019-05-29 | End: 2019-06-05 | Stop reason: HOSPADM

## 2019-05-29 RX ORDER — OLANZAPINE 5 MG/1
15 TABLET ORAL NIGHTLY
Status: DISCONTINUED | OUTPATIENT
Start: 2019-05-29 | End: 2019-06-05 | Stop reason: HOSPADM

## 2019-05-29 RX ORDER — SENNA AND DOCUSATE SODIUM 50; 8.6 MG/1; MG/1
2 TABLET, FILM COATED ORAL DAILY PRN
Status: DISCONTINUED | OUTPATIENT
Start: 2019-05-29 | End: 2019-06-05 | Stop reason: HOSPADM

## 2019-05-29 RX ORDER — POTASSIUM CHLORIDE 20 MEQ/1
40 TABLET, EXTENDED RELEASE ORAL 2 TIMES DAILY WITH MEALS
Status: DISCONTINUED | OUTPATIENT
Start: 2019-05-29 | End: 2019-06-05

## 2019-05-29 RX ORDER — DEXTROSE MONOHYDRATE 50 MG/ML
100 INJECTION, SOLUTION INTRAVENOUS PRN
Status: DISCONTINUED | OUTPATIENT
Start: 2019-05-29 | End: 2019-06-05 | Stop reason: HOSPADM

## 2019-05-29 RX ORDER — OLANZAPINE 5 MG/1
7.5 TABLET ORAL 2 TIMES DAILY
Status: DISCONTINUED | OUTPATIENT
Start: 2019-05-29 | End: 2019-06-05 | Stop reason: HOSPADM

## 2019-05-29 RX ORDER — ONDANSETRON 2 MG/ML
4 INJECTION INTRAMUSCULAR; INTRAVENOUS EVERY 6 HOURS PRN
Status: DISCONTINUED | OUTPATIENT
Start: 2019-05-29 | End: 2019-06-03

## 2019-05-29 RX ORDER — SODIUM CHLORIDE 0.9 % (FLUSH) 0.9 %
10 SYRINGE (ML) INJECTION PRN
Status: DISCONTINUED | OUTPATIENT
Start: 2019-05-29 | End: 2019-06-03 | Stop reason: SDUPTHER

## 2019-05-29 RX ORDER — DEXTROSE MONOHYDRATE 25 G/50ML
12.5 INJECTION, SOLUTION INTRAVENOUS PRN
Status: DISCONTINUED | OUTPATIENT
Start: 2019-05-29 | End: 2019-06-05 | Stop reason: HOSPADM

## 2019-05-29 RX ORDER — IBUPROFEN 200 MG
600 TABLET ORAL EVERY 6 HOURS PRN
Status: DISCONTINUED | OUTPATIENT
Start: 2019-05-29 | End: 2019-06-05 | Stop reason: HOSPADM

## 2019-05-29 RX ORDER — IPRATROPIUM BROMIDE AND ALBUTEROL SULFATE 2.5; .5 MG/3ML; MG/3ML
1 SOLUTION RESPIRATORY (INHALATION)
Status: DISCONTINUED | OUTPATIENT
Start: 2019-05-29 | End: 2019-06-02

## 2019-05-29 RX ORDER — GABAPENTIN 400 MG/1
400 CAPSULE ORAL 3 TIMES DAILY
Status: DISCONTINUED | OUTPATIENT
Start: 2019-05-29 | End: 2019-06-05 | Stop reason: HOSPADM

## 2019-05-29 RX ORDER — TRAZODONE HYDROCHLORIDE 50 MG/1
50 TABLET ORAL NIGHTLY PRN
Status: DISCONTINUED | OUTPATIENT
Start: 2019-05-29 | End: 2019-06-05 | Stop reason: HOSPADM

## 2019-05-29 RX ORDER — SODIUM CHLORIDE 0.9 % (FLUSH) 0.9 %
10 SYRINGE (ML) INJECTION PRN
Status: DISCONTINUED | OUTPATIENT
Start: 2019-05-29 | End: 2019-05-29

## 2019-05-29 RX ORDER — METOPROLOL SUCCINATE 25 MG/1
25 TABLET, EXTENDED RELEASE ORAL 2 TIMES DAILY
Status: DISCONTINUED | OUTPATIENT
Start: 2019-05-29 | End: 2019-06-05 | Stop reason: HOSPADM

## 2019-05-29 RX ORDER — OLANZAPINE 10 MG/1
10 INJECTION, POWDER, LYOPHILIZED, FOR SOLUTION INTRAMUSCULAR 3 TIMES DAILY PRN
Status: DISCONTINUED | OUTPATIENT
Start: 2019-05-29 | End: 2019-05-29 | Stop reason: SDUPTHER

## 2019-05-29 RX ORDER — NICOTINE POLACRILEX 4 MG
15 LOZENGE BUCCAL PRN
Status: DISCONTINUED | OUTPATIENT
Start: 2019-05-29 | End: 2019-06-05 | Stop reason: HOSPADM

## 2019-05-29 RX ORDER — SODIUM CHLORIDE 0.9 % (FLUSH) 0.9 %
10 SYRINGE (ML) INJECTION EVERY 12 HOURS SCHEDULED
Status: DISCONTINUED | OUTPATIENT
Start: 2019-05-29 | End: 2019-06-03 | Stop reason: SDUPTHER

## 2019-05-29 RX ORDER — POTASSIUM CHLORIDE 29.8 MG/ML
20 INJECTION INTRAVENOUS PRN
Status: DISCONTINUED | OUTPATIENT
Start: 2019-05-29 | End: 2019-05-29

## 2019-05-29 RX ORDER — FUROSEMIDE 10 MG/ML
40 INJECTION INTRAMUSCULAR; INTRAVENOUS 3 TIMES DAILY
Status: DISCONTINUED | OUTPATIENT
Start: 2019-05-29 | End: 2019-05-31

## 2019-05-29 RX ORDER — POTASSIUM CHLORIDE 7.45 MG/ML
10 INJECTION INTRAVENOUS PRN
Status: DISCONTINUED | OUTPATIENT
Start: 2019-05-29 | End: 2019-05-29

## 2019-05-29 RX ORDER — IPRATROPIUM BROMIDE AND ALBUTEROL SULFATE 2.5; .5 MG/3ML; MG/3ML
1 SOLUTION RESPIRATORY (INHALATION) 3 TIMES DAILY
Status: DISCONTINUED | OUTPATIENT
Start: 2019-05-29 | End: 2019-05-29

## 2019-05-29 RX ADMIN — GABAPENTIN 400 MG: 400 CAPSULE ORAL at 16:32

## 2019-05-29 RX ADMIN — Medication 10 ML: at 11:19

## 2019-05-29 RX ADMIN — FAMOTIDINE 20 MG: 10 INJECTION, SOLUTION INTRAVENOUS at 10:18

## 2019-05-29 RX ADMIN — OLANZAPINE 7.5 MG: 5 TABLET, FILM COATED ORAL at 11:17

## 2019-05-29 RX ADMIN — METOPROLOL SUCCINATE 25 MG: 25 TABLET, EXTENDED RELEASE ORAL at 22:19

## 2019-05-29 RX ADMIN — SODIUM CHLORIDE, PRESERVATIVE FREE 10 ML: 5 INJECTION INTRAVENOUS at 11:17

## 2019-05-29 RX ADMIN — VANCOMYCIN HYDROCHLORIDE 1000 MG: 10 INJECTION, POWDER, LYOPHILIZED, FOR SOLUTION INTRAVENOUS at 22:21

## 2019-05-29 RX ADMIN — IPRATROPIUM BROMIDE AND ALBUTEROL SULFATE 1 AMPULE: .5; 3 SOLUTION RESPIRATORY (INHALATION) at 20:01

## 2019-05-29 RX ADMIN — GABAPENTIN 400 MG: 400 CAPSULE ORAL at 11:16

## 2019-05-29 RX ADMIN — ASPIRIN 81 MG 81 MG: 81 TABLET ORAL at 10:18

## 2019-05-29 RX ADMIN — OLANZAPINE 7.5 MG: 5 TABLET, FILM COATED ORAL at 18:28

## 2019-05-29 RX ADMIN — IBUPROFEN 600 MG: 200 TABLET, FILM COATED ORAL at 22:24

## 2019-05-29 RX ADMIN — POTASSIUM CHLORIDE 40 MEQ: 20 TABLET, EXTENDED RELEASE ORAL at 18:27

## 2019-05-29 RX ADMIN — METOPROLOL SUCCINATE 25 MG: 25 TABLET, EXTENDED RELEASE ORAL at 11:19

## 2019-05-29 RX ADMIN — VANCOMYCIN HYDROCHLORIDE 1000 MG: 1 INJECTION, POWDER, LYOPHILIZED, FOR SOLUTION INTRAVENOUS at 10:03

## 2019-05-29 RX ADMIN — IPRATROPIUM BROMIDE AND ALBUTEROL SULFATE 1 AMPULE: .5; 3 SOLUTION RESPIRATORY (INHALATION) at 16:47

## 2019-05-29 RX ADMIN — OLANZAPINE 15 MG: 5 TABLET, FILM COATED ORAL at 22:20

## 2019-05-29 RX ADMIN — IPRATROPIUM BROMIDE AND ALBUTEROL SULFATE 1 AMPULE: .5; 3 SOLUTION RESPIRATORY (INHALATION) at 10:40

## 2019-05-29 RX ADMIN — FUROSEMIDE 40 MG: 10 INJECTION, SOLUTION INTRAMUSCULAR; INTRAVENOUS at 10:18

## 2019-05-29 RX ADMIN — POTASSIUM CHLORIDE 40 MEQ: 20 TABLET, EXTENDED RELEASE ORAL at 11:17

## 2019-05-29 RX ADMIN — GABAPENTIN 400 MG: 400 CAPSULE ORAL at 22:20

## 2019-05-29 RX ADMIN — SODIUM CHLORIDE, PRESERVATIVE FREE 10 ML: 5 INJECTION INTRAVENOUS at 22:20

## 2019-05-29 RX ADMIN — FAMOTIDINE 20 MG: 10 INJECTION, SOLUTION INTRAVENOUS at 22:24

## 2019-05-29 RX ADMIN — FUROSEMIDE 40 MG: 10 INJECTION, SOLUTION INTRAMUSCULAR; INTRAVENOUS at 22:24

## 2019-05-29 RX ADMIN — FUROSEMIDE 40 MG: 10 INJECTION, SOLUTION INTRAMUSCULAR; INTRAVENOUS at 16:32

## 2019-05-29 ASSESSMENT — PAIN SCALES - GENERAL
PAINLEVEL_OUTOF10: 2
PAINLEVEL_OUTOF10: 0
PAINLEVEL_OUTOF10: 6

## 2019-05-29 ASSESSMENT — PAIN DESCRIPTION - LOCATION: LOCATION: CHEST

## 2019-05-29 NOTE — PROGRESS NOTES
Pt rec'd from Cath Lab to 429  Oriented to room / floor / call light  Bedside table  Visiting hrs  Safety / comfort measures  Call light / bedside table within reach  Locked bed in low position with 2/4 sr's up  VSS SPO2 wnl on n/c support  dillon c/o except improving sternal discomfort s/p 3 rounds of CPR  Assessement and Admission in progress

## 2019-05-29 NOTE — PROGRESS NOTES
Alana Bosch is a 26 y.o. female patient.     Current Facility-Administered Medications   Medication Dose Route Frequency Provider Last Rate Last Dose    dextrose 5 % solution  100 mL/hr Intravenous PRN Rosanna Healy MD        dextrose 50 % solution 12.5 g  12.5 g Intravenous PRN Rosanna Healy MD        glucagon (rDNA) injection 1 mg  1 mg Intramuscular PRN Rosanna Healy MD        glucose (GLUTOSE) 40 % oral gel 15 g  15 g Oral PRN Rosanna Healy MD        aspirin chewable tablet 81 mg  81 mg Oral Daily Omid Orr MD   81 mg at 05/29/19 1018    famotidine (PEPCID) injection 20 mg  20 mg Intravenous BID Omid Orr MD   20 mg at 05/29/19 1018    furosemide (LASIX) injection 40 mg  40 mg Intravenous TID Rosanna Healy MD   40 mg at 05/29/19 1632    gabapentin (NEURONTIN) capsule 400 mg  400 mg Oral TID Rosanna Healy MD   400 mg at 05/29/19 1632    ibuprofen (ADVIL;MOTRIN) tablet 600 mg  600 mg Oral Q6H PRN Rosanna Healy MD        magnesium sulfate 1 g in dextrose 5% 100 mL IVPB  1 g Intravenous PRN Rosanna Healy MD        melatonin ER tablet 3 mg  3 mg Oral Nightly PRN Omid Orr MD        metoprolol succinate (TOPROL XL) extended release tablet 25 mg  25 mg Oral BID Rosanna Healy MD   25 mg at 05/29/19 1119    OLANZapine (ZYPREXA) tablet 15 mg  15 mg Oral Nightly Omid Orr MD        OLANZapine (ZYPREXA) tablet 7.5 mg  7.5 mg Oral BID Rosanna Healy MD   7.5 mg at 05/29/19 1117    ondansetron (ZOFRAN) injection 4 mg  4 mg Intravenous Q6H PRN Rosanna Healy MD        potassium chloride (KLOR-CON M) extended release tablet 40 mEq  40 mEq Oral BID WC Omid Orr MD   40 mEq at 05/29/19 1117    sennosides-docusate sodium (SENOKOT-S) 8.6-50 MG tablet 2 tablet  2 tablet Oral Daily PRN Omid Orr MD        sodium chloride flush 0.9 % injection 10 mL  10 mL Intravenous 2 times per day Omid

## 2019-05-29 NOTE — PROGRESS NOTES
RESPIRATORY THERAPY ASSESSMENT    Name:  Brian Record Number:  [id number]  Age: 26 y.o. Gender: female  : 1985  Today's Date:  2019  Room:  [facility]-    Assessment     Is the patient being admitted for a COPD or Asthma exacerbation? No   (If yes the patient will be seen every 4 hours for the first 24 hours and then reassessed)    Patient Admission Diagnosis      Allergies  No Known Allergies    Minimum Predicted Vital Capacity:     675          Actual Vital Capacity:      1250              Pulmonary History:No history, current smoker  Home Oxygen Therapy:  room air  Home Respiratory Therapy:None   Current Respiratory Therapy:  Duoneb TID  Treatment Type: HHN, IS  Medications: Albuterol/Ipratropium    Respiratory Severity Index(RSI)   Patients with orders for inhalation medications, oxygen, or any therapeutic treatment modality will be placed on Respiratory Protocol. They will be assessed with the first treatment and at least every 72 hours thereafter. The following severity scale will be used to determine frequency of treatment intervention. Smoking History: Pulmonary Disease or Smoking History, Greater than 15 pack year = 2    Social History  Social History     Tobacco Use    Smoking status: Current Every Day Smoker     Packs/day: 2.00    Smokeless tobacco: Never Used   Substance Use Topics    Alcohol use:  Yes    Drug use: Yes     Types: Marijuana       Recent Surgical History: None = 0  Past Surgical History  Past Surgical History:   Procedure Laterality Date    BRONCHOSCOPY N/A 2019    BRONCHOSCOPY ALVEOLAR LAVAGE performed by De Zam[facility] MD at  [facility]  2019    BRONCHOSCOPY THERAPUTIC ASPIRATION INITIAL performed by De Zamora MD at  [facility] N/A 2019    BRONCHOSCOPY ALVEOLAR LAVAGE performed by De [facility]ora MD at  [facility]  2019    BRONCHOSCOPY THERAPUTIC with spacer when the following criteria are met:  a. Alert and cooperative     b. HR < 140 bpm  c. RR < 30 bpm                d. Can demonstrate a 2-3 second inspiratory hold  4. Bronchodilators will be administered via Hand Held Nebulizer [facility]) to patients when ANY of the following criteria are met  a. Incognizant or uncooperative          b. Patients treated with HHN at Home        c. Unable to demonstrate proper use of MDI with spacer     d. RR > 30 bpm   5. Bronchodilators will be delivered via Metered Dose Inhaler (MDI), HHN, Aerogen to intubated patients on mechanical ventilation. 6. Inhalation medication orders will be delivered and/or substituted as outlined below. Aerosolized Medications Ordering and Administration Guidelines:    1. All Medications will be ordered by a physician, and their frequency and/or modality will be adjusted as defined by the patients Respiratory Severity Index (RSI) score. 2. If the patient does not have documented COPD, consider discontinuing anticholinergics when RSI is less than 9.  3. If the bronchospasm worsens (increased RSI), then the bronchodilator frequency can be increased to a maximum of every 4 hours. If greater than every 4 hours is required, the physician will be contacted. 4. If the bronchospasm improves, the frequency of the bronchodilator can be decreased, based on the patient's RSI, but not less than home treatment regimen frequency. 5. Bronchodilator(s) will be discontinued if patient has a RSI less than 9 and has received no scheduled or as needed treatment for 72  Hrs. Patients Ordered on a Mucolytic Agent:    1. Must always be administered with a bronchodilator. 2. Discontinue if patient experiences worsened bronchospasm, or secretions have lessened to the point that the patient is able to clear them with a cough. Anti-inflammatory and Combination Medications:    1.  If the patient lacks prior history of lung disease, is not using inhaled anti-inflammatory medication at home, and lacks wheezing by examination or by history for at least 24 hours, contact physician for possible discontinuation.

## 2019-05-29 NOTE — PROGRESS NOTES
Patient transferred from [facility] for secondary prevention ICD. She had low grade fever last and and agoin 99 this morning with elevated white count. Implantable cardioverter defibrillator  Cancelled. Patient will be admitted to hospitalist service.  Implantable cardioverter defibrillator  Pending ID approval.

## 2019-05-30 ENCOUNTER — APPOINTMENT (OUTPATIENT)
Dept: GENERAL RADIOLOGY | Age: 34
DRG: 161 | End: 2019-05-30
Attending: INTERNAL MEDICINE
Payer: MEDICAID

## 2019-05-30 LAB
A/G RATIO: 0.6 (ref 1.1–2.2)
ALBUMIN SERPL-MCNC: 3.2 G/DL (ref 3.4–5)
ALP BLD-CCNC: 100 U/L (ref 40–129)
ALT SERPL-CCNC: 29 U/L (ref 10–40)
ANION GAP SERPL CALCULATED.3IONS-SCNC: 13 MMOL/L (ref 3–16)
AST SERPL-CCNC: 33 U/L (ref 15–37)
BANDED NEUTROPHILS RELATIVE PERCENT: 1 % (ref 0–7)
BASOPHILS ABSOLUTE: 0.1 K/UL (ref 0–0.2)
BASOPHILS RELATIVE PERCENT: 1 %
BILIRUB SERPL-MCNC: 0.7 MG/DL (ref 0–1)
BUN BLDV-MCNC: 24 MG/DL (ref 7–20)
CALCIUM SERPL-MCNC: 10.1 MG/DL (ref 8.3–10.6)
CHLORIDE BLD-SCNC: 91 MMOL/L (ref 99–110)
CO2: 26 MMOL/L (ref 21–32)
CREAT SERPL-MCNC: 0.6 MG/DL (ref 0.6–1.1)
EOSINOPHILS ABSOLUTE: 0.1 K/UL (ref 0–0.6)
EOSINOPHILS RELATIVE PERCENT: 1 %
GFR AFRICAN AMERICAN: >60
GFR NON-AFRICAN AMERICAN: >60
GLOBULIN: 5.1 G/DL
GLUCOSE BLD-MCNC: 105 MG/DL (ref 70–99)
HCT VFR BLD CALC: 36.9 % (ref 36–48)
HEMOGLOBIN: 12.5 G/DL (ref 12–16)
LYMPHOCYTES ABSOLUTE: 3.6 K/UL (ref 1–5.1)
LYMPHOCYTES RELATIVE PERCENT: 30 %
MAGNESIUM: 1.2 MG/DL (ref 1.8–2.4)
MAGNESIUM: 2.7 MG/DL (ref 1.8–2.4)
MCH RBC QN AUTO: 29.5 PG (ref 26–34)
MCHC RBC AUTO-ENTMCNC: 33.9 G/DL (ref 31–36)
MCV RBC AUTO: 87.2 FL (ref 80–100)
MONOCYTES ABSOLUTE: 1.3 K/UL (ref 0–1.3)
MONOCYTES RELATIVE PERCENT: 11 %
NEUTROPHILS ABSOLUTE: 6.9 K/UL (ref 1.7–7.7)
NEUTROPHILS RELATIVE PERCENT: 56 %
PDW BLD-RTO: 12.8 % (ref 12.4–15.4)
PHOSPHORUS: 4.1 MG/DL (ref 2.5–4.9)
PLATELET # BLD: 674 K/UL (ref 135–450)
PMV BLD AUTO: 7.7 FL (ref 5–10.5)
POTASSIUM REFLEX MAGNESIUM: 4.8 MMOL/L (ref 3.5–5.1)
POTASSIUM SERPL-SCNC: 3.2 MMOL/L (ref 3.5–5.1)
RBC # BLD: 4.23 M/UL (ref 4–5.2)
RBC # BLD: NORMAL 10*6/UL
SODIUM BLD-SCNC: 130 MMOL/L (ref 136–145)
TOTAL PROTEIN: 8.3 G/DL (ref 6.4–8.2)
WBC # BLD: 12.1 K/UL (ref 4–11)

## 2019-05-30 PROCEDURE — 71046 X-RAY EXAM CHEST 2 VIEWS: CPT

## 2019-05-30 PROCEDURE — 36415 COLL VENOUS BLD VENIPUNCTURE: CPT

## 2019-05-30 PROCEDURE — 80053 COMPREHEN METABOLIC PANEL: CPT

## 2019-05-30 PROCEDURE — 2580000003 HC RX 258: Performed by: INTERNAL MEDICINE

## 2019-05-30 PROCEDURE — 99254 IP/OBS CNSLTJ NEW/EST MOD 60: CPT | Performed by: INTERNAL MEDICINE

## 2019-05-30 PROCEDURE — 6370000000 HC RX 637 (ALT 250 FOR IP): Performed by: INTERNAL MEDICINE

## 2019-05-30 PROCEDURE — 2500000003 HC RX 250 WO HCPCS: Performed by: INTERNAL MEDICINE

## 2019-05-30 PROCEDURE — 6360000002 HC RX W HCPCS: Performed by: INTERNAL MEDICINE

## 2019-05-30 PROCEDURE — 84132 ASSAY OF SERUM POTASSIUM: CPT

## 2019-05-30 PROCEDURE — 94640 AIRWAY INHALATION TREATMENT: CPT

## 2019-05-30 PROCEDURE — 83735 ASSAY OF MAGNESIUM: CPT

## 2019-05-30 PROCEDURE — 2060000000 HC ICU INTERMEDIATE R&B

## 2019-05-30 PROCEDURE — 85025 COMPLETE CBC W/AUTO DIFF WBC: CPT

## 2019-05-30 PROCEDURE — 84100 ASSAY OF PHOSPHORUS: CPT

## 2019-05-30 RX ORDER — POTASSIUM CHLORIDE 7.45 MG/ML
10 INJECTION INTRAVENOUS
Status: DISPENSED | OUTPATIENT
Start: 2019-05-30 | End: 2019-05-30

## 2019-05-30 RX ORDER — MAGNESIUM SULFATE 1 G/100ML
INJECTION INTRAVENOUS
Status: DISPENSED
Start: 2019-05-30 | End: 2019-05-30

## 2019-05-30 RX ORDER — POTASSIUM CHLORIDE 29.8 MG/ML
20 INJECTION INTRAVENOUS
Status: DISCONTINUED | OUTPATIENT
Start: 2019-05-30 | End: 2019-05-30 | Stop reason: DRUGHIGH

## 2019-05-30 RX ADMIN — MAGNESIUM SULFATE HEPTAHYDRATE 1 G: 1 INJECTION, SOLUTION INTRAVENOUS at 11:53

## 2019-05-30 RX ADMIN — FUROSEMIDE 40 MG: 10 INJECTION, SOLUTION INTRAMUSCULAR; INTRAVENOUS at 22:06

## 2019-05-30 RX ADMIN — METOPROLOL SUCCINATE 25 MG: 25 TABLET, EXTENDED RELEASE ORAL at 22:07

## 2019-05-30 RX ADMIN — POTASSIUM CHLORIDE 40 MEQ: 20 TABLET, EXTENDED RELEASE ORAL at 17:50

## 2019-05-30 RX ADMIN — POTASSIUM CHLORIDE 10 MEQ: 10 INJECTION, SOLUTION INTRAVENOUS at 12:01

## 2019-05-30 RX ADMIN — METOPROLOL SUCCINATE 25 MG: 25 TABLET, EXTENDED RELEASE ORAL at 09:48

## 2019-05-30 RX ADMIN — GABAPENTIN 400 MG: 400 CAPSULE ORAL at 09:49

## 2019-05-30 RX ADMIN — FUROSEMIDE 40 MG: 10 INJECTION, SOLUTION INTRAMUSCULAR; INTRAVENOUS at 13:24

## 2019-05-30 RX ADMIN — OLANZAPINE 7.5 MG: 5 TABLET, FILM COATED ORAL at 10:20

## 2019-05-30 RX ADMIN — VANCOMYCIN HYDROCHLORIDE 1000 MG: 10 INJECTION, POWDER, LYOPHILIZED, FOR SOLUTION INTRAVENOUS at 04:41

## 2019-05-30 RX ADMIN — MAGNESIUM SULFATE HEPTAHYDRATE 1 G: 1 INJECTION, SOLUTION INTRAVENOUS at 13:07

## 2019-05-30 RX ADMIN — ENOXAPARIN SODIUM 40 MG: 40 INJECTION SUBCUTANEOUS at 09:50

## 2019-05-30 RX ADMIN — ASPIRIN 81 MG 81 MG: 81 TABLET ORAL at 09:49

## 2019-05-30 RX ADMIN — OLANZAPINE 15 MG: 5 TABLET, FILM COATED ORAL at 22:07

## 2019-05-30 RX ADMIN — SODIUM CHLORIDE, PRESERVATIVE FREE 10 ML: 5 INJECTION INTRAVENOUS at 22:08

## 2019-05-30 RX ADMIN — GABAPENTIN 400 MG: 400 CAPSULE ORAL at 22:07

## 2019-05-30 RX ADMIN — POTASSIUM CHLORIDE 10 MEQ: 10 INJECTION, SOLUTION INTRAVENOUS at 12:58

## 2019-05-30 RX ADMIN — IBUPROFEN 600 MG: 200 TABLET, FILM COATED ORAL at 09:48

## 2019-05-30 RX ADMIN — FAMOTIDINE 20 MG: 10 INJECTION, SOLUTION INTRAVENOUS at 22:07

## 2019-05-30 RX ADMIN — OLANZAPINE 7.5 MG: 5 TABLET, FILM COATED ORAL at 17:50

## 2019-05-30 RX ADMIN — MAGNESIUM SULFATE HEPTAHYDRATE 1 G: 1 INJECTION, SOLUTION INTRAVENOUS at 10:51

## 2019-05-30 RX ADMIN — POTASSIUM CHLORIDE 40 MEQ: 20 TABLET, EXTENDED RELEASE ORAL at 09:48

## 2019-05-30 RX ADMIN — IPRATROPIUM BROMIDE AND ALBUTEROL SULFATE 1 AMPULE: .5; 3 SOLUTION RESPIRATORY (INHALATION) at 15:58

## 2019-05-30 RX ADMIN — FAMOTIDINE 20 MG: 10 INJECTION, SOLUTION INTRAVENOUS at 09:49

## 2019-05-30 RX ADMIN — IPRATROPIUM BROMIDE AND ALBUTEROL SULFATE 1 AMPULE: .5; 3 SOLUTION RESPIRATORY (INHALATION) at 07:57

## 2019-05-30 RX ADMIN — GABAPENTIN 400 MG: 400 CAPSULE ORAL at 13:24

## 2019-05-30 RX ADMIN — SODIUM CHLORIDE, PRESERVATIVE FREE 10 ML: 5 INJECTION INTRAVENOUS at 09:45

## 2019-05-30 RX ADMIN — MAGNESIUM SULFATE HEPTAHYDRATE 1 G: 1 INJECTION, SOLUTION INTRAVENOUS at 09:39

## 2019-05-30 RX ADMIN — FUROSEMIDE 40 MG: 10 INJECTION, SOLUTION INTRAMUSCULAR; INTRAVENOUS at 09:41

## 2019-05-30 RX ADMIN — IPRATROPIUM BROMIDE AND ALBUTEROL SULFATE 1 AMPULE: .5; 3 SOLUTION RESPIRATORY (INHALATION) at 12:11

## 2019-05-30 ASSESSMENT — PAIN DESCRIPTION - LOCATION
LOCATION: CHEST
LOCATION: STERNUM
LOCATION: CHEST

## 2019-05-30 ASSESSMENT — PAIN SCALES - GENERAL
PAINLEVEL_OUTOF10: 2
PAINLEVEL_OUTOF10: 3
PAINLEVEL_OUTOF10: 2
PAINLEVEL_OUTOF10: 2
PAINLEVEL_OUTOF10: 3
PAINLEVEL_OUTOF10: 0
PAINLEVEL_OUTOF10: 5

## 2019-05-30 NOTE — PROGRESS NOTES
Pt relieved that her Mg+ / K+ levels are WNL  / she tolerated the po and IV replacement moderately well  No c/o n/v thus far   Safety / comfort measures maintained

## 2019-05-30 NOTE — CARE COORDINATION
CASE MANAGEMENT INITIAL ASSESSMENT      Reviewed chart and met with patient today, re: Triggered by inpatient consult to social work and diagnosis and RA within less than 30 days  Explained Case Management role/services. To patient    Family present:   Primary contact information: Gabi Esquivel Parent [phone]    Admit date/status: Inpatient 5/29/19  Diagnosis: Cardiac Arrest    Insurance: [employer] of [address] required for SNF - Y    3 night stay required - N    Living arrangements, Adls, care needs, prior to admission: Lives at home with her mom    Transportation: TBD    Durable Medical Equipment at home: Walker__Cane__RTS__ BSC__Shower Chair__  02__ HHN__ CPAP__  BiPap__  Hospital Bed__ W/C___ Other__________    Services in the home and/or outpatient, prior to admission: None     PT/OT recs: Not seen    Hospital Exemption Notification (HEN): N/A    Barriers to discharge: None identified at present    Plan/comments: To return home to previous level of support but is open to services if indicated. Patient has long complicated hx. 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 Patient transferred from [facility] and has admitting diagnosis of Cardiac Arrest in setting of CHF, dilated cardiomyopathy, afib, hx bipolar and hallucinations. Plan for pacemaker insertion once cleared for procedure by Infectious disease. On IVABX currently. Per Dr. Tim Servin may be able to move forward with ICD placement Monday. Will follow for barriers to discharge and needs requiring the assistance of a discharge planner.      ECOC on chart for MD signature

## 2019-05-30 NOTE — CONSULTS
Infectious Diseases   Consult Note      Reason for Consult: cardiac arrest, candidate for ICD, ID clearance for procedure    Requesting Physician: Dr. 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 There was no improvement with narcan. She went into VF arrest and had CPR with VALERIE. She was intubated in the ER. WBC was elevated at 19   UDS positive only for cannabis  Imaging revealed multiple rib fractures  She was started on broad spectrum abx for suspected aspiration pneumonia. She had continued fever and thick tracheal secretions, had bronch on 5/16/19  She was extubated 5/24                 No significant fever in several days  Currently on room air   WBC continues to trend down, at 12 today  She says she coughed up some yellow phlegm today and is having more chest pressure.     No GI/ complaints       Current abx:  vanc 1g q8 - started 5/14    Zosyn given 5/15-5/20  Ceftriaxone given 5/20-5/21  Meropenem given 5/21-5/28       Past Surgical History:       Diagnosis Date    Cancer [facility])     rhabdomyosarcoma    Hepatitis C antibody positive in blood 05/17/2019    Hyperlipidemia          Procedure Laterality Date    BRONCHOSCOPY N/A 5/16/2019    BRONCHOSCOPY ALVEOLAR LAVAGE performed by Melquiades Yanez MD at [address] [place] [address]  5/16/2019    BRONCHOSCOPY THERAPUTIC ASPIRATION INITIAL performed by Melquiades Yanez MD at [address] [place] [address] N/A 5/19/2019    BRONCHOSCOPY ALVEOLAR LAVAGE performed by Melquiades Yanez MD at [address] [place] [address]  5/19/2019    BRONCHOSCOPY THERAPUTIC ASPIRATION INITIAL performed by Melquiades Yanez MD at [address] [place] [address] CHOLECYSTECTOMY      FOOT SURGERY      LEG SURGERY         Social History:    TOBACCO:   reports that she has been smoking. She has been smoking about 2.00 packs per day. She has never used smokeless tobacco.  ETOH:   reports that she drinks alcohol. She denies IVDU. No other significant epidemiologic exposures. Family History:   History reviewed. No pertinent family history. There is no family history of autoimmune diseases or significant infectious diseases.       Current Medications:    Current Facility-Administered Medications: dextrose 5 % solution, 100 mL/hr, Intravenous, PRN  dextrose 50 % solution 12.5 g, 12.5 g, Intravenous, PRN  glucagon (rDNA) injection 1 mg, 1 mg, Intramuscular, PRN  glucose (GLUTOSE) 40 % oral gel 15 g, 15 g, Oral, PRN  aspirin chewable tablet 81 mg, 81 mg, Oral, Daily  famotidine (PEPCID) injection 20 mg, 20 mg, Intravenous, BID  furosemide (LASIX) injection 40 mg, 40 mg, Intravenous, TID  gabapentin (NEURONTIN) capsule 400 mg, 400 mg, Oral, TID  ibuprofen (ADVIL;MOTRIN) tablet 600 mg, 600 mg, Oral, Q6H PRN  magnesium sulfate 1 g in dextrose 5% 100 mL IVPB, 1 g, Intravenous, PRN  melatonin ER tablet 3 mg, 3 mg, Oral, Nightly PRN  metoprolol succinate (TOPROL XL) extended release tablet 25 mg, 25 mg, Oral, BID  OLANZapine (ZYPREXA) tablet 15 mg, 15 mg, Oral, Nightly  OLANZapine (ZYPREXA) tablet 7.5 mg, 7.5 mg, Oral, BID  ondansetron (ZOFRAN) injection 4 mg, 4 mg, Intravenous, Q6H PRN  potassium chloride (KLOR-CON M) extended release tablet 40 mEq, 40 mEq, Oral, BID   sennosides-docusate sodium (SENOKOT-S) 8.6-50 MG tablet 2 tablet, 2 tablet, Oral, Daily PRN  sodium chloride flush 0.9 % injection 10 mL, 10 mL, Intravenous, 2 times per day  sodium chloride flush 0.9 % injection 10 mL, 10 mL, Intravenous, PRN  traZODone (DESYREL) tablet 50 mg, 50 mg, Oral, Nightly PRN  ipratropium-albuterol (DUONEB) nebulizer solution 1 ampule, 1 ampule, Inhalation, Q4H WA  vancomycin 1000 mg IVPB in 250 mL D5W addavial, 1,000 mg, Intravenous, Q8H  enoxaparin (LOVENOX) injection 40 mg, 40 mg, Subcutaneous, Daily      No Known Allergies       REVIEW OF SYSTEMS:    CONSTITUTIONAL:  See HPI  Stable weight prior to admission, no night sweats    EYES:  negative for eye discharge, acute visual disturbance and icterus  HEENT:  negative for acute hearing loss, tinnitus, ear drainage, sinus pressure, nasal congestion, epistaxis and snoring  RESPIRATORY:   See HPI. No hemoptysis  CARDIOVASCULAR:  negative for palpitations, exertional chest pressure/discomfort, edema, syncope  GASTROINTESTINAL:  negative for nausea, vomiting, diarrhea, constipation, blood in stool and abdominal pain  GENITOURINARY:  negative for frequency, dysuria, urinary incontinence, decreased urine volume, and hematuria  HEMATOLOGIC/LYMPHATIC:  negative for easy bruising, bleeding and lymphadenopathy  ALLERGIC/IMMUNOLOGIC:  negative for recurrent infections, angioedema, anaphylaxis and drug reactions  ENDOCRINE:  negative for weight changes and diabetic symptoms including polyuria, polydipsia and polyphagia  MUSCULOSKELETAL:  negative for acute joint pain, joint swelling, decreased range of motion and muscle weakness  NEUROLOGICAL:  negative for headaches, slurred speech, unilateral weakness  PSYCHIATRIC/BEHAVIORAL: negative for hallucinations, behavioral problems, confusion and agitation. Objective:   PHYSICAL EXAM:      VITALS:  BP 91/67   Pulse 102   Temp 98.4 °F (36.9 °C) (Oral)   Resp 18   Ht 5' (1.524 m)   Wt 171 lb 6.4 oz (77.7 kg)   SpO2 91%   BMI 33.47 kg/m²      24HR INTAKE/OUTPUT:      Intake/Output Summary (Last 24 hours) at 5/30/2019 0916  Last data filed at 5/30/2019 0541  Gross per 24 hour   Intake 368 ml   Output 1700 ml   Net -1332 ml     CONSTITUTIONAL:  Awake, alert, cooperative, no apparent distress, and appears stated age  [de-identified]: NCAT, PERRL, EOMI. Sclera white, conjunctiva full.   OP with moist mucosal membranes, no thrush, tongue protrudes midline  Upper dentures  Dental carries remaining lower teeth. No dental abscess  NECK:  Supple, symmetrical, trachea midline, no adenopathy  LUNGS:  no increased work of breathing, diminished breath sounds bases. No W/R/R  CARDIOVASCULAR:  Tachycardic, regular. No murmur   ABDOMEN:  normal bowel sounds, soft, flat, NT   PSYCHIATRIC: Oriented to person place and time. No obvious depression or anxiety. MUSCULOSKELETAL: No obvious misalignment or effusion of the joints. No clubbing, cyanosis of the digits. SKIN:  normal skin color, texture, turgor and no redness, warmth, or swelling. No palpable nodules or stigmata of embolic phenomenon  NEUROLOGIC: nonfocal exam  ACCESS:  PIV in place      DATA:    Old records have been reviewed    CBC:  Recent Labs     05/28/19  0600 05/30/19  0444   WBC 17.2* 12.1*   RBC 4.64 4.23   HGB 13.8 12.5   HCT 40.9 36.9   * 674*   MCV 88.2 87.2   MCH 29.7 29.5   MCHC 33.7 33.9   RDW 12.8 12.8   BANDSPCT  --  1      BMP:  Recent Labs     05/28/19  0600 05/28/19  1535 05/30/19  0444   * 130* 130*   K 3.5 4.1 3.2*   CL 89* 88* 91*   CO2 30 26 26   BUN 22* 23* 24*   CREATININE 0.6 <0.5* 0.6   CALCIUM 10.2 10.5 10.1   GLUCOSE 116* 119* 105*        Cultures:   5/14 BC x2 neg   UC neg   Resp culture with yeast  5/15 Flu ag neg   5/16 BAL heavy growth MSSA, C albicans   5/17 UC neg   BC x1 neg  5/19 BAL culture neg; GPC on GS   5/23 Trach aspirate neg, GS no organisms   5/29 BC x1 NGTD       Radiology Review:  All pertinent images / reports were reviewed as a part of this visit.        Assessment:     Patient Active Problem List   Diagnosis    Acute respiratory failure ([address]    Aspiration pneumonitis (HCC)    [facility] RVR [facility]    Electrolyte disturbance    Shock ([address]    Transaminitis    Hyperglycemia    Pneumonia due to infectious organism    ARDS (adult respiratory distress syndrome) ([facility]    Mucus plugging of bronchi    Cardiac arrest with ventricular fibrillation (HCC)    Multiple tracheobronchial mucus plugs    Moderate protein-calorie malnutrition (HCC)    Acute encephalopathy    Non-ischemic cardiomyopathy ([address]    Acute on chronic systolic congestive heart failure (HCC)    Hypokalemia    Cardiac arrest ([facility] [address]       Flaco Mcqueen is a 33yoF who is evaluated for the following:    History of sarcoma as a child  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  History of psychiatric illness  Illicit drug use but no established IVDU  HCV seropositive     Admitted 5/14/19 after VF arrest in home  Candidate for ICD for secondary prevention     Respiratory failure on admission  Suspected aspiration pneumonia  MSSA isolated in BAL cultures with negative BC/UC on admission and subsequently   She has had 2+ weeks IV abx     Leukocytosis, resolving     NKDA       Recs:  Overall, seems to be doing better  Stop abx and follow expectantly  Repeat CXR  Follow-up on pending BC and procalcitonin   Follow trend fever, WBC     Stop abx and follow expectantly  Follow-up pending cultures and procalcitonin  Check HIV screen   Encouraged use of IS     Would like her to demonstrate stability off of abx before moving ahead with ICD. Perhaps Monday     We will follow    D/w pt, all questions answered  D/w DORIAN Sheets M.D. Thank you for the opportunity to participate in the care of your patient.     Please do not hesitate to contact me:   [phone] office  [phone] mobile

## 2019-05-30 NOTE — H&P
Hospital Medicine History & Physical      PCP: Tamara Macias MD    Date of Admission: 5/29/2019    Date of Service: Pt seen/examined on 5/29/2019 and Admitted to Inpatient with expected LOS greater than two midnights due to medical therapy. Chief Complaint:  Cardiac arrest    History Of Present Illness:   26 y.o. female who presented initially to [facility] with Cardiac Arrest. After further recovery and treatment for aspiration pneumonia, she was transferred to [facility] for ICD placement. Due to ongoing signs of infection (fever, leukocytosis) the ICD was cancelled and she is being admitted for further management. 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 She subsequently developed non-ischemic dilated cardiomyopathy. She presented with a Vfib arrest at home, which required defibrillation by EMS with ROSC. She was at [facility] for about 2 weeks, treated for respiratory failure, aspiration pneumonia, septic shock, Afib, RAUDEL. She reportedly has baseline bipolar disorder, but since the arrest has had some delirium and now short term memory impairment. At time of evaluation she is alert and talkative. Acknowledges she is having some difficulty finding words. Reports mild SOB. Denies any further chest pain related to prior CPR.      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      LEG SURGERY         Medications Prior to Admission:      Prior to Admission medications    Medication Sig Start Date End Date Taking? Authorizing Provider   ibuprofen (ADVIL;MOTRIN) 200 MG tablet Take 800 mg by mouth every 6 hours as needed for Pain   Yes Historical Provider, MD   albuterol sulfate  (90 Base) MCG/ACT inhaler Inhale 2 puffs into the lungs 3 times daily 5/9/19  Yes Historical Provider, MD   OLANZapine (ZYPREXA) 15 MG tablet Take 7.5 mg by mouth 3 times daily Indications: 1/2 tab every morning, 1/2 tab every evening and 1 tab at bedtime    Yes Historical Provider, MD   melatonin 5 MG TABS tablet Take 5 mg by mouth nightly   Yes Historical Provider, MD   gabapentin (NEURONTIN) 400 MG capsule Take 400 mg by mouth 3 times daily. Yes Historical Provider, MD   traZODone (DESYREL) 50 MG tablet Take by mouth nightly Indications: for insomnia, patient takes PRN when melatonin does not work, mother unsure of dose. Yes Historical Provider, MD       Allergies:  Patient has no known allergies. Social History:    TOBACCO:   reports that she has been smoking. She has been smoking about 2.00 packs per day. She has never used smokeless tobacco.  ETOH:   reports that she drinks alcohol. Family History:      History reviewed. No pertinent family history. REVIEW OF SYSTEMS:   Pertinent positives as noted in the HPI. All other systems reviewed and negative. Physical Exam Performed:    BP (!) 135/96   Pulse 119   Temp 99.4 °F (37.4 °C) (Oral)   Resp 16   Ht 5' (1.524 m)   Wt 171 lb 1.2 oz (77.6 kg)   SpO2 93%   BMI 33.41 kg/m²     General appearance: No apparent distress, appears stated age and cooperative. HEENT:  Normal cephalic, atraumatic without obvious deformity. Pupils equal, round, and reactive to light. Extra ocular muscles intact. Conjunctivae/corneas clear.   Neck: Supple, no jugular venous distention. Trachea midline with full range of motion. Respiratory:  Normal respiratory effort. Clear to auscultation, bilaterally without Rales/Wheezes/Rhonchi. Cardiovascular: Regular rate and rhythm with normal S1/S2 without murmurs, rubs or gallops. Abdomen: Soft, non-tender, non-distended with normal bowel sounds. Musculoskelatal: No clubbing, cyanosis. Mild LE edema bilaterally. Full range of motion without deformity. Neurologic:  Neurovascularly intact without any focal sensory/motor deficits. Cranial nerves: II-XII intact, grossly non-focal.  Psychiatric: Alert and oriented, thought content appropriate, some sluggish thinking and word finding difficulty. Skin: Skin color, texture, turgor normal.  No rashes or lesions. Capillary Refill: Brisk,< 3 seconds   Peripheral Pulses: +2 palpable, equal bilaterally       Labs:     Recent Labs     05/27/19 0455 05/28/19  0600   WBC 19.2* 17.2*   HGB 14.2 13.8   HCT 41.6 40.9   * 608*     Recent Labs     05/27/19 0455 05/27/19  2033 05/28/19  0600 05/28/19  1535   *  --   --  132* 130*   K 2.7*   < > 4.0 3.5 4.1   CL 85*  --   --  89* 88*   CO2 31  --   --  30 26   BUN 21*  --   --  22* 23*   CREATININE <0.5*  --   --  0.6 <0.5*   CALCIUM 10.2  --   --  10.2 10.5   PHOS 3.4  --   --  3.3 2.8    < > = values in this interval not displayed. No results for input(s): AST, ALT, BILIDIR, BILITOT, ALKPHOS in the last 72 hours. No results for input(s): INR in the last 72 hours. Recent Labs     05/27/19  1732   TROPONINI <0.01       Radiology:     Ct Abdomen Pelvis Wo Contrast Additional Contrast? None    Result Date: 5/14/2019  EXAMINATION: CTA OF THE CHEST; CT OF THE ABDOMEN AND PELVIS WITHOUT CONTRAST 5/14/2019 8:55 pm; 5/14/2019 8:56 pm TECHNIQUE: CTA of the chest was performed after the administration of intravenous contrast.  Multiplanar reformatted images are provided for review. MIP images are provided for review.  Dose modulation, iterative reconstruction, and/or weight based adjustment of the mA/kV was utilized to reduce the radiation dose to as low as reasonably achievable.; CT of the abdomen and pelvis was performed without the administration of intravenous contrast. Multiplanar reformatted images are provided for review. Dose modulation, iterative reconstruction, and/or weight based adjustment of the mA/kV was utilized to reduce the radiation dose to as low as reasonably achievable. COMPARISON: 05/14/2019 HISTORY: ORDERING SYSTEM PROVIDED HISTORY: resp arrest, concern for PE TECHNOLOGIST PROVIDED HISTORY: Ordering Physician Provided Reason for Exam: resp arrest, concern for PE Acuity: Acute Type of Exam: Initial; ORDERING SYSTEM PROVIDED HISTORY: ABDOMINAL PAIN TECHNOLOGIST PROVIDED HISTORY: Additional Contrast?->None Ordering Physician Provided Reason for Exam: Abd pain Acuity: Acute Type of Exam: Initial FINDINGS: Chest: Pulmonary Arteries: Motion artifact degrades image quality. There is no acute pulmonary thromboembolus. Mediastinum: The heart is unremarkable. There are no enlarged thoracic lymph nodes. Lungs/pleura: An endotracheal tube is in situ. The airway is patent. There is no pneumothorax or pleural effusion. There is enhancing consolidation involving the bilateral lungs due to atelectasis with complete collapse of the right lower lobe and near complete collapse of the left lower lobe. Soft Tissues/Bones: Degenerative changes involve the thoracic spine. There are acute nondisplaced fractures involving the anterior 2nd through 4th right ribs and anterior 3rd through 5th left ribs. Abdomen/Pelvis: Organs: The liver, spleen, pancreas, adrenal glands and kidneys are unremarkable. Status post cholecystectomy. GI/Bowel: The nasogastric tube terminates in the gastric fundus. There is no bowel obstruction. The appendix is within normal limits. Pelvis: The pelvic viscera are within normal limits. Peritoneum/Retroperitoneum: There is no free fluid or adenopathy. Bones/Soft Tissues: Degenerative changes involve the lumbar spine. 1. Bilateral lower lobe atelectasis, right greater than left. 2. Acute right 2nd through 4th and left 3rd through 5th rib fractures     Ct Head Wo Contrast    Result Date: 5/14/2019  EXAMINATION: CT OF THE HEAD WITHOUT CONTRAST  5/14/2019 3:43 pm TECHNIQUE: CT of the head was performed without the administration of intravenous contrast. Dose modulation, iterative reconstruction, and/or weight based adjustment of the mA/kV was utilized to reduce the radiation dose to as low as reasonably achievable. COMPARISON: None. HISTORY: ORDERING SYSTEM PROVIDED HISTORY: code TECHNOLOGIST PROVIDED HISTORY: Has a \"code stroke\" or \"stroke alert\" been called? ->No Ordering Physician Provided Reason for Exam: Respiratory Distress, pt. unresponsive Acuity: Acute Type of Exam: Initial FINDINGS: BRAIN/VENTRICLES: There is mild motion degradation. No acute loss of the gray-white matter differentiation is identified to suggest acute or subacute infarct. No masses or hemorrhages within the brain parenchyma are found. The ventricles are midline. The intracranial vasculature, including the dural venous sinuses, is unremarkable. ORBITS: The visualized portion of the orbits demonstrate no acute abnormality. SINUSES: Moderate mucosal thickening within the left maxillary sinus is found. Air-fluid level in the sphenoid sinus noted. Mild ethmoid sinus opacification is seen. No mastoid disease. SOFT TISSUES/SKULL:  Small soft tissue defect is identified within the right periorbital region, probably from previous piercing. Otherwise, the extracranial soft tissues are unremarkable in appearance. Calvarium is intact. No acute intracranial abnormality. Left maxillary sinus mucosal thickening. Air-fluid level in the sphenoid sinus. Correlate with any clinical evidence of sinusitis.      Xr Chest Portable    Result Date: 5/24/2019  EXAMINATION: ONE XRAY VIEW OF THE CHEST 5/24/2019 9:55 pm COMPARISON: 05/23/2019 HISTORY: ORDERING SYSTEM PROVIDED HISTORY: Confirm PICC placement TECHNOLOGIST PROVIDED HISTORY: Reason for exam:->Confirm PICC placement Ordering Physician Provided Reason for Exam: pulled rt arm PICC line Acuity: Acute Type of Exam: Initial Additional signs and symptoms: pt pulled her rt arm picc line, please check current positon FINDINGS: Right upper extremity PICC line is seen, extending to the mid right infraclavicular region. Heterogeneous bilateral pulmonary opacity is again demonstrated, slightly improved on the right as compared to prior. No pleural effusion. No pneumothorax. Cardiac and mediastinal silhouettes are within normal limits. Right upper extremity PICC line has not yet reached the inferior vena cava. Its tip is at the expected location of the mid right subclavian vein. Bilateral airspace disease, improving on the right as compared to prior. Findings were called by the radiology call center. Xr Chest Portable    Result Date: 5/23/2019  EXAMINATION: ONE XRAY VIEW OF THE CHEST 5/23/2019 4:54 am COMPARISON: 05/22/2019 HISTORY: ORDERING SYSTEM PROVIDED HISTORY: ventilator protocol TECHNOLOGIST PROVIDED HISTORY: Reason for exam:->ventilator protocol Ordering Physician Provided Reason for Exam: respitory failure Acuity: Acute Type of Exam: Initial FINDINGS: Tubes and lines remain in place. Diffuse right lung airspace disease has improved, particularly in the apex. There is increasing left basilar airspace disease. The heart size is within normal limits. There is no large pleural effusion or definite evidence for pneumothorax. 1. Improving multifocal right lung airspace disease. 2. Increasing left basilar atelectasis or pneumonia.      Xr Chest Portable    Result Date: 5/22/2019  EXAMINATION: ONE XRAY VIEW OF THE CHEST 5/22/2019 6:18 am COMPARISON: Chest radiograph May 21, 2019 and priors HISTORY: ORDERING SYSTEM PROVIDED HISTORY: ventilator protocol TECHNOLOGIST PROVIDED HISTORY: Reason for exam:->ventilator protocol Ordering Physician Provided Reason for Exam: Respiratory failure Acuity: Acute Type of Exam: Initial FINDINGS: Endotracheal tube tip is approximately 2.5 cm above the andrew. Left internal jugular central venous catheter overlies the upper superior vena cava. The lung volumes are low. There are perihilar airspace opacities which are new on the left. Asymmetric airspace disease is again seen throughout the right lung. No pneumothorax or definite effusion. Cardiac and mediastinal contours are without acute process. No acute osseous abnormality. Stable support apparatus. New left perihilar airspace disease and persistent airspace disease throughout the right lung. Findings may be related to edema and/or pneumonia. Xr Chest Portable    Result Date: 5/21/2019  EXAMINATION: ONE XRAY VIEW OF THE CHEST 5/21/2019 5:38 am COMPARISON: 05/20/2019 HISTORY: ORDERING SYSTEM PROVIDED HISTORY: ventilator protocol TECHNOLOGIST PROVIDED HISTORY: Reason for exam:->ventilator protocol Ordering Physician Provided Reason for Exam: respitory failure Acuity: Acute Type of Exam: Initial FINDINGS: The patient is rotated. Tubes and lines remain in place. There has been interval progression of right lung airspace disease which now involves most of the right lung. The heart size is within normal limits. There is no evidence for pleural effusion or pneumothorax. Worsening right lung airspace disease likely represents pneumonia.      Xr Chest Portable    Result Date: 5/20/2019  EXAMINATION: ONE XRAY VIEW OF THE CHEST 5/20/2019 3:53 am COMPARISON: 05/19/2019 HISTORY: ORDERING SYSTEM PROVIDED HISTORY: ventilator protocol TECHNOLOGIST PROVIDED HISTORY: Reason for exam:->ventilator protocol Ordering Physician Provided Reason for Exam: resp distress Acuity: Acute Type of Exam: Subsequent/Follow-up Additional signs and symptoms: f/u ETT FINDINGS: Tubes and lines remain in place. Right basilar airspace disease is unchanged. There is improved aeration in the left base. The heart size is at the upper limits of normal.  There is no definite pleural effusion or pneumothorax. Stable right basilar and improving left basilar atelectasis and/or pneumonia. Xr Chest Portable    Result Date: 5/19/2019  EXAMINATION: ONE XRAY VIEW OF THE CHEST 5/19/2019 3:59 pm COMPARISON: Chest radiograph performed 05/19/2019. HISTORY: ORDERING SYSTEM PROVIDED HISTORY: ett  placement TECHNOLOGIST PROVIDED HISTORY: Reason for exam:->ett  placement Ordering Physician Provided Reason for Exam: ETT placement Acuity: Acute Type of Exam: Initial FINDINGS: There is bilateral pulmonary congestion. There is a right basilar consolidation. There is no pneumothorax. The mediastinal structures are unremarkable. There is endotracheal tube with the tip in the midtrachea. There is a gastric tube with the tip in the stomach. There is a left internal jugular catheter. The upper abdomen is unremarkable. The extrathoracic soft tissues are unremarkable. Bilateral pulmonary congestion with right basilar consolidation concerning for pneumonia. Stable support tubes. Xr Chest Portable    Result Date: 5/19/2019  EXAMINATION: ONE XRAY VIEW OF THE CHEST 5/19/2019 3:56 am COMPARISON: 05/18/2019 HISTORY: ORDERING SYSTEM PROVIDED HISTORY: ventilator protocol TECHNOLOGIST PROVIDED HISTORY: Reason for exam:->ventilator protocol Ordering Physician Provided Reason for Exam: resp distress Acuity: Acute Type of Exam: Subsequent/Follow-up Additional signs and symptoms: f/u ETT FINDINGS: Tubes and lines remain in place. There has been no significant change in bilateral airspace disease. The heart size is at the upper limits of normal. There is no discernible pneumothorax. Grossly stable bilateral airspace disease.      Jose Alberto Morris Chest Portable    Result Date: 5/18/2019  EXAMINATION: ONE XRAY VIEW OF THE CHEST 5/18/2019 4:27 am COMPARISON: 05/17/2019 HISTORY: ORDERING SYSTEM PROVIDED HISTORY: ventilator protocol TECHNOLOGIST PROVIDED HISTORY: Reason for exam:->ventilator protocol Ordering Physician Provided Reason for Exam: resp distress Acuity: Acute Type of Exam: Subsequent/Follow-up Additional signs and symptoms: f/u ETT ET tube and central line are stable. There is persistent right perihilar and right lower lobe airspace consolidation significantly improved since prior examination. Left airspace disease has also improved. No pneumothorax. Small bilateral pleural effusions. Cardiomediastinal silhouette and bony thorax are unchanged. FINDINGS: Improving multifocal airspace consolidation with persistent right perihilar and right lower lobe airspace consolidation. Findings suggest improving pulmonary edema and/or ARDS. Improving multifocal pneumonia is also in the differential.     Xr Chest Portable    Result Date: 5/17/2019  EXAMINATION: ONE XRAY VIEW OF THE CHEST 5/17/2019 5:28 am COMPARISON: 05/16/2019 radiograph HISTORY: ORDERING SYSTEM PROVIDED HISTORY: ventilator protocol TECHNOLOGIST PROVIDED HISTORY: Reason for exam:->ventilator protocol Ordering Physician Provided Reason for Exam: respitory failure Acuity: Acute Type of Exam: Initial FINDINGS: Endotracheal tube has been advanced since the prior study and is now approximately 2 cm above the andrew. Enteric tube and left central venous catheter stable. The heart is enlarged. Severe perihilar opacities have increased centrally and there is diffuse ground-glass opacification centrally in the right lung and in the left lower lung zone. No significant skeletal finding. Worsening bilateral airspace opacities in a pattern that would favor ARDS.      Xr Chest Portable    Result Date: 5/16/2019  EXAMINATION: ONE XRAY VIEW OF THE CHEST 5/16/2019 5:27 am COMPARISON: Chest radiograph 05/15/2019 HISTORY: ORDERING SYSTEM PROVIDED HISTORY: MV TECHNOLOGIST PROVIDED HISTORY: Reason for exam:->MV Ordering Physician Provided Reason for Exam: Respiratory failure Acuity: Acute Type of Exam: Initial FINDINGS: Endotracheal tube terminates approximately 6.2 cm superior to the andrew. Additional life support devices are unchanged. Stable cardiomediastinal contours. Perihilar predominant airspace disease persists. Small effusions suspected. No pneumothorax. Stable life support device positioning. Persistent perihilar predominant edema and small effusions. Xr Chest Portable    Result Date: 5/15/2019  EXAMINATION: ONE XRAY VIEW OF THE CHEST 5/15/2019 10:00 am COMPARISON: Radiograph 05/14/2019 HISTORY: ORDERING SYSTEM PROVIDED HISTORY: SOB TECHNOLOGIST PROVIDED HISTORY: Reason for exam:->SOB Ordering Physician Provided Reason for Exam: daily while on vent Acuity: Unknown Type of Exam: Unknown FINDINGS: Left IJ central line tip in the SVC. Endotracheal tube tip in the trachea at the level of the thoracic inlet. Enteric tube tip projects over the fundus of the stomach. Cardiomediastinal silhouette is unchanged. No pneumothorax. Improved pulmonary edema. Interval improvement in pulmonary edema. Xr Chest Portable    Result Date: 5/15/2019  EXAMINATION: ONE XRAY VIEW OF THE CHEST 5/14/2019 11:53 pm COMPARISON: Chest radiographs 05/14/2019 HISTORY: ORDERING SYSTEM PROVIDED HISTORY: cvc placement TECHNOLOGIST PROVIDED HISTORY: Reason for exam:->cvc placement Ordering Physician Provided Reason for Exam: cvc placement FINDINGS: Endotracheal tube terminates 4.1 cm superior to the andrew. Left IJ central venous catheter terminates over the azygos-SVC confluence. No pneumothorax. Layering bilateral effusions and hazy gradient of opacities extending to the lung bases. Esophagogastric tube terminates over the gastric bubble. No acute osseous abnormality.      Appropriate left IJ central venous Provided Reason for Exam: resp arrest, concern for PE Acuity: Acute Type of Exam: Initial; ORDERING SYSTEM PROVIDED HISTORY: ABDOMINAL PAIN TECHNOLOGIST PROVIDED HISTORY: Additional Contrast?->None Ordering Physician Provided Reason for Exam: Abd pain Acuity: Acute Type of Exam: Initial FINDINGS: Chest: Pulmonary Arteries: Motion artifact degrades image quality. There is no acute pulmonary thromboembolus. Mediastinum: The heart is unremarkable. There are no enlarged thoracic lymph nodes. Lungs/pleura: An endotracheal tube is in situ. The airway is patent. There is no pneumothorax or pleural effusion. There is enhancing consolidation involving the bilateral lungs due to atelectasis with complete collapse of the right lower lobe and near complete collapse of the left lower lobe. Soft Tissues/Bones: Degenerative changes involve the thoracic spine. There are acute nondisplaced fractures involving the anterior 2nd through 4th right ribs and anterior 3rd through 5th left ribs. Abdomen/Pelvis: Organs: The liver, spleen, pancreas, adrenal glands and kidneys are unremarkable. Status post cholecystectomy. GI/Bowel: The nasogastric tube terminates in the gastric fundus. There is no bowel obstruction. The appendix is within normal limits. Pelvis: The pelvic viscera are within normal limits. Peritoneum/Retroperitoneum: There is no free fluid or adenopathy. Bones/Soft Tissues: Degenerative changes involve the lumbar spine. 1. Bilateral lower lobe atelectasis, right greater than left. 2. Acute right 2nd through 4th and left 3rd through 5th rib fractures     Ir Picc Wo Sq Port/pump > 5 Years    Result Date: 5/26/2019  EXAMINATION: LIMITED ULTRASOUND OF THE ARM FOR PICC ACCESS, 5/26/2019 TECHNIQUE: The PICC team used ultrasound and VPS guidance to place a PICC line.  HISTORY: ORDERING SYSTEM PROVIDED HISTORY: limited access TECHNOLOGIST PROVIDED HISTORY: Reason for exam:->limited access How many lumens are being requested?->2 What site is the preferred site? ->No preference What side should this line be placed? ->Either Ordering Physician Provided Reason for Exam: limited access Acuity: Acute Type of Exam: Initial Relevant Medical/Surgical History: 44cm, 1cm left out, 5fr, dual lumen, right basilic, non-tunneled Power PICC, US/VPS guidance FLUOROSCOPY DOSE AND TYPE OR TIME AND EXPOSURES: None FINDINGS: Ultrasound images demonstrate patency of the right basilic vein which was used for access for placement of a PICC by the PICC team, without a radiologist present. Ultrasound/VPS guidance was used by the PICC team By report, a 44 cm 5 Slovak dual-lumen PICC was placed. Successful placement of PICC line. Ir Picc Wo Sq Port/pump > 5 Years    Result Date: 5/24/2019  EXAMINATION: LIMITED ULTRASOUND OF THE RIGHT ARM FOR PICC ACCESS, 5/24/2019 TECHNIQUE: The PICC team used ultrasound and VPS guidance to place a PICC line. HISTORY: ORDERING SYSTEM PROVIDED HISTORY: limited access TECHNOLOGIST PROVIDED HISTORY: Reason for exam:->limited access How many lumens are being requested?->2 What site is the preferred site? ->No preference What side should this line be placed? ->Either Ordering Physician Provided Reason for Exam: limited access Acuity: Acute Type of Exam: Initial Additional signs and symptoms: 34cm, 4cm left out, 5fr, dual lumen, right brachial, non-tunneled Power PICC, US/VPS guidance FLUOROSCOPY DOSE AND TYPE OR TIME AND EXPOSURES: Fluoroscopy was not utilized for the procedure. FINDINGS: Ultrasound images demonstrate patency of the right brachial vein which was used for access for placement of a PICC by the PICC team, without a radiologist present. VPS guidance was used by the PICC team By report, a 34 cm 5 Slovak dual-lumen PICC was placed. Successful placement of PICC line. ASSESSMENT:    Active Problems:    Cardiac arrest [facility])  Resolved Problems:    * No resolved hospital problems.  *      PLAN:  VFib Cardiac Arrest   - Seems to have recovered well, although still some memory issues  - Cardiology recommends ICD placement when infection cleared. ID consulted. - Monitor     Acute on chronic systolic CHF 2/2 nonischemic dilated cardiomyopathy  - secondary to adriamycin treatment as a child. - echo showed LVEF 25% initially, repeat up to 40%  - Continue diuresis with IV Lasix     Acute respiratory failure due to cardiac arrest  - extubated 5/24/19  - Status post bronchoscopy   - Cultures showed MSSA       Aspiration Pneumonia   - Status post bronchoscopy cultures positive for MSSA  - Completed 8 days of Vanc and merrem at [facility].  - Given persistent fevers and leukocytosis, ID consulted     Paroxysmal Afib   - Was noted after arrest.   - Now stable off Amiodarone     Septic shock, resolved     Acute Metabolic Encephaloapathy  - Continue Zyprexa per psych remmendation  - Improving but still some memory issues      DVT Prophylaxis: Lovenox  Diet: Dietary Nutrition Supplements: Low Calorie High Protein Supplement  DIET CARDIAC;  Code Status: Full Code    PT/OT Eval Status: Pending course    Dispo - >2 days     Melquiades Chandler MD    Thank you Bethany Shepard MD for the opportunity to be involved in this patient's care. If you have any questions or concerns please feel free to contact me at [phone].

## 2019-05-30 NOTE — PLAN OF CARE
Problem: Falls - Risk of:  Goal: Will remain free from falls  Description  Will remain free from falls  5/29/2019 2326 by Toni Bee RN  Outcome: Ongoing   Pt is free of falls at this time. Bed is in the lowest position, wheels locked, side rails up x 2, call light, and personal belongings within reach. Will continue to monitor. Problem: Risk for Impaired Skin Integrity  Goal: Tissue integrity - skin and mucous membranes  Description  Structural intactness and normal physiological function of skin and  mucous membranes.   5/29/2019 2326 by Toni Bee RN  Outcome: Ongoing

## 2019-05-30 NOTE — PROGRESS NOTES
midline with full range of motion. Respiratory:  Normal respiratory effort. Clear to auscultation, bilaterally without Rales/Wheezes/Rhonchi. Cardiovascular: Regular rate and rhythm with normal S1/S2 without murmurs, rubs or gallops. Abdomen: Soft, non-tender, non-distended with normal bowel sounds. Musculoskelatal: No clubbing, cyanosis. Mild LE edema bilaterally. Full range of motion without deformity. Neurologic:  Neurovascularly intact without any focal sensory/motor deficits. Cranial nerves: II-XII intact, grossly non-focal.  Psychiatric: Alert and oriented, thought content appropriate, some sluggish thinking and word finding difficulty. Skin: Skin color, texture, turgor normal.  No rashes or lesions. Capillary Refill: Brisk,< 3 seconds   Peripheral Pulses: +2 palpable, equal bilaterally       Assessment/Plan:    Active Hospital Problems    Diagnosis    Cardiac arrest [facility]) [I46.9]     VFib Cardiac Arrest   - Seems to have recovered well, although still some memory issues  - Cardiology recommends ICD placement when infection cleared. ID consulted; tentatively on hold until Monday. - Monitor     Acute on chronic systolic CHF 2/2 nonischemic dilated cardiomyopathy  - secondary to adriamycin treatment as a child. - echo showed LVEF 25% initially, repeat up to 40%  - Continue diuresis with IV Lasix  - Monitor and replace electrolytes     Acute respiratory failure due to cardiac arrest  - extubated 5/24/19  - Status post bronchoscopy   - Cultures showed MSSA       Aspiration Pneumonia   - Status post bronchoscopy cultures positive for MSSA  - Completed 8 days of Vanc and merrem at [facility].  - Given persistent fevers and leukocytosis, ID consulted here - recommend following expectantly off of ABx.      Paroxysmal Afib   - Was noted after arrest.   - Now stable off Amiodarone     Septic shock, resolved     Acute Metabolic Encephaloapathy  - Continue Zyprexa per psych remmendation  - Improving but still some memory issues      DVT Prophylaxis: Lovenox  Diet: Dietary Nutrition Supplements: Low Calorie High Protein Supplement  DIET CARDIAC;  Code Status: Full Code  PT/OT Eval Status: pending course    Dispo - >2 days     Alexis Dc MD

## 2019-05-31 LAB
A/G RATIO: 0.7 (ref 1.1–2.2)
ALBUMIN SERPL-MCNC: 3.4 G/DL (ref 3.4–5)
ALP BLD-CCNC: 102 U/L (ref 40–129)
ALT SERPL-CCNC: 34 U/L (ref 10–40)
ANION GAP SERPL CALCULATED.3IONS-SCNC: 12 MMOL/L (ref 3–16)
AST SERPL-CCNC: 37 U/L (ref 15–37)
BASOPHILS ABSOLUTE: 0.1 K/UL (ref 0–0.2)
BASOPHILS RELATIVE PERCENT: 0.9 %
BILIRUB SERPL-MCNC: 0.5 MG/DL (ref 0–1)
BUN BLDV-MCNC: 16 MG/DL (ref 7–20)
CALCIUM SERPL-MCNC: 10.1 MG/DL (ref 8.3–10.6)
CHLORIDE BLD-SCNC: 96 MMOL/L (ref 99–110)
CO2: 25 MMOL/L (ref 21–32)
CREAT SERPL-MCNC: <0.5 MG/DL (ref 0.6–1.1)
EOSINOPHILS ABSOLUTE: 0.3 K/UL (ref 0–0.6)
EOSINOPHILS RELATIVE PERCENT: 2.6 %
GFR AFRICAN AMERICAN: >60
GFR NON-AFRICAN AMERICAN: >60
GLOBULIN: 5.1 G/DL
GLUCOSE BLD-MCNC: 112 MG/DL (ref 70–99)
HCT VFR BLD CALC: 37.2 % (ref 36–48)
HEMATOLOGY PATH CONSULT: NORMAL
HEMATOLOGY PATH CONSULT: YES
HEMOGLOBIN: 12.7 G/DL (ref 12–16)
LYMPHOCYTES ABSOLUTE: 2.4 K/UL (ref 1–5.1)
LYMPHOCYTES RELATIVE PERCENT: 19.1 %
MAGNESIUM: 1.6 MG/DL (ref 1.8–2.4)
MCH RBC QN AUTO: 29.5 PG (ref 26–34)
MCHC RBC AUTO-ENTMCNC: 34.1 G/DL (ref 31–36)
MCV RBC AUTO: 86.5 FL (ref 80–100)
MONOCYTES ABSOLUTE: 1.8 K/UL (ref 0–1.3)
MONOCYTES RELATIVE PERCENT: 14 %
NEUTROPHILS ABSOLUTE: 7.9 K/UL (ref 1.7–7.7)
NEUTROPHILS RELATIVE PERCENT: 63.4 %
PDW BLD-RTO: 12.7 % (ref 12.4–15.4)
PHOSPHORUS: 3.9 MG/DL (ref 2.5–4.9)
PLATELET # BLD: 694 K/UL (ref 135–450)
PMV BLD AUTO: 7.8 FL (ref 5–10.5)
POTASSIUM SERPL-SCNC: 4 MMOL/L (ref 3.5–5.1)
RBC # BLD: 4.3 M/UL (ref 4–5.2)
SODIUM BLD-SCNC: 133 MMOL/L (ref 136–145)
TOTAL PROTEIN: 8.5 G/DL (ref 6.4–8.2)
WBC # BLD: 12.5 K/UL (ref 4–11)

## 2019-05-31 PROCEDURE — 6360000002 HC RX W HCPCS: Performed by: INTERNAL MEDICINE

## 2019-05-31 PROCEDURE — 94640 AIRWAY INHALATION TREATMENT: CPT

## 2019-05-31 PROCEDURE — 2580000003 HC RX 258: Performed by: INTERNAL MEDICINE

## 2019-05-31 PROCEDURE — 84100 ASSAY OF PHOSPHORUS: CPT

## 2019-05-31 PROCEDURE — 2060000000 HC ICU INTERMEDIATE R&B

## 2019-05-31 PROCEDURE — 85025 COMPLETE CBC W/AUTO DIFF WBC: CPT

## 2019-05-31 PROCEDURE — 83735 ASSAY OF MAGNESIUM: CPT

## 2019-05-31 PROCEDURE — 99231 SBSQ HOSP IP/OBS SF/LOW 25: CPT | Performed by: INTERNAL MEDICINE

## 2019-05-31 PROCEDURE — 6370000000 HC RX 637 (ALT 250 FOR IP): Performed by: INTERNAL MEDICINE

## 2019-05-31 PROCEDURE — 80053 COMPREHEN METABOLIC PANEL: CPT

## 2019-05-31 PROCEDURE — 36415 COLL VENOUS BLD VENIPUNCTURE: CPT

## 2019-05-31 PROCEDURE — 2500000003 HC RX 250 WO HCPCS: Performed by: INTERNAL MEDICINE

## 2019-05-31 RX ORDER — FUROSEMIDE 10 MG/ML
40 INJECTION INTRAMUSCULAR; INTRAVENOUS 2 TIMES DAILY
Status: DISCONTINUED | OUTPATIENT
Start: 2019-06-01 | End: 2019-06-04

## 2019-05-31 RX ADMIN — SODIUM CHLORIDE, PRESERVATIVE FREE 10 ML: 5 INJECTION INTRAVENOUS at 19:58

## 2019-05-31 RX ADMIN — IPRATROPIUM BROMIDE AND ALBUTEROL SULFATE 1 AMPULE: .5; 3 SOLUTION RESPIRATORY (INHALATION) at 12:07

## 2019-05-31 RX ADMIN — ENOXAPARIN SODIUM 40 MG: 40 INJECTION SUBCUTANEOUS at 08:21

## 2019-05-31 RX ADMIN — GABAPENTIN 400 MG: 400 CAPSULE ORAL at 08:21

## 2019-05-31 RX ADMIN — METOPROLOL SUCCINATE 25 MG: 25 TABLET, EXTENDED RELEASE ORAL at 08:21

## 2019-05-31 RX ADMIN — IPRATROPIUM BROMIDE AND ALBUTEROL SULFATE 1 AMPULE: .5; 3 SOLUTION RESPIRATORY (INHALATION) at 19:40

## 2019-05-31 RX ADMIN — IPRATROPIUM BROMIDE AND ALBUTEROL SULFATE 1 AMPULE: .5; 3 SOLUTION RESPIRATORY (INHALATION) at 08:11

## 2019-05-31 RX ADMIN — FUROSEMIDE 40 MG: 10 INJECTION, SOLUTION INTRAMUSCULAR; INTRAVENOUS at 14:33

## 2019-05-31 RX ADMIN — GABAPENTIN 400 MG: 400 CAPSULE ORAL at 19:57

## 2019-05-31 RX ADMIN — OLANZAPINE 15 MG: 5 TABLET, FILM COATED ORAL at 19:58

## 2019-05-31 RX ADMIN — FAMOTIDINE 20 MG: 10 INJECTION, SOLUTION INTRAVENOUS at 19:57

## 2019-05-31 RX ADMIN — OLANZAPINE 7.5 MG: 5 TABLET, FILM COATED ORAL at 17:46

## 2019-05-31 RX ADMIN — METOPROLOL SUCCINATE 25 MG: 25 TABLET, EXTENDED RELEASE ORAL at 19:57

## 2019-05-31 RX ADMIN — OLANZAPINE 7.5 MG: 5 TABLET, FILM COATED ORAL at 08:15

## 2019-05-31 RX ADMIN — POTASSIUM CHLORIDE 40 MEQ: 20 TABLET, EXTENDED RELEASE ORAL at 16:19

## 2019-05-31 RX ADMIN — FAMOTIDINE 20 MG: 10 INJECTION, SOLUTION INTRAVENOUS at 08:22

## 2019-05-31 RX ADMIN — FUROSEMIDE 40 MG: 10 INJECTION, SOLUTION INTRAMUSCULAR; INTRAVENOUS at 08:22

## 2019-05-31 RX ADMIN — GABAPENTIN 400 MG: 400 CAPSULE ORAL at 14:31

## 2019-05-31 RX ADMIN — POTASSIUM CHLORIDE 40 MEQ: 20 TABLET, EXTENDED RELEASE ORAL at 08:21

## 2019-05-31 RX ADMIN — MAGNESIUM SULFATE HEPTAHYDRATE 1 G: 1 INJECTION, SOLUTION INTRAVENOUS at 11:17

## 2019-05-31 RX ADMIN — IPRATROPIUM BROMIDE AND ALBUTEROL SULFATE 1 AMPULE: .5; 3 SOLUTION RESPIRATORY (INHALATION) at 15:38

## 2019-05-31 RX ADMIN — SODIUM CHLORIDE, PRESERVATIVE FREE 10 ML: 5 INJECTION INTRAVENOUS at 08:33

## 2019-05-31 RX ADMIN — ASPIRIN 81 MG 81 MG: 81 TABLET ORAL at 08:21

## 2019-05-31 RX ADMIN — MAGNESIUM SULFATE HEPTAHYDRATE 1 G: 1 INJECTION, SOLUTION INTRAVENOUS at 13:16

## 2019-05-31 ASSESSMENT — PAIN SCALES - GENERAL
PAINLEVEL_OUTOF10: 0
PAINLEVEL_OUTOF10: 0

## 2019-05-31 NOTE — PROGRESS NOTES
Date    ALKPHOS 102 05/31/2019    ALT 34 05/31/2019    AST 37 05/31/2019    PROT 8.5 05/31/2019    BILITOT 0.5 05/31/2019    BILIDIR <0.2 05/23/2019    IBILI see below 05/23/2019    LABALBU 3.4 05/31/2019         MICRO:  5/29 BC x1 NGTD       IMAGING:  CXR 5/30 with R base infiltrate       Assessment:     Patient Active Problem List    Diagnosis Date Noted    Cardiac arrest ([facility] [address] 05/29/2019    Non-ischemic cardiomyopathy ([facility] [address]     Acute on chronic systolic congestive heart failure (HCC)     Hypokalemia     Acute encephalopathy 05/25/2019    Moderate protein-calorie malnutrition ([address] 05/23/2019    Multiple tracheobronchial mucus plugs     Cardiac arrest with ventricular fibrillation (HCC)     ARDS (adult respiratory distress syndrome) (HCC)     Mucus plugging of bronchi     Aspiration pneumonitis (HCC)     Atrial fibrillation with RVR (HCC)     Electrolyte disturbance     Shock ([facility] [address]     Transaminitis     Hyperglycemia     Pneumonia due to infectious organism     Acute respiratory failure ([facility] [address] 05/14/2019     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 Suspected aspiration pneumonia with growth of MSSA in BAL culture with negative BC/UC  Pneumonia clinically resolved after 2+ weeks IV abx. Persistent infiltrate on CXR not surprising      Leukocytosis, resolving      NKDA     Plan:   Clinically stable off of abx  Mild elevation of WBC persists without fever or other change    Follow expectantly over the weekend with plan for ICD next week at the discretion of Dr. Judith Naqvi if she remains stable     Discussed with patient/family, all questions answered    I will follow-up with her on Monday.   Please call with questions over the weekend       Cathy Blackwood MD  Phone: [phone]   Fax : [phone]

## 2019-05-31 NOTE — PROGRESS NOTES
Hospitalist Progress Note    Date of Admission: 5/29/2019    Chief Complaint: Cardiac arrest    Hospital Course:   26 y.o. female who presented initially to [facility] with Cardiac Arrest. After further recovery and treatment for aspiration pneumonia, she was transferred to [facility] for ICD placement. Due to ongoing signs of infection (fever, leukocytosis) the ICD was cancelled and she is being admitted for further management. 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 She subsequently developed non-ischemic dilated cardiomyopathy. She presented with a Vfib arrest at home, which required defibrillation by EMS with ROSC. She was at [facility] for about 2 weeks, treated for respiratory failure, aspiration pneumonia, septic shock, Afib, RAUDEL. She reportedly has baseline bipolar disorder, but since the arrest has had some delirium and now short term memory impairment. Subjective: Doing well. Memory issues seem to be resolving. No chest pain or SOB. Labs:   Recent Labs     05/30/19  0444 05/31/19  0532   WBC 12.1* 12.5*   HGB 12.5 12.7   HCT 36.9 37.2   * 694*     Recent Labs     05/28/19  1535 05/30/19  0444 05/30/19  1526 05/31/19  0532   * 130*  --  133*   K 4.1 3.2* 4.8 4.0   CL 88* 91*  --  96*   CO2 26 26  --  25   BUN 23* 24*  --  16   CREATININE <0.5* 0.6  --  <0.5*   CALCIUM 10.5 10.1  --  10.1   PHOS 2.8 4.1  --  3.9     Recent Labs     05/30/19  0444 05/31/19  0532   AST 33 37   ALT 29 34   BILITOT 0.7 0.5   ALKPHOS 100 102     No results for input(s): INR in the last 72 hours. Physical Exam Performed:    /78   Pulse 122   Temp 99 °F (37.2 °C) (Oral)   Resp 18   Ht 5' (1.524 m)   Wt 171 lb 14.4 oz (78 kg)   SpO2 95%   BMI 33.57 kg/m²     General appearance: No apparent distress, appears stated age and cooperative. HEENT:  Normal cephalic, atraumatic without obvious deformity. Pupils equal, round, and reactive to light.   Extra ocular muscles intact. Conjunctivae/corneas clear. Neck: Supple, no jugular venous distention. Trachea midline with full range of motion. Respiratory:  Normal respiratory effort. Clear to auscultation, bilaterally without Rales/Wheezes/Rhonchi. Cardiovascular: Regular rate and rhythm with normal S1/S2 without murmurs, rubs or gallops. Abdomen: Soft, non-tender, non-distended with normal bowel sounds. Musculoskelatal: No clubbing, cyanosis. Minimal LE edema bilaterally. Full range of motion without deformity. Neurologic:  Neurovascularly intact without any focal sensory/motor deficits. Cranial nerves: II-XII intact, grossly non-focal.  Psychiatric: Alert and oriented, thought content appropriate, some sluggish thinking and word finding difficulty. Skin: Skin color, texture, turgor normal.  No rashes or lesions. Capillary Refill: Brisk,< 3 seconds   Peripheral Pulses: +2 palpable, equal bilaterally       Assessment/Plan:    Active Hospital Problems    Diagnosis    Cardiac arrest [facility]) [I46.9]     VFib Cardiac Arrest   - Seems to have recovered well, although still some memory issues  - Cardiology recommends ICD placement when infection cleared. ID consulted; tentatively on hold until Monday. - Monitor     Acute on chronic systolic CHF 2/2 nonischemic dilated cardiomyopathy  - secondary to adriamycin treatment as a child. - echo showed LVEF 25% initially, repeat up to 40%  - Continue diuresis with IV Lasix  - Monitor and replace electrolytes     Acute respiratory failure due to cardiac arrest  - extubated 5/24/19  - Status post bronchoscopy   - Cultures showed MSSA       Aspiration Pneumonia   - Status post bronchoscopy cultures positive for MSSA  - Completed 8 days of Vanc and merrem at [facility].  - Given persistent fevers and leukocytosis, ID consulted here - recommend following expectantly off of ABx.      Paroxysmal Afib   - Was noted after arrest.   - Now stable off Amiodarone     Septic shock,

## 2019-05-31 NOTE — PROGRESS NOTES
Progress Note    Date:  2019   Patient: Zackery Holley  Age:  26 y.o. Admission:  2019  7:01 AM  Admit DX: Cardiac arrest ([address] [I46.9]   LOS: 2 days     Reason for follow up:cardiac arrest    SUBJECTIVE:    The patient was seen and examined. Notes reviewed. There were not complications over night. The patient is being seen for arrhythmia and cardiomyopathy. Patient's cardiac review of systems: negative. The patient is generally feeling stable. Review of Systems    OBJECTIVE:    Telemetry: Sinus  /70   Pulse 123   Temp 98.2 °F (36.8 °C) (Oral)   Resp 18   Ht 5' (1.524 m)   Wt 171 lb 14.4 oz (78 kg)   SpO2 93%   BMI 33.57 kg/m²   Vital Signs:           Blood pressure 103/70, pulse 123, temperature 98.2 °F (36.8 °C), temperature source Oral, resp. rate 18, height 5' (1.524 m), weight 171 lb 14.4 oz (78 kg), SpO2 93 %. Temp  Av.5 °F (36.9 °C)  Min: 97.7 °F (36.5 °C)  Max: 99 °F (37.2 °C)  Pulse  Av.9  Min: 102  Max: 123  BP  Min: 99/66  Max: 135/76  SpO2  Av.7 %  Min: 91 %  Max: 96 %      Admission Weight:  Weight: 171 lb 1.2 oz (77.6 kg)      I/O:     Intake/Output Summary (Last 24 hours) at 2019 1717  Last data filed at 2019 1621  Gross per 24 hour   Intake 598 ml   Output 2300 ml   Net -1702 ml             EXAM:   CONSTITUTIONAL:  awake, alert, cooperative, no apparent distress, and appears stated age. HEENT:Normal jugular venous pulsations, no carotid bruits. Head is atraumatic, normocephalic. Eyes and oral mucosa are normal.  LUNGS:  No increased work of breathing, good air exchange, clear to auscultation bilaterally, nocrackles or wheezing. CARDIOVASCULAR:  Normal apical impulse, regular rate and rhythm, normal S1 and S2, no S3 or S4, and no murmur noted. ABDOMEN: Soft, nontender, nondistended. Bowel soundspresent. No masses or tenderness. SKIN: Warm and dry. EXTREMITIES: No lower extremity edema. Motor movement grossly intact.   No cyanosis

## 2019-06-01 LAB
ANION GAP SERPL CALCULATED.3IONS-SCNC: 13 MMOL/L (ref 3–16)
BASOPHILS ABSOLUTE: 0.1 K/UL (ref 0–0.2)
BASOPHILS RELATIVE PERCENT: 1 %
BUN BLDV-MCNC: 15 MG/DL (ref 7–20)
CALCIUM SERPL-MCNC: 9.9 MG/DL (ref 8.3–10.6)
CHLORIDE BLD-SCNC: 95 MMOL/L (ref 99–110)
CO2: 25 MMOL/L (ref 21–32)
CREAT SERPL-MCNC: 0.6 MG/DL (ref 0.6–1.1)
EKG ATRIAL RATE: 131 BPM
EKG DIAGNOSIS: NORMAL
EKG P AXIS: 30 DEGREES
EKG P-R INTERVAL: 142 MS
EKG Q-T INTERVAL: 294 MS
EKG QRS DURATION: 76 MS
EKG QTC CALCULATION (BAZETT): 434 MS
EKG R AXIS: -5 DEGREES
EKG T AXIS: 52 DEGREES
EKG VENTRICULAR RATE: 131 BPM
EOSINOPHILS ABSOLUTE: 0.1 K/UL (ref 0–0.6)
EOSINOPHILS RELATIVE PERCENT: 1 %
GFR AFRICAN AMERICAN: >60
GFR NON-AFRICAN AMERICAN: >60
GLUCOSE BLD-MCNC: 140 MG/DL (ref 70–99)
HCT VFR BLD CALC: 36.6 % (ref 36–48)
HEMATOLOGY PATH CONSULT: NO
HEMOGLOBIN: 12.3 G/DL (ref 12–16)
LYMPHOCYTES ABSOLUTE: 2.4 K/UL (ref 1–5.1)
LYMPHOCYTES RELATIVE PERCENT: 19 %
MAGNESIUM: 1.7 MG/DL (ref 1.8–2.4)
MCH RBC QN AUTO: 29.2 PG (ref 26–34)
MCHC RBC AUTO-ENTMCNC: 33.6 G/DL (ref 31–36)
MCV RBC AUTO: 86.8 FL (ref 80–100)
MONOCYTES ABSOLUTE: 2.4 K/UL (ref 0–1.3)
MONOCYTES RELATIVE PERCENT: 19 %
NEUTROPHILS ABSOLUTE: 7.6 K/UL (ref 1.7–7.7)
NEUTROPHILS RELATIVE PERCENT: 60 %
PDW BLD-RTO: 12.9 % (ref 12.4–15.4)
PHOSPHORUS: 4.8 MG/DL (ref 2.5–4.9)
PLATELET # BLD: 728 K/UL (ref 135–450)
PLATELET SLIDE REVIEW: ABNORMAL
PMV BLD AUTO: 7.8 FL (ref 5–10.5)
POTASSIUM SERPL-SCNC: 4 MMOL/L (ref 3.5–5.1)
RBC # BLD: 4.22 M/UL (ref 4–5.2)
RBC # BLD: NORMAL 10*6/UL
SODIUM BLD-SCNC: 133 MMOL/L (ref 136–145)
TROPONIN: <0.01 NG/ML
WBC # BLD: 12.6 K/UL (ref 4–11)

## 2019-06-01 PROCEDURE — 83735 ASSAY OF MAGNESIUM: CPT

## 2019-06-01 PROCEDURE — 6370000000 HC RX 637 (ALT 250 FOR IP): Performed by: INTERNAL MEDICINE

## 2019-06-01 PROCEDURE — 93005 ELECTROCARDIOGRAM TRACING: CPT | Performed by: INTERNAL MEDICINE

## 2019-06-01 PROCEDURE — 2500000003 HC RX 250 WO HCPCS: Performed by: INTERNAL MEDICINE

## 2019-06-01 PROCEDURE — 2580000003 HC RX 258: Performed by: INTERNAL MEDICINE

## 2019-06-01 PROCEDURE — 6360000002 HC RX W HCPCS: Performed by: INTERNAL MEDICINE

## 2019-06-01 PROCEDURE — 2060000000 HC ICU INTERMEDIATE R&B

## 2019-06-01 PROCEDURE — 85025 COMPLETE CBC W/AUTO DIFF WBC: CPT

## 2019-06-01 PROCEDURE — 6360000002 HC RX W HCPCS: Performed by: NURSE PRACTITIONER

## 2019-06-01 PROCEDURE — 93010 ELECTROCARDIOGRAM REPORT: CPT | Performed by: INTERNAL MEDICINE

## 2019-06-01 PROCEDURE — 6370000000 HC RX 637 (ALT 250 FOR IP): Performed by: NURSE PRACTITIONER

## 2019-06-01 PROCEDURE — 80048 BASIC METABOLIC PNL TOTAL CA: CPT

## 2019-06-01 PROCEDURE — 84484 ASSAY OF TROPONIN QUANT: CPT

## 2019-06-01 PROCEDURE — 94640 AIRWAY INHALATION TREATMENT: CPT

## 2019-06-01 PROCEDURE — 84100 ASSAY OF PHOSPHORUS: CPT

## 2019-06-01 PROCEDURE — 36415 COLL VENOUS BLD VENIPUNCTURE: CPT

## 2019-06-01 RX ORDER — MORPHINE SULFATE 2 MG/ML
2 INJECTION, SOLUTION INTRAMUSCULAR; INTRAVENOUS
Status: DISCONTINUED | OUTPATIENT
Start: 2019-06-01 | End: 2019-06-05 | Stop reason: HOSPADM

## 2019-06-01 RX ORDER — NITROGLYCERIN 0.4 MG/1
TABLET SUBLINGUAL
Status: DISPENSED
Start: 2019-06-01 | End: 2019-06-01

## 2019-06-01 RX ORDER — NITROGLYCERIN 0.4 MG/1
0.4 TABLET SUBLINGUAL EVERY 5 MIN PRN
Status: DISCONTINUED | OUTPATIENT
Start: 2019-06-01 | End: 2019-06-05 | Stop reason: HOSPADM

## 2019-06-01 RX ADMIN — OLANZAPINE 15 MG: 5 TABLET, FILM COATED ORAL at 21:16

## 2019-06-01 RX ADMIN — SODIUM CHLORIDE, PRESERVATIVE FREE 10 ML: 5 INJECTION INTRAVENOUS at 21:17

## 2019-06-01 RX ADMIN — IPRATROPIUM BROMIDE AND ALBUTEROL SULFATE 1 AMPULE: .5; 3 SOLUTION RESPIRATORY (INHALATION) at 20:14

## 2019-06-01 RX ADMIN — FAMOTIDINE 20 MG: 10 INJECTION, SOLUTION INTRAVENOUS at 09:03

## 2019-06-01 RX ADMIN — FAMOTIDINE 20 MG: 10 INJECTION, SOLUTION INTRAVENOUS at 21:17

## 2019-06-01 RX ADMIN — METOPROLOL SUCCINATE 25 MG: 25 TABLET, EXTENDED RELEASE ORAL at 09:04

## 2019-06-01 RX ADMIN — OLANZAPINE 7.5 MG: 5 TABLET, FILM COATED ORAL at 17:34

## 2019-06-01 RX ADMIN — IPRATROPIUM BROMIDE AND ALBUTEROL SULFATE 1 AMPULE: .5; 3 SOLUTION RESPIRATORY (INHALATION) at 08:13

## 2019-06-01 RX ADMIN — FUROSEMIDE 40 MG: 10 INJECTION, SOLUTION INTRAMUSCULAR; INTRAVENOUS at 09:04

## 2019-06-01 RX ADMIN — ASPIRIN 81 MG 81 MG: 81 TABLET ORAL at 09:04

## 2019-06-01 RX ADMIN — GABAPENTIN 400 MG: 400 CAPSULE ORAL at 15:58

## 2019-06-01 RX ADMIN — FUROSEMIDE 40 MG: 10 INJECTION, SOLUTION INTRAMUSCULAR; INTRAVENOUS at 17:34

## 2019-06-01 RX ADMIN — IBUPROFEN 600 MG: 200 TABLET, FILM COATED ORAL at 21:21

## 2019-06-01 RX ADMIN — OLANZAPINE 7.5 MG: 5 TABLET, FILM COATED ORAL at 09:04

## 2019-06-01 RX ADMIN — IPRATROPIUM BROMIDE AND ALBUTEROL SULFATE 1 AMPULE: .5; 3 SOLUTION RESPIRATORY (INHALATION) at 11:51

## 2019-06-01 RX ADMIN — NITROGLYCERIN 0.4 MG: 0.4 TABLET SUBLINGUAL at 01:59

## 2019-06-01 RX ADMIN — IPRATROPIUM BROMIDE AND ALBUTEROL SULFATE 1 AMPULE: .5; 3 SOLUTION RESPIRATORY (INHALATION) at 16:06

## 2019-06-01 RX ADMIN — GABAPENTIN 400 MG: 400 CAPSULE ORAL at 09:03

## 2019-06-01 RX ADMIN — MORPHINE SULFATE 2 MG: 2 INJECTION, SOLUTION INTRAMUSCULAR; INTRAVENOUS at 02:18

## 2019-06-01 RX ADMIN — ENOXAPARIN SODIUM 40 MG: 40 INJECTION SUBCUTANEOUS at 09:03

## 2019-06-01 RX ADMIN — POTASSIUM CHLORIDE 40 MEQ: 20 TABLET, EXTENDED RELEASE ORAL at 09:04

## 2019-06-01 RX ADMIN — POTASSIUM CHLORIDE 40 MEQ: 20 TABLET, EXTENDED RELEASE ORAL at 17:34

## 2019-06-01 RX ADMIN — SODIUM CHLORIDE, PRESERVATIVE FREE 10 ML: 5 INJECTION INTRAVENOUS at 09:05

## 2019-06-01 RX ADMIN — METOPROLOL SUCCINATE 25 MG: 25 TABLET, EXTENDED RELEASE ORAL at 21:16

## 2019-06-01 ASSESSMENT — PAIN DESCRIPTION - FREQUENCY
FREQUENCY: CONTINUOUS

## 2019-06-01 ASSESSMENT — PAIN DESCRIPTION - PAIN TYPE
TYPE: CHRONIC PAIN
TYPE: ACUTE PAIN

## 2019-06-01 ASSESSMENT — PAIN DESCRIPTION - ONSET
ONSET: ON-GOING
ONSET: AWAKENED FROM SLEEP
ONSET: AWAKENED FROM SLEEP

## 2019-06-01 ASSESSMENT — PAIN DESCRIPTION - DESCRIPTORS
DESCRIPTORS: PRESSURE

## 2019-06-01 ASSESSMENT — PAIN DESCRIPTION - PROGRESSION
CLINICAL_PROGRESSION: NOT CHANGED
CLINICAL_PROGRESSION: GRADUALLY WORSENING
CLINICAL_PROGRESSION: NOT CHANGED

## 2019-06-01 ASSESSMENT — PAIN DESCRIPTION - LOCATION
LOCATION: CHEST
LOCATION: BACK
LOCATION: CHEST

## 2019-06-01 ASSESSMENT — PAIN SCALES - GENERAL
PAINLEVEL_OUTOF10: 6
PAINLEVEL_OUTOF10: 7
PAINLEVEL_OUTOF10: 3
PAINLEVEL_OUTOF10: 6

## 2019-06-01 NOTE — PROGRESS NOTES
deformity. Pupils equal, round, and reactive to light. Extra ocular muscles intact. Conjunctivae/corneas clear. Neck: Supple, no jugular venous distention. Trachea midline with full range of motion. Respiratory:  Normal respiratory effort. Clear to auscultation, bilaterally without Rales/Wheezes/Rhonchi. Cardiovascular: Regular rate and rhythm with normal S1/S2 without murmurs, rubs or gallops. Abdomen: Soft, non-tender, non-distended with normal bowel sounds. Musculoskelatal: No clubbing, cyanosis. Minimal LE edema bilaterally. Full range of motion without deformity. Neurologic:  Neurovascularly intact without any focal sensory/motor deficits. Cranial nerves: II-XII intact, grossly non-focal.  Psychiatric: Alert and oriented, thought content appropriate, some sluggish thinking and word finding difficulty. Skin: Skin color, texture, turgor normal.  No rashes or lesions. Capillary Refill: Brisk,< 3 seconds   Peripheral Pulses: +2 palpable, equal bilaterally       Assessment/Plan:  Active Hospital Problems    Diagnosis    Cardiac arrest [facility]) [I46.9]     VFib Cardiac Arrest   - Seems to have recovered well, although still some memory issues  - Cardiology recommends ICD placement when infection cleared. ID consulted - Monitoring off Abx ; ICD tentatively on hold until Monday. - Monitor     Acute on chronic systolic CHF 2/2 nonischemic dilated cardiomyopathy  - secondary to adriamycin treatment as a child. - echo showed LVEF 25% initially, repeat up to 40%  - Continue diuresis with IV Lasix  - Monitor and replace electrolytes     Acute respiratory failure due to cardiac arrest  - extubated 5/24/19  - Status post bronchoscopy   - Cultures showed MSSA       Aspiration Pneumonia   - Status post bronchoscopy cultures positive for MSSA  - Completed 8 days of Vanc and merrem at [facility].  - Given persistent fevers and leukocytosis, ID consulted here - recommend following expectantly off of ABx.      Paroxysmal Afib - Was noted after arrest.   - Now stable off Amiodarone     Septic shock, resolved     Acute Metabolic Encephaloapathy (POA)   - Continue Zyprexa per psych remmendation  - Improving but still some memory issues    DVT Prophylaxis: Lovenox  Diet: Dietary Nutrition Supplements: Low Calorie High Protein Supplement  DIET CARDIAC;  Code Status: Full Code  PT/OT Eval Status: pending course    Dispo - >2 days     Remy Tobar MD

## 2019-06-02 LAB
ANION GAP SERPL CALCULATED.3IONS-SCNC: 14 MMOL/L (ref 3–16)
BASOPHILS ABSOLUTE: 0.1 K/UL (ref 0–0.2)
BASOPHILS RELATIVE PERCENT: 1.1 %
BUN BLDV-MCNC: 18 MG/DL (ref 7–20)
CALCIUM SERPL-MCNC: 10 MG/DL (ref 8.3–10.6)
CHLORIDE BLD-SCNC: 94 MMOL/L (ref 99–110)
CO2: 25 MMOL/L (ref 21–32)
CREAT SERPL-MCNC: 0.7 MG/DL (ref 0.6–1.1)
EOSINOPHILS ABSOLUTE: 0.3 K/UL (ref 0–0.6)
EOSINOPHILS RELATIVE PERCENT: 2.7 %
GFR AFRICAN AMERICAN: >60
GFR NON-AFRICAN AMERICAN: >60
GLUCOSE BLD-MCNC: 102 MG/DL (ref 70–99)
HCT VFR BLD CALC: 36.1 % (ref 36–48)
HEMOGLOBIN: 12.3 G/DL (ref 12–16)
LYMPHOCYTES ABSOLUTE: 2.6 K/UL (ref 1–5.1)
LYMPHOCYTES RELATIVE PERCENT: 23.3 %
MAGNESIUM: 1.1 MG/DL (ref 1.8–2.4)
MCH RBC QN AUTO: 29.8 PG (ref 26–34)
MCHC RBC AUTO-ENTMCNC: 34.2 G/DL (ref 31–36)
MCV RBC AUTO: 87.2 FL (ref 80–100)
MONOCYTES ABSOLUTE: 1.4 K/UL (ref 0–1.3)
MONOCYTES RELATIVE PERCENT: 12.9 %
NEUTROPHILS ABSOLUTE: 6.7 K/UL (ref 1.7–7.7)
NEUTROPHILS RELATIVE PERCENT: 60 %
PDW BLD-RTO: 12.8 % (ref 12.4–15.4)
PHOSPHORUS: 4.7 MG/DL (ref 2.5–4.9)
PLATELET # BLD: 694 K/UL (ref 135–450)
PMV BLD AUTO: 8.2 FL (ref 5–10.5)
POTASSIUM SERPL-SCNC: 3.3 MMOL/L (ref 3.5–5.1)
RBC # BLD: 4.14 M/UL (ref 4–5.2)
SODIUM BLD-SCNC: 133 MMOL/L (ref 136–145)
WBC # BLD: 11.2 K/UL (ref 4–11)

## 2019-06-02 PROCEDURE — 94640 AIRWAY INHALATION TREATMENT: CPT

## 2019-06-02 PROCEDURE — 85025 COMPLETE CBC W/AUTO DIFF WBC: CPT

## 2019-06-02 PROCEDURE — 84100 ASSAY OF PHOSPHORUS: CPT

## 2019-06-02 PROCEDURE — 2500000003 HC RX 250 WO HCPCS: Performed by: INTERNAL MEDICINE

## 2019-06-02 PROCEDURE — 6360000002 HC RX W HCPCS: Performed by: NURSE PRACTITIONER

## 2019-06-02 PROCEDURE — 80048 BASIC METABOLIC PNL TOTAL CA: CPT

## 2019-06-02 PROCEDURE — 2060000000 HC ICU INTERMEDIATE R&B

## 2019-06-02 PROCEDURE — 6370000000 HC RX 637 (ALT 250 FOR IP): Performed by: INTERNAL MEDICINE

## 2019-06-02 PROCEDURE — 83735 ASSAY OF MAGNESIUM: CPT

## 2019-06-02 PROCEDURE — 2580000003 HC RX 258: Performed by: INTERNAL MEDICINE

## 2019-06-02 PROCEDURE — 6360000002 HC RX W HCPCS: Performed by: INTERNAL MEDICINE

## 2019-06-02 PROCEDURE — 36415 COLL VENOUS BLD VENIPUNCTURE: CPT

## 2019-06-02 PROCEDURE — 94761 N-INVAS EAR/PLS OXIMETRY MLT: CPT

## 2019-06-02 RX ORDER — IPRATROPIUM BROMIDE AND ALBUTEROL SULFATE 2.5; .5 MG/3ML; MG/3ML
1 SOLUTION RESPIRATORY (INHALATION) 2 TIMES DAILY
Status: DISCONTINUED | OUTPATIENT
Start: 2019-06-03 | End: 2019-06-05 | Stop reason: HOSPADM

## 2019-06-02 RX ADMIN — ENOXAPARIN SODIUM 40 MG: 40 INJECTION SUBCUTANEOUS at 08:36

## 2019-06-02 RX ADMIN — MORPHINE SULFATE 2 MG: 2 INJECTION, SOLUTION INTRAMUSCULAR; INTRAVENOUS at 11:04

## 2019-06-02 RX ADMIN — ASPIRIN 81 MG 81 MG: 81 TABLET ORAL at 08:37

## 2019-06-02 RX ADMIN — IPRATROPIUM BROMIDE AND ALBUTEROL SULFATE 1 AMPULE: .5; 3 SOLUTION RESPIRATORY (INHALATION) at 11:28

## 2019-06-02 RX ADMIN — GABAPENTIN 400 MG: 400 CAPSULE ORAL at 08:37

## 2019-06-02 RX ADMIN — FAMOTIDINE 20 MG: 10 INJECTION, SOLUTION INTRAVENOUS at 08:37

## 2019-06-02 RX ADMIN — SODIUM CHLORIDE, PRESERVATIVE FREE 10 ML: 5 INJECTION INTRAVENOUS at 08:38

## 2019-06-02 RX ADMIN — FUROSEMIDE 40 MG: 10 INJECTION, SOLUTION INTRAMUSCULAR; INTRAVENOUS at 08:36

## 2019-06-02 RX ADMIN — MAGNESIUM GLUCONATE 500 MG ORAL TABLET 400 MG: 500 TABLET ORAL at 13:34

## 2019-06-02 RX ADMIN — METOPROLOL SUCCINATE 25 MG: 25 TABLET, EXTENDED RELEASE ORAL at 20:02

## 2019-06-02 RX ADMIN — SODIUM CHLORIDE, PRESERVATIVE FREE 10 ML: 5 INJECTION INTRAVENOUS at 21:43

## 2019-06-02 RX ADMIN — IPRATROPIUM BROMIDE AND ALBUTEROL SULFATE 1 AMPULE: .5; 3 SOLUTION RESPIRATORY (INHALATION) at 20:16

## 2019-06-02 RX ADMIN — IPRATROPIUM BROMIDE AND ALBUTEROL SULFATE 1 AMPULE: .5; 3 SOLUTION RESPIRATORY (INHALATION) at 08:03

## 2019-06-02 RX ADMIN — GABAPENTIN 400 MG: 400 CAPSULE ORAL at 13:34

## 2019-06-02 RX ADMIN — FUROSEMIDE 40 MG: 10 INJECTION, SOLUTION INTRAMUSCULAR; INTRAVENOUS at 17:25

## 2019-06-02 RX ADMIN — OLANZAPINE 7.5 MG: 5 TABLET, FILM COATED ORAL at 08:36

## 2019-06-02 RX ADMIN — SODIUM CHLORIDE, PRESERVATIVE FREE 10 ML: 5 INJECTION INTRAVENOUS at 20:03

## 2019-06-02 RX ADMIN — OLANZAPINE 15 MG: 5 TABLET, FILM COATED ORAL at 21:43

## 2019-06-02 RX ADMIN — IPRATROPIUM BROMIDE AND ALBUTEROL SULFATE 1 AMPULE: .5; 3 SOLUTION RESPIRATORY (INHALATION) at 16:10

## 2019-06-02 RX ADMIN — POTASSIUM CHLORIDE 40 MEQ: 20 TABLET, EXTENDED RELEASE ORAL at 08:37

## 2019-06-02 RX ADMIN — MAGNESIUM GLUCONATE 500 MG ORAL TABLET 400 MG: 500 TABLET ORAL at 20:02

## 2019-06-02 RX ADMIN — GABAPENTIN 400 MG: 400 CAPSULE ORAL at 20:02

## 2019-06-02 RX ADMIN — METOPROLOL SUCCINATE 25 MG: 25 TABLET, EXTENDED RELEASE ORAL at 08:37

## 2019-06-02 RX ADMIN — FAMOTIDINE 20 MG: 10 INJECTION, SOLUTION INTRAVENOUS at 21:43

## 2019-06-02 RX ADMIN — POTASSIUM CHLORIDE 40 MEQ: 20 TABLET, EXTENDED RELEASE ORAL at 17:25

## 2019-06-02 RX ADMIN — OLANZAPINE 7.5 MG: 5 TABLET, FILM COATED ORAL at 17:25

## 2019-06-02 ASSESSMENT — PAIN DESCRIPTION - FREQUENCY: FREQUENCY: INTERMITTENT

## 2019-06-02 ASSESSMENT — PAIN DESCRIPTION - LOCATION: LOCATION: CHEST

## 2019-06-02 ASSESSMENT — PAIN DESCRIPTION - DESCRIPTORS: DESCRIPTORS: DULL

## 2019-06-02 ASSESSMENT — PAIN DESCRIPTION - PROGRESSION
CLINICAL_PROGRESSION: NOT CHANGED
CLINICAL_PROGRESSION: NOT CHANGED

## 2019-06-02 ASSESSMENT — PAIN SCALES - GENERAL
PAINLEVEL_OUTOF10: 5
PAINLEVEL_OUTOF10: 5
PAINLEVEL_OUTOF10: 0

## 2019-06-02 ASSESSMENT — PAIN DESCRIPTION - PAIN TYPE: TYPE: ACUTE PAIN

## 2019-06-02 ASSESSMENT — PAIN DESCRIPTION - DIRECTION: RADIATING_TOWARDS: BACK

## 2019-06-02 ASSESSMENT — PAIN DESCRIPTION - ORIENTATION: ORIENTATION: MID

## 2019-06-02 NOTE — PROGRESS NOTES
Hospitalist Progress Note    Date of Admission: 5/29/2019    Chief Complaint: Cardiac arrest    Hospital Course:   26 y.o. female who presented initially to [facility] with Cardiac Arrest. After further recovery and treatment for aspiration pneumonia, she was transferred to [facility] for ICD placement. Due to ongoing signs of infection (fever, leukocytosis) the ICD was cancelled and she is being admitted for further management. 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 She subsequently developed non-ischemic dilated cardiomyopathy. She presented with a Vfib arrest at home, which required defibrillation by EMS with ROSC. She was at [facility] for about 2 weeks, treated for respiratory failure, aspiration pneumonia, septic shock, Afib, RAUDEL. She reportedly has baseline bipolar disorder, but since the arrest has had some delirium and now short term memory impairment. Subjective: Doing well. Memory issues seem to be resolving. No chest pain or SOB. Leukocytosis improving  - Labs reviewed K and Mg being Replaced with BID dosing - Monitor     Labs:   Recent Labs     05/31/19  0532 06/01/19  0445 06/02/19  0449   WBC 12.5* 12.6* 11.2*   HGB 12.7 12.3 12.3   HCT 37.2 36.6 36.1   * 728* 694*     Recent Labs     05/31/19  0532 06/01/19  0445 06/02/19  0449   * 133* 133*   K 4.0 4.0 3.3*   CL 96* 95* 94*   CO2 25 25 25   BUN 16 15 18   CREATININE <0.5* 0.6 0.7   CALCIUM 10.1 9.9 10.0   PHOS 3.9 4.8 4.7     Recent Labs     05/31/19  0532   AST 37   ALT 34   BILITOT 0.5   ALKPHOS 102     No results for input(s): INR in the last 72 hours. Physical Exam Performed:  BP 98/70   Pulse 115   Temp 98.1 °F (36.7 °C) (Oral)   Resp 16   Ht 5' (1.524 m)   Wt 170 lb 4.8 oz (77.2 kg)   SpO2 93%   BMI 33.26 kg/m²     General appearance: No apparent distress, appears stated age and cooperative. HEENT:  Normal cephalic, atraumatic without obvious deformity.  Pupils equal, round, and reactive to light. Extra ocular muscles intact. Conjunctivae/corneas clear. Neck: Supple, no jugular venous distention. Trachea midline with full range of motion. Respiratory:  Normal respiratory effort. Clear to auscultation, bilaterally without Rales/Wheezes/Rhonchi. Cardiovascular: Regular rate and rhythm with normal S1/S2 without murmurs, rubs or gallops. Abdomen: Soft, non-tender, non-distended with normal bowel sounds. Musculoskelatal: No clubbing, cyanosis. Minimal LE edema bilaterally. Full range of motion without deformity. Neurologic:  Neurovascularly intact without any focal sensory/motor deficits. Cranial nerves: II-XII intact, grossly non-focal.  Psychiatric: Alert and oriented, thought content appropriate, some sluggish thinking and word finding difficulty. Skin: Skin color, texture, turgor normal.  No rashes or lesions. Capillary Refill: Brisk,< 3 seconds   Peripheral Pulses: +2 palpable, equal bilaterally       Assessment/Plan:  Active Hospital Problems    Diagnosis    Cardiac arrest [facility]) [I46.9]     VFib Cardiac Arrest   - Seems to have recovered well, although still some memory issues  - Cardiology recommends ICD placement when infection cleared. ID consulted - Monitoring off Abx ; ICD tentatively on hold until Monday. - Monitor     Acute on chronic systolic CHF 2/2 nonischemic dilated cardiomyopathy  - secondary to adriamycin treatment as a child. - echo showed LVEF 25% initially, repeat up to 40%  - Continue diuresis with IV Lasix  - Monitor and replace electrolytes     Acute respiratory failure due to cardiac arrest  - extubated 5/24/19  - Status post bronchoscopy   - Cultures showed MSSA       Aspiration Pneumonia   - Status post bronchoscopy cultures positive for MSSA  - Completed 8 days of Vanc and merrem at [facility].  - Given persistent fevers and leukocytosis, ID consulted here - recommend following expectantly off of ABx.      Paroxysmal Afib   - Was noted after arrest.   - Now stable off Amiodarone     Septic shock, resolved     Acute Metabolic Encephaloapathy (POA)   - Continue Zyprexa per psych remmendation  - Improving but still some memory issues    DVT Prophylaxis: Lovenox  Diet: Dietary Nutrition Supplements: Low Calorie High Protein Supplement  DIET CARDIAC;   Diet NPO Time Specified  Code Status: Full Code  PT/OT Eval Status: pending course    Dispo - >2 days     Luis Sargent MD

## 2019-06-02 NOTE — PROGRESS NOTES
Care assumed from previous RN. A&O, VSS, sinus tach on tele, assessment complete as documented. Medicated per [facility] with ibuprofen 600 mg for 6/10 chronic pain in lower back; will monitor for therapeutic response. Aware of need to call for ambulation assistance if weakness of legs or dizziness occurs. Denies pain or other needs, will continue to monitor.

## 2019-06-03 ENCOUNTER — APPOINTMENT (OUTPATIENT)
Dept: CARDIAC CATH/INVASIVE PROCEDURES | Age: 34
DRG: 161 | End: 2019-06-03
Attending: INTERNAL MEDICINE
Payer: MEDICAID

## 2019-06-03 ENCOUNTER — NURSE ONLY (OUTPATIENT)
Dept: CARDIOLOGY CLINIC | Age: 34
End: 2019-06-03

## 2019-06-03 DIAGNOSIS — Z95.810 ICD (IMPLANTABLE CARDIOVERTER-DEFIBRILLATOR) IN PLACE: Primary | ICD-10-CM

## 2019-06-03 LAB
ANION GAP SERPL CALCULATED.3IONS-SCNC: 12 MMOL/L (ref 3–16)
BASOPHILS ABSOLUTE: 0.1 K/UL (ref 0–0.2)
BASOPHILS RELATIVE PERCENT: 1 %
BLOOD CULTURE, ROUTINE: NORMAL
BUN BLDV-MCNC: 14 MG/DL (ref 7–20)
CALCIUM SERPL-MCNC: 10.1 MG/DL (ref 8.3–10.6)
CHLORIDE BLD-SCNC: 97 MMOL/L (ref 99–110)
CO2: 24 MMOL/L (ref 21–32)
CREAT SERPL-MCNC: <0.5 MG/DL (ref 0.6–1.1)
EOSINOPHILS ABSOLUTE: 0.3 K/UL (ref 0–0.6)
EOSINOPHILS RELATIVE PERCENT: 2.5 %
GFR AFRICAN AMERICAN: >60
GFR NON-AFRICAN AMERICAN: >60
GLUCOSE BLD-MCNC: 98 MG/DL (ref 70–99)
HCT VFR BLD CALC: 35.9 % (ref 36–48)
HEMATOLOGY PATH CONSULT: NO
HEMOGLOBIN: 12.1 G/DL (ref 12–16)
LYMPHOCYTES ABSOLUTE: 2.1 K/UL (ref 1–5.1)
LYMPHOCYTES RELATIVE PERCENT: 19 %
MAGNESIUM: 1.2 MG/DL (ref 1.8–2.4)
MCH RBC QN AUTO: 29.6 PG (ref 26–34)
MCHC RBC AUTO-ENTMCNC: 33.7 G/DL (ref 31–36)
MCV RBC AUTO: 87.7 FL (ref 80–100)
MONOCYTES ABSOLUTE: 1.6 K/UL (ref 0–1.3)
MONOCYTES RELATIVE PERCENT: 14 %
NEUTROPHILS ABSOLUTE: 7 K/UL (ref 1.7–7.7)
NEUTROPHILS RELATIVE PERCENT: 63.5 %
PDW BLD-RTO: 12.7 % (ref 12.4–15.4)
PHOSPHORUS: 4.2 MG/DL (ref 2.5–4.9)
PLATELET # BLD: 647 K/UL (ref 135–450)
PMV BLD AUTO: 7.6 FL (ref 5–10.5)
POTASSIUM SERPL-SCNC: 3.8 MMOL/L (ref 3.5–5.1)
RBC # BLD: 4.1 M/UL (ref 4–5.2)
SODIUM BLD-SCNC: 133 MMOL/L (ref 136–145)
WBC # BLD: 11.1 K/UL (ref 4–11)

## 2019-06-03 PROCEDURE — 2580000003 HC RX 258

## 2019-06-03 PROCEDURE — C1895 LEAD, AICD, ENDO DUAL COIL: HCPCS

## 2019-06-03 PROCEDURE — 94640 AIRWAY INHALATION TREATMENT: CPT

## 2019-06-03 PROCEDURE — 33225 L VENTRIC PACING LEAD ADD-ON: CPT | Performed by: INTERNAL MEDICINE

## 2019-06-03 PROCEDURE — C1894 INTRO/SHEATH, NON-LASER: HCPCS

## 2019-06-03 PROCEDURE — 2500000003 HC RX 250 WO HCPCS

## 2019-06-03 PROCEDURE — 2580000003 HC RX 258: Performed by: INTERNAL MEDICINE

## 2019-06-03 PROCEDURE — 1200000000 HC SEMI PRIVATE

## 2019-06-03 PROCEDURE — C1722 AICD, SINGLE CHAMBER: HCPCS

## 2019-06-03 PROCEDURE — 36415 COLL VENOUS BLD VENIPUNCTURE: CPT

## 2019-06-03 PROCEDURE — 33249 INSJ/RPLCMT DEFIB W/LEAD(S): CPT

## 2019-06-03 PROCEDURE — 83735 ASSAY OF MAGNESIUM: CPT

## 2019-06-03 PROCEDURE — 6360000002 HC RX W HCPCS: Performed by: INTERNAL MEDICINE

## 2019-06-03 PROCEDURE — 6360000004 HC RX CONTRAST MEDICATION

## 2019-06-03 PROCEDURE — 85025 COMPLETE CBC W/AUTO DIFF WBC: CPT

## 2019-06-03 PROCEDURE — 6360000002 HC RX W HCPCS

## 2019-06-03 PROCEDURE — 6370000000 HC RX 637 (ALT 250 FOR IP): Performed by: INTERNAL MEDICINE

## 2019-06-03 PROCEDURE — 2500000003 HC RX 250 WO HCPCS: Performed by: INTERNAL MEDICINE

## 2019-06-03 PROCEDURE — 0JH608Z INSERTION OF DEFIBRILLATOR GENERATOR INTO CHEST SUBCUTANEOUS TISSUE AND FASCIA, OPEN APPROACH: ICD-10-PCS | Performed by: INTERNAL MEDICINE

## 2019-06-03 PROCEDURE — 80048 BASIC METABOLIC PNL TOTAL CA: CPT

## 2019-06-03 PROCEDURE — 33212 INSERT PULSE GEN SNGL LEAD: CPT | Performed by: INTERNAL MEDICINE

## 2019-06-03 PROCEDURE — 0JH60PZ INSERTION OF CARDIAC RHYTHM RELATED DEVICE INTO CHEST SUBCUTANEOUS TISSUE AND FASCIA, OPEN APPROACH: ICD-10-PCS | Performed by: INTERNAL MEDICINE

## 2019-06-03 PROCEDURE — 02HK3KZ INSERTION OF DEFIBRILLATOR LEAD INTO RIGHT VENTRICLE, PERCUTANEOUS APPROACH: ICD-10-PCS | Performed by: INTERNAL MEDICINE

## 2019-06-03 PROCEDURE — 6360000002 HC RX W HCPCS: Performed by: NURSE PRACTITIONER

## 2019-06-03 PROCEDURE — 84100 ASSAY OF PHOSPHORUS: CPT

## 2019-06-03 RX ORDER — SODIUM CHLORIDE 0.9 % (FLUSH) 0.9 %
10 SYRINGE (ML) INJECTION PRN
Status: DISCONTINUED | OUTPATIENT
Start: 2019-06-03 | End: 2019-06-05 | Stop reason: HOSPADM

## 2019-06-03 RX ORDER — FENTANYL CITRATE 50 UG/ML
INJECTION, SOLUTION INTRAMUSCULAR; INTRAVENOUS
Status: COMPLETED | OUTPATIENT
Start: 2019-06-03 | End: 2019-06-03

## 2019-06-03 RX ORDER — ACETAMINOPHEN 325 MG/1
650 TABLET ORAL EVERY 4 HOURS PRN
Status: DISCONTINUED | OUTPATIENT
Start: 2019-06-03 | End: 2019-06-05 | Stop reason: HOSPADM

## 2019-06-03 RX ORDER — MIDAZOLAM HYDROCHLORIDE 5 MG/ML
INJECTION INTRAMUSCULAR; INTRAVENOUS
Status: COMPLETED | OUTPATIENT
Start: 2019-06-03 | End: 2019-06-03

## 2019-06-03 RX ORDER — MAGNESIUM SULFATE IN WATER 40 MG/ML
4 INJECTION, SOLUTION INTRAVENOUS ONCE
Status: COMPLETED | OUTPATIENT
Start: 2019-06-03 | End: 2019-06-03

## 2019-06-03 RX ORDER — ONDANSETRON 2 MG/ML
4 INJECTION INTRAMUSCULAR; INTRAVENOUS EVERY 6 HOURS PRN
Status: DISCONTINUED | OUTPATIENT
Start: 2019-06-03 | End: 2019-06-05 | Stop reason: HOSPADM

## 2019-06-03 RX ORDER — PROMETHAZINE HYDROCHLORIDE 25 MG/ML
INJECTION, SOLUTION INTRAMUSCULAR; INTRAVENOUS
Status: COMPLETED | OUTPATIENT
Start: 2019-06-03 | End: 2019-06-03

## 2019-06-03 RX ORDER — SODIUM CHLORIDE 0.9 % (FLUSH) 0.9 %
10 SYRINGE (ML) INJECTION EVERY 12 HOURS SCHEDULED
Status: DISCONTINUED | OUTPATIENT
Start: 2019-06-03 | End: 2019-06-05 | Stop reason: HOSPADM

## 2019-06-03 RX ADMIN — IPRATROPIUM BROMIDE AND ALBUTEROL SULFATE 1 AMPULE: .5; 3 SOLUTION RESPIRATORY (INHALATION) at 20:23

## 2019-06-03 RX ADMIN — OLANZAPINE 15 MG: 5 TABLET, FILM COATED ORAL at 19:55

## 2019-06-03 RX ADMIN — POTASSIUM CHLORIDE 40 MEQ: 20 TABLET, EXTENDED RELEASE ORAL at 16:36

## 2019-06-03 RX ADMIN — MAGNESIUM GLUCONATE 500 MG ORAL TABLET 400 MG: 500 TABLET ORAL at 19:56

## 2019-06-03 RX ADMIN — MAGNESIUM GLUCONATE 500 MG ORAL TABLET 400 MG: 500 TABLET ORAL at 05:37

## 2019-06-03 RX ADMIN — OLANZAPINE 7.5 MG: 5 TABLET, FILM COATED ORAL at 17:05

## 2019-06-03 RX ADMIN — FENTANYL CITRATE 50 MCG: 50 INJECTION, SOLUTION INTRAMUSCULAR; INTRAVENOUS at 15:19

## 2019-06-03 RX ADMIN — MIDAZOLAM HYDROCHLORIDE 2 MG: 5 INJECTION INTRAMUSCULAR; INTRAVENOUS at 14:53

## 2019-06-03 RX ADMIN — ASPIRIN 81 MG 81 MG: 81 TABLET ORAL at 09:57

## 2019-06-03 RX ADMIN — MORPHINE SULFATE 2 MG: 2 INJECTION, SOLUTION INTRAMUSCULAR; INTRAVENOUS at 19:57

## 2019-06-03 RX ADMIN — MIDAZOLAM HYDROCHLORIDE 2 MG: 5 INJECTION INTRAMUSCULAR; INTRAVENOUS at 14:23

## 2019-06-03 RX ADMIN — GABAPENTIN 400 MG: 400 CAPSULE ORAL at 19:55

## 2019-06-03 RX ADMIN — PROMETHAZINE HYDROCHLORIDE 25 MG: 25 INJECTION, SOLUTION INTRAMUSCULAR; INTRAVENOUS at 15:14

## 2019-06-03 RX ADMIN — FENTANYL CITRATE 50 MCG: 50 INJECTION, SOLUTION INTRAMUSCULAR; INTRAVENOUS at 15:23

## 2019-06-03 RX ADMIN — METOPROLOL SUCCINATE 25 MG: 25 TABLET, EXTENDED RELEASE ORAL at 09:57

## 2019-06-03 RX ADMIN — MIDAZOLAM HYDROCHLORIDE 1 MG: 5 INJECTION INTRAMUSCULAR; INTRAVENOUS at 14:23

## 2019-06-03 RX ADMIN — METOPROLOL SUCCINATE 25 MG: 25 TABLET, EXTENDED RELEASE ORAL at 19:55

## 2019-06-03 RX ADMIN — OLANZAPINE 7.5 MG: 5 TABLET, FILM COATED ORAL at 09:57

## 2019-06-03 RX ADMIN — MIDAZOLAM HYDROCHLORIDE 2 MG: 5 INJECTION INTRAMUSCULAR; INTRAVENOUS at 15:00

## 2019-06-03 RX ADMIN — FENTANYL CITRATE 50 MCG: 50 INJECTION, SOLUTION INTRAMUSCULAR; INTRAVENOUS at 14:53

## 2019-06-03 RX ADMIN — GABAPENTIN 400 MG: 400 CAPSULE ORAL at 09:57

## 2019-06-03 RX ADMIN — FUROSEMIDE 40 MG: 10 INJECTION, SOLUTION INTRAMUSCULAR; INTRAVENOUS at 10:03

## 2019-06-03 RX ADMIN — FAMOTIDINE 20 MG: 10 INJECTION, SOLUTION INTRAVENOUS at 10:02

## 2019-06-03 RX ADMIN — FENTANYL CITRATE 50 MCG: 50 INJECTION, SOLUTION INTRAMUSCULAR; INTRAVENOUS at 14:36

## 2019-06-03 RX ADMIN — GABAPENTIN 400 MG: 400 CAPSULE ORAL at 16:36

## 2019-06-03 RX ADMIN — IPRATROPIUM BROMIDE AND ALBUTEROL SULFATE 1 AMPULE: .5; 3 SOLUTION RESPIRATORY (INHALATION) at 08:08

## 2019-06-03 RX ADMIN — MORPHINE SULFATE 2 MG: 2 INJECTION, SOLUTION INTRAMUSCULAR; INTRAVENOUS at 10:00

## 2019-06-03 RX ADMIN — FAMOTIDINE 20 MG: 10 INJECTION, SOLUTION INTRAVENOUS at 19:56

## 2019-06-03 RX ADMIN — FENTANYL CITRATE 50 MCG: 50 INJECTION, SOLUTION INTRAMUSCULAR; INTRAVENOUS at 14:23

## 2019-06-03 RX ADMIN — POTASSIUM CHLORIDE 40 MEQ: 20 TABLET, EXTENDED RELEASE ORAL at 09:57

## 2019-06-03 RX ADMIN — SODIUM CHLORIDE, PRESERVATIVE FREE 10 ML: 5 INJECTION INTRAVENOUS at 19:57

## 2019-06-03 RX ADMIN — MIDAZOLAM HYDROCHLORIDE 1 MG: 5 INJECTION INTRAMUSCULAR; INTRAVENOUS at 14:36

## 2019-06-03 RX ADMIN — MAGNESIUM SULFATE HEPTAHYDRATE 4 G: 40 INJECTION, SOLUTION INTRAVENOUS at 10:05

## 2019-06-03 RX ADMIN — SODIUM CHLORIDE, PRESERVATIVE FREE 10 ML: 5 INJECTION INTRAVENOUS at 10:04

## 2019-06-03 RX ADMIN — FUROSEMIDE 40 MG: 10 INJECTION, SOLUTION INTRAMUSCULAR; INTRAVENOUS at 17:05

## 2019-06-03 RX ADMIN — MIDAZOLAM HYDROCHLORIDE 2 MG: 5 INJECTION INTRAMUSCULAR; INTRAVENOUS at 15:23

## 2019-06-03 ASSESSMENT — PAIN SCALES - GENERAL
PAINLEVEL_OUTOF10: 7
PAINLEVEL_OUTOF10: 6
PAINLEVEL_OUTOF10: 6

## 2019-06-03 ASSESSMENT — PAIN DESCRIPTION - LOCATION: LOCATION: CHEST

## 2019-06-03 ASSESSMENT — PAIN DESCRIPTION - PAIN TYPE: TYPE: ACUTE PAIN

## 2019-06-03 NOTE — PROGRESS NOTES
RESPIRATORY THERAPY ASSESSMENT    Name:  Brian Record Number:  [id number]  Age: 26 y.o. Gender: female  : 1985  Today's Date:  2019  Room:  [id number]    Assessment     Is the patient being admitted for a COPD or Asthma exacerbation? No   (If yes the patient will be seen every 4 hours for the first 24 hours and then reassessed)    Patient Admission Diagnosis      Allergies  No Known Allergies    Minimum Predicted Vital Capacity:     -          Actual Vital Capacity:      800            Pulmonary History:No history  Home Oxygen Therapy:  room air  Home Respiratory Therapy:Albuterol   Current Respiratory Therapy:  Duoneb HHN Q4 while awake   Treatment Type: HHN  Medications: Albuterol/Ipratropium    Respiratory Severity Index(RSI)   Patients with orders for inhalation medications, oxygen, or any therapeutic treatment modality will be placed on Respiratory Protocol. They will be assessed with the first treatment and at least every 72 hours thereafter. The following severity scale will be used to determine frequency of treatment intervention. Smoking History: Pulmonary Disease or Smoking History, Greater than 15 pack year = 2    Social History  Social History     Tobacco Use    Smoking status: Current Every Day Smoker     Packs/day: 2.00    Smokeless tobacco: Never Used   Substance Use Topics    Alcohol use:  Yes    Drug use: Yes     Types: Marijuana       Recent Surgical History: None = 0  Past Surgical History  Past Surgical History:   Procedure Laterality Date    BRONCHOSCOPY N/A 2019    BRONCHOSCOPY ALVEOLAR LAVAGE performed by Ben Ghotra MD at [address] [place] [address]  2019    BRONCHOSCOPY THERAPUTIC ASPIRATION INITIAL performed by Ben Ghotra MD at [address] [place] [address] N/A 2019    BRONCHOSCOPY ALVEOLAR LAVAGE performed by Ben Ghotra MD at [address] [place] [address]  2019    BRONCHOSCOPY THERAPUTIC ASPIRATION INITIAL performed by Deborah Barrera MD at [address]      LEG SURGERY         Level of Consciousness: Alert, Oriented, and Cooperative = 0    Level of Activity: Walking with assistance = 1    Respiratory Pattern: Regular Pattern; RR 8-20 = 0    Breath Sounds: Clear = 0    Sputum  Sputum Color: None, Tenacity: None, Sputum How Obtained: Cough on request  Cough: Strong, productive = 1    Vital Signs   BP (!) 145/78   Pulse 117   Temp 98.2 °F (36.8 °C) (Oral)   Resp 16   Ht 5' (1.524 m)   Wt 170 lb 4.8 oz (77.2 kg)   SpO2 96%   BMI 33.26 kg/m²   SPO2 (COPD values may differ): Greater than or equal to 92% on room air = 0    Peak Flow (asthma only): not applicable = 0    RSI: 0-4 = See once and convert to home regimen or discontinue        Plan       Goals: medication delivery, mobilize retained secretions, volume expansion and improve oxygenation    Patient/caregiver was educated on the proper method of use for Respiratory Care Devices:  Yes      Level of patient/caregiver understanding able to:   ? Verbalize understanding   ? Demonstrate understanding       ? Teach back        ? Needs reinforcement       ? No available caregiver               ? Other:     Response to education:  Good     Is patient being placed on Home Treatment Regimen? No     Does the patient have everything they need prior to discharge? NA     Comments: pt seen on rounds, eval at bedside     Plan of Care: Duoneb HHN BID     Electronically signed by Promise Edouard RCP on 6/2/2019 at 9:57 PM    Respiratory Protocol Guidelines     1. Assessment and treatment by Respiratory Therapy will be initiated for medication and therapeutic interventions upon initiation of aerosolized medication. 2. Physician will be contacted for respiratory rate (RR) greater than 35 breaths per minute.  Therapy will be held for heart rate (HR) greater than 140 beats per minute, pending direction from physician. 3. Bronchodilators will be administered via Metered Dose Inhaler (MDI) with spacer when the following criteria are met:  a. Alert and cooperative     b. HR < 140 bpm  c. RR < 30 bpm                d. Can demonstrate a 2-3 second inspiratory hold  4. Bronchodilators will be administered via Hand Held Nebulizer [facility]) to patients when ANY of the following criteria are met  a. Incognizant or uncooperative          b. Patients treated with HHN at Home        c. Unable to demonstrate proper use of MDI with spacer     d. RR > 30 bpm   5. Bronchodilators will be delivered via Metered Dose Inhaler (MDI), HHN, Aerogen to intubated patients on mechanical ventilation. 6. Inhalation medication orders will be delivered and/or substituted as outlined below. Aerosolized Medications Ordering and Administration Guidelines:    1. All Medications will be ordered by a physician, and their frequency and/or modality will be adjusted as defined by the patients Respiratory Severity Index (RSI) score. 2. If the patient does not have documented COPD, consider discontinuing anticholinergics when RSI is less than 9.  3. If the bronchospasm worsens (increased RSI), then the bronchodilator frequency can be increased to a maximum of every 4 hours. If greater than every 4 hours is required, the physician will be contacted. 4. If the bronchospasm improves, the frequency of the bronchodilator can be decreased, based on the patient's RSI, but not less than home treatment regimen frequency. 5. Bronchodilator(s) will be discontinued if patient has a RSI less than 9 and has received no scheduled or as needed treatment for 72  Hrs. Patients Ordered on a Mucolytic Agent:    1. Must always be administered with a bronchodilator. 2. Discontinue if patient experiences worsened bronchospasm, or secretions have lessened to the point that the patient is able to clear them with a cough.     Anti-inflammatory and Combination Medications:    1. If the patient lacks prior history of lung disease, is not using inhaled anti-inflammatory medication at home, and lacks wheezing by examination or by history for at least 24 hours, contact physician for possible discontinuation.

## 2019-06-03 NOTE — PROCEDURES
6/3/2019        Procedures performed:  · Insertion of right ventricular [defibrillator/pacing] lead under fluoroscopy. · Insertion of a single chamber ICD. · Electronic analysis of lead and device. · IV conscious sedation. Indication of the procedure:     · Non ischemic cardiomyopathy  · Secondary prevention  · Class II congestive heart failure     Sedation detail:  · Sedation start time: 14:19  · Sedation stop time: 16:10  · ASA class: 2  · Mallampati:2  · Pre and post Samantha score: 10  · Medication given: Versed 12 mg  · Fentanyl 300 mcg    Details of procedure: The patient was brought to the electrophysiology laboratory in stable condition. The patient was in a fasting and non-sedated state. The risks, benefits and alternatives of the procedure were discussed with the patient. The risks including, but not limited to, the risks of vascular injury, bleeding, infection, device malfunction, lead dislodgement, radiation exposure, injury to cardiac and surrounding structures (including pneumothorax), stroke, myocardial infarction and death were discussed in detail. The patient opted to proceed with the device implantation. Written informed consent was signed and placed in the chart. Prophylactic antibiotic was given. The patient was prepped and draped in a sterile fashion. A timeout protocol was completed to identify the patient and the procedure being performed. IV sedation was provided with IV Versed, Fentanyl. An incision was made in the left pectoral area after administration of lidocaine. Using electrocautery and blunt dissection, a pocket was created. Central venous access into the left subclavian vein was obtained using modified Seldinger technique. After central venous access was obtained, a sheath was placed in the axillary vein. A right ventricular lead was advanced into position in the apex under fluoroscopic guidance and using a series of curved stylets. The lead was actively fixated.

## 2019-06-03 NOTE — PLAN OF CARE
Problem: Falls - Risk of:  Goal: Will remain free from falls  Description  Will remain free from falls  Outcome: Ongoing     Patient instructed to call if assistance is needed. Call light within reach and patient understands how to use.

## 2019-06-03 NOTE — PLAN OF CARE
Problem: OXYGENATION/RESPIRATORY FUNCTION  Goal: Patient will maintain patent airway  Outcome: Ongoing  Note:     Patient's EF (Ejection Fraction) is less than 40%    Patient's weights and intake/output reviewed:    Patient's Last Weight: 171.2 lbs obtained by standing scale. Today's weight is noted to be 0.9 lb less than last documented weight. Intake/Output Summary (Last 24 hours) at 6/3/2019 0806  Last data filed at 6/3/2019 0539  Gross per 24 hour   Intake 2160 ml   Output 1250 ml   Net 910 ml       Pt resting in bed at this time on room air. Pt denies shortness of breath. Pt without lower extremity edema. Patient and family's stated goal of care: better understand heart failure and disease management prior to discharge        Patient has a past medical history of Cancer ([address] Hepatitis C antibody positive in blood, and Hyperlipidemia. Comorbidities reviewed and education provided.     >>For CHF and Comorbidity documentation on Education Time and Topics, please see Education Tab

## 2019-06-03 NOTE — PROGRESS NOTES
Hospitalist Progress Note    Date of Admission: 5/29/2019    Chief Complaint: Cardiac arrest    Hospital Course:   26 y.o. female who presented initially to [address] [place] [address] with Cardiac Arrest. After further recovery and treatment for aspiration pneumonia, she was transferred to [facility] for ICD placement. Due to ongoing signs of infection (fever, leukocytosis) the ICD was cancelled and she is being admitted for further management. 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 She subsequently developed non-ischemic dilated cardiomyopathy. She presented with a Vfib arrest at home, which required defibrillation by EMS with ROSC. She was at [address] [place] [address] for about 2 weeks, treated for respiratory failure, aspiration pneumonia, septic shock, Afib, RAUDEL. She reportedly has baseline bipolar disorder, but since the arrest has had some delirium and now short term memory impairment. Subjective: Doing well. Memory issues seem to be resolving. No chest pain or SOB. Leukocytosis improving  - Labs reviewed K and Mg being Replaced with BID dosing - Monitor     Labs:   Recent Labs     06/01/19 0445 06/02/19 0449 06/03/19  0429   WBC 12.6* 11.2* 11.1*   HGB 12.3 12.3 12.1   HCT 36.6 36.1 35.9*   * 694* 647*     Recent Labs     06/01/19 0445 06/02/19 0449 06/03/19  0429   * 133* 133*   K 4.0 3.3* 3.8   CL 95* 94* 97*   CO2 25 25 24   BUN 15 18 14   CREATININE 0.6 0.7 <0.5*   CALCIUM 9.9 10.0 10.1   PHOS 4.8 4.7 4.2     No results for input(s): AST, ALT, BILIDIR, BILITOT, ALKPHOS in the last 72 hours. No results for input(s): INR in the last 72 hours. Physical Exam Performed:  /67   Pulse 116   Temp 98.6 °F (37 °C) (Oral)   Resp 16   Ht 5' (1.524 m)   Wt 171 lb 3.2 oz (77.7 kg)   SpO2 96%   BMI 33.44 kg/m²     General appearance: No apparent distress, appears stated age and cooperative. HEENT:  Normal cephalic, atraumatic without obvious deformity.  Pupils - Now stable off Amiodarone     Septic shock, resolved     Acute Metabolic Encephaloapathy (POA)   - Continue Zyprexa per psych remmendation  - Improving but still some memory issues    DVT Prophylaxis: Lovenox  Diet: Diet NPO Time Specified  Code Status: Full Code  PT/OT Eval Status: pending course    Dispo - >2 days     Nati Almaraz MD

## 2019-06-04 ENCOUNTER — APPOINTMENT (OUTPATIENT)
Dept: GENERAL RADIOLOGY | Age: 34
DRG: 161 | End: 2019-06-04
Attending: INTERNAL MEDICINE
Payer: MEDICAID

## 2019-06-04 ENCOUNTER — APPOINTMENT (OUTPATIENT)
Dept: CARDIAC CATH/INVASIVE PROCEDURES | Age: 34
DRG: 161 | End: 2019-06-04
Attending: INTERNAL MEDICINE
Payer: MEDICAID

## 2019-06-04 PROBLEM — E44.0 MODERATE MALNUTRITION (HCC): Chronic | Status: ACTIVE | Noted: 2019-06-04

## 2019-06-04 PROCEDURE — 92526 ORAL FUNCTION THERAPY: CPT

## 2019-06-04 PROCEDURE — 6360000002 HC RX W HCPCS

## 2019-06-04 PROCEDURE — 99153 MOD SED SAME PHYS/QHP EA: CPT

## 2019-06-04 PROCEDURE — 99152 MOD SED SAME PHYS/QHP 5/>YRS: CPT | Performed by: INTERNAL MEDICINE

## 2019-06-04 PROCEDURE — 71046 X-RAY EXAM CHEST 2 VIEWS: CPT

## 2019-06-04 PROCEDURE — 2580000003 HC RX 258

## 2019-06-04 PROCEDURE — 6370000000 HC RX 637 (ALT 250 FOR IP): Performed by: INTERNAL MEDICINE

## 2019-06-04 PROCEDURE — 1200000000 HC SEMI PRIVATE

## 2019-06-04 PROCEDURE — 2500000003 HC RX 250 WO HCPCS

## 2019-06-04 PROCEDURE — 2580000003 HC RX 258: Performed by: INTERNAL MEDICINE

## 2019-06-04 PROCEDURE — 92610 EVALUATE SWALLOWING FUNCTION: CPT

## 2019-06-04 PROCEDURE — 02WA0MZ REVISION OF CARDIAC LEAD IN HEART, OPEN APPROACH: ICD-10-PCS | Performed by: INTERNAL MEDICINE

## 2019-06-04 PROCEDURE — APPNB30 APP NON BILLABLE TIME 0-30 MINS: Performed by: NURSE PRACTITIONER

## 2019-06-04 PROCEDURE — 6360000002 HC RX W HCPCS: Performed by: INTERNAL MEDICINE

## 2019-06-04 PROCEDURE — 33215 REPOSITION PACING-DEFIB LEAD: CPT

## 2019-06-04 PROCEDURE — 33215 REPOSITION PACING-DEFIB LEAD: CPT | Performed by: INTERNAL MEDICINE

## 2019-06-04 PROCEDURE — 94640 AIRWAY INHALATION TREATMENT: CPT

## 2019-06-04 PROCEDURE — 2500000003 HC RX 250 WO HCPCS: Performed by: INTERNAL MEDICINE

## 2019-06-04 PROCEDURE — 99152 MOD SED SAME PHYS/QHP 5/>YRS: CPT

## 2019-06-04 RX ORDER — MIDAZOLAM HYDROCHLORIDE 5 MG/ML
INJECTION INTRAMUSCULAR; INTRAVENOUS
Status: COMPLETED | OUTPATIENT
Start: 2019-06-04 | End: 2019-06-04

## 2019-06-04 RX ORDER — FENTANYL CITRATE 50 UG/ML
INJECTION, SOLUTION INTRAMUSCULAR; INTRAVENOUS
Status: COMPLETED | OUTPATIENT
Start: 2019-06-04 | End: 2019-06-04

## 2019-06-04 RX ORDER — PROMETHAZINE HYDROCHLORIDE 25 MG/ML
INJECTION, SOLUTION INTRAMUSCULAR; INTRAVENOUS
Status: COMPLETED | OUTPATIENT
Start: 2019-06-04 | End: 2019-06-04

## 2019-06-04 RX ADMIN — FAMOTIDINE 20 MG: 10 INJECTION, SOLUTION INTRAVENOUS at 22:49

## 2019-06-04 RX ADMIN — POTASSIUM CHLORIDE 40 MEQ: 20 TABLET, EXTENDED RELEASE ORAL at 15:21

## 2019-06-04 RX ADMIN — OLANZAPINE 7.5 MG: 5 TABLET, FILM COATED ORAL at 15:21

## 2019-06-04 RX ADMIN — MIDAZOLAM HYDROCHLORIDE 1 MG: 5 INJECTION INTRAMUSCULAR; INTRAVENOUS at 11:37

## 2019-06-04 RX ADMIN — MORPHINE SULFATE 2 MG: 2 INJECTION, SOLUTION INTRAMUSCULAR; INTRAVENOUS at 18:25

## 2019-06-04 RX ADMIN — OLANZAPINE 7.5 MG: 5 TABLET, FILM COATED ORAL at 17:46

## 2019-06-04 RX ADMIN — ASPIRIN 81 MG 81 MG: 81 TABLET ORAL at 15:21

## 2019-06-04 RX ADMIN — MIDAZOLAM HYDROCHLORIDE 2 MG: 5 INJECTION INTRAMUSCULAR; INTRAVENOUS at 12:09

## 2019-06-04 RX ADMIN — GABAPENTIN 400 MG: 400 CAPSULE ORAL at 15:21

## 2019-06-04 RX ADMIN — FENTANYL CITRATE 50 MCG: 50 INJECTION, SOLUTION INTRAMUSCULAR; INTRAVENOUS at 12:22

## 2019-06-04 RX ADMIN — MAGNESIUM GLUCONATE 500 MG ORAL TABLET 400 MG: 500 TABLET ORAL at 15:21

## 2019-06-04 RX ADMIN — IPRATROPIUM BROMIDE AND ALBUTEROL SULFATE 1 AMPULE: .5; 3 SOLUTION RESPIRATORY (INHALATION) at 20:05

## 2019-06-04 RX ADMIN — FENTANYL CITRATE 50 MCG: 50 INJECTION, SOLUTION INTRAMUSCULAR; INTRAVENOUS at 12:09

## 2019-06-04 RX ADMIN — MAGNESIUM GLUCONATE 500 MG ORAL TABLET 400 MG: 500 TABLET ORAL at 22:50

## 2019-06-04 RX ADMIN — MORPHINE SULFATE 2 MG: 2 INJECTION, SOLUTION INTRAMUSCULAR; INTRAVENOUS at 13:19

## 2019-06-04 RX ADMIN — MIDAZOLAM HYDROCHLORIDE 4 MG: 5 INJECTION INTRAMUSCULAR; INTRAVENOUS at 11:20

## 2019-06-04 RX ADMIN — FENTANYL CITRATE 50 MCG: 50 INJECTION, SOLUTION INTRAMUSCULAR; INTRAVENOUS at 11:20

## 2019-06-04 RX ADMIN — MIDAZOLAM HYDROCHLORIDE 1 MG: 5 INJECTION INTRAMUSCULAR; INTRAVENOUS at 12:27

## 2019-06-04 RX ADMIN — IBUPROFEN 600 MG: 200 TABLET, FILM COATED ORAL at 23:10

## 2019-06-04 RX ADMIN — MORPHINE SULFATE 2 MG: 2 INJECTION, SOLUTION INTRAMUSCULAR; INTRAVENOUS at 08:32

## 2019-06-04 RX ADMIN — METOPROLOL SUCCINATE 25 MG: 25 TABLET, EXTENDED RELEASE ORAL at 22:51

## 2019-06-04 RX ADMIN — SODIUM CHLORIDE, PRESERVATIVE FREE 10 ML: 5 INJECTION INTRAVENOUS at 15:24

## 2019-06-04 RX ADMIN — FENTANYL CITRATE 50 MCG: 50 INJECTION, SOLUTION INTRAMUSCULAR; INTRAVENOUS at 11:37

## 2019-06-04 RX ADMIN — OLANZAPINE 15 MG: 5 TABLET, FILM COATED ORAL at 22:50

## 2019-06-04 RX ADMIN — SODIUM CHLORIDE, PRESERVATIVE FREE 10 ML: 5 INJECTION INTRAVENOUS at 22:50

## 2019-06-04 RX ADMIN — MORPHINE SULFATE 2 MG: 2 INJECTION, SOLUTION INTRAMUSCULAR; INTRAVENOUS at 04:08

## 2019-06-04 RX ADMIN — ENOXAPARIN SODIUM 40 MG: 40 INJECTION SUBCUTANEOUS at 15:20

## 2019-06-04 RX ADMIN — IBUPROFEN 600 MG: 200 TABLET, FILM COATED ORAL at 13:18

## 2019-06-04 RX ADMIN — PROMETHAZINE HYDROCHLORIDE 25 MG: 25 INJECTION, SOLUTION INTRAMUSCULAR; INTRAVENOUS at 11:41

## 2019-06-04 RX ADMIN — METOPROLOL SUCCINATE 25 MG: 25 TABLET, EXTENDED RELEASE ORAL at 15:22

## 2019-06-04 RX ADMIN — FENTANYL CITRATE 50 MCG: 50 INJECTION, SOLUTION INTRAMUSCULAR; INTRAVENOUS at 12:01

## 2019-06-04 RX ADMIN — POTASSIUM CHLORIDE 40 MEQ: 20 TABLET, EXTENDED RELEASE ORAL at 17:46

## 2019-06-04 RX ADMIN — GABAPENTIN 400 MG: 400 CAPSULE ORAL at 22:51

## 2019-06-04 RX ADMIN — FAMOTIDINE 20 MG: 10 INJECTION, SOLUTION INTRAVENOUS at 15:23

## 2019-06-04 RX ADMIN — MIDAZOLAM HYDROCHLORIDE 1 MG: 5 INJECTION INTRAMUSCULAR; INTRAVENOUS at 12:26

## 2019-06-04 ASSESSMENT — PAIN SCALES - GENERAL
PAINLEVEL_OUTOF10: 5
PAINLEVEL_OUTOF10: 7
PAINLEVEL_OUTOF10: 10
PAINLEVEL_OUTOF10: 0
PAINLEVEL_OUTOF10: 5
PAINLEVEL_OUTOF10: 8
PAINLEVEL_OUTOF10: 7
PAINLEVEL_OUTOF10: 5
PAINLEVEL_OUTOF10: 10

## 2019-06-04 ASSESSMENT — PAIN DESCRIPTION - PAIN TYPE
TYPE: SURGICAL PAIN

## 2019-06-04 ASSESSMENT — PAIN DESCRIPTION - LOCATION
LOCATION: CHEST

## 2019-06-04 NOTE — PLAN OF CARE
Nutrition Problem: Moderate malnutrition  Intervention: Food and/or Nutrient Delivery: Continue current diet(pending swallow eval)  Nutritional Goals: Patient will eat 50% or greater of meals and supplements.

## 2019-06-04 NOTE — PROGRESS NOTES
[address]   Daily Progress Note    Admit Date:  5/29/2019  HPI:  No chief complaint on file. Interval history:   Monika Hzael is being followed for cardiomyopathy, afib, v-fib arrest.    Subjective:  Ms. Yasmine Diego complains of some soreness. Breathing is stable. No shortness of breath. Device interrogation per Dr. Stanley Montero    Objective:   /69   Pulse 105   Temp 98.3 °F (36.8 °C) (Oral)   Resp 18   Ht 5' (1.524 m)   Wt 179 lb 14.4 oz (81.6 kg)   SpO2 95%   BMI 35.13 kg/m²       Intake/Output Summary (Last 24 hours) at 6/4/2019 0919  Last data filed at 6/4/2019 0824  Gross per 24 hour   Intake 1251 ml   Output 1350 ml   Net -99 ml       NYHA: III    Physical Exam:  General:  Awake, alert, NAD  Skin:  Warm and dry, right anterior chest with dressing intact, no hematoma   Neck:  JVD<8  Chest:  Clear to auscultation, no wheezes/rhonchi/rales  Cardiovascular:  RRR S1S2  Abdomen:  Soft, nontender, +bowel sounds  Extremities:  No ble  edema    Medications:    magnesium oxide  400 mg Oral BID    sodium chloride flush  10 mL Intravenous 2 times per day    ipratropium-albuterol  1 ampule Inhalation BID    furosemide  40 mg Intravenous BID    aspirin  81 mg Oral Daily    famotidine (PEPCID) injection  20 mg Intravenous BID    gabapentin  400 mg Oral TID    metoprolol succinate  25 mg Oral BID    OLANZapine  15 mg Oral Nightly    OLANZapine  7.5 mg Oral BID    potassium chloride  40 mEq Oral BID WC    enoxaparin  40 mg Subcutaneous Daily      dextrose         Lab Data:  CBC:   Recent Labs     06/02/19  0449 06/03/19 0429   WBC 11.2* 11.1*   HGB 12.3 12.1   * 647*     BMP:    Recent Labs     06/02/19 0449 06/03/19 0429   * 133*   K 3.3* 3.8   CO2 25 24   BUN 18 14   CREATININE 0.7 <0.5*     INR:  No results for input(s): INR in the last 72 hours. BNP:  No results for input(s): PROBNP in the last 72 hours.   Lab Results   Component Value Date    LVEF 28 05/15/2019 Weights:  Admission weight to [facility] 199lbs 5/14/19 06/04/19 0457  179 lb 14.4 oz (81.6 kg)  Standing scale      06/03/19 0532  171 lb 3.2 oz (77.7 kg)  Actual;Standing scale     06/02/19 0426  170 lb 4.8 oz (77.2 kg)  Actual;Standing scale     05/31/19 0432  171 lb 14.4 oz (78 kg)  Standing scale     05/30/19 0407  171 lb 6.4 oz (77.7 kg)  Standing scale     05/29/19 1715  171 lb 1.2 oz (77.6 kg)  --     05/29/19 1530  171 lb 1.2 oz (77.6 kg)  Bed scale     Echo 5/15/19   Summary   Definity contrast administered.   Left ventricular systolic function is reduced with ejection fraction   estimated at 25-30 %.   Elevated left ventricular diastolic filling pressure: Septal E/e'' = 12.6   (for sinus tachycardia) .   Right ventricular systolic function is moderately reduced .   Mild mitral regurgitation.   Unable to obtain SPAP due to lack of tricuspid regurgitation. Echo 5/22/19   Summary   This is a limited study for EF follow up.   Left ventricular systolic function is reduced with ejection fraction   estimated at 40 %.   There is mild to moderate global hypokinesis present.   There is mild concentric left ventricular hypertrophy. Active Problems:    Cardiac arrest [facility])  Resolved Problems:    * No resolved hospital problems. 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     Plan:  Discussed with Dr. Stephanie Saxena. Plans for RV lead repositioning this afternoon. NPO for today  D/c remaining IV  lasix today as she will be NPO for today and plan to start PO lasix tomorrow prior to discharge.   Continue toprol 25mg BID  Replace potassium and magnesium  Not on an ACEi/ARB or ARNI due to recent RAUDEL, child-bearing age; will need to further discuss as outpatient    Total time spent 30 minutes, discussing plan of care for today with patient, family and nurse.        Gladys Santos, CNP, 6/4/2019, 9:32 AM

## 2019-06-04 NOTE — PLAN OF CARE
Problem: Falls - Risk of:  Goal: Will remain free from falls  Description  Will remain free from falls  6/4/2019 0950 by Myra Moore RN  Outcome: Ongoing  6/3/2019 2038 by Manpreet Gomez RN  Outcome: Ongoing  Goal: Absence of physical injury  Description  Absence of physical injury  Outcome: Ongoing    A&Ox4, appropriate safety awareness and judgement. Bed in lowest position, call light and belongings in reach. Pt calls out appropriately. Family at bedside. Problem: Risk for Impaired Skin Integrity  Goal: Tissue integrity - skin and mucous membranes  Description  Structural intactness and normal physiological function of skin and  mucous membranes. Outcome: Ongoing    Pt assessed, encouraged to self turn/reposition often. Problem: Pain:  Goal: Pain level will decrease  Description  Pain level will decrease  Outcome: Ongoing  Goal: Control of acute pain  Description  Control of acute pain  Outcome: Ongoing  Goal: Control of chronic pain  Description  Control of chronic pain  Outcome: Ongoing   Pain scale 0-10 utilized, surgical site pain present. Morphine PRN Q2 administered as ordered. Will continue to monitor. Problem: Fluid Volume:  Goal: Ability to achieve a balanced intake and output will improve  Description  Ability to achieve a balanced intake and output will improve  Outcome: Ongoing    I&O monitored and documented. Will continue to monitor.       Problem: OXYGENATION/RESPIRATORY FUNCTION  Goal: Patient will maintain patent airway  6/4/2019 0950 by Myra Moore RN  Outcome: Ongoing  6/4/2019 0321 by Manpreet Gomez RN  Outcome: Ongoing  Goal: Patient will achieve/maintain normal respiratory rate/effort  Description  Respiratory rate and effort will be within normal limits for the patient  6/4/2019 0950 by Myra Moore RN  Outcome: Ongoing  6/4/2019 0321 by Manpreet Gomez RN  Outcome: Ongoing

## 2019-06-04 NOTE — CARE COORDINATION
Patient had RV lead repositioning for dislodgement of previously implanted leads. Patient refused lab draw per nursing notes. She lives at home with her mother and has a long complicated history from childhood. No need for services at discharge. Will continue to monitor and assist as needed with any barriers to patient discharging home that may arise.

## 2019-06-04 NOTE — PROGRESS NOTES
Speech Language Pathology  Attempt Evaluation Note    SLP acknowledged new order for evaluation. Pt is currently off the floor in cath lab at this time. SLP will continue to follow and will re-attempt later this date as pt is medically appropriate and able to participate. Thank you. Nathalie GARCIA CCC-SLP  Speech-Language Pathologist  [phone]

## 2019-06-04 NOTE — PLAN OF CARE
Patients bed alarm is on and in place, patient alert to call out for assistance, will continue to monitor for risk of falls on my shift.   Yaima Morris RN

## 2019-06-04 NOTE — PROGRESS NOTES
Hospitalist Progress Note    Date of Admission: 5/29/2019    Chief Complaint: Cardiac arrest    Hospital Course:   26 y.o. female who presented initially to [facility] with Cardiac Arrest. After further recovery and treatment for aspiration pneumonia, she was transferred to [facility] for ICD placement. Due to ongoing signs of infection (fever, leukocytosis) the ICD was cancelled and she is being admitted for further management. 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 She subsequently developed non-ischemic dilated cardiomyopathy. She presented with a Vfib arrest at home, which required defibrillation by EMS with ROSC. She was at [facility] for about 2 weeks, treated for respiratory failure, aspiration pneumonia, septic shock, Afib, RAUDEL. She reportedly has baseline bipolar disorder, but since the arrest has had some delirium and now short term memory impairment. Subjective:   Patient is up in bed, comfortable, not in distress. Feeling sore at defibrillator placement site. Denies any chest pain or shortness of breath. No new event overnight noted. Labs:   Recent Labs     06/02/19 0449 06/03/19 0429   WBC 11.2* 11.1*   HGB 12.3 12.1   HCT 36.1 35.9*   * 647*     Recent Labs     06/02/19 0449 06/03/19 0429   * 133*   K 3.3* 3.8   CL 94* 97*   CO2 25 24   BUN 18 14   CREATININE 0.7 <0.5*   CALCIUM 10.0 10.1   PHOS 4.7 4.2     No results for input(s): AST, ALT, BILIDIR, BILITOT, ALKPHOS in the last 72 hours. No results for input(s): INR in the last 72 hours. Physical Exam Performed:  /69   Pulse 105   Temp 98.3 °F (36.8 °C) (Oral)   Resp 18   Ht 5' (1.524 m)   Wt 179 lb 14.4 oz (81.6 kg)   SpO2 95%   BMI 35.13 kg/m²     General appearance: No apparent distress, appears stated age and cooperative. HEENT:  Normal cephalic, atraumatic without obvious deformity. Pupils equal, round, and reactive to light.   Extra ocular muscles here - recommend following expectantly off of ABx.      Paroxysmal Afib   - Was noted after arrest.   - Now stable off Amiodarone     Septic shock, resolved     Acute Metabolic Encephaloapathy (POA)   - Continue Zyprexa per psych remmendation  - Improving but still some memory issues    DVT Prophylaxis: Lovenox  Diet: Diet NPO Effective Now  Code Status: Full Code  PT/OT Eval Status: pending course    Dispo - >2 days     Jasen Burris MD

## 2019-06-04 NOTE — PROCEDURES
[address] [address]                            CARDIAC CATHETERIZATION    PATIENT NAME: Lauren Jacinto                     :        1985  MED REC NO:   [id number]                          ROOM:       2822  ACCOUNT NO:   [de-identified]                           ADMIT DATE: 2019  PROVIDER:     Heike Cleaning MD    DATE OF PROCEDURE:  2019    PROCEDURE PERFORMED:  RV lead repositioning. INDICATION:  Dislodgement of previously implanted leads. PROCEDURE START TIME:  11:19. PROCEDURE END TIME:  12:30. MEDICATIONS GIVEN:  Fentanyl 300 mcg, Versed 10 mg. A post Samantha score is of 10. Pre and post Mallampati score of 1. PROCEDURE IN DETAIL:  The patient was brought to the EP lab in fasting  and nonsedated state. The procedure being performed and the patient  were identified. Risks, benefits, and alternative rationale of the  procedure were discussed which included, but were not limited to pain,  bleeding, infection, pneumothorax, hemothorax, cardiac perfusion,  tamponade, stroke, myocardial infarction, need to reposition RV lead and  death. She demonstrated complete understanding and wanted to proceed. After local infiltration with lidocaine and Marcaine, a horizontal  incision was made and the previously implanted RV lead was then  repositioned. Multiple repositioning attempts were made with low  sensing numbers. I repositioned the lead 9 or 10 times. Finally, I got  a sensing of 8.1 millivolt at the high RV septum. Pace impedance was  554 ohms. Threshold was 0.3 volt at 0.5 milliseconds. The lead was  then attached to the previously implanted single chamber ICD generator  from [employer]. Pocket was then flushed and closed with 2-0, 4-0 Vicryl  suture. She tolerated the procedure well, made complete hemodynamic and  neurovascular recovery.     CONCLUSION:  Successful RV lead repositioning. PLAN:  Routine post device care. The patient will follow up as  scheduled.         Lucy Samaniego MD    D: 06/04/2019 13:02:14       T: 06/04/2019 13:35:59     [email or web address]  Job#: [id number]     Doc#: [id number]    CC:

## 2019-06-04 NOTE — PROGRESS NOTES
Cath Lab sending transport for AICD placement. Report given to [facility]. Consent to be signed in Cath Lab. Meds held until post procedure. Pt and family updated.

## 2019-06-04 NOTE — PROGRESS NOTES
Nutrition Assessment    Type and Reason for Visit: Consult, Initial, Patient Education(CHF education)    Nutrition Recommendations:   1. Cardiac, 2000 mg na  2. Monitor texture changes pending swallow evaluation (was on therapeutic diet post extubation at [facility] last week)  3. Will monitor nutritional adequacy, nutrition-related labs, weights, BMs, and clinical progress     Nutrition Assessment: Nutritional status with some improvement eating % meals. Patient discussed goal to take multi vitamin and incorporate cardiac nutrition into life. Patient and RD discussed Cardiac nutrition guidelines; patient verbalized understanding. Will continue to monitor. Malnutrition Assessment:  · Malnutrition Status: Meets the criteria for moderate malnutrition  · Context: Acute illness or injury  · Findings of the 6 clinical characteristics of malnutrition (Minimum of 2 out of 6 clinical characteristics is required to make the diagnosis of moderate or severe Protein Calorie Malnutrition based on AND/ASPEN Guidelines):  1. Energy Intake-Less than or equal to 75% of estimated energy requirement, Greater than or equal to 7 days    2. Weight Loss-10% loss or greater(25 lb weight loss ), (last 3 weeks)  3. Fat Loss-No significant subcutaneous fat loss,    4. Muscle Loss-No significant muscle mass loss,    5. Fluid Accumulation-No significant fluid accumulation,    6.  Strength-Not measured    Nutrition Risk Level: Moderate    Nutrient Needs:  · Estimated Daily Total Kcal: 9686-8717  · Estimated Daily Protein (g): 81-97(1-1.2)  · Estimated Daily Total Fluid (ml/day): per MD due to CHF    Nutrition Diagnosis:   · Problem:  Moderate malnutrition  · Etiology: related to Increased demand for energy/nutrients     Signs and symptoms:  as evidenced by Diet history of poor intake, Weight loss    Objective Information:  · Nutrition-Focused Physical Findings: BM x 1 on 6/2/19  · Wound Type: (scattered bruising)  · Current Nutrition Therapies:  · Oral Diet Orders: Cardiac   · Oral Diet intake: Unable to assess(patient reported eating better now)  · Oral Nutrition Supplement (ONS) Orders: None  · ONS intake: Unable to assess  · Anthropometric Measures:  · Ht: 5' (152.4 cm)   · Current Body Wt: 179 lb 14 oz (81.6 kg)  · Ideal Body Wt: 100 lb (45.4 kg), % Ideal Body    · BMI Classification: BMI 35.0 - 39.9 Obese Class II    Nutrition Interventions:   Continue current diet(pending swallow eval)  Continued Inpatient Monitoring, Education Completed    Nutrition Evaluation:   · Evaluation: Goals set   · Goals: Patient will eat 50% or greater of meals and supplements.      · Monitoring: Supplement Intake, Meal Intake, Pertinent Labs      Electronically signed by Elan Yap RD, LD on 6/4/19 at 2:37 PM    Contact Number: Office: [phone]; [address]: [phone]

## 2019-06-04 NOTE — PROGRESS NOTES
recommendations (D3/TL), stating she would rather not have any restrictions. Pt currently on RA, O2 sat stable at 94% prior to PO trials. Pt observed with ice chips, thin liquids via tsp/cup/straw demonstrating with timely swallow initiation, adequate laryngeal elevation upon manual palpation of the anterior neck, no coughing, wet vocal quality, or throat clearing observed with liquids. Pt observed with puree and regular solid trials demonstrating with adequate mastication, A-P transit, no residue in oral cavity observed post swallow, and one instance of delayed throat clearing with regular solid. No other s/s of aspiration present with all consistencies trialed. SLP recommending a Regular texture diet with thin liquids, straws are ok, meds whole with water. Pt may benefit from an instrumental assessment to further assess pharyngeal phase of swallowing due to recent history of dysphagia and BSE results while at [facility], and to correlate clinical findings from recent CXR. Treatment Plan  Requires SLP Intervention: Yes  Duration/Frequency of Treatment: 1-2x/wk during acute LOS  D/C Recommendations: To be determined       Recommended Diet and Intervention  Diet Solids Recommendation: Regular  Liquid Consistency Recommendation: Thin  Recommended Form of Meds: Whole with water  Recommendations: Dysphagia treatment  Therapeutic Interventions: Diet tolerance monitoring;Patient/Family education; Therapeutic PO trials with SLP    Compensatory Swallowing Strategies  · Alternate solids and liquids  · Small bites/sips  · Eat/Feed slowly  · Remain upright for 30-45 minutes after meals  · Upright as possible for all oral intake    Treatment/Goals  Short-term Goals  Timeframe for Short-term Goals: 3 days (06/07/19)  Long-term Goals  Timeframe for Long-term Goals: 5 days (06/09/19)  Goal 1: Pt will tolerate safest and least restrictive diet without any clinical s/s of aspiration. Dysphagia Goals:  The patient will tolerate important use of safe swallow compensatory strategies during all meals. Patient Education Response: Verbalizes understanding  Safety Devices in place: Yes  Type of devices: All fall risk precautions in place; Patient at risk for falls; Bed alarm in place; Left in bed;Call light within reach;Nurse notified         Therapy Time  SLP Individual Minutes  Time In: 2518  Time Out: Barb 52  Minutes: Ning 73 M. JOSE [facility]-SLP  Speech-Language Pathologist  [phone]

## 2019-06-04 NOTE — PLAN OF CARE
Patient's EF (Ejection Fraction) is greater than 40%    Patient's weights and intake/output reviewed:    Patient's Last Weight: 77.7kg kg obtained by standing scale. Intake/Output Summary (Last 24 hours) at 6/4/2019 0322  Last data filed at 6/3/2019 2036  Gross per 24 hour   Intake 771 ml   Output 250 ml   Net 521 ml       Patient's current functional capacity:  No limitation of physical activity. Ordinary physical activity does not cause undue fatigue, palpitation, dyspnea. Pt resting in bed at this time on room air. Pt denies shortness of breath. Pt without lower extremity edema. 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 Comorbidities reviewed and education provided.     >>For CHF and Comorbidity documentation on Education Time and Topics, please see Education Tab

## 2019-06-04 NOTE — PROGRESS NOTES
Post Cath Lab follow up completed by Cath Lab staff. Access site right chest site assessed and WNL. Does have some pain still. Post ICD restrictions reviewed, no further questions.

## 2019-06-04 NOTE — PROGRESS NOTES
Pt in pain from incision site 10/10, medicated as ordered. Pt refuses AM medications but states she will take them later. Will continue to monitor.

## 2019-06-04 NOTE — PROGRESS NOTES
Telephone report received from Carter Loving. Reported, lead repositioned, sling on and in place, 2g Ancef, Versed, and Fentenyl received during procedure. Pt tolerated procedure well. Awaiting transport to .

## 2019-06-04 NOTE — PROGRESS NOTES
Attempted to see pt but she is off the unit at this time. Written CHF education book left at bedside. Staff nurses to review CHF education. FU appointment will be arranged with [facility] prior to discharge.

## 2019-06-04 NOTE — PROGRESS NOTES
Patient's EF (Ejection Fraction) is greater than 40%    Patient's weights and intake/output reviewed:    Patient's Last Weight: 179 lbs 14.4 ozs obtained by standing scale. Intake/Output Summary (Last 24 hours) at 6/4/2019 0956  Last data filed at 6/4/2019 0949  Gross per 24 hour   Intake 1251 ml   Output 1350 ml   Net -99 ml       Patient's current functional capacity:  No limitation of physical activity. Ordinary physical activity does not cause undue fatigue, palpitation, dyspnea. Pt resting in bed at this time on room air. Pt denies shortness of breath. Pt without lower extremity edema. 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 Comorbidities reviewed and education provided.     >>For CHF and Comorbidity documentation on Education Time and Topics, please see Education Tab

## 2019-06-05 ENCOUNTER — APPOINTMENT (OUTPATIENT)
Dept: GENERAL RADIOLOGY | Age: 34
DRG: 161 | End: 2019-06-05
Attending: INTERNAL MEDICINE
Payer: MEDICAID

## 2019-06-05 VITALS
TEMPERATURE: 97.9 F | WEIGHT: 171.4 LBS | BODY MASS INDEX: 33.65 KG/M2 | SYSTOLIC BLOOD PRESSURE: 96 MMHG | HEIGHT: 60 IN | OXYGEN SATURATION: 97 % | HEART RATE: 112 BPM | RESPIRATION RATE: 18 BRPM | DIASTOLIC BLOOD PRESSURE: 68 MMHG

## 2019-06-05 PROCEDURE — 99233 SBSQ HOSP IP/OBS HIGH 50: CPT | Performed by: NURSE PRACTITIONER

## 2019-06-05 PROCEDURE — 2580000003 HC RX 258: Performed by: INTERNAL MEDICINE

## 2019-06-05 PROCEDURE — 92526 ORAL FUNCTION THERAPY: CPT

## 2019-06-05 PROCEDURE — 6370000000 HC RX 637 (ALT 250 FOR IP): Performed by: INTERNAL MEDICINE

## 2019-06-05 PROCEDURE — 94640 AIRWAY INHALATION TREATMENT: CPT

## 2019-06-05 PROCEDURE — 6360000002 HC RX W HCPCS: Performed by: INTERNAL MEDICINE

## 2019-06-05 PROCEDURE — 71046 X-RAY EXAM CHEST 2 VIEWS: CPT

## 2019-06-05 PROCEDURE — 2500000003 HC RX 250 WO HCPCS: Performed by: INTERNAL MEDICINE

## 2019-06-05 RX ORDER — METOPROLOL SUCCINATE 50 MG/1
50 TABLET, EXTENDED RELEASE ORAL SEE ADMIN INSTRUCTIONS
Qty: 60 TABLET | Refills: 1 | Status: SHIPPED | OUTPATIENT
Start: 2019-06-05 | End: 2019-06-12

## 2019-06-05 RX ORDER — ASPIRIN 81 MG/1
81 TABLET, CHEWABLE ORAL DAILY
Qty: 30 TABLET | Refills: 3 | Status: SHIPPED | OUTPATIENT
Start: 2019-06-06 | End: 2019-06-19 | Stop reason: HOSPADM

## 2019-06-05 RX ORDER — POTASSIUM CHLORIDE 20 MEQ/1
20 TABLET, EXTENDED RELEASE ORAL SEE ADMIN INSTRUCTIONS
Qty: 30 TABLET | Refills: 1 | Status: SHIPPED | OUTPATIENT
Start: 2019-06-05 | End: 2019-06-12

## 2019-06-05 RX ORDER — FUROSEMIDE 40 MG/1
40 TABLET ORAL SEE ADMIN INSTRUCTIONS
Qty: 30 TABLET | Refills: 3 | Status: SHIPPED | OUTPATIENT
Start: 2019-06-05 | End: 2019-06-12

## 2019-06-05 RX ADMIN — ENOXAPARIN SODIUM 40 MG: 40 INJECTION SUBCUTANEOUS at 09:30

## 2019-06-05 RX ADMIN — ASPIRIN 81 MG 81 MG: 81 TABLET ORAL at 09:30

## 2019-06-05 RX ADMIN — MAGNESIUM GLUCONATE 500 MG ORAL TABLET 400 MG: 500 TABLET ORAL at 09:30

## 2019-06-05 RX ADMIN — METOPROLOL SUCCINATE 25 MG: 25 TABLET, EXTENDED RELEASE ORAL at 09:30

## 2019-06-05 RX ADMIN — FAMOTIDINE 20 MG: 10 INJECTION, SOLUTION INTRAVENOUS at 09:29

## 2019-06-05 RX ADMIN — IPRATROPIUM BROMIDE AND ALBUTEROL SULFATE 1 AMPULE: .5; 3 SOLUTION RESPIRATORY (INHALATION) at 07:44

## 2019-06-05 RX ADMIN — OLANZAPINE 7.5 MG: 5 TABLET, FILM COATED ORAL at 09:30

## 2019-06-05 RX ADMIN — GABAPENTIN 400 MG: 400 CAPSULE ORAL at 14:09

## 2019-06-05 RX ADMIN — POTASSIUM CHLORIDE 40 MEQ: 20 TABLET, EXTENDED RELEASE ORAL at 09:30

## 2019-06-05 RX ADMIN — GABAPENTIN 400 MG: 400 CAPSULE ORAL at 09:30

## 2019-06-05 RX ADMIN — IBUPROFEN 600 MG: 200 TABLET, FILM COATED ORAL at 09:30

## 2019-06-05 RX ADMIN — SODIUM CHLORIDE, PRESERVATIVE FREE 10 ML: 5 INJECTION INTRAVENOUS at 09:31

## 2019-06-05 ASSESSMENT — PAIN DESCRIPTION - LOCATION
LOCATION: CHEST
LOCATION: CHEST

## 2019-06-05 ASSESSMENT — PAIN SCALES - GENERAL
PAINLEVEL_OUTOF10: 5
PAINLEVEL_OUTOF10: 0
PAINLEVEL_OUTOF10: 5
PAINLEVEL_OUTOF10: 5

## 2019-06-05 ASSESSMENT — PAIN DESCRIPTION - PAIN TYPE
TYPE: SURGICAL PAIN
TYPE: SURGICAL PAIN

## 2019-06-05 NOTE — PROGRESS NOTES
Follow up visit by Cath Lab staff. Left shoulder site assessed and WNL. Post-procedural instructions reviewed with patient, patient verbalizes understanding. Patient with no further questions at this time.

## 2019-06-05 NOTE — PROGRESS NOTES
[address]   Daily Progress Note    Admit Date:  5/29/2019  HPI:  No chief complaint on file. Interval history:   Monika Hazel is being followed for cardiomyopathy, afib, v-fib arrest.    Subjective:  Ms. Yasmine Diego is feeling better. Some chest soreness. Breathing is stable. Weight is stable. Ready to go home. Device interrogation shows normal function per EP and device rep. CXR shows Right sided AICD placed with leads in expected position. Objective:   BP 96/68   Pulse 112   Temp 97.9 °F (36.6 °C) (Oral)   Resp 18   Ht 5' (1.524 m)   Wt 171 lb 6.4 oz (77.7 kg)   SpO2 97%   BMI 33.47 kg/m²       Intake/Output Summary (Last 24 hours) at 6/5/2019 1258  Last data filed at 6/5/2019 1020  Gross per 24 hour   Intake 1430 ml   Output 2500 ml   Net -1070 ml       NYHA: III    Physical Exam:  General:  Awake, alert, NAD  Skin:  Warm and dry, right anterior chest with dressing intact  Neck:  JVD<8  Chest:  Clear to auscultation, no wheezes/rhonchi/rales  Cardiovascular:  Tachy rate, reg rhythm, S1S2, no m/r/g  Abdomen:  Soft, nontender, +bowel sounds  Extremities:  No ble  edema    Medications:    magnesium oxide  400 mg Oral BID    sodium chloride flush  10 mL Intravenous 2 times per day    ipratropium-albuterol  1 ampule Inhalation BID    aspirin  81 mg Oral Daily    famotidine (PEPCID) injection  20 mg Intravenous BID    gabapentin  400 mg Oral TID    metoprolol succinate  25 mg Oral BID    OLANZapine  15 mg Oral Nightly    OLANZapine  7.5 mg Oral BID    enoxaparin  40 mg Subcutaneous Daily      dextrose         Lab Data:  CBC:   Recent Labs     06/03/19  0429   WBC 11.1*   HGB 12.1   *     BMP:    Recent Labs     06/03/19  0429   *   K 3.8   CO2 24   BUN 14   CREATININE <0.5*     INR:  No results for input(s): INR in the last 72 hours. BNP:  No results for input(s): PROBNP in the last 72 hours.   Lab Results   Component Value Date    LVEF 28 05/15/2019

## 2019-06-05 NOTE — PROGRESS NOTES
Patient's EF (Ejection Fraction) is greater than 40%    Patient's weights and intake/output reviewed:    Patient's Last Weight: 171 lbs 6.4 ozs obtained by standing scale. Today's weight is noted to be 8 less than last documented weight. Intake/Output Summary (Last 24 hours) at 6/5/2019 1026  Last data filed at 6/5/2019 1020  Gross per 24 hour   Intake 1430 ml   Output 2500 ml   Net -1070 ml       Patient's current functional capacity:  No limitation of physical activity. Ordinary physical activity does not cause undue fatigue, palpitation, dyspnea. Pt resting in bed at this time on room air. Pt denies shortness of breath. Pt without lower extremity edema. Patient and family's stated goal of care: reduce shortness of breath, increase activity tolerance, better understand heart failure and disease management, be more comfortable and reduce lower extremity edema prior to discharge        Patient has a past medical history of Cancer ([address] Cardiac arrest ([address] Cardiomyopathy ([address] COPD (chronic obstructive pulmonary disease) ([address] Family history of early CAD, Hepatitis C antibody positive in blood, Hyperlipidemia, Smoker, and VF (ventricular fibrillation) ([address] Comorbidities reviewed and education provided.     >>For CHF and Comorbidity documentation on Education Time and Topics, please see Education Tab

## 2019-06-05 NOTE — PROGRESS NOTES
Discharge orders received. [facility] notified and tele monitoring removed. Device signed into main nurses station C4. Pt educated on PIV removal. PIV removed without complication, cannula intact. Dry dressing and tape applied. Pt tolerated removal well.

## 2019-06-05 NOTE — PROGRESS NOTES
Patient discharge completed. Discharge information included information on diagnosis including signs and symptoms, complications and when to seek medical attention. Information on new medications also provided included use for the medication, side effects and when to call the doctor. Patient verbalized understanding of all discharge information. Pt given medications from meds-to-beds. Patient escorted out by staff with all documented belongings. Home with mother.

## 2019-06-05 NOTE — PROGRESS NOTES
midline with full range of motion. Respiratory:  Normal respiratory effort. Clear to auscultation, bilaterally without Rales/Wheezes/Rhonchi. Cardiovascular: Regular rate and rhythm with normal S1/S2 without murmurs, rubs or gallops. Abdomen: Soft, non-tender, non-distended with normal bowel sounds. Musculoskelatal: No clubbing, cyanosis. Minimal LE edema bilaterally. Full range of motion without deformity. Neurologic:  Neurovascularly intact without any focal sensory/motor deficits. Cranial nerves: II-XII intact, grossly non-focal.  Psychiatric: Alert and oriented, thought content appropriate, some sluggish thinking and word finding difficulty. Skin: Skin color, texture, turgor normal.  No rashes or lesions. Capillary Refill: Brisk,< 3 seconds   Peripheral Pulses: +2 palpable, equal bilaterally       Assessment/Plan:  Active Hospital Problems    Diagnosis    Moderate malnutrition ([address] [E44.0]    Cardiac arrest ([address] [I46.9]     VFib Cardiac Arrest   - Seems to have recovered well, although still some memory issues  - Cardiology recommends ICD placement when infection cleared. ID consulted - Monitoring off Abx . Plan for ICD placement today as per EP. Status post ICD placement done on 6/3/2019 by EP, upon interrogation it was found lead was not position well and s/p reposition of leadd again done on 6/4/19. Clinically remained stable.     Acute on chronic systolic CHF 2/2 nonischemic dilated cardiomyopathy  - secondary to adriamycin treatment as a child.   - echo showed LVEF 25% initially, repeat up to 40%  - Continue diuresis with IV Lasix  - Monitor and replace electrolytes     Acute respiratory failure due to cardiac arrest  - extubated 5/24/19  - Status post bronchoscopy   - Cultures showed MSSA       Aspiration Pneumonia   - Status post bronchoscopy cultures positive for MSSA  - Completed 8 days of Vanc and merrem at [facility].  - Given persistent fevers and leukocytosis, ID consulted here - recommend following expectantly off of ABx.      Paroxysmal Afib   - Was noted after arrest.   - Now stable off Amiodarone     Septic shock, resolved     Acute Metabolic Encephaloapathy (POA)   - Continue Zyprexa per psych remmendation  - Improving but still some memory issues    DVT Prophylaxis: Lovenox  Diet: DIET CARDIAC;  Code Status: Full Code  PT/OT Eval Status: pending course    Dispo - possible today    Kaveh Nguyen MD

## 2019-06-05 NOTE — DISCHARGE SUMMARY
Hospital Medicine Discharge Summary    Patient ID: [facility]      Patient's PCP: Gio Salas MD    Admit Date: 5/29/2019     Discharge Date:   6/5/19    Admitting Physician: Dillan Myles MD     Discharge Physician: Kaveh Nguyen MD     Discharge Diagnoses: Active Hospital Problems    Diagnosis    Moderate malnutrition ([address] [E44.0]    Cardiac arrest ([facility] [address] [I46.9]       The patient was seen and examined on day of discharge and this discharge summary is in conjunction with any daily progress note from day of discharge. Hospital Course:     VFib Cardiac Arrest   - Seems to have recovered well, although still some memory issues  - Cardiology recommends ICD placement when infection cleared. ID consulted - Monitoring off Abx . Plan for ICD placement today as per EP. Status post ICD placement done on 6/3/2019 by EP, upon interrogation it was found lead was not position well and s/p reposition of leadd again done on 6/4/19. Clinically remained stable.     Acute on chronic systolic CHF 2/2 nonischemic dilated cardiomyopathy  - secondary to adriamycin treatment as a child.   - echo showed LVEF 25% initially, repeat up to 40%  - Continue diuresis with IV Lasix  - Monitor and replace electrolytes     Acute respiratory failure due to cardiac arrest  - extubated 5/24/19  - Status post bronchoscopy   - Cultures showed MSSA       Aspiration Pneumonia   - Status post bronchoscopy cultures positive for MSSA  - Completed 8 days of Vanc and merrem at [facility].  - Given persistent fevers and leukocytosis, ID consulted here - recommend following expectantly off of ABx.      Paroxysmal Afib   - Was noted after arrest.   - Now stable off Amiodarone     Septic shock, resolved     Acute Metabolic Encephaloapathy (POA)   - Continue Zyprexa per psych remmendation  - Improving but still some memory issues        Physical Exam Performed:     BP 96/68   Pulse 112   Temp 97.9 °F (36.6 °C) (Oral)   Resp 18   Ht 5' (1.524 m)   Wt 171 lb 6.4 oz (77.7 kg)   SpO2 97%   BMI 33.47 kg/m²       General appearance:  No apparent distress, appears stated age and cooperative. HEENT:  Normal cephalic, atraumatic without obvious deformity. Pupils equal, round, and reactive to light. Extra ocular muscles intact. Conjunctivae/corneas clear. Neck: Supple, with full range of motion. No jugular venous distention. Trachea midline. Respiratory:  Normal respiratory effort. Clear to auscultation, bilaterally without Rales/Wheezes/Rhonchi. Cardiovascular:  Regular rate and rhythm with normal S1/S2 without murmurs, rubs or gallops. Abdomen: Soft, non-tender, non-distended with normal bowel sounds. Musculoskeletal:  No clubbing, cyanosis or edema bilaterally. Full range of motion without deformity. Skin: Skin color, texture, turgor normal.  No rashes or lesions. Neurologic:  Neurovascularly intact without any focal sensory/motor deficits. Cranial nerves: II-XII intact, grossly non-focal.  Psychiatric:  Alert and oriented, thought content appropriate, normal insight  Capillary Refill: Brisk,< 3 seconds   Peripheral Pulses: +2 palpable, equal bilaterally       Labs: For convenience and continuity at follow-up the following most recent labs are provided:      CBC:    Lab Results   Component Value Date    WBC 11.1 06/03/2019    HGB 12.1 06/03/2019    HCT 35.9 06/03/2019     06/03/2019       Renal:    Lab Results   Component Value Date     06/03/2019    K 3.8 06/03/2019    K 4.8 05/30/2019    CL 97 06/03/2019    CO2 24 06/03/2019    BUN 14 06/03/2019    CREATININE <0.5 06/03/2019    CALCIUM 10.1 06/03/2019    PHOS 4.2 06/03/2019         Significant Diagnostic Studies    Radiology:   XR CHEST STANDARD (2 VW)   Preliminary Result   Pacemaker. Minimal bibasilar atelectasis. XR CHEST STANDARD (2 VW)   Final Result   1. Interval placement of a right-sided AICD, in proper position, without   evidence of a pneumothorax.    2. Mild

## 2019-06-05 NOTE — PLAN OF CARE
Problem: Falls - Risk of:  Goal: Will remain free from falls  Description  Will remain free from falls  6/5/2019 1022 by Gurinder Torres RN  Outcome: Ongoing  6/5/2019 0529 by Lauren Scott RN  Outcome: Met This Shift  Goal: Absence of physical injury  Description  Absence of physical injury  6/5/2019 1022 by Gurinder Torres RN  Outcome: Ongoing  6/5/2019 0529 by Lauren Scott RN  Outcome: Met This Shift     Problem: Risk for Impaired Skin Integrity  Goal: Tissue integrity - skin and mucous membranes  Description  Structural intactness and normal physiological function of skin and  mucous membranes.   6/5/2019 1022 by Gurinder Torres RN  Outcome: Ongoing  6/5/2019 0529 by Lauren Scott RN  Outcome: Met This Shift     Problem: Pain:  Goal: Pain level will decrease  Description  Pain level will decrease  6/5/2019 1022 by Gurinder Torres RN  Outcome: Ongoing  6/5/2019 0529 by Lauren Scott RN  Outcome: Ongoing  Goal: Control of acute pain  Description  Control of acute pain  6/5/2019 1022 by Gurinder Torres RN  Outcome: Ongoing  6/5/2019 0529 by Lauren Scott RN  Outcome: Ongoing  Goal: Control of chronic pain  Description  Control of chronic pain  6/5/2019 1022 by Gurinder Torres RN  Outcome: Ongoing  6/5/2019 0529 by Lauren Scott RN  Outcome: Ongoing     Problem: Fluid Volume:  Goal: Ability to achieve a balanced intake and output will improve  Description  Ability to achieve a balanced intake and output will improve  6/5/2019 1022 by Gurinder Torres RN  Outcome: Ongoing  6/5/2019 0529 by Lauren Scott RN  Outcome: Ongoing     Problem: OXYGENATION/RESPIRATORY FUNCTION  Goal: Patient will maintain patent airway  6/5/2019 1022 by Gurinder Torres RN  Outcome: Ongoing  6/5/2019 0529 by Lauren Scott RN  Outcome: Met This Shift  Goal: Patient will achieve/maintain normal respiratory rate/effort  Description  Respiratory rate and effort will be within normal limits for the patient  6/5/2019 1022 by Jacque Vázquez RN  Outcome: Ongoing  6/5/2019 0529 by Agapito Keyes RN  Outcome: Ongoing     Problem: Nutrition  Goal: Optimal nutrition therapy  Outcome: Ongoing

## 2019-06-05 NOTE — PROGRESS NOTES
Speech Language Pathology  Facility/Department: [facility] C4 PCU  Dysphagia Daily Treatment Note    NAME: Herbert Judge  : 1985  MRN: [id number]    Patient Diagnosis(es):   Patient Active Problem List    Diagnosis Date Noted    Moderate malnutrition ([facility] 2019    ICD (implantable cardioverter-defibrillator) in place 2019    Cardiac arrest ([facility] 2019    Non-ischemic cardiomyopathy ([facility]     Acute on chronic systolic congestive heart failure ([address]     Hypokalemia     Acute encephalopathy 2019    Multiple tracheobronchial mucus plugs     Cardiac arrest with ventricular fibrillation (HCC)     ARDS (adult respiratory distress syndrome) (HCC)     Mucus plugging of bronchi     Aspiration pneumonitis (HCC)     Atrial fibrillation with RVR (HCC)     Electrolyte disturbance     Shock ([facility]     Transaminitis     Hyperglycemia     Pneumonia due to infectious organism     Acute respiratory failure ([facility] 2019       Pain: No c/o pain during session    Current Diet: Regular solid diet with thin liquids    Diet Tolerance:  Patient tolerating current diet level without signs/symptoms of penetration / aspiration per chart, RN, and pt. P.O. Trials: Thin   x x4 straw sips   Nectar       Honey       Puree       Solid         Impressions:  Pt seen upright in bed, alert and agreeable to therapy, RN OK'd SLP entry and therapy, pt's mother at bedside. Pt currently on RA. Pt reported feeling \"much better\" and denied dysphagia. Pt recalled initial dysphagia s/p extubation at [facility], modified diet recommendations at that time. Pt reported fair appetite, denied difficulty with meals / meds. Pt observed with thin liquid trials via straw with grossly timely swallow initiation noted, no overt s/s of aspiration/penetration throughout. SLP reviewed role of ST and aspiration precautions -- pt verbalized understanding of all reviewed information / strategies, denied concerns or questions. Recommendations: Continue current diet; further dysphagia tx not indicated at this time, ST to sign-off. Please re-refer ST if changes occur. Dysphagia goals:  Short-term Goals  Timeframe for Short-term Goals: 3 days (06/07/19)  1) The patient will tolerate recommended diet without observed clinical signs of aspiration. 06/05: goal met. 2) The patient/caregiver will demonstrate understanding of compensatory strategies for improved swallowing safety. 06/05: goal met, see above. Long-term Goals  Timeframe for Long-term Goals: 5 days (06/09/19)  Goal 1: Pt will tolerate safest and least restrictive diet without any clinical s/s of aspiration. 06/05: goal met. Patient/Family/Caregiver Education:  See above    Compensatory Strategies:  · Alternate solids and liquids  · Small bites/sips  · Eat/Feed slowly  · Remain upright for 30-45 minutes after meals  · Upright as possible for all oral intake    Plan:  Pt denies dysphagia, tolerating least restrictive diet without difficulty. ST to sign-off at this time. Please re-refer pt if changes occur. Discharge Recommendations: Further dysphagia tx not indicated at this time.     Total Treatment Time:  1155-1690 (8 minutes); dysphagia tx    Signature:  Sukumar Arnold M.S. [id number] [facility]  Speech-language pathologist  [id number]

## 2019-06-06 ENCOUNTER — FOLLOWUP TELEPHONE ENCOUNTER (OUTPATIENT)
Dept: TELEMETRY | Age: 34
End: 2019-06-06

## 2019-06-06 NOTE — TELEPHONE ENCOUNTER
[address]    SYMPTOM ASSESSMENT: Post discharge follow-up phone call made to patient. Patient denies shortness of breath, edema, and difficulty sleeping; stated she has been feeling \"better\" since discharge. Patient is acquiring scale this afternoon, provided instruction for daily weight procedure. Patient describes being in the green zone. MEDICATION REVIEW: Patient was able to fill all prescriptions and states she has been taking medications as prescribed. Reviewed patient medications; no discrepancies identified. FOLLOW-UP APPOINTMENT: Reminded patient of their appointment with Corinne Simon at [facility] scheduled for 6-12-19. Patient has arranged transportation to scheduled appointment with herself. EDUCATION: Educated patient on sodium restriction of <2500 mg and fluid restriction of < 64 oz, daily weights, follow-up, and medication compliance. Reviewed recommended level of activity. Advised on continued smoking cessation benefits. Notified patient to call the doctor post discharge if they experiences shortness of breath, chest pain, swelling, cough, or weight gain or loss of 2-3 pounds in a day/5 pounds in a week. Also notified patient to call the doctor if she feels dizzy, increased fatigue, decreased or difficulty urinating. Reviewed the red, yellow and green zones of HF management. Pt will benefit from additional education reinforcement at future FU appointments. Patient stated they will call the doctor with any questions or signs of symptom worsening. No additional questions at this time. HF resource number made available for non-urgent questions. LIFESTYLE GOAL DISCUSSED: Sodium restriction, fluid management, daily weights    RECOMMENDATIONS:  1. Track Daily Weights  2. Adhere to Sodium restricted diet  3. Follow fluid intake limits as directed  4. Take all medications as prescribed  5. Attend all scheduled MD appointments.   6. Participate in activity as tolerated.

## 2019-06-07 ENCOUNTER — FOLLOWUP TELEPHONE ENCOUNTER (OUTPATIENT)
Dept: TELEMETRY | Age: 34
End: 2019-06-07

## 2019-06-09 ENCOUNTER — HOSPITAL ENCOUNTER (EMERGENCY)
Age: 34
Discharge: HOME | End: 2019-06-09
Attending: EMERGENCY MEDICINE
Payer: MEDICAID

## 2019-06-09 ENCOUNTER — APPOINTMENT (OUTPATIENT)
Dept: CT IMAGING | Age: 34
End: 2019-06-09
Payer: MEDICAID

## 2019-06-09 ENCOUNTER — APPOINTMENT (OUTPATIENT)
Dept: GENERAL RADIOLOGY | Age: 34
End: 2019-06-09
Payer: MEDICAID

## 2019-06-09 VITALS
DIASTOLIC BLOOD PRESSURE: 76 MMHG | WEIGHT: 171 LBS | OXYGEN SATURATION: 94 % | HEART RATE: 98 BPM | BODY MASS INDEX: 33.4 KG/M2 | TEMPERATURE: 98.6 F | SYSTOLIC BLOOD PRESSURE: 110 MMHG | RESPIRATION RATE: 15 BRPM

## 2019-06-09 DIAGNOSIS — J18.9 PNEUMONIA DUE TO ORGANISM: ICD-10-CM

## 2019-06-09 DIAGNOSIS — R07.89 ACUTE CHEST WALL PAIN: Primary | ICD-10-CM

## 2019-06-09 LAB
A/G RATIO: 0.8 (ref 1.1–2.2)
ALBUMIN SERPL-MCNC: 3.5 G/DL (ref 3.4–5)
ALP BLD-CCNC: 106 U/L (ref 40–129)
ALT SERPL-CCNC: 40 U/L (ref 10–40)
ANION GAP SERPL CALCULATED.3IONS-SCNC: 13 MMOL/L (ref 3–16)
AST SERPL-CCNC: 44 U/L (ref 15–37)
BASOPHILS ABSOLUTE: 0.1 K/UL (ref 0–0.2)
BASOPHILS RELATIVE PERCENT: 0.7 %
BILIRUB SERPL-MCNC: 0.3 MG/DL (ref 0–1)
BUN BLDV-MCNC: 6 MG/DL (ref 7–20)
CALCIUM SERPL-MCNC: 10.1 MG/DL (ref 8.3–10.6)
CHLORIDE BLD-SCNC: 98 MMOL/L (ref 99–110)
CO2: 26 MMOL/L (ref 21–32)
CREAT SERPL-MCNC: <0.5 MG/DL (ref 0.6–1.1)
EKG ATRIAL RATE: 95 BPM
EKG DIAGNOSIS: NORMAL
EKG P AXIS: 22 DEGREES
EKG P-R INTERVAL: 178 MS
EKG Q-T INTERVAL: 356 MS
EKG QRS DURATION: 84 MS
EKG QTC CALCULATION (BAZETT): 447 MS
EKG R AXIS: -5 DEGREES
EKG T AXIS: 27 DEGREES
EKG VENTRICULAR RATE: 95 BPM
EOSINOPHILS ABSOLUTE: 0.3 K/UL (ref 0–0.6)
EOSINOPHILS RELATIVE PERCENT: 2.9 %
GFR AFRICAN AMERICAN: >60
GFR NON-AFRICAN AMERICAN: >60
GLOBULIN: 4.4 G/DL
GLUCOSE BLD-MCNC: 98 MG/DL (ref 70–99)
HCG QUALITATIVE: NEGATIVE
HCT VFR BLD CALC: 36.8 % (ref 36–48)
HEMOGLOBIN: 12.4 G/DL (ref 12–16)
INR BLD: 1.18 (ref 0.86–1.14)
LACTIC ACID: 1.5 MMOL/L (ref 0.4–2)
LYMPHOCYTES ABSOLUTE: 2.1 K/UL (ref 1–5.1)
LYMPHOCYTES RELATIVE PERCENT: 21.7 %
MCH RBC QN AUTO: 29.3 PG (ref 26–34)
MCHC RBC AUTO-ENTMCNC: 33.8 G/DL (ref 31–36)
MCV RBC AUTO: 86.9 FL (ref 80–100)
MONOCYTES ABSOLUTE: 0.9 K/UL (ref 0–1.3)
MONOCYTES RELATIVE PERCENT: 9.3 %
NEUTROPHILS ABSOLUTE: 6.2 K/UL (ref 1.7–7.7)
NEUTROPHILS RELATIVE PERCENT: 65.4 %
PDW BLD-RTO: 12.5 % (ref 12.4–15.4)
PLATELET # BLD: 420 K/UL (ref 135–450)
PMV BLD AUTO: 7.4 FL (ref 5–10.5)
POTASSIUM SERPL-SCNC: 3.6 MMOL/L (ref 3.5–5.1)
PRO-BNP: 204 PG/ML (ref 0–124)
PROTHROMBIN TIME: 13.4 SEC (ref 9.8–13)
RBC # BLD: 4.23 M/UL (ref 4–5.2)
SODIUM BLD-SCNC: 137 MMOL/L (ref 136–145)
TOTAL PROTEIN: 7.9 G/DL (ref 6.4–8.2)
TROPONIN: <0.01 NG/ML
WBC # BLD: 9.5 K/UL (ref 4–11)

## 2019-06-09 PROCEDURE — 6370000000 HC RX 637 (ALT 250 FOR IP): Performed by: EMERGENCY MEDICINE

## 2019-06-09 PROCEDURE — 84703 CHORIONIC GONADOTROPIN ASSAY: CPT

## 2019-06-09 PROCEDURE — 84484 ASSAY OF TROPONIN QUANT: CPT

## 2019-06-09 PROCEDURE — 6360000004 HC RX CONTRAST MEDICATION: Performed by: EMERGENCY MEDICINE

## 2019-06-09 PROCEDURE — 93005 ELECTROCARDIOGRAM TRACING: CPT | Performed by: EMERGENCY MEDICINE

## 2019-06-09 PROCEDURE — 80053 COMPREHEN METABOLIC PANEL: CPT

## 2019-06-09 PROCEDURE — 2580000003 HC RX 258: Performed by: EMERGENCY MEDICINE

## 2019-06-09 PROCEDURE — 83605 ASSAY OF LACTIC ACID: CPT

## 2019-06-09 PROCEDURE — 96361 HYDRATE IV INFUSION ADD-ON: CPT

## 2019-06-09 PROCEDURE — 96374 THER/PROPH/DIAG INJ IV PUSH: CPT

## 2019-06-09 PROCEDURE — 6360000002 HC RX W HCPCS: Performed by: EMERGENCY MEDICINE

## 2019-06-09 PROCEDURE — 71260 CT THORAX DX C+: CPT

## 2019-06-09 PROCEDURE — 85025 COMPLETE CBC W/AUTO DIFF WBC: CPT

## 2019-06-09 PROCEDURE — 99284 EMERGENCY DEPT VISIT MOD MDM: CPT

## 2019-06-09 PROCEDURE — 83880 ASSAY OF NATRIURETIC PEPTIDE: CPT

## 2019-06-09 PROCEDURE — 71046 X-RAY EXAM CHEST 2 VIEWS: CPT

## 2019-06-09 PROCEDURE — 85610 PROTHROMBIN TIME: CPT

## 2019-06-09 PROCEDURE — 93010 ELECTROCARDIOGRAM REPORT: CPT | Performed by: INTERNAL MEDICINE

## 2019-06-09 RX ORDER — OLANZAPINE 15 MG/1
TABLET ORAL
COMMUNITY
Start: 2019-05-28 | End: 2019-06-12

## 2019-06-09 RX ORDER — ASPIRIN 81 MG
TABLET,CHEWABLE ORAL
COMMUNITY
Start: 2019-06-05 | End: 2019-06-12

## 2019-06-09 RX ORDER — LEVOFLOXACIN 750 MG/1
750 TABLET ORAL DAILY
Qty: 7 TABLET | Refills: 0 | Status: SHIPPED | OUTPATIENT
Start: 2019-06-09 | End: 2019-06-16

## 2019-06-09 RX ORDER — HYDROCODONE BITARTRATE AND ACETAMINOPHEN 5; 325 MG/1; MG/1
1 TABLET ORAL EVERY 6 HOURS PRN
Qty: 10 TABLET | Refills: 0 | Status: SHIPPED | OUTPATIENT
Start: 2019-06-09 | End: 2019-06-12

## 2019-06-09 RX ORDER — POTASSIUM CHLORIDE 20 MEQ/1
TABLET, EXTENDED RELEASE ORAL
COMMUNITY
Start: 2019-06-05 | End: 2019-06-12

## 2019-06-09 RX ORDER — LANOLIN ALCOHOL/MO/W.PET/CERES
CREAM (GRAM) TOPICAL
COMMUNITY
Start: 2019-06-05 | End: 2019-06-12

## 2019-06-09 RX ORDER — ALBUTEROL SULFATE 90 UG/1
AEROSOL, METERED RESPIRATORY (INHALATION)
COMMUNITY
Start: 2019-05-09 | End: 2019-06-12

## 2019-06-09 RX ORDER — 0.9 % SODIUM CHLORIDE 0.9 %
1000 INTRAVENOUS SOLUTION INTRAVENOUS ONCE
Status: COMPLETED | OUTPATIENT
Start: 2019-06-09 | End: 2019-06-09

## 2019-06-09 RX ORDER — CHOLECALCIFEROL (VITAMIN D3) 125 MCG
CAPSULE ORAL
COMMUNITY
Start: 2019-05-28 | End: 2019-06-12

## 2019-06-09 RX ORDER — VENLAFAXINE HYDROCHLORIDE 150 MG/1
CAPSULE, EXTENDED RELEASE ORAL
COMMUNITY
Start: 2019-05-28 | End: 2019-07-22

## 2019-06-09 RX ORDER — FUROSEMIDE 40 MG/1
TABLET ORAL
COMMUNITY
Start: 2019-06-05 | End: 2019-06-12

## 2019-06-09 RX ORDER — MORPHINE SULFATE 2 MG/ML
2 INJECTION, SOLUTION INTRAMUSCULAR; INTRAVENOUS ONCE
Status: COMPLETED | OUTPATIENT
Start: 2019-06-09 | End: 2019-06-09

## 2019-06-09 RX ORDER — LEVOFLOXACIN 500 MG/1
500 TABLET, FILM COATED ORAL ONCE
Status: COMPLETED | OUTPATIENT
Start: 2019-06-09 | End: 2019-06-09

## 2019-06-09 RX ORDER — METOPROLOL SUCCINATE 50 MG/1
TABLET, EXTENDED RELEASE ORAL
COMMUNITY
Start: 2019-06-05 | End: 2019-06-12

## 2019-06-09 RX ADMIN — MORPHINE SULFATE 2 MG: 2 INJECTION, SOLUTION INTRAMUSCULAR; INTRAVENOUS at 10:03

## 2019-06-09 RX ADMIN — LEVOFLOXACIN 500 MG: 500 TABLET, FILM COATED ORAL at 12:19

## 2019-06-09 RX ADMIN — SODIUM CHLORIDE 1000 ML: 9 INJECTION, SOLUTION INTRAVENOUS at 11:19

## 2019-06-09 RX ADMIN — IOPAMIDOL 75 ML: 755 INJECTION, SOLUTION INTRAVENOUS at 11:00

## 2019-06-09 ASSESSMENT — PAIN DESCRIPTION - PAIN TYPE
TYPE: ACUTE PAIN;SURGICAL PAIN

## 2019-06-09 ASSESSMENT — PAIN DESCRIPTION - LOCATION
LOCATION: SHOULDER

## 2019-06-09 ASSESSMENT — PAIN SCALES - GENERAL
PAINLEVEL_OUTOF10: 8
PAINLEVEL_OUTOF10: 9
PAINLEVEL_OUTOF10: 7
PAINLEVEL_OUTOF10: 6

## 2019-06-09 ASSESSMENT — PAIN DESCRIPTION - ORIENTATION
ORIENTATION: RIGHT
ORIENTATION: RIGHT

## 2019-06-09 NOTE — ED PROVIDER NOTES
by Allan Hood MD at [address] [facility]  4/15/2014    Laparascopic    ENDOSCOPY, COLON, DIAGNOSTIC      FOOT SURGERY      GROWTH PLATE SURGERY      LEG SURGERY       Family History   Problem Relation Age of Onset    Emphysema Mother     Mental Illness Mother     Substance Abuse Mother     Heart Attack Father     Mental Illness Father     Arthritis Father     Heart Disease Father     High Blood Pressure Father     High Cholesterol Father      Social History     Socioeconomic History    Marital status: Legally      Spouse name: Not on file    Number of children: Not on file    Years of education: Not on file    Highest education level: Not on file   Occupational History    Not on file   Social Needs    Financial resource strain: Not on file    Food insecurity:     Worry: Not on file     Inability: Not on file    Transportation needs:     Medical: Not on file     Non-medical: Not on file   Tobacco Use    Smoking status: Former Smoker     Packs/day: 0.00     Years: 0.00     Pack years: 0.00    Smokeless tobacco: Never Used   Substance and Sexual Activity    Alcohol use: No     Comment: none for years    Drug use: Not Currently     Types: Marijuana    Sexual activity: Yes     Partners: Male   Lifestyle    Physical activity:     Days per week: Not on file     Minutes per session: Not on file    Stress: Not on file   Relationships    Social connections:     Talks on phone: Not on file     Gets together: Not on file     Attends Islam service: Not on file     Active member of club or organization: Not on file     Attends meetings of clubs or organizations: Not on file     Relationship status: Not on file    Intimate partner violence:     Fear of current or ex partner: Not on file     Emotionally abused: Not on file     Physically abused: Not on file     Forced sexual activity: Not on file   Other Topics Concern    Not on file   Social History Narrative    ** Merged History Encounter **          No current facility-administered medications for this encounter. Current Outpatient Medications   Medication Sig Dispense Refill    levofloxacin (LEVAQUIN) 750 MG tablet Take 1 tablet by mouth daily for 7 days 7 tablet 0    HYDROcodone-acetaminophen (NORCO) 5-325 MG per tablet Take 1 tablet by mouth every 6 hours as needed for Pain for up to 10 doses. 10 tablet 0    gabapentin (NEURONTIN) 600 MG tablet Take 600 mg by mouth 3 times daily.  OLANZapine zydis (ZYPREXA) 10 MG disintegrating tablet Take 15 mg by mouth nightly.       albuterol sulfate  (90 Base) MCG/ACT inhaler       ASPIRIN LOW DOSE 81 MG chewable tablet       furosemide (LASIX) 40 MG tablet       magnesium oxide (MAG-OX) 400 (240 Mg) MG tablet       melatonin 5 MG TABS tablet       metoprolol succinate (TOPROL XL) 50 MG extended release tablet       potassium chloride (KLOR-CON M) 20 MEQ extended release tablet       venlafaxine (EFFEXOR XR) 150 MG extended release capsule       OLANZapine (ZYPREXA) 15 MG tablet       aspirin 81 MG chewable tablet Take 1 tablet by mouth daily 30 tablet 3    metoprolol succinate (TOPROL XL) 50 MG extended release tablet Take 1 tablet by mouth See Admin Instructions 1/2 tab in the am, 1 tab in the PM 60 tablet 1    magnesium oxide (MAG-OX) 400 (241.3 Mg) MG TABS tablet Take 1 tablet by mouth 2 times daily 60 tablet 3    furosemide (LASIX) 40 MG tablet Take 1 tablet by mouth See Admin Instructions As needed for 3lbs weight gain in a day or 5lbs weight gain in a week 30 tablet 3    potassium chloride (KLOR-CON M) 20 MEQ extended release tablet Take 1 tablet by mouth See Admin Instructions As needed when you take your lasix (furosemide) 30 tablet 1    albuterol sulfate  (90 Base) MCG/ACT inhaler Inhale 2 puffs into the lungs 3 times daily      OLANZapine (ZYPREXA) 15 MG tablet Take 7.5 mg by mouth 3 times daily Indications: 1/2 tab every morning, 1/2 tab every evening and 1 tab at bedtime       melatonin 5 MG TABS tablet Take 5 mg by mouth nightly      gabapentin (NEURONTIN) 400 MG capsule Take 400 mg by mouth 3 times daily.  traZODone (DESYREL) 50 MG tablet Take by mouth nightly Indications: for insomnia, patient takes PRN when melatonin does not work, mother unsure of dose. Allergies   Allergen Reactions    Tape [facility] Tape]        REVIEW OF SYSTEMS  10 systems reviewed, pertinent positives per HPI otherwise noted to be negative. PHYSICAL EXAM  /76   Pulse 98   Temp 98.6 °F (37 °C) (Oral)   Resp 15   Wt 171 lb (77.6 kg)   LMP 04/01/2019   SpO2 94%   BMI 33.40 kg/m²   GENERAL APPEARANCE: Awake and alert. Cooperative. No acute distress. HEAD: Normocephalic. Atraumatic. EYES: PERRL. EOM's grossly intact. ENT: Mucous membranes are moist.   NECK: Supple. Non-tender  HEART: RRR. Right upper anterior chest wall tenderness to palpation around the AICD. No underlying fluctuance or drainage  LUNGS: Respirations unlabored. CTAB. Good air exchange. Speaking comfortably in full sentences. ABDOMEN: Soft. Non-distended. Non-tender. No masses. No organomegaly. No guarding or rebound. BACK:  No midline Tenderness. EXTREMITIES: No peripheral edema. Moves all extremities equally. All extremities neurovascularly intact. SKIN: Warm and dry. No acute rashes. NEUROLOGICAL: Alert and oriented. CN's 2-12 intact. No gross facial drooping. Strength 5/5, sensation intact. 2 plus DTR's in lower extremity bilaterally. Gait normal.   PSYCHIATRIC: Normal mood and affect. LABS  I have reviewed all labs for this visit.    Results for orders placed or performed during the hospital encounter of 06/09/19   CBC Auto Differential   Result Value Ref Range    WBC 9.5 4.0 - 11.0 K/uL    RBC 4.23 4.00 - 5.20 M/uL    Hemoglobin 12.4 12.0 - 16.0 g/dL    Hematocrit 36.8 36.0 - 48.0 %    MCV 86.9 80.0 - 100.0 fL    MCH 29.3 26.0 - 34.0 pg    MCHC 33.8 31.0 - 36.0 g/dL    RDW 12.5 12.4 - 15.4 %    Platelets 675 423 - 316 K/uL    MPV 7.4 5.0 - 10.5 fL    Neutrophils % 65.4 %    Lymphocytes % 21.7 %    Monocytes % 9.3 %    Eosinophils % 2.9 %    Basophils % 0.7 %    Neutrophils # 6.2 1.7 - 7.7 K/uL    Lymphocytes # 2.1 1.0 - 5.1 K/uL    Monocytes # 0.9 0.0 - 1.3 K/uL    Eosinophils # 0.3 0.0 - 0.6 K/uL    Basophils # 0.1 0.0 - 0.2 K/uL   Comprehensive Metabolic Panel   Result Value Ref Range    Sodium 137 136 - 145 mmol/L    Potassium 3.6 3.5 - 5.1 mmol/L    Chloride 98 (L) 99 - 110 mmol/L    CO2 26 21 - 32 mmol/L    Anion Gap 13 3 - 16    Glucose 98 70 - 99 mg/dL    BUN 6 (L) 7 - 20 mg/dL    CREATININE <0.5 (L) 0.6 - 1.1 mg/dL    GFR Non-African American >60 >60    GFR African American >60 >60    Calcium 10.1 8.3 - 10.6 mg/dL    Total Protein 7.9 6.4 - 8.2 g/dL    Alb 3.5 3.4 - 5.0 g/dL    Albumin/Globulin Ratio 0.8 (L) 1.1 - 2.2    Total Bilirubin 0.3 0.0 - 1.0 mg/dL    Alkaline Phosphatase 106 40 - 129 U/L    ALT 40 10 - 40 U/L    AST 44 (H) 15 - 37 U/L    Globulin 4.4 g/dL   Protime-INR   Result Value Ref Range    Protime 13.4 (H) 9.8 - 13.0 sec    INR 1.18 (H) 0.86 - 1.14   Troponin   Result Value Ref Range    Troponin <0.01 <0.01 ng/mL   Brain Natriuretic Peptide   Result Value Ref Range    Pro- (H) 0 - 124 pg/mL   HCG Qualitative, Serum   Result Value Ref Range    hCG Qual Negative Detects HCG level >10 MIU/mL   Lactic Acid, Plasma   Result Value Ref Range    Lactic Acid 1.5 0.4 - 2.0 mmol/L   EKG 12 Lead   Result Value Ref Range    Ventricular Rate 95 BPM    Atrial Rate 95 BPM    P-R Interval 178 ms    QRS Duration 84 ms    Q-T Interval 356 ms    QTc Calculation (Bazett) 447 ms    P Axis 22 degrees    R Axis -5 degrees    T Axis 27 degrees    Diagnosis       Normal sinus rhythmMinimal voltage criteria for LVH, may be normal variantNonspecific T wave abnormalityAbnormal ECGConfirmed by Gurwinder Cope MD, Adrianna Garcia ([id number]) on 6/9/2019 10:16:04 AM       The Ekg interpreted by me shows  normal sinus rhythm with a rate of 95  Axis is   Normal  QTc is  normal  Intervals and Durations are unremarkable. ST Segments: nonspecific changes          RADIOLOGY    Ct Abdomen Pelvis Wo Contrast Additional Contrast? None    Result Date: 5/14/2019  EXAMINATION: CTA OF THE CHEST; CT OF THE ABDOMEN AND PELVIS WITHOUT CONTRAST 5/14/2019 8:55 pm; 5/14/2019 8:56 pm TECHNIQUE: CTA of the chest was performed after the administration of intravenous contrast.  Multiplanar reformatted images are provided for review. MIP images are provided for review. Dose modulation, iterative reconstruction, and/or weight based adjustment of the mA/kV was utilized to reduce the radiation dose to as low as reasonably achievable.; CT of the abdomen and pelvis was performed without the administration of intravenous contrast. Multiplanar reformatted images are provided for review. Dose modulation, iterative reconstruction, and/or weight based adjustment of the mA/kV was utilized to reduce the radiation dose to as low as reasonably achievable. COMPARISON: 05/14/2019 HISTORY: ORDERING SYSTEM PROVIDED HISTORY: resp arrest, concern for PE TECHNOLOGIST PROVIDED HISTORY: Ordering Physician Provided Reason for Exam: resp arrest, concern for PE Acuity: Acute Type of Exam: Initial; ORDERING SYSTEM PROVIDED HISTORY: ABDOMINAL PAIN TECHNOLOGIST PROVIDED HISTORY: Additional Contrast?->None Ordering Physician Provided Reason for Exam: Abd pain Acuity: Acute Type of Exam: Initial FINDINGS: Chest: Pulmonary Arteries: Motion artifact degrades image quality. There is no acute pulmonary thromboembolus. Mediastinum: The heart is unremarkable. There are no enlarged thoracic lymph nodes. Lungs/pleura: An endotracheal tube is in situ. The airway is patent. There is no pneumothorax or pleural effusion.   There is enhancing consolidation involving the bilateral lungs due to atelectasis with complete collapse of Pacemaker. Minimal bibasilar atelectasis. Xr Chest Standard (2 Vw)    Result Date: 6/5/2019  EXAMINATION: TWO XRAY VIEWS OF THE CHEST 6/4/2019 7:25 am COMPARISON: 05/30/2019, 05/24/2019 HISTORY: ORDERING SYSTEM PROVIDED HISTORY: post device implant TECHNOLOGIST PROVIDED HISTORY: Reason for exam:->post device implant Ordering Physician Provided Reason for Exam: post device implant Acuity: Acute Type of Exam: Initial FINDINGS: Frontal and lateral views of the chest were performed. There is no acute osseous abnormality. There has been interval placement of a right-sided AICD, in proper position, without evidence of a pneumothorax. The heart size and mediastinal contours are stable, and within normal limits. There is mild bibasilar atelectasis. The lungs are otherwise clear. 1. Interval placement of a right-sided AICD, in proper position, without evidence of a pneumothorax. 2. Mild bibasilar atelectasis. Xr Chest Standard (2 Vw)    Result Date: 5/30/2019  EXAMINATION: TWO XRAY VIEWS OF THE CHEST 5/30/2019 11:21 am COMPARISON: Portable chest 05/24/2019. HISTORY: ORDERING SYSTEM PROVIDED HISTORY: pneumonia TECHNOLOGIST PROVIDED HISTORY: Reason for exam:->pneumonia Ordering Physician Provided Reason for Exam: pneumonia Acuity: Acute Type of Exam: Initial FINDINGS: Interval removal of right upper extremity PICC. Heart size and mediastinal contours not substantially changed given PA technique. Persistent mild right infrahilar consolidative opacity. Lung aeration may be slightly improved. There is no pleural effusion. Persistent right basilar airspace disease without pleural effusion.      Ct Head Wo Contrast    Result Date: 5/14/2019  EXAMINATION: CT OF THE HEAD WITHOUT CONTRAST  5/14/2019 3:43 pm TECHNIQUE: CT of the head was performed without the administration of intravenous contrast. Dose modulation, iterative reconstruction, and/or weight based adjustment of the mA/kV was utilized to reduce is again demonstrated, slightly improved on the right as compared to prior. No pleural effusion. No pneumothorax. Cardiac and mediastinal silhouettes are within normal limits. Right upper extremity PICC line has not yet reached the inferior vena cava. Its tip is at the expected location of the mid right subclavian vein. Bilateral airspace disease, improving on the right as compared to prior. Findings were called by the radiology call center. Xr Chest Portable    Result Date: 5/23/2019  EXAMINATION: ONE XRAY VIEW OF THE CHEST 5/23/2019 4:54 am COMPARISON: 05/22/2019 HISTORY: ORDERING SYSTEM PROVIDED HISTORY: ventilator protocol TECHNOLOGIST PROVIDED HISTORY: Reason for exam:->ventilator protocol Ordering Physician Provided Reason for Exam: respitory failure Acuity: Acute Type of Exam: Initial FINDINGS: Tubes and lines remain in place. Diffuse right lung airspace disease has improved, particularly in the apex. There is increasing left basilar airspace disease. The heart size is within normal limits. There is no large pleural effusion or definite evidence for pneumothorax. 1. Improving multifocal right lung airspace disease. 2. Increasing left basilar atelectasis or pneumonia. Xr Chest Portable    Result Date: 5/22/2019  EXAMINATION: ONE XRAY VIEW OF THE CHEST 5/22/2019 6:18 am COMPARISON: Chest radiograph May 21, 2019 and priors HISTORY: ORDERING SYSTEM PROVIDED HISTORY: ventilator protocol TECHNOLOGIST PROVIDED HISTORY: Reason for exam:->ventilator protocol Ordering Physician Provided Reason for Exam: Respiratory failure Acuity: Acute Type of Exam: Initial FINDINGS: Endotracheal tube tip is approximately 2.5 cm above the andrew. Left internal jugular central venous catheter overlies the upper superior vena cava. The lung volumes are low. There are perihilar airspace opacities which are new on the left. Asymmetric airspace disease is again seen throughout the right lung.   No pneumothorax or definite effusion. Cardiac and mediastinal contours are without acute process. No acute osseous abnormality. Stable support apparatus. New left perihilar airspace disease and persistent airspace disease throughout the right lung. Findings may be related to edema and/or pneumonia. Xr Chest Portable    Result Date: 5/21/2019  EXAMINATION: ONE XRAY VIEW OF THE CHEST 5/21/2019 5:38 am COMPARISON: 05/20/2019 HISTORY: ORDERING SYSTEM PROVIDED HISTORY: ventilator protocol TECHNOLOGIST PROVIDED HISTORY: Reason for exam:->ventilator protocol Ordering Physician Provided Reason for Exam: respitory failure Acuity: Acute Type of Exam: Initial FINDINGS: The patient is rotated. Tubes and lines remain in place. There has been interval progression of right lung airspace disease which now involves most of the right lung. The heart size is within normal limits. There is no evidence for pleural effusion or pneumothorax. Worsening right lung airspace disease likely represents pneumonia. Xr Chest Portable    Result Date: 5/20/2019  EXAMINATION: ONE XRAY VIEW OF THE CHEST 5/20/2019 3:53 am COMPARISON: 05/19/2019 HISTORY: ORDERING SYSTEM PROVIDED HISTORY: ventilator protocol TECHNOLOGIST PROVIDED HISTORY: Reason for exam:->ventilator protocol Ordering Physician Provided Reason for Exam: resp distress Acuity: Acute Type of Exam: Subsequent/Follow-up Additional signs and symptoms: f/u ETT FINDINGS: Tubes and lines remain in place. Right basilar airspace disease is unchanged. There is improved aeration in the left base. The heart size is at the upper limits of normal.  There is no definite pleural effusion or pneumothorax. Stable right basilar and improving left basilar atelectasis and/or pneumonia. Xr Chest Portable    Result Date: 5/19/2019  EXAMINATION: ONE XRAY VIEW OF THE CHEST 5/19/2019 3:59 pm COMPARISON: Chest radiograph performed 05/19/2019.  HISTORY: ORDERING SYSTEM PROVIDED HISTORY: ett  placement TECHNOLOGIST PROVIDED HISTORY: Reason for exam:->ett  placement Ordering Physician Provided Reason for Exam: ETT placement Acuity: Acute Type of Exam: Initial FINDINGS: There is bilateral pulmonary congestion. There is a right basilar consolidation. There is no pneumothorax. The mediastinal structures are unremarkable. There is endotracheal tube with the tip in the midtrachea. There is a gastric tube with the tip in the stomach. There is a left internal jugular catheter. The upper abdomen is unremarkable. The extrathoracic soft tissues are unremarkable. Bilateral pulmonary congestion with right basilar consolidation concerning for pneumonia. Stable support tubes. Xr Chest Portable    Result Date: 5/19/2019  EXAMINATION: ONE XRAY VIEW OF THE CHEST 5/19/2019 3:56 am COMPARISON: 05/18/2019 HISTORY: ORDERING SYSTEM PROVIDED HISTORY: ventilator protocol TECHNOLOGIST PROVIDED HISTORY: Reason for exam:->ventilator protocol Ordering Physician Provided Reason for Exam: resp distress Acuity: Acute Type of Exam: Subsequent/Follow-up Additional signs and symptoms: f/u ETT FINDINGS: Tubes and lines remain in place. There has been no significant change in bilateral airspace disease. The heart size is at the upper limits of normal. There is no discernible pneumothorax. Grossly stable bilateral airspace disease. Xr Chest Portable    Result Date: 5/18/2019  EXAMINATION: ONE XRAY VIEW OF THE CHEST 5/18/2019 4:27 am COMPARISON: 05/17/2019 HISTORY: ORDERING SYSTEM PROVIDED HISTORY: ventilator protocol TECHNOLOGIST PROVIDED HISTORY: Reason for exam:->ventilator protocol Ordering Physician Provided Reason for Exam: resp distress Acuity: Acute Type of Exam: Subsequent/Follow-up Additional signs and symptoms: f/u ETT ET tube and central line are stable. There is persistent right perihilar and right lower lobe airspace consolidation significantly improved since prior examination.   Left airspace disease has also improved. No pneumothorax. Small bilateral pleural effusions. Cardiomediastinal silhouette and bony thorax are unchanged. FINDINGS: Improving multifocal airspace consolidation with persistent right perihilar and right lower lobe airspace consolidation. Findings suggest improving pulmonary edema and/or ARDS. Improving multifocal pneumonia is also in the differential.     Xr Chest Portable    Result Date: 5/17/2019  EXAMINATION: ONE XRAY VIEW OF THE CHEST 5/17/2019 5:28 am COMPARISON: 05/16/2019 radiograph HISTORY: ORDERING SYSTEM PROVIDED HISTORY: ventilator protocol TECHNOLOGIST PROVIDED HISTORY: Reason for exam:->ventilator protocol Ordering Physician Provided Reason for Exam: respitory failure Acuity: Acute Type of Exam: Initial FINDINGS: Endotracheal tube has been advanced since the prior study and is now approximately 2 cm above the andrew. Enteric tube and left central venous catheter stable. The heart is enlarged. Severe perihilar opacities have increased centrally and there is diffuse ground-glass opacification centrally in the right lung and in the left lower lung zone. No significant skeletal finding. Worsening bilateral airspace opacities in a pattern that would favor ARDS. Xr Chest Portable    Result Date: 5/16/2019  EXAMINATION: ONE XRAY VIEW OF THE CHEST 5/16/2019 5:27 am COMPARISON: Chest radiograph 05/15/2019 HISTORY: ORDERING SYSTEM PROVIDED HISTORY: MV TECHNOLOGIST PROVIDED HISTORY: Reason for exam:->MV Ordering Physician Provided Reason for Exam: Respiratory failure Acuity: Acute Type of Exam: Initial FINDINGS: Endotracheal tube terminates approximately 6.2 cm superior to the andrew. Additional life support devices are unchanged. Stable cardiomediastinal contours. Perihilar predominant airspace disease persists. Small effusions suspected. No pneumothorax. Stable life support device positioning. Persistent perihilar predominant edema and small effusions.      Xr Chest Portable    Result Date: 5/15/2019  EXAMINATION: ONE XRAY VIEW OF THE CHEST 5/15/2019 10:00 am COMPARISON: Radiograph 05/14/2019 HISTORY: ORDERING SYSTEM PROVIDED HISTORY: SOB TECHNOLOGIST PROVIDED HISTORY: Reason for exam:->SOB Ordering Physician Provided Reason for Exam: daily while on vent Acuity: Unknown Type of Exam: Unknown FINDINGS: Left IJ central line tip in the SVC. Endotracheal tube tip in the trachea at the level of the thoracic inlet. Enteric tube tip projects over the fundus of the stomach. Cardiomediastinal silhouette is unchanged. No pneumothorax. Improved pulmonary edema. Interval improvement in pulmonary edema. Xr Chest Portable    Result Date: 5/15/2019  EXAMINATION: ONE XRAY VIEW OF THE CHEST 5/14/2019 11:53 pm COMPARISON: Chest radiographs 05/14/2019 HISTORY: ORDERING SYSTEM PROVIDED HISTORY: cvc placement TECHNOLOGIST PROVIDED HISTORY: Reason for exam:->cvc placement Ordering Physician Provided Reason for Exam: cvc placement FINDINGS: Endotracheal tube terminates 4.1 cm superior to the andrew. Left IJ central venous catheter terminates over the azygos-SVC confluence. No pneumothorax. Layering bilateral effusions and hazy gradient of opacities extending to the lung bases. Esophagogastric tube terminates over the gastric bubble. No acute osseous abnormality. Appropriate left IJ central venous catheter positioning. No pneumothorax. Appropriate endotracheal tube positioning. Layering bilateral effusions. Equivocal for a component of superimposed pulmonary edema. Atelectasis likely present. Xr Chest Portable    Result Date: 5/14/2019  EXAMINATION: ONE XRAY VIEW OF THE CHEST 5/14/2019 6:15 pm COMPARISON: None.  HISTORY: ORDERING SYSTEM PROVIDED HISTORY: et/og placement TECHNOLOGIST PROVIDED HISTORY: Reason for exam:->et/og placement Ordering Physician Provided Reason for Exam: Respiratory Distress, ETT and OG placement Acuity: Acute Type of Exam: Initial FINDINGS: NG tube with tip in the stomach. Endotracheal tube with tip approximately 2.5 cm from the andrew. Cardiac silhouette is enlarged. No pneumothorax or pleural effusion. Pulmonary vascular congestion interstitial prominence throughout the lungs. Gaseous distention of the stomach. Findings as above which may be related to congestive heart failure and edema. Ct Chest Pulmonary Embolism W Contrast    Result Date: 6/9/2019  EXAMINATION: CTA OF THE CHEST, 6/9/2019 10:54 am TECHNIQUE: CTA of the chest was performed after the administration of intravenous contrast.  Multiplanar reformatted images are provided for review. MIP images are provided for review. Dose modulation, iterative reconstruction, and/or weight based adjustment of the mA/kV was utilized to reduce the radiation dose to as low as reasonably achievable. COMPARISON: Radiograph dated June 9, 2019 HISTORY: ORDERING SYSTEM PROVIDED HISTORY: Chest pain, acute coronary syndrome suspect TECHNOLOGIST PROVIDED HISTORY: Ordering Physician Provided Reason for Exam: Chest pain Acuity: Acute Type of Exam: Initial Additional signs and symptoms: Shortness of breath Relevant Medical/Surgical History: Recent defibrillator surgery, cancer, Rhabdomyosarcoma FINDINGS: Pulmonary Arteries: Pulmonary arteries are adequately opacified for evaluation. No evidence of intraluminal filling defect to suggest pulmonary embolism. Main pulmonary artery is normal in caliber. Mediastinum: Right chest wall pacer in place. The heart is normal in size without a pericardial effusion. The aorta and arch branches are normal in course and caliber. Lungs/pleura: Reticular and ground-glass opacities are seen in the mid to lower lung zones. No pleural effusion. Central airways are patent. Minimal bronchial wall thickening. No bronchiectasis. Upper Abdomen: Limited images of the upper abdomen are unremarkable.  Soft Tissues/Bones: Multiple anterior rib deformities are present with mild callus formation suggestive of a subacute process. No acute or aggressive osseous lesion. Gas is identified adjacent to the chest wall device. No drainable fluid collection. 1. No pulmonary embolus. 2. Ground-glass and reticular opacities in the bilateral lower lobes. Differential considerations include infection or edema. 3. Healing anterior rib fractures. 4. Gas identified adjacent to the right chest wall device. No drainable fluid collection. Ct Chest Pulmonary Embolism W Contrast    Result Date: 5/14/2019  EXAMINATION: CTA OF THE CHEST; CT OF THE ABDOMEN AND PELVIS WITHOUT CONTRAST 5/14/2019 8:55 pm; 5/14/2019 8:56 pm TECHNIQUE: CTA of the chest was performed after the administration of intravenous contrast.  Multiplanar reformatted images are provided for review. MIP images are provided for review. Dose modulation, iterative reconstruction, and/or weight based adjustment of the mA/kV was utilized to reduce the radiation dose to as low as reasonably achievable.; CT of the abdomen and pelvis was performed without the administration of intravenous contrast. Multiplanar reformatted images are provided for review. Dose modulation, iterative reconstruction, and/or weight based adjustment of the mA/kV was utilized to reduce the radiation dose to as low as reasonably achievable. COMPARISON: 05/14/2019 HISTORY: ORDERING SYSTEM PROVIDED HISTORY: resp arrest, concern for PE TECHNOLOGIST PROVIDED HISTORY: Ordering Physician Provided Reason for Exam: resp arrest, concern for PE Acuity: Acute Type of Exam: Initial; ORDERING SYSTEM PROVIDED HISTORY: ABDOMINAL PAIN TECHNOLOGIST PROVIDED HISTORY: Additional Contrast?->None Ordering Physician Provided Reason for Exam: Abd pain Acuity: Acute Type of Exam: Initial FINDINGS: Chest: Pulmonary Arteries: Motion artifact degrades image quality. There is no acute pulmonary thromboembolus. Mediastinum: The heart is unremarkable. There are no enlarged thoracic lymph nodes. pain    2. Pneumonia due to organism        Blood pressure 110/76, pulse 98, temperature 98.6 °F (37 °C), temperature source Oral, resp. rate 15, weight 171 lb (77.6 kg), last menstrual period 04/01/2019, SpO2 94 %. Sarwat Mcfarlane was discharged to home in stable condition.          Mallory Fagan, DO  06/10/19 [id number]

## 2019-06-09 NOTE — ED NOTES
Discharge: Pt discharged to home as per order. IV removed. Scripts plus instructions given. Pt verbalized understanding. Denied questions.        Devonte Holman RN  06/09/19 [id number]

## 2019-06-10 ENCOUNTER — TELEPHONE (OUTPATIENT)
Dept: CARDIOLOGY CLINIC | Age: 34
End: 2019-06-10

## 2019-06-10 NOTE — TELEPHONE ENCOUNTER
Pt mother sts pt was released from hospital on 6/5. Pt was told that [facility] would be coming to home to check on her. As of yet no has been there. Pt also had equipment delivered to home for device cks and nothing was explained.

## 2019-06-11 NOTE — TELEPHONE ENCOUNTER
Spoke with patient and let her know that no HH was ordered at discharge and that she will need to call [employer] to set up her home checks.  She verbalized understanding and agreed with the plan

## 2019-06-12 ENCOUNTER — NURSE ONLY (OUTPATIENT)
Dept: CARDIOLOGY CLINIC | Age: 34
End: 2019-06-12
Payer: MEDICAID

## 2019-06-12 ENCOUNTER — OFFICE VISIT (OUTPATIENT)
Dept: CARDIOLOGY CLINIC | Age: 34
End: 2019-06-12
Payer: MEDICAID

## 2019-06-12 VITALS
BODY MASS INDEX: 35.02 KG/M2 | HEART RATE: 97 BPM | SYSTOLIC BLOOD PRESSURE: 100 MMHG | HEIGHT: 60 IN | OXYGEN SATURATION: 98 % | DIASTOLIC BLOOD PRESSURE: 70 MMHG | WEIGHT: 178.4 LBS

## 2019-06-12 DIAGNOSIS — I42.8 NON-ISCHEMIC CARDIOMYOPATHY (HCC): Primary | ICD-10-CM

## 2019-06-12 DIAGNOSIS — E87.8 ELECTROLYTE DISTURBANCE: ICD-10-CM

## 2019-06-12 DIAGNOSIS — Z95.810 ICD (IMPLANTABLE CARDIOVERTER-DEFIBRILLATOR) IN PLACE: ICD-10-CM

## 2019-06-12 DIAGNOSIS — I42.8 NON-ISCHEMIC CARDIOMYOPATHY (HCC): ICD-10-CM

## 2019-06-12 DIAGNOSIS — I47.29 NON-SUSTAINED VENTRICULAR TACHYCARDIA: ICD-10-CM

## 2019-06-12 PROCEDURE — 1111F DSCHRG MED/CURRENT MED MERGE: CPT | Performed by: NURSE PRACTITIONER

## 2019-06-12 PROCEDURE — 93282 PRGRMG EVAL IMPLANTABLE DFB: CPT | Performed by: INTERNAL MEDICINE

## 2019-06-12 PROCEDURE — G8427 DOCREV CUR MEDS BY ELIG CLIN: HCPCS | Performed by: NURSE PRACTITIONER

## 2019-06-12 PROCEDURE — 1036F TOBACCO NON-USER: CPT | Performed by: NURSE PRACTITIONER

## 2019-06-12 PROCEDURE — 99214 OFFICE O/P EST MOD 30 MIN: CPT | Performed by: NURSE PRACTITIONER

## 2019-06-12 PROCEDURE — G8417 CALC BMI ABV UP PARAM F/U: HCPCS | Performed by: NURSE PRACTITIONER

## 2019-06-12 RX ORDER — FUROSEMIDE 40 MG/1
40 TABLET ORAL DAILY
Qty: 60 TABLET | Refills: 3 | Status: SHIPPED | OUTPATIENT
Start: 2019-06-12 | End: 2019-08-19

## 2019-06-12 RX ORDER — POTASSIUM CHLORIDE 20 MEQ/1
40 TABLET, EXTENDED RELEASE ORAL DAILY
Qty: 60 TABLET | Refills: 3 | Status: SHIPPED | OUTPATIENT
Start: 2019-06-12 | End: 2019-07-22

## 2019-06-12 RX ORDER — METOPROLOL SUCCINATE 50 MG/1
50 TABLET, EXTENDED RELEASE ORAL 2 TIMES DAILY
Qty: 60 TABLET | Refills: 2 | Status: SHIPPED | OUTPATIENT
Start: 2019-06-12 | End: 2019-07-31 | Stop reason: SDUPTHER

## 2019-06-12 NOTE — LETTER
([facility] Chronic kidney disease, COPD (chronic obstructive pulmonary disease) ([facility] Depression, E. 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 Surgical History:   has a past surgical history that includes Foot surgery; Cholecystectomy; bronchoscopy (N/A, 5/16/2019); bronchoscopy (5/16/2019); bronchoscopy (N/A, 5/19/2019); bronchoscopy (5/19/2019); bone marrow transplant; Leg Surgery; Growth plate surgery; Achilles tendon surgery; Endoscopy, colon, diagnostic; and Cholecystectomy (4/15/2014). Social History:   reports that she has quit smoking. She smoked 0.00 packs per day for 0.00 years. She has never used smokeless tobacco. She reports that she has current or past drug history. Drug: Marijuana. She reports that she does not drink alcohol. Family History:   Family History   Problem Relation Age of Onset    Emphysema Mother     Mental Illness Mother     Substance Abuse Mother     Heart Attack Father     Mental Illness Father     Arthritis Father     Heart Disease Father     High Blood Pressure Father     High Cholesterol Father        Home Medications:  Prior to Admission medications    Medication Sig Start Date End Date Taking?  Authorizing Provider   albuterol sulfate  (90 Base) MCG/ACT inhaler  5/9/19   Historical Provider, MD   ASPIRIN LOW DOSE 81 MG chewable tablet  6/5/19   Historical Provider, MD   furosemide (LASIX) 40 MG tablet  6/5/19   Historical Provider, MD   magnesium oxide (MAG-OX) 400 (240 Mg) MG tablet  6/5/19   Historical Provider, MD   melatonin 5 MG TABS tablet  5/28/19   Historical Provider, MD   metoprolol succinate (TOPROL XL) 50 MG extended release tablet  6/5/19   Historical Provider, MD   potassium chloride (KLOR-CON M) 20 MEQ extended release tablet  6/5/19 Historical Provider, MD   venlafaxine (EFFEXOR XR) 150 MG extended release capsule  5/28/19   Historical Provider, MD   OLANZapine (ZYPREXA) 15 MG tablet  5/28/19   Historical Provider, MD   levofloxacin (LEVAQUIN) 750 MG tablet Take 1 tablet by mouth daily for 7 days 6/9/19 6/16/19  Hugh Proper, DO   HYDROcodone-acetaminophen (NORCO) 5-325 MG per tablet Take 1 tablet by mouth every 6 hours as needed for Pain for up to 10 doses. 6/9/19 6/12/19  Hugh Proper, DO   aspirin 81 MG chewable tablet Take 1 tablet by mouth daily 6/6/19   JAYLYN Victor CNP   metoprolol succinate (TOPROL XL) 50 MG extended release tablet Take 1 tablet by mouth See Admin Instructions 1/2 tab in the am, 1 tab in the PM 6/5/19   JAYLYN Victor CNP   magnesium oxide (MAG-OX) 400 (241.3 Mg) MG TABS tablet Take 1 tablet by mouth 2 times daily 6/5/19   JAYLYN Victor CNP   furosemide (LASIX) 40 MG tablet Take 1 tablet by mouth See Admin Instructions As needed for 3lbs weight gain in a day or 5lbs weight gain in a week 6/5/19   JAYLYN Victor CNP   potassium chloride (KLOR-CON M) 20 MEQ extended release tablet Take 1 tablet by mouth See Admin Instructions As needed when you take your lasix (furosemide) 6/5/19   JAYLYN Victor CNP   albuterol sulfate  (90 Base) MCG/ACT inhaler Inhale 2 puffs into the lungs 3 times daily 5/9/19   Historical Provider, MD   OLANZapine (ZYPREXA) 15 MG tablet Take 7.5 mg by mouth 3 times daily Indications: 1/2 tab every morning, 1/2 tab every evening and 1 tab at bedtime     Historical Provider, MD   melatonin 5 MG TABS tablet Take 5 mg by mouth nightly    Historical Provider, MD   gabapentin (NEURONTIN) 400 MG capsule Take 400 mg by mouth 3 times daily.     Historical Provider, MD   traZODone (DESYREL) 50 MG tablet Take by mouth nightly Indications: for insomnia, patient takes PRN when melatonin does not work, mother unsure of dose.    Historical Provider, MD   gabapentin (NEURONTIN) 600 MG tablet Take 600 mg by mouth 3 times daily. Historical Provider, MD   OLANZapine zydis (ZYPREXA) 10 MG disintegrating tablet Take 15 mg by mouth nightly. Historical Provider, MD        Allergies:  Tape [adhesive tape]     Review of Systems:   · Constitutional: there has been no unanticipated weight loss. · Eyes: No vision changes  · ENT: No Headaches, no nasal congestion. No mouth sores or sore throat. · Cardiovascular: Reviewed in HPI  · Respiratory: No cough or wheezing, no sputum production. · Gastrointestinal: No abdominal pain, no constipation or diarrhea  · Genitourinary: No dysuria, trouble voiding, or hematuria. · Musculoskeletal:  No weakness or joint complaints. · Integumentary: No rash or pruritis. · Neurological: No numbness or tingling. No weakness. No tremor. · Psychiatric: No anxiety, no depression. · Endocrine:  No excessive thirst or urination. · Hematologic/Lymphatic: No abnormal bruising or bleeding, blood clots or swollen lymph nodes. Physical Examination:    Vitals:    06/12/19 1150   BP: 100/70   Pulse: 97   SpO2: 98%   Weight: 178 lb 6.4 oz (80.9 kg)   Height: 5' (1.524 m)        Constitutional and General Appearance: no apparent distress  HEENT: non-icteric sclera, oropharynx without exudate, oral mucosa moist  Neck: JVP less than 8 cm H20  Respiratory:  · No use of accessory muscles  · Clear breath sounds throughout, no wheezing, no crackles, no rhonchi  Cardiovascular: right AICD dressing removed, steri strips intake, no hematoma, minimal edema. Small area on the right side of the incision with scant clear fluid.    · The apical impulses not displaced  · Heart tones are crisp and normal, no murmur/rub/gallop  · Regular rate and rhythm, S1,S2 normal  · Radial pulses 2+ and equal bilaterally  · No edema  · Pedal Pulses: 2+ and equal   Abdomen:  · No masses or tenderness  · Liver: No Abnormalities Noted Musculoskeletal/Skin:  · Exhibits normal gait balance and coordination  · There is no clubbing, cyanosis of the extremities  · Skin is warm and dry  · Moves all extremities well  Neurological/Psychiatric:  · Alert and oriented in all spheres  · No abnormalities of mood, affect, memory, mentation, or behavior are noted    Lab Data:  CBC:   Lab Results   Component Value Date    WBC 9.5 06/09/2019    WBC 11.1 06/03/2019    WBC 11.2 06/02/2019    RBC 4.23 06/09/2019    RBC 4.10 06/03/2019    RBC 4.14 06/02/2019    HGB 12.4 06/09/2019    HGB 12.1 06/03/2019    HGB 12.3 06/02/2019    HCT 36.8 06/09/2019    HCT 35.9 06/03/2019    HCT 36.1 06/02/2019    MCV 86.9 06/09/2019    MCV 87.7 06/03/2019    MCV 87.2 06/02/2019    RDW 12.5 06/09/2019    RDW 12.7 06/03/2019    RDW 12.8 06/02/2019     06/09/2019     06/03/2019     06/02/2019     Iron: No results found for: IRON, TIBC, FERRITIN  BMP:   Lab Results   Component Value Date     06/09/2019     06/03/2019     06/02/2019    K 3.6 06/09/2019    K 3.8 06/03/2019    K 3.3 06/02/2019    K 4.8 05/30/2019    K 2.9 05/15/2019    K 3.4 05/15/2019    CL 98 06/09/2019    CL 97 06/03/2019    CL 94 06/02/2019    CO2 26 06/09/2019    CO2 24 06/03/2019    CO2 25 06/02/2019    PHOS 4.2 06/03/2019    PHOS 4.7 06/02/2019    PHOS 4.8 06/01/2019    BUN 6 06/09/2019    BUN 14 06/03/2019    BUN 18 06/02/2019    CREATININE <0.5 06/09/2019    CREATININE <0.5 06/03/2019    CREATININE 0.7 06/02/2019     BNP:   Lab Results   Component Value Date    PROBNP 204 06/09/2019     Lipids: No results found for: CHOL     Lab Results   Component Value Date    TRIG 232 (H) 05/22/2019      No results found for: HDL   No results found for: LDLCHOLESTEROL, LDLCALC   No results found for: LABVLDL, VLDL   No results found for: CHOLHDLRATIO    EF:   Lab Results   Component Value Date    LVEF 40 05/22/2019       Recent Testing:  Echo  Echo 5/15/19   Summary 10. Try sugar free candies, ice chips, frozen grapes to help curb the dry mouth. 11. Ice chips would count toward your fluid restriction (equates to 1/2 of the volume of the cup)  12. Follow up next week       QUALITY MEASURES  1. Tobacco Cessation Counseling: NA  2. Retake of BP if >140/90:   NA  3. Documentation to PCP/referring for new patient:  Sent to PCP at close of office visit  4. CAD patient on anti-platelet: NA  5. CAD patient on STATIN therapy:  NA  6. Patient with CHF and aFib on anticoagulation:  NA     I appreciate the opportunity for caring for this patient.      Mario Quick CNP, 6/12/2019, 1:48 PM 175

## 2019-06-12 NOTE — PROGRESS NOTES
[address]   Cardiac Follow-up    Primary Care Doctor:  Alina Khan MD    Chief Complaint   Patient presents with    Follow-Up from [address] [place] [address]        History of Present Illness:   I had the pleasure of seeing Kathrine Dumont in follow up for hospital follow up. Admitted to [facility] on 5/15/19 after Cardiopulmonary arrest, Vfib arrest, acute respiratory failure, aspiration, hemodynamic shock. Transferred to [facility] on 5/29/19 for ICD consideration. ICD placed on 6/4/19, required lead revision on 6/5/19. She was treated for Heart failure as well, admission weight was 199lbs, discharge weight down to 171lbs. No history of heart issues in the past per patient. 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 She was discharged on toprol 25mg in the am and 50mg in the PM, lasix changed to PRN and magnesium 400mg once a day  She has been taking lasix daily along with potassium dose daily as she is having weight gain. Weight is up to 178lbs at home. Monitoring her diet. Drinking a lot of fluids, water and lemonade. Not having any edema. + having some chest soreness, seen in the ED on 6/9/19- labs reviewed and overall, stable. CXR reviewed and showed leads in expected position and not changed from the last CXR on 6/5/19. She had a chest CT, started on antibiotic for possible pneumonia. She continues with some intermittent quick pinching pains in the chest at times, doesn't last. Some dizziness. Sleeping is slowly improving, now sleeping on a wedge pillow. Here with her mom. Continues with some shortness of breath with talking. Kathrine Dumont describes symptoms including chest pain, dyspnea, orthopnea, fatigue but denies early saiety, syncope.      Home weights: 178lbs    Past Medical History:   has a past medical history of Arrhythmia, Arthritis, Bipolar disorder ([address] Cancer ([address] Cancer ([address] Cardiac arrest ([address] Cardiomyopathy ([address] Chronic kidney disease, COPD (chronic obstructive pulmonary disease) ([facility] Depression, E. 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 Surgical History:   has a past surgical history that includes Foot surgery; Cholecystectomy; bronchoscopy (N/A, 5/16/2019); bronchoscopy (5/16/2019); bronchoscopy (N/A, 5/19/2019); bronchoscopy (5/19/2019); bone marrow transplant; Leg Surgery; Growth plate surgery; Achilles tendon surgery; Endoscopy, colon, diagnostic; and Cholecystectomy (4/15/2014). Social History:   reports that she has quit smoking. She smoked 0.00 packs per day for 0.00 years. She has never used smokeless tobacco. She reports that she has current or past drug history. Drug: Marijuana. She reports that she does not drink alcohol. Family History:   Family History   Problem Relation Age of Onset    Emphysema Mother     Mental Illness Mother     Substance Abuse Mother     Heart Attack Father     Mental Illness Father     Arthritis Father     Heart Disease Father     High Blood Pressure Father     High Cholesterol Father        Home Medications:  Prior to Admission medications    Medication Sig Start Date End Date Taking?  Authorizing Provider   albuterol sulfate  (90 Base) MCG/ACT inhaler  5/9/19   Historical Provider, MD   ASPIRIN LOW DOSE 81 MG chewable tablet  6/5/19   Historical Provider, MD   furosemide (LASIX) 40 MG tablet  6/5/19   Historical Provider, MD   magnesium oxide (MAG-OX) 400 (240 Mg) MG tablet  6/5/19   Historical Provider, MD   melatonin 5 MG TABS tablet  5/28/19   Historical Provider, MD   metoprolol succinate (TOPROL XL) 50 MG extended release tablet  6/5/19   Historical Provider, MD   potassium chloride (KLOR-CON M) 20 MEQ extended release tablet  6/5/19   Historical Provider, MD   venlafaxine (EFFEXOR XR) 150 MG extended release capsule  5/28/19   Historical Provider, MD   OLANZapine (ZYPREXA) 15 MG tablet  5/28/19   Historical Provider, MD   levofloxacin (LEVAQUIN) 750 MG tablet Take 1 tablet by mouth daily for 7 days 6/9/19 6/16/19  Natasha Garces DO   HYDROcodone-acetaminophen (NORCO) 5-325 MG per tablet Take 1 tablet by mouth every 6 hours as needed for Pain for up to 10 doses. 6/9/19 6/12/19  Natasha Garces DO   aspirin 81 MG chewable tablet Take 1 tablet by mouth daily 6/6/19   JAYLYN Reagan CNP   metoprolol succinate (TOPROL XL) 50 MG extended release tablet Take 1 tablet by mouth See Admin Instructions 1/2 tab in the am, 1 tab in the PM 6/5/19   JAYLYN Reagan CNP   magnesium oxide (MAG-OX) 400 (241.3 Mg) MG TABS tablet Take 1 tablet by mouth 2 times daily 6/5/19   JAYLYN Reagan CNP   furosemide (LASIX) 40 MG tablet Take 1 tablet by mouth See Admin Instructions As needed for 3lbs weight gain in a day or 5lbs weight gain in a week 6/5/19   JAYLYN Reagan CNP   potassium chloride (KLOR-CON M) 20 MEQ extended release tablet Take 1 tablet by mouth See Admin Instructions As needed when you take your lasix (furosemide) 6/5/19   JAYLYN Reagan CNP   albuterol sulfate  (90 Base) MCG/ACT inhaler Inhale 2 puffs into the lungs 3 times daily 5/9/19   Historical Provider, MD   OLANZapine (ZYPREXA) 15 MG tablet Take 7.5 mg by mouth 3 times daily Indications: 1/2 tab every morning, 1/2 tab every evening and 1 tab at bedtime     Historical Provider, MD   melatonin 5 MG TABS tablet Take 5 mg by mouth nightly    Historical Provider, MD   gabapentin (NEURONTIN) 400 MG capsule Take 400 mg by mouth 3 times daily. Historical Provider, MD   traZODone (DESYREL) 50 MG tablet Take by mouth nightly Indications: for insomnia, patient takes PRN when melatonin does not work, mother unsure of dose.     Historical Provider, MD   gabapentin (NEURONTIN) 600 MG tablet Take 600 mg by mouth 3 times daily. Historical Provider, MD   OLANZapine zydis (ZYPREXA) 10 MG disintegrating tablet Take 15 mg by mouth nightly. Historical Provider, MD        Allergies:  Tape [adhesive tape]     Review of Systems:   · Constitutional: there has been no unanticipated weight loss. · Eyes: No vision changes  · ENT: No Headaches, no nasal congestion. No mouth sores or sore throat. · Cardiovascular: Reviewed in HPI  · Respiratory: No cough or wheezing, no sputum production. · Gastrointestinal: No abdominal pain, no constipation or diarrhea  · Genitourinary: No dysuria, trouble voiding, or hematuria. · Musculoskeletal:  No weakness or joint complaints. · Integumentary: No rash or pruritis. · Neurological: No numbness or tingling. No weakness. No tremor. · Psychiatric: No anxiety, no depression. · Endocrine:  No excessive thirst or urination. · Hematologic/Lymphatic: No abnormal bruising or bleeding, blood clots or swollen lymph nodes. Physical Examination:    Vitals:    06/12/19 1150   BP: 100/70   Pulse: 97   SpO2: 98%   Weight: 178 lb 6.4 oz (80.9 kg)   Height: 5' (1.524 m)        Constitutional and General Appearance: no apparent distress  HEENT: non-icteric sclera, oropharynx without exudate, oral mucosa moist  Neck: JVP less than 8 cm H20  Respiratory:  · No use of accessory muscles  · Clear breath sounds throughout, no wheezing, no crackles, no rhonchi  Cardiovascular: right AICD dressing removed, steri strips intake, no hematoma, minimal edema. Small area on the right side of the incision with scant clear fluid.    · The apical impulses not displaced  · Heart tones are crisp and normal, no murmur/rub/gallop  · Regular rate and rhythm, S1,S2 normal  · Radial pulses 2+ and equal bilaterally  · No edema  · Pedal Pulses: 2+ and equal   Abdomen:  · No masses or tenderness  · Liver: No Abnormalities Noted  Musculoskeletal/Skin:  · Exhibits normal gait balance and coordination  · There is no clubbing, cyanosis of the extremities  · Skin is warm and dry  · Moves all extremities well  Neurological/Psychiatric:  · Alert and oriented in all spheres  · No abnormalities of mood, affect, memory, mentation, or behavior are noted    Lab Data:  CBC:   Lab Results   Component Value Date    WBC 9.5 06/09/2019    WBC 11.1 06/03/2019    WBC 11.2 06/02/2019    RBC 4.23 06/09/2019    RBC 4.10 06/03/2019    RBC 4.14 06/02/2019    HGB 12.4 06/09/2019    HGB 12.1 06/03/2019    HGB 12.3 06/02/2019    HCT 36.8 06/09/2019    HCT 35.9 06/03/2019    HCT 36.1 06/02/2019    MCV 86.9 06/09/2019    MCV 87.7 06/03/2019    MCV 87.2 06/02/2019    RDW 12.5 06/09/2019    RDW 12.7 06/03/2019    RDW 12.8 06/02/2019     06/09/2019     06/03/2019     06/02/2019     Iron: No results found for: IRON, TIBC, FERRITIN  BMP:   Lab Results   Component Value Date     06/09/2019     06/03/2019     06/02/2019    K 3.6 06/09/2019    K 3.8 06/03/2019    K 3.3 06/02/2019    K 4.8 05/30/2019    K 2.9 05/15/2019    K 3.4 05/15/2019    CL 98 06/09/2019    CL 97 06/03/2019    CL 94 06/02/2019    CO2 26 06/09/2019    CO2 24 06/03/2019    CO2 25 06/02/2019    PHOS 4.2 06/03/2019    PHOS 4.7 06/02/2019    PHOS 4.8 06/01/2019    BUN 6 06/09/2019    BUN 14 06/03/2019    BUN 18 06/02/2019    CREATININE <0.5 06/09/2019    CREATININE <0.5 06/03/2019    CREATININE 0.7 06/02/2019     BNP:   Lab Results   Component Value Date    PROBNP 204 06/09/2019     Lipids: No results found for: CHOL     Lab Results   Component Value Date    TRIG 232 (H) 05/22/2019      No results found for: HDL   No results found for: LDLCHOLESTEROL, LDLCALC   No results found for: LABVLDL, VLDL   No results found for: CHOLHDLRATIO    EF:   Lab Results   Component Value Date    LVEF 40 05/22/2019       Recent Testing:  Echo  Echo 5/15/19   Summary   Definity contrast administered.   Left ventricular systolic function is reduced with ejection fraction   estimated at 25-30 %.   Elevated left ventricular diastolic filling pressure: Septal E/e'' = 12.6   (for sinus tachycardia) .   Right ventricular systolic function is moderately reduced .   Mild mitral regurgitation.   Unable to obtain SPAP due to lack of tricuspid regurgitation.     Echo 5/22/19   Summary   This is a limited study for EF follow up.   Left ventricular systolic function is reduced with ejection fraction   estimated at 40 %.   There is mild to moderate global hypokinesis present.   There is mild concentric left ventricular hypertrophy. 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 Non-ischemic cardiomyopathy ([address]    2. ICD (implantable cardioverter-defibrillator) in place    3. Electrolyte disturbance    4. Non-sustained ventricular tachycardia ([address]      Plan:   1. Adjust magnesium 1 tab twice a day  2. Repeat labs next week, BMP and magnesium by next tuesday  3. Continue lasix 40mg daily as you are taking, Okay to take an extra 20mg of the lasix if your weight goes up 3lbs in a day or 5lbs in a week; may need to go up on the lasix, but need to check electrolytes 1st as she has had 2 episodes of NSVT on device check since discharge. 4. Adjust potassium to 40 meq a day (okay to take 2 tabs of the 20meq a day)  5. Increase metoprolol to 50mg twice a day  6. Continue daily weights- and record  7. If your weight goes above 180lbs - call the office for further instructions  8. [employer] 2Liters or 64 ounces of fluid a day   9. Low sodium diet  10. Try sugar free candies, ice chips, frozen grapes to help curb the dry mouth.     11. Ice chips would count toward your fluid restriction (equates to 1/2 of the volume of the cup)  12. Follow up next week       QUALITY MEASURES  1. Tobacco Cessation Counseling: NA  2. Retake of BP if >140/90:   NA  3. Documentation to PCP/referring for new patient:  Sent to PCP at close of office visit  4. CAD patient on anti-platelet: NA  5. CAD patient on STATIN therapy:  NA  6. Patient with CHF and aFib on anticoagulation:  NA     I appreciate the opportunity for caring for this patient.      Maria Corona, CNP, 6/12/2019, 1:48 PM

## 2019-06-12 NOTE — PATIENT INSTRUCTIONS
1. Adjust magnesium 1 tab twice a day  2. Repeat labs next week, BMP and magnesium by next tuesday  3. Continue lasix 40mg daily as you are taking, Okay to take an extra 20mg of the lasix if your weight goes up 3lbs in a day or 5lbs in a week  4. Adjust potassium to 40 meq a day (okay to take 2 tabs of the 20meq a day)  5. Increase metoprolol to 50mg twice a day  6. Continue daily weights- and record  7. If your weight goes above 180lbs - call the office for further instructions  8. Target 2Liters or 64 ounces of fluid a day   9. Low sodium diet  10. Try sugar free candies, ice chips, frozen grapes to help curb the dry mouth. 11. Ice chips would count toward your fluid restriction (equates to 1/2 of the volume of the cup)  12.  Follow up next week

## 2019-06-13 ENCOUNTER — TELEPHONE (OUTPATIENT)
Dept: CARDIOLOGY CLINIC | Age: 34
End: 2019-06-13

## 2019-06-13 ENCOUNTER — TELEPHONE (OUTPATIENT)
Dept: OTHER | Facility: CLINIC | Age: 34
End: 2019-06-13

## 2019-06-13 ENCOUNTER — HOSPITAL ENCOUNTER (EMERGENCY)
Age: 34
Discharge: HOME | End: 2019-06-13
Payer: MEDICAID

## 2019-06-13 VITALS
HEIGHT: 60 IN | HEART RATE: 79 BPM | BODY MASS INDEX: 33.38 KG/M2 | WEIGHT: 170 LBS | RESPIRATION RATE: 16 BRPM | TEMPERATURE: 98 F | DIASTOLIC BLOOD PRESSURE: 76 MMHG | OXYGEN SATURATION: 99 % | SYSTOLIC BLOOD PRESSURE: 105 MMHG

## 2019-06-13 DIAGNOSIS — Z48.89 ENCOUNTER FOR POSTOPERATIVE WOUND CHECK: Primary | ICD-10-CM

## 2019-06-13 DIAGNOSIS — M54.50 LUMBAR BACK PAIN: ICD-10-CM

## 2019-06-13 PROCEDURE — 99283 EMERGENCY DEPT VISIT LOW MDM: CPT

## 2019-06-13 PROCEDURE — 96372 THER/PROPH/DIAG INJ SC/IM: CPT

## 2019-06-13 PROCEDURE — 6370000000 HC RX 637 (ALT 250 FOR IP): Performed by: NURSE PRACTITIONER

## 2019-06-13 PROCEDURE — 97162 PT EVAL MOD COMPLEX 30 MIN: CPT

## 2019-06-13 PROCEDURE — 97112 NEUROMUSCULAR REEDUCATION: CPT

## 2019-06-13 PROCEDURE — 6360000002 HC RX W HCPCS: Performed by: NURSE PRACTITIONER

## 2019-06-13 RX ORDER — PREDNISONE 20 MG/1
60 TABLET ORAL ONCE
Status: COMPLETED | OUTPATIENT
Start: 2019-06-13 | End: 2019-06-13

## 2019-06-13 RX ORDER — KETOROLAC TROMETHAMINE 30 MG/ML
30 INJECTION, SOLUTION INTRAMUSCULAR; INTRAVENOUS ONCE
Status: COMPLETED | OUTPATIENT
Start: 2019-06-13 | End: 2019-06-13

## 2019-06-13 RX ORDER — HYDROCODONE BITARTRATE AND ACETAMINOPHEN 7.5; 325 MG/1; MG/1
1 TABLET ORAL ONCE
Status: COMPLETED | OUTPATIENT
Start: 2019-06-13 | End: 2019-06-13

## 2019-06-13 RX ORDER — ORPHENADRINE CITRATE 30 MG/ML
60 INJECTION INTRAMUSCULAR; INTRAVENOUS ONCE
Status: COMPLETED | OUTPATIENT
Start: 2019-06-13 | End: 2019-06-13

## 2019-06-13 RX ORDER — METHOCARBAMOL 500 MG/1
500 TABLET, FILM COATED ORAL 3 TIMES DAILY PRN
Qty: 20 TABLET | Refills: 0 | Status: SHIPPED | OUTPATIENT
Start: 2019-06-13 | End: 2019-06-23

## 2019-06-13 RX ADMIN — ORPHENADRINE CITRATE 60 MG: 30 INJECTION INTRAMUSCULAR; INTRAVENOUS at 14:02

## 2019-06-13 RX ADMIN — KETOROLAC TROMETHAMINE 30 MG: 30 INJECTION, SOLUTION INTRAMUSCULAR at 14:02

## 2019-06-13 RX ADMIN — HYDROCODONE BITARTRATE AND ACETAMINOPHEN 1 TABLET: 7.5; 325 TABLET ORAL at 14:02

## 2019-06-13 RX ADMIN — PREDNISONE 60 MG: 20 TABLET ORAL at 14:02

## 2019-06-13 ASSESSMENT — PAIN SCALES - GENERAL
PAINLEVEL_OUTOF10: 9
PAINLEVEL_OUTOF10: 8
PAINLEVEL_OUTOF10: 8

## 2019-06-13 NOTE — TELEPHONE ENCOUNTER
Writer contacted [phone], APRN - CNP, ED provider to inform of 30 day readmission risk. ED provider informed writer of intention to discharge. Patient already has scheduled appointment for 6/19.

## 2019-06-13 NOTE — ED NOTES
Pt scripts x1 instructed to follow up with PCP/PT. Assessed per Sara Selby NP.      Herman Barthel, YAMIL  [id number]

## 2019-06-13 NOTE — ED PROVIDER NOTES
[facility] Emergency Department    CHIEF COMPLAINT  Wound Check (Bandage from defib placement has some dried blood after wound check at clinic yesterday. Wants to make sure wound is not leaking and need redressed)      HISTORY OF PRESENT ILLNESS  Melany Gordon is a 26 y.o. female who presents to the ED complaining of wound check and low back pain. Patient had a pacemaker and defibrillator surgically inserted over a week ago. Patient was in the clinic yesterday to have her surgical incision checked and her dressing changed. Patient reports no issues during visit yesterday. Patient reports she noticed her new dressing was saturated with dark red blood this morning. Patient is also complaining of bilateral low back pain. Patient denies radiation of pain to her buttocks or legs. Patient denies known injury or trauma. Patient has no known history of back injuries, surgeries, trauma. Patient denies numbness, tingling, saddle anesthesia, bowel or bladder incontinence. Patient denies fever or IV drug abuse. Patient rates her pain a 9 out of 10. No other complaints, modifying factors or associated symptoms. Nursing notes reviewed.    Past Medical History:   Diagnosis Date    Acute on chronic systolic congestive heart failure (HCC)     Arrhythmia     Arthritis     Bipolar disorder (HCC)     Cancer (HCC)     rhabdomyosarcoma    Cancer ([address]     Cardiac arrest ([address] 05/2019    VF    Cardiomyopathy ([address] 05/2019    EF= 40% with global hypokinesis    Chronic kidney disease     COPD (chronic obstructive pulmonary disease) (HCC)     Depression     E. 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[address]     Scoliosis     Smoker     Tachycardia     VF (ventricular fibrillation) ([address] Past Surgical History:   Procedure Laterality Date    ACHILLES TENDON SURGERY      BONE MARROW TRANSPLANT      BRONCHOSCOPY N/A 5/16/2019    BRONCHOSCOPY ALVEOLAR LAVAGE performed by Ben Ghotra MD at [facility]  5/16/2019    BRONCHOSCOPY THERAPUTIC ASPIRATION INITIAL performed by Ben Ghotra MD at [facility] N/A 5/19/2019    BRONCHOSCOPY ALVEOLAR LAVAGE performed by Ben Ghotra MD at [place] [address]  5/19/2019    BRONCHOSCOPY THERAPUTIC ASPIRATION INITIAL performed by Ben Ghotra MD at [address] [place] [address]  4/15/2014    Laparascopic    ENDOSCOPY, COLON, DIAGNOSTIC      FOOT SURGERY      GROWTH PLATE SURGERY      LEG SURGERY       Family History   Problem Relation Age of Onset    Emphysema Mother     Mental Illness Mother     Substance Abuse Mother     Heart Attack Father     Mental Illness Father     Arthritis Father     Heart Disease Father     High Blood Pressure Father     High Cholesterol Father      Social History     Socioeconomic History    Marital status: Legally      Spouse name: Not on file    Number of children: Not on file    Years of education: Not on file    Highest education level: Not on file   Occupational History    Not on file   Social Needs    Financial resource strain: Not on file    Food insecurity:     Worry: Not on file     Inability: Not on file    Transportation needs:     Medical: Not on file     Non-medical: Not on file   Tobacco Use    Smoking status: Former Smoker     Packs/day: 0.00     Years: 0.00     Pack years: 0.00    Smokeless tobacco: Never Used   Substance and Sexual Activity    Alcohol use: No     Comment: none for years    Drug use: Not Currently     Types: Marijuana    Sexual activity: Yes     Partners: Male   Lifestyle    Physical activity:     Days per week: Not on file     Minutes per session: Not on file    Stress: Not on file   Relationships    Social connections:     Talks on phone: Not on file     Gets together: Not on file     Attends Mosque service: Not on file     Active member of club or organization: Not on file     Attends meetings of clubs or organizations: Not on file     Relationship status: Not on file    Intimate partner violence:     Fear of current or ex partner: Not on file     Emotionally abused: Not on file     Physically abused: Not on file     Forced sexual activity: Not on file   Other Topics Concern    Not on file   Social History Narrative    ** Merged History Encounter **          No current facility-administered medications for this encounter.       Current Outpatient Medications   Medication Sig Dispense Refill    methocarbamol (ROBAXIN) 500 MG tablet Take 1 tablet by mouth 3 times daily as needed (pain) 20 tablet 0    furosemide (LASIX) 40 MG tablet Take 1 tablet by mouth daily Okay to take an extra 20mg As needed for 3lbs weight gain in a day or 5lbs weight gain in a week 60 tablet 3    potassium chloride (KLOR-CON M) 20 MEQ extended release tablet Take 2 tablets by mouth daily 60 tablet 3    metoprolol succinate (TOPROL XL) 50 MG extended release tablet Take 1 tablet by mouth 2 times daily 60 tablet 2    magnesium oxide (MAG-OX) 400 (241.3 Mg) MG TABS tablet Take 1 tablet by mouth 2 times daily 60 tablet 3    venlafaxine (EFFEXOR XR) 150 MG extended release capsule       levofloxacin (LEVAQUIN) 750 MG tablet Take 1 tablet by mouth daily for 7 days 7 tablet 0    aspirin 81 MG chewable tablet Take 1 tablet by mouth daily 30 tablet 3    albuterol sulfate  (90 Base) MCG/ACT inhaler Inhale 2 puffs into the lungs 3 times daily      OLANZapine (ZYPREXA) 15 MG tablet Take 7.5 mg by mouth 3 times daily Indications: 1/2 tab every morning, 1/2 tab every evening and 1 tab at bedtime       melatonin 5 MG TABS tablet Take 5 mg by mouth nightly      gabapentin (NEURONTIN) 400 MG capsule Take 400 mg by mouth 3 times daily.  traZODone (DESYREL) 50 MG tablet Take by mouth nightly Indications: for insomnia, patient takes PRN when melatonin does not work, mother unsure of dose.  gabapentin (NEURONTIN) 600 MG tablet Take 600 mg by mouth 3 times daily. Allergies   Allergen Reactions    Tape [place] Tape]        REVIEW OF SYSTEMS  6 systems reviewed, pertinent positives per HPI otherwise noted to be negative    PHYSICAL EXAM  /76   Pulse 79   Temp 98 °F (36.7 °C) (Oral)   Resp 16   Ht 5' (1.524 m)   Wt 170 lb (77.1 kg)   LMP 04/01/2019   SpO2 99%   BMI 33.20 kg/m²   GENERAL APPEARANCE: Awake and alert. Cooperative. No acute distress. HEAD: Normocephalic. Atraumatic. EYES: PERRL. EOM's grossly intact. ENT: Mucous membranes are moist.   NECK: Supple. Normal ROM. CHEST: Equal symmetric chest rise. LUNGS: Breathing is unlabored. Speaking comfortably in full sentences. Abdomen: Nondistended  EXTREMITIES: MAEE. No acute deformities. SKIN: Warm and dry. Healing surgical incision to the right chest wall with ecchymosis around incision. No drainage or bleeding from incision site. NEUROLOGICAL: Alert and oriented. Strength is 5/5 in all extremities and sensation is intact. BACK: On exam of cervical, thoracic, lumbar spine, there is no swelling, bruising, or color change noted. There is no midline bony tenderness, without crepitus, deformity, or step off. Patient exhibits tenderness of paraspinal musculature to the right and left of midline of the lumbar region. There is no point tenderness over the SI Joint. Patellar reflexes +2. Distal pulses intact. Cap refill < 2 seconds. Sensation intact. Neurovascularly intact. HSNE spine intact. ED COURSE  I have independently evaluated this patient per my scope of practice. My supervising physician was in the department as needed for consultation.      Patient presents emergency department for evaluation of wound check and low back pain. Wound care performed. Dressing change. No active bleeding or drainage. No signs of infection. Patient given Toradol, Norflex, Norco, prednisone with good relief of back pain. Patient evaluated by physical therapist in the emergency department. Patient given referral for follow-up for physical therapy. Strict return precautions discussed. Patient instructed to return to the emergency department with no worsening symptoms. Patient is agreeable with plan of care and denies any questions at this time. Patient was sent home with a prescription for Robaxin. MDM    No results found for this visit on 06/13/19. I estimate there is LOW risk for ABDOMINAL AORTIC ANEURYSM, CAUDA EQUINA SYNDROME, EPIDURAL MASS LESION, SPINAL STENOSIS, OR HERNIATED DISK CAUSING SEVERE STENOSIS, thus I consider the discharge disposition reasonable. Neelam Hubbard and I have discussed the diagnosis and risks, and we agree with discharging home to follow-up with their primary doctor. We also discussed returning to the Emergency Department immediately if new or worsening symptoms occur. We have discussed the symptoms which are most concerning (e.g., saddle anesthesia, urinary or bowel incontinence or retention, changing or worsening pain) that necessitate immediate return. Final Impression    1. Encounter for postoperative wound check    2. Lumbar back pain        Blood pressure 105/76, pulse 79, temperature 98 °F (36.7 °C), temperature source Oral, resp. rate 16, height 5' (1.524 m), weight 170 lb (77.1 kg), last menstrual period 04/01/2019, SpO2 99 %. DISPOSITION  Patient was discharged to home in good condition.        Kyra Mae, APRN - CNP  06/13/19 [id number]

## 2019-06-13 NOTE — PROGRESS NOTES
Postop check, steri strips removed. Incision with dried blood on edges of incision and sero-sang drainage noted. The incision is not completely healed. Mild swelling noted. No redness noted. Steri strips reapplied/reinforced and tegaderm applied over site. Postop care/restrictions and carelink reviewed. She will  Set up  Monitor when she gets home. V/U after intructions given. Patient comes in for programming evaluation for her icd. All sensing and pacing parameters are within normal range. No changes need to be made at this time. Please see interrogation for more detail. IMPLANT 6/3/19 by JAMAAL and RV lead repo 6/4/19.  2 brief NSVT. Patient to see Perlita today. Patient will follow up in. 1 week for incision check w/ NPRB. See PACEART report under Cardiology tab.

## 2019-06-13 NOTE — TELEPHONE ENCOUNTER
Patients mother calling back. She is very upset, she states her daughter is in unbearable pain in her back. The mother seems to be convinced it is the meds that cardiology has her on. She states the bleeding started shortly after her daughter was here yesterday for a site check. From the notes it states we changed the steri strips and placed tegaderm over the site. Patient and her mother are in the [facility] ED currently.

## 2019-06-13 NOTE — ED NOTES
increased muscle tone   []   Yes   [x]   No   []   NT  Location:   Ligament tenderness/provocation:   []   Yes   [x]   No   []   NT  Location:      Gait/Steps/Balance       Deviations on a level linoleum surface include antalgic sequence, WBOS, decreased stride length, decreased foot clearance    [x]  All balance WFL unless otherwise noted below:  Static/ Dynamic Sitting:  Static/Dynamic Standing:    Quick Tests/Functional Myotome Tests:     Heel Walk (L4): []   Able to perform WNL       []   Unable to perform         [x]   NT    Toe Walk (S1):  []   Able to perform WNL     []   Unable to perform     [x]   NT    SI Special Tests     SI Special Test Findings   Standing Landmarks []  Iliac Crests Equal   [x] R High  [] L High  []  PSIS Equal   []  R High  []  L High     Standing Flexion Test []   WFL     []   Positive R []   Positive L   Seated Landmarks [x]  Iliac Crests Equal   []  R High   [] L High  []  PSIS Equal   []  R High  []  L High     Seated Flexion Test []   WFL     []   Positive R []   Positive L   Sit up Test/Long sit test []   NEG     [x]   Positive - Comment below: LLE shorter     Sacral Sulci Test [x]   [facility]     []   Positive R []   Positive L   SI distraction [x]   NEG      []   Positive R []   Positive L     SI compression [x]   NEG     []   Positive R []   Positive L   Sacral thrust tests [x]   NEG      []   Positive R []   Positive L   Prone knee bend test [x]   NEG     []   Positive     []   NT  Comments:     Lumbar Special Tests     Lumbar Special Test Findings   Straight Leg Raise [x]   NEG    []   Positive R []   Positive L   Crams []   NEG    []   Positive R []   Positive L   Dural Tension/Slump test []   NEG    []   Positive R []   Positive L   Distraction []   NEG    []   Inc pain []   Dec pain   Compression []   NEG    []   Inc pain []   Dec pain     Lumbar Range of Motion/Strength Testing      [x] All WNL except as marked below  ROM (*denotes pain) AROM PROM COMMENTS   Flexion 75%  Slow, minutes    Total Treatment Time:  [address] #[id number]

## 2019-06-14 ENCOUNTER — PROCEDURE VISIT (OUTPATIENT)
Dept: CARDIOLOGY CLINIC | Age: 34
End: 2019-06-14

## 2019-06-14 DIAGNOSIS — Z95.810 ICD (IMPLANTABLE CARDIOVERTER-DEFIBRILLATOR) IN PLACE: ICD-10-CM

## 2019-06-14 NOTE — LETTER
[address] [facility] [phone]  [address] [facility]  [address] [place] [address] [address] [facility] [phone]    Pacemaker/Defibrillator Clinic          06/14/19        Wyatt Mortensen  [address]         Dear Wyatt Catchguanakito    This letter is to inform you that we received the transmission from your monitor at home that checks your pacemaker and/or defibrillator, or implanted heart monitor. The next date your monitor will automatically transmit will be 12/17/19. Your device and monitor are wireless and most transmit cellularly, but please periodically check your monitor is still plugged in to the electrical outlet. If you still use the telephone land line to send please ensure the connection to the phone nallely is secure. This will help to ensure successful automatic transmissions in the future. Also, the monitor needs to be close to you while sleeping at night. Please be aware that the remote device transmission sites are periodically monitored only during regular business hours during which simultaneous in-office device clinics are being run. If your transmission requires attention, we will contact you as soon as possible. Thank you.             [place]

## 2019-06-14 NOTE — TELEPHONE ENCOUNTER
Agree, ER didn't notify us she was there. she is coming in next Wednesday for another incision check.

## 2019-06-17 LAB
FUNGUS (MYCOLOGY) CULTURE: ABNORMAL
FUNGUS (MYCOLOGY) CULTURE: ABNORMAL
FUNGUS (MYCOLOGY) CULTURE: NORMAL
FUNGUS STAIN: ABNORMAL
FUNGUS STAIN: NORMAL
ORGANISM: ABNORMAL

## 2019-06-19 ENCOUNTER — OFFICE VISIT (OUTPATIENT)
Dept: CARDIOLOGY CLINIC | Age: 34
End: 2019-06-19
Payer: MEDICAID

## 2019-06-19 VITALS
HEART RATE: 93 BPM | DIASTOLIC BLOOD PRESSURE: 70 MMHG | HEIGHT: 60 IN | OXYGEN SATURATION: 97 % | SYSTOLIC BLOOD PRESSURE: 100 MMHG | BODY MASS INDEX: 34.75 KG/M2 | WEIGHT: 177 LBS

## 2019-06-19 DIAGNOSIS — I47.29 NON-SUSTAINED VENTRICULAR TACHYCARDIA: ICD-10-CM

## 2019-06-19 DIAGNOSIS — Z95.810 ICD (IMPLANTABLE CARDIOVERTER-DEFIBRILLATOR) IN PLACE: ICD-10-CM

## 2019-06-19 DIAGNOSIS — E87.8 ELECTROLYTE DISTURBANCE: ICD-10-CM

## 2019-06-19 DIAGNOSIS — I42.8 NON-ISCHEMIC CARDIOMYOPATHY (HCC): Primary | ICD-10-CM

## 2019-06-19 PROCEDURE — 1111F DSCHRG MED/CURRENT MED MERGE: CPT | Performed by: NURSE PRACTITIONER

## 2019-06-19 PROCEDURE — G8427 DOCREV CUR MEDS BY ELIG CLIN: HCPCS | Performed by: NURSE PRACTITIONER

## 2019-06-19 PROCEDURE — 1036F TOBACCO NON-USER: CPT | Performed by: NURSE PRACTITIONER

## 2019-06-19 PROCEDURE — G8417 CALC BMI ABV UP PARAM F/U: HCPCS | Performed by: NURSE PRACTITIONER

## 2019-06-19 PROCEDURE — 99214 OFFICE O/P EST MOD 30 MIN: CPT | Performed by: NURSE PRACTITIONER

## 2019-06-19 NOTE — PROGRESS NOTES
[facility]   Cardiac Follow-up    Primary Care Doctor:  Ghulam Waddell, RN, NP    Chief Complaint   Patient presents with    Follow-up        History of Present Illness:   I had the pleasure of seeing Herbert Judge in follow up for cardiomyopathy. Admitted to [facility] on 5/15/19 after Cardiopulmonary arrest, Vfib arrest, acute respiratory failure, aspiration, hemodynamic shock. Transferred to [facility] on 5/29/19 for ICD consideration. ICD placed on 6/4/19, required lead revision on 6/5/19. She was treated for Heart failure as well, admission weight was 199lbs, discharge weight down to 171lbs. No history of heart issues in the past per patient. 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 She was discharged on toprol 25mg in the am and 50mg in the PM, lasix changed to PRN and magnesium 400mg once a day    Since last visit, seen in the ED on 6/13/19 for bleeding at incision site. Chest pain is improving. She is doing better. Continues with some shortness of breath with minimal exertion  Some edema in the left foot. Not walking much. Has had issues with balance for some time. Frustrated with her memory since the hospitalization. She is trying to cut back on the fluids; she is tracking fluids by using the 2L bottle method. She is checking her weight. Shortness of breath with talking at times- about the same. Here with her mom. Sleeping better. Home weight 176lbs    Herbert Judge describes symptoms including dyspnea, fatigue but denies chest pain, palpitations, syncope.      Past Medical History:   has a past medical history of Acute on chronic systolic congestive heart failure ([address] Arrhythmia, Arthritis, Bipolar disorder ([address] Cancer ([address] Cancer ([address] Cardiac arrest ([address] Cardiomyopathy ([address] Chronic kidney disease, COPD (chronic obstructive pulmonary disease) ([address] Depression, E. coli infection, Family history of early CAD, Hepatitis C, Hepatitis C antibody positive in blood, Hyperlipidemia, Paranoia (psychosis) ([address] Pneumonia due to infectious organism, Rhabdomyosarcoma ([address] Scoliosis, Smoker, Tachycardia, and VF (ventricular fibrillation) ([address] Surgical History:   has a past surgical history that includes Foot surgery; Cholecystectomy; bronchoscopy (N/A, 5/16/2019); bronchoscopy (5/16/2019); bronchoscopy (N/A, 5/19/2019); bronchoscopy (5/19/2019); bone marrow transplant; Leg Surgery; Growth plate surgery; Achilles tendon surgery; Endoscopy, colon, diagnostic; and Cholecystectomy (4/15/2014). Social History:   reports that she has quit smoking. She smoked 0.00 packs per day for 0.00 years. She has never used smokeless tobacco. She reports that she has current or past drug history. Drug: Marijuana. She reports that she does not drink alcohol. Family History:   Family History   Problem Relation Age of Onset    Emphysema Mother     Mental Illness Mother     Substance Abuse Mother     Heart Attack Father     Mental Illness Father     Arthritis Father     Heart Disease Father     High Blood Pressure Father     High Cholesterol Father        Home Medications:  Prior to Admission medications    Medication Sig Start Date End Date Taking?  Authorizing Provider   methocarbamol (ROBAXIN) 500 MG tablet Take 1 tablet by mouth 3 times daily as needed (pain) 6/13/19 6/23/19 Yes JAYLYN Ward CNP   furosemide (LASIX) 40 MG tablet Take 1 tablet by mouth daily Okay to take an extra 20mg As needed for 3lbs weight gain in a day or 5lbs weight gain in a week 6/12/19  Yes JAYLYN Hutchinson CNP   potassium chloride (KLOR-CON M) 20 MEQ extended release tablet Take 2 tablets by mouth daily 6/12/19  Yes JAYLYN Hutchinson CNP   metoprolol succinate (TOPROL XL) 50 MG extended release tablet Take 1 tablet by mouth 2 times daily 6/12/19  Yes JAYLYN Hutchinson CNP   magnesium oxide (MAG-OX) 400 (241.3 Mg) MG TABS tablet Take 1 tablet by mouth 2 times daily 6/12/19  Yes JAYLYN Ayala CNP   venlafaxine (EFFEXOR XR) 150 MG extended release capsule  5/28/19  Yes Historical Provider, MD   aspirin 81 MG chewable tablet Take 1 tablet by mouth daily 6/6/19  Yes JAYLYN Ayala CNP   albuterol sulfate  (90 Base) MCG/ACT inhaler Inhale 2 puffs into the lungs 3 times daily 5/9/19  Yes Historical Provider, MD   OLANZapine (ZYPREXA) 15 MG tablet Take 7.5 mg by mouth 3 times daily Indications: 1/2 tab every morning, 1/2 tab every evening and 1 tab at bedtime    Yes Historical Provider, MD   melatonin 5 MG TABS tablet Take 5 mg by mouth nightly   Yes Historical Provider, MD   gabapentin (NEURONTIN) 400 MG capsule Take 400 mg by mouth 3 times daily. Yes Historical Provider, MD   traZODone (DESYREL) 50 MG tablet Take by mouth nightly Indications: for insomnia, patient takes PRN when melatonin does not work, mother unsure of dose. Yes Historical Provider, MD   gabapentin (NEURONTIN) 600 MG tablet Take 600 mg by mouth 3 times daily. Historical Provider, MD        Allergies:  Tape [adhesive tape]     Review of Systems:   · Constitutional: there has been no unanticipated weight loss. · Eyes: No vision changes  · ENT: No Headaches, no nasal congestion. No mouth sores or sore throat. · Cardiovascular: Reviewed in HPI  · Respiratory: No cough or wheezing, no sputum production. · Gastrointestinal: No abdominal pain, no constipation or diarrhea  · Genitourinary: No dysuria, trouble voiding, or hematuria. · Musculoskeletal:  No weakness or joint complaints. · Integumentary: No rash or pruritis. · Neurological: No numbness or tingling. No weakness. No tremor. · Psychiatric: No anxiety, no depression. · Endocrine:  No excessive thirst or urination. · Hematologic/Lymphatic: No abnormal bruising or bleeding, blood clots or swollen lymph nodes.     Physical Examination:    Vitals:    06/19/19 1120   BP: 100/70   Pulse: 93   SpO2: 97%   Weight: 177 lb lasix 20mg daily for now; if your weight goes above 179lbs increase lasix back up to 40mg daily until your weight is back down to 176lbs  4. Start Entresto 1/2 tab daily for 3 days and then increase to 1/2 tab twice a day for heart failure. Discussed need for lab monitoring, and the box warning related to pregnancy- she verbalized understanding. 5. Start walking about 3-5 minutes a day  6. Continue potassium 40 meq a day for now and will monitor potassium level through your weekly labs  7. Continue metoprolol 50mg twice a day  8. Continue daily weights- and record  9. If your weight goes above 180lbs - call the office for further instructions  10. Target 2Liters or 64 ounces of fluid a day   11. Low sodium diet  12. Try sugar free candies, ice chips, frozen grapes to help curb the dry mouth. 13. Ice chips would count toward your fluid restriction (equates to 1/2 of the volume of the cup)  14. Follow up 4 weeks  15. Follow up with Dr. Shahab Shelton 6 weeks   Call your insurance company to see what transportation benefits they have  Will consider cardiac rehab after you are recovered from your ICD implant. Stop Aspirin for now given she was bleeding from the ICD site last week. QUALITY MEASURES  1. Tobacco Cessation Counseling: NA  2. Retake of BP if >140/90:   NA  3. Documentation to PCP/referring for new patient:  Sent to PCP at close of office visit  4. CAD patient on anti-platelet: NA  5. CAD patient on STATIN therapy:  NA  6. Patient with CHF and aFib on anticoagulation:  NA     I appreciate the opportunity for caring for this patient.      Chu Hill CNP, 6/19/2019, 11:26 AM

## 2019-06-19 NOTE — PATIENT INSTRUCTIONS
Plan:   1. Continue magnesium 1 tab twice a day  2. Repeat labs next week and once a week  3. Decrease lasix 20mg daily for now; if your weight goes above 179lbs increase lasix back up to 40mg daily until your weight is back down to 176lbs  4. Start Entresto 1/2 tab daily for 3 days and then increase to 1/2 tab twice a day for heart failure  5. Start walking about 3-5 minutes a day  6. Continue potassium 40 meq a day for now and will monitor potassium level through your weekly labs  7. Continue metoprolol 50mg twice a day  8. Continue daily weights- and record  9. If your weight goes above 180lbs - call the office for further instructions  10. Target 2Liters or 64 ounces of fluid a day   11. Low sodium diet  12. Try sugar free candies, ice chips, frozen grapes to help curb the dry mouth. 13. Ice chips would count toward your fluid restriction (equates to 1/2 of the volume of the cup)  14. Follow up 4 weeks  15. Follow up with Dr. Gustavo Irwin 6 weeks   Call your insurance company to see what transportation benefits they have  Will consider cardiac rehab after you are recovered from your ICD implant.

## 2019-06-19 NOTE — LETTER
[facility]   Cardiac Follow-up    Primary Care Doctor:  Ncaho Amado, RN, NP    Chief Complaint   Patient presents with    Follow-up        History of Present Illness:   I had the pleasure of seeing Chrissy Zhong in follow up for cardiomyopathy. Admitted to [facility] on 5/15/19 after Cardiopulmonary arrest, Vfib arrest, acute respiratory failure, aspiration, hemodynamic shock. Transferred to [facility] on 5/29/19 for ICD consideration. ICD placed on 6/4/19, required lead revision on 6/5/19. She was treated for Heart failure as well, admission weight was 199lbs, discharge weight down to 171lbs. No history of heart issues in the past per patient. 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 She was discharged on toprol 25mg in the am and 50mg in the PM, lasix changed to PRN and magnesium 400mg once a day    Since last visit, seen in the ED on 6/13/19 for bleeding at incision site. Chest pain is improving. She is doing better. Continues with some shortness of breath with minimal exertion  Some edema in the left foot. Not walking much. Has had issues with balance for some time. Frustrated with her memory since the hospitalization. She is trying to cut back on the fluids; she is tracking fluids by using the 2L bottle method. She is checking her weight. Shortness of breath with talking at times- about the same. Here with her mom. Sleeping better. Home weight 176lbs    Chrissy Zhong describes symptoms including dyspnea, fatigue but denies chest pain, palpitations, syncope.      Past Medical History:   has a past medical history of Acute on chronic systolic congestive heart failure ([address] Arrhythmia, Arthritis, Bipolar disorder ([address] Cancer ([address] Cancer ([address] Cardiac arrest ([address] Cardiomyopathy ([address] Chronic kidney disease, COPD (chronic obstructive pulmonary disease) ([address] Depression, E. coli infection, Family history of early CAD, Hepatitis C, magnesium oxide (MAG-OX) 400 (241.3 Mg) MG TABS tablet Take 1 tablet by mouth 2 times daily 6/12/19  Yes JAYLYN Morales CNP   venlafaxine (EFFEXOR XR) 150 MG extended release capsule  5/28/19  Yes Historical Provider, MD   aspirin 81 MG chewable tablet Take 1 tablet by mouth daily 6/6/19  Yes JAYLYN Morales CNP   albuterol sulfate  (90 Base) MCG/ACT inhaler Inhale 2 puffs into the lungs 3 times daily 5/9/19  Yes Historical Provider, MD   OLANZapine (ZYPREXA) 15 MG tablet Take 7.5 mg by mouth 3 times daily Indications: 1/2 tab every morning, 1/2 tab every evening and 1 tab at bedtime    Yes Historical Provider, MD   melatonin 5 MG TABS tablet Take 5 mg by mouth nightly   Yes Historical Provider, MD   gabapentin (NEURONTIN) 400 MG capsule Take 400 mg by mouth 3 times daily. Yes Historical Provider, MD   traZODone (DESYREL) 50 MG tablet Take by mouth nightly Indications: for insomnia, patient takes PRN when melatonin does not work, mother unsure of dose. Yes Historical Provider, MD   gabapentin (NEURONTIN) 600 MG tablet Take 600 mg by mouth 3 times daily. Historical Provider, MD        Allergies:  Tape [adhesive tape]     Review of Systems:   · Constitutional: there has been no unanticipated weight loss. · Eyes: No vision changes  · ENT: No Headaches, no nasal congestion. No mouth sores or sore throat. · Cardiovascular: Reviewed in HPI  · Respiratory: No cough or wheezing, no sputum production. · Gastrointestinal: No abdominal pain, no constipation or diarrhea  · Genitourinary: No dysuria, trouble voiding, or hematuria. · Musculoskeletal:  No weakness or joint complaints. · Integumentary: No rash or pruritis. · Neurological: No numbness or tingling. No weakness. No tremor. · Psychiatric: No anxiety, no depression. · Endocrine:  No excessive thirst or urination. · Hematologic/Lymphatic: No abnormal bruising or bleeding, blood clots or swollen lymph nodes. Physical Examination:    Vitals:    06/19/19 1120   BP: 100/70   Pulse: 93   SpO2: 97%   Weight: 177 lb (80.3 kg)   Height: 5' (1.524 m)        Constitutional and General Appearance: no apparent distress  HEENT: non-icteric sclera, oropharynx without exudate, oral mucosa moist  Neck: JVP less than 8 cm H20  Respiratory:  · No use of accessory muscles  · Clear breath sounds throughout, no wheezing, no crackles, no rhonchi  Cardiovascular: right AICD dressing intact with old blood and steri strips.    · The apical impulses not displaced  · Heart tones are crisp and normal, no murmur/rub/gallop  · Regular rate and rhythm, S1,S2 normal  · Radial pulses 2+ and equal bilaterally  · No edema, left leg with chronic atrophy   · Pedal Pulses: 2+ and equal   Abdomen:  · No masses or tenderness  · Liver: No Abnormalities Noted  Musculoskeletal/Skin:  · Gait is steady   · There is no clubbing, cyanosis of the extremities  · Skin is warm and dry  · Moves all extremities well  Neurological/Psychiatric:  · Alert and oriented in all spheres  · No abnormalities of mood, affect, mentation, or behavior are noted  · + short term memory deficit     Lab Data:  CBC:   Lab Results   Component Value Date    WBC 9.5 06/09/2019    WBC 11.1 06/03/2019    WBC 11.2 06/02/2019    RBC 4.23 06/09/2019    RBC 4.10 06/03/2019    RBC 4.14 06/02/2019    HGB 12.4 06/09/2019    HGB 12.1 06/03/2019    HGB 12.3 06/02/2019    HCT 36.8 06/09/2019    HCT 35.9 06/03/2019    HCT 36.1 06/02/2019    MCV 86.9 06/09/2019    MCV 87.7 06/03/2019    MCV 87.2 06/02/2019    RDW 12.5 06/09/2019    RDW 12.7 06/03/2019    RDW 12.8 06/02/2019     06/09/2019     06/03/2019     06/02/2019     Iron: No results found for: IRON, TIBC, FERRITIN  BMP:   Lab Results   Component Value Date     06/09/2019     06/03/2019     06/02/2019    K 3.6 06/09/2019    K 3.8 06/03/2019    K 3.3 06/02/2019    K 4.8 05/30/2019    K 2.9 05/15/2019 K 3.4 05/15/2019    CL 98 06/09/2019    CL 97 06/03/2019    CL 94 06/02/2019    CO2 26 06/09/2019    CO2 24 06/03/2019    CO2 25 06/02/2019    PHOS 4.2 06/03/2019    PHOS 4.7 06/02/2019    PHOS 4.8 06/01/2019    BUN 6 06/09/2019    BUN 14 06/03/2019    BUN 18 06/02/2019    CREATININE <0.5 06/09/2019    CREATININE <0.5 06/03/2019    CREATININE 0.7 06/02/2019     BNP:   Lab Results   Component Value Date    PROBNP 204 06/09/2019     Lipids: No results found for: CHOL     Lab Results   Component Value Date    TRIG 232 (H) 05/22/2019      No results found for: HDL   No results found for: LDLCHOLESTEROL, LDLCALC   No results found for: LABVLDL, VLDL   No results found for: CHOLHDLRATIO    EF:   Lab Results   Component Value Date    LVEF 40 05/22/2019       Recent Testing:  Echo  Echo 5/15/19   Summary   Definity contrast administered.   Left ventricular systolic function is reduced with ejection fraction   estimated at 25-30 %.   Elevated left ventricular diastolic filling pressure: Septal E/e'' = 12.6   (for sinus tachycardia) .   Right ventricular systolic function is moderately reduced .   Mild mitral regurgitation.   Unable to obtain SPAP due to lack of tricuspid regurgitation.     Echo 5/22/19   Summary   This is a limited study for EF follow up.   Left ventricular systolic function is reduced with ejection fraction   estimated at 40 %.   There is mild to moderate global hypokinesis present.   There is mild concentric left ventricular hypertrophy. 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 Non-ischemic cardiomyopathy ([address]    2. ICD (implantable cardioverter-defibrillator) in place    3.  Non-sustained ventricular tachycardia ([address]

## 2019-06-20 ENCOUNTER — TELEPHONE (OUTPATIENT)
Dept: CARDIOLOGY CLINIC | Age: 34
End: 2019-06-20

## 2019-06-25 ENCOUNTER — TELEPHONE (OUTPATIENT)
Dept: CARDIOLOGY CLINIC | Age: 34
End: 2019-06-25

## 2019-06-25 NOTE — TELEPHONE ENCOUNTER
Spoke with Marylu and relayed message for her to not take Entresto for now and that she should send transmission. She verbalized understanding and agreed with the plan. She will call [employer] and send transmission.

## 2019-06-25 NOTE — TELEPHONE ENCOUNTER
Pt called in and wanted to let to us know that she thinks she is having side effects to the entresto. Pt is dizzy and actually passed out/ loss consciousness, and mom ended up calling a squad for her last Sat 6/22/19. Paramedics evaluated her and released her, they did not take her in to the ED. Pt is concerned and has discontinued the entresto for now. Pt can be reached at [phone].       TY

## 2019-06-25 NOTE — TELEPHONE ENCOUNTER
Okay to stop the entresto for now. Do not have her take any further doses for now. Please have her send in a care link transmission from her device.

## 2019-06-28 ENCOUNTER — PROCEDURE VISIT (OUTPATIENT)
Dept: CARDIOLOGY CLINIC | Age: 34
End: 2019-06-28

## 2019-06-28 ENCOUNTER — TELEPHONE (OUTPATIENT)
Dept: CARDIOLOGY CLINIC | Age: 34
End: 2019-06-28

## 2019-06-28 DIAGNOSIS — Z95.810 ICD (IMPLANTABLE CARDIOVERTER-DEFIBRILLATOR) IN PLACE: ICD-10-CM

## 2019-06-28 NOTE — DISCHARGE SUMMARY
Name:  Sabino Molina  Room:  /9905-65  MRN:    [id number]    Discharge Summary      This discharge summary is in conjunction with a complete physical exam done on the day of discharge. Discharging Physician: Dr. Katelyn Mora: 5/14/2019  Discharge:  5/29/2019    HPI taken from admission H&P:    The patient is a 26 y.o. female with PMH below, presents with SOB/BOURNE, resp distress, unresponsive episode, v fib arrest.  She is intubated and sedated at the time that I saw her and is thus unable to give hx. No family at bedside at this time. 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 The following was written by Dr. Cande Marroquin (ED):     Michelle Hampton is a 26 y.o. female Jasson Reap presents to the ED complaining of cardiopulmonary arrest.  EMS states that when they got there she was having trouble breathing with agonal respirations.  She then went unresponsive and they noted V. fib on the monitor.  Patient was shocked ×1 with return of spontaneous circulation.  She was spontaneously breathing at that time although was still somewhat labored.  She did not regain consciousness.  Per the mom, mom had come home from work and patient was her normal self and stated to mom that they needed some toilet paper.  Mom ran to the grocery store which is a short distance away and upon returning about 10-15 minutes later the patient was slumped to the side on her couch and had throbbing at the mouth.  The mom removed the patient's pulse teeth and noted that the patient was not breathing right and so called EMS at that point. Lenard Sos blood sugar noted to be in the 190s per EMS.   They did administer 2 mg Narcan without improvement.  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  States that she does not currently follow the cardiologist nor with any providers at [facility] for sometimes and follows up early for her medical care at [facility] [facility].  States that she has cardiomegaly.  She also has hallucinations for which she is on Zyprexa per mom and also is on neurontin.  She has a history of chronic kidney and liver disease as well per mom. Diagnoses this Admission and Hospital Course   Cardiac V fib arrest   - status post successful resuscitation ,trop elevation  - likely related to CPR and demand ischemia  - EP eval once stable and off vent-->Transfer to [facility]  - Continue beta blocker   - Now in sinus tach     Acute on chronic systolic CHF  Patient with severe nonischemic dilated cardiomyopathy  - secondary to adriamycin treatment as a child . she had chemotherapy for rhabdomyosarcoma. Supposedly missed f/w from [facility]   - echo shows ejection fraction of 25%   - getting zaroxylyn  IV Lasix -TID   - consider digoxin or low dose coreg   - improving EF with diuresis and medical mx on repeat echo to 40 %  - need close f/w as outpt     Acute respiratory failure  - sec to cardiac arrest   - extubated 5/24/19  doing well  - Status post bronchoscopy  twice due to increased respiratory secretions  - sputum with MSSA   - Now on 3 L      A. fib   - pt in afib following defib for V fib arrest. Loaded with amiodarone   -  off AMIO/heparin     Septic shock   Pneumonia   - ?  Aspiration - pt had resp symptoms for 2 months   - Secondary to staph aureus infection.   - Status post bronchoscopy cultures positive for MSSA  - Continue  IV antibiotics , D8 on vanc and merrem .      Procedures (Please Review Full Report for Details)  PICC  Intubation/extubation   Bronch x 2    Consults    Cardiology   [address] [facility]   Psychiatry       Physical Exam at Discharge:    BP (!) 140/95   Pulse 110   Temp 98.8 °F (37.1 °C) (Oral)   Resp 16   Ht 5' (1.524 m)   Wt 171 lb 1.6 oz (77.6 kg)   SpO2 93%   BMI 33.42 kg/m² General: young female,  Awake alert and anxious  Skin:  Warm and dry  Neck:  JVD absent. Neck supple  Chest:  Diminished breath sounds . No wheezes, rales or rhonchi. Cardiovascular:  RRR ,S1S2 normal  Abdomen:  Soft, non tender, non distended, BS +  Extremities:  Trace edema of upper extremity . No edema of lower extremity. . Left lower extremity atrophy present  Intact peripheral pulses. Brisk cap refill, < 2 secs  Neuro:  Alert,oriented     Data:  CBC:         Recent Labs     05/26/19 0417 05/27/19 0455 05/28/19  0600   WBC 26.0* 19.2* 17.2*   RBC 4.75 4.77 4.64   HGB 13.9 14.2 13.8   HCT 41.4 41.6 40.9   MCV 87.3 87.4 88.2   RDW 13.1 13.0 12.8   * 546* 608*      BMP:            Recent Labs     05/26/19 0417 05/27/19 0455 05/27/19  1515 05/27/19 2033 05/28/19  0600   * 132*  --   --   --  132*   K 2.9* 2.7*   < > 2.8* 4.0 3.5   CL 90* 85*  --   --   --  89*   CO2 26 31  --   --   --  30   PHOS 4.7 3.4  --   --   --  3.3   BUN 21* 21*  --   --   --  22*   CREATININE <0.5* <0.5*  --   --   --  0.6    < > = values in this interval not displayed. CULTURES  Respiratory cultures heavy growth staph - MSSA     Blood - NGTD    RADIOLOGY  IR PICC WO SQ PORT/PUMP > 5 YEARS   Final Result   Successful placement of PICC line. XR CHEST PORTABLE   Final Result   Right upper extremity PICC line has not yet reached the inferior vena cava. Its tip is at the expected location of the mid right subclavian vein. Bilateral airspace disease, improving on the right as compared to prior. Findings were called by the radiology call center. IR PICC WO SQ PORT/PUMP > 5 YEARS   Final Result   Successful placement of PICC line. XR CHEST PORTABLE   Final Result   1. Improving multifocal right lung airspace disease. 2. Increasing left basilar atelectasis or pneumonia. XR CHEST PORTABLE   Final Result   Stable support apparatus.       New left perihilar airspace disease and persistent airspace disease   throughout the right lung. Findings may be related to edema and/or pneumonia. XR CHEST PORTABLE   Final Result   Worsening right lung airspace disease likely represents pneumonia. XR CHEST PORTABLE   Final Result   Stable right basilar and improving left basilar atelectasis and/or pneumonia. XR CHEST PORTABLE   Final Result   Bilateral pulmonary congestion with right basilar consolidation concerning   for pneumonia. Stable support tubes. XR CHEST PORTABLE   Final Result   Grossly stable bilateral airspace disease. XR CHEST PORTABLE   Final Result      XR CHEST PORTABLE   Final Result   Worsening bilateral airspace opacities in a pattern that would favor ARDS. XR CHEST PORTABLE   Final Result   Stable life support device positioning. Persistent perihilar predominant edema and small effusions. XR CHEST PORTABLE   Final Result   Interval improvement in pulmonary edema. XR CHEST PORTABLE   Final Result   Appropriate left IJ central venous catheter positioning. No pneumothorax. Appropriate endotracheal tube positioning. Layering bilateral effusions. Equivocal for a component of superimposed   pulmonary edema. Atelectasis likely present. CT ABDOMEN PELVIS WO CONTRAST Additional Contrast? None   Final Result   1. Bilateral lower lobe atelectasis, right greater than left. 2. Acute right 2nd through 4th and left 3rd through 5th rib fractures         CT Chest Pulmonary Embolism W Contrast   Final Result   1. Bilateral lower lobe atelectasis, right greater than left. 2. Acute right 2nd through 4th and left 3rd through 5th rib fractures         CT Head WO Contrast   Final Result   No acute intracranial abnormality. Left maxillary sinus mucosal thickening. Air-fluid level in the sphenoid   sinus. Correlate with any clinical evidence of sinusitis.          XR CHEST PORTABLE   Final Result Findings as above which may be related to congestive heart failure and edema. Echo  Summary   Definity contrast administered.   Left ventricular systolic function is reduced with ejection fraction   estimated at 25-30 %.   Elevated left ventricular diastolic filling pressure: Septal E/e'' = 12.6   (for sinus tachycardia) .   Right ventricular systolic function is moderately reduced .   Mild mitral regurgitation.   Unable to obtain SPAP due to lack of tricuspid regurgitation. Repeat echo 5/22        This is a limited study for EF follow up.   Left ventricular systolic function is reduced with ejection fraction   estimated at 40 %.   There is mild to moderate global hypokinesis present.   There is mild concentric left ventricular hypertrophy. Discharge Medications     Medication List      ASK your doctor about these medications    albuterol sulfate  (90 Base) MCG/ACT inhaler     gabapentin 400 MG capsule  Commonly known as:  NEURONTIN     ibuprofen 200 MG tablet  Commonly known as:  ADVIL;MOTRIN     melatonin 5 MG Tabs tablet     OLANZapine 15 MG tablet  Commonly known as:  ZYPREXA     traZODone 50 MG tablet  Commonly known as:  DESYREL            Transferred to [facility] for EP evaluation in stable condition    Total time spent on discharge is 35 minutes    LYNN Carty.

## 2019-06-28 NOTE — TELEPHONE ENCOUNTER
Spoke with Mauro Vazquezvernon mother and she states that patient is having heart burn and she is wondering if she can take Tums. Advised that she can take Tums and asked that she send a remote transmission as we discussed on 6/25/19. She states they will call [employer] and send a remote transmission immediately.

## 2019-06-30 NOTE — PROGRESS NOTES
Carelink transmission shows normal sensing and pacing function. See interrogation for more details. See telephone encounter from 6/25 (per pt mom pt had passed ot and LOC on 6/22/19) and 6/28 (pt had heartburn). Was advised to send transmission on both days. carelink received on 6/28/19. no events recorded on 6/22 or 6/28. presenting rhythm 6/28 @0840 regular, ST/ST @ 560-580ms. Episodes Since: 14-Brennen-2019  6/15-1 Non-sustained VT-MVT x 10 beat  6/26-1 Non-sustained High Rate-??PVT (complete ECG not able to be viewed) x 7 beats  (toprol xl and mag-ox)  Oly Carver MD  [facility] Staff   Cc: Maria Del Carmen Hummel             PSVT,continue current medications          See PACEART report under Cardiology tab.

## 2019-07-02 LAB
AFB CULTURE (MYCOBACTERIA): NORMAL
AFB CULTURE (MYCOBACTERIA): NORMAL
AFB SMEAR: NORMAL
AFB SMEAR: NORMAL

## 2019-07-08 ENCOUNTER — HOSPITAL ENCOUNTER (EMERGENCY)
Age: 34
Discharge: HOME | End: 2019-07-08
Attending: EMERGENCY MEDICINE
Payer: MEDICAID

## 2019-07-08 ENCOUNTER — APPOINTMENT (OUTPATIENT)
Dept: GENERAL RADIOLOGY | Age: 34
End: 2019-07-08
Payer: MEDICAID

## 2019-07-08 VITALS
HEART RATE: 84 BPM | BODY MASS INDEX: 33.57 KG/M2 | WEIGHT: 171 LBS | SYSTOLIC BLOOD PRESSURE: 110 MMHG | TEMPERATURE: 98.4 F | DIASTOLIC BLOOD PRESSURE: 78 MMHG | OXYGEN SATURATION: 98 % | HEIGHT: 60 IN | RESPIRATION RATE: 16 BRPM

## 2019-07-08 DIAGNOSIS — R10.13 EPIGASTRIC PAIN: Primary | ICD-10-CM

## 2019-07-08 DIAGNOSIS — R07.9 CHEST PAIN, UNSPECIFIED TYPE: ICD-10-CM

## 2019-07-08 LAB
A/G RATIO: 1 (ref 1.1–2.2)
ALBUMIN SERPL-MCNC: 4.1 G/DL (ref 3.4–5)
ALP BLD-CCNC: 103 U/L (ref 40–129)
ALT SERPL-CCNC: 49 U/L (ref 10–40)
ANION GAP SERPL CALCULATED.3IONS-SCNC: 16 MMOL/L (ref 3–16)
AST SERPL-CCNC: 42 U/L (ref 15–37)
BASOPHILS ABSOLUTE: 0.1 K/UL (ref 0–0.2)
BASOPHILS RELATIVE PERCENT: 0.6 %
BILIRUB SERPL-MCNC: 0.4 MG/DL (ref 0–1)
BUN BLDV-MCNC: 6 MG/DL (ref 7–20)
CALCIUM SERPL-MCNC: 9.6 MG/DL (ref 8.3–10.6)
CHLORIDE BLD-SCNC: 97 MMOL/L (ref 99–110)
CO2: 25 MMOL/L (ref 21–32)
CREAT SERPL-MCNC: <0.5 MG/DL (ref 0.6–1.1)
EOSINOPHILS ABSOLUTE: 0.2 K/UL (ref 0–0.6)
EOSINOPHILS RELATIVE PERCENT: 1.4 %
GFR AFRICAN AMERICAN: >60
GFR NON-AFRICAN AMERICAN: >60
GLOBULIN: 4.3 G/DL
GLUCOSE BLD-MCNC: 119 MG/DL (ref 70–99)
HCG QUALITATIVE: NEGATIVE
HCT VFR BLD CALC: 38.3 % (ref 36–48)
HEMOGLOBIN: 12.9 G/DL (ref 12–16)
LIPASE: 67 U/L (ref 13–60)
LYMPHOCYTES ABSOLUTE: 2.5 K/UL (ref 1–5.1)
LYMPHOCYTES RELATIVE PERCENT: 20.3 %
MCH RBC QN AUTO: 29.6 PG (ref 26–34)
MCHC RBC AUTO-ENTMCNC: 33.8 G/DL (ref 31–36)
MCV RBC AUTO: 87.6 FL (ref 80–100)
MONOCYTES ABSOLUTE: 0.9 K/UL (ref 0–1.3)
MONOCYTES RELATIVE PERCENT: 7.6 %
NEUTROPHILS ABSOLUTE: 8.6 K/UL (ref 1.7–7.7)
NEUTROPHILS RELATIVE PERCENT: 70.1 %
PDW BLD-RTO: 12.8 % (ref 12.4–15.4)
PLATELET # BLD: 415 K/UL (ref 135–450)
PMV BLD AUTO: 7 FL (ref 5–10.5)
POTASSIUM SERPL-SCNC: 3.4 MMOL/L (ref 3.5–5.1)
RBC # BLD: 4.37 M/UL (ref 4–5.2)
SODIUM BLD-SCNC: 138 MMOL/L (ref 136–145)
TOTAL PROTEIN: 8.4 G/DL (ref 6.4–8.2)
TROPONIN: <0.01 NG/ML
WBC # BLD: 12.2 K/UL (ref 4–11)

## 2019-07-08 PROCEDURE — 84484 ASSAY OF TROPONIN QUANT: CPT

## 2019-07-08 PROCEDURE — 84703 CHORIONIC GONADOTROPIN ASSAY: CPT

## 2019-07-08 PROCEDURE — 93005 ELECTROCARDIOGRAM TRACING: CPT | Performed by: EMERGENCY MEDICINE

## 2019-07-08 PROCEDURE — 99283 EMERGENCY DEPT VISIT LOW MDM: CPT

## 2019-07-08 PROCEDURE — 80053 COMPREHEN METABOLIC PANEL: CPT

## 2019-07-08 PROCEDURE — 71046 X-RAY EXAM CHEST 2 VIEWS: CPT

## 2019-07-08 PROCEDURE — 83690 ASSAY OF LIPASE: CPT

## 2019-07-08 PROCEDURE — 6370000000 HC RX 637 (ALT 250 FOR IP): Performed by: EMERGENCY MEDICINE

## 2019-07-08 PROCEDURE — 85025 COMPLETE CBC W/AUTO DIFF WBC: CPT

## 2019-07-08 RX ORDER — PANTOPRAZOLE SODIUM 40 MG/1
40 TABLET, DELAYED RELEASE ORAL
Qty: 30 TABLET | Refills: 0 | Status: SHIPPED | OUTPATIENT
Start: 2019-07-08 | End: 2020-02-12

## 2019-07-08 RX ORDER — ALUMINA, MAGNESIA, AND SIMETHICONE 2400; 2400; 240 MG/30ML; MG/30ML; MG/30ML
15 SUSPENSION ORAL EVERY 4 HOURS PRN
Qty: 1 BOTTLE | Refills: 0 | Status: SHIPPED | OUTPATIENT
Start: 2019-07-08 | End: 2020-10-22

## 2019-07-08 RX ORDER — CALCIUM CARBONATE 200(500)MG
1 TABLET,CHEWABLE ORAL DAILY
COMMUNITY
End: 2019-08-19

## 2019-07-08 RX ORDER — SUCRALFATE 1 G/1
1 TABLET ORAL 4 TIMES DAILY
Qty: 40 TABLET | Refills: 0 | Status: SHIPPED | OUTPATIENT
Start: 2019-07-08 | End: 2019-08-19

## 2019-07-08 RX ADMIN — LIDOCAINE HYDROCHLORIDE: 20 SOLUTION ORAL; TOPICAL at 20:27

## 2019-07-08 ASSESSMENT — PAIN DESCRIPTION - DESCRIPTORS: DESCRIPTORS: BURNING

## 2019-07-08 ASSESSMENT — PAIN DESCRIPTION - LOCATION: LOCATION: CHEST

## 2019-07-08 ASSESSMENT — PAIN SCALES - GENERAL: PAINLEVEL_OUTOF10: 7

## 2019-07-08 ASSESSMENT — PAIN DESCRIPTION - PAIN TYPE: TYPE: ACUTE PAIN

## 2019-07-09 LAB
EKG ATRIAL RATE: 92 BPM
EKG DIAGNOSIS: NORMAL
EKG P AXIS: 16 DEGREES
EKG P-R INTERVAL: 156 MS
EKG Q-T INTERVAL: 362 MS
EKG QRS DURATION: 76 MS
EKG QTC CALCULATION (BAZETT): 447 MS
EKG R AXIS: -2 DEGREES
EKG T AXIS: 45 DEGREES
EKG VENTRICULAR RATE: 92 BPM

## 2019-07-09 PROCEDURE — 93010 ELECTROCARDIOGRAM REPORT: CPT | Performed by: INTERNAL MEDICINE

## 2019-07-09 NOTE — ED PROVIDER NOTES
acute process. LABS  Labs Reviewed   CBC WITH AUTO DIFFERENTIAL - Abnormal; Notable for the following components:       Result Value    WBC 12.2 (*)     Neutrophils # 8.6 (*)     All other components within normal limits    Narrative:     Performed at:  [address],  [place], [address]   Phone [phone]   COMPREHENSIVE METABOLIC PANEL - Abnormal; Notable for the following components:    Potassium 3.4 (*)     Chloride 97 (*)     Glucose 119 (*)     BUN 6 (*)     CREATININE <0.5 (*)     Total Protein 8.4 (*)     Albumin/Globulin Ratio 1.0 (*)     ALT 49 (*)     AST 42 (*)     All other components within normal limits    Narrative:     Performed at:  [address],  [place], [address]   Phone [phone]   LIPASE - Abnormal; Notable for the following components:    Lipase 67.0 (*)     All other components within normal limits    Narrative:     Performed at:  [address],  [place], [address]   Phone [phone]   TROPONIN    Narrative:     Performed at:  [address],  [place], [address]   Phone [phone]   HCG, SERUM, QUALITATIVE    Narrative:     Performed at:  [address],  [place], [address]   Phone [phone]       Patient feels better after GI cocktail administration. I estimate there is LOW risk for ACUTE APPENDICITIS, BOWEL OBSTRUCTION, CHOLECYSTITIS, DIVERTICULITIS, INCARCERATED HERNIA, PANCREATITIS, or PERFORATED BOWEL or ULCER, PULMONARY EMBOLISM, ACUTE CORONARY SYNDROME, OR THORACIC AORTIC DISSECTION, thus I consider the discharge disposition reasonable. Nomi Kaiser and I have discussed the diagnosis and risks, and we agree with discharging home to follow-up with their primary doctor.  We also

## 2019-07-22 ENCOUNTER — OFFICE VISIT (OUTPATIENT)
Dept: CARDIOLOGY CLINIC | Age: 34
End: 2019-07-22
Payer: MEDICAID

## 2019-07-22 ENCOUNTER — PROCEDURE VISIT (OUTPATIENT)
Dept: CARDIOLOGY CLINIC | Age: 34
End: 2019-07-22
Payer: MEDICAID

## 2019-07-22 VITALS
SYSTOLIC BLOOD PRESSURE: 108 MMHG | BODY MASS INDEX: 36.28 KG/M2 | OXYGEN SATURATION: 98 % | DIASTOLIC BLOOD PRESSURE: 82 MMHG | HEIGHT: 60 IN | HEART RATE: 82 BPM | WEIGHT: 184.8 LBS

## 2019-07-22 DIAGNOSIS — I49.01 CARDIAC ARREST WITH VENTRICULAR FIBRILLATION (HCC): ICD-10-CM

## 2019-07-22 DIAGNOSIS — I46.9 CARDIAC ARREST WITH VENTRICULAR FIBRILLATION (HCC): ICD-10-CM

## 2019-07-22 DIAGNOSIS — Z95.810 ICD (IMPLANTABLE CARDIOVERTER-DEFIBRILLATOR) IN PLACE: ICD-10-CM

## 2019-07-22 DIAGNOSIS — I42.8 NON-ISCHEMIC CARDIOMYOPATHY (HCC): Primary | ICD-10-CM

## 2019-07-22 DIAGNOSIS — I50.23 ACUTE ON CHRONIC SYSTOLIC CONGESTIVE HEART FAILURE (HCC): ICD-10-CM

## 2019-07-22 LAB
ALBUMIN SERPL-MCNC: 4.6 G/DL
ALP BLD-CCNC: 137 U/L
ALT SERPL-CCNC: 65 U/L
ANION GAP SERPL CALCULATED.3IONS-SCNC: NORMAL MMOL/L
AST SERPL-CCNC: 59 U/L
BASOPHILS ABSOLUTE: 0 /ΜL
BASOPHILS RELATIVE PERCENT: 0 %
BILIRUB SERPL-MCNC: 0.4 MG/DL (ref 0.1–1.4)
BUN BLDV-MCNC: 7 MG/DL
CALCIUM SERPL-MCNC: 10.6 MG/DL
CHLORIDE BLD-SCNC: 97 MMOL/L
CHOLESTEROL, TOTAL: 231 MG/DL
CHOLESTEROL/HDL RATIO: NORMAL
CO2: 25 MMOL/L
CREAT SERPL-MCNC: 0.58 MG/DL
EOSINOPHILS ABSOLUTE: 0 /ΜL
EOSINOPHILS RELATIVE PERCENT: 1 %
GFR CALCULATED: 120
GLUCOSE BLD-MCNC: 89 MG/DL
HCT VFR BLD CALC: 41.3 % (ref 36–46)
HDLC SERPL-MCNC: 52 MG/DL (ref 35–70)
HEMOGLOBIN: 13.6 G/DL (ref 12–16)
LDL CHOLESTEROL CALCULATED: 140 MG/DL (ref 0–160)
LYMPHOCYTES ABSOLUTE: 2.2 /ΜL
LYMPHOCYTES RELATIVE PERCENT: 27 %
MCH RBC QN AUTO: 27.9 PG
MCHC RBC AUTO-ENTMCNC: 32.9 G/DL
MCV RBC AUTO: 85 FL
MONOCYTES ABSOLUTE: 0.6 /ΜL
MONOCYTES RELATIVE PERCENT: 8 %
NEUTROPHILS ABSOLUTE: 5.1 /ΜL
NEUTROPHILS RELATIVE PERCENT: 64 %
PLATELET # BLD: 446 K/ΜL
PMV BLD AUTO: NORMAL FL
POTASSIUM SERPL-SCNC: 3.8 MMOL/L
RBC # BLD: 4.87 10^6/ΜL
SODIUM BLD-SCNC: 141 MMOL/L
TOTAL PROTEIN: 8.3
TRIGL SERPL-MCNC: 193 MG/DL
VITAMIN D 25-HYDROXY: 21.7
VITAMIN D2, 25 HYDROXY: NORMAL
VITAMIN D3,25 HYDROXY: NORMAL
VLDLC SERPL CALC-MCNC: 39 MG/DL
WBC # BLD: 8.1 10^3/ML

## 2019-07-22 PROCEDURE — 99214 OFFICE O/P EST MOD 30 MIN: CPT | Performed by: NURSE PRACTITIONER

## 2019-07-22 PROCEDURE — G8427 DOCREV CUR MEDS BY ELIG CLIN: HCPCS | Performed by: NURSE PRACTITIONER

## 2019-07-22 PROCEDURE — G8417 CALC BMI ABV UP PARAM F/U: HCPCS | Performed by: NURSE PRACTITIONER

## 2019-07-22 PROCEDURE — 1036F TOBACCO NON-USER: CPT | Performed by: NURSE PRACTITIONER

## 2019-07-22 PROCEDURE — 93290 INTERROG DEV EVAL ICPMS IP: CPT | Performed by: NURSE PRACTITIONER

## 2019-07-22 RX ORDER — LISINOPRIL 2.5 MG/1
2.5 TABLET ORAL DAILY
Qty: 30 TABLET | Refills: 3 | Status: SHIPPED | OUTPATIENT
Start: 2019-07-22 | End: 2019-11-20 | Stop reason: SDUPTHER

## 2019-07-22 RX ORDER — POTASSIUM CHLORIDE 20 MEQ/1
TABLET, EXTENDED RELEASE ORAL
Qty: 60 TABLET | Refills: 3
Start: 2019-07-22 | End: 2020-01-09

## 2019-07-22 NOTE — LETTER
(before breakfast) 7/8/19  Yes Mirlande Vazquez MD   aluminum & magnesium hydroxide-simethicone (MAALOX ADVANCED MAX ST) 400-400-40 MG/5ML SUSP Take 15 mLs by mouth every 4 hours as needed (nausea or reflux) 7/8/19  Yes Mirlande Vazquez MD   furosemide (LASIX) 40 MG tablet Take 1 tablet by mouth daily Okay to take an extra 20mg As needed for 3lbs weight gain in a day or 5lbs weight gain in a week 6/12/19  Yes Cayetano Castellano APRN - CNP   potassium chloride (KLOR-CON M) 20 MEQ extended release tablet Take 2 tablets by mouth daily 6/12/19  Yes Cayetano Castellano APRN - CNP   metoprolol succinate (TOPROL XL) 50 MG extended release tablet Take 1 tablet by mouth 2 times daily 6/12/19  Yes Cayetano Castellano APRN - CNP   magnesium oxide (MAG-OX) 400 (241.3 Mg) MG TABS tablet Take 1 tablet by mouth 2 times daily 6/12/19  Yes Cayetano Castellano APRN - CNP   albuterol sulfate  (90 Base) MCG/ACT inhaler Inhale 2 puffs into the lungs 3 times daily 5/9/19  Yes Historical Provider, MD   OLANZapine (ZYPREXA) 15 MG tablet Take 7.5 mg by mouth 3 times daily Indications: 1/2 tab every morning, 1/2 tab every evening and 1 tab at bedtime    Yes Historical Provider, MD   melatonin 5 MG TABS tablet Take 5 mg by mouth nightly   Yes Historical Provider, MD   gabapentin (NEURONTIN) 400 MG capsule Take 400 mg by mouth 3 times daily. Yes Historical Provider, MD   traZODone (DESYREL) 50 MG tablet Take by mouth nightly Indications: for insomnia, patient takes PRN when melatonin does not work, mother unsure of dose. Yes Historical Provider, MD        Allergies:  Tape [adhesive tape]     Review of Systems:   · Constitutional: there has been no unanticipated weight loss. · Eyes: No vision changes  · ENT: No Headaches, no nasal congestion. No mouth sores or sore throat. · Cardiovascular: Reviewed in HPI  · Respiratory: No cough or wheezing, no sputum production. MCV 86.9 06/09/2019    MCV 87.7 06/03/2019    RDW 12.8 07/08/2019    RDW 12.5 06/09/2019    RDW 12.7 06/03/2019     07/08/2019     06/09/2019     06/03/2019     Iron: No results found for: IRON, TIBC, FERRITIN  BMP:   Lab Results   Component Value Date     07/08/2019     06/09/2019     06/03/2019    K 3.4 07/08/2019    K 3.6 06/09/2019    K 3.8 06/03/2019    K 4.8 05/30/2019    K 2.9 05/15/2019    K 3.4 05/15/2019    CL 97 07/08/2019    CL 98 06/09/2019    CL 97 06/03/2019    CO2 25 07/08/2019    CO2 26 06/09/2019    CO2 24 06/03/2019    PHOS 4.2 06/03/2019    PHOS 4.7 06/02/2019    PHOS 4.8 06/01/2019    BUN 6 07/08/2019    BUN 6 06/09/2019    BUN 14 06/03/2019    CREATININE <0.5 07/08/2019    CREATININE <0.5 06/09/2019    CREATININE <0.5 06/03/2019     BNP:   Lab Results   Component Value Date    PROBNP 204 06/09/2019     Lipids: No results found for: CHOL     Lab Results   Component Value Date    TRIG 232 (H) 05/22/2019      No results found for: HDL   No results found for: LDLCHOLESTEROL, LDLCALC   No results found for: LABVLDL, VLDL   No results found for: CHOLHDLRATIO    EF:   Lab Results   Component Value Date    LVEF 40 05/22/2019       Recent Testing:  Echo 5/15/19   Summary   Definity contrast administered.   Left ventricular systolic function is reduced with ejection fraction   estimated at 25-30 %.   Elevated left ventricular diastolic filling pressure: Septal E/e'' = 12.6   (for sinus tachycardia) .   Right ventricular systolic function is moderately reduced .   Mild mitral regurgitation.   Unable to obtain SPAP due to lack of tricuspid regurgitation.     Echo 5/22/19   Summary   This is a limited study for EF follow up.   Left ventricular systolic function is reduced with ejection fraction   estimated at 40 %.   There is mild to moderate global hypokinesis present.   There is mild concentric left ventricular hypertrophy.     NYHA:   III  ACC/ AHA Stage:    C

## 2019-07-22 NOTE — PROGRESS NOTES
hydroxide-simethicone (MAALOX ADVANCED MAX ST) 565-796-09 MG/5ML SUSP Take 15 mLs by mouth every 4 hours as needed (nausea or reflux) 7/8/19  Yes Jakub De La Paz MD   furosemide (LASIX) 40 MG tablet Take 1 tablet by mouth daily Okay to take an extra 20mg As needed for 3lbs weight gain in a day or 5lbs weight gain in a week 6/12/19  Yes JAYLYN Peña CNP   potassium chloride (KLOR-CON M) 20 MEQ extended release tablet Take 2 tablets by mouth daily 6/12/19  Yes JAYLYN Peña CNP   metoprolol succinate (TOPROL XL) 50 MG extended release tablet Take 1 tablet by mouth 2 times daily 6/12/19  Yes JAYLYN Peña CNP   magnesium oxide (MAG-OX) 400 (241.3 Mg) MG TABS tablet Take 1 tablet by mouth 2 times daily 6/12/19  Yes JAYLYN Peña CNP   albuterol sulfate  (90 Base) MCG/ACT inhaler Inhale 2 puffs into the lungs 3 times daily 5/9/19  Yes Historical Provider, MD   OLANZapine (ZYPREXA) 15 MG tablet Take 7.5 mg by mouth 3 times daily Indications: 1/2 tab every morning, 1/2 tab every evening and 1 tab at bedtime    Yes Historical Provider, MD   melatonin 5 MG TABS tablet Take 5 mg by mouth nightly   Yes Historical Provider, MD   gabapentin (NEURONTIN) 400 MG capsule Take 400 mg by mouth 3 times daily. Yes Historical Provider, MD   traZODone (DESYREL) 50 MG tablet Take by mouth nightly Indications: for insomnia, patient takes PRN when melatonin does not work, mother unsure of dose. Yes Historical Provider, MD        Allergies:  Tape [adhesive tape]     Review of Systems:   · Constitutional: there has been no unanticipated weight loss. · Eyes: No vision changes  · ENT: No Headaches, no nasal congestion. No mouth sores or sore throat. · Cardiovascular: Reviewed in HPI  · Respiratory: No cough or wheezing, no sputum production.   · Gastrointestinal: No abdominal pain, no constipation or diarrhea, + nausea and vomiting  · Genitourinary: No dysuria, trouble voiding, or hematuria. · Musculoskeletal:  No weakness or joint complaints. · Integumentary: No rash or pruritis. · Neurological: No numbness or tingling. No weakness. No tremor. · Psychiatric: No anxiety, no depression. · Endocrine:  No excessive thirst or urination. · Hematologic/Lymphatic: No abnormal bruising or bleeding, blood clots or swollen lymph nodes.     Physical Examination:    Vitals:    07/22/19 0948   BP: 108/82   Pulse: 82   SpO2: 98%   Weight: 184 lb 12.8 oz (83.8 kg)   Height: 5' (1.524 m)        Constitutional and General Appearance: no apparent distress  HEENT: non-icteric sclera, oropharynx without exudate, oral mucosa moist  Neck: JVP less than 8 cm H20  Respiratory:  · No use of accessory muscles  · Clear breath sounds throughout, no wheezing, no crackles, no rhonchi  Cardiovascular:  · The apical impulses not displaced  · Heart tones are crisp and normal, no murmur/rub/gallop  · Regular rate and rhythm, S1,S2 normal  · Radial pulses 2+ and equal bilaterally  · No edema, left leg with chronic atrophy   · Pedal Pulses: 2+ and equal   Abdomen:  · No masses or tenderness  · Liver: No Abnormalities Noted  Musculoskeletal/Skin:  · Gait is steady   · There is no clubbing, cyanosis of the extremities  · Skin is warm and dry  · Moves all extremities well  Neurological/Psychiatric:  · Alert and oriented in all spheres  · No abnormalities of mood, affect, mentation, or behavior are noted  · + short term memory deficit     Lab Data:  CBC:   Lab Results   Component Value Date    WBC 12.2 07/08/2019    WBC 9.5 06/09/2019    WBC 11.1 06/03/2019    RBC 4.37 07/08/2019    RBC 4.23 06/09/2019    RBC 4.10 06/03/2019    HGB 12.9 07/08/2019    HGB 12.4 06/09/2019    HGB 12.1 06/03/2019    HCT 38.3 07/08/2019    HCT 36.8 06/09/2019    HCT 35.9 06/03/2019    MCV 87.6 07/08/2019    MCV 86.9 06/09/2019    MCV 87.7 06/03/2019    RDW 12.8 07/08/2019    RDW 12.5 06/09/2019    RDW 12.7 06/03/2019     07/08/2019    PLT 420 06/09/2019     06/03/2019     Iron: No results found for: IRON, TIBC, FERRITIN  BMP:   Lab Results   Component Value Date     07/08/2019     06/09/2019     06/03/2019    K 3.4 07/08/2019    K 3.6 06/09/2019    K 3.8 06/03/2019    K 4.8 05/30/2019    K 2.9 05/15/2019    K 3.4 05/15/2019    CL 97 07/08/2019    CL 98 06/09/2019    CL 97 06/03/2019    CO2 25 07/08/2019    CO2 26 06/09/2019    CO2 24 06/03/2019    PHOS 4.2 06/03/2019    PHOS 4.7 06/02/2019    PHOS 4.8 06/01/2019    BUN 6 07/08/2019    BUN 6 06/09/2019    BUN 14 06/03/2019    CREATININE <0.5 07/08/2019    CREATININE <0.5 06/09/2019    CREATININE <0.5 06/03/2019     BNP:   Lab Results   Component Value Date    PROBNP 204 06/09/2019     Lipids: No results found for: CHOL     Lab Results   Component Value Date    TRIG 232 (H) 05/22/2019      No results found for: HDL   No results found for: LDLCHOLESTEROL, LDLCALC   No results found for: LABVLDL, VLDL   No results found for: CHOLHDLRATIO    EF:   Lab Results   Component Value Date    LVEF 40 05/22/2019       Recent Testing:  Echo 5/15/19   Summary   Definity contrast administered.   Left ventricular systolic function is reduced with ejection fraction   estimated at 25-30 %.   Elevated left ventricular diastolic filling pressure: Septal E/e'' = 12.6   (for sinus tachycardia) .   Right ventricular systolic function is moderately reduced .   Mild mitral regurgitation.   Unable to obtain SPAP due to lack of tricuspid regurgitation.     Echo 5/22/19   Summary   This is a limited study for EF follow up.   Left ventricular systolic function is reduced with ejection fraction   estimated at 40 %.   There is mild to moderate global hypokinesis present.   There is mild concentric left ventricular hypertrophy.     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

## 2019-08-01 RX ORDER — METOPROLOL SUCCINATE 50 MG/1
TABLET, EXTENDED RELEASE ORAL
Qty: 45 TABLET | Refills: 0 | Status: SHIPPED | OUTPATIENT
Start: 2019-08-01 | End: 2019-08-19 | Stop reason: SDUPTHER

## 2019-08-18 ENCOUNTER — HOSPITAL ENCOUNTER (OUTPATIENT)
Age: 34
Discharge: HOME | End: 2019-08-18
Payer: MEDICAID

## 2019-08-18 PROCEDURE — 80048 BASIC METABOLIC PNL TOTAL CA: CPT

## 2019-08-18 PROCEDURE — 36415 COLL VENOUS BLD VENIPUNCTURE: CPT

## 2019-08-18 PROCEDURE — 83735 ASSAY OF MAGNESIUM: CPT

## 2019-08-18 PROCEDURE — 83880 ASSAY OF NATRIURETIC PEPTIDE: CPT

## 2019-08-19 ENCOUNTER — TELEPHONE (OUTPATIENT)
Dept: CARDIOLOGY CLINIC | Age: 34
End: 2019-08-19

## 2019-08-19 ENCOUNTER — OFFICE VISIT (OUTPATIENT)
Dept: CARDIOLOGY CLINIC | Age: 34
End: 2019-08-19
Payer: MEDICAID

## 2019-08-19 ENCOUNTER — PROCEDURE VISIT (OUTPATIENT)
Dept: CARDIOLOGY CLINIC | Age: 34
End: 2019-08-19
Payer: MEDICAID

## 2019-08-19 VITALS
BODY MASS INDEX: 38.21 KG/M2 | SYSTOLIC BLOOD PRESSURE: 98 MMHG | HEIGHT: 60 IN | OXYGEN SATURATION: 97 % | DIASTOLIC BLOOD PRESSURE: 62 MMHG | HEART RATE: 72 BPM | WEIGHT: 194.6 LBS

## 2019-08-19 DIAGNOSIS — I42.8 NON-ISCHEMIC CARDIOMYOPATHY (HCC): Primary | ICD-10-CM

## 2019-08-19 DIAGNOSIS — I42.8 NON-ISCHEMIC CARDIOMYOPATHY (HCC): ICD-10-CM

## 2019-08-19 DIAGNOSIS — Z95.810 ICD (IMPLANTABLE CARDIOVERTER-DEFIBRILLATOR) IN PLACE: ICD-10-CM

## 2019-08-19 DIAGNOSIS — E87.8 ELECTROLYTE DISTURBANCE: ICD-10-CM

## 2019-08-19 DIAGNOSIS — I50.23 ACUTE ON CHRONIC SYSTOLIC CONGESTIVE HEART FAILURE (HCC): ICD-10-CM

## 2019-08-19 DIAGNOSIS — I48.91 ATRIAL FIBRILLATION WITH RVR (HCC): ICD-10-CM

## 2019-08-19 LAB
ANION GAP SERPL CALCULATED.3IONS-SCNC: 15 MMOL/L (ref 3–16)
BUN BLDV-MCNC: 9 MG/DL (ref 7–20)
CALCIUM SERPL-MCNC: 9.8 MG/DL (ref 8.3–10.6)
CHLORIDE BLD-SCNC: 95 MMOL/L (ref 99–110)
CO2: 28 MMOL/L (ref 21–32)
CREAT SERPL-MCNC: <0.5 MG/DL (ref 0.6–1.1)
GFR AFRICAN AMERICAN: >60
GFR NON-AFRICAN AMERICAN: >60
GLUCOSE BLD-MCNC: 80 MG/DL (ref 70–99)
MAGNESIUM: 1.2 MG/DL (ref 1.8–2.4)
POTASSIUM SERPL-SCNC: 3.6 MMOL/L (ref 3.5–5.1)
PRO-BNP: 274 PG/ML (ref 0–124)
SODIUM BLD-SCNC: 138 MMOL/L (ref 136–145)

## 2019-08-19 PROCEDURE — G8417 CALC BMI ABV UP PARAM F/U: HCPCS | Performed by: NURSE PRACTITIONER

## 2019-08-19 PROCEDURE — 93290 INTERROG DEV EVAL ICPMS IP: CPT | Performed by: INTERNAL MEDICINE

## 2019-08-19 PROCEDURE — 1036F TOBACCO NON-USER: CPT | Performed by: NURSE PRACTITIONER

## 2019-08-19 PROCEDURE — G8427 DOCREV CUR MEDS BY ELIG CLIN: HCPCS | Performed by: NURSE PRACTITIONER

## 2019-08-19 PROCEDURE — 99214 OFFICE O/P EST MOD 30 MIN: CPT | Performed by: NURSE PRACTITIONER

## 2019-08-19 RX ORDER — METOPROLOL SUCCINATE 50 MG/1
TABLET, EXTENDED RELEASE ORAL
Qty: 270 TABLET | Refills: 0 | Status: SHIPPED | OUTPATIENT
Start: 2019-08-19 | End: 2020-01-09

## 2019-08-19 RX ORDER — SPIRONOLACTONE 25 MG/1
25 TABLET ORAL DAILY
Qty: 30 TABLET | Refills: 1 | Status: CANCELLED | OUTPATIENT
Start: 2019-08-19

## 2019-08-19 RX ORDER — FUROSEMIDE 40 MG/1
60 TABLET ORAL DAILY
Qty: 180 TABLET | Refills: 1 | Status: SHIPPED | OUTPATIENT
Start: 2019-08-19 | End: 2020-01-09

## 2019-08-19 RX ORDER — SPIRONOLACTONE 25 MG/1
25 TABLET ORAL DAILY
Qty: 90 TABLET | Refills: 1 | Status: SHIPPED | OUTPATIENT
Start: 2019-08-19 | End: 2020-03-26

## 2019-08-19 NOTE — PROGRESS NOTES
[facility]   Cardiac Follow-up    Primary Care Doctor:  Josh Noe RN, NP    Chief Complaint   Patient presents with    Follow-up        History of Present Illness:   I had the pleasure of seeing Alexander Pittman in follow up for cardiomyopathy. Admitted to [facility] on 5/15/19 after Cardiopulmonary arrest, Vfib arrest, acute respiratory failure, aspiration, hemodynamic shock. Transferred to [facility] on 5/29/19 for ICD consideration. ICD placed on 6/4/19, required lead revision on 6/5/19. She was treated for Heart failure as well, admission weight was 199lbs, discharge weight down to 171lbs. No history of heart issues in the past per patient. 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 She was discharged on toprol 25mg in the am and 50mg in the PM, lasix changed to PRN and magnesium 400mg once a day   seen in the ED on 6/13/19 for bleeding at incision site. Since last visit, she was started on lisinopril for cardiomyopathy. She is doing well with the lisinopril. No lightheadedness or dizziness. Now taking the metoprolol at night due to the fatigue. Having anxitey. Issues with memory still.  + edema more in the left leg + more pain in the left ankle with the edema. She is eating well, drinking a lot of juice. Remains smoke free. Here with her mom. She is still frustrated that she is still having issues with her memory, she can't remember Keith. Weight is up 10lbs. Occasional panic attacks, dealing with anxiety, goes through [facility]. Seeing counselor,. Alexander Pittman describes symptoms including fatigue, edema but denies chest pain, palpitations, orthopnea, early saiety, syncope.          Past Medical History:   has a past medical history of Acute on chronic systolic congestive heart failure ([address] Arrhythmia, Arthritis, Bipolar disorder ([address] Cancer ([address] Cancer ([address] Cardiac arrest ([address] Cardiomyopathy ([address] Chronic kidney disease, COPD (chronic obstructive pulmonary disease) ([facility] Depression, E. 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 Surgical History:   has a past surgical history that includes Foot surgery; Cholecystectomy; bronchoscopy (N/A, 5/16/2019); bronchoscopy (5/16/2019); bronchoscopy (N/A, 5/19/2019); bronchoscopy (5/19/2019); bone marrow transplant; Leg Surgery; Growth plate surgery; Achilles tendon surgery; Endoscopy, colon, diagnostic; and Cholecystectomy (4/15/2014). Social History:   reports that she has quit smoking. She smoked 0.00 packs per day for 0.00 years. She has never used smokeless tobacco. She reports that she has current or past drug history. Drug: Marijuana. She reports that she does not drink alcohol. Family History:   Family History   Problem Relation Age of Onset    Emphysema Mother     Mental Illness Mother     Substance Abuse Mother     Heart Attack Father     Mental Illness Father     Arthritis Father     Heart Disease Father     High Blood Pressure Father     High Cholesterol Father        Home Medications:  Prior to Admission medications    Medication Sig Start Date End Date Taking?  Authorizing Provider   metoprolol succinate (TOPROL XL) 50 MG extended release tablet TAKE ONE-HALF TABLET BY MOUTH EVERY MORNING AND TAKE ONE TABLET BY MOUTH EVERY EVENING 8/1/19   JAYLYN Aguirre CNP   lisinopril (PRINIVIL;ZESTRIL) 2.5 MG tablet Take 1 tablet by mouth daily 7/22/19   JAYLYN Aguirre CNP   magnesium oxide (MAG-OX) 400 (241.3 Mg) MG TABS tablet 1 tab at noon and 1 tab in the evening 7/22/19   JAYLYN Aguirre CNP   potassium chloride (KLOR-CON M) 20 MEQ extended release tablet 1 tab twice a day 7/22/19   JAYLYN Aguirre CNP calcium carbonate (TUMS) 500 MG chewable tablet Take 1 tablet by mouth daily    Historical Provider, MD   sucralfate (CARAFATE) 1 GM tablet Take 1 tablet by mouth 4 times daily for 10 days 7/8/19 7/22/19  Gwendolyn Delgado MD   pantoprazole (PROTONIX) 40 MG tablet Take 1 tablet by mouth every morning (before breakfast) 7/8/19   Gwendolyn Delgado MD   aluminum & magnesium hydroxide-simethicone (MAALOX ADVANCED MAX ST) 841-938-23 MG/5ML SUSP Take 15 mLs by mouth every 4 hours as needed (nausea or reflux) 7/8/19   Gwendolyn Deglado MD   furosemide (LASIX) 40 MG tablet Take 1 tablet by mouth daily Okay to take an extra 20mg As needed for 3lbs weight gain in a day or 5lbs weight gain in a week 6/12/19   JAYLYN Ahmadi - RICHARD   albuterol sulfate  (90 Base) MCG/ACT inhaler Inhale 2 puffs into the lungs 3 times daily 5/9/19   Historical Provider, MD   OLANZapine (ZYPREXA) 15 MG tablet Take 7.5 mg by mouth 3 times daily Indications: 1/2 tab every morning, 1/2 tab every evening and 1 tab at bedtime     Historical Provider, MD   melatonin 5 MG TABS tablet Take 5 mg by mouth nightly    Historical Provider, MD   gabapentin (NEURONTIN) 400 MG capsule Take 400 mg by mouth 3 times daily. Historical Provider, MD   traZODone (DESYREL) 50 MG tablet Take by mouth nightly Indications: for insomnia, patient takes PRN when melatonin does not work, mother unsure of dose. Historical Provider, MD        Allergies:  Tape [adhesive tape]     Review of Systems:   · Constitutional: there has been no unanticipated weight loss. · Eyes: No vision changes  · ENT: No Headaches, no nasal congestion. No mouth sores or sore throat. · Cardiovascular: Reviewed in HPI  · Respiratory: No cough or wheezing, no sputum production. · Gastrointestinal: No abdominal pain, no constipation or diarrhea,   · Genitourinary: No dysuria, trouble voiding, or hematuria. · Musculoskeletal:  No weakness or joint complaints.   · Integumentary: No FERRITIN  BMP:   Lab Results   Component Value Date     07/17/2019     07/08/2019     06/09/2019    K 3.8 07/17/2019    K 3.4 07/08/2019    K 3.6 06/09/2019    K 4.8 05/30/2019    K 2.9 05/15/2019    K 3.4 05/15/2019    CL 97 07/17/2019    CL 97 07/08/2019    CL 98 06/09/2019    CO2 25 07/17/2019    CO2 25 07/08/2019    CO2 26 06/09/2019    PHOS 4.2 06/03/2019    PHOS 4.7 06/02/2019    PHOS 4.8 06/01/2019    BUN 7 07/17/2019    BUN 6 07/08/2019    BUN 6 06/09/2019    CREATININE 0.58 07/17/2019    CREATININE <0.5 07/08/2019    CREATININE <0.5 06/09/2019     BNP:   Lab Results   Component Value Date    PROBNP 204 06/09/2019     Lipids:   Lab Results   Component Value Date    CHOL 231 07/17/2019        Lab Results   Component Value Date    TRIG 193 07/17/2019        Lab Results   Component Value Date    HDL 52 07/17/2019        Lab Results   Component Value Date    LDLCALC 140 07/17/2019        Lab Results   Component Value Date    VLDL 39 07/17/2019      No results found for: CHOLHDLRATIO    EF:   Lab Results   Component Value Date    LVEF 40 05/22/2019       Recent Testing:  Echo 5/15/19   Summary   Definity contrast administered.   Left ventricular systolic function is reduced with ejection fraction   estimated at 25-30 %.   Elevated left ventricular diastolic filling pressure: Septal E/e'' = 12.6   (for sinus tachycardia) .   Right ventricular systolic function is moderately reduced .   Mild mitral regurgitation.   Unable to obtain SPAP due to lack of tricuspid regurgitation.     Echo 5/22/19   Summary   This is a limited study for EF follow up.   Left ventricular systolic function is reduced with ejection fraction   estimated at 40 %.   There is mild to moderate global hypokinesis present.   There is mild concentric left ventricular hypertrophy.     NYHA:   III  ACC/ AHA Stage:    C    Pertinent Problems:  [unfilled]-Fib cardiac arrest 5/14/19  ~Non-ischemic dilated cardiomyopathy due to Adriamycin for

## 2019-08-27 ENCOUNTER — TELEPHONE (OUTPATIENT)
Dept: CARDIOLOGY CLINIC | Age: 34
End: 2019-08-27

## 2019-08-27 NOTE — TELEPHONE ENCOUNTER
Patient called requesting clarification on her medication.  Patient is confused if she is to continue with Lasix and add spironolactone or discontinue the lasix

## 2019-09-04 NOTE — PROGRESS NOTES
[address]   Electrophysiology Note              Date:  September 5, 2019  Patient name: Avery Steiner  YOB: 1985    Primary Care physician: Melissa Caceres RN, NP    HISTORY OF PRESENT ILLNESS: The patient is a 37 y.o.  female with a history of VF arrest, NICM, sarcoma, and bipolar disorder. 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 Stress test in 2011 was negative per Dr. Pati Delarosa' note. She was admitted to [facility] in 5/2019 after being found unresponsive. She had a VF arrest, was intubated for aspiration pneumonia, and was transferred to [facility]. First echo in 5/2019 showed an EF of  25-30%. Repeat echo in 5/2019 showed an EF of 40%. In 6/2019 she had a secondary single chamber ICD implanted and required a lead revision that admission. Today she is being seen for VF and NICM. Device check shows normal function, less than 0.1% , no arrhythmias, and recent Optivol elevation (now normal). States she was recently diagnosed with an ulcer and complains of nausea over the past two weeks. Has been taking medications on an empty stomach. She denies chest pain, palpitations, shortness of breath, and dizziness. Past Medical History:   has a past medical history of Acute on chronic systolic congestive heart failure ([address] Arrhythmia, Arthritis, Bipolar disorder ([address] Cancer ([address] Cancer ([address] Cardiac arrest ([address] Cardiomyopathy ([address] Chronic kidney disease, COPD (chronic obstructive pulmonary disease) ([address] Depression, E. 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     Past Surgical History:   has a past surgical history that includes Foot surgery; Cholecystectomy; bronchoscopy (N/A, 5/16/2019); bronchoscopy (5/16/2019); bronchoscopy (N/A, 5/19/2019); bronchoscopy (5/19/2019); bone marrow transplant; Leg Surgery; Growth plate surgery; Achilles tendon surgery; Endoscopy, colon, diagnostic; and Cholecystectomy (4/15/2014). Home Medications:    Prior to Admission medications    Medication Sig Start Date End Date Taking?  Authorizing Provider   Atorvastatin Calcium (LIPITOR PO) Take by mouth daily   Yes Historical Provider, MD   Cholecalciferol (VITAMIN D PO) Take by mouth daily   Yes Historical Provider, MD   furosemide (LASIX) 40 MG tablet Take 1.5 tablets by mouth daily Okay to take an extra 20mg As needed for 3lbs weight gain in a day or 5lbs weight gain in a week 8/19/19  Yes JAYLYN Reinoso CNP   metoprolol succinate (TOPROL XL) 50 MG extended release tablet TAKE ONE-HALF TABLET BY MOUTH EVERY MORNING AND TAKE ONE TABLET BY MOUTH EVERY EVENING  Patient taking differently: 75 mg TAKE ONE-HALF TABLET BY MOUTH EVERY MORNING AND TAKE ONE TABLET BY MOUTH EVERY EVENING 8/19/19  Yes JAYLYN Reinoso CNP   spironolactone (ALDACTONE) 25 MG tablet Take 1 tablet by mouth daily 8/19/19  Yes JAYLYN Reinoso CNP   lisinopril (PRINIVIL;ZESTRIL) 2.5 MG tablet Take 1 tablet by mouth daily 7/22/19  Yes JAYLYN Reinoso CNP   magnesium oxide (MAG-OX) 400 (241.3 Mg) MG TABS tablet 1 tab at noon and 1 tab in the evening 7/22/19  Yes JAYLYN Reinoso CNP   potassium chloride (KLOR-CON M) 20 MEQ extended release tablet 1 tab twice a day  Patient taking differently: Take 40 mEq by mouth daily 2 tabs am, 1 tab pm 7/22/19  Yes JAYLYN Reinoso CNP   pantoprazole (PROTONIX) 40 MG tablet Take 1 tablet by mouth every morning (before breakfast) 7/8/19  Yes Benjamín Anguiano MD   aluminum & magnesium hydroxide-simethicone (MAALOX ADVANCED MAX ST) 232-785-70 MG/5ML SUSP Take 15 mLs by mouth every 4 hours as needed (nausea or reflux) 7/8/19  Yes Manisha Reyez MD   albuterol sulfate  (90 Base) MCG/ACT inhaler Inhale 2 puffs into the lungs 3 times daily 5/9/19  Yes Historical Provider, MD   OLANZapine (ZYPREXA) 15 MG tablet Take 7.5 mg by mouth 3 times daily Indications: 1/2 tab every morning, 1/2 tab every evening and 1 tab at bedtime    Yes Historical Provider, MD   melatonin 5 MG TABS tablet Take 5 mg by mouth nightly   Yes Historical Provider, MD   gabapentin (NEURONTIN) 400 MG capsule Take 400 mg by mouth 3 times daily. Yes Historical Provider, MD   traZODone (DESYREL) 50 MG tablet Take by mouth nightly Indications: for insomnia, patient takes PRN when melatonin does not work, mother unsure of dose. Yes Historical Provider, MD       Allergies:  Tape [adhesive tape]    Social History:   reports that she has quit smoking. She smoked 0.00 packs per day for 0.00 years. She has never used smokeless tobacco. She reports that she has current or past drug history. Drug: Marijuana. She reports that she does not drink alcohol. Family History: family history includes Arthritis in her father; Emphysema in her mother; Heart Attack in her father; Heart Disease in her father; High Blood Pressure in her father; High Cholesterol in her father; Mental Illness in her father and mother; Substance Abuse in her mother. Review of Systems   Constitutional: Negative. HENT: Negative. Eyes: Negative. Respiratory: Negative. Cardiovascular: see HPI  Gastrointestinal: + nausea  Genitourinary: Negative. Musculoskeletal: Negative. Skin: Negative. Neurological: Negative. Hematological: Negative. Psychiatric/Behavioral: Negative. PHYSICAL EXAM:    Vital signs:    BP 96/62   Pulse 80   Ht 5' (1.524 m)   Wt 192 lb (87.1 kg)   BMI 37.50 kg/m²      Constitutional and general appearance: alert, cooperative, no distress and appears stated age  [de-identified]: PERRL, no cervical lymphadenopathy. No masses palpable.  Normal oral No results for input(s): NA, K, CO2, BUN, CREATININE, LABGLOM, GLUCOSE in the last 72 hours. PT/INR: No results for input(s): PROTIME, INR in the last 72 hours. APTT:No results for input(s): APTT in the last 72 hours. FASTING LIPID PANEL:  Lab Results   Component Value Date    HDL 52 07/17/2019    LDLCALC 140 07/17/2019    TRIG 193 07/17/2019     LIVER PROFILE:No results for input(s): AST, ALT, ALB in the last 72 hours. Assessment:   1. Ventricular fibrillation: stable   -noted 5/2019   -s/p single chamber ICD implant then lead repositioning 6/2019    -device check today shows normal function, less than 0.1% , no arrhythmias, and recent Optivol elevation (now normal)  2. NICM: ongoing   -stress test negative 2011   -due to Adriamycin used for sarcoma of the leg as child   -followed by heart failure team   3. Bipolar disorder  4. Memory loss: reported after cardiac arrest   5. Gastric ulcer: ongoing, followed by GI    Plan:   1. Continue Toprol, lisinopril, Lasix, and spironolactone (unable to up titrate today due to soft BP)  2. Remote device transmissions every 3 months   3. Recommend taking medications with food  4.  Follow up in 1 year with EP and follow up as scheduled with heart failure team    Ban Hightower, [address]  [phone]

## 2019-09-05 ENCOUNTER — HOSPITAL ENCOUNTER (OUTPATIENT)
Age: 34
Discharge: HOME | End: 2019-09-05
Payer: MEDICAID

## 2019-09-05 ENCOUNTER — NURSE ONLY (OUTPATIENT)
Dept: CARDIOLOGY CLINIC | Age: 34
End: 2019-09-05
Payer: MEDICAID

## 2019-09-05 ENCOUNTER — OFFICE VISIT (OUTPATIENT)
Dept: CARDIOLOGY CLINIC | Age: 34
End: 2019-09-05
Payer: MEDICAID

## 2019-09-05 VITALS
HEART RATE: 80 BPM | SYSTOLIC BLOOD PRESSURE: 96 MMHG | HEIGHT: 60 IN | DIASTOLIC BLOOD PRESSURE: 62 MMHG | WEIGHT: 192 LBS | BODY MASS INDEX: 37.69 KG/M2

## 2019-09-05 DIAGNOSIS — I49.01 CARDIAC ARREST WITH VENTRICULAR FIBRILLATION (HCC): ICD-10-CM

## 2019-09-05 DIAGNOSIS — Z95.810 ICD (IMPLANTABLE CARDIOVERTER-DEFIBRILLATOR) IN PLACE: ICD-10-CM

## 2019-09-05 DIAGNOSIS — I42.8 NON-ISCHEMIC CARDIOMYOPATHY (HCC): ICD-10-CM

## 2019-09-05 DIAGNOSIS — I50.23 ACUTE ON CHRONIC SYSTOLIC CONGESTIVE HEART FAILURE (HCC): ICD-10-CM

## 2019-09-05 DIAGNOSIS — I46.9 CARDIAC ARREST WITH VENTRICULAR FIBRILLATION (HCC): ICD-10-CM

## 2019-09-05 DIAGNOSIS — I42.8 NON-ISCHEMIC CARDIOMYOPATHY (HCC): Primary | ICD-10-CM

## 2019-09-05 LAB
ANION GAP SERPL CALCULATED.3IONS-SCNC: 19 MMOL/L (ref 3–16)
BUN BLDV-MCNC: 11 MG/DL (ref 7–20)
CALCIUM SERPL-MCNC: 10.8 MG/DL (ref 8.3–10.6)
CHLORIDE BLD-SCNC: 95 MMOL/L (ref 99–110)
CO2: 25 MMOL/L (ref 21–32)
CREAT SERPL-MCNC: <0.5 MG/DL (ref 0.6–1.1)
GFR AFRICAN AMERICAN: >60
GFR NON-AFRICAN AMERICAN: >60
GLUCOSE BLD-MCNC: 75 MG/DL (ref 70–99)
MAGNESIUM: 1.4 MG/DL (ref 1.8–2.4)
POTASSIUM SERPL-SCNC: 4.2 MMOL/L (ref 3.5–5.1)
PRO-BNP: 128 PG/ML (ref 0–124)
SODIUM BLD-SCNC: 139 MMOL/L (ref 136–145)

## 2019-09-05 PROCEDURE — G8417 CALC BMI ABV UP PARAM F/U: HCPCS | Performed by: NURSE PRACTITIONER

## 2019-09-05 PROCEDURE — 83880 ASSAY OF NATRIURETIC PEPTIDE: CPT

## 2019-09-05 PROCEDURE — 93290 INTERROG DEV EVAL ICPMS IP: CPT | Performed by: NURSE PRACTITIONER

## 2019-09-05 PROCEDURE — 99213 OFFICE O/P EST LOW 20 MIN: CPT | Performed by: NURSE PRACTITIONER

## 2019-09-05 PROCEDURE — 93282 PRGRMG EVAL IMPLANTABLE DFB: CPT | Performed by: INTERNAL MEDICINE

## 2019-09-05 PROCEDURE — 80048 BASIC METABOLIC PNL TOTAL CA: CPT

## 2019-09-05 PROCEDURE — G8427 DOCREV CUR MEDS BY ELIG CLIN: HCPCS | Performed by: NURSE PRACTITIONER

## 2019-09-05 PROCEDURE — 1036F TOBACCO NON-USER: CPT | Performed by: NURSE PRACTITIONER

## 2019-09-05 PROCEDURE — 36415 COLL VENOUS BLD VENIPUNCTURE: CPT

## 2019-09-05 PROCEDURE — 83735 ASSAY OF MAGNESIUM: CPT

## 2019-09-06 ENCOUNTER — TELEPHONE (OUTPATIENT)
Dept: CARDIOLOGY CLINIC | Age: 34
End: 2019-09-06

## 2019-10-01 ENCOUNTER — TELEPHONE (OUTPATIENT)
Dept: CARDIOLOGY CLINIC | Age: 34
End: 2019-10-01

## 2019-10-01 ENCOUNTER — OFFICE VISIT (OUTPATIENT)
Dept: CARDIOLOGY CLINIC | Age: 34
End: 2019-10-01
Payer: MEDICAID

## 2019-10-01 ENCOUNTER — NURSE ONLY (OUTPATIENT)
Dept: CARDIOLOGY CLINIC | Age: 34
End: 2019-10-01

## 2019-10-01 ENCOUNTER — PROCEDURE VISIT (OUTPATIENT)
Dept: CARDIOLOGY CLINIC | Age: 34
End: 2019-10-01
Payer: MEDICAID

## 2019-10-01 VITALS
HEART RATE: 85 BPM | BODY MASS INDEX: 37.89 KG/M2 | WEIGHT: 193 LBS | DIASTOLIC BLOOD PRESSURE: 62 MMHG | HEIGHT: 60 IN | OXYGEN SATURATION: 98 % | SYSTOLIC BLOOD PRESSURE: 88 MMHG

## 2019-10-01 DIAGNOSIS — I50.22 CHRONIC SYSTOLIC CONGESTIVE HEART FAILURE (HCC): Primary | ICD-10-CM

## 2019-10-01 DIAGNOSIS — R06.02 SOB (SHORTNESS OF BREATH): ICD-10-CM

## 2019-10-01 DIAGNOSIS — I42.8 NON-ISCHEMIC CARDIOMYOPATHY (HCC): ICD-10-CM

## 2019-10-01 DIAGNOSIS — R00.0 TACHYCARDIA: ICD-10-CM

## 2019-10-01 DIAGNOSIS — I49.01 CARDIAC ARREST WITH VENTRICULAR FIBRILLATION (HCC): ICD-10-CM

## 2019-10-01 DIAGNOSIS — I46.9 CARDIAC ARREST WITH VENTRICULAR FIBRILLATION (HCC): ICD-10-CM

## 2019-10-01 DIAGNOSIS — Z95.810 ICD (IMPLANTABLE CARDIOVERTER-DEFIBRILLATOR) IN PLACE: ICD-10-CM

## 2019-10-01 DIAGNOSIS — I50.22 CHRONIC SYSTOLIC CONGESTIVE HEART FAILURE (HCC): ICD-10-CM

## 2019-10-01 DIAGNOSIS — R05.9 COUGH: ICD-10-CM

## 2019-10-01 PROBLEM — G93.40 ACUTE ENCEPHALOPATHY: Status: RESOLVED | Noted: 2019-05-25 | Resolved: 2019-10-01

## 2019-10-01 PROCEDURE — 93290 INTERROG DEV EVAL ICPMS IP: CPT | Performed by: INTERNAL MEDICINE

## 2019-10-01 PROCEDURE — G8417 CALC BMI ABV UP PARAM F/U: HCPCS | Performed by: INTERNAL MEDICINE

## 2019-10-01 PROCEDURE — G8427 DOCREV CUR MEDS BY ELIG CLIN: HCPCS | Performed by: INTERNAL MEDICINE

## 2019-10-01 PROCEDURE — G8484 FLU IMMUNIZE NO ADMIN: HCPCS | Performed by: INTERNAL MEDICINE

## 2019-10-01 PROCEDURE — 99244 OFF/OP CNSLTJ NEW/EST MOD 40: CPT | Performed by: INTERNAL MEDICINE

## 2019-10-01 RX ORDER — AMOXICILLIN 500 MG/1
500 CAPSULE ORAL 3 TIMES DAILY
Qty: 30 CAPSULE | Refills: 0 | Status: SHIPPED | OUTPATIENT
Start: 2019-10-01 | End: 2019-10-11

## 2019-10-15 ENCOUNTER — NURSE ONLY (OUTPATIENT)
Dept: CARDIOLOGY CLINIC | Age: 34
End: 2019-10-15

## 2019-10-15 DIAGNOSIS — Z95.810 ICD (IMPLANTABLE CARDIOVERTER-DEFIBRILLATOR) IN PLACE: ICD-10-CM

## 2019-10-21 RX ORDER — POTASSIUM CHLORIDE 1500 MG/1
TABLET, FILM COATED, EXTENDED RELEASE ORAL
Qty: 180 TABLET | Refills: 3 | Status: SHIPPED | OUTPATIENT
Start: 2019-10-21 | End: 2020-04-16

## 2019-10-30 ENCOUNTER — OFFICE VISIT (OUTPATIENT)
Dept: CARDIOLOGY CLINIC | Age: 34
End: 2019-10-30
Payer: MEDICAID

## 2019-10-30 ENCOUNTER — NURSE ONLY (OUTPATIENT)
Dept: CARDIOLOGY CLINIC | Age: 34
End: 2019-10-30
Payer: MEDICAID

## 2019-10-30 ENCOUNTER — TELEPHONE (OUTPATIENT)
Dept: CARDIOLOGY CLINIC | Age: 34
End: 2019-10-30

## 2019-10-30 VITALS
HEIGHT: 60 IN | HEART RATE: 84 BPM | DIASTOLIC BLOOD PRESSURE: 74 MMHG | SYSTOLIC BLOOD PRESSURE: 96 MMHG | WEIGHT: 193 LBS | BODY MASS INDEX: 37.89 KG/M2 | OXYGEN SATURATION: 98 % | RESPIRATION RATE: 16 BRPM

## 2019-10-30 DIAGNOSIS — I42.8 NON-ISCHEMIC CARDIOMYOPATHY (HCC): ICD-10-CM

## 2019-10-30 DIAGNOSIS — I46.9 CARDIAC ARREST WITH VENTRICULAR FIBRILLATION (HCC): Primary | ICD-10-CM

## 2019-10-30 DIAGNOSIS — Z95.810 ICD (IMPLANTABLE CARDIOVERTER-DEFIBRILLATOR) IN PLACE: ICD-10-CM

## 2019-10-30 DIAGNOSIS — I49.01 CARDIAC ARREST WITH VENTRICULAR FIBRILLATION (HCC): Primary | ICD-10-CM

## 2019-10-30 DIAGNOSIS — I49.01 CARDIAC ARREST WITH VENTRICULAR FIBRILLATION (HCC): ICD-10-CM

## 2019-10-30 DIAGNOSIS — I46.9 CARDIAC ARREST WITH VENTRICULAR FIBRILLATION (HCC): ICD-10-CM

## 2019-10-30 DIAGNOSIS — R00.0 TACHYCARDIA: ICD-10-CM

## 2019-10-30 PROCEDURE — 99214 OFFICE O/P EST MOD 30 MIN: CPT | Performed by: INTERNAL MEDICINE

## 2019-10-30 PROCEDURE — 93282 PRGRMG EVAL IMPLANTABLE DFB: CPT | Performed by: INTERNAL MEDICINE

## 2019-10-30 PROCEDURE — G8427 DOCREV CUR MEDS BY ELIG CLIN: HCPCS | Performed by: INTERNAL MEDICINE

## 2019-10-30 PROCEDURE — 1036F TOBACCO NON-USER: CPT | Performed by: INTERNAL MEDICINE

## 2019-10-30 PROCEDURE — G8417 CALC BMI ABV UP PARAM F/U: HCPCS | Performed by: INTERNAL MEDICINE

## 2019-10-30 PROCEDURE — G8484 FLU IMMUNIZE NO ADMIN: HCPCS | Performed by: INTERNAL MEDICINE

## 2019-10-31 DIAGNOSIS — Z79.899 MEDICATION MANAGEMENT: Primary | ICD-10-CM

## 2019-11-05 ENCOUNTER — TELEPHONE (OUTPATIENT)
Dept: CARDIOLOGY CLINIC | Age: 34
End: 2019-11-05

## 2019-11-06 ENCOUNTER — HOSPITAL ENCOUNTER (EMERGENCY)
Age: 34
Discharge: HOME | End: 2019-11-06
Attending: EMERGENCY MEDICINE
Payer: MEDICAID

## 2019-11-06 ENCOUNTER — APPOINTMENT (OUTPATIENT)
Dept: CT IMAGING | Age: 34
End: 2019-11-06
Payer: MEDICAID

## 2019-11-06 ENCOUNTER — APPOINTMENT (OUTPATIENT)
Dept: GENERAL RADIOLOGY | Age: 34
End: 2019-11-06
Payer: MEDICAID

## 2019-11-06 VITALS
RESPIRATION RATE: 16 BRPM | WEIGHT: 190 LBS | BODY MASS INDEX: 37.11 KG/M2 | OXYGEN SATURATION: 95 % | DIASTOLIC BLOOD PRESSURE: 59 MMHG | TEMPERATURE: 98.1 F | SYSTOLIC BLOOD PRESSURE: 90 MMHG | HEART RATE: 81 BPM

## 2019-11-06 DIAGNOSIS — R07.89 ATYPICAL CHEST PAIN: Primary | ICD-10-CM

## 2019-11-06 LAB
A/G RATIO: 1.1 (ref 1.1–2.2)
ALBUMIN SERPL-MCNC: 4 G/DL (ref 3.4–5)
ALP BLD-CCNC: 101 U/L (ref 40–129)
ALT SERPL-CCNC: 112 U/L (ref 10–40)
ANION GAP SERPL CALCULATED.3IONS-SCNC: 13 MMOL/L (ref 3–16)
AST SERPL-CCNC: 81 U/L (ref 15–37)
BASE EXCESS VENOUS: 3.8 MMOL/L (ref -3–3)
BASOPHILS ABSOLUTE: 0.1 K/UL (ref 0–0.2)
BASOPHILS RELATIVE PERCENT: 0.6 %
BILIRUB SERPL-MCNC: 0.7 MG/DL (ref 0–1)
BUN BLDV-MCNC: 9 MG/DL (ref 7–20)
CALCIUM SERPL-MCNC: 9.7 MG/DL (ref 8.3–10.6)
CARBOXYHEMOGLOBIN: 1.3 % (ref 0–1.5)
CHLORIDE BLD-SCNC: 95 MMOL/L (ref 99–110)
CO2: 27 MMOL/L (ref 21–32)
CREAT SERPL-MCNC: <0.5 MG/DL (ref 0.6–1.1)
EOSINOPHILS ABSOLUTE: 0.2 K/UL (ref 0–0.6)
EOSINOPHILS RELATIVE PERCENT: 1.5 %
GFR AFRICAN AMERICAN: >60
GFR NON-AFRICAN AMERICAN: >60
GLOBULIN: 3.8 G/DL
GLUCOSE BLD-MCNC: 89 MG/DL (ref 70–99)
HCG QUALITATIVE: NEGATIVE
HCO3 VENOUS: 30.1 MMOL/L (ref 23–29)
HCT VFR BLD CALC: 38.6 % (ref 36–48)
HEMOGLOBIN: 12.8 G/DL (ref 12–16)
LACTIC ACID: 0.8 MMOL/L (ref 0.4–2)
LYMPHOCYTES ABSOLUTE: 3.1 K/UL (ref 1–5.1)
LYMPHOCYTES RELATIVE PERCENT: 31.3 %
MCH RBC QN AUTO: 28.2 PG (ref 26–34)
MCHC RBC AUTO-ENTMCNC: 33.2 G/DL (ref 31–36)
MCV RBC AUTO: 85.2 FL (ref 80–100)
METHEMOGLOBIN VENOUS: 0.5 %
MONOCYTES ABSOLUTE: 0.8 K/UL (ref 0–1.3)
MONOCYTES RELATIVE PERCENT: 8.7 %
NEUTROPHILS ABSOLUTE: 5.7 K/UL (ref 1.7–7.7)
NEUTROPHILS RELATIVE PERCENT: 57.9 %
O2 CONTENT, VEN: 15 VOL %
O2 SAT, VEN: 81 %
O2 THERAPY: ABNORMAL
PCO2, VEN: 51.9 MMHG (ref 40–50)
PDW BLD-RTO: 12.7 % (ref 12.4–15.4)
PH VENOUS: 7.38 (ref 7.35–7.45)
PLATELET # BLD: 326 K/UL (ref 135–450)
PMV BLD AUTO: 7.2 FL (ref 5–10.5)
PO2, VEN: 45.6 MMHG (ref 25–40)
POTASSIUM SERPL-SCNC: 3.8 MMOL/L (ref 3.5–5.1)
PRO-BNP: 250 PG/ML (ref 0–124)
RBC # BLD: 4.53 M/UL (ref 4–5.2)
SODIUM BLD-SCNC: 135 MMOL/L (ref 136–145)
TCO2 CALC VENOUS: 32 MMOL/L
TOTAL PROTEIN: 7.8 G/DL (ref 6.4–8.2)
TROPONIN: <0.01 NG/ML
WBC # BLD: 9.8 K/UL (ref 4–11)

## 2019-11-06 PROCEDURE — 71046 X-RAY EXAM CHEST 2 VIEWS: CPT

## 2019-11-06 PROCEDURE — 82803 BLOOD GASES ANY COMBINATION: CPT

## 2019-11-06 PROCEDURE — 84484 ASSAY OF TROPONIN QUANT: CPT

## 2019-11-06 PROCEDURE — 80053 COMPREHEN METABOLIC PANEL: CPT

## 2019-11-06 PROCEDURE — 83605 ASSAY OF LACTIC ACID: CPT

## 2019-11-06 PROCEDURE — 71260 CT THORAX DX C+: CPT

## 2019-11-06 PROCEDURE — 84703 CHORIONIC GONADOTROPIN ASSAY: CPT

## 2019-11-06 PROCEDURE — 6360000004 HC RX CONTRAST MEDICATION: Performed by: EMERGENCY MEDICINE

## 2019-11-06 PROCEDURE — 99285 EMERGENCY DEPT VISIT HI MDM: CPT

## 2019-11-06 PROCEDURE — 36415 COLL VENOUS BLD VENIPUNCTURE: CPT

## 2019-11-06 PROCEDURE — 96375 TX/PRO/DX INJ NEW DRUG ADDON: CPT

## 2019-11-06 PROCEDURE — 87040 BLOOD CULTURE FOR BACTERIA: CPT

## 2019-11-06 PROCEDURE — 96374 THER/PROPH/DIAG INJ IV PUSH: CPT

## 2019-11-06 PROCEDURE — 6360000002 HC RX W HCPCS: Performed by: EMERGENCY MEDICINE

## 2019-11-06 PROCEDURE — 85025 COMPLETE CBC W/AUTO DIFF WBC: CPT

## 2019-11-06 PROCEDURE — 83880 ASSAY OF NATRIURETIC PEPTIDE: CPT

## 2019-11-06 PROCEDURE — 93005 ELECTROCARDIOGRAM TRACING: CPT | Performed by: EMERGENCY MEDICINE

## 2019-11-06 RX ORDER — ORPHENADRINE CITRATE 30 MG/ML
30 INJECTION INTRAMUSCULAR; INTRAVENOUS ONCE
Status: COMPLETED | OUTPATIENT
Start: 2019-11-06 | End: 2019-11-06

## 2019-11-06 RX ORDER — FENTANYL CITRATE 50 UG/ML
50 INJECTION, SOLUTION INTRAMUSCULAR; INTRAVENOUS ONCE
Status: COMPLETED | OUTPATIENT
Start: 2019-11-06 | End: 2019-11-06

## 2019-11-06 RX ORDER — KETOROLAC TROMETHAMINE 30 MG/ML
30 INJECTION, SOLUTION INTRAMUSCULAR; INTRAVENOUS ONCE
Status: COMPLETED | OUTPATIENT
Start: 2019-11-06 | End: 2019-11-06

## 2019-11-06 RX ADMIN — KETOROLAC TROMETHAMINE 30 MG: 30 INJECTION, SOLUTION INTRAMUSCULAR at 13:51

## 2019-11-06 RX ADMIN — FENTANYL CITRATE 50 MCG: 50 INJECTION, SOLUTION INTRAMUSCULAR; INTRAVENOUS at 13:50

## 2019-11-06 RX ADMIN — ORPHENADRINE CITRATE 30 MG: 30 INJECTION INTRAMUSCULAR; INTRAVENOUS at 13:51

## 2019-11-06 RX ADMIN — IOPAMIDOL 75 ML: 755 INJECTION, SOLUTION INTRAVENOUS at 13:38

## 2019-11-06 ASSESSMENT — PAIN SCALES - GENERAL
PAINLEVEL_OUTOF10: 6
PAINLEVEL_OUTOF10: 5

## 2019-11-06 ASSESSMENT — PAIN DESCRIPTION - LOCATION: LOCATION: CHEST

## 2019-11-06 ASSESSMENT — PAIN DESCRIPTION - PAIN TYPE: TYPE: ACUTE PAIN

## 2019-11-08 LAB
EKG ATRIAL RATE: 75 BPM
EKG DIAGNOSIS: NORMAL
EKG P AXIS: 38 DEGREES
EKG P-R INTERVAL: 166 MS
EKG Q-T INTERVAL: 378 MS
EKG QRS DURATION: 74 MS
EKG QTC CALCULATION (BAZETT): 422 MS
EKG R AXIS: 6 DEGREES
EKG T AXIS: 19 DEGREES
EKG VENTRICULAR RATE: 75 BPM

## 2019-11-08 PROCEDURE — 93010 ELECTROCARDIOGRAM REPORT: CPT | Performed by: INTERNAL MEDICINE

## 2019-11-11 LAB
BLOOD CULTURE, ROUTINE: NORMAL
CULTURE, BLOOD 2: NORMAL

## 2019-11-21 RX ORDER — LISINOPRIL 2.5 MG/1
TABLET ORAL
Qty: 90 TABLET | Refills: 3 | Status: SHIPPED | OUTPATIENT
Start: 2019-11-21 | End: 2020-11-11 | Stop reason: SDUPTHER

## 2019-12-12 ENCOUNTER — TELEPHONE (OUTPATIENT)
Dept: CARDIOLOGY CLINIC | Age: 34
End: 2019-12-12

## 2019-12-17 ENCOUNTER — NURSE ONLY (OUTPATIENT)
Dept: CARDIOLOGY CLINIC | Age: 34
End: 2019-12-17

## 2019-12-17 DIAGNOSIS — Z95.810 ICD (IMPLANTABLE CARDIOVERTER-DEFIBRILLATOR) IN PLACE: ICD-10-CM

## 2020-01-09 ENCOUNTER — TELEPHONE (OUTPATIENT)
Dept: CARDIOLOGY CLINIC | Age: 35
End: 2020-01-09

## 2020-01-09 ENCOUNTER — OFFICE VISIT (OUTPATIENT)
Dept: CARDIOLOGY CLINIC | Age: 35
End: 2020-01-09
Payer: MEDICAID

## 2020-01-09 VITALS
OXYGEN SATURATION: 96 % | SYSTOLIC BLOOD PRESSURE: 102 MMHG | HEART RATE: 84 BPM | HEIGHT: 60 IN | WEIGHT: 197 LBS | DIASTOLIC BLOOD PRESSURE: 62 MMHG | BODY MASS INDEX: 38.68 KG/M2

## 2020-01-09 PROCEDURE — G8427 DOCREV CUR MEDS BY ELIG CLIN: HCPCS | Performed by: NURSE PRACTITIONER

## 2020-01-09 PROCEDURE — 1036F TOBACCO NON-USER: CPT | Performed by: NURSE PRACTITIONER

## 2020-01-09 PROCEDURE — 99214 OFFICE O/P EST MOD 30 MIN: CPT | Performed by: NURSE PRACTITIONER

## 2020-01-09 PROCEDURE — G8484 FLU IMMUNIZE NO ADMIN: HCPCS | Performed by: NURSE PRACTITIONER

## 2020-01-09 PROCEDURE — G8417 CALC BMI ABV UP PARAM F/U: HCPCS | Performed by: NURSE PRACTITIONER

## 2020-01-09 RX ORDER — FUROSEMIDE 40 MG/1
TABLET ORAL
Qty: 180 TABLET | Refills: 1
Start: 2020-01-09 | End: 2020-04-16

## 2020-01-09 RX ORDER — METOPROLOL SUCCINATE 50 MG/1
TABLET, EXTENDED RELEASE ORAL
Qty: 270 TABLET | Refills: 2
Start: 2020-01-09 | End: 2020-02-28

## 2020-01-09 NOTE — PROGRESS NOTES
EVERY EVENING  Patient taking differently: 75 mg TAKE ONE-HALF TABLET BY MOUTH EVERY MORNING AND TAKE ONE TABLET BY MOUTH EVERY EVENING 8/19/19  Yes JAYLYN Kuo CNP   spironolactone (ALDACTONE) 25 MG tablet Take 1 tablet by mouth daily 8/19/19  Yes JAYLYN Kuo CNP   magnesium oxide (MAG-OX) 400 (241.3 Mg) MG TABS tablet 1 tab at noon and 1 tab in the evening 7/22/19  Yes JAYLYN Kuo CNP   pantoprazole (PROTONIX) 40 MG tablet Take 1 tablet by mouth every morning (before breakfast) 7/8/19  Yes Mil Magallon MD   aluminum & magnesium hydroxide-simethicone (MAALOX ADVANCED MAX ST) 400-400-40 MG/5ML SUSP Take 15 mLs by mouth every 4 hours as needed (nausea or reflux) 7/8/19  Yes Mil Magallon MD   albuterol sulfate  (90 Base) MCG/ACT inhaler Inhale 2 puffs into the lungs 3 times daily 5/9/19  Yes Historical Provider, MD   OLANZapine (ZYPREXA) 15 MG tablet Take 7.5 mg by mouth 3 times daily Indications: 1/2 tab every morning, 1/2 tab every evening and 1 tab at bedtime    Yes Historical Provider, MD   melatonin 5 MG TABS tablet Take 5 mg by mouth nightly   Yes Historical Provider, MD   gabapentin (NEURONTIN) 400 MG capsule Take 400 mg by mouth 3 times daily. Yes Historical Provider, MD        Allergies:  Tape [adhesive tape]     Review of Systems:   · Constitutional: there has been no unanticipated weight loss. · Eyes: No vision changes  · ENT: No Headaches, no nasal congestion. No mouth sores or sore throat. · Cardiovascular: Reviewed in HPI  · Respiratory: No cough or wheezing, no sputum production. · Gastrointestinal: No abdominal pain, no constipation or diarrhea,   · Genitourinary: No dysuria, trouble voiding, or hematuria. · Musculoskeletal:  No weakness or joint complaints. · Integumentary: No rash or pruritis. · Neurological: No numbness or tingling. No weakness. No tremor. · Psychiatric: + anxiety, no depression.   · Endocrine:  No excessive thirst or urination. · Hematologic/Lymphatic: No abnormal bruising or bleeding, blood clots or swollen lymph nodes.     Physical Examination:    Vitals:    01/09/20 1350   BP: 102/62   Pulse: 84   SpO2: 96%   Weight: 197 lb (89.4 kg)   Height: 5' (1.524 m)        Constitutional and General Appearance: no apparent distress  HEENT: non-icteric sclera, oropharynx without exudate, oral mucosa moist  Neck: no JVD  Respiratory:  · No use of accessory muscles  · Dim breath sounds throughout, no wheezing, no crackles, no rhonchi  Cardiovascular:  · The apical impulses not displaced  ·  no murmur/rub/gallop  · Regular rate and rhythm, S1,S2 normal  · Radial pulses 2+ and equal bilaterally  · Trace BLE edema  · Pedal Pulses: 2+ and equal   Abdomen:  · No masses or tenderness  · Liver: No Abnormalities Noted  Musculoskeletal/Skin:  · Gait is steady   · There is no clubbing, cyanosis of the extremities  · Skin is warm and dry  · Moves all extremities well  Neurological/Psychiatric:  · Alert and oriented in all spheres  · No abnormalities of mood, affect, mentation, or behavior are noted  · + short term memory deficit     Lab Data:  CBC:   Lab Results   Component Value Date    WBC 9.8 11/06/2019    WBC 8.1 07/17/2019    WBC 12.2 07/08/2019    RBC 4.53 11/06/2019    RBC 4.87 07/17/2019    RBC 4.37 07/08/2019    HGB 12.8 11/06/2019    HGB 13.6 07/17/2019    HGB 12.9 07/08/2019    HCT 38.6 11/06/2019    HCT 41.3 07/17/2019    HCT 38.3 07/08/2019    MCV 85.2 11/06/2019    MCV 85 07/17/2019    MCV 87.6 07/08/2019    RDW 12.7 11/06/2019    RDW 12.8 07/08/2019    RDW 12.5 06/09/2019     11/06/2019     07/17/2019     07/08/2019     Iron: No results found for: IRON, TIBC, FERRITIN  BMP:   Lab Results   Component Value Date     11/06/2019     09/05/2019     08/18/2019    K 3.8 11/06/2019    K 4.2 09/05/2019    K 3.6 08/18/2019    K 4.8 05/30/2019    K 2.9 05/15/2019    K 3.4 05/15/2019    CL 95 11/06/2019 CL 95 09/05/2019    CL 95 08/18/2019    CO2 27 11/06/2019    CO2 25 09/05/2019    CO2 28 08/18/2019    PHOS 4.2 06/03/2019    PHOS 4.7 06/02/2019    PHOS 4.8 06/01/2019    BUN 9 11/06/2019    BUN 11 09/05/2019    BUN 9 08/18/2019    CREATININE <0.5 11/06/2019    CREATININE <0.5 09/05/2019    CREATININE <0.5 08/18/2019     BNP:   Lab Results   Component Value Date    PROBNP 250 11/06/2019    PROBNP 128 09/05/2019    PROBNP 274 08/18/2019     Lipids:   Lab Results   Component Value Date    CHOL 231 07/17/2019        Lab Results   Component Value Date    TRIG 193 07/17/2019        Lab Results   Component Value Date    HDL 52 07/17/2019        Lab Results   Component Value Date    LDLCALC 140 07/17/2019        Lab Results   Component Value Date    VLDL 39 07/17/2019      No results found for: CHOLHDLRATIO    EF:   Lab Results   Component Value Date    LVEF 40 05/22/2019       Recent Testing:  Echo 5/15/19   Summary   Definity contrast administered.   Left ventricular systolic function is reduced with ejection fraction   estimated at 25-30 %.   Elevated left ventricular diastolic filling pressure: Septal E/e'' = 12.6   (for sinus tachycardia) .   Right ventricular systolic function is moderately reduced .   Mild mitral regurgitation.   Unable to obtain SPAP due to lack of tricuspid regurgitation.     Echo 5/22/19   Summary   This is a limited study for EF follow up.   Left ventricular systolic function is reduced with ejection fraction   estimated at 40 %.   There is mild to moderate global hypokinesis present.   There is mild concentric left ventricular hypertrophy. NYHA:   III  ACC/ AHA Stage:    C    Pertinent Problems:  [unfilled]-Fib cardiac arrest 5/14/19  ~Non-ischemic dilated cardiomyopathy due to Adriamycin for Sarcoma of the leg as a child  ~S/p single chamber AICD placed 6/3/19 with lead reposition/revicion 6/4/19  ~hypomagnesium  ~palpitations      Visit Diagnosis:    1. Non-ischemic cardiomyopathy ([address]    2. Palpitations    3. Chronic systolic congestive heart failure ([facility]    4. ICD (implantable cardioverter-defibrillator) in place      Plan:     Device check yesterday showed an episode of SVT on 12/19/19. No VT. 1. Check magnesium and BMP  2. Continue lasix 40 mg daily  3. Lisinopril 2.5mg daily for heart function  4. Adjust metoprolol to 50mg twice a day for heart function and palpitations  5. Continue daily weights- and record them to help track you fluid balance  6. Adjust total fluid intake to about Target 2Liters or 64 ounces of fluid a day   7. Discussed sleep hygiene today and exercise  8. 2 week event monitor to evaluate palpitations  9. Follow up 3 months     QUALITY MEASURES  1. Tobacco Cessation Counseling: NA  2. Retake of BP if >140/90:   NA  3. Documentation to PCP/referring for new patient:  Sent to PCP at close of office visit  4. CAD patient on anti-platelet: NA  5. CAD patient on STATIN therapy:  NA  6. Patient with CHF and aFib on anticoagulation:  NA     I appreciate the opportunity for caring for this patient.      Filomena Greco CNP, 1/9/2020, 2:38 PM

## 2020-01-09 NOTE — LETTER
· Psychiatric: + anxiety, no depression. · Endocrine:  No excessive thirst or urination. · Hematologic/Lymphatic: No abnormal bruising or bleeding, blood clots or swollen lymph nodes.     Physical Examination:    Vitals:    01/09/20 1350   BP: 102/62   Pulse: 84   SpO2: 96%   Weight: 197 lb (89.4 kg)   Height: 5' (1.524 m)        Constitutional and General Appearance: no apparent distress  HEENT: non-icteric sclera, oropharynx without exudate, oral mucosa moist  Neck: no JVD  Respiratory:  · No use of accessory muscles  · Dim breath sounds throughout, no wheezing, no crackles, no rhonchi  Cardiovascular:  · The apical impulses not displaced  ·  no murmur/rub/gallop  · Regular rate and rhythm, S1,S2 normal  · Radial pulses 2+ and equal bilaterally  · Trace BLE edema  · Pedal Pulses: 2+ and equal   Abdomen:  · No masses or tenderness  · Liver: No Abnormalities Noted  Musculoskeletal/Skin:  · Gait is steady   · There is no clubbing, cyanosis of the extremities  · Skin is warm and dry  · Moves all extremities well  Neurological/Psychiatric:  · Alert and oriented in all spheres  · No abnormalities of mood, affect, mentation, or behavior are noted  · + short term memory deficit     Lab Data:  CBC:   Lab Results   Component Value Date    WBC 9.8 11/06/2019    WBC 8.1 07/17/2019    WBC 12.2 07/08/2019    RBC 4.53 11/06/2019    RBC 4.87 07/17/2019    RBC 4.37 07/08/2019    HGB 12.8 11/06/2019    HGB 13.6 07/17/2019    HGB 12.9 07/08/2019    HCT 38.6 11/06/2019    HCT 41.3 07/17/2019    HCT 38.3 07/08/2019    MCV 85.2 11/06/2019    MCV 85 07/17/2019    MCV 87.6 07/08/2019    RDW 12.7 11/06/2019    RDW 12.8 07/08/2019    RDW 12.5 06/09/2019     11/06/2019     07/17/2019     07/08/2019     Iron: No results found for: IRON, TIBC, FERRITIN  BMP:   Lab Results   Component Value Date     11/06/2019     09/05/2019     08/18/2019    K 3.8 11/06/2019    K 4.2 09/05/2019    K 3.6 08/18/2019 ~palpitations      Visit Diagnosis:    1. Non-ischemic cardiomyopathy (HCC)    2. Palpitations    3. Chronic systolic congestive heart failure ([facility]    4. ICD (implantable cardioverter-defibrillator) in place      Plan:     Device check yesterday showed an episode of SVT on 12/19/19. No VT. 1. Check magnesium and BMP  2. Continue lasix 40 mg daily  3. Lisinopril 2.5mg daily for heart function  4. Adjust metoprolol to 50mg twice a day for heart function and palpitations  5. Continue daily weights- and record them to help track you fluid balance  6. Adjust total fluid intake to about Target 2Liters or 64 ounces of fluid a day   7. Discussed sleep hygiene today and exercise  8. 2 week event monitor to evaluate palpitations  9. Follow up 3 months     QUALITY MEASURES  1. Tobacco Cessation Counseling: NA  2. Retake of BP if >140/90:   NA  3. Documentation to PCP/referring for new patient:  Sent to PCP at close of office visit  4. CAD patient on anti-platelet: NA  5. CAD patient on STATIN therapy:  NA  6. Patient with CHF and aFib on anticoagulation:  NA     I appreciate the opportunity for caring for this patient.      Shae Collier CNP, 1/9/2020, 2:38 PM

## 2020-01-09 NOTE — PATIENT INSTRUCTIONS
Plan:   1. Check magnesium and BMP  2. Continue lasix 40 mg daily  3. Lisinopril 2.5mg daily for heart function  4. Adjust metoprolol to 50mg twice a day for heart function and palpitations  5. Continue daily weights- and record them to help track you fluid balance  6. Adjust total fluid intake to about Target 2Liters or 64 ounces of fluid a day   7. 2 week event monitor  8.  Follow up 3 months

## 2020-02-04 ENCOUNTER — NURSE ONLY (OUTPATIENT)
Dept: CARDIOLOGY CLINIC | Age: 35
End: 2020-02-04
Payer: MEDICAID

## 2020-02-04 PROCEDURE — 93297 REM INTERROG DEV EVAL ICPMS: CPT | Performed by: NURSE PRACTITIONER

## 2020-02-04 PROCEDURE — 93295 DEV INTERROG REMOTE 1/2/MLT: CPT | Performed by: INTERNAL MEDICINE

## 2020-02-04 PROCEDURE — 93296 REM INTERROG EVL PM/IDS: CPT | Performed by: INTERNAL MEDICINE

## 2020-02-04 NOTE — PROGRESS NOTES
Carelink transmission shows normal sensing and pacing function. See interrogation for more details. No new arrhythmias. optivol shows increasing fluid index since 1/1/20-message sent to staff to call pt and assess for s/s of chf.  nprb to review.

## 2020-02-06 ENCOUNTER — TELEPHONE (OUTPATIENT)
Dept: CARDIOLOGY CLINIC | Age: 35
End: 2020-02-06

## 2020-02-07 ENCOUNTER — TELEPHONE (OUTPATIENT)
Dept: CARDIOLOGY CLINIC | Age: 35
End: 2020-02-07

## 2020-02-12 ENCOUNTER — HOSPITAL ENCOUNTER (EMERGENCY)
Age: 35
Discharge: HOME | End: 2020-02-12
Attending: EMERGENCY MEDICINE
Payer: MEDICAID

## 2020-02-12 ENCOUNTER — APPOINTMENT (OUTPATIENT)
Dept: CT IMAGING | Age: 35
End: 2020-02-12
Payer: MEDICAID

## 2020-02-12 ENCOUNTER — APPOINTMENT (OUTPATIENT)
Dept: GENERAL RADIOLOGY | Age: 35
End: 2020-02-12
Payer: MEDICAID

## 2020-02-12 ENCOUNTER — PROCEDURE VISIT (OUTPATIENT)
Dept: CARDIOLOGY CLINIC | Age: 35
End: 2020-02-12

## 2020-02-12 VITALS
HEIGHT: 60 IN | OXYGEN SATURATION: 97 % | BODY MASS INDEX: 36.71 KG/M2 | SYSTOLIC BLOOD PRESSURE: 108 MMHG | DIASTOLIC BLOOD PRESSURE: 68 MMHG | RESPIRATION RATE: 21 BRPM | HEART RATE: 91 BPM | WEIGHT: 187 LBS | TEMPERATURE: 98.1 F

## 2020-02-12 LAB
A/G RATIO: 1.1 (ref 1.1–2.2)
ALBUMIN SERPL-MCNC: 4.2 G/DL (ref 3.4–5)
ALP BLD-CCNC: 86 U/L (ref 40–129)
ALT SERPL-CCNC: 92 U/L (ref 10–40)
ANION GAP SERPL CALCULATED.3IONS-SCNC: 14 MMOL/L (ref 3–16)
AST SERPL-CCNC: 67 U/L (ref 15–37)
BASOPHILS ABSOLUTE: 0.1 K/UL (ref 0–0.2)
BASOPHILS RELATIVE PERCENT: 0.6 %
BILIRUB SERPL-MCNC: 0.4 MG/DL (ref 0–1)
BUN BLDV-MCNC: 6 MG/DL (ref 7–20)
CALCIUM SERPL-MCNC: 9.9 MG/DL (ref 8.3–10.6)
CHLORIDE BLD-SCNC: 98 MMOL/L (ref 99–110)
CO2: 25 MMOL/L (ref 21–32)
CREAT SERPL-MCNC: <0.5 MG/DL (ref 0.6–1.1)
D DIMER: 3330 NG/ML DDU (ref 0–229)
EOSINOPHILS ABSOLUTE: 0.1 K/UL (ref 0–0.6)
EOSINOPHILS RELATIVE PERCENT: 0.6 %
GFR AFRICAN AMERICAN: >60
GFR NON-AFRICAN AMERICAN: >60
GLOBULIN: 3.7 G/DL
GLUCOSE BLD-MCNC: 102 MG/DL (ref 70–99)
HCG QUALITATIVE: NEGATIVE
HCT VFR BLD CALC: 41.8 % (ref 36–48)
HEMOGLOBIN: 14 G/DL (ref 12–16)
LIPASE: 28 U/L (ref 13–60)
LYMPHOCYTES ABSOLUTE: 2.8 K/UL (ref 1–5.1)
LYMPHOCYTES RELATIVE PERCENT: 29 %
MCH RBC QN AUTO: 29.2 PG (ref 26–34)
MCHC RBC AUTO-ENTMCNC: 33.6 G/DL (ref 31–36)
MCV RBC AUTO: 86.9 FL (ref 80–100)
MONOCYTES ABSOLUTE: 1.1 K/UL (ref 0–1.3)
MONOCYTES RELATIVE PERCENT: 11.4 %
NEUTROPHILS ABSOLUTE: 5.6 K/UL (ref 1.7–7.7)
NEUTROPHILS RELATIVE PERCENT: 58.4 %
PDW BLD-RTO: 12.1 % (ref 12.4–15.4)
PLATELET # BLD: 333 K/UL (ref 135–450)
PMV BLD AUTO: 8 FL (ref 5–10.5)
POTASSIUM SERPL-SCNC: 3.8 MMOL/L (ref 3.5–5.1)
PRO-BNP: 118 PG/ML (ref 0–124)
RBC # BLD: 4.81 M/UL (ref 4–5.2)
SODIUM BLD-SCNC: 137 MMOL/L (ref 136–145)
TOTAL PROTEIN: 7.9 G/DL (ref 6.4–8.2)
TROPONIN: <0.01 NG/ML
WBC # BLD: 9.5 K/UL (ref 4–11)

## 2020-02-12 PROCEDURE — 71046 X-RAY EXAM CHEST 2 VIEWS: CPT

## 2020-02-12 PROCEDURE — 84484 ASSAY OF TROPONIN QUANT: CPT

## 2020-02-12 PROCEDURE — 2500000003 HC RX 250 WO HCPCS: Performed by: NURSE PRACTITIONER

## 2020-02-12 PROCEDURE — 83690 ASSAY OF LIPASE: CPT

## 2020-02-12 PROCEDURE — 6360000002 HC RX W HCPCS: Performed by: NURSE PRACTITIONER

## 2020-02-12 PROCEDURE — 99285 EMERGENCY DEPT VISIT HI MDM: CPT

## 2020-02-12 PROCEDURE — 96374 THER/PROPH/DIAG INJ IV PUSH: CPT

## 2020-02-12 PROCEDURE — 2580000003 HC RX 258: Performed by: EMERGENCY MEDICINE

## 2020-02-12 PROCEDURE — 80053 COMPREHEN METABOLIC PANEL: CPT

## 2020-02-12 PROCEDURE — 36415 COLL VENOUS BLD VENIPUNCTURE: CPT

## 2020-02-12 PROCEDURE — 6360000004 HC RX CONTRAST MEDICATION: Performed by: EMERGENCY MEDICINE

## 2020-02-12 PROCEDURE — 93005 ELECTROCARDIOGRAM TRACING: CPT | Performed by: EMERGENCY MEDICINE

## 2020-02-12 PROCEDURE — 6360000002 HC RX W HCPCS: Performed by: EMERGENCY MEDICINE

## 2020-02-12 PROCEDURE — 96375 TX/PRO/DX INJ NEW DRUG ADDON: CPT

## 2020-02-12 PROCEDURE — 71260 CT THORAX DX C+: CPT

## 2020-02-12 PROCEDURE — 2580000003 HC RX 258: Performed by: NURSE PRACTITIONER

## 2020-02-12 PROCEDURE — 84703 CHORIONIC GONADOTROPIN ASSAY: CPT

## 2020-02-12 PROCEDURE — 85025 COMPLETE CBC W/AUTO DIFF WBC: CPT

## 2020-02-12 PROCEDURE — 85379 FIBRIN DEGRADATION QUANT: CPT

## 2020-02-12 PROCEDURE — 6370000000 HC RX 637 (ALT 250 FOR IP): Performed by: EMERGENCY MEDICINE

## 2020-02-12 PROCEDURE — 6370000000 HC RX 637 (ALT 250 FOR IP): Performed by: NURSE PRACTITIONER

## 2020-02-12 PROCEDURE — 83880 ASSAY OF NATRIURETIC PEPTIDE: CPT

## 2020-02-12 RX ORDER — 0.9 % SODIUM CHLORIDE 0.9 %
1000 INTRAVENOUS SOLUTION INTRAVENOUS ONCE
Status: COMPLETED | OUTPATIENT
Start: 2020-02-12 | End: 2020-02-12

## 2020-02-12 RX ORDER — OMEPRAZOLE 40 MG/1
40 CAPSULE, DELAYED RELEASE ORAL DAILY
Qty: 30 CAPSULE | Refills: 0 | Status: SHIPPED | OUTPATIENT
Start: 2020-02-12 | End: 2020-07-08

## 2020-02-12 RX ORDER — KETOROLAC TROMETHAMINE 30 MG/ML
30 INJECTION, SOLUTION INTRAMUSCULAR; INTRAVENOUS ONCE
Status: COMPLETED | OUTPATIENT
Start: 2020-02-12 | End: 2020-02-12

## 2020-02-12 RX ORDER — SUCRALFATE 1 G/1
1 TABLET ORAL 4 TIMES DAILY
Qty: 120 TABLET | Refills: 3 | Status: SHIPPED | OUTPATIENT
Start: 2020-02-12 | End: 2020-04-16

## 2020-02-12 RX ORDER — FAMOTIDINE 20 MG/1
20 TABLET, FILM COATED ORAL 2 TIMES DAILY
Qty: 60 TABLET | Refills: 0 | Status: SHIPPED | OUTPATIENT
Start: 2020-02-12 | End: 2020-07-08

## 2020-02-12 RX ORDER — ASPIRIN 81 MG/1
324 TABLET, CHEWABLE ORAL ONCE
Status: COMPLETED | OUTPATIENT
Start: 2020-02-12 | End: 2020-02-12

## 2020-02-12 RX ORDER — FENTANYL CITRATE 50 UG/ML
25 INJECTION, SOLUTION INTRAMUSCULAR; INTRAVENOUS ONCE
Status: COMPLETED | OUTPATIENT
Start: 2020-02-12 | End: 2020-02-12

## 2020-02-12 RX ORDER — ONDANSETRON 2 MG/ML
4 INJECTION INTRAMUSCULAR; INTRAVENOUS ONCE
Status: COMPLETED | OUTPATIENT
Start: 2020-02-12 | End: 2020-02-12

## 2020-02-12 RX ADMIN — KETOROLAC TROMETHAMINE 30 MG: 30 INJECTION, SOLUTION INTRAMUSCULAR at 15:05

## 2020-02-12 RX ADMIN — NITROGLYCERIN 0.5 INCH: 20 OINTMENT TOPICAL at 14:18

## 2020-02-12 RX ADMIN — LIDOCAINE HYDROCHLORIDE: 20 SOLUTION ORAL; TOPICAL at 16:01

## 2020-02-12 RX ADMIN — ASPIRIN 81 MG 324 MG: 81 TABLET ORAL at 14:18

## 2020-02-12 RX ADMIN — SODIUM CHLORIDE 1000 ML: 9 INJECTION, SOLUTION INTRAVENOUS at 14:18

## 2020-02-12 RX ADMIN — FENTANYL CITRATE 25 MCG: 50 INJECTION, SOLUTION INTRAMUSCULAR; INTRAVENOUS at 16:01

## 2020-02-12 RX ADMIN — ONDANSETRON 4 MG: 2 INJECTION INTRAMUSCULAR; INTRAVENOUS at 14:18

## 2020-02-12 RX ADMIN — IOPAMIDOL 75 ML: 755 INJECTION, SOLUTION INTRAVENOUS at 17:20

## 2020-02-12 RX ADMIN — FAMOTIDINE 20 MG: 10 INJECTION, SOLUTION INTRAVENOUS at 14:18

## 2020-02-12 RX ADMIN — SODIUM CHLORIDE 1000 ML: 9 INJECTION, SOLUTION INTRAVENOUS at 16:01

## 2020-02-12 ASSESSMENT — PAIN SCALES - GENERAL
PAINLEVEL_OUTOF10: 0
PAINLEVEL_OUTOF10: 4
PAINLEVEL_OUTOF10: 8

## 2020-02-12 ASSESSMENT — PAIN DESCRIPTION - LOCATION
LOCATION: CHEST

## 2020-02-12 ASSESSMENT — PAIN DESCRIPTION - PAIN TYPE
TYPE: ACUTE PAIN

## 2020-02-12 ASSESSMENT — PAIN DESCRIPTION - FREQUENCY: FREQUENCY: CONTINUOUS

## 2020-02-12 ASSESSMENT — PAIN DESCRIPTION - ORIENTATION
ORIENTATION: MID
ORIENTATION: MID

## 2020-02-12 ASSESSMENT — PAIN DESCRIPTION - ONSET: ONSET: ON-GOING

## 2020-02-12 ASSESSMENT — PAIN DESCRIPTION - PROGRESSION: CLINICAL_PROGRESSION: NOT CHANGED

## 2020-02-12 NOTE — ED NOTES
Dr. Polo Hennessy called @0107 spoke to Dr. Marvin Davila   Re device check       Samantha Gray  02/12/20 [id number]

## 2020-02-12 NOTE — ED PROVIDER NOTES
[facility] Emergency Department    CHIEF COMPLAINT  Chest Pain (c/o constant midsternal nonradiating chest pain starting last night. c/o emesis x 3 episodes since this morning. has pacer/defibrillator and would like interrogated. ) and Emesis      HISTORY OF PRESENT ILLNESS  Martha Price is a 37 y.o. female who presents to the ED complaining of chest pain she describes as a \"pressure\", nausea, vomiting, and heartburn. Patient reports heartburn and nausea started last evening and chest pressure and emesis started today. Patient reports she had multiple nonbloody episodes of emesis today. Patient reports chest pain has remained constant and rates it an 8 out of 10. Patient denies identifiable aggravating or alleviating factors. Patient tried to take her Gaviscon and Rolaids with no relief of heartburn. 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 Patient went into ventricular fibrillation cardiac arrest and also has congestive heart failure. Patient does have a pacemaker and defibrillator. Patient denies any shocks released from her defibrillator. Patient was recently on a 7-day Holter monitor ordered by Thalia Bliss with cardiology. Dr. Nuzhat Mcgee is her electrophysiologist.  Patient also reports cardiac palpitations \"take her breath away. \"  Patient denies recent travel or known sick contacts. Patient denies measurable fever, chills, body aches, dizziness, syncope, cough, bloody sputum, nasal congestion, abdominal pain, diarrhea, dysuria, hematuria, urinary frequency or urgency, flank pain, calf pain, leg swelling. No other complaints, modifying factors or associated symptoms. Nursing notes reviewed.    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 05/2019    VF    Cardiomyopathy ([facility] 05/2019    EF= 40% with global hypokinesis    Chronic kidney disease     COPD (chronic obstructive pulmonary disease) (HCC)     Depression     E. coli infection     Family history of early CAD     Hepatitis C     Hepatitis C antibody positive in blood 05/17/2019    Hyperlipidemia     Paranoia (psychosis) (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     Scoliosis     Smoker     Tachycardia     VF (ventricular fibrillation) ([facility]      Past Surgical History:   Procedure Laterality Date    ACHILLES TENDON SURGERY      BONE MARROW TRANSPLANT      BRONCHOSCOPY N/A 5/16/2019    BRONCHOSCOPY ALVEOLAR LAVAGE performed by Ross Marroquin MD at [address]  5/16/2019    BRONCHOSCOPY THERAPUTIC ASPIRATION INITIAL performed by Ross Marroquin MD at [address] N/A 5/19/2019    BRONCHOSCOPY ALVEOLAR LAVAGE performed by Ross Marroquin MD at [address]  5/19/2019    BRONCHOSCOPY THERAPUTIC ASPIRATION INITIAL performed by Ross Marrqouin MD at [address] [place] [address]  4/15/2014    Laparascopic    ENDOSCOPY, COLON, DIAGNOSTIC      FOOT SURGERY      GROWTH PLATE SURGERY      LEG SURGERY       Family History   Problem Relation Age of Onset    Emphysema Mother     Mental Illness Mother     Substance Abuse Mother     Heart Attack Father     Mental Illness Father     Arthritis Father     Heart Disease Father     High Blood Pressure Father     High Cholesterol Father      Social History     Socioeconomic History    Marital status: Legally      Spouse name: Not on file    Number of children: Not on file    Years of education: Not on file    Highest education level: Not on file   Occupational History    Not on file   Social Needs    Financial resource strain: Not on file    Food insecurity: Worry: Not on file     Inability: Not on file    Transportation needs:     Medical: Not on file     Non-medical: Not on file   Tobacco Use    Smoking status: Former Smoker     Packs/day: 0.00     Years: 0.00     Pack years: 0.00    Smokeless tobacco: Never Used   Substance and Sexual Activity    Alcohol use: Yes     Comment: social    Drug use: Yes     Types: Marijuana    Sexual activity: Yes     Partners: Male   Lifestyle    Physical activity:     Days per week: Not on file     Minutes per session: Not on file    Stress: Not on file   Relationships    Social connections:     Talks on phone: Not on file     Gets together: Not on file     Attends Restorationism service: Not on file     Active member of club or organization: Not on file     Attends meetings of clubs or organizations: Not on file     Relationship status: Not on file    Intimate partner violence:     Fear of current or ex partner: Not on file     Emotionally abused: Not on file     Physically abused: Not on file     Forced sexual activity: Not on file   Other Topics Concern    Not on file   Social History Narrative    ** Merged History Encounter **          No current facility-administered medications for this encounter.       Current Outpatient Medications   Medication Sig Dispense Refill    famotidine (PEPCID) 20 MG tablet Take 1 tablet by mouth 2 times daily 60 tablet 0    sucralfate (CARAFATE) 1 GM tablet Take 1 tablet by mouth 4 times daily 120 tablet 3    omeprazole (PRILOSEC) 40 MG delayed release capsule Take 1 capsule by mouth daily 30 capsule 0    metoprolol succinate (TOPROL XL) 50 MG extended release tablet 1 tab twice a day 270 tablet 2    furosemide (LASIX) 40 MG tablet 1 tab daily and Okay to take an extra 20mg As needed for 3lbs weight gain in a day or 5lbs weight gain in a week 180 tablet 1    lisinopril (PRINIVIL;ZESTRIL) 2.5 MG tablet TAKE ONE TABLET BY MOUTH DAILY 90 tablet 3    potassium chloride (KLOR-CON M) 20 MEQ TBCR extended release tablet TAKE TWO TABLETS BY MOUTH DAILY 180 tablet 3    Atorvastatin Calcium (LIPITOR PO) Take by mouth daily      Cholecalciferol (VITAMIN D PO) Take by mouth daily      spironolactone (ALDACTONE) 25 MG tablet Take 1 tablet by mouth daily 90 tablet 1    magnesium oxide (MAG-OX) 400 (241.3 Mg) MG TABS tablet 1 tab at noon and 1 tab in the evening 60 tablet 3    aluminum & magnesium hydroxide-simethicone (MAALOX ADVANCED MAX ST) 400-400-40 MG/5ML SUSP Take 15 mLs by mouth every 4 hours as needed (nausea or reflux) 1 Bottle 0    albuterol sulfate  (90 Base) MCG/ACT inhaler Inhale 2 puffs into the lungs 3 times daily      OLANZapine (ZYPREXA) 15 MG tablet Take 7.5 mg by mouth 3 times daily Indications: 1/2 tab every morning, 1/2 tab every evening and 1 tab at bedtime       melatonin 5 MG TABS tablet Take 5 mg by mouth nightly      gabapentin (NEURONTIN) 400 MG capsule Take 400 mg by mouth 3 times daily. Allergies   Allergen Reactions    Tape [place] [employer] Tape]        REVIEW OF SYSTEMS  10 systems reviewed, pertinent positives per HPI otherwise noted to be negative    PHYSICAL EXAM  /68   Pulse 91   Temp 98.1 °F (36.7 °C) (Oral)   Resp 21   Ht 5' (1.524 m)   Wt 187 lb (84.8 kg)   LMP 05/01/2019   SpO2 97%   BMI 36.52 kg/m²   GENERAL APPEARANCE: Awake and alert. Cooperative. No acute distress. Vital signs are stable. Well appearing and non toxic. HEAD: Normocephalic. Atraumatic. EYES: PERRL. EOM's grossly intact. ENT: Mucous membranes are moist.   NECK: Supple. Normal ROM. HEART: RRR. No murmurs. Distal pulses are equal and intact. Cap refill less than 2 seconds. LUNGS: Respirations unlabored. CTAB. Good air exchange. Speaking comfortably in full sentences. No wheezing, rhonchi, rales, stridor. ABDOMEN: Soft. Non-distended. Non-tender. No guarding or rebound. No masses. No organomegaly. No rigidity. Bowel sounds are present in all 4 quadrants.  Negative rojas's. Negative McBurney's point. Negative CVA tenderness. EXTREMITIES: No peripheral edema. Moves all extremities equally. All extremities neurovascularly intact. SKIN: Warm and dry. No acute rashes. NEUROLOGICAL: Alert and oriented. CN's 2-12 intact. No gross facial drooping. Strength 5/5, sensation intact. No dysarthria. No dysmetria. No ataxia. PSYCHIATRIC: Normal mood and affect. SCREENINGS       RADIOLOGY  Xr Chest Standard (2 Vw)    Result Date: 2/12/2020  EXAMINATION: TWO XRAY VIEWS OF THE CHEST 2/12/2020 1:56 pm COMPARISON: 11/06/2019 HISTORY: ORDERING SYSTEM PROVIDED HISTORY: cp TECHNOLOGIST PROVIDED HISTORY: Reason for exam:->cp Reason for Exam: Chest Pain (c/o constant midsternal nonradiating chest pain starting last night. c/o emesis x 3 episodes since this morning. has pacer/defibrillator and would like interrogated. ) Acuity: Acute Type of Exam: Initial FINDINGS: There is a right subclavian AICD. Heart size and pulmonary vasculature within normal limits. Lungs clear. Costophrenic angles sharp     No active cardiopulmonary disease     Ct Chest Pulmonary Embolism W Contrast    Result Date: 2/12/2020  EXAMINATION: CTA OF THE CHEST 2/12/2020 5:11 pm TECHNIQUE: CTA of the chest was performed after the administration of intravenous contrast.  Multiplanar reformatted images are provided for review. MIP images are provided for review. Dose modulation, iterative reconstruction, and/or weight based adjustment of the mA/kV was utilized to reduce the radiation dose to as low as reasonably achievable.  COMPARISON: 11/06/2019 HISTORY: ORDERING SYSTEM PROVIDED HISTORY: CP +ddimer TECHNOLOGIST PROVIDED HISTORY: Reason for exam:->CP +ddimer Reason for Exam: chest pain since last night with some sob Acuity: Acute Type of Exam: Initial Additional signs and symptoms: former smoker x 18 yrs Relevant Medical/Surgical History: pacemaker/defibrillator FINDINGS: Pulmonary Arteries: Pulmonary arteries are follow-up with primary care physician for further management of GERD and cardiology for routine follow-up. While in ED patient received   Medications   aspirin chewable tablet 324 mg (324 mg Oral Given 2/12/20 1418)   famotidine (PEPCID) injection 20 mg (20 mg Intravenous Given 2/12/20 1418)   ondansetron (ZOFRAN) injection 4 mg (4 mg Intravenous Given 2/12/20 1418)   nitroglycerin (NITRO-BID) 2 % ointment 0.5 inch (0.5 inches Topical Given 2/12/20 1418)   0.9 % sodium chloride bolus (0 mLs Intravenous Stopped 2/12/20 1601)   ketorolac (TORADOL) injection 30 mg (30 mg Intravenous Given 2/12/20 1505)   0.9 % sodium chloride IV bolus 1,000 mL (0 mLs Intravenous Stopped 2/12/20 1805)   aluminum & magnesium hydroxide-simethicone (MAALOX) 30 mL, lidocaine viscous hcl (XYLOCAINE) 5 mL (GI COCKTAIL) ( Oral Given 2/12/20 1601)   fentaNYL (SUBLIMAZE) injection 25 mcg (25 mcg Intravenous Given 2/12/20 1601)   iopamidol (ISOVUE-370) 76 % injection 75 mL (75 mLs Intravenous Given 2/12/20 1720)             At this point I do not feel the patient requires further work up and it is reasonable to discharge the patient. A discussion was had with the patient and/or their surrogate regarding diagnosis, diagnostic testing results, treatment/ plan of care, and follow up. There was shared decision-making between myself as well as the patient and/or their surrogate and we are all in agreement with discharge home. There was an opportunity for questions and all questions were answered to the best of my ability and to the satisfaction of the patient and/or patient family. Patient will follow up with primary care physician and cardiology for further evaluation/treatment. The patient was given strict return precautions as we discussed symptoms that would necessitate return to the ED. Patient will return to ED for new/worsening symptoms. The patient verbalized their understanding and agreement with the above plan.     Please refer to AVS for further details regarding discharge instructions.       Results for orders placed or performed during the hospital encounter of 02/12/20   CBC Auto Differential   Result Value Ref Range    WBC 9.5 4.0 - 11.0 K/uL    RBC 4.81 4.00 - 5.20 M/uL    Hemoglobin 14.0 12.0 - 16.0 g/dL    Hematocrit 41.8 36.0 - 48.0 %    MCV 86.9 80.0 - 100.0 fL    MCH 29.2 26.0 - 34.0 pg    MCHC 33.6 31.0 - 36.0 g/dL    RDW 12.1 (L) 12.4 - 15.4 %    Platelets 461 274 - 180 K/uL    MPV 8.0 5.0 - 10.5 fL    Neutrophils % 58.4 %    Lymphocytes % 29.0 %    Monocytes % 11.4 %    Eosinophils % 0.6 %    Basophils % 0.6 %    Neutrophils Absolute 5.6 1.7 - 7.7 K/uL    Lymphocytes Absolute 2.8 1.0 - 5.1 K/uL    Monocytes Absolute 1.1 0.0 - 1.3 K/uL    Eosinophils Absolute 0.1 0.0 - 0.6 K/uL    Basophils Absolute 0.1 0.0 - 0.2 K/uL   Comprehensive Metabolic Panel   Result Value Ref Range    Sodium 137 136 - 145 mmol/L    Potassium 3.8 3.5 - 5.1 mmol/L    Chloride 98 (L) 99 - 110 mmol/L    CO2 25 21 - 32 mmol/L    Anion Gap 14 3 - 16    Glucose 102 (H) 70 - 99 mg/dL    BUN 6 (L) 7 - 20 mg/dL    CREATININE <0.5 (L) 0.6 - 1.1 mg/dL    GFR Non-African American >60 >60    GFR African American >60 >60    Calcium 9.9 8.3 - 10.6 mg/dL    Total Protein 7.9 6.4 - 8.2 g/dL    Alb 4.2 3.4 - 5.0 g/dL    Albumin/Globulin Ratio 1.1 1.1 - 2.2    Total Bilirubin 0.4 0.0 - 1.0 mg/dL    Alkaline Phosphatase 86 40 - 129 U/L    ALT 92 (H) 10 - 40 U/L    AST 67 (H) 15 - 37 U/L    Globulin 3.7 g/dL   Troponin   Result Value Ref Range    Troponin <0.01 <0.01 ng/mL   D-dimer, quantitative   Result Value Ref Range    D-Dimer, Quant 3330 (H) 0 - 229 ng/mL DDU   Brain Natriuretic Peptide   Result Value Ref Range    Pro- 0 - 124 pg/mL   Lipase   Result Value Ref Range    Lipase 28.0 13.0 - 60.0 U/L   HCG Qualitative, Serum   Result Value Ref Range    hCG Qual Negative Detects HCG level >10 MIU/mL   EKG 12 Lead   Result Value Ref Range    Ventricular Rate 90 BPM Atrial Rate 90 BPM    P-R Interval 168 ms    QRS Duration 78 ms    Q-T Interval 368 ms    QTc Calculation (Bazett) 450 ms    P Axis 18 degrees    R Axis -2 degrees    T Axis 8 degrees    Diagnosis       Normal sinus rhythmMinimal voltage criteria for LVH, may be normal variantBorderline ECGWhen compared with ECG of 06-NOV-2019 12:23,No significant change was found       I estimate there is LOW risk for PULMONARY EMBOLISM, ACUTE CORONARY SYNDROME, OR THORACIC AORTIC DISSECTION, thus I consider the discharge disposition reasonable. Zuleika Gunn and I have discussed the diagnosis and risks, and we agree with discharging home to follow-up with their primary doctor. We also discussed returning to the Emergency Department immediately if new or worsening symptoms occur. We have discussed the symptoms which are most concerning (e.g., bloody sputum, fever, worsening pain or shortness of breath, vomiting) that necessitate immediate return. FINAL Impression    1. Chest pain, unspecified type    2. Gastroesophageal reflux disease, esophagitis presence not specified        Blood pressure 108/68, pulse 91, temperature 98.1 °F (36.7 °C), temperature source Oral, resp. rate 21, height 5' (1.524 m), weight 187 lb (84.8 kg), last menstrual period 05/01/2019, SpO2 97 %. mdm    Patient was sent home with a prescription for below medication/s. I did [place] patient on appropriate use of these medication.   Discharge Medication List as of 2/12/2020  6:08 PM      START taking these medications    Details   famotidine (PEPCID) 20 MG tablet Take 1 tablet by mouth 2 times daily, Disp-60 tablet, R-0Print      sucralfate (CARAFATE) 1 GM tablet Take 1 tablet by mouth 4 times daily, Disp-120 tablet, R-3Print      omeprazole (PRILOSEC) 40 MG delayed release capsule Take 1 capsule by mouth daily, Disp-30 capsule, R-0Print                 FOLLOW UP  Giuliana Piña, APRN - [address] [address] [facility]  [phone]    Schedule an appointment as soon as possible for a visit   follow up    [facility] [address]  ED  [address]  Go to   As needed, If symptoms worsen    Sasha Ramires, RN, NP  Melisa Hickman [address] [phone]    Schedule an appointment as soon as possible for a visit   follow up      DISPOSITION  Patient was discharged to home in good condition. Comment: Please note this report has been produced using speech recognition software and may contain errors related to that system including errors in grammar, punctuation, and spelling, as well as words and phrases that may be inappropriate. If there are any questions or concerns please feel free to contact the dictating provider for clarification.            Michele Thornton, JAYLYN - RICHARD  02/12/20 0308

## 2020-02-12 NOTE — ED PROVIDER NOTES
QTc  ST segments: no ST elevations or depressions  T waves: no abnormal inversions  Non-specific T wave changes: present  Prior EKG comparison: EKG dated 11/6/19 is not significantly different    MDM:  Diagnostic considerations included acute coronary syndrome, pulmonary embolism, COPD/asthma, pneumonia, musculoskeletal, reflux/PUD/gastritis, pneumothorax, CHF, thoracic aortic dissection, anxiety    ED course was notable for chest pain, suspect possible GI etiology or peptic ulcer disease. She was started on PPI and Carafate for home and symptoms did improve little bit in the ED as well as her mild low blood pressures were improved with IV fluids. Her troponin is negative. Do not suspect acute coronary disease. No signs of acute volume overload or heart failure, CT negative for PE after a positive d-dimer. Lipase is also normal.  Dietary counseling was advised. F/u with PCP. I consulted and spoke with Dr. Lynne Gonzalez from cardiology about the patient's ED history, physical, workup, and course so far. Recommendations from this consultant included he spoke with Dr. Jorge Luis Mac and did not feel any worrisome findings were found on the device check and no cardiac acute issues.     During the patient's ED course, the patient was given:  Medications   aspirin chewable tablet 324 mg (324 mg Oral Given 2/12/20 1418)   famotidine (PEPCID) injection 20 mg (20 mg Intravenous Given 2/12/20 1418)   ondansetron (ZOFRAN) injection 4 mg (4 mg Intravenous Given 2/12/20 1418)   nitroglycerin (NITRO-BID) 2 % ointment 0.5 inch (0.5 inches Topical Given 2/12/20 1418)   0.9 % sodium chloride bolus (0 mLs Intravenous Stopped 2/12/20 1601)   ketorolac (TORADOL) injection 30 mg (30 mg Intravenous Given 2/12/20 1505)   0.9 % sodium chloride IV bolus 1,000 mL (1,000 mLs Intravenous New Bag 2/12/20 1601)   aluminum & magnesium hydroxide-simethicone (MAALOX) 30 mL, lidocaine viscous hcl (XYLOCAINE) 5 mL (GI COCKTAIL) ( Oral Given 2/12/20 1601)

## 2020-02-13 LAB
EKG ATRIAL RATE: 90 BPM
EKG DIAGNOSIS: NORMAL
EKG P AXIS: 18 DEGREES
EKG P-R INTERVAL: 168 MS
EKG Q-T INTERVAL: 368 MS
EKG QRS DURATION: 78 MS
EKG QTC CALCULATION (BAZETT): 450 MS
EKG R AXIS: -2 DEGREES
EKG T AXIS: 8 DEGREES
EKG VENTRICULAR RATE: 90 BPM

## 2020-02-13 PROCEDURE — 93010 ELECTROCARDIOGRAM REPORT: CPT | Performed by: INTERNAL MEDICINE

## 2020-02-14 ENCOUNTER — TELEPHONE (OUTPATIENT)
Dept: CARDIOLOGY CLINIC | Age: 35
End: 2020-02-14

## 2020-02-14 NOTE — TELEPHONE ENCOUNTER
Message left with patient regarding her device check and that RPS would like to see her next available. Please call to schedule.

## 2020-02-14 NOTE — TELEPHONE ENCOUNTER
----- Message from Sajan Hartmann MD sent at 2/13/2020  1:49 PM EST -----  Have her see me next available

## 2020-02-19 ENCOUNTER — TELEPHONE (OUTPATIENT)
Dept: CARDIOLOGY CLINIC | Age: 35
End: 2020-02-19

## 2020-02-19 NOTE — TELEPHONE ENCOUNTER
Pt is unable to keep her meds down due to vomiting. This vomiting is from severe heartburn. Mother states [facility]B know about heartburn. Needs to know how to keep meds in her system. Vomiting has been going on for past week. Please advise.

## 2020-02-19 NOTE — TELEPHONE ENCOUNTER
If she is vomiting, okay to hold the lasix and the potassium for now.    She needs to see GI specialist.

## 2020-03-26 RX ORDER — SPIRONOLACTONE 25 MG/1
TABLET ORAL
Qty: 90 TABLET | Refills: 0 | Status: SHIPPED | OUTPATIENT
Start: 2020-03-26 | End: 2020-07-06

## 2020-03-26 NOTE — TELEPHONE ENCOUNTER
1/9/2020   Visit Diagnosis:    1. Non-ischemic cardiomyopathy (HCC)    2. Palpitations    3. Chronic systolic congestive heart failure ([facility]    4. ICD (implantable cardioverter-defibrillator) in place       Plan:      Device check yesterday showed an episode of SVT on 12/19/19. No VT. 1. Check magnesium and BMP  2. Continue lasix 40 mg daily  3. Lisinopril 2.5mg daily for heart function  4. Adjust metoprolol to 50mg twice a day for heart function and palpitations  5. Continue daily weights- and record them to help track you fluid balance  6. Adjust total fluid intake to about Target 2Liters or 64 ounces of fluid a day   7. Discussed sleep hygiene today and exercise  8. 2 week event monitor to evaluate palpitations  9. Follow up 3 months     3/26/2020   Ref.  Range 2/12/2020 14:25   Sodium Latest Ref Range: 136 - 145 mmol/L 137   Potassium Latest Ref Range: 3.5 - 5.1 mmol/L 3.8   Chloride Latest Ref Range: 99 - 110 mmol/L 98 (L)   CO2 Latest Ref Range: 21 - 32 mmol/L 25   BUN Latest Ref Range: 7 - 20 mg/dL 6 (L)   Creatinine Latest Ref Range: 0.6 - 1.1 mg/dL <0.5 (L)   Anion Gap Latest Ref Range: 3 - 16  14   GFR Non- Latest Ref Range: >60  >60   GFR  Latest Ref Range: >60  >60   Glucose Latest Ref Range: 70 - 99 mg/dL 102 (H)   Calcium Latest Ref Range: 8.3 - 10.6 mg/dL 9.9   Total Protein Latest Ref Range: 6.4 - 8.2 g/dL 7.9   Pro-BNP Latest Ref Range: 0 - 124 pg/mL 118   Troponin Latest Ref Range: <0.01 ng/mL <0.01

## 2020-04-01 ENCOUNTER — TELEPHONE (OUTPATIENT)
Dept: CARDIOLOGY CLINIC | Age: 35
End: 2020-04-01

## 2020-04-01 RX ORDER — AMOXICILLIN 875 MG/1
875 TABLET, COATED ORAL 2 TIMES DAILY
Qty: 20 TABLET | Refills: 0 | Status: SHIPPED | OUTPATIENT
Start: 2020-04-01 | End: 2020-04-11

## 2020-04-01 NOTE — TELEPHONE ENCOUNTER
Spoke to patient, no fever, no other symptoms. No medication allergies, and she wants [facility] in [facility].

## 2020-04-01 NOTE — TELEPHONE ENCOUNTER
Pt's mother called and sts that the pt has an infection due to an abscess tooth. The mother sts that the pt's side of the face is swollen and would like to know if [facility] can prescribe the pt antibiotics? The pt's mother did not state if the pt is getting the tooth extracted or not? Please advise and contact the pt.  Thank you

## 2020-04-03 ENCOUNTER — HOSPITAL ENCOUNTER (EMERGENCY)
Age: 35
Discharge: HOME | End: 2020-04-03
Payer: MEDICAID

## 2020-04-03 VITALS
HEIGHT: 60 IN | TEMPERATURE: 99.3 F | WEIGHT: 180 LBS | SYSTOLIC BLOOD PRESSURE: 100 MMHG | HEART RATE: 89 BPM | RESPIRATION RATE: 16 BRPM | OXYGEN SATURATION: 97 % | BODY MASS INDEX: 35.34 KG/M2 | DIASTOLIC BLOOD PRESSURE: 70 MMHG

## 2020-04-03 PROCEDURE — 99282 EMERGENCY DEPT VISIT SF MDM: CPT

## 2020-04-03 RX ORDER — PENICILLIN V POTASSIUM 500 MG/1
500 TABLET ORAL 4 TIMES DAILY
Qty: 28 TABLET | Refills: 0 | Status: SHIPPED | OUTPATIENT
Start: 2020-04-03 | End: 2020-04-10

## 2020-04-03 RX ORDER — ACETAMINOPHEN AND CODEINE PHOSPHATE 300; 30 MG/1; MG/1
1 TABLET ORAL 3 TIMES DAILY PRN
Qty: 9 TABLET | Refills: 0 | Status: SHIPPED | OUTPATIENT
Start: 2020-04-03 | End: 2020-04-06

## 2020-04-03 ASSESSMENT — ENCOUNTER SYMPTOMS
SORE THROAT: 0
DIARRHEA: 0
COUGH: 0
VOMITING: 0
ABDOMINAL PAIN: 0
WHEEZING: 0
BACK PAIN: 0
SHORTNESS OF BREATH: 0
COLOR CHANGE: 0
NAUSEA: 0

## 2020-04-03 ASSESSMENT — PAIN DESCRIPTION - LOCATION: LOCATION: TEETH

## 2020-04-03 ASSESSMENT — PAIN SCALES - GENERAL: PAINLEVEL_OUTOF10: 8

## 2020-04-03 ASSESSMENT — PAIN DESCRIPTION - PAIN TYPE: TYPE: ACUTE PAIN

## 2020-04-03 NOTE — ED PROVIDER NOTES
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[facility]     Scoliosis     Smoker     Tachycardia     VF (ventricular fibrillation) ([facility]          Procedure Laterality Date    ACHILLES TENDON SURGERY      BONE MARROW TRANSPLANT      BRONCHOSCOPY N/A 5/16/2019    BRONCHOSCOPY ALVEOLAR LAVAGE performed by Fred Perez MD at [address] [facility]  5/16/2019    BRONCHOSCOPY THERAPUTIC ASPIRATION INITIAL performed by Fred Perez MD at [address] [facility] 5/19/2019    BRONCHOSCOPY ALVEOLAR LAVAGE performed by Fred Perez MD at [address] [facility]  5/19/2019    BRONCHOSCOPY THERAPUTIC ASPIRATION INITIAL performed by Fred ePrez MD at [address] [place] [address]  4/15/2014    Laparascopic    ENDOSCOPY, COLON, DIAGNOSTIC      FOOT SURGERY      GROWTH PLATE SURGERY      LEG SURGERY         Medications:  Previous Medications    ALBUTEROL SULFATE  (90 BASE) MCG/ACT INHALER    Inhale 2 puffs into the lungs 3 times daily    ALUMINUM & MAGNESIUM HYDROXIDE-SIMETHICONE (MAALOX ADVANCED MAX ST) 400-400-40 MG/5ML SUSP    Take 15 mLs by mouth every 4 hours as needed (nausea or reflux)    AMOXICILLIN (AMOXIL) 875 MG TABLET    Take 1 tablet by mouth 2 times daily for 10 days    ATORVASTATIN CALCIUM (LIPITOR PO)    Take by mouth daily    CHOLECALCIFEROL (VITAMIN D PO)    Take by mouth daily    FAMOTIDINE (PEPCID) 20 MG TABLET    Take 1 tablet by mouth 2 times daily    FUROSEMIDE (LASIX) 40 MG TABLET    1 tab daily and Okay to take an extra 20mg As needed for 3lbs weight gain in a day or 5lbs weight gain in a week    GABAPENTIN (NEURONTIN) 400 MG CAPSULE    Take 400 mg by mouth 3 times daily.     LISINOPRIL (PRINIVIL;ZESTRIL) 2.5 MG TABLET    TAKE ONE TABLET BY MOUTH DAILY    MAGNESIUM OXIDE (MAG-OX) 400

## 2020-04-04 ENCOUNTER — CARE COORDINATION (OUTPATIENT)
Dept: CARE COORDINATION | Age: 35
End: 2020-04-04

## 2020-04-04 NOTE — CARE COORDINATION
Patient contacted regarding recent discharge and COVID-19 risk   Care Transition Nurse/ Ambulatory Care Manager contacted the patient by telephone to perform post discharge assessment. Verified name and  with patient as identifiers. Patient has following risk factors of: CVD, recent ED visit and heart failure. CTN/ACM reviewed discharge instructions, medical action plan and red flags related to discharge diagnosis. Reviewed and educated them on any new and changed medications related to discharge diagnosis. Advised obtaining a 90-day supply of all daily and as-needed medications. Education provided regarding infection prevention, and signs and symptoms of COVID-19 and when to seek medical attention with patient who verbalized understanding. Discussed exposure protocols and quarantine from [address] you at higher risk for severe illness  and given an opportunity for questions and concerns. The patient agrees to contact the COVID-19 hotline [phone] or PCP office for questions related to their healthcare. CTN/ACM provided contact information for future reference. From CDC: Are you at higher risk for severe illness?  Wash your hands often.  Avoid close contact (6 feet, which is about two arm lengths) with people who are sick.  Put distance between yourself and other people if COVID-19 is spreading in your community.  Clean and disinfect frequently touched surfaces.  Avoid all cruise travel and non-essential air travel.  Call your healthcare professional if you have concerns about COVID-19 and your underlying condition or if you are sick.     For more information on steps you can take to protect yourself, see CDC's How to Protect Yourself

## 2020-04-16 ENCOUNTER — VIRTUAL VISIT (OUTPATIENT)
Dept: CARDIOLOGY CLINIC | Age: 35
End: 2020-04-16
Payer: MEDICAID

## 2020-04-16 ENCOUNTER — HOSPITAL ENCOUNTER (OUTPATIENT)
Age: 35
Discharge: HOME | End: 2020-04-16
Payer: MEDICAID

## 2020-04-16 VITALS — BODY MASS INDEX: 38.87 KG/M2 | WEIGHT: 198 LBS | HEIGHT: 60 IN

## 2020-04-16 LAB
CHOLESTEROL, FASTING: 156 MG/DL (ref 0–199)
HDLC SERPL-MCNC: 42 MG/DL (ref 40–60)
LDL CHOLESTEROL CALCULATED: 90 MG/DL
TRIGLYCERIDE, FASTING: 118 MG/DL (ref 0–150)
VLDLC SERPL CALC-MCNC: 24 MG/DL

## 2020-04-16 PROCEDURE — 36415 COLL VENOUS BLD VENIPUNCTURE: CPT

## 2020-04-16 PROCEDURE — 83880 ASSAY OF NATRIURETIC PEPTIDE: CPT

## 2020-04-16 PROCEDURE — 99442 PR PHYS/QHP TELEPHONE EVALUATION 11-20 MIN: CPT | Performed by: NURSE PRACTITIONER

## 2020-04-16 PROCEDURE — 80048 BASIC METABOLIC PNL TOTAL CA: CPT

## 2020-04-16 PROCEDURE — 83735 ASSAY OF MAGNESIUM: CPT

## 2020-04-16 PROCEDURE — 80061 LIPID PANEL: CPT

## 2020-04-16 RX ORDER — POTASSIUM CHLORIDE 1500 MG/1
TABLET, FILM COATED, EXTENDED RELEASE ORAL
Qty: 180 TABLET | Refills: 3
Start: 2020-04-16 | End: 2020-07-08

## 2020-04-16 RX ORDER — FUROSEMIDE 40 MG/1
TABLET ORAL
Qty: 180 TABLET | Refills: 1
Start: 2020-04-16 | End: 2020-07-08

## 2020-04-16 NOTE — PROGRESS NOTES
(241.3 Mg) MG TABS tablet 1 tab at noon and 1 tab in the evening  Monae Beckham, APRN - CNP   aluminum & magnesium hydroxide-simethicone (MAALOX ADVANCED MAX ST) 400-400-40 MG/5ML SUSP Take 15 mLs by mouth every 4 hours as needed (nausea or reflux)  Ernestina Villanueva MD   albuterol sulfate  (90 Base) MCG/ACT inhaler Inhale 2 puffs into the lungs 3 times daily  Historical Provider, MD   OLANZapine (ZYPREXA) 15 MG tablet Take 7.5 mg by mouth 3 times daily Indications: 1/2 tab every morning, 1/2 tab every evening and 1 tab at bedtime   Historical Provider, MD   melatonin 5 MG TABS tablet Take 5 mg by mouth nightly  Historical Provider, MD   gabapentin (NEURONTIN) 400 MG capsule Take 400 mg by mouth 3 times daily.   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[address]     Scoliosis     Smoker     Tachycardia     VF (ventricular fibrillation) (HCC)    ,   Past Surgical History:   Procedure Laterality Date    ACHILLES TENDON SURGERY      BONE MARROW TRANSPLANT      BRONCHOSCOPY N/A 5/16/2019    BRONCHOSCOPY ALVEOLAR LAVAGE performed by Josep Lopez MD at [address]  5/16/2019    BRONCHOSCOPY THERAPUTIC ASPIRATION INITIAL performed by Josep Lopez MD at [address]  N/A 5/19/2019    BRONCHOSCOPY ALVEOLAR LAVAGE performed by Josep Lopez MD at [address]   5/19/2019    BRONCHOSCOPY THERAPUTIC ASPIRATION INITIAL performed by Josep Lopez MD at [address] [place] [address]  4/15/2014    Laparascopic    ENDOSCOPY, COLON, DIAGNOSTIC      FOOT SURGERY      GROWTH PLATE SURGERY      LEG SURGERY         PHYSICAL EXAMINATION: limited as video/visual not available    Able to complete sentences without sounding breathless. Echo 5/15/19   Summary   Definity contrast administered.   Left ventricular systolic function is reduced with ejection fraction   estimated at 25-30 %.   Elevated left ventricular diastolic filling pressure: Septal E/e'' = 12.6   (for sinus tachycardia) .   Right ventricular systolic function is moderately reduced .   Mild mitral regurgitation.   Unable to obtain SPAP due to lack of tricuspid regurgitation.     Echo 5/22/19   Summary   This is a limited study for EF follow up.   Left ventricular systolic function is reduced with ejection fraction   estimated at 40 %.   There is mild to moderate global hypokinesis present.   There is mild concentric left ventricular hypertrophy. 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 Non-ischemic cardiomyopathy (HCC)  BASIC METABOLIC PANEL    BRAIN NATRIURETIC PEPTIDE (BNP)    Lipid, Fasting    MAGNESIUM   2.  Chronic systolic congestive heart failure ([address] BASIC METABOLIC PANEL    BRAIN NATRIURETIC PEPTIDE (BNP)    Lipid, Fasting    MAGNESIUM   3. ICD (implantable cardioverter-defibrillator) in place     4. Medication management       Plan:  Check labs to monitor potassium and renal function as being off of lasix and potassium  Continue lasix and potassium PRN for weight gain- she verbalized understanding  Continue daily weights  Stay as active as possible- discussed exercise and biking routinue  Repeat echo this summer  To evaluate LVEF. Follow up in 4 months     Magaly Garcia is a 37 y.o. female being evaluated by a audio visit) encounter to address concerns as mentioned above. A caregiver was present when appropriate. Due to this being a TeleHealth encounter (During MWSYU-06 public health emergency), evaluation of the following organ systems was limited: Vitals/Constitutional/EENT/Resp/CV/GI//MS/Neuro/Skin/Heme-Lymph-Imm. Pursuant to the emergency declaration under the [address] [place] [address] authority and the [employer] Act, this Virtual Visit was conducted with patient's (and/or legal guardian's) consent, to reduce the patient's risk of exposure to COVID-19 and provide necessary medical care. The patient (and/or legal guardian) has also been advised to contact this office for worsening conditions or problems, and seek emergency medical treatment and/or call 911 if deemed necessary. Services were provided through a audio discussion virtually to substitute for in-person clinic visit. Patient was  located at their individual homes, and provider was in the office. Total time spent 20 minutes     --JAYLYN Monsivais CNP on 4/16/2020 at 9:55 AM    An electronic signature was used to authenticate this note.

## 2020-04-17 ENCOUNTER — CARE COORDINATION (OUTPATIENT)
Dept: CARE COORDINATION | Age: 35
End: 2020-04-17

## 2020-04-17 ENCOUNTER — TELEPHONE (OUTPATIENT)
Dept: CARDIOLOGY CLINIC | Age: 35
End: 2020-04-17

## 2020-04-17 LAB
ANION GAP SERPL CALCULATED.3IONS-SCNC: 19 MMOL/L (ref 3–16)
BUN BLDV-MCNC: 7 MG/DL (ref 7–20)
CALCIUM SERPL-MCNC: 10 MG/DL (ref 8.3–10.6)
CHLORIDE BLD-SCNC: 98 MMOL/L (ref 99–110)
CO2: 22 MMOL/L (ref 21–32)
CREAT SERPL-MCNC: 0.5 MG/DL (ref 0.6–1.1)
GFR AFRICAN AMERICAN: >60
GFR NON-AFRICAN AMERICAN: >60
GLUCOSE BLD-MCNC: 73 MG/DL (ref 70–99)
MAGNESIUM: 1.7 MG/DL (ref 1.8–2.4)
POTASSIUM SERPL-SCNC: 4.1 MMOL/L (ref 3.5–5.1)
PRO-BNP: 210 PG/ML (ref 0–124)
SODIUM BLD-SCNC: 139 MMOL/L (ref 136–145)

## 2020-04-17 NOTE — CARE COORDINATION
Two week and final outreach call made to f/u on ED visit from 4/3/20. No answer and message with ACM contact information left.

## 2020-05-07 ENCOUNTER — NURSE ONLY (OUTPATIENT)
Dept: CARDIOLOGY CLINIC | Age: 35
End: 2020-05-07
Payer: MEDICAID

## 2020-05-07 PROCEDURE — 93295 DEV INTERROG REMOTE 1/2/MLT: CPT | Performed by: INTERNAL MEDICINE

## 2020-05-07 PROCEDURE — 93296 REM INTERROG EVL PM/IDS: CPT | Performed by: INTERNAL MEDICINE

## 2020-05-07 PROCEDURE — 93297 REM INTERROG DEV EVAL ICPMS: CPT | Performed by: INTERNAL MEDICINE

## 2020-05-07 NOTE — LETTER
[address] [phone]  [address] [address] [address] [place] [address]    Pacemaker/Defibrillator Clinic          05/07/20        Elan Christy  [address] [facility]        Dear Elan Harrison    This letter is to inform you that we received the transmission from your monitor at home that checks your implanted heart device. The next date your monitor will automatically transmit will be 8/19. If your report needs attention we will notify you. Your device and monitor are wireless and most transmit cellularly, but please periodically check your monitor is still plugged in to the electrical outlet. If you still use the telephone land line to send please ensure the connection to the phone nallely is secure. This will help to ensure successful automatic transmissions in the future. Also, the monitor needs to be close to you while sleeping at night. Please be aware that the remote device transmission sites are periodically monitored only during regular business hours during which simultaneous in-office device clinics are being run. If your transmission requires attention, we will contact you as soon as possible. Thank you.             [place]

## 2020-05-07 NOTE — PROGRESS NOTES
Carelink transmission shows normal sensing and pacing function. See interrogation for more details. Episodes Since: 12-Feb-2020  1 Non-sustained VT x 8 beats. Optivol @ baseline. Ov 5/12. Follow up in 3 months via carelink.

## 2020-07-06 RX ORDER — SPIRONOLACTONE 25 MG/1
TABLET ORAL
Qty: 90 TABLET | Refills: 1 | Status: SHIPPED | OUTPATIENT
Start: 2020-07-06

## 2020-07-07 ENCOUNTER — APPOINTMENT (OUTPATIENT)
Dept: GENERAL RADIOLOGY | Age: 35
End: 2020-07-07
Payer: MEDICAID

## 2020-07-07 ENCOUNTER — TELEPHONE (OUTPATIENT)
Dept: CARDIOLOGY CLINIC | Age: 35
End: 2020-07-07

## 2020-07-07 ENCOUNTER — APPOINTMENT (OUTPATIENT)
Dept: CT IMAGING | Age: 35
End: 2020-07-07
Payer: MEDICAID

## 2020-07-07 ENCOUNTER — HOSPITAL ENCOUNTER (EMERGENCY)
Age: 35
Discharge: HOME | End: 2020-07-07
Attending: EMERGENCY MEDICINE
Payer: MEDICAID

## 2020-07-07 VITALS
DIASTOLIC BLOOD PRESSURE: 62 MMHG | OXYGEN SATURATION: 95 % | RESPIRATION RATE: 11 BRPM | SYSTOLIC BLOOD PRESSURE: 102 MMHG | TEMPERATURE: 98.4 F | WEIGHT: 185 LBS | HEIGHT: 60 IN | HEART RATE: 75 BPM | BODY MASS INDEX: 36.32 KG/M2

## 2020-07-07 LAB
A/G RATIO: 1.3 (ref 1.1–2.2)
ALBUMIN SERPL-MCNC: 4.3 G/DL (ref 3.4–5)
ALP BLD-CCNC: 112 U/L (ref 40–129)
ALT SERPL-CCNC: 77 U/L (ref 10–40)
ANION GAP SERPL CALCULATED.3IONS-SCNC: 10 MMOL/L (ref 3–16)
AST SERPL-CCNC: 51 U/L (ref 15–37)
BASOPHILS ABSOLUTE: 0.1 K/UL (ref 0–0.2)
BASOPHILS RELATIVE PERCENT: 0.7 %
BILIRUB SERPL-MCNC: 0.5 MG/DL (ref 0–1)
BUN BLDV-MCNC: 9 MG/DL (ref 7–20)
CALCIUM SERPL-MCNC: 9.5 MG/DL (ref 8.3–10.6)
CHLORIDE BLD-SCNC: 102 MMOL/L (ref 99–110)
CO2: 26 MMOL/L (ref 21–32)
CREAT SERPL-MCNC: <0.5 MG/DL (ref 0.6–1.1)
D DIMER: 3510 NG/ML DDU (ref 0–229)
EKG ATRIAL RATE: 85 BPM
EKG DIAGNOSIS: NORMAL
EKG P AXIS: 30 DEGREES
EKG P-R INTERVAL: 172 MS
EKG Q-T INTERVAL: 368 MS
EKG QRS DURATION: 82 MS
EKG QTC CALCULATION (BAZETT): 437 MS
EKG R AXIS: 19 DEGREES
EKG T AXIS: 29 DEGREES
EKG VENTRICULAR RATE: 85 BPM
EOSINOPHILS ABSOLUTE: 0.1 K/UL (ref 0–0.6)
EOSINOPHILS RELATIVE PERCENT: 1 %
GFR AFRICAN AMERICAN: >60
GFR NON-AFRICAN AMERICAN: >60
GLOBULIN: 3.4 G/DL
GLUCOSE BLD-MCNC: 108 MG/DL (ref 70–99)
HCG QUALITATIVE: NEGATIVE
HCT VFR BLD CALC: 41.6 % (ref 36–48)
HEMOGLOBIN: 13.9 G/DL (ref 12–16)
LYMPHOCYTES ABSOLUTE: 3.4 K/UL (ref 1–5.1)
LYMPHOCYTES RELATIVE PERCENT: 30.7 %
MCH RBC QN AUTO: 29.1 PG (ref 26–34)
MCHC RBC AUTO-ENTMCNC: 33.6 G/DL (ref 31–36)
MCV RBC AUTO: 86.6 FL (ref 80–100)
MONOCYTES ABSOLUTE: 1.1 K/UL (ref 0–1.3)
MONOCYTES RELATIVE PERCENT: 9.6 %
NEUTROPHILS ABSOLUTE: 6.5 K/UL (ref 1.7–7.7)
NEUTROPHILS RELATIVE PERCENT: 58 %
PDW BLD-RTO: 12.1 % (ref 12.4–15.4)
PLATELET # BLD: 335 K/UL (ref 135–450)
PMV BLD AUTO: 7.4 FL (ref 5–10.5)
POTASSIUM SERPL-SCNC: 3.6 MMOL/L (ref 3.5–5.1)
PRO-BNP: 154 PG/ML (ref 0–124)
RBC # BLD: 4.8 M/UL (ref 4–5.2)
SODIUM BLD-SCNC: 138 MMOL/L (ref 136–145)
TOTAL PROTEIN: 7.7 G/DL (ref 6.4–8.2)
TROPONIN: <0.01 NG/ML
TROPONIN: <0.01 NG/ML
WBC # BLD: 11.2 K/UL (ref 4–11)

## 2020-07-07 PROCEDURE — 84703 CHORIONIC GONADOTROPIN ASSAY: CPT

## 2020-07-07 PROCEDURE — U0003 INFECTIOUS AGENT DETECTION BY NUCLEIC ACID (DNA OR RNA); SEVERE ACUTE RESPIRATORY SYNDROME CORONAVIRUS 2 (SARS-COV-2) (CORONAVIRUS DISEASE [COVID-19]), AMPLIFIED PROBE TECHNIQUE, MAKING USE OF HIGH THROUGHPUT TECHNOLOGIES AS DESCRIBED BY CMS-2020-01-R: HCPCS

## 2020-07-07 PROCEDURE — 83880 ASSAY OF NATRIURETIC PEPTIDE: CPT

## 2020-07-07 PROCEDURE — 2580000003 HC RX 258: Performed by: NURSE PRACTITIONER

## 2020-07-07 PROCEDURE — 93010 ELECTROCARDIOGRAM REPORT: CPT | Performed by: INTERNAL MEDICINE

## 2020-07-07 PROCEDURE — 71260 CT THORAX DX C+: CPT

## 2020-07-07 PROCEDURE — 84484 ASSAY OF TROPONIN QUANT: CPT

## 2020-07-07 PROCEDURE — 96374 THER/PROPH/DIAG INJ IV PUSH: CPT

## 2020-07-07 PROCEDURE — 93005 ELECTROCARDIOGRAM TRACING: CPT | Performed by: EMERGENCY MEDICINE

## 2020-07-07 PROCEDURE — 85025 COMPLETE CBC W/AUTO DIFF WBC: CPT

## 2020-07-07 PROCEDURE — 71045 X-RAY EXAM CHEST 1 VIEW: CPT

## 2020-07-07 PROCEDURE — 80053 COMPREHEN METABOLIC PANEL: CPT

## 2020-07-07 PROCEDURE — 6360000004 HC RX CONTRAST MEDICATION: Performed by: NURSE PRACTITIONER

## 2020-07-07 PROCEDURE — 6360000002 HC RX W HCPCS: Performed by: NURSE PRACTITIONER

## 2020-07-07 PROCEDURE — 85379 FIBRIN DEGRADATION QUANT: CPT

## 2020-07-07 PROCEDURE — 36415 COLL VENOUS BLD VENIPUNCTURE: CPT

## 2020-07-07 PROCEDURE — 6370000000 HC RX 637 (ALT 250 FOR IP): Performed by: EMERGENCY MEDICINE

## 2020-07-07 PROCEDURE — 99285 EMERGENCY DEPT VISIT HI MDM: CPT

## 2020-07-07 RX ORDER — MORPHINE SULFATE 4 MG/ML
4 INJECTION, SOLUTION INTRAMUSCULAR; INTRAVENOUS
Status: DISCONTINUED | OUTPATIENT
Start: 2020-07-07 | End: 2020-07-08 | Stop reason: HOSPADM

## 2020-07-07 RX ORDER — 0.9 % SODIUM CHLORIDE 0.9 %
1000 INTRAVENOUS SOLUTION INTRAVENOUS ONCE
Status: COMPLETED | OUTPATIENT
Start: 2020-07-07 | End: 2020-07-07

## 2020-07-07 RX ADMIN — IOPAMIDOL 75 ML: 755 INJECTION, SOLUTION INTRAVENOUS at 21:41

## 2020-07-07 RX ADMIN — SODIUM CHLORIDE 1000 ML: 9 INJECTION, SOLUTION INTRAVENOUS at 19:44

## 2020-07-07 RX ADMIN — LIDOCAINE HYDROCHLORIDE: 20 SOLUTION ORAL; TOPICAL at 20:01

## 2020-07-07 RX ADMIN — MORPHINE SULFATE 4 MG: 4 INJECTION, SOLUTION INTRAMUSCULAR; INTRAVENOUS at 20:28

## 2020-07-07 ASSESSMENT — PAIN SCALES - GENERAL
PAINLEVEL_OUTOF10: 8
PAINLEVEL_OUTOF10: 8

## 2020-07-07 NOTE — ED PROVIDER NOTES
I independently performed a history and physical on [address] [facility],  [address].   All diagnostic, treatment, and disposition decisions were made by myself in conjunction with the advanced practice provider. For further details of Red[facility] Gomez emergency department encounter, please see Morteza Vasquez NP's documentation. Patient reports several days of constant midsternal chest pain that seems to radiate around in the center of the chest.  The pain does not relent however. It has steadily become more intense and is now moderate. Initially it was just a nagging or mild sensation. She denies any fevers, chills or sweats. She does have some slight shortness of breath. No other associated symptoms. No diaphoresis, nausea, lightheadedness or palpitations. Patient has a history of prior cardiac arrest.  On exam heart regular rate and rhythm and lungs clear to auscultation and abdomen benign. Chest nontender. 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 EKG  The Ekg interpreted by me shows  normal sinus rhythm with a rate of 85  Axis is   Normal  QTc is  normal  Intervals and Durations are unremarkable.       ST Segments: no acute change  No significant change from prior EKG dated 12 feb 2020    Results for orders placed or performed during the hospital encounter of 07/07/20   CBC Auto Differential   Result Value Ref Range    WBC 11.2 (H) 4.0 - 11.0 K/uL    RBC 4.80 4.00 - 5.20 M/uL    Hemoglobin 13.9 12.0 - 16.0 g/dL    Hematocrit 41.6 36.0 - 48.0 %    MCV 86.6 80.0 - 100.0 fL    MCH 29.1 26.0 - 34.0 pg    MCHC 33.6 31.0 - 36.0 g/dL    RDW 12.1 (L) 12.4 - 15.4 %    Platelets 264 955 - 536 K/uL    MPV 7.4 5.0 - 10.5 fL    Neutrophils % 58.0 %    Lymphocytes % 30.7 %    Monocytes % 9.6 %    Eosinophils % 1.0 %    Basophils % 0.7 %    Neutrophils Absolute 6.5 1.7 - 7.7 K/uL    Lymphocytes Absolute 3.4 1.0 - 5.1 K/uL    Monocytes Absolute 1.1 0.0 - 1.3 K/uL    Eosinophils Absolute 0.1 0.0 - 0.6 K/uL    Basophils Absolute 0.1 0.0 - 0.2 K/uL   Comprehensive Metabolic Panel   Result Value Ref Range    Sodium 138 136 - 145 mmol/L    Potassium 3.6 3.5 - 5.1 mmol/L    Chloride 102 99 - 110 mmol/L    CO2 26 21 - 32 mmol/L    Anion Gap 10 3 - 16    Glucose 108 (H) 70 - 99 mg/dL    BUN 9 7 - 20 mg/dL    CREATININE <0.5 (L) 0.6 - 1.1 mg/dL    GFR Non-African American >60 >60    GFR African American >60 >60    Calcium 9.5 8.3 - 10.6 mg/dL    Total Protein 7.7 6.4 - 8.2 g/dL    Alb 4.3 3.4 - 5.0 g/dL    Albumin/Globulin Ratio 1.3 1.1 - 2.2    Total Bilirubin 0.5 0.0 - 1.0 mg/dL    Alkaline Phosphatase 112 40 - 129 U/L    ALT 77 (H) 10 - 40 U/L    AST 51 (H) 15 - 37 U/L    Globulin 3.4 g/dL   Brain Natriuretic Peptide   Result Value Ref Range    Pro- (H) 0 - 124 pg/mL   Troponin   Result Value Ref Range    Troponin <0.01 <0.01 ng/mL   D-dimer, quantitative   Result Value Ref Range    D-Dimer, Quant 3510 (H) 0 - 229 ng/mL DDU   HCG Qualitative, Serum   Result Value Ref Range    hCG Qual Negative Detects HCG level >10 MIU/mL   Troponin   Result Value Ref Range    Troponin <0.01 <0.01 ng/mL   Covid-19 Ambulatory   Result Value Ref Range    Source NP swab    EKG 12 Lead   Result Value Ref Range    Ventricular Rate 85 BPM    Atrial Rate 85 BPM    P-R Interval 172 ms    QRS Duration 82 ms    Q-T Interval 368 ms    QTc Calculation (Bazett) 437 ms    P Axis 30 degrees    R Axis 19 degrees    T Axis 29 degrees    Diagnosis       Normal sinus rhythmNormal ECGWhen compared with ECG of 12-FEB-2020 13:42,No significant change was foundConfirmed by Giuliana Barger MD, Maurice Chandra ([id number]) on 7/7/2020 7:03:28 PM     Xr Chest Portable  No gross acute process. Ct Chest Pulmonary Embolism W Contrast  No evidence of pulmonary embolism or aortic dissection. No definite acute abnormality detected overall.  Mild ground-glass opacity within the lower lungs bilaterally, which probably relates to atelectasis, but correlate with any clinical evidence of infectious or inflammatory pneumonitis.              Nayely Nieves MD  07/08/20 [id number]

## 2020-07-07 NOTE — TELEPHONE ENCOUNTER
CHEST PAIN    1) Are you having active chest pain right now? yes If so, when did the symptoms start? 2 days ago       2) On a scale of 1-10 how bad is your pain?6 or 7       3) Where is the pain located? Center of chest, feels like someone pushing into her chest, pressure    4) Are you having shortness of breath? yes   Nausea? no  Vomiting? no  Dizziness? yes in AM     5) Have you taken any Nitroglycerin tablets? No prescribed. Pt wants to get ECHO done that [facility] wanted . Need order place on chart.

## 2020-07-08 ENCOUNTER — CARE COORDINATION (OUTPATIENT)
Dept: CARE COORDINATION | Age: 35
End: 2020-07-08

## 2020-07-08 NOTE — ED PROVIDER NOTES
EMERGENCY DEPARTMENT ENCOUNTER      This patient was seen and evaluated by the attending physician. Pt Name: Lashell Hatfield  MRN: [id number]  Misha 1985  Date of evaluation: 7/7/2020  Provider: JAYLYN Clements CNP-C  PCP: Gokul Swan, RN, NP  ED Attending: Dr. Mauro Pagan    History provided by the patient. CHIEF COMPLAINT:     Chief Complaint   Patient presents with    Chest Pain     couple of days. . pressure . . like someone standing on her chest.. had full arrest last may due medication reaction of chemo drugs. Collette Ruts denies N/V/D       HISTORY OF PRESENT ILLNESS:      Lashell Hatfield is a 37 y.o. female who presents [facility]  ED with complaints of chest pain. Patient states that she has had persistent chest pain for the last couple days, states that it is midsternal nonradiating and has been very consistent. Patient states that she does have a history last May of cardiac arrest which she says was secondary to chemo drugs which she is no longer taking. She does have a cardiologist and has an implantable cardiac defibrillator. Patient denies any nausea vomiting or diarrhea with it. Denies fever. She is here for further evaluation. Location:ache  Quality:ache  Severity:  Duration:8  Modifying factors:none noted    Nursing Notes were reviewed     REVIEW OF SYSTEMS:     Review of Systems  All systems, atotal of 10, are reviewed and negative except for those that were just noted in history present illness.         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[facility]     Scoliosis     Smoker     Tachycardia     VF (ventricular fibrillation) (HCC)          SURGICAL HISTORY:      Past Surgical History:   Procedure Laterality Date    ACHILLES TENDON SURGERY      BONE MARROW TRANSPLANT      BRONCHOSCOPY N/A 5/16/2019    BRONCHOSCOPY ALVEOLAR LAVAGE performed by Stacie Lui MD at [address]  5/16/2019    BRONCHOSCOPY THERAPUTIC ASPIRATION INITIAL performed by Stacie Lui MD at [address] N/A 5/19/2019    BRONCHOSCOPY ALVEOLAR LAVAGE performed by Stacie Lui MD at [address]  5/19/2019    BRONCHOSCOPY THERAPUTIC ASPIRATION INITIAL performed by Stacie Lui MD at [address] [place] [address]  4/15/2014    Laparascopic    ENDOSCOPY, COLON, DIAGNOSTIC      FOOT SURGERY      GROWTH PLATE SURGERY      LEG SURGERY           CURRENT MEDICATIONS:       Discharge Medication List as of 7/7/2020 11:24 PM      CONTINUE these medications which have NOT CHANGED    Details   spironolactone (ALDACTONE) 25 MG tablet TAKE ONE TABLET BY MOUTH DAILY, Disp-90 tablet, R-1Normal      metoprolol succinate (TOPROL XL) 50 MG extended release tablet Take 1 tablet by mouth 2 times daily, Disp-60 tablet, R-1Normal      lisinopril (PRINIVIL;ZESTRIL) 2.5 MG tablet TAKE ONE TABLET BY MOUTH DAILY, Disp-90 tablet, R-3Normal      Atorvastatin Calcium (LIPITOR PO) Take by mouth dailyHistorical Med      Cholecalciferol (VITAMIN D PO) Take by mouth dailyHistorical Med      magnesium oxide (MAG-OX) 400 (241.3 Mg) MG TABS tablet 1 tab at noon and 1 tab in the evening, Disp-60 tablet, R-3Adjust Sig      aluminum & magnesium hydroxide-simethicone (MAALOX ADVANCED MAX ST) 400-400-40 MG/5ML SUSP Take 15 mLs by mouth every 4 hours as needed (nausea or reflux), Disp-1 Bottle, R-0Print      albuterol sulfate  (90 Base) MCG/ACT inhaler Inhale 2 puffs into the lungs 3 times dailyHistorical Med      OLANZapine (ZYPREXA) 15 MG tablet Indications: 1/2 tab every morning, 1/2 tab every evening and 1 tab at bedtime Historical Med      gabapentin (NEURONTIN) 400 MG capsule Take 400 mg by mouth 3 times daily. Historical Med      furosemide (LASIX) 40 MG tablet As needed for 3lbs weight gain in a day or 5lbs weight gain in a week, Disp-180 tablet, R-1Adjust Sig      potassium chloride (KLOR-CON M) 20 MEQ TBCR extended release tablet Daily PRN when you take lasix, Disp-180 tablet, R-3Adjust Sig      famotidine (PEPCID) 20 MG tablet Take 1 tablet by mouth 2 times daily, Disp-60 tablet, R-0Print      omeprazole (PRILOSEC) 40 MG delayed release capsule Take 1 capsule by mouth daily, Disp-30 capsule, R-0Print               ALLERGIES:    Tape Javy Hench tape]    FAMILY HISTORY:       Family History   Problem Relation Age of Onset    Emphysema Mother     Mental Illness Mother     Substance Abuse Mother     Heart Attack Father     Mental Illness Father     Arthritis Father     Heart Disease Father     High Blood Pressure Father     High Cholesterol Father           SOCIAL HISTORY:       Social History     Socioeconomic History    Marital status: Legally      Spouse name: None    Number of children: None    Years of education: None    Highest education level: None   Occupational History    None   Social Needs    Financial resource strain: None    Food insecurity     Worry: None     Inability: None    Transportation needs     Medical: None     Non-medical: None   Tobacco Use    Smoking status: Former Smoker     Packs/day: 0.00     Years: 0.00     Pack years: 0.00    Smokeless tobacco: Never Used   Substance and Sexual Activity    Alcohol use: Yes     Comment: social    Drug use: Yes     Types: Marijuana    Sexual activity: Yes     Partners: Male   Lifestyle    Physical activity     Days per week: None     Minutes per session: None    Stress: None   Relationships    Social connections     Talks on phone: None     Gets together: None     Attends Congregational service: None     Active member of club or organization: None     Attends meetings of clubs or organizations: None     Relationship status: None    Intimate partner violence     Fear of current or ex partner: None     Emotionally abused: None     Physically abused: None     Forced sexual activity: None   Other Topics Concern    None   Social History Narrative    ** Merged History Encounter **            SCREENINGS:   Josiah Coma Scale  Eye Opening: Spontaneous  Best Verbal Response: Oriented  Best Motor Response: Obeys commands  Josiah Coma Scale Score: 15        PHYSICAL EXAM:       ED Triage Vitals   BP Temp Temp Source Pulse Resp SpO2 Height Weight   07/07/20 1726 07/07/20 1726 07/07/20 2314 07/07/20 1726 07/07/20 1726 07/07/20 1726 07/07/20 1726 07/07/20 1726   103/70 98.4 °F (36.9 °C) Oral 87 16 96 % 5' (1.524 m) 185 lb (83.9 kg)       Physical Exam    CONSTITUTIONAL: Awake and alert. Cooperative. Well-developed. Well-nourished. Vitals:    07/07/20 2245 07/07/20 2300 07/07/20 2314 07/07/20 2315   BP: 92/68 96/67 102/62    Pulse: 77 91 96 75   Resp: 17 17  11   Temp:   98.4 °F (36.9 °C)    TempSrc:   Oral    SpO2: 94% 94% 96% 95%   Weight:       Height:         HENT: Normocephalic. Atraumatic. External ears normal, without discharge. TMs clear bilaterally. No nasal discharge. Oropharynx clear, no erythema. Mucous membranes moist.  EYES: Conjunctiva non-injected, no lid abnormalities noted. No scleral icterus. PERRL. EOM's grossly intact. Anterior chambers clear. NECK: Supple. Normal ROM. No meningismus. No thyroid tenderness or swelling noted. CARDIOVASCULAR: RRR. No Murmer.   PULMONARY/CHEST WALL: Effort normal. No Bilirubin 0.5 0.0 - 1.0 mg/dL    Alkaline Phosphatase 112 40 - 129 U/L    ALT 77 (H) 10 - 40 U/L    AST 51 (H) 15 - 37 U/L    Globulin 3.4 g/dL   Brain Natriuretic Peptide   Result Value Ref Range    Pro- (H) 0 - 124 pg/mL   Troponin   Result Value Ref Range    Troponin <0.01 <0.01 ng/mL   D-dimer, quantitative   Result Value Ref Range    D-Dimer, Quant 3510 (H) 0 - 229 ng/mL DDU   HCG Qualitative, Serum   Result Value Ref Range    hCG Qual Negative Detects HCG level >10 MIU/mL   Troponin   Result Value Ref Range    Troponin <0.01 <0.01 ng/mL   Covid-19 Ambulatory   Result Value Ref Range    Source NP swab    EKG 12 Lead   Result Value Ref Range    Ventricular Rate 85 BPM    Atrial Rate 85 BPM    P-R Interval 172 ms    QRS Duration 82 ms    Q-T Interval 368 ms    QTc Calculation (Bazett) 437 ms    P Axis 30 degrees    R Axis 19 degrees    T Axis 29 degrees    Diagnosis       Normal sinus rhythmNormal ECGWhen compared with ECG of 12-FEB-2020 13:42,No significant change was foundConfirmed by Negin Chin MD, Fleeta Failing ([id number]) on 7/7/2020 7:03:28 PM         RADIOLOGY:  All x-ray studies are viewed/reviewedby me. Formal interpretations per the radiologist are as follows:      CT Chest Pulmonary Embolism W Contrast   Final Result   No evidence of pulmonary embolism or aortic dissection. No definite acute   abnormality detected overall. Mild ground-glass opacity within the lower lungs bilaterally, which probably   relates to atelectasis, but correlate with any clinical evidence of   infectious or inflammatory pneumonitis. XR CHEST PORTABLE   Final Result   No gross acute process.                  EKG:  See EKG interpretation by an attending phsyician      PROCEDURES:   N/A    CRITICAL CARE TIME:   N/A    CONSULTS:  None      EMERGENCYDEPARTMENT COURSE and DIFFERENTIAL DIAGNOSIS/MDM:   Vitals:    Vitals:    07/07/20 2245 07/07/20 2300 07/07/20 2314 07/07/20 2315   BP: 92/68 96/67 102/62    Pulse: 77 91 96 75   Resp: 17 17  11   Temp:   98.4 °F (36.9 °C)    TempSrc:   Oral    SpO2: 94% 94% 96% 95%   Weight:       Height:           Patient was given the following medications:  Medications   0.9 % sodium chloride bolus (0 mLs Intravenous Stopped 7/7/20 2028)   aluminum & magnesium hydroxide-simethicone (MAALOX) 30 mL, lidocaine viscous hcl (XYLOCAINE) 5 mL (GI COCKTAIL) ( Oral Given 7/7/20 2001)   iopamidol (ISOVUE-370) 76 % injection 75 mL (75 mLs Intravenous Given 7/7/20 2141)         Patient was evaluated by both myself and Dr. Valentin Muhammad. Patient presented to the emergency room today with complaints of persistent chest pain. Patient has had midsternal nonradiating chest pain that has been going on consistently for several days. Patient does have a prior history of a cardiac arrest.  No diaphoresis. Patient work-up today showed 2- troponin, mild leukocytosis at 11.2. Attending to do a d-dimer which was elevated, CT of the chest showed no evidence of a PE or aortic dissection, they did suggest some atelectasis bilaterally for which I did swab the patient for COVID. After discussion with the patient we did feel comfortable with discharge home given that her EKG was nonacute with 2- troponins. Patient vital signs stable upon discharge. She was given instructions to follow-up with cardiology as an outpatient, she was given strict return precautions. Patient verbalized understanding of the plan and agreed with the.  Discharged home       Patient laboratory studies, radiographic imaging, andassessment were all discussed with the patient and/or patient family. There was shared decision-making between myself, the attending physician, as well as the patient and/or their surrogate and we are all in agreement with discharge home. There was an opportunity for questions and all questions were answered to the best of my ability and to the satisfaction of the patient and/or patient family.     I have discussed with the

## 2020-07-08 NOTE — CARE COORDINATION
Chart reviewed. Pt seen and evaluated in ED for Chest pain. Pt given the following referrals/recommnedations (see below):    - Call Jus Scott, RN, NP; For follow up    Initial ED f/u outreach call to pt. Per appts, pt has appt with Cards 2020- see below. 3-4 days, still having chest pain. Pt called [facility] prior to ED visit yesterday and was advised to go to ED and be evaluated. Pt had 2 Troponin's done (both normal). Pt unclear if her chest pain is related to her anxiety or her heart. Pt stated she suffered Cardiac Arrest and that's when she began seeing Cards. Pt voiced most of her cardiac issues are related to Cancer and treatment when she was a child. Pt voiced she is aware of COVID precautions. Pt stated she is out with her mother at time of call. Medications reviewed. Pt agreeable to additional outreach. Pt tested for COVID while in ED. As of 2020 at 2:35pm (pending)    Patient contacted regarding Alex Jansen. Discussed COVID-19 related testing which was pending at this time. Test results were pending. Patient informed of results, if available? Yes    Care Transition Nurse/ Ambulatory Care Manager contacted the patient by telephone to perform post discharge assessment. Verified name and  with patient as identifiers. Provided introduction to self, and explanation of the CTN/ACM role, and reason for call due to risk factors for infection and/or exposure to COVID-19. Symptoms reviewed with patient who verbalized the following symptoms: chest pain, no worsening symptoms and has had chest pain for past 3-4 days per pt. Due to no new or worsening symptoms encounter was not routed to provider for escalation. Discussed follow-up appointments.  If no appointment was previously scheduled, appointment scheduling offered: Yes  [address] follow up appointment(s):   Future Appointments   Date Time Provider Shakira Mane   2020  2:15 PM SCHEDULE, [facility] [address]   2020 2:15 PM MD Ronaldo Link   8/19/2020  7:20 AM SCHEDULE, [facility] TRANSMISSION Ronaldo GEIGER     Non-[facility] follow up appointment(s): Pt stated she will f/u with Rinaedwar Cheek, CASIE) and her Cardiologist with [facility] once she has COVID results     Patient has following risk factors of: heart failure and COPD, V Fib, CKD, Hx Cardiac Arrest, Cardiomyopathy and recent ED visit. CTN/ACM reviewed discharge instructions, medical action plan and red flags such as increased shortness of breath, increasing fever and signs of decompensation with patient who verbalized understanding. Discussed exposure protocols and quarantine with CDC Guidelines What to do if you are sick with coronavirus disease 2019.  Patient was given an opportunity for questions and concerns. The patient agrees to contact the [facility] exposure line [phone], local [facility] department [place] [facility] of Health: ([phone]) and PCP office for questions related to their healthcare. CTN/ACM provided contact information for future needs. Reviewed and educated patient on any new and changed medications related to discharge diagnosis     Patient/family/caregiver given information for [employer] and agrees to enroll no    Plan for follow-up call in 1-2 days based on severity of symptoms and risk factors. FU appts/Provider:    Future Appointments   Date Time Provider Shakira Mane   8/4/2020  2:15 PM [facility] Ronaldo GEIGER   8/4/2020  2:15 PM MD Ronaldo Link   8/19/2020  7:20 AM SCHEDULE, [facility] TRANSMISSION Ronaldo GEIGER     - Pt asked for number to call to schedule her ECHO. Pt provided number: [phone] (Pts last ECHO was 5/2019 and was 40%)      Alexey Patel BSN, RN Care Manager  [phone]  [address] [place] [address],  [address] [place] [address] Primary Care

## 2020-07-09 LAB
SARS-COV-2: NOT DETECTED
SOURCE: NORMAL

## 2020-08-19 ENCOUNTER — HOSPITAL ENCOUNTER (OUTPATIENT)
Dept: NON INVASIVE DIAGNOSTICS | Age: 35
Discharge: HOME | End: 2020-08-19
Payer: MEDICAID

## 2020-08-19 LAB
LV EF: 38 %
LVEF MODALITY: NORMAL

## 2020-08-19 PROCEDURE — C8929 TTE W OR WO FOL WCON,DOPPLER: HCPCS

## 2020-08-19 PROCEDURE — 6360000004 HC RX CONTRAST MEDICATION: Performed by: NURSE PRACTITIONER

## 2020-08-19 RX ADMIN — PERFLUTREN 2.2 MG: 6.52 INJECTION, SUSPENSION INTRAVENOUS at 10:52

## 2020-08-21 ENCOUNTER — TELEPHONE (OUTPATIENT)
Dept: CARDIOLOGY CLINIC | Age: 35
End: 2020-08-21

## 2020-08-21 NOTE — TELEPHONE ENCOUNTER
----- Message from JAYLYN Vega CNP sent at 8/20/2020  8:11 AM EDT -----  Please call heart function is about the same as prior echo 35-40% EF. Pressures are normal and no clot seen in the heart. Keep follow up.

## 2020-09-03 RX ORDER — METOPROLOL SUCCINATE 50 MG/1
TABLET, EXTENDED RELEASE ORAL
Qty: 60 TABLET | Refills: 5 | Status: SHIPPED | OUTPATIENT
Start: 2020-09-03 | End: 2021-03-17

## 2020-09-03 NOTE — TELEPHONE ENCOUNTER
LOV 04/16/2020 w/ NPRB (vv)    ASSESSMENT/PLAN:   Diagnosis Orders   1. Non-ischemic cardiomyopathy (HCC)  BASIC METABOLIC PANEL     BRAIN NATRIURETIC PEPTIDE (BNP)     Lipid, Fasting     MAGNESIUM   2. Chronic systolic congestive heart failure (HCC)  BASIC METABOLIC PANEL     BRAIN NATRIURETIC PEPTIDE (BNP)     Lipid, Fasting     MAGNESIUM   3. ICD (implantable cardioverter-defibrillator) in place      4.  Medication management        Plan:  Check labs to monitor potassium and renal function as being off of lasix and potassium  Continue lasix and potassium PRN for weight gain- she verbalized understanding  Continue daily weights  Stay as active as possible- discussed exercise and biking routinue  Repeat echo this summer  To evaluate LVEF.      Follow up in 4 months

## 2020-10-21 NOTE — PROGRESS NOTES
[facility]   Electrophysiology Consult Note              Date:  October 22, 2020  Patient name: Fabian Gates  YOB: 1985    Primary Care Physician: Walt Dejesus, RN, NP     CHIEF COMPLAINT:   Chief Complaint   Patient presents with    1 Year Follow Up    Congestive Heart Failure    Other     VF     HISTORY OF PRESENT ILLNESS: Fabian Gates is a 38 y.o. female who presents for follow up for NICM. She has a past medical history of VF arrest, NICM, sarcoma, and bipolar disorder. 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 Stress test in 2011 was negative per Dr. Doyle Kayser' note. She was admitted to [facility] in 5/2019 after being found unresponsive. She had a VF arrest, was intubated for aspiration pneumonia, and was transferred to [facility]. First echo in 5/2019 showed an EF of  25-30%. Repeat echo in 5/2019 showed an EF of 40%. In 6/2019 she had a secondary single chamber ICD implanted and required a lead revision that admission. She reported on 10/30/2019 that she has exprienced fatigue ever since her ICD placement. Today, 10/22/2020, patient's device interrogations shows  <0.1%, and 13 episodes of 4 beats or more of NSVT. Her ECG today shows SR, 70BPM. She reports she is doing poorly from a cardiac standpoint. She reports she is having recurrent chest pain. She described the sensation as discomfort. She reports some swelling in her left foot. She reports that she has palpitations several times per week. She reports she is taking all medications as prescribed and tolerates them well. Patient denies current chest pain, sob, dizziness or syncope.      Past Medical History:   has a past medical history of Acute on chronic systolic congestive heart failure ([address] Arrhythmia, Arthritis, Bipolar disorder ([address] Cancer ([address] Cancer ([address] Cardiac arrest ([address] Cardiomyopathy ([address] Chronic kidney disease, COPD (chronic obstructive pulmonary disease) ([facility] Depression, E. 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 Past Surgical History:   has a past surgical history that includes Foot surgery; Cholecystectomy; bronchoscopy (N/A, 5/16/2019); bronchoscopy (5/16/2019); bronchoscopy (N/A, 5/19/2019); bronchoscopy (5/19/2019); bone marrow transplant; Leg Surgery; Growth plate surgery; Achilles tendon surgery; Endoscopy, colon, diagnostic; and Cholecystectomy (4/15/2014). Allergies:  Tape [adhesive tape]    Social History:   reports that she has quit smoking. She smoked 0.00 packs per day for 0.00 years. She has never used smokeless tobacco. She reports current alcohol use. She reports current drug use. Drug: Marijuana. Family History: family history includes Arthritis in her father; Emphysema in her mother; Heart Attack in her father; Heart Disease in her father; High Blood Pressure in her father; High Cholesterol in her father; Mental Illness in her father and mother; Substance Abuse in her mother. Home Medications:    Prior to Admission medications    Medication Sig Start Date End Date Taking? Authorizing Provider   HYDROcodone-acetaminophen (NORCO) 5-325 MG per tablet Take 1 tablet by mouth every 6 hours as needed for Pain. Yes Historical Provider, MD   clonazePAM (KLONOPIN) 0.5 MG tablet Take 0.5 mg by mouth 2 times daily as needed.    Yes Historical Provider, MD   metoprolol succinate (TOPROL XL) 50 MG extended release tablet TAKE ONE TABLET BY MOUTH TWICE A DAY 9/3/20  Yes JAYLYN Chowdary CNP   spironolactone (ALDACTONE) 25 MG tablet TAKE ONE TABLET BY MOUTH DAILY 7/6/20  Yes JAYLYN Chowdary CNP   lisinopril (PRINIVIL;ZESTRIL) 2.5 MG tablet TAKE ONE TABLET BY MOUTH DAILY 11/21/19  Yes Bebe Browning MD   albuterol sulfate  (90 Base) MCG/ACT inhaler Inhale 2 puffs into the lungs 3 times daily 5/9/19  Yes Historical Provider, MD   OLANZapine (ZYPREXA) 15 MG tablet Indications: 1/2 tab every morning, 1/2 tab every evening and 1 tab at bedtime    Yes Historical Provider, MD   gabapentin (NEURONTIN) 400 MG capsule Take 400 mg by mouth 3 times daily. Yes Historical Provider, MD       REVIEW OF SYSTEMS:    All 14-point review of systems are completed and  pertinent positives are mentioned in the history of present illness. Other  systems are reviewed and are negative. Physical Examination:    BP 94/60   Pulse 82   Ht 5' 1\" (1.549 m)   Wt 204 lb 8 oz (92.8 kg)   SpO2 97%   BMI 38.64 kg/m²      Constitutional and General Appearance:    alert, cooperative, no distress and appears stated age  [de-identified]:    PERRLA, no cervical lymphadenopathy. No masses palpable. Normal oral  mucosa  Respiratory:  · Normal excursion and expansion without use of accessory muscles  · Resp Auscultation: Normal breath sounds without dullness or wheezing  Cardiovascular:  · The apical impulse is not displaced  · RRR without M/G/R  Abdomen:  · No masses or tenderness  · Bowel sounds present  Extremities:  ·  No Cyanosis or Clubbing  ·  Lower extremity edema: No  · Skin: Warm and dry  Neurological:  · Alert and oriented. · Moves all extremities well  · No abnormalities of mood, affect, memory, mentation, or behavior are noted    DATA:      ECG: 10/22/2020:  SR, 70BPM    ECG 7/7/2020:  NSR 85 BPM.     ECHO 8/19/2020:   Left Ventricle   Left ventricular systolic function is moderately reduced with a visually   estimated ejection fraction of 35-40 %. EF estimated by Koch's method at 38 %. The left ventricle is normal in size with normal wall thickness.    Mild to moderate global hypokinesis more prominent on the anterior,   anterolateral, and anteroseptal weeks. We discussed potentially placing a monitor for palpitations as her device only has a single lead. Patient would like to hold off given the fact that she is moving soon. - Continue metoprolol.  - Establish care with EP/Cardiology near new home. 2. Non-ischemic cardiomyopathy.  - Plan as per above. 3. Palpitations. - Plan as per above. RECOMMENDATIONS:  1. Recommend a 4 week monitor to assess palpitations. Patient would like to defer at this time as she is moving to [facility] in 2 weeks. 2. Recommend stress test to evaluate chest pain. Patient would like to defer at this time as she is moving to [facility] in 2 weeks. 3. When you acquire a new cardiologist, please have them send a release so we can transfer your records. QUALITY MEASURES  1. Tobacco Cessation Counseling: NA  2. Retake of BP if >140/90:   NA  3. Documentation to PCP/referring for new patient:  Sent to PCP at close of office visit  4. CAD patient on anti-platelet: NA  5. CAD patient on STATIN therapy:  NA  6. Patient with CHF and aFib on anticoagulation:  NA     All questions and concerns were addressed to the patient/family. Alternatives to my treatment were discussed. This note has been scribed in the presence of Michael Castañeda MD by Moi Carmen RN. Dr. Monique Lane MD  Electrophysiology  [facility], [place]. [address] [place] [address]. [address]. [place] [address]  Phone: [phone]  Fax: [phone]     NOTE: This report was transcribed using voice recognition software. Every effort was made to ensure accuracy, however, inadvertent computerized transcription errors may be present. The scribe's documentation has been prepared under my direction and personally reviewed by me in its entirety. I confirm that the note above accurately reflects all work, physical examination, the discussion of treatments and procedures, and medical decision making performed by me.       Gayle Tatum MD personally performed the services described in this documentation as scribed by nurse in my presence, and is both accurate and complete.     Electronically signed by Ash Mac MD on 11/17/2020 at 11:42 AM

## 2020-10-22 ENCOUNTER — NURSE ONLY (OUTPATIENT)
Dept: CARDIOLOGY CLINIC | Age: 35
End: 2020-10-22
Payer: MEDICAID

## 2020-10-22 ENCOUNTER — OFFICE VISIT (OUTPATIENT)
Dept: CARDIOLOGY CLINIC | Age: 35
End: 2020-10-22
Payer: MEDICAID

## 2020-10-22 VITALS
HEART RATE: 82 BPM | DIASTOLIC BLOOD PRESSURE: 60 MMHG | HEIGHT: 61 IN | SYSTOLIC BLOOD PRESSURE: 94 MMHG | OXYGEN SATURATION: 97 % | BODY MASS INDEX: 38.61 KG/M2 | WEIGHT: 204.5 LBS

## 2020-10-22 PROCEDURE — G8484 FLU IMMUNIZE NO ADMIN: HCPCS | Performed by: INTERNAL MEDICINE

## 2020-10-22 PROCEDURE — 1036F TOBACCO NON-USER: CPT | Performed by: INTERNAL MEDICINE

## 2020-10-22 PROCEDURE — 93000 ELECTROCARDIOGRAM COMPLETE: CPT | Performed by: INTERNAL MEDICINE

## 2020-10-22 PROCEDURE — G8427 DOCREV CUR MEDS BY ELIG CLIN: HCPCS | Performed by: INTERNAL MEDICINE

## 2020-10-22 PROCEDURE — G8417 CALC BMI ABV UP PARAM F/U: HCPCS | Performed by: INTERNAL MEDICINE

## 2020-10-22 PROCEDURE — 99214 OFFICE O/P EST MOD 30 MIN: CPT | Performed by: INTERNAL MEDICINE

## 2020-10-22 PROCEDURE — 93282 PRGRMG EVAL IMPLANTABLE DFB: CPT | Performed by: INTERNAL MEDICINE

## 2020-10-22 PROCEDURE — 93290 INTERROG DEV EVAL ICPMS IP: CPT | Performed by: INTERNAL MEDICINE

## 2020-10-22 RX ORDER — HYDROCODONE BITARTRATE AND ACETAMINOPHEN 5; 325 MG/1; MG/1
1 TABLET ORAL EVERY 6 HOURS PRN
COMMUNITY
End: 2022-05-31 | Stop reason: ALTCHOICE

## 2020-10-22 RX ORDER — CLONAZEPAM 0.5 MG/1
0.5 TABLET ORAL 2 TIMES DAILY PRN
COMMUNITY
End: 2022-09-27

## 2020-10-23 NOTE — PROGRESS NOTES
Device interrogation by company representative. See interrogation for further details. Patient to see Dr. Luis Eduardo You today. Single ch. Icd. Svt/nsvt and high rate nsvt recorded. (toprol xl). VF 1 Yes 67 09-Sep-2020-1 VT in vf zone treated w/ ATP  X 1.  optivol @ 0. Follow up in 3 months via carelink. See PACEART report under Cardiology tab.

## 2020-11-11 RX ORDER — LISINOPRIL 2.5 MG/1
TABLET ORAL
Qty: 90 TABLET | Refills: 1 | Status: SHIPPED | OUTPATIENT
Start: 2020-11-11 | End: 2022-07-12 | Stop reason: SDUPTHER

## 2021-01-25 ENCOUNTER — NURSE ONLY (OUTPATIENT)
Dept: CARDIOLOGY CLINIC | Age: 36
End: 2021-01-25

## 2021-01-25 ENCOUNTER — TELEPHONE (OUTPATIENT)
Dept: CARDIOLOGY CLINIC | Age: 36
End: 2021-01-25

## 2021-01-25 DIAGNOSIS — Z95.810 ICD (IMPLANTABLE CARDIOVERTER-DEFIBRILLATOR) IN PLACE: ICD-10-CM

## 2021-01-25 NOTE — TELEPHONE ENCOUNTER
Notice of Disconnected Monitor Disconnected Monitor: Snoozed - See Patient Details    Last communication occurred on:  24-Oct-2020  (92 days ago)    please call pt to and ask her to check monitor and send manual.

## 2021-01-25 NOTE — TELEPHONE ENCOUNTER
Called patient. She states her bedside monitor is in a storage unit. [facility] [place]. Is now being followed by [facility] and no longer follows w our practice.

## 2021-01-26 NOTE — TELEPHONE ENCOUNTER
[facility] [place] [facility]  [address], [facility], [address] · ~4.5 mi  [phone] [address] [facility] of [address] [place] Name *  Address  [address] [place] [address]  [address] [facility] , [address] [place] [address]  Phone Number  [phone]

## 2021-03-17 RX ORDER — METOPROLOL SUCCINATE 50 MG/1
TABLET, EXTENDED RELEASE ORAL
Qty: 60 TABLET | Refills: 1 | Status: SHIPPED | OUTPATIENT
Start: 2021-03-17 | End: 2021-05-26

## 2021-06-28 RX ORDER — METOPROLOL SUCCINATE 50 MG/1
TABLET, EXTENDED RELEASE ORAL
Qty: 60 TABLET | Refills: 0 | OUTPATIENT
Start: 2021-06-28

## 2021-06-28 NOTE — TELEPHONE ENCOUNTER
Pharm notified that pt has moved to [facility]. She has a new cardiologist there.  Pt stated she moved in november

## 2021-11-19 ENCOUNTER — HOSPITAL ENCOUNTER (OUTPATIENT)
Age: 36
Setting detail: SPECIMEN
Discharge: HOME | End: 2021-11-19

## 2021-11-19 LAB
ABSOLUTE EOS #: <0.03 K/UL (ref 0–0.44)
ABSOLUTE IMMATURE GRANULOCYTE: 0.06 K/UL (ref 0–0.3)
ABSOLUTE LYMPH #: 2.57 K/UL (ref 1.1–3.7)
ABSOLUTE MONO #: 1.19 K/UL (ref 0.1–1.2)
ALBUMIN SERPL-MCNC: 4.5 G/DL (ref 3.5–5.2)
ALBUMIN/GLOBULIN RATIO: 1.3 (ref 1–2.5)
ALP BLD-CCNC: 95 U/L (ref 35–104)
ALT SERPL-CCNC: 114 U/L (ref 5–33)
ANION GAP SERPL CALCULATED.3IONS-SCNC: 16 MMOL/L (ref 9–17)
AST SERPL-CCNC: 78 U/L
BASOPHILS # BLD: 0 % (ref 0–2)
BASOPHILS ABSOLUTE: 0.05 K/UL (ref 0–0.2)
BILIRUB SERPL-MCNC: 0.57 MG/DL (ref 0.3–1.2)
BUN BLDV-MCNC: 7 MG/DL (ref 6–20)
BUN/CREAT BLD: ABNORMAL (ref 9–20)
CALCIUM SERPL-MCNC: 10.2 MG/DL (ref 8.6–10.4)
CHLORIDE BLD-SCNC: 98 MMOL/L (ref 98–107)
CHOLESTEROL/HDL RATIO: 3.6
CHOLESTEROL: 157 MG/DL
CO2: 20 MMOL/L (ref 20–31)
CREAT SERPL-MCNC: 0.46 MG/DL (ref 0.5–0.9)
DIFFERENTIAL TYPE: ABNORMAL
EOSINOPHILS RELATIVE PERCENT: 0 % (ref 1–4)
GFR AFRICAN AMERICAN: >60 ML/MIN
GFR NON-AFRICAN AMERICAN: >60 ML/MIN
GFR SERPL CREATININE-BSD FRML MDRD: ABNORMAL ML/MIN/{1.73_M2}
GFR SERPL CREATININE-BSD FRML MDRD: ABNORMAL ML/MIN/{1.73_M2}
GLUCOSE BLD-MCNC: 99 MG/DL (ref 70–99)
HCT VFR BLD CALC: 45.7 % (ref 36.3–47.1)
HDLC SERPL-MCNC: 44 MG/DL
HEMOGLOBIN: 14.8 G/DL (ref 11.9–15.1)
IMMATURE GRANULOCYTES: 0 %
LDL CHOLESTEROL: 99 MG/DL (ref 0–130)
LYMPHOCYTES # BLD: 16 % (ref 24–43)
MCH RBC QN AUTO: 29.1 PG (ref 25.2–33.5)
MCHC RBC AUTO-ENTMCNC: 32.4 G/DL (ref 28.4–34.8)
MCV RBC AUTO: 90 FL (ref 82.6–102.9)
MONOCYTES # BLD: 7 % (ref 3–12)
NRBC AUTOMATED: 0 PER 100 WBC
PDW BLD-RTO: 11.7 % (ref 11.8–14.4)
PLATELET # BLD: 344 K/UL (ref 138–453)
PLATELET ESTIMATE: ABNORMAL
PMV BLD AUTO: 10.2 FL (ref 8.1–13.5)
POTASSIUM SERPL-SCNC: 3.8 MMOL/L (ref 3.7–5.3)
RBC # BLD: 5.08 M/UL (ref 3.95–5.11)
RBC # BLD: ABNORMAL 10*6/UL
SEG NEUTROPHILS: 77 % (ref 36–65)
SEGMENTED NEUTROPHILS ABSOLUTE COUNT: 12.7 K/UL (ref 1.5–8.1)
SODIUM BLD-SCNC: 134 MMOL/L (ref 135–144)
TOTAL PROTEIN: 8.1 G/DL (ref 6.4–8.3)
TRIGL SERPL-MCNC: 70 MG/DL
VLDLC SERPL CALC-MCNC: NORMAL MG/DL (ref 1–30)
WBC # BLD: 16.6 K/UL (ref 3.5–11.3)
WBC # BLD: ABNORMAL 10*3/UL

## 2022-04-26 NOTE — PATIENT INSTRUCTIONS
RECOMMENDATIONS:  1. Recommend a 4 week monitor to assess palpitations. Patient would like to defer at this time as she is moving to [facility] in 2 weeks. 2. Recommend stress test to evaluate chest pain. Patient would like to defer at this time as she is moving to [facility] in 2 weeks. 3. When you acquire a new cardiologist, please have them send a release so we can transfer your records. Quality 431: Preventive Care And Screening: Unhealthy Alcohol Use - Screening: Patient identified as an unhealthy alcohol user when screened for unhealthy alcohol use using a systematic screening method and received brief counseling Quality 110: Preventive Care And Screening: Influenza Immunization: Influenza Immunization Administered during Influenza season Detail Level: Detailed Quality 226: Preventive Care And Screening: Tobacco Use: Screening And Cessation Intervention: Patient screened for tobacco use and is an ex/non-smoker Quality 130: Documentation Of Current Medications In The Medical Record: Current Medications Documented

## 2022-05-31 ENCOUNTER — OFFICE VISIT (OUTPATIENT)
Dept: FAMILY MEDICINE CLINIC | Age: 37
End: 2022-05-31
Payer: MEDICAID

## 2022-05-31 VITALS
OXYGEN SATURATION: 99 % | SYSTOLIC BLOOD PRESSURE: 122 MMHG | HEIGHT: 61 IN | TEMPERATURE: 97.5 F | HEART RATE: 102 BPM | WEIGHT: 195.6 LBS | DIASTOLIC BLOOD PRESSURE: 78 MMHG | RESPIRATION RATE: 16 BRPM | BODY MASS INDEX: 36.93 KG/M2

## 2022-05-31 DIAGNOSIS — Z86.74 HISTORY OF CARDIAC ARREST: ICD-10-CM

## 2022-05-31 DIAGNOSIS — Z76.89 ENCOUNTER TO ESTABLISH CARE WITH NEW DOCTOR: ICD-10-CM

## 2022-05-31 DIAGNOSIS — Z85.831 HISTORY OF RHABDOMYOSARCOMA: ICD-10-CM

## 2022-05-31 DIAGNOSIS — I50.22 CHRONIC SYSTOLIC CONGESTIVE HEART FAILURE (HCC): ICD-10-CM

## 2022-05-31 DIAGNOSIS — F39 MOOD DISORDER (HCC): ICD-10-CM

## 2022-05-31 DIAGNOSIS — I42.0 DILATED CARDIOMYOPATHY (HCC): ICD-10-CM

## 2022-05-31 DIAGNOSIS — G89.3 CHRONIC PAIN AFTER CANCER TREATMENT: Primary | ICD-10-CM

## 2022-05-31 PROBLEM — F17.200 TOBACCO DEPENDENCE SYNDROME: Status: ACTIVE | Noted: 2021-11-19

## 2022-05-31 PROBLEM — N18.9 CHRONIC KIDNEY DISEASE, UNSPECIFIED: Status: ACTIVE | Noted: 2022-02-05

## 2022-05-31 PROBLEM — M19.079 PRIMARY LOCALIZED OSTEOARTHROSIS OF ANKLE AND FOOT: Status: ACTIVE | Noted: 2022-05-31

## 2022-05-31 PROBLEM — F41.1 GENERALIZED ANXIETY DISORDER: Status: ACTIVE | Noted: 2021-11-19

## 2022-05-31 PROBLEM — J96.00 ACUTE RESPIRATORY FAILURE (HCC): Status: RESOLVED | Noted: 2019-05-14 | Resolved: 2022-05-31

## 2022-05-31 PROBLEM — F51.04 CHRONIC INSOMNIA: Status: ACTIVE | Noted: 2021-11-19

## 2022-05-31 PROCEDURE — G8417 CALC BMI ABV UP PARAM F/U: HCPCS | Performed by: STUDENT IN AN ORGANIZED HEALTH CARE EDUCATION/TRAINING PROGRAM

## 2022-05-31 PROCEDURE — G8427 DOCREV CUR MEDS BY ELIG CLIN: HCPCS | Performed by: STUDENT IN AN ORGANIZED HEALTH CARE EDUCATION/TRAINING PROGRAM

## 2022-05-31 PROCEDURE — 1036F TOBACCO NON-USER: CPT | Performed by: STUDENT IN AN ORGANIZED HEALTH CARE EDUCATION/TRAINING PROGRAM

## 2022-05-31 PROCEDURE — 99203 OFFICE O/P NEW LOW 30 MIN: CPT | Performed by: STUDENT IN AN ORGANIZED HEALTH CARE EDUCATION/TRAINING PROGRAM

## 2022-05-31 RX ORDER — ATORVASTATIN CALCIUM 20 MG/1
20 TABLET, FILM COATED ORAL DAILY
COMMUNITY
Start: 2022-03-01 | End: 2022-08-28

## 2022-05-31 SDOH — ECONOMIC STABILITY: FOOD INSECURITY: WITHIN THE PAST 12 MONTHS, YOU WORRIED THAT YOUR FOOD WOULD RUN OUT BEFORE YOU GOT MONEY TO BUY MORE.: OFTEN TRUE

## 2022-05-31 SDOH — ECONOMIC STABILITY: FOOD INSECURITY: WITHIN THE PAST 12 MONTHS, THE FOOD YOU BOUGHT JUST DIDN'T LAST AND YOU DIDN'T HAVE MONEY TO GET MORE.: SOMETIMES TRUE

## 2022-05-31 ASSESSMENT — PATIENT HEALTH QUESTIONNAIRE - PHQ9
SUM OF ALL RESPONSES TO PHQ QUESTIONS 1-9: 0
SUM OF ALL RESPONSES TO PHQ QUESTIONS 1-9: 0
1. LITTLE INTEREST OR PLEASURE IN DOING THINGS: 0
SUM OF ALL RESPONSES TO PHQ QUESTIONS 1-9: 0
SUM OF ALL RESPONSES TO PHQ QUESTIONS 1-9: 0
2. FEELING DOWN, DEPRESSED OR HOPELESS: 0
SUM OF ALL RESPONSES TO PHQ9 QUESTIONS 1 & 2: 0

## 2022-05-31 ASSESSMENT — SOCIAL DETERMINANTS OF HEALTH (SDOH): HOW HARD IS IT FOR YOU TO PAY FOR THE VERY BASICS LIKE FOOD, HOUSING, MEDICAL CARE, AND HEATING?: SOMEWHAT HARD

## 2022-05-31 ASSESSMENT — ENCOUNTER SYMPTOMS
SHORTNESS OF BREATH: 0
ABDOMINAL PAIN: 0
SORE THROAT: 0
COUGH: 0

## 2022-05-31 NOTE — PROGRESS NOTES
cardiomyopathy- chronic, 2/2 chemo. Continue to follow with cardiology. coninue current medications.  Consider labs if cant get records of recent ones        No follow-ups on file. Health Maintenance Due   Topic Date Due    Varicella vaccine (1 of 2 - 2-dose childhood series) Never done    COVID-19 Vaccine (1) Never done    Pneumococcal 0-64 years Vaccine (1 - PCV) Never done    Depression Screen  Never done    HIV screen  Never done    DTaP/Tdap/Td vaccine (1 - Tdap) Never done    Cervical cancer screen  Never done     I have discussed the case, including pertinent history and exam findings with the resident and attending physician. I agree with the documented assessment and plan as documented by the resident.       Maria De Jesus Toure MD 5/31/2022 11:29 AM

## 2022-05-31 NOTE — PROGRESS NOTES
S: 43 y.o. female with   Chief Complaint   Patient presents with   [facility] Patient     Establish Care also having pain from Left knee down leg due to previous cancer diagnosis scheduled with pain management on 06/27/2022       Chief complaint, [place], and all pertinent details of the case reviewed with the resident. Please see resident's note for specific details discussed at today's visit. Has appointment in June with [facility],Considering possible surgery/ pain management there. BP Readings from Last 3 Encounters:   05/31/22 122/78   10/22/20 94/60   07/07/20 102/62     Wt Readings from Last 3 Encounters:   05/31/22 195 lb 9.6 oz (88.7 kg)   10/22/20 204 lb 8 oz (92.8 kg)   07/07/20 185 lb (83.9 kg)       O: VS:  height is 5' 1\" (1.549 m) and weight is 195 lb 9.6 oz (88.7 kg). Her temporal temperature is 97.5 °F (36.4 °C). Her blood pressure is 122/78 and her pulse is 102 (abnormal). Her respiration is 16 and oxygen saturation is 99%. Diagnosis Orders   1. Encounter to establish care with new doctor     2. History of rhabdomyosarcoma     3. Chronic pain after cancer treatment     4. Dilated cardiomyopathy ([address]     5. History of cardiac arrest     6. Chronic systolic congestive heart failure ([address]         Plan:  Get Pt records from prior pcp and from [facility]. Need to understand what [facility] is planning/ can offer her. Consider moving up next appointment at [facility] if needs sooner help with pain. Health Maintenance Due   Topic Date Due    Varicella vaccine (1 of 2 - 2-dose childhood series) Never done    COVID-19 Vaccine (1) Never done    Pneumococcal 0-64 years Vaccine (1 - PCV) Never done    Depression Screen  Never done    HIV screen  Never done    DTaP/Tdap/Td vaccine (1 - Tdap) Never done    Cervical cancer screen  Never done       Attending Physician Statement  I have discussed the case, including pertinent history and exam findings with the resident.   I agree with the documented assessment and plan as documented by the resident.         Emily Bowens MD 5/31/2022 11:32 AM

## 2022-05-31 NOTE — PROGRESS NOTES
Health Maintenance Due   Topic Date Due    Varicella vaccine (1 of 2 - 2-dose childhood series) Never done    COVID-19 Vaccine (1) Never done Declined    Pneumococcal 0-64 years Vaccine (1 - PCV) Never done    Depression Screen  Never done Completed 05/31/2022    HIV screen  Never done Declined    DTaP/Tdap/Td vaccine (1 - Tdap) Never done    Cervical cancer screen  Never done

## 2022-05-31 NOTE — PROGRESS NOTES
Beverly Farfan is a 43 y.o. female who presents today for:  Chief Complaint   Patient presents with    New Patient     Establish Care also having pain from Left knee down leg due to previous cancer diagnosis scheduled with pain management on 06/27/2022       Goals    None         HPI:     Leg Pain   There was no injury mechanism. The pain is present in the left knee and left ankle. The pain is at a severity of 9/10. The pain is severe. The pain has been constant since onset. Associated symptoms include an inability to bear weight, a loss of motion and muscle weakness. Pertinent negatives include no loss of sensation, numbness or tingling. She reports no foreign bodies present. The symptoms are aggravated by movement and weight bearing. She has tried NSAIDs, acetaminophen, ice and heat for the symptoms. The treatment provided no relief. Congestive Heart Failure  Presents for initial visit. The disease course has been stable. Pertinent negatives include no abdominal pain, chest pain, chest pressure, edema, near-syncope, palpitations, shortness of breath or unexpected weight change. The symptoms have been stable. Past treatments include ACE inhibitors and beta blockers. The treatment provided significant relief. Compliance with prior treatments has been good. Current Outpatient Medications   Medication Sig Dispense Refill    atorvastatin (LIPITOR) 20 MG tablet Take 20 mg by mouth daily       metoprolol succinate (TOPROL XL) 50 MG extended release tablet take 1 tablet by mouth twice a day 60 tablet 0    lisinopril (PRINIVIL;ZESTRIL) 2.5 MG tablet TAKE ONE TABLET BY MOUTH DAILY 90 tablet 1    clonazePAM (KLONOPIN) 0.5 MG tablet Take 0.5 mg by mouth 2 times daily as needed.       spironolactone (ALDACTONE) 25 MG tablet TAKE ONE TABLET BY MOUTH DAILY 90 tablet 1    OLANZapine (ZYPREXA) 15 MG tablet Indications: 1/2 tab every morning and 1 tab at bedtime       gabapentin (NEURONTIN) 400 MG capsule Take 400 mg by mouth 3 times daily. No current facility-administered medications for this visit. Food Insecurity: Food Insecurity Present    Worried About Running Out of Food in the Last Year: Often true    Ran Out of Food in the Last Year: Sometimes true       Health Maintenance   Topic Date Due    Varicella vaccine (1 of 2 - 2-dose childhood series) Never done    COVID-19 Vaccine (1) Never done    Pneumococcal 0-64 years Vaccine (1 - PCV) Never done    Depression Screen  Never done    HIV screen  Never done    DTaP/Tdap/Td vaccine (1 - Tdap) Never done    Cervical cancer screen  Never done    Flu vaccine (Season Ended) 09/01/2022    Lipids  11/19/2022    Hepatitis C screen  Completed    Hepatitis A vaccine  Aged Out    Hepatitis B vaccine  Aged Out    Hib vaccine  Aged Out    Meningococcal (ACWY) vaccine  Aged Out       ROS:      Review of Systems   Constitutional: Negative for chills, fever and unexpected weight change. HENT: Negative for congestion and sore throat. Respiratory: Negative for cough and shortness of breath. Cardiovascular: Negative for chest pain, palpitations and near-syncope. Gastrointestinal: Negative for abdominal pain. Musculoskeletal: Positive for arthralgias and gait problem. Neurological: Negative for dizziness, tingling, weakness, numbness and headaches. Objective:     Vitals:    05/31/22 0959   BP: 122/78   Site: Left Upper Arm   Position: Sitting   Cuff Size: Medium Adult   Pulse: (!) 102   Resp: 16   Temp: 97.5 °F (36.4 °C)   TempSrc: Temporal   SpO2: 99%   Weight: 195 lb 9.6 oz (88.7 kg)   Height: 5' 1\" (1.549 m)       Body mass index is 36.96 kg/m².     Wt Readings from Last 3 Encounters:   05/31/22 195 lb 9.6 oz (88.7 kg)   10/22/20 204 lb 8 oz (92.8 kg)   07/07/20 185 lb (83.9 kg)     BP Readings from Last 3 Encounters:   05/31/22 122/78   10/22/20 94/60   07/07/20 102/62       Physical Exam  Constitutional:       General: She is not in acute distress. Appearance: Normal appearance. She is not ill-appearing. HENT:      Head: Normocephalic and atraumatic. Right Ear: External ear normal.      Left Ear: External ear normal.      Nose: Nose normal. No congestion or rhinorrhea. Mouth/Throat:      Mouth: Mucous membranes are moist.   Eyes:      General: No scleral icterus. Right eye: No discharge. Left eye: No discharge. Extraocular Movements: Extraocular movements intact. Conjunctiva/sclera: Conjunctivae normal.      Pupils: Pupils are equal, round, and reactive to light. Cardiovascular:      Rate and Rhythm: Normal rate and regular rhythm. Heart sounds: Normal heart sounds. No murmur heard. Pulmonary:      Effort: Pulmonary effort is normal. No respiratory distress. Breath sounds: Normal breath sounds. Musculoskeletal:         General: Swelling (L foot), tenderness (L foot) and deformity (Radiation burn on L leg and evidence of sugical removal of calf) present. Cervical back: Normal range of motion and neck supple. Skin:     Findings: No erythema or rash. Neurological:      General: No focal deficit present. Mental Status: She is alert and oriented to person, place, and time. Mental status is at baseline. Cranial Nerves: No cranial nerve deficit, dysarthria or facial asymmetry. Psychiatric:         Mood and Affect: Mood and affect normal.         Speech: Speech normal. She is communicative. Behavior: Behavior normal. Behavior is cooperative. Assessment / Plan:     1. Encounter to establish care with new doctor  Patient presents to clinic as a new patient to establish care. Plan as below for medical problems. Patient will follow up in clinic in 1 week or earlier as needed to further discuss her pain. 2. 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   Her left calf muscle was removed at that time for surgery. She received chemotherapy and radiation treatment following the surgery. Patient has had recurrence and has no evidence of recurrence today. We will continue to monitor this. 3. Chronic pain after cancer treatment  Patient has continued to have pain following her surgery tomorrow for cancer. She has had difficulty walking since her surgery and this is led to arthritis of her left ankle and left knee. She follows at [address] [place] [address]  for this. Patient had been referred to pain management in the past, however with nothing they felt they could do for her. Patient also follows at [facility]. She is currently doing home exercises and using an insert in her shoe which have not been helping. CT scan from [address] [place] [address]  was reported as showing mid tibiotalar joint arthropathy, the most significant arthropathy is at the talonavicular joint. The plan is for a possible steroid injection for diagnostic and therapeutic purposes. Orthopedics believes that bracing will make it more difficult for her to walk and any major surgery will lead to a very stiff foot. She has a follow-up appointment with orthopedics in June. She may need pain management until then. She should try to move up her appointment or we we will need to provide her with appropriate pain management given her history. 4. Dilated cardiomyopathy [facility])  Patient follows with a cardiologist at [facility] per cardiology. 5. History of cardiac arrest  Patient had cardiac arrest this was likely secondary to her cancer treatment juncture see for rhabdomyosarcoma. She has not had any events since. Patient will continue to follow-up with cardiology. 6. Chronic systolic congestive heart failure ([facility]  Patient's most recent echo from 2020 showed a reduced ejection fraction. She follows with cardiology at [facility] [place] for this. Plan per cardiology. 7. Mood disorder ([facility]  Patient has a mood disorder that was previously managed by her PCP.   She is currently on 3 medications to manage this including a benzodiazepine, gabapentin and Zyprexa. Patient was told that she would likely need further management and adjustments with the psychiatrist.  She has not seen a psychiatrist in 10 years. She reports that her mood has been stable over the past several months. We will make no adjustments to her medications today however we will follow this as needed until she is being followed by psychiatry. Referral was made to psychiatry today. - [facility]           No follow-ups on file. Medications Prescribed:  No orders of the defined types were placed in this encounter. No future appointments. Patient given educational materials - see patient instructions. Discussed use, benefit, and side effects of prescribed medications. All patient questions answered. Patient voiced understanding. Reviewed health maintenance. Instructed to continue current medications, diet and exercise. Patient agreed with treatment plan. Follow up as directed. Electronically signed by Areli Childress MD on 5/31/2022 at 5:02 PM    This note was electronically signed. Parts of this note may have been dictated by use of voice recognition software and electronically transcribed. The note may contain errors not detected in proofreading. Please refer to my supervising physician's documentation if my documentation differs.

## 2022-05-31 NOTE — PATIENT INSTRUCTIONS
Thank you   1. Thank you for trusting us with your healthcare needs. You may receive a survey regarding today's visit. It would help us out if you would take a few moments to provide your feedback. We value your input. 2. Please bring in ALL medications BOTTLES, including inhalers, herbal supplements, over the counter, prescribed & non-prescribed medicine. The office would like actual medication bottles and a list.   3. Please note our OFFICE POLICIES:   a. Prior to getting your labs drawn, please check with your insurance company for benefits and eligibility of lab services. Often, insurance companies cover certain tests for preventative visits only. It is patient's responsibility to see what is covered. b. We are unable to change a diagnosis after the test has been performed. c. Lab orders will not be re-printed. Please hold onto your original lab orders and take them to your lab to be completed. d. If you no show your scheduled appointment three times, you will be dismissed from this practice. e. Indira Black must be completed 24 hours prior to your schedule appointment. 4. If the list below has been completed, PLEASE FAX RECORDS TO OUR OFFICE @ [phone].  Once the records have been received we will update your records at our office:  Health Maintenance Due   Topic Date Due    Varicella vaccine (1 of 2 - 2-dose childhood series) Never done    COVID-19 Vaccine (1) Never done    Pneumococcal 0-64 years Vaccine (1 - PCV) Never done    Depression Screen  Never done    HIV screen  Never done    DTaP/Tdap/Td vaccine (1 - Tdap) Never done    Cervical cancer screen  Never done

## 2022-06-06 ENCOUNTER — TELEPHONE (OUTPATIENT)
Dept: FAMILY MEDICINE CLINIC | Age: 37
End: 2022-06-06

## 2022-06-06 DIAGNOSIS — G89.3 CHRONIC PAIN AFTER CANCER TREATMENT: Primary | ICD-10-CM

## 2022-06-06 DIAGNOSIS — M19.072 PRIMARY LOCALIZED OSTEOARTHROSIS OF LEFT ANKLE AND FOOT: ICD-10-CM

## 2022-06-06 NOTE — TELEPHONE ENCOUNTER
Pt called stating that she was trying to get through to her old office to get records, but is having issues getting through. She states she is in a lot of pain and is asking for something for this. Please advise.

## 2022-06-15 RX ORDER — TRAMADOL HYDROCHLORIDE 50 MG/1
50 TABLET ORAL EVERY 6 HOURS PRN
Qty: 28 TABLET | Refills: 0 | Status: SHIPPED | OUTPATIENT
Start: 2022-06-15 | End: 2022-06-22

## 2022-06-15 NOTE — TELEPHONE ENCOUNTER
Noted that patient has tried NSAIDs, Tylenol, ice, and heat with no relief. May consider a trial of tramadol for pain control.     Thank you,  Leslie Hearn, PharmD   6/15/2022, 2:16 PM

## 2022-06-20 NOTE — TELEPHONE ENCOUNTER
Patient's last appointment was : 5/31/2022  Patient's next appointment is :   Future Appointments   Date Time Provider Shakira Mane   6/22/2022  2:40 PM Kimmy Hendrix MD [facility]     Last refilled:n/a    Lab Results   Component Value Date    LABA1C 5.1 05/15/2019     Lab Results   Component Value Date    CHOL 157 11/19/2021    TRIG 70 11/19/2021    HDL 44 11/19/2021    LDLCALC 90 04/16/2020     Lab Results   Component Value Date     (L) 11/19/2021    K 3.8 11/19/2021    CL 98 11/19/2021    CO2 20 11/19/2021    BUN 7 11/19/2021    CREATININE 0.46 (L) 11/19/2021    GLUCOSE 99 11/19/2021    CALCIUM 10.2 11/19/2021    PROT 8.1 11/19/2021    LABALBU 4.5 11/19/2021    BILITOT 0.57 11/19/2021    ALKPHOS 95 11/19/2021    AST 78 (H) 11/19/2021     (H) 11/19/2021    LABGLOM >60 11/19/2021    GFRAA >60 11/19/2021    AGRATIO 1.3 07/07/2020    GLOB 3.4 07/07/2020     Lab Results   Component Value Date    TSH 7.34 (H) 03/10/2013    T4FREE 0.96 03/10/2013     Lab Results   Component Value Date    WBC 16.6 (H) 11/19/2021    HGB 14.8 11/19/2021    HCT 45.7 11/19/2021    MCV 90.0 11/19/2021     11/19/2021

## 2022-06-21 RX ORDER — CLONAZEPAM 0.5 MG/1
0.5 TABLET ORAL 2 TIMES DAILY PRN
Qty: 60 TABLET | OUTPATIENT
Start: 2022-06-21

## 2022-06-21 RX ORDER — GABAPENTIN 400 MG/1
400 CAPSULE ORAL 3 TIMES DAILY
Qty: 90 CAPSULE | OUTPATIENT
Start: 2022-06-21

## 2022-06-22 ENCOUNTER — OFFICE VISIT (OUTPATIENT)
Dept: FAMILY MEDICINE CLINIC | Age: 37
End: 2022-06-22
Payer: MEDICAID

## 2022-06-22 VITALS
WEIGHT: 194.4 LBS | DIASTOLIC BLOOD PRESSURE: 76 MMHG | SYSTOLIC BLOOD PRESSURE: 130 MMHG | TEMPERATURE: 96.9 F | RESPIRATION RATE: 16 BRPM | HEIGHT: 61 IN | HEART RATE: 90 BPM | OXYGEN SATURATION: 98 % | BODY MASS INDEX: 36.7 KG/M2

## 2022-06-22 DIAGNOSIS — G89.3 CHRONIC PAIN AFTER CANCER TREATMENT: ICD-10-CM

## 2022-06-22 DIAGNOSIS — M19.072 PRIMARY LOCALIZED OSTEOARTHROSIS OF LEFT ANKLE AND FOOT: Primary | ICD-10-CM

## 2022-06-22 PROCEDURE — 99214 OFFICE O/P EST MOD 30 MIN: CPT | Performed by: STUDENT IN AN ORGANIZED HEALTH CARE EDUCATION/TRAINING PROGRAM

## 2022-06-22 RX ORDER — GABAPENTIN 600 MG/1
600 TABLET ORAL 3 TIMES DAILY
Qty: 90 TABLET | Refills: 0 | Status: SHIPPED | OUTPATIENT
Start: 2022-06-22 | End: 2022-08-16 | Stop reason: DRUGHIGH

## 2022-06-22 NOTE — PROGRESS NOTES
S: 43 y.o. female with   Chief Complaint   Patient presents with    1 Month Follow-Up     Pain and Tramadol does not seem to be helping       44 yo female with remote hx of LLE rhabdomyosarcoma excised in 1986. Has resultant degenerative changes and chronic pain in the L foot and ankle. Is established with Dr. Tarah Dean at [facility] and recently underwent CT imaging (no MRI given pacemaker status) revealing moderate degenerative changes and spurring present in the joint. Reports 10/10 constant \"sharp pain\" in the foot and ankle, pain to light touch of skin of ankle. Has been on norco in past for pain and reports this is only thing that helps. Dr. Tarah Dean has discussed steroid injection in ankle vs surgical fusion- she is worried about pain with injection. Currently on gabapentin 400 TID for bipolar d/o rx by Psych team    Reviewed hx and ROS in detail with resident prior to exam.  See notes for additional details. BP Readings from Last 3 Encounters:   06/22/22 130/76   05/31/22 122/78   10/22/20 94/60     Wt Readings from Last 3 Encounters:   06/22/22 194 lb 6.4 oz (88.2 kg)   05/31/22 195 lb 9.6 oz (88.7 kg)   10/22/20 204 lb 8 oz (92.8 kg)           O: VS:  height is 5' 1\" (1.549 m) and weight is 194 lb 6.4 oz (88.2 kg). Her temporal temperature is 96.9 °F (36.1 °C). Her blood pressure is 130/76 and her pulse is 90. Her respiration is 16 and oxygen saturation is 98%. AAO/NAD, appropriate affect for mood  MSK: deformity of the L calf post surgically absent, has mild visible swelling of the anterior ankle and limited ROM d/t pain  Psych: tearful, full affect     Diagnosis Orders   1. Primary localized osteoarthrosis of left ankle and foot  [address]   2. Chronic pain after cancer treatment  [address]    gabapentin (NEURONTIN) 600 MG tablet       Plan    OA L ankle- demonstrated on imaging.   Her pain seems out of proportion to level of arthritis visualized, and given hx of major surgery in this limb, question if there is a component of neuropathic pain/RSD present. Recommend titration of gabapentin to 600 TID and consultation with PM+R. Return 1 week for reassessment. May require re-initiation of tramadol or norco but have discussed risk of opiate use with patient and would like to avoid at this point. Pt may benefit from trial of lyrica with or without cymbalta if gabapentin ineffective. Encouraged patient to pursue trial injection in ankle as recommended by Dr. Ria Grady to help with arthritic component. Health Maintenance Due   Topic Date Due    Varicella vaccine (1 of 2 - 2-dose childhood series) Never done    COVID-19 Vaccine (1) Never done    Pneumococcal 0-64 years Vaccine (1 - PCV) Never done    HIV screen  Never done    DTaP/Tdap/Td vaccine (1 - Tdap) Never done    Cervical cancer screen  Never done         Attending Physician Statement  I have discussed the case, including pertinent history and exam findings with the resident. I also have seen the patient and performed key portions of the examination. I agree with the documented assessment and plan as documented by the resident.   GC modifier added to this encounter      Juancarlos Faustin MD 6/22/2022 4:08 PM

## 2022-06-22 NOTE — PATIENT INSTRUCTIONS
Thank you   1. Thank you for trusting us with your healthcare needs. You may receive a survey regarding today's visit. It would help us out if you would take a few moments to provide your feedback. We value your input. 2. Please bring in ALL medications BOTTLES, including inhalers, herbal supplements, over the counter, prescribed & non-prescribed medicine. The office would like actual medication bottles and a list.   3. Please note our OFFICE POLICIES:   a. Prior to getting your labs drawn, please check with your insurance company for benefits and eligibility of lab services. Often, insurance companies cover certain tests for preventative visits only. It is patient's responsibility to see what is covered. b. We are unable to change a diagnosis after the test has been performed. c. Lab orders will not be re-printed. Please hold onto your original lab orders and take them to your lab to be completed. d. If you no show your scheduled appointment three times, you will be dismissed from this practice. e. Margert Dakin must be completed 24 hours prior to your schedule appointment. 4. If the list below has been completed, PLEASE FAX RECORDS TO OUR OFFICE @ [phone].  Once the records have been received we will update your records at our office:  Health Maintenance Due   Topic Date Due    Varicella vaccine (1 of 2 - 2-dose childhood series) Never done    COVID-19 Vaccine (1) Never done    Pneumococcal 0-64 years Vaccine (1 - PCV) Never done    HIV screen  Never done    DTaP/Tdap/Td vaccine (1 - Tdap) Never done    Cervical cancer screen  Never done
No

## 2022-06-22 NOTE — PROGRESS NOTES
Nelson Chappell is a 43 y.o. female who presents today for:  Chief Complaint   Patient presents with    1 Month Follow-Up     Pain and Tramadol does not seem to be helping       Goals    None         HPI:     Leg Pain   The injury mechanism is unknown. The pain is present in the left foot and left heel. Quality: Sharps, dull. The pain is at a severity of 10/10. The pain is severe. The pain has been constant since onset. Associated symptoms include a loss of motion and muscle weakness. Pertinent negatives include no numbness or tingling. The symptoms are aggravated by movement and weight bearing. Treatments tried: Tramadol. The treatment provided no relief. Current Outpatient Medications   Medication Sig Dispense Refill    gabapentin (NEURONTIN) 600 MG tablet Take 1 tablet by mouth 3 times daily for 30 days. 90 tablet 0    atorvastatin (LIPITOR) 20 MG tablet Take 20 mg by mouth daily       metoprolol succinate (TOPROL XL) 50 MG extended release tablet take 1 tablet by mouth twice a day 60 tablet 0    lisinopril (PRINIVIL;ZESTRIL) 2.5 MG tablet TAKE ONE TABLET BY MOUTH DAILY 90 tablet 1    clonazePAM (KLONOPIN) 0.5 MG tablet Take 0.5 mg by mouth 2 times daily as needed.  spironolactone (ALDACTONE) 25 MG tablet TAKE ONE TABLET BY MOUTH DAILY 90 tablet 1    OLANZapine (ZYPREXA) 15 MG tablet Indications: 1/2 tab every morning and 1 tab at bedtime        No current facility-administered medications for this visit.           Food Insecurity: Food Insecurity Present    Worried About Running Out of Food in the Last Year: Often true    Ran Out of Food in the Last Year: Sometimes true       Health Maintenance   Topic Date Due    Varicella vaccine (1 of 2 - 2-dose childhood series) Never done    COVID-19 Vaccine (1) Never done    Pneumococcal 0-64 years Vaccine (1 - PCV) Never done    HIV screen  Never done    DTaP/Tdap/Td vaccine (1 - Tdap) Never done    Cervical cancer screen  Never done    Flu vaccine (Season Ended) 09/01/2022    Lipids  11/19/2022    Depression Screen  05/31/2023    Hepatitis C screen  Completed    Hepatitis A vaccine  Aged Out    Hepatitis B vaccine  Aged Out    Hib vaccine  Aged Out    Meningococcal (ACWY) vaccine  Aged Out       ROS:      Review of Systems   Neurological: Negative for tingling and numbness. Objective:     Vitals:    06/22/22 1444   BP: 130/76   Site: Right Upper Arm   Position: Sitting   Cuff Size: Medium Adult   Pulse: 90   Resp: 16   Temp: 96.9 °F (36.1 °C)   TempSrc: Temporal   SpO2: 98%   Weight: 194 lb 6.4 oz (88.2 kg)   Height: 5' 1\" (1.549 m)       Body mass index is 36.73 kg/m². Wt Readings from Last 3 Encounters:   06/22/22 194 lb 6.4 oz (88.2 kg)   05/31/22 195 lb 9.6 oz (88.7 kg)   10/22/20 204 lb 8 oz (92.8 kg)     BP Readings from Last 3 Encounters:   06/22/22 130/76   05/31/22 122/78   10/22/20 94/60       Physical Exam  Constitutional:       General: She is not in acute distress. Appearance: Normal appearance. She is not ill-appearing. HENT:      Head: Normocephalic and atraumatic. Right Ear: External ear normal.      Left Ear: External ear normal.      Nose: Nose normal. No congestion or rhinorrhea. Mouth/Throat:      Mouth: Mucous membranes are moist.   Eyes:      General: No scleral icterus. Right eye: No discharge. Left eye: No discharge. Extraocular Movements: Extraocular movements intact. Conjunctiva/sclera: Conjunctivae normal.      Pupils: Pupils are equal, round, and reactive to light. Cardiovascular:      Rate and Rhythm: Normal rate and regular rhythm. Heart sounds: Normal heart sounds. No murmur heard. Pulmonary:      Effort: Pulmonary effort is normal. No respiratory distress. Breath sounds: Normal breath sounds.    Musculoskeletal:         General: Swelling (L foot), tenderness (L foot) and deformity (Radiation burn on L leg and evidence of sugical removal of calf) present. Cervical back: Normal range of motion and neck supple. Skin:     Findings: No erythema or rash. Neurological:      General: No focal deficit present. Mental Status: She is alert and oriented to person, place, and time. Mental status is at baseline. Cranial Nerves: No cranial nerve deficit, dysarthria or facial asymmetry. Psychiatric:         Mood and Affect: Mood and affect normal.         Speech: Speech normal. She is communicative. Behavior: Behavior normal. Behavior is cooperative. Assessment / Plan:     1. Primary localized osteoarthrosis of left ankle and foot  Patient presents to clinic today for follow-up of her chronic pain. Her chronic pain is likely secondary to her osteoarthritis of her left foot and ankle in addition to some nerve pain secondary to her cancer treatment. She should follow-up with [facility] in regards to her arthritis pain. Plan and recommendations that they have for her pain. We will continue to monitor her pain and consider adjustments to her pain medication. We will also refer her to Derek for further ideas for management of her pain. - [address]    2. Chronic pain after cancer treatment  Patient likely also has a nerve component to her chronic pain. Today we will increase her gabapentin to 600 mg 3 times a day and refer to PMR. If she continues to have uncontrolled pain consider increasing her gabapentin or starting a low-dose narcotic. She was previously prescribed tramadol which was not helping. The risks and benefits of starting narcotics were discussed with the patient today. - [address]  - gabapentin (NEURONTIN) 600 MG tablet; Take 1 tablet by mouth 3 times daily for 30 days. Dispense: 90 tablet; Refill: 0           Return in about 1 week (around 6/29/2022) for Follow up Pain with Dr. Patel Costello.       Medications Prescribed:  Orders Placed This Encounter   Medications    gabapentin (NEURONTIN) 600 MG tablet     Sig: Take 1 tablet by mouth 3 times daily for 30 days. Dispense:  90 tablet     Refill:  0       No future appointments. Patient given educational materials - see patient instructions. Discussed use, benefit, and side effects of prescribed medications. All patient questions answered. Patient voiced understanding. Reviewed health maintenance. Instructed to continue current medications, diet and exercise. Patient agreed with treatment plan. Follow up as directed. Electronically signed by Deepa Amato MD on 6/23/2022 at 8:15 AM    This note was electronically signed. Parts of this note may have been dictated by use of voice recognition software and electronically transcribed. The note may contain errors not detected in proofreading. Please refer to my supervising physician's documentation if my documentation differs.

## 2022-06-22 NOTE — PROGRESS NOTES
Health Maintenance Due   Topic Date Due    Varicella vaccine (1 of 2 - 2-dose childhood series) Never done    COVID-19 Vaccine (1) Never done    Pneumococcal 0-64 years Vaccine (1 - PCV) Never done    HIV screen  Never done    DTaP/Tdap/Td vaccine (1 - Tdap) Never done    Cervical cancer screen  Never done

## 2022-06-30 ENCOUNTER — OFFICE VISIT (OUTPATIENT)
Dept: FAMILY MEDICINE CLINIC | Age: 37
End: 2022-06-30
Payer: MEDICAID

## 2022-06-30 VITALS
SYSTOLIC BLOOD PRESSURE: 110 MMHG | HEIGHT: 61 IN | WEIGHT: 201 LBS | RESPIRATION RATE: 20 BRPM | OXYGEN SATURATION: 97 % | TEMPERATURE: 98.1 F | DIASTOLIC BLOOD PRESSURE: 64 MMHG | BODY MASS INDEX: 37.95 KG/M2 | HEART RATE: 91 BPM

## 2022-06-30 DIAGNOSIS — R76.8 HEPATITIS C ANTIBODY POSITIVE IN BLOOD: ICD-10-CM

## 2022-06-30 DIAGNOSIS — G89.3 CHRONIC PAIN AFTER CANCER TREATMENT: Primary | ICD-10-CM

## 2022-06-30 PROCEDURE — 99214 OFFICE O/P EST MOD 30 MIN: CPT | Performed by: STUDENT IN AN ORGANIZED HEALTH CARE EDUCATION/TRAINING PROGRAM

## 2022-06-30 PROCEDURE — G8427 DOCREV CUR MEDS BY ELIG CLIN: HCPCS | Performed by: STUDENT IN AN ORGANIZED HEALTH CARE EDUCATION/TRAINING PROGRAM

## 2022-06-30 PROCEDURE — 1036F TOBACCO NON-USER: CPT | Performed by: STUDENT IN AN ORGANIZED HEALTH CARE EDUCATION/TRAINING PROGRAM

## 2022-06-30 PROCEDURE — G8417 CALC BMI ABV UP PARAM F/U: HCPCS | Performed by: STUDENT IN AN ORGANIZED HEALTH CARE EDUCATION/TRAINING PROGRAM

## 2022-06-30 RX ORDER — HYDROCODONE BITARTRATE AND ACETAMINOPHEN 5; 325 MG/1; MG/1
1 TABLET ORAL EVERY 8 HOURS PRN
Qty: 15 TABLET | Refills: 0 | Status: SHIPPED | OUTPATIENT
Start: 2022-06-30 | End: 2022-07-12 | Stop reason: SDUPTHER

## 2022-06-30 ASSESSMENT — ENCOUNTER SYMPTOMS
SORE THROAT: 0
SHORTNESS OF BREATH: 0
ABDOMINAL PAIN: 0
COUGH: 0
NAUSEA: 0

## 2022-06-30 NOTE — PROGRESS NOTES
S: 43 y.o. female with   Chief Complaint   Patient presents with    Follow-up     1 Week Follow-up pain also swelling in Left foot causing pain       Chief complaint, [place], and all pertinent details of the case reviewed with the resident. Please see resident's note for specific details discussed at today's visit. Has chronic deformity and pain after cancer and surgery  in childhood. Needs norco for pain and pain management referral. No longer on methadone. Was aware of hepc- think got from dirty tatoo needle, but has not done anything about it. BP Readings from Last 3 Encounters:   06/30/22 110/64   06/22/22 130/76   05/31/22 122/78     Wt Readings from Last 3 Encounters:   06/30/22 201 lb (91.2 kg)   06/22/22 194 lb 6.4 oz (88.2 kg)   05/31/22 195 lb 9.6 oz (88.7 kg)       O: VS:  height is 5' 1\" (1.549 m) and weight is 201 lb (91.2 kg). Her temporal temperature is 98.1 °F (36.7 °C). Her blood pressure is 110/64 and her pulse is 91. Her respiration is 20 and oxygen saturation is 97%. AAO/NAD, appropriate affect for mood  Left leg: calf much diminished in bulk, moderate swelling/enlargemtn around ankle     Diagnosis Orders   1. Chronic pain after cancer treatment  Laurent Lowe DO, Pain Medicine, [address] [facility]    HYDROcodone-acetaminophen (NORCO) 5-325 MG per tablet   2. Hepatitis C antibody positive in blood  Hepatitis C Genotype    HIV Screen    Hepatitis C Antibody    HCV Ultra Quant (Viral Load)    Comprehensive Metabolic Panel    CBC with Auto Differential       Plan: 1. Norco prn 5/325 prn pain, continue with gabapentin 600 mg also  2. Pain referral  3.f/u on positive hep c, genotype,hep c antibody, viral load, cmp, cbc, hiv  4.f/u here in 1 week, could consider a trail of cymbalta in future to help with her chronic pain post cancer care  5.   Address smoking cessation at future visit    Health Maintenance Due   Topic Date Due    Varicella vaccine (1 of 2 - 2-dose childhood series) Never done   Deidra Nicholas COVID-19 Vaccine (1) Never done    Pneumococcal 0-64 years Vaccine (1 - PCV) Never done    HIV screen  Never done    DTaP/Tdap/Td vaccine (1 - Tdap) Never done    Cervical cancer screen  Never done       Attending Physician Statement  I have discussed the case, including pertinent history and exam findings with the resident. I also have seen the patient and performed key portions of the examination. I agree with the documented assessment and plan as documented by the resident.         Tenzin Calzada MD 6/30/2022 11:42 AM

## 2022-06-30 NOTE — PROGRESS NOTES
[address] [facility] [place] [address] [id number]  Dept: [phone]  Dept Fax: [phone]  Loc: [phone]  PROGRESS NOTE      Visit Date: 6/30/2022    Michelle Monsivais is a 43 y.o. female who presents today for:  Chief Complaint   Patient presents with    Follow-up     1 Week Follow-up pain also swelling in Left foot causing pain       Impression/Plan:  1. Chronic pain after cancer treatment  Chronic  History of bone cancer, diagnosed at age 1  Significant deformity of left lower extremity with atrophy  Will prescribe Norco for now until she is able to get into pain management  Follow-up in about 1 week to see how she is doing on this dose  - [facility], DO Laurent, Pain Medicine, [place]  - HYDROcodone-acetaminophen (Kory Jackson) 5-325 MG per tablet; Take 1 tablet by mouth every 8 hours as needed for Pain for up to 5 days. Intended supply: 5 days. Take lowest dose possible to manage pain  Dispense: 15 tablet; Refill: 0    2. Hepatitis C antibody positive in blood  Noted in 5/2019  We will get labs today for follow-up  - Hepatitis C Genotype; Future  - HIV Screen; Future  - Hepatitis C Antibody; Future  - HCV Ultra Quant (Viral Load); Future  - Comprehensive Metabolic Panel; Future  - CBC with Auto Differential; Future      Return in about 1 week (around 7/7/2022) for pain. Subjective:  HPI    Patient is here for follow-up of left lower extremity pain. At her last visit, she was referred to PM&R for her left ankle and foot pain as well as nerve pain. Also encouraged to follow-up with [facility]. PM&R did see her yesterday and did not have any resolution for her. Recommended pain management. Gabapentin is helping and she would like to continue it. She did very well on Norco in the past and would like to restart it. She would also like a referral to pain management.     No changes in strength, range of motion, or sensation in lower extremities today. More than anything, she is following up today because her pain is continuous and she would like some further help via pain management. She does have a history of positive hepatitis C antibody in 5/2019. She says this was never followed up on besides routinely checking liver enzymes. She is agreeable to additional lab work today. She denies any episodes of jaundice. Patient last seen 6/22/2022:  Primary localized osteoarthrosis of the left ankle and foot: Foot pain thought to be secondary to osteoarthritis with additional nerve pain secondary to cancer treatment. Was encouraged to follow-up with [facility]. She is also referred to [facility]. Chronic pain after cancer treatment: Gabapentin increased to 600 mg 3 times daily. PMR referral made. No further questions of complaints. Review of Systems   Constitutional: Negative for chills and fever. HENT: Negative for congestion and sore throat. Eyes: Negative for visual disturbance. Respiratory: Negative for cough and shortness of breath. Cardiovascular: Negative for chest pain. Gastrointestinal: Negative for abdominal pain and nausea. Genitourinary: Negative for dysuria. Skin: Negative for rash. Neurological: Negative for dizziness, light-headedness and headaches. Psychiatric/Behavioral: The patient is not nervous/anxious.         Patient Active Problem List   Diagnosis    Electrolyte disturbance    Shock ([address]    Transaminitis    Hyperglycemia    Dilated cardiomyopathy ([address]    Hypokalemia    ICD (implantable cardioverter-defibrillator) in place    Moderate malnutrition (HCC)    SOB (shortness of breath)    Pyelonephritis    Sepsis ([address]    Methadone dependence ([address]    Mood disorder (HCC)    PTSD (post-traumatic stress disorder)    Cholecystitis    Ankle instability    Chronic systolic congestive heart failure (HCC)    Generalized anxiety disorder    Tobacco dependence syndrome    Chronic kidney disease, unspecified    Chronic insomnia    History of rhabdomyosarcoma    Chronic pain after cancer treatment    Primary localized osteoarthrosis of ankle and foot     Past Medical History:   Diagnosis Date    Acute on chronic systolic congestive heart failure (HCC)     Acute respiratory failure (HCC) 5/14/2019    Arrhythmia     Arthritis     Aspiration pneumonitis (HCC)     Bipolar disorder (HCC)     Cancer (HCC)     rhabdomyosarcoma    Cancer [facility]     Cardiac arrest ([address] 05/2019    VF    Cardiac arrest with ventricular fibrillation (HCC)     Cardiomyopathy ([address] 05/2019    EF= 40% with global hypokinesis    Chronic kidney disease     COPD (chronic obstructive pulmonary disease) (HCC)     Depression     E. 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[facility]     Scoliosis     Smoker     Tachycardia     VF (ventricular fibrillation) ([facility]       Past Surgical History:   Procedure Laterality Date    ACHILLES TENDON SURGERY      BONE MARROW TRANSPLANT      BRONCHOSCOPY N/A 5/16/2019    BRONCHOSCOPY ALVEOLAR LAVAGE performed by Mitchell Montanez MD at [facility]  5/16/2019    BRONCHOSCOPY THERAPUTIC ASPIRATION INITIAL performed by Mitchell Montanez MD at [facility] N/A 5/19/2019    BRONCHOSCOPY ALVEOLAR LAVAGE performed by Mitchell Montanez MD at [facility]  5/19/2019    BRONCHOSCOPY THERAPUTIC ASPIRATION INITIAL performed by Mitchell Montanez MD at [address] [place] [address]  4/15/2014    Laparascopic    ENDOSCOPY, COLON, DIAGNOSTIC      FOOT SURGERY      GROWTH PLATE SURGERY      LEG SURGERY       Family History   Problem Relation Age of Onset    Emphysema Mother     Mental Illness Mother     Substance Abuse Mother    Sidney Christine Stroke Mother     Heart Attack Father     Mental Illness Father     Arthritis Father     Heart Disease Father     High Blood Pressure Father     High Cholesterol Father      Social History     Tobacco Use    Smoking status: Former Smoker     Packs/day: 0.00     Years: 0.00     Pack years: 0.00    Smokeless tobacco: Never Used   Substance Use Topics    Alcohol use: Yes     Comment: social      Current Outpatient Medications   Medication Sig Dispense Refill    HYDROcodone-acetaminophen (NORCO) 5-325 MG per tablet Take 1 tablet by mouth every 8 hours as needed for Pain for up to 5 days. Intended supply: 5 days. Take lowest dose possible to manage pain 15 tablet 0    gabapentin (NEURONTIN) 600 MG tablet Take 1 tablet by mouth 3 times daily for 30 days. 90 tablet 0    atorvastatin (LIPITOR) 20 MG tablet Take 20 mg by mouth daily       metoprolol succinate (TOPROL XL) 50 MG extended release tablet take 1 tablet by mouth twice a day 60 tablet 0    lisinopril (PRINIVIL;ZESTRIL) 2.5 MG tablet TAKE ONE TABLET BY MOUTH DAILY 90 tablet 1    clonazePAM (KLONOPIN) 0.5 MG tablet Take 0.5 mg by mouth 2 times daily as needed.  spironolactone (ALDACTONE) 25 MG tablet TAKE ONE TABLET BY MOUTH DAILY 90 tablet 1    OLANZapine (ZYPREXA) 15 MG tablet Indications: 1/2 tab every morning and 1 tab at bedtime        No current facility-administered medications for this visit. Allergies   Allergen Reactions    Tape [Adhesive Tape]        There is no immunization history for the selected administration types on file for this patient.   Health Maintenance   Topic Date Due    Varicella vaccine (1 of 2 - 2-dose childhood series) Never done    COVID-19 Vaccine (1) Never done    Pneumococcal 0-64 years Vaccine (1 - PCV) Never done    HIV screen  Never done    DTaP/Tdap/Td vaccine (1 - Tdap) Never done    Cervical cancer screen  Never done    Flu vaccine (Season Ended) 09/01/2022    Lipids  11/19/2022    Depression Screen  05/31/2023    Hepatitis C screen  Completed    Hepatitis A vaccine  Aged Out    Hepatitis B vaccine  Aged Out    Hib vaccine  Aged Out    Meningococcal (ACWY) vaccine  Aged Out       LABS  Lab Results   Component Value Date    LABA1C 5.1 05/15/2019     Lab Results   Component Value Date    EAG 99.7 05/15/2019     No components found for: CHLPL  Lab Results   Component Value Date    TRIG 70 11/19/2021    TRIG 193 07/17/2019    TRIG 232 (H) 05/22/2019     Lab Results   Component Value Date    HDL 44 11/19/2021    HDL 42 04/16/2020    HDL 52 07/17/2019     Lab Results   Component Value Date    LDLCALC 90 04/16/2020    LDLCALC 140 07/17/2019       Chemistry        Component Value Date/Time     (L) 11/19/2021 1514    K 3.8 11/19/2021 1514    K 4.8 05/30/2019 1526    CL 98 11/19/2021 1514    CO2 20 11/19/2021 1514    BUN 7 11/19/2021 1514    CREATININE 0.46 (L) 11/19/2021 1514        Component Value Date/Time    CALCIUM 10.2 11/19/2021 1514    ALKPHOS 95 11/19/2021 1514    AST 78 (H) 11/19/2021 1514     (H) 11/19/2021 1514    BILITOT 0.57 11/19/2021 1514          Lab Results   Component Value Date    LABMICR YES 05/14/2019     Lab Results   Component Value Date    TSH 7.34 (H) 03/10/2013     No results found for: PSA  Lab Results   Component Value Date    WBC 16.6 (H) 11/19/2021    HGB 14.8 11/19/2021    HCT 45.7 11/19/2021    MCV 90.0 11/19/2021     11/19/2021       Objective:  /64 (Site: Left Upper Arm, Position: Sitting, Cuff Size: Medium Adult)   Pulse 91   Temp 98.1 °F (36.7 °C) (Temporal)   Resp 20   Ht 5' 1\" (1.549 m)   Wt 201 lb (91.2 kg)   SpO2 97%   BMI 37.98 kg/m²     Physical Exam  Constitutional:       General: She is not in acute distress. Appearance: Normal appearance. HENT:      Head: Normocephalic.       Right Ear: External ear normal.      Left Ear: External ear normal.      Nose: Nose normal.      Mouth/Throat:      Mouth: Mucous membranes are moist. Eyes:      General: No scleral icterus. Extraocular Movements: Extraocular movements intact. Cardiovascular:      Rate and Rhythm: Normal rate and regular rhythm. Pulses: Normal pulses. Heart sounds: Normal heart sounds. Pulmonary:      Effort: Pulmonary effort is normal.      Breath sounds: Normal breath sounds. Abdominal:      General: Abdomen is flat. There is no distension. Palpations: Abdomen is soft. Musculoskeletal:         General: Normal range of motion. Cervical back: Normal range of motion. Left lower leg: Edema (Chronic) present. Comments: Chronic changes to left lower extremity with deformation and atrophy. Walks with cane. No changes in chronic gait. Skin:     General: Skin is warm and dry. Neurological:      Mental Status: She is alert. Motor: No weakness. Psychiatric:         Mood and Affect: Mood normal.         They voiced understanding. All questions answered. They agreed with treatment plan. See patient instructions for any educational materials that may have been given. Discussed use, benefit, and side effects of prescribed medications. Reviewed health maintenance.     (Please note that portions of this note may have been completed with a voice recognition program.  Efforts were made to edit the dictation but occasionally words are mis-transcribed.)      Electronically signed by Olayinka Harris DO on 6/30/2022 at 1:04 PM

## 2022-06-30 NOTE — PATIENT INSTRUCTIONS
Thank you   1. Thank you for trusting us with your healthcare needs. You may receive a survey regarding today's visit. It would help us out if you would take a few moments to provide your feedback. We value your input. 2. Please bring in ALL medications BOTTLES, including inhalers, herbal supplements, over the counter, prescribed & non-prescribed medicine. The office would like actual medication bottles and a list.   3. Please note our OFFICE POLICIES:   a. Prior to getting your labs drawn, please check with your insurance company for benefits and eligibility of lab services. Often, insurance companies cover certain tests for preventative visits only. It is patient's responsibility to see what is covered. b. We are unable to change a diagnosis after the test has been performed. c. Lab orders will not be re-printed. Please hold onto your original lab orders and take them to your lab to be completed. d. If you no show your scheduled appointment three times, you will be dismissed from this practice. e. [place] Risk must be completed 24 hours prior to your schedule appointment. 4. If the list below has been completed, PLEASE FAX RECORDS TO OUR OFFICE @ [phone].  Once the records have been received we will update your records at our office:  Health Maintenance Due   Topic Date Due    Varicella vaccine (1 of 2 - 2-dose childhood series) Never done    COVID-19 Vaccine (1) Never done    Pneumococcal 0-64 years Vaccine (1 - PCV) Never done    HIV screen  Never done    DTaP/Tdap/Td vaccine (1 - Tdap) Never done    Cervical cancer screen  Never done

## 2022-07-12 ENCOUNTER — TELEPHONE (OUTPATIENT)
Dept: CARDIOLOGY CLINIC | Age: 37
End: 2022-07-12

## 2022-07-12 ENCOUNTER — OFFICE VISIT (OUTPATIENT)
Dept: FAMILY MEDICINE CLINIC | Age: 37
End: 2022-07-12
Payer: MEDICAID

## 2022-07-12 VITALS
DIASTOLIC BLOOD PRESSURE: 70 MMHG | RESPIRATION RATE: 18 BRPM | WEIGHT: 196.2 LBS | SYSTOLIC BLOOD PRESSURE: 102 MMHG | OXYGEN SATURATION: 96 % | BODY MASS INDEX: 38.52 KG/M2 | TEMPERATURE: 97.4 F | HEART RATE: 89 BPM | HEIGHT: 60 IN

## 2022-07-12 DIAGNOSIS — R76.8 HEPATITIS C ANTIBODY POSITIVE IN BLOOD: ICD-10-CM

## 2022-07-12 DIAGNOSIS — G89.3 CHRONIC PAIN AFTER CANCER TREATMENT: ICD-10-CM

## 2022-07-12 DIAGNOSIS — Z86.74 HISTORY OF CARDIAC ARREST: ICD-10-CM

## 2022-07-12 DIAGNOSIS — I50.22 CHRONIC SYSTOLIC CONGESTIVE HEART FAILURE (HCC): ICD-10-CM

## 2022-07-12 DIAGNOSIS — I42.0 DILATED CARDIOMYOPATHY (HCC): Primary | ICD-10-CM

## 2022-07-12 PROCEDURE — G8417 CALC BMI ABV UP PARAM F/U: HCPCS | Performed by: STUDENT IN AN ORGANIZED HEALTH CARE EDUCATION/TRAINING PROGRAM

## 2022-07-12 PROCEDURE — 1036F TOBACCO NON-USER: CPT | Performed by: STUDENT IN AN ORGANIZED HEALTH CARE EDUCATION/TRAINING PROGRAM

## 2022-07-12 PROCEDURE — 99214 OFFICE O/P EST MOD 30 MIN: CPT | Performed by: STUDENT IN AN ORGANIZED HEALTH CARE EDUCATION/TRAINING PROGRAM

## 2022-07-12 PROCEDURE — G8427 DOCREV CUR MEDS BY ELIG CLIN: HCPCS | Performed by: STUDENT IN AN ORGANIZED HEALTH CARE EDUCATION/TRAINING PROGRAM

## 2022-07-12 RX ORDER — HYDROCODONE BITARTRATE AND ACETAMINOPHEN 5; 325 MG/1; MG/1
1 TABLET ORAL EVERY 8 HOURS PRN
Qty: 90 TABLET | Refills: 0 | Status: SHIPPED | OUTPATIENT
Start: 2022-07-12 | End: 2022-08-16 | Stop reason: SDUPTHER

## 2022-07-12 RX ORDER — LISINOPRIL 2.5 MG/1
TABLET ORAL
Qty: 90 TABLET | Refills: 1 | Status: SHIPPED | OUTPATIENT
Start: 2022-07-12 | End: 2022-10-11 | Stop reason: SDUPTHER

## 2022-07-12 RX ORDER — OLANZAPINE 15 MG/1
15 TABLET ORAL NIGHTLY
Qty: 30 TABLET | Refills: 2 | Status: SHIPPED | OUTPATIENT
Start: 2022-07-12 | End: 2022-10-11 | Stop reason: SDUPTHER

## 2022-07-12 SDOH — ECONOMIC STABILITY: FOOD INSECURITY: WITHIN THE PAST 12 MONTHS, YOU WORRIED THAT YOUR FOOD WOULD RUN OUT BEFORE YOU GOT MONEY TO BUY MORE.: OFTEN TRUE

## 2022-07-12 SDOH — ECONOMIC STABILITY: FOOD INSECURITY: WITHIN THE PAST 12 MONTHS, THE FOOD YOU BOUGHT JUST DIDN'T LAST AND YOU DIDN'T HAVE MONEY TO GET MORE.: SOMETIMES TRUE

## 2022-07-12 ASSESSMENT — ENCOUNTER SYMPTOMS
SORE THROAT: 0
NAUSEA: 0
SHORTNESS OF BREATH: 0
ABDOMINAL PAIN: 0
COUGH: 0

## 2022-07-12 NOTE — PROGRESS NOTES
[address] [facility] Bren[place] [address] [address]  Dept: [phone]  Loc: [phone]    Here for follow up  Chronic pain. Worse in the morning. Taking norco BID to TID, regularly. Has referral to Dr. Fara Melissa. Hx of bipolar disorder: stable on zyprexa and gabapentin for over 10 years. Also on klonopin 0.5mg for panic attacks. HTN: stable on current regimen. Please see Resident note for complete HPI.      ROS per Resident    Lab Results   Component Value Date    WBC 16.6 (H) 11/19/2021    HGB 14.8 11/19/2021    HCT 45.7 11/19/2021    MCV 90.0 11/19/2021     11/19/2021     Lab Results   Component Value Date     (L) 11/19/2021    K 3.8 11/19/2021    CL 98 11/19/2021    CO2 20 11/19/2021    BUN 7 11/19/2021    CREATININE 0.46 (L) 11/19/2021    GLUCOSE 99 11/19/2021    CALCIUM 10.2 11/19/2021    PROT 8.1 11/19/2021    LABALBU 4.5 11/19/2021    BILITOT 0.57 11/19/2021    ALKPHOS 95 11/19/2021    AST 78 (H) 11/19/2021     (H) 11/19/2021    LABGLOM >60 11/19/2021    GFRAA >60 11/19/2021    AGRATIO 1.3 07/07/2020    GLOB 3.4 07/07/2020     Lab Results   Component Value Date    LABA1C 5.1 05/15/2019     Lab Results   Component Value Date    EAG 99.7 05/15/2019     Lab Results   Component Value Date    LABMICR YES 05/14/2019     Lab Results   Component Value Date    TSH 7.34 (H) 03/10/2013    T4FREE 0.96 03/10/2013     Lab Results   Component Value Date    CHOL 157 11/19/2021    CHOL 231 07/17/2019     Lab Results   Component Value Date    TRIG 70 11/19/2021    TRIG 193 07/17/2019    TRIG 232 (H) 05/22/2019     Lab Results   Component Value Date    HDL 44 11/19/2021    HDL 42 04/16/2020    HDL 52 07/17/2019     Lab Results   Component Value Date    LDLCHOLESTEROL 99 11/19/2021    LDLCALC 90 04/16/2020    LDLCALC 140 07/17/2019     Lab Results   Component Value Date    LABVLDL 24 04/16/2020    VLDL NOT REPORTED 11/19/2021    VLDL 39 07/17/2019     Lab Results   Component Value Date    JORGE L 3.6 11/19/2021     No results found for: PSA, PSADIA    Health Maintenance Due   Topic Date Due    Varicella vaccine (1 of 2 - 2-dose childhood series) Never done    COVID-19 Vaccine (1) Never done    Pneumococcal 0-64 years Vaccine (1 - PCV) Never done    HIV screen  Never done    DTaP/Tdap/Td vaccine (1 - Tdap) Never done    Cervical cancer screen  Never done         Physical Exam per Resident       ICD-10-CM    1. Chronic pain after cancer treatment  G89.3            Plan  I participated in the discussion and care of this patient     Pain agreement today. Refill meds today. Wean off klonopin as this can worsen bipolar disorder. Continue lisinopril for HTN- stable today  Refer to cardiology. Patient wants to transfer care here. Encouraged patient to complete her labs. Consider recommend vaccines and cervical cancer screening in the future.

## 2022-07-12 NOTE — PATIENT INSTRUCTIONS
LAB INSTRUCTIONS:    Please complete labs in 1 day(s). Please fast for 12 hours prior to lab collection. The clinic will call you within 1 week of collection. If you have not heard from us within that amount of time, please call us at [phone].     Lucy Pedersen

## 2022-07-12 NOTE — PROGRESS NOTES
[id number] [facility] Brennen GASTON [address] [address]  Dept: [phone]  Dept Fax: [phone]  Loc: [phone]  PROGRESS NOTE      Visit Date: 7/12/2022    William Phillips is a 43 y.o. female who presents today for:  Chief Complaint   Patient presents with    Follow-up     pain management , has appt. 8/3/22 but has no pain meds       Impression/Plan:  1. 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 Will touch base at next appointment. UDS needed in next few months  - HYDROcodone-acetaminophen (NORCO) 5-325 MG per tablet; Take 1 tablet by mouth every 8 hours as needed for Pain for up to 30 days. Intended supply: 5 days. Take lowest dose possible to manage pain  Dispense: 90 tablet; Refill: 0    2. Dilated cardiomyopathy ([address]  Chronic  Secondary to chemotherapy  States her cardiologist at [facility] is retired and would like referral to a cardiologist at [address] [place] [address] [facility]    3. History of cardiac arrest  See plan above  - [facility]    4. Chronic systolic congestive heart failure ([facility]  See plan as above  Weight stable  Fluid overload  - [facility]    5. Hepatitis C antibody positive in blood  She has still not completed her lab work from her last visit  Emphasized importance of completing this lab work prior to her next appointment in a month  - Lipid, Fasting; Future      Return in about 4 weeks (around 8/9/2022) for Chronic and lab followup.     Subjective:  HPI    Chronic Pain:  Doing very well on norco.  Takes first dose when waking up because that is when her pain  Is the worse.  Normally takes another dose around noon. Occasionally taking at bedtime depending on level of activity that day. Bipolar:  Stable on gabapentin and zyprexa for several years. Mood is good today. Anxiety: Started Clonazepam 3-4 years ago. Having panic attacks. Very well controlled on clonazepam.  However, we had a long discussion today regarding the long-term contraindication associated with benzodiazepine use. We will work on weaning this medication in the future    No further questions of complaints. Review of Systems   Constitutional: Negative for chills and fever. HENT: Negative for congestion and sore throat. Eyes: Negative for visual disturbance. Respiratory: Negative for cough and shortness of breath. Cardiovascular: Negative for chest pain. Gastrointestinal: Negative for abdominal pain and nausea. Genitourinary: Negative for dysuria. Musculoskeletal:        Chronic lower extremity pain   Skin: Negative for rash. Neurological: Negative for dizziness, light-headedness and headaches. Psychiatric/Behavioral: The patient is not nervous/anxious.         Patient Active Problem List   Diagnosis    Electrolyte disturbance    Shock ([address]    Transaminitis    Hyperglycemia    Dilated cardiomyopathy ([address]    Hypokalemia    ICD (implantable cardioverter-defibrillator) in place    Moderate malnutrition (HCC)    SOB (shortness of breath)    Pyelonephritis    Sepsis ([address]    Methadone dependence ([address]    Mood disorder (HCC)    PTSD (post-traumatic stress disorder)    Cholecystitis    Ankle instability    Chronic systolic congestive heart failure (HCC)    Generalized anxiety disorder    Tobacco dependence syndrome    Chronic kidney disease, unspecified    Chronic insomnia    History of rhabdomyosarcoma    Chronic pain after cancer treatment    Primary localized osteoarthrosis of ankle and foot     Past Medical History:   Diagnosis Date    Acute on chronic status: Former Smoker     Packs/day: 0.00     Years: 0.00     Pack years: 0.00    Smokeless tobacco: Never Used   Substance Use Topics    Alcohol use: Yes     Comment: social      Current Outpatient Medications   Medication Sig Dispense Refill    lisinopril (PRINIVIL;ZESTRIL) 2.5 MG tablet TAKE ONE TABLET BY MOUTH DAILY 90 tablet 1    OLANZapine (ZYPREXA) 15 MG tablet Take 1 tablet by mouth nightly Indications: 1/2 tab every morning and 1 tab at bedtime 30 tablet 2    HYDROcodone-acetaminophen (NORCO) 5-325 MG per tablet Take 1 tablet by mouth every 8 hours as needed for Pain for up to 30 days. Intended supply: 5 days. Take lowest dose possible to manage pain 90 tablet 0    gabapentin (NEURONTIN) 600 MG tablet Take 1 tablet by mouth 3 times daily for 30 days. 90 tablet 0    atorvastatin (LIPITOR) 20 MG tablet Take 20 mg by mouth daily       metoprolol succinate (TOPROL XL) 50 MG extended release tablet take 1 tablet by mouth twice a day 60 tablet 0    clonazePAM (KLONOPIN) 0.5 MG tablet Take 0.5 mg by mouth 2 times daily as needed.  spironolactone (ALDACTONE) 25 MG tablet TAKE ONE TABLET BY MOUTH DAILY 90 tablet 1     No current facility-administered medications for this visit. Allergies   Allergen Reactions    Tape [Adhesive Tape]        There is no immunization history for the selected administration types on file for this patient.   Health Maintenance   Topic Date Due    Varicella vaccine (1 of 2 - 2-dose childhood series) Never done    COVID-19 Vaccine (1) Never done    Pneumococcal 0-64 years Vaccine (1 - PCV) Never done    HIV screen  Never done    DTaP/Tdap/Td vaccine (1 - Tdap) Never done    Cervical cancer screen  Never done    Flu vaccine (1) 09/01/2022    Lipids  11/19/2022    Depression Screen  05/31/2023    Hepatitis C screen  Completed    Hepatitis A vaccine  Aged Out    Hepatitis B vaccine  Aged Out    Hib vaccine  Aged Out    Meningococcal (ACWY) vaccine  Aged Out LABS  Lab Results   Component Value Date    LABA1C 5.1 05/15/2019     Lab Results   Component Value Date    EAG 99.7 05/15/2019     No components found for: CHLPL  Lab Results   Component Value Date    TRIG 70 11/19/2021    TRIG 193 07/17/2019    TRIG 232 (H) 05/22/2019     Lab Results   Component Value Date    HDL 44 11/19/2021    HDL 42 04/16/2020    HDL 52 07/17/2019     Lab Results   Component Value Date    LDLCALC 90 04/16/2020    LDLCALC 140 07/17/2019       Chemistry        Component Value Date/Time     (L) 11/19/2021 1514    K 3.8 11/19/2021 1514    K 4.8 05/30/2019 1526    CL 98 11/19/2021 1514    CO2 20 11/19/2021 1514    BUN 7 11/19/2021 1514    CREATININE 0.46 (L) 11/19/2021 1514        Component Value Date/Time    CALCIUM 10.2 11/19/2021 1514    ALKPHOS 95 11/19/2021 1514    AST 78 (H) 11/19/2021 1514     (H) 11/19/2021 1514    BILITOT 0.57 11/19/2021 1514          Lab Results   Component Value Date    LABMICR YES 05/14/2019     Lab Results   Component Value Date    TSH 7.34 (H) 03/10/2013     No results found for: PSA  Lab Results   Component Value Date    WBC 16.6 (H) 11/19/2021    HGB 14.8 11/19/2021    HCT 45.7 11/19/2021    MCV 90.0 11/19/2021     11/19/2021       Objective:  /70 (Site: Right Upper Arm, Position: Sitting, Cuff Size: Medium Adult)   Pulse 89   Temp 97.4 °F (36.3 °C) (Skin)   Resp 18   Ht 5' (1.524 m)   Wt 196 lb 3.2 oz (89 kg)   SpO2 96%   BMI 38.32 kg/m²     Physical Exam  Constitutional:       General: She is not in acute distress. Appearance: Normal appearance. HENT:      Head: Normocephalic. Right Ear: External ear normal.      Left Ear: External ear normal.      Nose: Nose normal.      Mouth/Throat:      Mouth: Mucous membranes are moist.   Eyes:      General: No scleral icterus. Extraocular Movements: Extraocular movements intact. Cardiovascular:      Rate and Rhythm: Normal rate and regular rhythm.       Pulses: Normal pulses. Heart sounds: Normal heart sounds. Pulmonary:      Effort: Pulmonary effort is normal.      Breath sounds: Normal breath sounds. Abdominal:      General: Abdomen is flat. There is no distension. Palpations: Abdomen is soft. Musculoskeletal:         General: Swelling and deformity present. Normal range of motion. Cervical back: Normal range of motion. Comments: Chronic deformity of the lower extremity   Skin:     General: Skin is warm and dry. Neurological:      Mental Status: She is alert. Motor: No weakness. Psychiatric:         Mood and Affect: Mood normal.       Controlled Substance Monitoring:    Acute and Chronic Pain Monitoring:   RX Monitoring 7/12/2022   Acute Pain Prescriptions Severe pain not adequately treated with lower dose. Periodic Controlled Substance Monitoring Possible medication side effects, risk of tolerance/dependence & alternative treatments discussed. ;No signs of potential drug abuse or diversion identified. They voiced understanding. All questions answered. They agreed with treatment plan. See patient instructions for any educational materials that may have been given. Discussed use, benefit, and side effects of prescribed medications. Reviewed health maintenance.     (Please note that portions of this note may have been completed with a voice recognition program.  Efforts were made to edit the dictation but occasionally words are mis-transcribed.)      Electronically signed by Cassidy Dupont DO on 7/12/2022 at 8:51 PM

## 2022-07-12 NOTE — PROGRESS NOTES
S: 43 y.o. female with   Chief Complaint   Patient presents with    Follow-up     pain management , has appt. 8/3/22 but has no pain meds       Chief complaint, [place], and all pertinent details of the case reviewed with the resident. Please see resident's note for specific details discussed at today's visit. BP Readings from Last 3 Encounters:   07/12/22 102/70   06/30/22 110/64   06/22/22 130/76     Wt Readings from Last 3 Encounters:   07/12/22 196 lb 3.2 oz (89 kg)   06/30/22 201 lb (91.2 kg)   06/22/22 194 lb 6.4 oz (88.2 kg)       O: VS:  height is 5' (1.524 m) and weight is 196 lb 3.2 oz (89 kg). Her skin temperature is 97.4 °F (36.3 °C). Her blood pressure is 102/70 and her pulse is 89. Her respiration is 18 and oxygen saturation is 96%. AAO/NAD, appropriate affect for mood       Diagnosis Orders   1. Dilated cardiomyopathy [facility]   2. Chronic pain after cancer treatment  HYDROcodone-acetaminophen (NORCO) 5-325 MG per tablet   3. History of cardiac arrest  [facility]. [facility]   4. Chronic systolic congestive heart failure [facility]. [facility]   5. Hepatitis C antibody positive in blood  Lipid, Fasting       Plan: 1. Pain management agreement for norco script management, norco 5/325 mg po bid-tid prn  2. Discuss weaning off of klonopin, suggest 1/2 pill bid prn  3. Continue gabapentin and zyprexa as bipolar seems controlled on this  4. Consider low dose cymbalta at next visit to possibly help with post cancer pain and decreasing anxiety, but would need to make sure she was stable with bipolar  5. Get lab work done form prior order because of +hepc  6. Lipid, fasting  7.  Referral to [facility] cardiology for transfer of cardiology care to [facility]    Health Maintenance Due   Topic Date Due    Varicella vaccine (1 of 2 - 2-dose childhood series) Never done    COVID-19 Vaccine (1) Never done    Pneumococcal 0-64 years Vaccine (1 - PCV) Never done    HIV screen Never done    DTaP/Tdap/Td vaccine (1 - Tdap) Never done    Cervical cancer screen  Never done       Attending Physician Statement  I have discussed the case, including pertinent history and exam findings with the resident. I also have seen the patient and performed key portions of the examination. I agree with the documented assessment and plan as documented by the resident.         Sharon Chance MD 7/12/2022 3:14 PM

## 2022-08-16 ENCOUNTER — NURSE ONLY (OUTPATIENT)
Dept: LAB | Age: 37
End: 2022-08-16

## 2022-08-16 ENCOUNTER — OFFICE VISIT (OUTPATIENT)
Dept: FAMILY MEDICINE CLINIC | Age: 37
End: 2022-08-16
Payer: MEDICAID

## 2022-08-16 VITALS
WEIGHT: 199.8 LBS | SYSTOLIC BLOOD PRESSURE: 114 MMHG | RESPIRATION RATE: 14 BRPM | OXYGEN SATURATION: 98 % | TEMPERATURE: 97.9 F | DIASTOLIC BLOOD PRESSURE: 76 MMHG | BODY MASS INDEX: 39.23 KG/M2 | HEART RATE: 107 BPM | HEIGHT: 60 IN

## 2022-08-16 DIAGNOSIS — R79.89 ELEVATED TSH: Primary | ICD-10-CM

## 2022-08-16 DIAGNOSIS — R79.89 ELEVATED TSH: ICD-10-CM

## 2022-08-16 DIAGNOSIS — R76.8 HEPATITIS C ANTIBODY POSITIVE IN BLOOD: ICD-10-CM

## 2022-08-16 DIAGNOSIS — G89.3 CHRONIC PAIN AFTER CANCER TREATMENT: ICD-10-CM

## 2022-08-16 LAB
ALBUMIN SERPL-MCNC: 4.7 G/DL (ref 3.5–5.1)
ALP BLD-CCNC: 129 U/L (ref 38–126)
ALT SERPL-CCNC: 58 U/L (ref 11–66)
ANION GAP SERPL CALCULATED.3IONS-SCNC: 16 MEQ/L (ref 8–16)
AST SERPL-CCNC: 40 U/L (ref 5–40)
BASOPHILS # BLD: 0.3 %
BASOPHILS ABSOLUTE: 0 THOU/MM3 (ref 0–0.1)
BILIRUB SERPL-MCNC: 0.7 MG/DL (ref 0.3–1.2)
BUN BLDV-MCNC: 8 MG/DL (ref 7–22)
CALCIUM SERPL-MCNC: 10.3 MG/DL (ref 8.5–10.5)
CHLORIDE BLD-SCNC: 98 MEQ/L (ref 98–111)
CO2: 23 MEQ/L (ref 23–33)
CREAT SERPL-MCNC: 0.5 MG/DL (ref 0.4–1.2)
EOSINOPHIL # BLD: 0.1 %
EOSINOPHILS ABSOLUTE: 0 THOU/MM3 (ref 0–0.4)
ERYTHROCYTE [DISTWIDTH] IN BLOOD BY AUTOMATED COUNT: 12 % (ref 11.5–14.5)
ERYTHROCYTE [DISTWIDTH] IN BLOOD BY AUTOMATED COUNT: 39.3 FL (ref 35–45)
GFR SERPL CREATININE-BSD FRML MDRD: > 90 ML/MIN/1.73M2
GLUCOSE BLD-MCNC: 111 MG/DL (ref 70–108)
HCT VFR BLD CALC: 45 % (ref 37–47)
HEMOGLOBIN: 14.5 GM/DL (ref 12–16)
HEPATITIS C ANTIBODY: ABNORMAL
IMMATURE GRANS (ABS): 0.05 THOU/MM3 (ref 0–0.07)
IMMATURE GRANULOCYTES: 0.3 %
LYMPHOCYTES # BLD: 13 %
LYMPHOCYTES ABSOLUTE: 1.9 THOU/MM3 (ref 1–4.8)
MCH RBC QN AUTO: 28.8 PG (ref 26–33)
MCHC RBC AUTO-ENTMCNC: 32.2 GM/DL (ref 32.2–35.5)
MCV RBC AUTO: 89.5 FL (ref 81–99)
MONOCYTES # BLD: 7.8 %
MONOCYTES ABSOLUTE: 1.2 THOU/MM3 (ref 0.4–1.3)
NUCLEATED RED BLOOD CELLS: 0 /100 WBC
PLATELET # BLD: 329 THOU/MM3 (ref 130–400)
PMV BLD AUTO: 10.4 FL (ref 9.4–12.4)
POTASSIUM SERPL-SCNC: 4 MEQ/L (ref 3.5–5.2)
RBC # BLD: 5.03 MILL/MM3 (ref 4.2–5.4)
SEG NEUTROPHILS: 78.5 %
SEGMENTED NEUTROPHILS ABSOLUTE COUNT: 11.7 THOU/MM3 (ref 1.8–7.7)
SODIUM BLD-SCNC: 137 MEQ/L (ref 135–145)
T4 FREE: 1.36 NG/DL (ref 0.93–1.76)
TOTAL PROTEIN: 8 G/DL (ref 6.1–8)
TSH SERPL DL<=0.05 MIU/L-ACNC: 1.25 UIU/ML (ref 0.4–4.2)
WBC # BLD: 14.9 THOU/MM3 (ref 4.8–10.8)

## 2022-08-16 PROCEDURE — G8428 CUR MEDS NOT DOCUMENT: HCPCS | Performed by: STUDENT IN AN ORGANIZED HEALTH CARE EDUCATION/TRAINING PROGRAM

## 2022-08-16 PROCEDURE — G8417 CALC BMI ABV UP PARAM F/U: HCPCS | Performed by: STUDENT IN AN ORGANIZED HEALTH CARE EDUCATION/TRAINING PROGRAM

## 2022-08-16 PROCEDURE — 1036F TOBACCO NON-USER: CPT | Performed by: STUDENT IN AN ORGANIZED HEALTH CARE EDUCATION/TRAINING PROGRAM

## 2022-08-16 PROCEDURE — 99214 OFFICE O/P EST MOD 30 MIN: CPT | Performed by: STUDENT IN AN ORGANIZED HEALTH CARE EDUCATION/TRAINING PROGRAM

## 2022-08-16 RX ORDER — HYDROCODONE BITARTRATE AND ACETAMINOPHEN 5; 325 MG/1; MG/1
1 TABLET ORAL EVERY 8 HOURS PRN
Qty: 90 TABLET | Refills: 0 | Status: SHIPPED | OUTPATIENT
Start: 2022-08-16 | End: 2022-09-13 | Stop reason: SDUPTHER

## 2022-08-16 RX ORDER — OLANZAPINE 7.5 MG/1
7.5 TABLET ORAL EVERY MORNING
COMMUNITY
Start: 2022-08-12 | End: 2022-10-11 | Stop reason: SDUPTHER

## 2022-08-16 RX ORDER — GABAPENTIN 400 MG/1
400 CAPSULE ORAL 3 TIMES DAILY
COMMUNITY
Start: 2022-07-18 | End: 2022-08-24 | Stop reason: SDUPTHER

## 2022-08-16 ASSESSMENT — ENCOUNTER SYMPTOMS
SORE THROAT: 0
NAUSEA: 0
COUGH: 0
ABDOMINAL PAIN: 0
SHORTNESS OF BREATH: 0

## 2022-08-16 NOTE — PROGRESS NOTES
[id number] [place] [address] [address]  Dept: [phone]  Dept Fax: [phone]    Ignacia Kelly is a 42 y. o.female    Chief Complaint   Patient presents with    Follow-up     Pt presents for a 4 week f/u. Pt states she has been doing alright. Chief complaint, [place], and all pertinent details of the case reviewed with the resident. Please see resident's note for specific details discussed at today's visit. Patient Active Problem List   Diagnosis    Electrolyte disturbance    Shock ([address]    Transaminitis    Hyperglycemia    Dilated cardiomyopathy (HCC)    Hypokalemia    ICD (implantable cardioverter-defibrillator) in place    Moderate malnutrition (HCC)    SOB (shortness of breath)    Pyelonephritis    Sepsis (HCC)    Methadone dependence (HCC)    Mood disorder (HCC)    PTSD (post-traumatic stress disorder)    Cholecystitis    Ankle instability    Chronic systolic congestive heart failure (HCC)    Generalized anxiety disorder    Tobacco dependence syndrome    Chronic kidney disease, unspecified    Chronic insomnia    History of rhabdomyosarcoma    Chronic pain after cancer treatment    Primary localized osteoarthrosis of ankle and foot       Current Outpatient Medications   Medication Sig Dispense Refill    HYDROcodone-acetaminophen (NORCO) 5-325 MG per tablet Take 1 tablet by mouth every 8 hours as needed for Pain for up to 30 days. Intended supply: 5 days.  Take lowest dose possible to manage pain 90 tablet 0    lisinopril (PRINIVIL;ZESTRIL) 2.5 MG tablet TAKE ONE TABLET BY MOUTH DAILY 90 tablet 1    OLANZapine (ZYPREXA) 15 MG tablet Take 1 tablet by mouth nightly Indications: 1/2 tab every morning and 1 tab at bedtime 30 tablet 2    atorvastatin (LIPITOR) 20 MG tablet Take 20 mg by mouth daily       metoprolol succinate (TOPROL XL) 50 MG extended release tablet take 1 tablet by mouth twice a day 60 tablet 0    clonazePAM (KLONOPIN) 0.5 MG tablet Take 0.5 mg by mouth 2 times daily as needed. spironolactone (ALDACTONE) 25 MG tablet TAKE ONE TABLET BY MOUTH DAILY 90 tablet 1    gabapentin (NEURONTIN) 400 MG capsule Take 400 mg by mouth in the morning, at noon, and at bedtime. OLANZapine (ZYPREXA) 7.5 MG tablet Take 7.5 mg by mouth every morning       No current facility-administered medications for this visit. Review of Systems per resident physician    OBJECTIVE     /76   Pulse (!) 107   Temp 97.9 °F (36.6 °C)   Resp 14   Ht 5' (1.524 m)   Wt 199 lb 12.8 oz (90.6 kg)   SpO2 98%   BMI 39.02 kg/m²   BP Readings from Last 3 Encounters:   08/16/22 114/76   07/12/22 102/70   06/30/22 110/64       Wt Readings from Last 3 Encounters:   08/16/22 199 lb 12.8 oz (90.6 kg)   07/12/22 196 lb 3.2 oz (89 kg)   06/30/22 201 lb (91.2 kg)     Body mass index is 39.02 kg/m². Physical Exam  Vitals and nursing note reviewed. Constitutional:       General: She is not in acute distress. Appearance: Normal appearance. She is normal weight. She is not ill-appearing, toxic-appearing or diaphoretic. HENT:      Head: Normocephalic and atraumatic. Nose: Nose normal.   Eyes:      General: No scleral icterus. Right eye: No discharge. Left eye: No discharge. Extraocular Movements: Extraocular movements intact. Conjunctiva/sclera: Conjunctivae normal.      Pupils: Pupils are equal, round, and reactive to light. Neck:      Vascular: No carotid bruit. Cardiovascular:      Rate and Rhythm: Normal rate and regular rhythm. Heart sounds: Normal heart sounds. No murmur heard. No friction rub. No gallop. Pulmonary:      Effort: Pulmonary effort is normal. No respiratory distress. Breath sounds: Normal breath sounds. No stridor. No wheezing, rhonchi or rales. Abdominal:      General: Abdomen is flat. Bowel sounds are normal. There is no distension. Palpations: Abdomen is soft.  There is no mass. Tenderness: There is no abdominal tenderness. There is no guarding or rebound. Hernia: No hernia is present. Musculoskeletal:         General: No swelling or signs of injury. Normal range of motion. Cervical back: Normal range of motion. Right lower leg: No edema. Left lower leg: No edema. Skin:     General: Skin is warm and dry. Coloration: Skin is not jaundiced or pale. Findings: No bruising, erythema, lesion or rash. Neurological:      General: No focal deficit present. Mental Status: She is alert and oriented to person, place, and time. Psychiatric:         Mood and Affect: Mood normal.         Behavior: Behavior normal.         Thought Content: Thought content normal.         Judgment: Judgment normal.        No results found for this visit on 08/16/22. Lab Results   Component Value Date    LABA1C 5.1 05/15/2019       Lab Results   Component Value Date    CHOL 157 11/19/2021    TRIG 70 11/19/2021    HDL 44 11/19/2021    LDLCALC 90 04/16/2020       The ASCVD Risk score (Brandy Price, et al., 2013) failed to calculate for the following reasons: The 2013 ASCVD risk score is only valid for ages 36 to 78    Lab Results   Component Value Date     (L) 11/19/2021    K 3.8 11/19/2021    CL 98 11/19/2021    CO2 20 11/19/2021    BUN 7 11/19/2021    CREATININE 0.46 (L) 11/19/2021    GLUCOSE 99 11/19/2021    CALCIUM 10.2 11/19/2021    PROT 8.1 11/19/2021    LABALBU 4.5 11/19/2021    BILITOT 0.57 11/19/2021    ALKPHOS 95 11/19/2021    AST 78 (H) 11/19/2021     (H) 11/19/2021    LABGLOM >60 11/19/2021    GFRAA >60 11/19/2021    AGRATIO 1.3 07/07/2020    GLOB 3.4 07/07/2020     CrCl cannot be calculated (Patient's most recent lab result is older than the maximum 180 days allowed. ).     Lab Results   Component Value Date    LABMICR YES 05/14/2019       Lab Results   Component Value Date    TSH 7.34 (H) 03/10/2013    T4FREE 0.96 03/10/2013       Lab Results   Component Value Date    WBC 16.6 (H) 11/19/2021    HGB 14.8 11/19/2021    HCT 45.7 11/19/2021    MCV 90.0 11/19/2021     11/19/2021         There is no immunization history for the selected administration types on file for this patient. Health Maintenance   Topic Date Due    COVID-19 Vaccine (1) Never done    Varicella vaccine (1 of 2 - 2-dose childhood series) Never done    Pneumococcal 0-64 years Vaccine (1 - PCV) Never done    HIV screen  Never done    DTaP/Tdap/Td vaccine (1 - Tdap) Never done    Cervical cancer screen  Never done    Flu vaccine (1) 09/01/2022    Lipids  11/19/2022    Depression Screen  05/31/2023    Hepatitis C screen  Completed    Hepatitis A vaccine  Aged Out    Hepatitis B vaccine  Aged Out    Hib vaccine  Aged Out    Meningococcal (ACWY) vaccine  Aged Out           Future Appointments   Date Time Provider Shakira Mane   9/2/2022 10:45 AM Evaristo Mcwilliams MD N [facility] [address]   9/13/2022  3:40 PM Sarah Dawn DO [facility] [address]       ASSESSMENT       Diagnosis Orders   1. Elevated TSH  TSH    T4, Free      2. Chronic pain after cancer treatment  HYDROcodone-acetaminophen (NORCO) 5-325 MG per tablet          PLAN      After discussion with pt and resident provider, we agreed on plan as follows: Will continue Norco 5/325-1 pill every 8 hours  PDMP completed today  Pain contract completed today  Follow-up with Ortho as planned  No current follow-up planned with pain management as patient had less than optimal experience at recent visit  Check hep C antibody, hep C genotype, hep C viral load  Check HIV screen, CBC, CMP, and free T4/TSH at this time  Continue current medications  Follow-up in 1 month for pain evaluation and preventive care gaps to be updated at that time        Attending Physician Statement  I have discussed the case, including pertinent history and exam findings with the resident.  I also have seen the patient and performed key portions of the examination. I agree with the documented assessment and plan as documented by the resident. GC modifier added  to this encounter     Electronically signed by Jason Gandara MD on 8/16/2022 at 3:51 PM    Controlled Substance Monitoring:    Acute and Chronic Pain Monitoring:   RX Monitoring 8/16/2022   Acute Pain Prescriptions -   Periodic Controlled Substance Monitoring No signs of potential drug abuse or diversion identified.;Obtaining appropriate analgesic effect of treatment.    Chronic Pain > 50 MEDD -

## 2022-08-16 NOTE — PROGRESS NOTES
S: 42 y.o. female with   Chief Complaint   Patient presents with    Follow-up     Pt presents for a 4 week f/u. Pt states she has been doing alright. HPI: please see resident note for HPI and ROS. Doing well. Needs refill on [place].   Talking to [facility] about amputation. Hep C ab positive in chart  BP Readings from Last 3 Encounters:   08/16/22 114/76   07/12/22 102/70   06/30/22 110/64     Wt Readings from Last 3 Encounters:   08/16/22 199 lb 12.8 oz (90.6 kg)   07/12/22 196 lb 3.2 oz (89 kg)   06/30/22 201 lb (91.2 kg)       O: VS:  height is 5' (1.524 m) and weight is 199 lb 12.8 oz (90.6 kg). Her temperature is 97.9 °F (36.6 °C). Her blood pressure is 114/76 and her pulse is 107 (abnormal). Her respiration is 14 and oxygen saturation is 98%. AAO/NAD, appropriate affect for mood  CV:  RRR, no murmur  Resp: CTAB  Exam per resident     Diagnosis Orders   1. Elevated TSH  TSH    T4, Free      2. Chronic pain after cancer treatment  HYDROcodone-acetaminophen (NORCO) 5-325 MG per tablet          Plan:  Please refer to resident note for full plan. Follow up with [facility] as planned  Refill Norco, PDMP reviewed, Pain Contract filled out  Get labs, HIV, Hep C CMP, CBC, Thyroids       Return in about 4 weeks (around 9/13/2022) for chronic pain and labs. Health Maintenance Due   Topic Date Due    COVID-19 Vaccine (1) Never done    Varicella vaccine (1 of 2 - 2-dose childhood series) Never done    Pneumococcal 0-64 years Vaccine (1 - PCV) Never done    HIV screen  Never done    DTaP/Tdap/Td vaccine (1 - Tdap) Never done    Cervical cancer screen  Never done     I have discussed the case, including pertinent history and exam findings with the resident and attending physician. I agree with the documented assessment and plan as documented by the resident.       Harshil Marino,  8/16/2022 2:29 PM

## 2022-08-16 NOTE — PROGRESS NOTES
[id number] [facility] [address]nen GASTON [address] [facility] [address]  Dept: [phone]  Dept Fax: [phone]  Loc: [phone]  PROGRESS NOTE      Visit Date: 8/16/2022    Enriqueta Lanes is a 42 y.o. female who presents today for:  Chief Complaint   Patient presents with    Follow-up     Pt presents for a 4 week f/u. Pt states she has been doing alright. Impression/Plan:  1. Chronic pain after cancer treatment  Chronic, reasonably well controlled on current Norco regimen  Continue Norco  She is considering amputation with Ortho, we will continue to follow-up with us  Pain agreement completed today, to be electronically placed in chart  - HYDROcodone-acetaminophen (NORCO) 5-325 MG per tablet; Take 1 tablet by mouth every 8 hours as needed for Pain for up to 30 days. Intended supply: 5 days. Take lowest dose possible to manage pain  Dispense: 90 tablet; Refill: 0    2. Elevated TSH  Noted on past lab work  Repeat labs today  - TSH; Future  - T4, Free; Future    Return in about 4 weeks (around 9/13/2022) for chronic pain and labs. Subjective:  HPI    She is here for follow-up. Pain:  mild/moderate and very happy with his level of control. Doing well on norco.  Did not like [facility] pain management. Cardiology:  appointment next month. No changes in chronic symptoms of left-sided lower extremity edema. She denies any shortness of breath or chest pain. Hepatitis C:  will get labs today    No further questions of complaints. 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 This was to be following up with pain management.   Dilated cardiomyopathy: Referral made to cardiology  History of cardiac arrest: Referral made to cardiology  Chronic systolic CHF: Weight stable, referral made to cardiology  Hepatitis C: Antibodies positive, she has not completed her additional lab work. Review of Systems   Constitutional:  Negative for chills and fever. HENT:  Negative for congestion and sore throat. Eyes:  Negative for visual disturbance. Respiratory:  Negative for cough and shortness of breath. Cardiovascular:  Negative for chest pain. Gastrointestinal:  Negative for abdominal pain and nausea. Genitourinary:  Negative for dysuria. Musculoskeletal:         Chronic pain, improving control on current Norco regimen   Skin:  Negative for rash. Neurological:  Negative for dizziness, light-headedness and headaches. Psychiatric/Behavioral:  The patient is not nervous/anxious.       Patient Active Problem List   Diagnosis    Electrolyte disturbance    Shock ([address]    Transaminitis    Hyperglycemia    Dilated cardiomyopathy (HCC)    Hypokalemia    ICD (implantable cardioverter-defibrillator) in place    Moderate malnutrition (HCC)    SOB (shortness of breath)    Pyelonephritis    Sepsis (HCC)    Methadone dependence (HCC)    Mood disorder (HCC)    PTSD (post-traumatic stress disorder)    Cholecystitis    Ankle instability    Chronic systolic congestive heart failure (HCC)    Generalized anxiety disorder    Tobacco dependence syndrome    Chronic kidney disease, unspecified    Chronic insomnia    History of rhabdomyosarcoma    Chronic pain after cancer treatment    Primary localized osteoarthrosis of ankle and foot     Past Medical History:   Diagnosis Date    Acute on chronic systolic congestive heart failure (HCC)     Acute respiratory failure ([address] 5/14/2019    Arrhythmia     Arthritis     Aspiration pneumonitis (HCC)     Bipolar disorder ([address]     Cancer (HCC)     rhabdomyosarcoma    Cancer ([address]     Cardiac arrest ([address] 05/2019    VF    Cardiac arrest with ventricular fibrillation ([address]     Cardiomyopathy ([address] 05/2019    EF= 40% with global hypokinesis    Chronic kidney disease     COPD (chronic obstructive pulmonary disease) (HCC)     Depression     E. coli infection Family history of early CAD     Hepatitis C     Hepatitis C antibody positive in blood 05/17/2019    Hyperlipidemia     Paranoia (psychosis) ([facility]     Pneumonia due to infectious organism     Rhabdomyosarcoma ([facility]     Scoliosis     Smoker     Tachycardia     VF (ventricular fibrillation) ([facility]       Past Surgical History:   Procedure Laterality Date    ACHILLES TENDON SURGERY      BONE MARROW TRANSPLANT      BRONCHOSCOPY N/A 5/16/2019    BRONCHOSCOPY ALVEOLAR LAVAGE performed by Bob El MD at [facility]  5/16/2019    BRONCHOSCOPY THERAPUTIC ASPIRATION INITIAL performed by Bob El MD at [facility] N/A 5/19/2019    BRONCHOSCOPY ALVEOLAR LAVAGE performed by Bob El MD at [facility]  5/19/2019    BRONCHOSCOPY THERAPUTIC ASPIRATION INITIAL performed by Bob El MD at [address] [place] [address]  4/15/2014    Laparascopic    ENDOSCOPY, COLON, DIAGNOSTIC      FOOT SURGERY      GROWTH PLATE SURGERY      LEG SURGERY       Family History   Problem Relation Age of Onset    Emphysema Mother     Mental Illness Mother     Substance Abuse Mother     Stroke Mother     Heart Attack Father     Mental Illness Father     Arthritis Father     Heart Disease Father     High Blood Pressure Father     High Cholesterol Father      Social History     Tobacco Use    Smoking status: Former     Packs/day: 0.00     Years: 0.00     Pack years: 0.00     Types: Cigarettes    Smokeless tobacco: Never   Substance Use Topics    Alcohol use: Yes     Comment: social      Current Outpatient Medications   Medication Sig Dispense Refill    HYDROcodone-acetaminophen (NORCO) 5-325 MG per tablet Take 1 tablet by mouth every 8 hours as needed for Pain for up to 30 days. Intended supply: 5 days.  Take lowest dose possible to manage pain 90 tablet 0    lisinopril (PRINIVIL;ZESTRIL) 2.5 MG tablet TAKE ONE TABLET BY MOUTH DAILY 90 tablet 1 OLANZapine (ZYPREXA) 15 MG tablet Take 1 tablet by mouth nightly Indications: 1/2 tab every morning and 1 tab at bedtime 30 tablet 2    atorvastatin (LIPITOR) 20 MG tablet Take 20 mg by mouth daily       metoprolol succinate (TOPROL XL) 50 MG extended release tablet take 1 tablet by mouth twice a day 60 tablet 0    clonazePAM (KLONOPIN) 0.5 MG tablet Take 0.5 mg by mouth 2 times daily as needed. spironolactone (ALDACTONE) 25 MG tablet TAKE ONE TABLET BY MOUTH DAILY 90 tablet 1    gabapentin (NEURONTIN) 400 MG capsule Take 400 mg by mouth in the morning, at noon, and at bedtime. OLANZapine (ZYPREXA) 7.5 MG tablet Take 7.5 mg by mouth every morning       No current facility-administered medications for this visit. Allergies   Allergen Reactions    Tape [Adhesive Tape]        There is no immunization history for the selected administration types on file for this patient.   Health Maintenance   Topic Date Due    COVID-19 Vaccine (1) Never done    Varicella vaccine (1 of 2 - 2-dose childhood series) Never done    Pneumococcal 0-64 years Vaccine (1 - PCV) Never done    HIV screen  Never done    DTaP/Tdap/Td vaccine (1 - Tdap) Never done    Cervical cancer screen  Never done    Flu vaccine (1) 09/01/2022    Lipids  11/19/2022    Depression Screen  05/31/2023    Hepatitis C screen  Completed    Hepatitis A vaccine  Aged Out    Hepatitis B vaccine  Aged Out    Hib vaccine  Aged Out    Meningococcal (ACWY) vaccine  Aged Out       LABS  Lab Results   Component Value Date    LABA1C 5.1 05/15/2019     Lab Results   Component Value Date    EAG 99.7 05/15/2019     No components found for: CHLPL  Lab Results   Component Value Date    TRIG 70 11/19/2021    TRIG 193 07/17/2019    TRIG 232 (H) 05/22/2019     Lab Results   Component Value Date    HDL 44 11/19/2021    HDL 42 04/16/2020    HDL 52 07/17/2019     Lab Results   Component Value Date    LDLCALC 90 04/16/2020    [address] 140 07/17/2019       Chemistry Component Value Date/Time     (L) 11/19/2021 1514    K 3.8 11/19/2021 1514    K 4.8 05/30/2019 1526    CL 98 11/19/2021 1514    CO2 20 11/19/2021 1514    BUN 7 11/19/2021 1514    CREATININE 0.46 (L) 11/19/2021 1514        Component Value Date/Time    CALCIUM 10.2 11/19/2021 1514    ALKPHOS 95 11/19/2021 1514    AST 78 (H) 11/19/2021 1514     (H) 11/19/2021 1514    BILITOT 0.57 11/19/2021 1514          Lab Results   Component Value Date    LABMICR YES 05/14/2019     Lab Results   Component Value Date    TSH 7.34 (H) 03/10/2013     No results found for: PSA  Lab Results   Component Value Date    WBC 16.6 (H) 11/19/2021    HGB 14.8 11/19/2021    HCT 45.7 11/19/2021    MCV 90.0 11/19/2021     11/19/2021       Objective:  /76   Pulse (!) 107   Temp 97.9 °F (36.6 °C)   Resp 14   Ht 5' (1.524 m)   Wt 199 lb 12.8 oz (90.6 kg)   SpO2 98%   BMI 39.02 kg/m²     Physical Exam  Constitutional:       General: She is not in acute distress. Appearance: Normal appearance. HENT:      Head: Normocephalic. Right Ear: External ear normal.      Left Ear: External ear normal.      Nose: Nose normal.      Mouth/Throat:      Mouth: Mucous membranes are moist.   Eyes:      General: No scleral icterus. Extraocular Movements: Extraocular movements intact. Cardiovascular:      Rate and Rhythm: Normal rate and regular rhythm. Pulses: Normal pulses. Heart sounds: Normal heart sounds. Pulmonary:      Effort: Pulmonary effort is normal.      Breath sounds: Normal breath sounds. Abdominal:      General: Abdomen is flat. There is no distension. Palpations: Abdomen is soft. Musculoskeletal:         General: Normal range of motion. Cervical back: Normal range of motion. Skin:     General: Skin is warm and dry. Neurological:      Mental Status: She is alert. Motor: No weakness. Psychiatric:         Mood and Affect: Mood normal.         They voiced understanding. All questions answered. They agreed with treatment plan. See patient instructions for any educational materials that may have been given. Discussed use, benefit, and side effects of prescribed medications. Reviewed health maintenance.     (Please note that portions of this note may have been completed with a voice recognition program.  Efforts were made to edit the dictation but occasionally words are mis-transcribed.)      Electronically signed by Lb Quinteros DO on 8/16/2022 at 2:34 PM

## 2022-08-17 LAB — HIV AG/AB: NONREACTIVE

## 2022-08-18 RX ORDER — GABAPENTIN 400 MG/1
400 CAPSULE ORAL 3 TIMES DAILY
Qty: 90 CAPSULE | OUTPATIENT
Start: 2022-08-18

## 2022-08-18 RX ORDER — CLONAZEPAM 0.5 MG/1
0.5 TABLET ORAL 2 TIMES DAILY PRN
Qty: 60 TABLET | OUTPATIENT
Start: 2022-08-18

## 2022-08-18 NOTE — TELEPHONE ENCOUNTER
Last visit- 8/16/2022  Next visit- 9/13/2022    Requested Prescriptions     Pending Prescriptions Disp Refills    clonazePAM (KLONOPIN) 0.5 MG tablet 60 tablet      Sig: Take 1 tablet by mouth 2 times daily as needed. gabapentin (NEURONTIN) 400 MG capsule 90 capsule      Sig: Take 1 capsule by mouth in the morning, at noon, and at bedtime.

## 2022-08-19 LAB
HCV QNT BY NAAT INTERPRETATION: DETECTED
HCV QNT BY NAAT IU/ML: ABNORMAL IU/ML
HCV QNT BY NAAT LOG IU/ML: 5.97 LOG IU/ML

## 2022-08-22 LAB — HCV GENOTYPE: NORMAL

## 2022-08-24 RX ORDER — GABAPENTIN 400 MG/1
400 CAPSULE ORAL 3 TIMES DAILY
Qty: 90 CAPSULE | Refills: 2 | Status: SHIPPED | OUTPATIENT
Start: 2022-08-24 | End: 2022-10-25

## 2022-08-24 RX ORDER — CLONAZEPAM 0.5 MG/1
0.5 TABLET ORAL 2 TIMES DAILY PRN
Qty: 60 TABLET | OUTPATIENT
Start: 2022-08-24

## 2022-08-29 ENCOUNTER — TELEPHONE (OUTPATIENT)
Dept: FAMILY MEDICINE CLINIC | Age: 37
End: 2022-08-29

## 2022-09-02 ENCOUNTER — TELEPHONE (OUTPATIENT)
Dept: CARDIOLOGY CLINIC | Age: 37
End: 2022-09-02

## 2022-09-02 NOTE — TELEPHONE ENCOUNTER
Patient called to cancel appt with Dr. Lucio Martin d/t severe period cramps & bleeding she was experiencing this morning. No available r/s dates with [facility] until November. Patient states that she does not have a provider preference. She is scheduled with [facility] for 09/27/2022. Patient is okay with this date.

## 2022-09-13 ENCOUNTER — OFFICE VISIT (OUTPATIENT)
Dept: FAMILY MEDICINE CLINIC | Age: 37
End: 2022-09-13
Payer: MEDICAID

## 2022-09-13 VITALS
OXYGEN SATURATION: 98 % | SYSTOLIC BLOOD PRESSURE: 106 MMHG | HEIGHT: 60 IN | HEART RATE: 75 BPM | DIASTOLIC BLOOD PRESSURE: 76 MMHG | RESPIRATION RATE: 18 BRPM | TEMPERATURE: 97 F | WEIGHT: 191.2 LBS | BODY MASS INDEX: 37.54 KG/M2

## 2022-09-13 DIAGNOSIS — R76.8 HEPATITIS C ANTIBODY POSITIVE IN BLOOD: Primary | ICD-10-CM

## 2022-09-13 DIAGNOSIS — G89.3 CHRONIC PAIN AFTER CANCER TREATMENT: ICD-10-CM

## 2022-09-13 PROCEDURE — 99213 OFFICE O/P EST LOW 20 MIN: CPT | Performed by: STUDENT IN AN ORGANIZED HEALTH CARE EDUCATION/TRAINING PROGRAM

## 2022-09-13 PROCEDURE — G8417 CALC BMI ABV UP PARAM F/U: HCPCS | Performed by: STUDENT IN AN ORGANIZED HEALTH CARE EDUCATION/TRAINING PROGRAM

## 2022-09-13 PROCEDURE — G8427 DOCREV CUR MEDS BY ELIG CLIN: HCPCS | Performed by: STUDENT IN AN ORGANIZED HEALTH CARE EDUCATION/TRAINING PROGRAM

## 2022-09-13 PROCEDURE — 1036F TOBACCO NON-USER: CPT | Performed by: STUDENT IN AN ORGANIZED HEALTH CARE EDUCATION/TRAINING PROGRAM

## 2022-09-13 RX ORDER — HYDROCODONE BITARTRATE AND ACETAMINOPHEN 5; 325 MG/1; MG/1
1 TABLET ORAL EVERY 8 HOURS PRN
Qty: 90 TABLET | Refills: 0 | Status: SHIPPED | OUTPATIENT
Start: 2022-09-16 | End: 2022-10-11 | Stop reason: SDUPTHER

## 2022-09-13 ASSESSMENT — ENCOUNTER SYMPTOMS
COUGH: 0
SORE THROAT: 0
NAUSEA: 0
SHORTNESS OF BREATH: 0
ABDOMINAL PAIN: 0

## 2022-09-13 NOTE — PROGRESS NOTES
PRECEPTOR NOTE:    S: 42 y.o. female with   Chief Complaint   Patient presents with    Follow-up     Pt presents for a 4 week f/u for chronic pain and labs. Pt states she has been doing ok. HPI: please see resident note for HPI and ROS. Patient presents for follow-up of left lower leg chronic pain. She has a history of rhabdomyosarcoma as a child. She has required Norco and Gabapentin for pain control, which is working well. She is also seeing surgeon to discuss possible amputation of left lower extremity. Patient also has history of hepatitis C in the past. Recently repeated labs show elevated quantitative level. She denies IV drug use or recent tattoos. She has not received treatment in the past.    BP Readings from Last 3 Encounters:   09/13/22 106/76   08/16/22 114/76   07/12/22 102/70     Wt Readings from Last 3 Encounters:   09/13/22 191 lb 3.2 oz (86.7 kg)   08/16/22 199 lb 12.8 oz (90.6 kg)   07/12/22 196 lb 3.2 oz (89 kg)       O: VS:  height is 5' (1.524 m) and weight is 191 lb 3.2 oz (86.7 kg). Her temperature is 97 °F (36.1 °C). Her blood pressure is 106/76 and her pulse is 75. Her respiration is 18 and oxygen saturation is 98%. AAO/NAD, appropriate affect for mood  CV:  RRR, no murmur  Resp: CTAB    Please see resident's note for complete physical exam     Diagnosis Orders   1. Hepatitis C antibody positive in blood  [facility] - Jesus Castillo MD[facility]      2. Chronic pain after cancer treatment  HYDROcodone-acetaminophen (NORCO) 5-325 MG per tablet          Plan:  Please refer to resident note for full plan. Refill patient's dose of Norco, as this is successful at controlling chronic pain  Refer to GI for history of Hepatitis C  Follow-up in 1 month       No follow-ups on file.     Health Maintenance Due   Topic Date Due    COVID-19 Vaccine (1) Never done    Varicella vaccine (1 of 2 - 2-dose childhood series) Never done    Pneumococcal 0-64 years Vaccine (1 - PCV) Never

## 2022-09-13 NOTE — PROGRESS NOTES
S: 42 y.o. female with   Chief Complaint   Patient presents with    Follow-up     Pt presents for a 4 week f/u for chronic pain and labs. Pt states she has been doing ok. HPI: please see resident note for HPI and ROS. 64-year-old female here for follow-up on chronic pain medication. Patient has chronic pain secondary to deformity of lower extremity after being diagnosed with and treated for rhabdomyosarcoma in childhood. Patient is established with surgeon and has discussed possible amputation in future. She does take Norco as needed chronically for her pain. PDMP reviewed and appropriate. Also has a remote history of hepatitis C, elevated quant. No symptoms currently. BP Readings from Last 3 Encounters:   09/13/22 106/76   08/16/22 114/76   07/12/22 102/70     Wt Readings from Last 3 Encounters:   09/13/22 191 lb 3.2 oz (86.7 kg)   08/16/22 199 lb 12.8 oz (90.6 kg)   07/12/22 196 lb 3.2 oz (89 kg)       O: VS:  height is 5' (1.524 m) and weight is 191 lb 3.2 oz (86.7 kg). Her temperature is 97 °F (36.1 °C). Her blood pressure is 106/76 and her pulse is 75. Her respiration is 18 and oxygen saturation is 98%. Diagnosis Orders   1. Hepatitis C antibody positive in blood  RAMONA - Jesus Castillo MD, Gastroenterology, [email or web address]      2. Chronic pain after cancer treatment  HYDROcodone-acetaminophen (NORCO) 5-325 MG per tablet          Plan:  Please refer to resident note for full plan. Chronic pain: Chronic, stable. OK to continue Norco as needed. PDMP reviewed and appropriate. Continue follow-up with surgeon as scheduled to discuss any possible surgical management that may be warranted. Hepatitis C positive: Plan to refer to GI for treatment. Follow-up in 1 month for recheck.     Health Maintenance Due   Topic Date Due    COVID-19 Vaccine (1) Never done    Varicella vaccine (1 of 2 - 2-dose childhood series) Never done    Pneumococcal 0-64 years Vaccine (1 - PCV) Never done DTaP/Tdap/Td vaccine (1 - Tdap) Never done    Cervical cancer screen  Never done    Diabetes screen  05/15/2022    Flu vaccine (1) Never done       Attending Physician Statement  I have discussed the case, including pertinent history and exam findings with the resident. I agree with the documented assessment and plan as documented by the resident.         Flaco Waters,  9/14/2022 1:02 PM

## 2022-09-13 NOTE — PROGRESS NOTES
[address] [facility] Brennen GASTON [address] [facility] [address]  Dept: [phone]  Dept Fax: [phone]  Loc: [phone]  PROGRESS NOTE      Visit Date: 9/13/2022    Perla Perry is a 42 y.o. female who presents today for:  Chief Complaint   Patient presents with    Follow-up     Pt presents for a 4 week f/u for chronic pain and labs. Pt states she has been doing ok. Impression/Plan:  1. Hepatitis C antibody positive in blood  Chronic  Labs completed  Referral made to GI for treatment  - [facility]    2. Chronic pain after cancer treatment  Chronic, controlled  Doing very well on norco and as needed ibuprofen  Still considering possible amputation  - HYDROcodone-acetaminophen (NORCO) 5-325 MG per tablet; Take 1 tablet by mouth every 8 hours as needed for Pain for up to 30 days. Intended supply: 5 days. Take lowest dose possible to manage pain  Dispense: 90 tablet; Refill: 0    Return in about 4 weeks (around 10/11/2022) for Chronic. Subjective:  HPI    She is here today for follow-up of her chronic pain and recent lab work. Chronic pain: Well-controlled on Norco.  No concern for medication misuse. She still has not decided if she would like to proceed with amputation of her lower limb. She is very worried about phantom limb pain. Advised her to discuss this further with her surgeon because he may have other options for her for treatment of potential nerve pain if she does go through the surgery. Hepatitis C: Positive, elevated quant. Type IIb. Referral made to GI. She denies any episodes of jaundice. She does have a history of tattoos. 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 Review of Systems   Constitutional:  Negative for chills and fever.    HENT:  Negative for congestion and sore throat. Eyes:  Negative for visual disturbance. Respiratory:  Negative for cough and shortness of breath. Cardiovascular:  Negative for chest pain. Gastrointestinal:  Negative for abdominal pain and nausea. Genitourinary:  Negative for dysuria. Skin:  Negative for rash. Neurological:  Negative for dizziness, light-headedness and headaches. Psychiatric/Behavioral:  The patient is not nervous/anxious.       Patient Active Problem List   Diagnosis    Electrolyte disturbance    Shock ([address]    Transaminitis    Hyperglycemia    Dilated cardiomyopathy (HCC)    Hypokalemia    ICD (implantable cardioverter-defibrillator) in place    Moderate malnutrition (HCC)    SOB (shortness of breath)    Pyelonephritis    Sepsis (HCC)    Methadone dependence (HCC)    Mood disorder (HCC)    PTSD (post-traumatic stress disorder)    Cholecystitis    Ankle instability    Chronic systolic congestive heart failure (HCC)    Generalized anxiety disorder    Tobacco dependence syndrome    Chronic kidney disease, unspecified    Chronic insomnia    History of rhabdomyosarcoma    Chronic pain after cancer treatment    Primary localized osteoarthrosis of ankle and foot     Past Medical History:   Diagnosis Date    Acute on chronic systolic congestive heart failure (HCC)     Acute respiratory failure (HCC) 5/14/2019    Arrhythmia     Arthritis     Aspiration pneumonitis (HCC)     Bipolar disorder ([address]     Cancer (HCC)     rhabdomyosarcoma    Cancer ([address]     Cardiac arrest ([address] 05/2019    VF    Cardiac arrest with ventricular fibrillation ([address]     Cardiomyopathy ([address] 05/2019    EF= 40% with global hypokinesis    Chronic kidney disease     COPD (chronic obstructive pulmonary disease) (HCC)     Depression     E. coli infection     Family history of early CAD     Hepatitis C     Hepatitis C antibody positive in blood 05/17/2019    Hyperlipidemia     Paranoia (psychosis) ([address]     Pneumonia due to infectious organism Rhabdomyosarcoma [address]     Scoliosis     Smoker     Tachycardia     VF (ventricular fibrillation) ([facility]       Past Surgical History:   Procedure Laterality Date    ACHILLES TENDON SURGERY      BONE MARROW TRANSPLANT      BRONCHOSCOPY N/A 5/16/2019    BRONCHOSCOPY ALVEOLAR LAVAGE performed by Ishan Pathak MD at [place] [address]  5/16/2019    BRONCHOSCOPY THERAPUTIC ASPIRATION INITIAL performed by Ishan Pathak MD at [place] [address] N/A 5/19/2019    BRONCHOSCOPY ALVEOLAR LAVAGE performed by Ishan Pathak MD at [place] [address]  5/19/2019    BRONCHOSCOPY THERAPUTIC ASPIRATION INITIAL performed by Ishan Pathak MD at [address] [place] [address]  4/15/2014    Laparascopic    ENDOSCOPY, COLON, DIAGNOSTIC      FOOT SURGERY      GROWTH PLATE SURGERY      LEG SURGERY       Family History   Problem Relation Age of Onset    Emphysema Mother     Mental Illness Mother     Substance Abuse Mother     Stroke Mother     Heart Attack Father     Mental Illness Father     Arthritis Father     Heart Disease Father     High Blood Pressure Father     High Cholesterol Father      Social History     Tobacco Use    Smoking status: Former     Packs/day: 0.00     Years: 0.00     Pack years: 0.00     Types: Cigarettes    Smokeless tobacco: Never   Substance Use Topics    Alcohol use: Yes     Comment: social      Current Outpatient Medications   Medication Sig Dispense Refill    [START ON 9/16/2022] HYDROcodone-acetaminophen (NORCO) 5-325 MG per tablet Take 1 tablet by mouth every 8 hours as needed for Pain for up to 30 days. Intended supply: 5 days. Take lowest dose possible to manage pain 90 tablet 0    gabapentin (NEURONTIN) 400 MG capsule Take 1 capsule by mouth in the morning, at noon, and at bedtime for 30 days.  90 capsule 2    OLANZapine (ZYPREXA) 7.5 MG tablet Take 7.5 mg by mouth every morning      lisinopril (PRINIVIL;ZESTRIL) 2.5 MG tablet TAKE ONE TABLET BY MOUTH DAILY 90 tablet 1    OLANZapine (ZYPREXA) 15 MG tablet Take 1 tablet by mouth nightly Indications: 1/2 tab every morning and 1 tab at bedtime 30 tablet 2    metoprolol succinate (TOPROL XL) 50 MG extended release tablet take 1 tablet by mouth twice a day 60 tablet 0    clonazePAM (KLONOPIN) 0.5 MG tablet Take 0.5 mg by mouth 2 times daily as needed. spironolactone (ALDACTONE) 25 MG tablet TAKE ONE TABLET BY MOUTH DAILY 90 tablet 1     No current facility-administered medications for this visit. Allergies   Allergen Reactions    Tape [Adhesive Tape]        There is no immunization history for the selected administration types on file for this patient.   Health Maintenance   Topic Date Due    COVID-19 Vaccine (1) Never done    Varicella vaccine (1 of 2 - 2-dose childhood series) Never done    Pneumococcal 0-64 years Vaccine (1 - PCV) Never done    DTaP/Tdap/Td vaccine (1 - Tdap) Never done    Cervical cancer screen  Never done    Diabetes screen  05/15/2022    Flu vaccine (1) Never done    Depression Screen  05/31/2023    Hepatitis C screen  Completed    HIV screen  Completed    Hepatitis A vaccine  Aged Out    Hepatitis B vaccine  Aged Out    Hib vaccine  Aged Out    Meningococcal (ACWY) vaccine  Aged Out       LABS  Lab Results   Component Value Date    LABA1C 5.1 05/15/2019     Lab Results   Component Value Date    EAG 99.7 05/15/2019     No components found for: CHLPL  Lab Results   Component Value Date    TRIG 70 11/19/2021    TRIG 193 07/17/2019    TRIG 232 (H) 05/22/2019     Lab Results   Component Value Date    HDL 44 11/19/2021    HDL 42 04/16/2020    HDL 52 07/17/2019     Lab Results   Component Value Date    LDLCALC 90 04/16/2020    LDLCALC 140 07/17/2019       Chemistry        Component Value Date/Time     08/16/2022 1433    K 4.0 08/16/2022 1433    K 4.8 05/30/2019 1526    CL 98 08/16/2022 1433    CO2 23 08/16/2022 1433    BUN 8 08/16/2022 1433    CREATININE 0.5 08/16/2022 1433        Component Value Date/Time    CALCIUM 10.3 08/16/2022 1433    ALKPHOS 129 (H) 08/16/2022 1433    AST 40 08/16/2022 1433    ALT 58 08/16/2022 1433    BILITOT 0.7 08/16/2022 1433          Lab Results   Component Value Date    LABMICR YES 05/14/2019     Lab Results   Component Value Date    TSH 1.250 08/16/2022     No results found for: PSA  Lab Results   Component Value Date    WBC 14.9 (H) 08/16/2022    HGB 14.5 08/16/2022    HCT 45.0 08/16/2022    MCV 89.5 08/16/2022     08/16/2022       Objective:  /76   Pulse 75   Temp 97 °F (36.1 °C)   Resp 18   Ht 5' (1.524 m)   Wt 191 lb 3.2 oz (86.7 kg)   SpO2 98%   BMI 37.34 kg/m²     Physical Exam  Constitutional:       General: She is not in acute distress. Appearance: Normal appearance. HENT:      Head: Normocephalic. Right Ear: External ear normal.      Left Ear: External ear normal.      Nose: Nose normal.      Mouth/Throat:      Mouth: Mucous membranes are moist.   Eyes:      General: No scleral icterus. Extraocular Movements: Extraocular movements intact. Cardiovascular:      Rate and Rhythm: Normal rate and regular rhythm. Pulses: Normal pulses. Heart sounds: Normal heart sounds. Pulmonary:      Effort: Pulmonary effort is normal.      Breath sounds: Normal breath sounds. Abdominal:      General: Abdomen is flat. There is no distension. Palpations: Abdomen is soft. Musculoskeletal:         General: Normal range of motion. Cervical back: Normal range of motion. Comments: Deformity of lower extremity   Skin:     General: Skin is warm and dry. Neurological:      Mental Status: She is alert. Motor: No weakness. Psychiatric:         Mood and Affect: Mood normal.       They voiced understanding. All questions answered. They agreed with treatment plan. See patient instructions for any educational materials that may have been given.   Discussed use, benefit, and side effects of prescribed medications. Reviewed health maintenance.     (Please note that portions of this note may have been completed with a voice recognition program.  Efforts were made to edit the dictation but occasionally words are mis-transcribed.)      Electronically signed by Jacy Eastman DO on 9/13/2022 at 5:24 PM

## 2022-09-14 ENCOUNTER — TELEPHONE (OUTPATIENT)
Dept: FAMILY MEDICINE CLINIC | Age: 37
End: 2022-09-14

## 2022-09-14 NOTE — TELEPHONE ENCOUNTER
Lady Cee, DO  [facility] called and states that she woke up with blisters that are causing itching one on her Right knee, 2 on her Left knee, 2 on her hand and 1 on her Left arm and states that she looked up Monkey Pox and she states that the blisters look similar. Patient states that she may go to Urgent Care to be evaluated. Please Advise.

## 2022-09-16 NOTE — TELEPHONE ENCOUNTER
Spoke with pt, pt states that the blister have gone away. Pt states she used Cortisone cream on the areas and they are now gone. Encourage pt if blisters reappear to go to the UC/ED for possible monkey pox testing. Pt verbalized understanding.

## 2022-09-27 ENCOUNTER — OFFICE VISIT (OUTPATIENT)
Dept: CARDIOLOGY CLINIC | Age: 37
End: 2022-09-27
Payer: MEDICAID

## 2022-09-27 VITALS
BODY MASS INDEX: 37.13 KG/M2 | DIASTOLIC BLOOD PRESSURE: 71 MMHG | HEART RATE: 100 BPM | HEIGHT: 60 IN | SYSTOLIC BLOOD PRESSURE: 99 MMHG | WEIGHT: 189.13 LBS

## 2022-09-27 DIAGNOSIS — I50.22 CHRONIC SYSTOLIC CONGESTIVE HEART FAILURE (HCC): Primary | ICD-10-CM

## 2022-09-27 DIAGNOSIS — I42.8 NON-ISCHEMIC CARDIOMYOPATHY (HCC): ICD-10-CM

## 2022-09-27 DIAGNOSIS — R00.2 HEART PALPITATIONS: ICD-10-CM

## 2022-09-27 PROCEDURE — G8417 CALC BMI ABV UP PARAM F/U: HCPCS | Performed by: INTERNAL MEDICINE

## 2022-09-27 PROCEDURE — 99244 OFF/OP CNSLTJ NEW/EST MOD 40: CPT | Performed by: INTERNAL MEDICINE

## 2022-09-27 PROCEDURE — 93000 ELECTROCARDIOGRAM COMPLETE: CPT | Performed by: INTERNAL MEDICINE

## 2022-09-27 PROCEDURE — G8427 DOCREV CUR MEDS BY ELIG CLIN: HCPCS | Performed by: INTERNAL MEDICINE

## 2022-09-27 RX ORDER — ASPIRIN 81 MG/1
81 TABLET ORAL DAILY
Qty: 90 TABLET | Refills: 1 | Status: SHIPPED | OUTPATIENT
Start: 2022-09-27

## 2022-09-27 RX ORDER — ATORVASTATIN CALCIUM 20 MG/1
TABLET, FILM COATED ORAL
COMMUNITY
Start: 2022-09-12

## 2022-09-27 NOTE — PROGRESS NOTES
New patient here for check up - CHF- MI    EKG done today     Pt continues with heart palpitations, sob , notice more, extremely tired, left foot swells

## 2022-09-27 NOTE — PROGRESS NOTES
[address] [facility] [address]  [facility] [address]  Dept: [phone]  Dept Fax: [phone]  Loc: [phone]    Visit Date: 9/27/2022    Ms. Cyndee Branham is a 42 y.o. female  who presented for:    New patient referral  Establish cardiac care  CHF    HPI:   HPI   Liliane Woodard is a pleasant 42year old female patient who  has a past medical history of Acute on chronic systolic congestive heart failure ([address] Acute respiratory failure ([address] Arrhythmia, Arthritis, Aspiration pneumonitis ([address] Bipolar disorder ([address] Cancer ([address] Cancer ([address] Cardiac arrest ([address] Cardiac arrest with ventricular fibrillation ([address] Cardiomyopathy ([address] Chronic kidney disease, COPD (chronic obstructive pulmonary disease) ([address] Depression, E. 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 Echo in 2011 revealed an EF of 50-55%, stress test was negative. On 5/2019, patient was admitted to OSH with VF cardiac arrest, severely reduced EF. She underwent AICD placement in 2019. Most recent echocardiogram on 8/2020 revealed an EF of 35-40%. Patient does not recall having a [facility]. She moved from [place] to [address] [place] [address]. BP 99/71. She has shortness of breath, dyspnea on exertion and fatigue. She states she \"feels tired al the time\". Patient had prior left surgery for her sarcoma (calf muscle resection 1986). She reports chronic left leg pain. She denies chest pain. Current Outpatient Medications:     HYDROcodone-acetaminophen (NORCO) 5-325 MG per tablet, Take 1 tablet by mouth every 8 hours as needed for Pain for up to 30 days. Intended supply: 5 days.  Take lowest dose possible to manage pain, Disp: 90 tablet, Rfl: 0    gabapentin (NEURONTIN) 400 MG capsule, Take 1 capsule by mouth in the morning, at noon, and at bedtime for 30 days. , Disp: 90 capsule, Rfl: 2    OLANZapine (ZYPREXA) 7.5 MG tablet, Take 7.5 mg by mouth every morning, Disp: , Rfl:     lisinopril (PRINIVIL;ZESTRIL) 2.5 MG tablet, TAKE ONE TABLET BY MOUTH DAILY, Disp: 90 tablet, Rfl: 1    OLANZapine (ZYPREXA) 15 MG tablet, Take 1 tablet by mouth nightly Indications: 1/2 tab every morning and 1 tab at bedtime, Disp: 30 tablet, Rfl: 2    metoprolol succinate (TOPROL XL) 50 MG extended release tablet, take 1 tablet by mouth twice a day, Disp: 60 tablet, Rfl: 0    clonazePAM (KLONOPIN) 0.5 MG tablet, Take 0.5 mg by mouth 2 times daily as needed. , Disp: , Rfl:     spironolactone (ALDACTONE) 25 MG tablet, TAKE ONE TABLET BY MOUTH DAILY, Disp: 90 tablet, Rfl: 1    Past Medical History  Rubi  has a past medical history of Acute on chronic systolic congestive heart failure ([address] Acute respiratory failure ([address] Arrhythmia, Arthritis, Aspiration pneumonitis ([address] Bipolar disorder ([address] Cancer ([address] Cancer ([address] Cardiac arrest [facility]), Cardiac arrest with ventricular fibrillation ([address] Cardiomyopathy ([address] Chronic kidney disease, COPD (chronic obstructive pulmonary disease) ([address] Depression, E. 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 Social History  Rubi  reports that she has quit smoking. Her smoking use included cigarettes. She has never used smokeless tobacco. She reports current alcohol use. She reports current drug use. Drug: Marijuana Treva Jazlyn).     Family History  Rubi family history includes Arthritis in her father; Emphysema in her mother; Heart Attack in her father; Heart Disease in her father; High Blood Pressure in her father; High Cholesterol in her father; Mental Illness in her father and mother; Stroke in her mother; Substance Abuse in her mother. Past Surgical History   Past Surgical History:   Procedure Laterality Date    ACHILLES TENDON SURGERY      BONE MARROW TRANSPLANT      BRONCHOSCOPY N/A 5/16/2019    BRONCHOSCOPY ALVEOLAR LAVAGE performed by Elvis Garcia MD at [address] [place] [address]  5/16/2019    BRONCHOSCOPY THERAPUTIC ASPIRATION INITIAL performed by Elvis Garcia MD at [address] [place] [address] N/A 5/19/2019    BRONCHOSCOPY ALVEOLAR LAVAGE performed by Elvis Garcia MD at [address] [place] [address]  5/19/2019    BRONCHOSCOPY THERAPUTIC ASPIRATION INITIAL performed by Elvis Garcia MD at [address] [place] [address]  4/15/2014    Laparascopic    ENDOSCOPY, COLON, DIAGNOSTIC      FOOT SURGERY      GROWTH PLATE SURGERY      LEG SURGERY         Review of Systems   Constitutional: Negative for chills and fever  HENT: Negative for congestion, sinus pressure, sneezing and sore throat. Eyes: Negative for pain, discharge, redness and itching. Respiratory: Negative for apnea, cough  Gastrointestinal: Negative for blood in stool, constipation, diarrhea   Endocrine: Negative for cold intolerance, heat intolerance, polydipsia. Genitourinary: Negative for dysuria, enuresis, flank pain and hematuria. Musculoskeletal: Negative for arthralgias, joint swelling and neck pain. Neurological: Negative for numbness and headaches. Psychiatric/Behavioral: Negative for agitation, confusion, decreased concentration and dysphoric mood. Objective: There were no vitals taken for this visit.     Wt Readings from Last 3 Encounters:   09/13/22 191 lb 3.2 oz (86.7 kg)   08/16/22 199 lb 12.8 oz (90.6 kg)   07/12/22 196 lb 3.2 oz (89 kg)     BP Readings from Last 3 Encounters:   09/13/22 106/76   08/16/22 114/76 07/12/22 102/70       Nursing note and vitals reviewed. Physical Exam   Constitutional: Oriented to person, place, and time. Appears well-developed and well-nourished. ENT: Moist mucous membranes. No bleeding. Tongue is midline. Head: Normocephalic and atraumatic. Eyes: EOM are normal. Pupils are equal, round, and reactive to light. Neck: Normal range of motion. Neck supple. No JVD present. Cardiovascular: Normal rate, regular rhythm, no murmur, no rubs, and intact distal pulses. Pulmonary/Chest: Effort normal and breath sounds normal. No respiratory distress. No wheezes. No rales. Abdominal: Soft. Bowel sounds are normal. No distension. There is no tenderness. Musculoskeletal: Normal range of motion. no edema. Neurological: Alert and oriented to person, place, and time. No cranial nerve deficit. Coordination normal.   Skin: Skin is warm and dry. Psychiatric: Normal mood and affect.        Lab Results   Component Value Date/Time    CKTOTAL 116 05/23/2019 05:00 AM    CKTOTAL 46 05/22/2019 05:53 AM    CKTOTAL 32 05/21/2019 06:20 AM       Lab Results   Component Value Date/Time    WBC 14.9 08/16/2022 02:33 PM    RBC 5.03 08/16/2022 02:33 PM    HGB 14.5 08/16/2022 02:33 PM    HCT 45.0 08/16/2022 02:33 PM    MCV 89.5 08/16/2022 02:33 PM    MCH 28.8 08/16/2022 02:33 PM    MCHC 32.2 08/16/2022 02:33 PM    RDW 11.7 11/19/2021 03:14 PM     08/16/2022 02:33 PM    MPV 10.4 08/16/2022 02:33 PM       Lab Results   Component Value Date/Time     08/16/2022 02:33 PM    K 4.0 08/16/2022 02:33 PM    K 4.8 05/30/2019 03:26 PM    CL 98 08/16/2022 02:33 PM    CO2 23 08/16/2022 02:33 PM    BUN 8 08/16/2022 02:33 PM    LABALBU 4.7 08/16/2022 02:33 PM    CREATININE 0.5 08/16/2022 02:33 PM    CALCIUM 10.3 08/16/2022 02:33 PM    GFRAA >60 11/19/2021 03:14 PM    GFRAA >60 06/08/2013 02:30 PM    LABGLOM >90 08/16/2022 02:33 PM    GLUCOSE 111 08/16/2022 02:33 PM       Lab Results   Component Value Date/Time    ALKPHOS 129 08/16/2022 02:33 PM    ALT 58 08/16/2022 02:33 PM    AST 40 08/16/2022 02:33 PM    PROT 8.0 08/16/2022 02:33 PM    PROT 8.2 03/10/2013 11:41 PM    BILITOT 0.7 08/16/2022 02:33 PM    BILIDIR <0.2 05/23/2019 12:22 PM    IBILI see below 05/23/2019 12:22 PM    LABALBU 4.7 08/16/2022 02:33 PM       Lab Results   Component Value Date/Time    MG 1.70 04/16/2020 01:39 PM       Lab Results   Component Value Date    INR 1.18 (H) 06/09/2019    INR 1.30 (H) 05/26/2019    INR 1.14 05/24/2019    PROTIME 13.4 (H) 06/09/2019    PROTIME 14.8 (H) 05/26/2019    PROTIME 13.0 05/24/2019         Lab Results   Component Value Date/Time    LABA1C 5.1 05/15/2019 06:25 AM       Lab Results   Component Value Date/Time    TRIG 70 11/19/2021 03:14 PM    HDL 44 11/19/2021 03:14 PM    LDLCALC 90 04/16/2020 01:39 PM    LABVLDL 24 04/16/2020 01:39 PM       Lab Results   Component Value Date/Time    TSH 1.250 08/16/2022 02:33 PM         Testing Reviewed:      I have individually reviewed the cardiac test below:    ECHO: Results for orders placed during the hospital encounter of 08/19/20    ECHO Complete 2D W Doppler W Color    Narrative  Transthoracic Echocardiography Report (TTE)    Demographics    Patient Name       Mickey Garza    Date of Study      08/19/2020         Gender              Female    Patient Number     [id number]         Date of Birth       1985    Visit Number       [id number]          Age                 38 year(s)    Accession Number   [id number]          Room Number         OP    Corporate ID       [id number]           Sonographer         [facility], [facility] [address] Physician [address] [place] [address] [employer], [address], CNP             Physician           Cande Cunningham MD, [facility] [place]    Procedure    Type of Study    TTE procedure:ECHOCARDIOGRAM COMPLETE 2D W DOPPLER W COLOR.     Procedure Date  Date: 08/19/2020 Start: 10:18 AM    Study Location: [facility] - Echo Lab  Technical Quality: Limited visualization due to body habitus. Indications:Chest pain and Cardiomyopathy, non-ischemic. Patient Status: Routine    Contrast Medium: Definity. Amount - 2 ml    Height: 60 inches Weight: 180 pounds BSA: 1.78 m2 BMI: 35.15 kg/m2    BP: 102/62 mmHg    Conclusions    Summary  Technically difficult examination due to body habitus. Definity® used for  myocardial border enhancement. Left ventricular systolic function is moderately reduced with a visually  estimated ejection fraction of 35-40 %. EF estimated by Koch's method at 38 %. The left ventricle is normal in size with normal wall thickness. Mild to moderate global hypokinesis more prominent on the anterior,  anterolateral, and anteroseptal walls. Grade I diastolic dysfunction with normal LV pressure. Mild tricuspid regurgitation. Systolic pulmonary artery pressure (SPAP) is normal and estimated at 27 mmHg  (right atrial pressure 3 mmHg). Signature    ------------------------------------------------------------------  Electronically signed by Eric Curling, MD, Lanita Motts  (Interpreting physician) on 08/19/2020 at 03:40 PM  ------------------------------------------------------------------    Findings    Left Ventricle  Left ventricular systolic function is moderately reduced with a visually  estimated ejection fraction of 35-40 %. EF estimated by Koch's method at 38 %. The left ventricle is normal in size with normal wall thickness. Mild to moderate global hypokinesis more prominent on the anterior,  anterolateral, and anteroseptal walls. Grade I diastolic dysfunction with normal LV pressure. There is no evidence of a left ventricular thrombus. Mitral Valve  The mitral valve is normal in structure and function. No evidence of mitral regurgitation. Left Atrium  The left atrium appears normal in size. Aortic Valve  Individual aortic valve leaflets are not clearly visualized.   No evidence of aortic valve regurgitation. Aorta  The aortic root is normal in size. Right Ventricle  The right ventricle is normal in size and function. TAPSE is measured at 25 mm. S' prime velocity is measured at 11.1 cm/s. Pacer/ICD wire is visualized in the right ventricle. Tricuspid Valve  The tricuspid valve is normal in structure. Mild tricuspid regurgitation. Systolic pulmonary artery pressure (SPAP) is normal and estimated at 27 mmHg  (right atrial pressure 3 mmHg). Right Atrium  The right atrium is normal in size. Right atrial area is 15.3 cm2. Pacemaker / ICD lead is visualized in the right atrium. Pulmonic Valve  The pulmonic valve leaflets are not well visualized. No evidence of pulmonic regurgitation. Pericardial Effusion  No pericardial effusion noted. Pleural Effusion  No pleural effusion noted. Miscellaneous  No obvious masses, thrombi, or vegetations are noted. IVC is normal in size (<2.1 cm) and collapses > 50% with respiration  consistent with normal right atrial pressure (3 mmHg). M-Mode/2D Measurements (cm)    LV Diastolic Dimension: 4 cm LV Systolic Dimension: 3.6 cm  LV Septum Diastolic: 5.56 cm  LV PW Diastolic: 2.69 cm     AO Root Dimension: 2.9 cm  LA Dimension: 4.1 cm  LA Area: 16.5 cm2  LVOT: 1.7 cm                 LA volume/Index: 43.5 ml /24 ml/m2    Doppler Measurements    AV Peak Velocity: 141 cm/s    MV Peak E-Wave: 78.1 cm/s  AV Peak Gradient: 7.95 mmHg   MV Peak A-Wave: 58.7 cm/s  LVOT Peak Velocity: 66.5 cm/s MV E/A Ratio: 1.33    TR Velocity:231 cm/s  TR Gradient:21.34 mmHg  Estimated RAP:3 mmHg  Estimated RVSP: 24 mmHg  E' Septal Velocity: 8.05 cm/s  E' Lateral Velocity: 9.9 cm/s  E/E' ratio: 8.8    Aortic Valve    Peak Velocity: 141 cm/s  Peak Gradient: 7.95 mmHg    Aorta    Aortic Root: 2.9 cm  LVOT Diameter: 1.7 cm       Ekg:   EKG Interpretation:  normal EKG, normal sinus rhythm, unchanged from previous tracings.     ECG: 10/22/2020:  SR, 70BPM     ECG 7/7/2020:  NSR 85 BPM.      ECHO 8/19/2020:   Left Ventricle   Left ventricular systolic function is moderately reduced with a visually   estimated ejection fraction of 35-40 %. EF estimated by Koch's method at 38 %. The left ventricle is normal in size with normal wall thickness. Mild to moderate global hypokinesis more prominent on the anterior,   anterolateral, and anteroseptal walls. Grade I diastolic dysfunction with normal LV pressure. There is no evidence of a left ventricular thrombus. NM STRESS TEST: 4/19/2011:   Left ventricular cavity size is within normal limits. There is mild septal and apical hypokinesis. The estimated ejection fraction is 48%. Impression- 1. Limited study. 2. Transient apical thinning or artifact related to activity in the GI tract. True ischemia at the apex cannot be excluded. 3. The left ventricular ejection fraction is at the lower limits of normal for this technique, possibly artifactually low due to GI tract activity. ECHO: 3/10/2011:  1. Technically difficult quality study. 2.    There is poor endocardial border visualization. 3.    Off axis angle imaging. 4.    Based on apical views, LV systolic function appears to be   preserved. Estimated ejection fraction is 50-55%. Small regions of   wall motion abnormality cannot be entirely excluded. 5.    Valvular detail not well seen, however, mitral and aortic   valve leaflets appear to open adequately. RV appears to be of normal   size and function. There is trace mitral regurgitation. Trace   tricuspid regurgitation. Estimated right ventricular systolic   pressure is 81 mmHg. 6.    No masses, effusions or thrombi are identified on the surface   study. 7.   Right ventricle appears to be of normal size and function. CONCLUSIONS-  Technically difficult study suggesting preserved LV   contractility, trace mitral and tricuspid regurgitation, grossly   normal cardiac chamber sizes. AssessmentPlan:   Anamaria Shannon is a pleasant 42year old female patient who  has a past medical history of Acute on chronic systolic congestive heart failure ([address] Acute respiratory failure ([address] Arrhythmia, Arthritis, Aspiration pneumonitis ([address] Bipolar disorder ([address] Cancer [facility] Cancer ([address] [facility] Cardiac arrest with ventricular fibrillation ([facility] Cardiomyopathy ([address] Chronic kidney disease, COPD (chronic obstructive pulmonary disease) ([facility] Depression, E. 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 Echo in  revealed an EF of 50-55%, stress test was negative. On 2019, patient was admitted to OSH with VF cardiac arrest, severely reduced EF. She underwent AICD placement in . Most recent echocardiogram on 2020 revealed an EF of 35-40%. Patient does not recall having a [facility]. She moved from [facility]. BP 99/71. She has shortness of breath, dyspnea on exertion and fatigue. She states she \"feels tired al the time\". Patient had prior left surgery for her sarcoma (calf muscle resection ). She reports chronic left leg pain. She denies chest pain.    Dyspnea on exertion   Fatigue   H/o ventricular fibrillation, cardiac arrest 2019  S/p AICD placement 2021  H/o severe NICMP (though to be 2/2 Adriamycin Rx)  H/O Gasstric ulcer  Bipolar disorder   FH of premature CAD (her father had first MI at age 45,  from MI at age 54)    BP 80/69    GDMT might be limited by low BP  The patient was instructed to check the blood pressure at home, and record the readings  No leg swelling   Check daily weight (no recent gain per patient)   Limit Na intake Toprol  Aldactone  On low dose lisinopril   She could not tolerate Entresto in the past due to hypotension, syncope   Lipitor   Refer to CHF clinic   Refer to pacemaker clinic   Add ASA     Above findings and plan of care were discussed with patient in details, patient's questions were answered.      Disposition:  RTC in 6 MONTHS             Electronically signed by Salina Trent MD, [address] [facility]    9/27/2022 at 10:01 AM EDT

## 2022-10-04 ENCOUNTER — TELEPHONE (OUTPATIENT)
Dept: CARDIOLOGY CLINIC | Age: 37
End: 2022-10-04

## 2022-10-07 ENCOUNTER — HOSPITAL ENCOUNTER (OUTPATIENT)
Dept: NON INVASIVE DIAGNOSTICS | Age: 37
Discharge: HOME | End: 2022-10-07
Payer: MEDICAID

## 2022-10-07 DIAGNOSIS — I50.22 CHRONIC SYSTOLIC CONGESTIVE HEART FAILURE (HCC): ICD-10-CM

## 2022-10-07 DIAGNOSIS — R00.2 HEART PALPITATIONS: ICD-10-CM

## 2022-10-07 DIAGNOSIS — I42.8 NON-ISCHEMIC CARDIOMYOPATHY (HCC): ICD-10-CM

## 2022-10-07 LAB
LV EF: 43 %
LVEF MODALITY: NORMAL

## 2022-10-07 PROCEDURE — 3430000000 HC RX DIAGNOSTIC RADIOPHARMACEUTICAL: Performed by: INTERNAL MEDICINE

## 2022-10-07 PROCEDURE — 6360000002 HC RX W HCPCS

## 2022-10-07 PROCEDURE — A9500 TC99M SESTAMIBI: HCPCS | Performed by: INTERNAL MEDICINE

## 2022-10-07 PROCEDURE — 93306 TTE W/DOPPLER COMPLETE: CPT

## 2022-10-07 PROCEDURE — 93017 CV STRESS TEST TRACING ONLY: CPT | Performed by: INTERNAL MEDICINE

## 2022-10-07 PROCEDURE — 78452 HT MUSCLE IMAGE SPECT MULT: CPT

## 2022-10-07 RX ADMIN — Medication 32.8 MILLICURIE: at 14:30

## 2022-10-07 RX ADMIN — Medication 9.8 MILLICURIE: at 13:45

## 2022-10-07 NOTE — RESULT ENCOUNTER NOTE
Per record review, most recent echocardiogram on 8/2020 revealed an EF of 35-40%.   EF is stable/slightly better  Please inform patient

## 2022-10-10 ENCOUNTER — TELEPHONE (OUTPATIENT)
Dept: CARDIOLOGY CLINIC | Age: 37
End: 2022-10-10

## 2022-10-10 NOTE — TELEPHONE ENCOUNTER
----- Message from Sirena Agudelo MD sent at 10/10/2022  1:03 PM EDT -----  Please inform patient that stress test result was negative for ischemia. If chest pain persists/recurs, patient should call office.

## 2022-10-10 NOTE — TELEPHONE ENCOUNTER
----- Message from Sita Velasquez MD sent at 10/7/2022  4:10 PM EDT -----  Per record review, most recent echocardiogram on 8/2020 revealed an EF of 35-40%.   EF is stable/slightly better  Please inform patient

## 2022-10-11 ENCOUNTER — OFFICE VISIT (OUTPATIENT)
Dept: FAMILY MEDICINE CLINIC | Age: 37
End: 2022-10-11
Payer: MEDICAID

## 2022-10-11 VITALS
HEART RATE: 98 BPM | WEIGHT: 189 LBS | OXYGEN SATURATION: 97 % | SYSTOLIC BLOOD PRESSURE: 102 MMHG | BODY MASS INDEX: 37.11 KG/M2 | RESPIRATION RATE: 16 BRPM | DIASTOLIC BLOOD PRESSURE: 60 MMHG | HEIGHT: 60 IN | TEMPERATURE: 97.6 F

## 2022-10-11 DIAGNOSIS — I42.0 DILATED CARDIOMYOPATHY (HCC): ICD-10-CM

## 2022-10-11 DIAGNOSIS — I50.22 CHRONIC SYSTOLIC CONGESTIVE HEART FAILURE (HCC): ICD-10-CM

## 2022-10-11 DIAGNOSIS — F39 MOOD DISORDER (HCC): ICD-10-CM

## 2022-10-11 DIAGNOSIS — G89.3 CHRONIC PAIN AFTER CANCER TREATMENT: Primary | ICD-10-CM

## 2022-10-11 PROCEDURE — 99213 OFFICE O/P EST LOW 20 MIN: CPT | Performed by: STUDENT IN AN ORGANIZED HEALTH CARE EDUCATION/TRAINING PROGRAM

## 2022-10-11 PROCEDURE — G8484 FLU IMMUNIZE NO ADMIN: HCPCS | Performed by: STUDENT IN AN ORGANIZED HEALTH CARE EDUCATION/TRAINING PROGRAM

## 2022-10-11 PROCEDURE — G8417 CALC BMI ABV UP PARAM F/U: HCPCS | Performed by: STUDENT IN AN ORGANIZED HEALTH CARE EDUCATION/TRAINING PROGRAM

## 2022-10-11 PROCEDURE — 1036F TOBACCO NON-USER: CPT | Performed by: STUDENT IN AN ORGANIZED HEALTH CARE EDUCATION/TRAINING PROGRAM

## 2022-10-11 PROCEDURE — G8427 DOCREV CUR MEDS BY ELIG CLIN: HCPCS | Performed by: STUDENT IN AN ORGANIZED HEALTH CARE EDUCATION/TRAINING PROGRAM

## 2022-10-11 RX ORDER — LISINOPRIL 2.5 MG/1
TABLET ORAL
Qty: 90 TABLET | Refills: 2 | Status: SHIPPED | OUTPATIENT
Start: 2022-10-11

## 2022-10-11 RX ORDER — OLANZAPINE 7.5 MG/1
7.5 TABLET ORAL EVERY MORNING
Qty: 30 TABLET | Refills: 5 | Status: SHIPPED | OUTPATIENT
Start: 2022-10-11 | End: 2023-04-09

## 2022-10-11 RX ORDER — HYDROCODONE BITARTRATE AND ACETAMINOPHEN 5; 325 MG/1; MG/1
1 TABLET ORAL EVERY 8 HOURS PRN
Qty: 90 TABLET | Refills: 0 | Status: SHIPPED | OUTPATIENT
Start: 2022-10-11 | End: 2022-11-10

## 2022-10-11 RX ORDER — OLANZAPINE 15 MG/1
15 TABLET ORAL NIGHTLY
Qty: 30 TABLET | Refills: 5 | Status: SHIPPED | OUTPATIENT
Start: 2022-10-11 | End: 2023-04-09

## 2022-10-11 ASSESSMENT — ENCOUNTER SYMPTOMS
SORE THROAT: 0
COUGH: 0
SHORTNESS OF BREATH: 0
NAUSEA: 0
ABDOMINAL PAIN: 0

## 2022-10-11 NOTE — PROGRESS NOTES
[id number] [facility] MARILIN Wilks [facility] [id number]  Dept: [phone]  Dept Fax: [phone]  Loc: [phone]  PROGRESS NOTE      Visit Date: 10/11/2022    Jeromy Blair is a 42 y.o. female who presents today for:  Chief Complaint   Patient presents with    Follow-up       Impression/Plan:  1. Chronic pain after cancer treatment  Chronic, at baseline  Continue Norco  Continue gabapentin  Follow-up with surgeon  - HYDROcodone-acetaminophen (Margarita Goshen) 5-325 MG per tablet; Take 1 tablet by mouth every 8 hours as needed for Pain for up to 30 days. Intended supply: 5 days. Take lowest dose possible to manage pain  Dispense: 90 tablet; Refill: 0    2. Mood disorder (HCC)  Chronic, controlled  Continue Zyprexa  - OLANZapine (ZYPREXA) 15 MG tablet; Take 1 tablet by mouth nightly Indications: 1/2 tab every morning and 1 tab at bedtime  Dispense: 30 tablet; Refill: 5  - OLANZapine (ZYPREXA) 7.5 MG tablet; Take 1 tablet by mouth every morning  Dispense: 30 tablet; Refill: 5    3. Chronic systolic congestive heart failure (HCC)  Chronic, controlled  Follow-up with cardiology and CHF clinic  Refill lisinopril  - lisinopril (PRINIVIL;ZESTRIL) 2.5 MG tablet; TAKE ONE TABLET BY MOUTH DAILY  Dispense: 90 tablet; Refill: 2    4. Dilated cardiomyopathy ([address]  Chronic, controlled  Follow-up with cardiology and pacemaker clinic  Refilled lisinopril  - lisinopril (PRINIVIL;ZESTRIL) 2.5 MG tablet; TAKE ONE TABLET BY MOUTH DAILY  Dispense: 90 tablet; Refill: 2    Return in about 4 weeks (around 11/8/2022) for chronic pain. Subjective:  HPI    Hepatitis C antibody positive in blood:  Did see GI, needs to get bloodwork before they can begin treatment. Denies any episodes of jaundice or right upper quadrant pain. Chronic pain:  Doing well on Norco, pain is stable. Still considering amputation. Has had many discussions with her family.   She is frustrated that they are not always listening to her wishes. I encouraged her to reach out to her surgeon to discuss this further as she is worried about phantom pain. Mood is very stable. Doing very well on Zyprexa. Due for refill today. No further questions of complaints. Review of Systems   Constitutional:  Negative for chills and fever. HENT:  Negative for congestion and sore throat. Eyes:  Negative for visual disturbance. Respiratory:  Negative for cough and shortness of breath. Cardiovascular:  Negative for chest pain. Gastrointestinal:  Negative for abdominal pain and nausea. Genitourinary:  Negative for dysuria. 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 Skin:  Negative for rash. Neurological:  Negative for dizziness, light-headedness and headaches. Psychiatric/Behavioral:  The patient is not nervous/anxious.       Patient Active Problem List   Diagnosis    Electrolyte disturbance    Shock ([address]    Transaminitis    Hyperglycemia    Dilated cardiomyopathy (HCC)    Hypokalemia    ICD (implantable cardioverter-defibrillator) in place    Moderate malnutrition (HCC)    SOB (shortness of breath)    Pyelonephritis    Sepsis (HCC)    Methadone dependence (HCC)    Mood disorder (HCC)    PTSD (post-traumatic stress disorder)    Cholecystitis    Ankle instability    Chronic systolic congestive heart failure (HCC)    Generalized anxiety disorder    Tobacco dependence syndrome    Chronic kidney disease, unspecified    Chronic insomnia    History of rhabdomyosarcoma    Chronic pain after cancer treatment    Primary localized osteoarthrosis of ankle and foot     Past Medical History:   Diagnosis Date    Acute on chronic systolic congestive heart failure (HCC)     Acute respiratory failure ([address] 5/14/2019    Arrhythmia     Arthritis     Aspiration pneumonitis (HCC)     Bipolar disorder (HCC)     Cancer (HCC)     rhabdomyosarcoma    Cancer ([address]     Cardiac [facility] 05/2019    VF    Cardiac arrest with ventricular fibrillation ([facility]     Cardiomyopathy ([facility] 05/2019    EF= 40% with global hypokinesis    Chronic kidney disease     COPD (chronic obstructive pulmonary disease) (HCC)     Depression     E. coli infection     Family history of early CAD     Hepatitis C     Hepatitis C antibody positive in blood 05/17/2019    Hyperlipidemia     Paranoia (psychosis) ([facility]     Pneumonia due to infectious organism     Rhabdomyosarcoma ([facility]     Scoliosis     Smoker     Tachycardia     VF (ventricular fibrillation) ([facility]       Past Surgical History:   Procedure Laterality Date    ACHILLES TENDON SURGERY      BONE MARROW TRANSPLANT      BRONCHOSCOPY N/A 5/16/2019    BRONCHOSCOPY ALVEOLAR LAVAGE performed by Trisha Gutierres MD at [facility]  5/16/2019    BRONCHOSCOPY THERAPUTIC ASPIRATION INITIAL performed by Trisha Gutierres MD at [facility] N/A 5/19/2019    BRONCHOSCOPY ALVEOLAR LAVAGE performed by Trisha Gutierres MD at [facility]  5/19/2019    BRONCHOSCOPY THERAPUTIC ASPIRATION INITIAL performed by Trisha Gutierres MD at [address] [place] [address]  4/15/2014    Laparascopic    ENDOSCOPY, COLON, DIAGNOSTIC      FOOT SURGERY      GROWTH PLATE SURGERY      LEG SURGERY       Family History   Problem Relation Age of Onset    Emphysema Mother     Mental Illness Mother     Substance Abuse Mother     Stroke Mother     Heart Attack Father     Mental Illness Father     Arthritis Father     Heart Disease Father     High Blood Pressure Father     High Cholesterol Father      Social History     Tobacco Use    Smoking status: Former     Packs/day: 2.00     Years: 20.00     Pack years: 40.00     Types: Cigarettes    Smokeless tobacco: Never   Substance Use Topics    Alcohol use: Yes     Comment: social      Current Outpatient Medications   Medication Sig Dispense Refill    OLANZapine (ZYPREXA) 15 MG tablet Take 1 tablet by mouth nightly Indications: 1/2 tab every morning and 1 tab at bedtime 30 tablet 5    lisinopril (PRINIVIL;ZESTRIL) 2.5 MG tablet TAKE ONE TABLET BY MOUTH DAILY 90 tablet 2    HYDROcodone-acetaminophen (NORCO) 5-325 MG per tablet Take 1 tablet by mouth every 8 hours as needed for Pain for up to 30 days. Intended supply: 5 days. Take lowest dose possible to manage pain 90 tablet 0    OLANZapine (ZYPREXA) 7.5 MG tablet Take 1 tablet by mouth every morning 30 tablet 5    atorvastatin (LIPITOR) 20 MG tablet take 1 tablet by mouth once daily      aspirin EC 81 MG EC tablet Take 1 tablet by mouth daily 90 tablet 1    gabapentin (NEURONTIN) 400 MG capsule Take 1 capsule by mouth in the morning, at noon, and at bedtime for 30 days. 90 capsule 2    metoprolol succinate (TOPROL XL) 50 MG extended release tablet take 1 tablet by mouth twice a day 60 tablet 0    spironolactone (ALDACTONE) 25 MG tablet TAKE ONE TABLET BY MOUTH DAILY 90 tablet 1     No current facility-administered medications for this visit. Allergies   Allergen Reactions    Tape [Adhesive Tape]        There is no immunization history for the selected administration types on file for this patient.   Health Maintenance   Topic Date Due    COVID-19 Vaccine (1) Never done    Varicella vaccine (1 of 2 - 2-dose childhood series) Never done    Pneumococcal 0-64 years Vaccine (1 - PCV) Never done    DTaP/Tdap/Td vaccine (1 - Tdap) Never done    Cervical cancer screen  Never done    Diabetes screen  05/15/2022    Flu vaccine (1) Never done    Lipids  11/19/2022    Depression Screen  05/31/2023    Hepatitis C screen  Completed    HIV screen  Completed    Hepatitis A vaccine  Aged Out    Hib vaccine  Aged Out    Meningococcal (ACWY) vaccine  Aged Out       LABS  Lab Results   Component Value Date    LABA1C 5.1 05/15/2019     Lab Results   Component Value Date    EAG 99.7 05/15/2019     No components found for: CHLPL  Lab Results   Component Value Date    TRIG 70 11/19/2021    TRIG 193 07/17/2019    TRIG 232 (H) 05/22/2019     Lab Results   Component Value Date    HDL 44 11/19/2021    HDL 42 04/16/2020    HDL 52 07/17/2019     Lab Results   Component Value Date    LDLCALC 90 04/16/2020    LDLCALC 140 07/17/2019       Chemistry        Component Value Date/Time     08/16/2022 1433    K 4.0 08/16/2022 1433    K 4.8 05/30/2019 1526    CL 98 08/16/2022 1433    CO2 23 08/16/2022 1433    BUN 8 08/16/2022 1433    CREATININE 0.5 08/16/2022 1433        Component Value Date/Time    CALCIUM 10.3 08/16/2022 1433    ALKPHOS 129 (H) 08/16/2022 1433    AST 40 08/16/2022 1433    ALT 58 08/16/2022 1433    BILITOT 0.7 08/16/2022 1433          Lab Results   Component Value Date    LABMICR YES 05/14/2019     Lab Results   Component Value Date    TSH 1.250 08/16/2022     No results found for: PSA  Lab Results   Component Value Date    WBC 14.9 (H) 08/16/2022    HGB 14.5 08/16/2022    HCT 45.0 08/16/2022    MCV 89.5 08/16/2022     08/16/2022       Objective:  /60 (Site: Left Upper Arm, Position: Sitting, Cuff Size: Medium Adult)   Pulse 98   Temp 97.6 °F (36.4 °C) (Skin)   Resp 16   Ht 5' (1.524 m)   Wt 189 lb (85.7 kg)   SpO2 97%   BMI 36.91 kg/m²     Physical Exam  Constitutional:       General: She is not in acute distress. Appearance: Normal appearance. HENT:      Head: Normocephalic. Right Ear: External ear normal.      Left Ear: External ear normal.      Nose: Nose normal.      Mouth/Throat:      Mouth: Mucous membranes are moist.   Eyes:      General: No scleral icterus. Extraocular Movements: Extraocular movements intact. Cardiovascular:      Rate and Rhythm: Normal rate and regular rhythm. Pulses: Normal pulses. Heart sounds: Normal heart sounds. Pulmonary:      Effort: Pulmonary effort is normal.      Breath sounds: Normal breath sounds. Abdominal:      General: Abdomen is flat. There is no distension. Palpations: Abdomen is soft. Musculoskeletal:         General: Normal range of motion. Cervical back: Normal range of motion. Comments: Left lower extremity changes secondary to rhabdomyosarcoma. Uses cane for walking. Skin:     General: Skin is warm and dry. Neurological:      Mental Status: She is alert. Motor: No weakness. Psychiatric:         Mood and Affect: Mood normal.       They voiced understanding. All questions answered. They agreed with treatment plan. See patient instructions for any educational materials that may have been given. Discussed use, benefit, and side effects of prescribed medications. Reviewed health maintenance.     (Please note that portions of this note may have been completed with a voice recognition program.  Efforts were made to edit the dictation but occasionally words are mis-transcribed.)      Electronically signed by Kailyn Oseguera DO on 10/11/2022 at 2:49 PM

## 2022-10-25 ENCOUNTER — OFFICE VISIT (OUTPATIENT)
Dept: CARDIOLOGY CLINIC | Age: 37
End: 2022-10-25
Payer: MEDICAID

## 2022-10-25 ENCOUNTER — NURSE ONLY (OUTPATIENT)
Dept: CARDIOLOGY CLINIC | Age: 37
End: 2022-10-25
Payer: MEDICAID

## 2022-10-25 VITALS
WEIGHT: 191.6 LBS | SYSTOLIC BLOOD PRESSURE: 122 MMHG | BODY MASS INDEX: 37.42 KG/M2 | DIASTOLIC BLOOD PRESSURE: 72 MMHG | OXYGEN SATURATION: 96 % | HEART RATE: 70 BPM

## 2022-10-25 DIAGNOSIS — Z95.810 ICD (IMPLANTABLE CARDIOVERTER-DEFIBRILLATOR) IN PLACE: ICD-10-CM

## 2022-10-25 DIAGNOSIS — I42.8 NON-ISCHEMIC CARDIOMYOPATHY (HCC): ICD-10-CM

## 2022-10-25 DIAGNOSIS — Z95.810 ICD (IMPLANTABLE CARDIOVERTER-DEFIBRILLATOR) IN PLACE: Primary | ICD-10-CM

## 2022-10-25 DIAGNOSIS — I50.22 CHF NYHA CLASS II, CHRONIC, SYSTOLIC (HCC): Primary | ICD-10-CM

## 2022-10-25 PROCEDURE — G8427 DOCREV CUR MEDS BY ELIG CLIN: HCPCS | Performed by: NURSE PRACTITIONER

## 2022-10-25 PROCEDURE — G8484 FLU IMMUNIZE NO ADMIN: HCPCS | Performed by: NURSE PRACTITIONER

## 2022-10-25 PROCEDURE — G8417 CALC BMI ABV UP PARAM F/U: HCPCS | Performed by: NURSE PRACTITIONER

## 2022-10-25 PROCEDURE — 99214 OFFICE O/P EST MOD 30 MIN: CPT | Performed by: NURSE PRACTITIONER

## 2022-10-25 PROCEDURE — 93282 PRGRMG EVAL IMPLANTABLE DFB: CPT | Performed by: INTERNAL MEDICINE

## 2022-10-25 PROCEDURE — 1036F TOBACCO NON-USER: CPT | Performed by: NURSE PRACTITIONER

## 2022-10-25 PROCEDURE — 93290 INTERROG DEV EVAL ICPMS IP: CPT | Performed by: INTERNAL MEDICINE

## 2022-10-25 ASSESSMENT — ENCOUNTER SYMPTOMS
SHORTNESS OF BREATH: 0
ABDOMINAL DISTENTION: 0
COUGH: 0

## 2022-10-25 NOTE — PROGRESS NOTES
Heart Failure Clinic       Visit Date: 10/25/2022  Cardiologist:  Dr. Chetna Jimenez (new 9/2022)  Primary Care Physician: Dr. Neal Clemente DO  Referred by: Norma Patel is a 42 y.o. female who presents today for:  Chief Complaint   Patient presents with    Congestive Heart Failure     New Patient        HPI:   Linda Elias is a 42 y.o. female who presents to the office for a NEW patient visit in the heart failure clinic. Accompanied by no one  Moved from [address] HF: HFrEF (40-45% 9/2022 = improved from 25-30% 2019)   Cause: NICM (Adriamycin/Doxorubicin use x 18 mth)  Device: Single Medtronic ICD (2019)  HX: Sarcoma (calf muscle resection 1986), VF cardiac arrest (2019), Bipolar, Former smoker, Marijuana (few years), Family hx CAD (dad passed MI)    Dry Wt:  190    Hospitalization:  none    Today: overall feeling at baseline. Tires easy at times and occasional swelling in left foot but improves w/ elevation. Was on diuretic years ago. Mobility mostly limited by foot - deals w/ a lot pain. Uses cane. Uses pain meds and medical marijuana. Pain is much more limiting than ever SOB. Has seen Ortho - told no options other than amputation.    Device check ok - Optivol WNL    Patient has:  Chest Pain: no  SOB: no  Orthopnea/PND: no  PRITI: no  Edema: on/off left foot  Fatigue: yes - stable  Abdominal bloating: no  Cough: no  Appetite: good  Home weight: stable   Home blood pressure: runs low    Past Medical History:   Diagnosis Date    Acute on chronic systolic congestive heart failure (HCC)     Acute respiratory failure ([address] 5/14/2019    Arrhythmia     Arthritis     Aspiration pneumonitis ([address]     Bipolar disorder ([address]     Cancer (HCC)     rhabdomyosarcoma    Cancer ([address]     Cardiac arrest ([address] 05/2019    VF    Cardiac arrest with ventricular fibrillation ([address]     Cardiomyopathy ([address] 05/2019    EF= 40% with global hypokinesis    Chronic kidney disease     COPD (chronic obstructive pulmonary disease) (HCC)     Depression     E. coli infection     Family history of early CAD     Hepatitis C     Hepatitis C antibody positive in blood 05/17/2019    Hyperlipidemia     Paranoia (psychosis) [facility]     Pneumonia due to infectious organism     Rhabdomyosarcoma ([facility]     Scoliosis     Smoker     Tachycardia     [facility]      Past Surgical History:   Procedure Laterality Date    ACHILLES TENDON SURGERY      BONE MARROW TRANSPLANT      BRONCHOSCOPY N/A 5/16/2019    BRONCHOSCOPY ALVEOLAR LAVAGE performed by Juan Jordan MD at [place] [address]  5/16/2019    BRONCHOSCOPY THERAPUTIC ASPIRATION INITIAL performed by Juan Jordan MD at [place] [address] N/A 5/19/2019    BRONCHOSCOPY ALVEOLAR LAVAGE performed by Juan Jordan MD at [place] [address]  5/19/2019    BRONCHOSCOPY THERAPUTIC ASPIRATION INITIAL performed by Juan Jordan MD at [address] [place] [address]  4/15/2014    Laparascopic    ENDOSCOPY, COLON, DIAGNOSTIC      FOOT SURGERY      GROWTH PLATE SURGERY      LEG SURGERY       Family History   Problem Relation Age of Onset    Emphysema Mother     Mental Illness Mother     Substance Abuse Mother     Stroke Mother     Heart Attack Father     Mental Illness Father     Arthritis Father     Heart Disease Father     High Blood Pressure Father     High Cholesterol Father      Social History     Tobacco Use    Smoking status: Former     Packs/day: 2.00     Years: 20.00     Pack years: 40.00     Types: Cigarettes    Smokeless tobacco: Never   Substance Use Topics    Alcohol use: Yes     Comment: social     Current Outpatient Medications   Medication Sig Dispense Refill    OLANZapine (ZYPREXA) 15 MG tablet Take 1 tablet by mouth nightly Indications: 1/2 tab every morning and 1 tab at bedtime 30 tablet 5    lisinopril (PRINIVIL;ZESTRIL) 2.5 MG tablet TAKE ONE TABLET BY MOUTH DAILY 90 tablet 2 HYDROcodone-acetaminophen (NORCO) 5-325 MG per tablet Take 1 tablet by mouth every 8 hours as needed for Pain for up to 30 days. Intended supply: 5 days. Take lowest dose possible to manage pain 90 tablet 0    OLANZapine (ZYPREXA) 7.5 MG tablet Take 1 tablet by mouth every morning 30 tablet 5    atorvastatin (LIPITOR) 20 MG tablet take 1 tablet by mouth once daily      aspirin EC 81 MG EC tablet Take 1 tablet by mouth daily 90 tablet 1    gabapentin (NEURONTIN) 400 MG capsule Take 1 capsule by mouth in the morning, at noon, and at bedtime for 30 days. 90 capsule 2    metoprolol succinate (TOPROL XL) 50 MG extended release tablet take 1 tablet by mouth twice a day 60 tablet 0    spironolactone (ALDACTONE) 25 MG tablet TAKE ONE TABLET BY MOUTH DAILY 90 tablet 1     No current facility-administered medications for this visit. Allergies   Allergen Reactions    Tape Meryle Grit Tape]        SUBJECTIVE:   Review of Systems   Constitutional:  Negative for activity change, appetite change and fatigue. Respiratory:  Negative for cough and shortness of breath. Cardiovascular:  Negative for chest pain, palpitations and leg swelling. Gastrointestinal:  Negative for abdominal distention. Musculoskeletal:  Positive for arthralgias. Neurological:  Negative for weakness, light-headedness and headaches. Hematological:  Negative for adenopathy. Psychiatric/Behavioral:  Negative for sleep disturbance. OBJECTIVE:   Today's Vitals:  /72   Pulse 70   Wt 191 lb 9.6 oz (86.9 kg)   SpO2 96%   BMI 37.42 kg/m²     Physical Exam  Vitals reviewed. Constitutional:       General: She is not in acute distress. Appearance: Normal appearance. She is well-developed. She is not diaphoretic. HENT:      Head: Normocephalic and atraumatic. Eyes:      Conjunctiva/sclera: Conjunctivae normal.   Neck:      Comments: No JVD  Cardiovascular:      Rate and Rhythm: Normal rate and regular rhythm.       Heart sounds: Normal heart sounds. No murmur heard. Pulmonary:      Effort: Pulmonary effort is normal. No respiratory distress. Breath sounds: Normal breath sounds. No wheezing or rales. Abdominal:      General: Bowel sounds are normal. There is no distension. Palpations: Abdomen is soft. Tenderness: There is no abdominal tenderness. Musculoskeletal:         General: Deformity (Left LE d/t calf surgery as child) present. Normal range of motion. Cervical back: Normal range of motion and neck supple. Right lower leg: No edema. Left lower leg: Edema (mild foot) present. Skin:     General: Skin is warm and dry. Capillary Refill: Capillary refill takes less than 2 seconds. Neurological:      Mental Status: She is alert and oriented to person, place, and time. Coordination: Coordination normal.   Psychiatric:         Behavior: Behavior normal.     Wt Readings from Last 3 Encounters:   10/25/22 191 lb 9.6 oz (86.9 kg)   10/11/22 189 lb (85.7 kg)   09/27/22 189 lb 2 oz (85.8 kg)     BP Readings from Last 3 Encounters:   10/25/22 122/72   10/11/22 102/60   09/27/22 99/71     Pulse Readings from Last 3 Encounters:   10/25/22 70   10/11/22 98   09/27/22 100     Body mass index is 37.42 kg/m². ECHO:    Summary   Left ventricle size is normal.   Normal left ventricular wall thickness. There was mild global hypokinesis of the left ventricle. Ejection fraction is visually estimated in the range of 40% to 45%. Signature      ----------------------------------------------------------------   Electronically signed by Magy Love MD (Interpreting   physician) on 10/07/2022 at 02:55 PM   ----------------------------------------------------------------    8/2020 - EF 35-40%  5/2019 - EF 25-30%      STRESS:    Summary   There was a small sized, mild in intensity, fixed myocardial perfusion   defect of the apical wall.    Myocardial perfusion imaging is not suggestive for myocardial ischemia. Recommendation   Clinical correlation is recommended. Signatures      ----------------------------------------------------------------   Electronically signed by Naila Spence MD (Interpreting   Cardiologist) on 10/07/2022 at 16:54      Results reviewed:  BNP: No results found for: BNP  CBC:   Lab Results   Component Value Date/Time    WBC 14.9 08/16/2022 02:33 PM    RBC 5.03 08/16/2022 02:33 PM    HGB 14.5 08/16/2022 02:33 PM    HCT 45.0 08/16/2022 02:33 PM     08/16/2022 02:33 PM     CMP:    Lab Results   Component Value Date/Time     08/16/2022 02:33 PM    K 4.0 08/16/2022 02:33 PM    K 4.8 05/30/2019 03:26 PM    CL 98 08/16/2022 02:33 PM    CO2 23 08/16/2022 02:33 PM    BUN 8 08/16/2022 02:33 PM    CREATININE 0.5 08/16/2022 02:33 PM    GFRAA >60 11/19/2021 03:14 PM    GFRAA >60 06/08/2013 02:30 PM    AGRATIO 1.3 07/07/2020 07:10 PM    LABGLOM >90 08/16/2022 02:33 PM    GLUCOSE 111 08/16/2022 02:33 PM    CALCIUM 10.3 08/16/2022 02:33 PM     Hepatic Function Panel:    Lab Results   Component Value Date/Time    ALKPHOS 129 08/16/2022 02:33 PM    ALT 58 08/16/2022 02:33 PM    AST 40 08/16/2022 02:33 PM    PROT 8.0 08/16/2022 02:33 PM    PROT 8.2 03/10/2013 11:41 PM    BILITOT 0.7 08/16/2022 02:33 PM    BILIDIR <0.2 05/23/2019 12:22 PM    IBILI see below 05/23/2019 12:22 PM    LABALBU 4.7 08/16/2022 02:33 PM     Magnesium:    Lab Results   Component Value Date/Time    MG 1.70 04/16/2020 01:39 PM     PT/INR:    Lab Results   Component Value Date/Time    PROTIME 13.4 06/09/2019 09:34 AM    INR 1.18 06/09/2019 09:34 AM     Lipids:    Lab Results   Component Value Date/Time    TRIG 70 11/19/2021 03:14 PM    HDL 44 11/19/2021 03:14 PM    LDLCALC 90 04/16/2020 01:39 PM    LABVLDL 24 04/16/2020 01:39 PM       ASSESSMENT AND PLAN:      Diagnosis Orders   1. CHF NYHA class II, chronic, systolic (HCC)  Basic Metabolic Panel    Magnesium      2. Non-ischemic cardiomyopathy ([facility]        3.  ICD (implantable cardioverter-defibrillator) in place          Continue:  GDMT:   ACE/ARB/ARNI - Lisinopril 2.5/day - Not tolerate Entresto d/t low BP   BB - Toprol 50/day   SGLT2 -  no - not interested at this time  AA - Aldactone 25/day  Diuretic - no  Vasodilator - no  Other - ASA  HFrEF (40-45%), NYHA I/II  NICM (d/t Adriamycin)  S/p ICD (5/2019)  Hx VF Cardiac arrest (5/2019)    Stable, appears Euvolemic  Lab reviewed - stable  Repeat blood work in 6 months - prior [facility] appt - BMP, Mg  BP/HR stable   No med changes today - discussed optimization of meds - would recommend trialing Jardiance. She declines - concerned w/ BP not tolerating and feeling well today, doesn't want mess w/ meds. Discussed diet and fluid   Recommend daily wts. F/U w/ Cardiology/[facility] in April  F/U in clinic in 1 yr or sooner     Tolerating above noted HF meds, no ill side effects noted. Will continue to monitor kidney function and electrolytes. Will optimize as tolerated. Pt is compliant w/ medications. Total visit time of 45 minutes has been spent with patient on education of symptoms, management, medication, and plan of care; as well as review of chart: labs, ECHO, radiology reports, etc.   I personally spent more then 50% of the appt time face to face with the patient. Daily weights  Fluid restriction of 2 Liters per day  Limit sodium in diet to around 1736-8855 mg/day  Monitor BP  Activity as tolerated     Patient was instructed to call the [facility] for any changes in symptoms as noted in AVS.      Return in about 1 year (around 10/25/2023). or sooner if needed     Patient given educational materials - see patient instructions. We discussed the importance of weighing oneself and recording daily. We also discussed the importance of a low sodium diet, higher sodium foods to avoid and better low sodium food options.    Patient verbalizes understanding of plan of care using teach back method, and is agreeable to the treatment plan.        Electronically signed by JAYLYN Barnes CNP on 10/25/2022 at 2:52 PM

## 2022-10-25 NOTE — PROGRESS NOTES
In Office Medtronic Single lead ICD - no [employer]  New Pt of [facility]    Transfer from [facility]. Device last checked 10/28/21    Battery 8.1 years    Presenting rhythm VS    RV impedance 456    RV shock 51  SVC shock 77    RV wave 6.4    V threshold 0.75 @ 0.40  V amplitude 2.0 @ 0.40    Programmed mode VVI 40    V paced <0.1%     AFib burden <0.1%     Episodes   SVT  NS VT  False afib - ?    Optivol WNL    Pt states she does know when she has fast heart rates. She says she just have to take some deep breathes. Sometimes she gets light headed, but it subsides.

## 2022-10-25 NOTE — PATIENT INSTRUCTIONS
You may receive a survey regarding the care you received during your visit. Your input is valuable to us. We encourage you to complete and return your survey. We hope you will choose us in the future for your healthcare needs.     Continue:  Continue current medications  Daily weights and record  Fluid restriction of 2 Liters per day  Limit sodium in diet to around 6071-1994 mg/day  Monitor BP  Activity as tolerated     Call the [facility] for any of the following symptoms: [phone]  Weight gain of 2-3 pounds in 1 day or 5 pounds in 1 week  Increased shortness of breath  Shortness of breath while laying down  Cough  Chest pain  Swelling in feet, ankles or legs  Tenderness or bloating in the abdomen  Fatigue   Decreased appetite or nausea   Confusion

## 2022-11-08 ENCOUNTER — OFFICE VISIT (OUTPATIENT)
Dept: FAMILY MEDICINE CLINIC | Age: 37
End: 2022-11-08
Payer: MEDICAID

## 2022-11-08 VITALS
HEART RATE: 96 BPM | WEIGHT: 187.8 LBS | BODY MASS INDEX: 36.87 KG/M2 | SYSTOLIC BLOOD PRESSURE: 108 MMHG | HEIGHT: 60 IN | OXYGEN SATURATION: 98 % | DIASTOLIC BLOOD PRESSURE: 68 MMHG | TEMPERATURE: 97.3 F

## 2022-11-08 DIAGNOSIS — F41.0 PANIC ATTACKS: Primary | ICD-10-CM

## 2022-11-08 DIAGNOSIS — G89.3 CHRONIC PAIN AFTER CANCER TREATMENT: ICD-10-CM

## 2022-11-08 PROCEDURE — G8484 FLU IMMUNIZE NO ADMIN: HCPCS | Performed by: STUDENT IN AN ORGANIZED HEALTH CARE EDUCATION/TRAINING PROGRAM

## 2022-11-08 PROCEDURE — G8427 DOCREV CUR MEDS BY ELIG CLIN: HCPCS | Performed by: STUDENT IN AN ORGANIZED HEALTH CARE EDUCATION/TRAINING PROGRAM

## 2022-11-08 PROCEDURE — 1036F TOBACCO NON-USER: CPT | Performed by: STUDENT IN AN ORGANIZED HEALTH CARE EDUCATION/TRAINING PROGRAM

## 2022-11-08 PROCEDURE — G8417 CALC BMI ABV UP PARAM F/U: HCPCS | Performed by: STUDENT IN AN ORGANIZED HEALTH CARE EDUCATION/TRAINING PROGRAM

## 2022-11-08 PROCEDURE — 99214 OFFICE O/P EST MOD 30 MIN: CPT | Performed by: STUDENT IN AN ORGANIZED HEALTH CARE EDUCATION/TRAINING PROGRAM

## 2022-11-08 RX ORDER — HYDROCODONE BITARTRATE AND ACETAMINOPHEN 5; 325 MG/1; MG/1
1 TABLET ORAL EVERY 8 HOURS PRN
Qty: 90 TABLET | Refills: 0 | Status: SHIPPED | OUTPATIENT
Start: 2022-11-15 | End: 2022-12-15

## 2022-11-08 RX ORDER — SPIRONOLACTONE 25 MG/1
TABLET ORAL
Qty: 90 TABLET | Refills: 1 | Status: SHIPPED | OUTPATIENT
Start: 2022-11-08

## 2022-11-08 RX ORDER — METOPROLOL SUCCINATE 50 MG/1
TABLET, EXTENDED RELEASE ORAL
Qty: 60 TABLET | Refills: 0 | Status: SHIPPED | OUTPATIENT
Start: 2022-11-08

## 2022-11-08 RX ORDER — ATORVASTATIN CALCIUM 20 MG/1
TABLET, FILM COATED ORAL
Qty: 30 TABLET | Refills: 5 | Status: SHIPPED | OUTPATIENT
Start: 2022-11-08

## 2022-11-08 RX ORDER — CLONAZEPAM 0.5 MG/1
0.5 TABLET ORAL NIGHTLY PRN
Qty: 10 TABLET | Refills: 0 | Status: SHIPPED | OUTPATIENT
Start: 2022-11-08 | End: 2022-11-18

## 2022-11-08 ASSESSMENT — ENCOUNTER SYMPTOMS
SHORTNESS OF BREATH: 0
ABDOMINAL PAIN: 0
NAUSEA: 0
COUGH: 0
SORE THROAT: 0

## 2022-11-08 NOTE — PROGRESS NOTES
several days. She was previously well controlled on occasional clonazepam.  Also having panic attacks. Things have been very stressful at home recently in addition to trying to decide what to do about her left lower extremity. We discussed the importance of not regularly using clonazepam given her history of chronic pain and Norco use. She understands and is amenable to this plan. No further questions of complaints. Review of Systems   Constitutional:  Negative for chills and fever. HENT:  Negative for congestion and sore throat. Eyes:  Negative for visual disturbance. Respiratory:  Negative for cough and shortness of breath. Cardiovascular:  Negative for chest pain. Gastrointestinal:  Negative for abdominal pain and nausea. Genitourinary:  Negative for dysuria. Musculoskeletal:         Chronic pain of left lower extremity, at baseline   Skin:  Negative for rash. Neurological:  Negative for dizziness, light-headedness and headaches. Psychiatric/Behavioral:  Positive for sleep disturbance. Negative for suicidal ideas. The patient is nervous/anxious.       Patient Active Problem List   Diagnosis    Electrolyte disturbance    Shock ([address]    Transaminitis    Hyperglycemia    Dilated cardiomyopathy (HCC)    Hypokalemia    ICD (implantable cardioverter-defibrillator) in place    Moderate malnutrition (HCC)    SOB (shortness of breath)    Pyelonephritis    Sepsis (HCC)    Methadone dependence (HCC)    Mood disorder (HCC)    PTSD (post-traumatic stress disorder)    Cholecystitis    Ankle instability    Chronic systolic congestive heart failure (HCC)    Generalized anxiety disorder    Tobacco dependence syndrome    Chronic kidney disease, unspecified    Chronic insomnia    History of rhabdomyosarcoma    Chronic pain after cancer treatment    Primary localized osteoarthrosis of ankle and foot     Past Medical History:   Diagnosis Date    Acute on chronic systolic congestive heart failure ([address] Acute respiratory failure ([facility] 5/14/2019    Arrhythmia     Arthritis     Aspiration pneumonitis (HCC)     Bipolar disorder (HCC)     Cancer (HCC)     rhabdomyosarcoma    Cancer ([facility]     Cardiac arrest ([facility] 05/2019    VF    Cardiac arrest with ventricular fibrillation ([facility] [facility]     Cardiomyopathy ([facility] [facility] 05/2019    EF= 40% with global hypokinesis    Chronic kidney disease     COPD (chronic obstructive pulmonary disease) (HCC)     Depression     E. coli infection     Family history of early CAD     Hepatitis C     Hepatitis C antibody positive in blood 05/17/2019    Hyperlipidemia     Paranoia (psychosis) ([facility] [facility]     Pneumonia due to infectious organism     Rhabdomyosarcoma ([facility] [facility]     Scoliosis     Smoker     Tachycardia     VF (ventricular fibrillation) ([facility] [address] [facility]       Past Surgical History:   Procedure Laterality Date    ACHILLES TENDON SURGERY      BONE MARROW TRANSPLANT      BRONCHOSCOPY N/A 5/16/2019    BRONCHOSCOPY ALVEOLAR LAVAGE performed by Juan Jordan MD at [place] [address]  5/16/2019    BRONCHOSCOPY THERAPUTIC ASPIRATION INITIAL performed by Juan Jordan MD at [place] [address] N/A 5/19/2019    BRONCHOSCOPY ALVEOLAR LAVAGE performed by Juan Jordan MD at [place] [address]  5/19/2019    BRONCHOSCOPY THERAPUTIC ASPIRATION INITIAL performed by Juan Jordan MD at [address] [place] [address]  4/15/2014    Laparascopic    ENDOSCOPY, COLON, DIAGNOSTIC      FOOT SURGERY      GROWTH PLATE SURGERY      LEG SURGERY       Family History   Problem Relation Age of Onset    Emphysema Mother     Mental Illness Mother     Substance Abuse Mother     Stroke Mother     Heart Attack Father     Mental Illness Father     Arthritis Father     Heart Disease Father     High Blood Pressure Father     High Cholesterol Father      Social History     Tobacco Use    Smoking status: Former     Packs/day: 2.00     Years: 20.00     Pack years: 40.00     Types: Cigarettes    Smokeless tobacco: Never   Substance Use Topics    Alcohol use: Yes     Comment: social      Current Outpatient Medications   Medication Sig Dispense Refill    clonazePAM (KLONOPIN) 0.5 MG tablet Take 1 tablet by mouth nightly as needed for Anxiety for up to 10 doses. 10 tablet 0    [START ON 11/15/2022] HYDROcodone-acetaminophen (NORCO) 5-325 MG per tablet Take 1 tablet by mouth every 8 hours as needed for Pain for up to 30 days. Intended supply: 5 days. Take lowest dose possible to manage pain 90 tablet 0    atorvastatin (LIPITOR) 20 MG tablet take 1 tablet by mouth once daily 30 tablet 5    metoprolol succinate (TOPROL XL) 50 MG extended release tablet take 1 tablet by mouth twice a day 60 tablet 0    spironolactone (ALDACTONE) 25 MG tablet TAKE ONE TABLET BY MOUTH DAILY 90 tablet 1    OLANZapine (ZYPREXA) 15 MG tablet Take 1 tablet by mouth nightly Indications: 1/2 tab every morning and 1 tab at bedtime 30 tablet 5    lisinopril (PRINIVIL;ZESTRIL) 2.5 MG tablet TAKE ONE TABLET BY MOUTH DAILY 90 tablet 2    OLANZapine (ZYPREXA) 7.5 MG tablet Take 1 tablet by mouth every morning 30 tablet 5    aspirin EC 81 MG EC tablet Take 1 tablet by mouth daily 90 tablet 1    gabapentin (NEURONTIN) 400 MG capsule Take 1 capsule by mouth in the morning, at noon, and at bedtime for 30 days. 90 capsule 2    atorvastatin (LIPITOR) 20 MG tablet Take 20 mg by mouth daily        No current facility-administered medications for this visit. Allergies   Allergen Reactions    Tape [Adhesive Tape]        There is no immunization history for the selected administration types on file for this patient.   Health Maintenance   Topic Date Due    COVID-19 Vaccine (1) Never done    Varicella vaccine (1 of 2 - 2-dose childhood series) Never done    Pneumococcal 0-64 years Vaccine (1 - PCV) Never done    DTaP/Tdap/Td vaccine (1 - Tdap) Never done    Cervical cancer screen  Never done    Diabetes screen  05/15/2022    Flu vaccine (1) Never done    Lipids  11/19/2022    Depression Screen  05/31/2023    Hepatitis C screen  Completed    HIV screen  Completed    Hepatitis A vaccine  Aged Out    Hib vaccine  Aged Out    Meningococcal (ACWY) vaccine  Aged Out       LABS  Lab Results   Component Value Date    LABA1C 5.1 05/15/2019     Lab Results   Component Value Date    EAG 99.7 05/15/2019     No components found for: CHLPL  Lab Results   Component Value Date    TRIG 70 11/19/2021    TRIG 193 07/17/2019    TRIG 232 (H) 05/22/2019     Lab Results   Component Value Date    HDL 44 11/19/2021    HDL 42 04/16/2020    HDL 52 07/17/2019     Lab Results   Component Value Date    LDLCALC 90 04/16/2020    LDLCALC 140 07/17/2019       Chemistry        Component Value Date/Time     08/16/2022 1433    K 4.0 08/16/2022 1433    K 4.8 05/30/2019 1526    CL 98 08/16/2022 1433    CO2 23 08/16/2022 1433    BUN 8 08/16/2022 1433    CREATININE 0.5 08/16/2022 1433        Component Value Date/Time    CALCIUM 10.3 08/16/2022 1433    ALKPHOS 129 (H) 08/16/2022 1433    AST 40 08/16/2022 1433    ALT 58 08/16/2022 1433    BILITOT 0.7 08/16/2022 1433          Lab Results   Component Value Date    LABMICR YES 05/14/2019     Lab Results   Component Value Date    TSH 1.250 08/16/2022     No results found for: PSA  Lab Results   Component Value Date    WBC 14.9 (H) 08/16/2022    HGB 14.5 08/16/2022    HCT 45.0 08/16/2022    MCV 89.5 08/16/2022     08/16/2022       Objective:  /68 (Site: Left Upper Arm, Position: Sitting, Cuff Size: Medium Adult)   Pulse 96   Temp 97.3 °F (36.3 °C) (Temporal)   Ht 5' (1.524 m)   Wt 187 lb 12.8 oz (85.2 kg)   SpO2 98%   BMI 36.68 kg/m²     Physical Exam  Constitutional:       General: She is not in acute distress. Appearance: Normal appearance. HENT:      Head: Normocephalic.       Right Ear: External ear normal.      Left Ear: External ear normal.      Nose: Nose normal.      Mouth/Throat:      Mouth: Mucous membranes are moist.   Eyes:      General: No scleral icterus. Extraocular Movements: Extraocular movements intact. Cardiovascular:      Rate and Rhythm: Normal rate and regular rhythm. Pulses: Normal pulses. Heart sounds: Normal heart sounds. Pulmonary:      Effort: Pulmonary effort is normal.      Breath sounds: Normal breath sounds. Abdominal:      General: Abdomen is flat. There is no distension. Palpations: Abdomen is soft. Musculoskeletal:         General: Deformity (Chronic changes to left lower extremity) present. Normal range of motion. Cervical back: Normal range of motion. Skin:     General: Skin is warm and dry. Neurological:      Mental Status: She is alert. Motor: No weakness. Psychiatric:         Mood and Affect: Mood normal.     Controlled substances monitoring: possible medication side effects, risk of tolerance and/or dependence, and alternative treatments discussed, no signs of potential drug abuse or diversion identified, and [place] report reviewed today- activity consistent with treatment plan. Please see media tab -clinic record from 8/22/2022 for trolled substance agreement. They voiced understanding. All questions answered. They agreed with treatment plan. See patient instructions for any educational materials that may have been given. Discussed use, benefit, and side effects of prescribed medications. Reviewed health maintenance.     (Please note that portions of this note may have been completed with a voice recognition program.  Efforts were made to edit the dictation but occasionally words are mis-transcribed.)      Electronically signed by Pavan Haque DO on 11/8/2022 at 5:54 PM

## 2022-11-08 NOTE — PROGRESS NOTES
Health Maintenance Due   Topic Date Due    COVID-19 Vaccine (1) Never done    Varicella vaccine (1 of 2 - 2-dose childhood series) Never done    Pneumococcal 0-64 years Vaccine (1 - PCV) Never done    DTaP/Tdap/Td vaccine (1 - Tdap) Never done    Cervical cancer screen  Never done    Diabetes screen  05/15/2022    Flu vaccine (1) Never done    Lipids  11/19/2022

## 2022-11-21 RX ORDER — GABAPENTIN 400 MG/1
400 CAPSULE ORAL 3 TIMES DAILY
Qty: 90 CAPSULE | Refills: 2 | Status: SHIPPED | OUTPATIENT
Start: 2022-11-21 | End: 2022-12-21

## 2022-11-21 NOTE — TELEPHONE ENCOUNTER
Pt called requesting medication refill.     Patient's last appointment was : 11/8/2022  Patient's next appointment is :   Future Appointments   Date Time Provider Shakira Mane   12/13/2022  1:00 PM Marcos Wang, DO [facility]  [facility] [address]   1/31/2023  3:30 PM SCHEDULE, [facility] PACER NURSE N [facility] PACER [facility] [place]   4/4/2023  2:00 PM Yanick Minor MD N [facility] [place]   10/2/2023  2:00 PM JAYLYN Amato - CNP N [facility] CHF [facility] [place]     Last refilled:    Lab Results   Component Value Date    LABA1C 5.1 05/15/2019     Lab Results   Component Value Date    CHOL 157 11/19/2021    TRIG 70 11/19/2021    HDL 44 11/19/2021    LDLCALC 90 04/16/2020     Lab Results   Component Value Date     08/16/2022    K 4.0 08/16/2022    CL 98 08/16/2022    CO2 23 08/16/2022    BUN 8 08/16/2022    CREATININE 0.5 08/16/2022    GLUCOSE 111 (H) 08/16/2022    CALCIUM 10.3 08/16/2022    PROT 8.0 08/16/2022    LABALBU 4.7 08/16/2022    BILITOT 0.7 08/16/2022    ALKPHOS 129 (H) 08/16/2022    AST 40 08/16/2022    ALT 58 08/16/2022    LABGLOM >90 08/16/2022    GFRAA >60 11/19/2021    AGRATIO 1.3 07/07/2020    GLOB 3.4 07/07/2020     Lab Results   Component Value Date    TSH 1.250 08/16/2022    T4FREE 1.36 08/16/2022     Lab Results   Component Value Date    WBC 14.9 (H) 08/16/2022    HGB 14.5 08/16/2022    HCT 45.0 08/16/2022    MCV 89.5 08/16/2022     08/16/2022
Refill sent, thanks!     KL
N/A

## 2022-12-13 ENCOUNTER — OFFICE VISIT (OUTPATIENT)
Dept: FAMILY MEDICINE CLINIC | Age: 37
End: 2022-12-13
Payer: MEDICAID

## 2022-12-13 VITALS
BODY MASS INDEX: 36.24 KG/M2 | RESPIRATION RATE: 16 BRPM | OXYGEN SATURATION: 99 % | HEIGHT: 60 IN | DIASTOLIC BLOOD PRESSURE: 68 MMHG | TEMPERATURE: 97.7 F | HEART RATE: 100 BPM | WEIGHT: 184.6 LBS | SYSTOLIC BLOOD PRESSURE: 110 MMHG

## 2022-12-13 DIAGNOSIS — G89.3 CHRONIC PAIN AFTER CANCER TREATMENT: Primary | ICD-10-CM

## 2022-12-13 DIAGNOSIS — Z85.831 HISTORY OF RHABDOMYOSARCOMA: ICD-10-CM

## 2022-12-13 DIAGNOSIS — F41.0 PANIC ATTACKS: ICD-10-CM

## 2022-12-13 DIAGNOSIS — M21.952 DEFORMITY OF LOWER EXTREMITY, LEFT: ICD-10-CM

## 2022-12-13 PROCEDURE — 1036F TOBACCO NON-USER: CPT | Performed by: STUDENT IN AN ORGANIZED HEALTH CARE EDUCATION/TRAINING PROGRAM

## 2022-12-13 PROCEDURE — 99214 OFFICE O/P EST MOD 30 MIN: CPT | Performed by: STUDENT IN AN ORGANIZED HEALTH CARE EDUCATION/TRAINING PROGRAM

## 2022-12-13 PROCEDURE — G8417 CALC BMI ABV UP PARAM F/U: HCPCS | Performed by: STUDENT IN AN ORGANIZED HEALTH CARE EDUCATION/TRAINING PROGRAM

## 2022-12-13 PROCEDURE — G8484 FLU IMMUNIZE NO ADMIN: HCPCS | Performed by: STUDENT IN AN ORGANIZED HEALTH CARE EDUCATION/TRAINING PROGRAM

## 2022-12-13 PROCEDURE — G8427 DOCREV CUR MEDS BY ELIG CLIN: HCPCS | Performed by: STUDENT IN AN ORGANIZED HEALTH CARE EDUCATION/TRAINING PROGRAM

## 2022-12-13 RX ORDER — CLONAZEPAM 0.5 MG/1
0.5 TABLET ORAL NIGHTLY PRN
Qty: 15 TABLET | Refills: 0 | Status: SHIPPED | OUTPATIENT
Start: 2022-12-13 | End: 2023-01-17

## 2022-12-13 RX ORDER — HYDROCODONE BITARTRATE AND ACETAMINOPHEN 5; 325 MG/1; MG/1
1 TABLET ORAL EVERY 8 HOURS PRN
Qty: 90 TABLET | Refills: 0 | Status: SHIPPED | OUTPATIENT
Start: 2022-12-16 | End: 2023-01-15

## 2022-12-13 ASSESSMENT — ENCOUNTER SYMPTOMS
SORE THROAT: 0
ABDOMINAL PAIN: 0
NAUSEA: 0
COUGH: 0
SHORTNESS OF BREATH: 0

## 2022-12-13 NOTE — PROGRESS NOTES
[id number] [facility] [place] [address] [place] [address]  Dept: [phone]  Dept Fax: [phone]  Loc: [phone]  PROGRESS NOTE      Visit Date: 12/13/2022    Hao Mancini is a 42 y.o. female who presents today for:  Chief Complaint   Patient presents with    Follow-up       Impression/Plan:  1. Chronic pain after cancer treatment  Chronic, controlled  Continue current dose of Norco  Follow-up with [facility] as planned  - HYDROcodone-acetaminophen (NORCO) 5-325 MG per tablet; Take 1 tablet by mouth every 8 hours as needed for Pain for up to 30 days. Intended supply: 5 days. Take lowest dose possible to manage pain  Dispense: 90 tablet; Refill: 0    2. Panic attacks  Chronic, overall stable  We have weaned her dose of Klonopin, given only 10 tablets last month  She did run out, but would like to keep her current prescription low  Given that her panic attacks are worse during holidays, will give her 15 tabs for the next month  - clonazePAM (KLONOPIN) 0.5 MG tablet; Take 1 tablet by mouth nightly as needed for Anxiety for up to 15 doses. Dispense: 15 tablet; Refill: 0    3. Deformity of lower extremity, left  Chronic  Plan as above  - HYDROcodone-acetaminophen (NORCO) 5-325 MG per tablet; Take 1 tablet by mouth every 8 hours as needed for Pain for up to 30 days. Intended supply: 5 days. Take lowest dose possible to manage pain  Dispense: 90 tablet; Refill: 0    4. History of rhabdomyosarcoma  Chronic  Plan as above  - HYDROcodone-acetaminophen (NORCO) 5-325 MG per tablet; Take 1 tablet by mouth every 8 hours as needed for Pain for up to 30 days. Intended supply: 5 days. Take lowest dose possible to manage pain  Dispense: 90 tablet; Refill: 0      Return in 4 weeks (on 1/10/2023) for Chronic. Subjective:  HPI    Here today for follow-up of chronic pain and panic attacks.     Chronic pain: Doing well on current dose of Norco.  She does see  Hernando Pitts with [facility] on January 9th. Wants to discuss possibly shaving down bone spurs on her left lower extremity prior to trying amputation. We do manage her pain meds for her form of left lower extremity secondary to her surgery for rhabdomyosarcoma. She has been on Norco for quite some time on a stable dose. No signs of diversion. We have tried to wean in the past without success. Panic attacks: We did take over her Klonopin prescription last month. She was given 10 pills for the month, but she did run out before 30 days right. She would like to keep the dose low and agrees that this is for the best in the long-term scenarios. We will try 15 pills this month, especially given her anxiety around the holidays. No further questions of complaints. Review of Systems   Constitutional:  Negative for chills and fever. HENT:  Negative for congestion and sore throat. Eyes:  Negative for visual disturbance. Respiratory:  Negative for cough and shortness of breath. Cardiovascular:  Negative for chest pain. Gastrointestinal:  Negative for abdominal pain and nausea. Genitourinary:  Negative for dysuria. Musculoskeletal:  Positive for arthralgias (chronic pain, well controlled today). Skin:  Negative for rash. Neurological:  Negative for dizziness, light-headedness and headaches. Psychiatric/Behavioral:  The patient is not nervous/anxious (still having panic attacks, worse around holidays).       Patient Active Problem List   Diagnosis    Electrolyte disturbance    Shock ([address]    Transaminitis    Hyperglycemia    Dilated cardiomyopathy (HCC)    Hypokalemia    ICD (implantable cardioverter-defibrillator) in place    Moderate malnutrition (HCC)    SOB (shortness of breath)    Pyelonephritis    Sepsis (HCC)    Methadone dependence (HCC)    Mood disorder (HCC)    PTSD (post-traumatic stress disorder)    Cholecystitis    Ankle instability    Chronic systolic congestive heart failure ([address] Generalized anxiety disorder    Tobacco dependence syndrome    Chronic kidney disease, unspecified    Chronic insomnia    History of rhabdomyosarcoma    Chronic pain after cancer treatment    Primary localized osteoarthrosis of ankle and foot     Past Medical History:   Diagnosis Date    Acute on chronic systolic congestive heart failure (HCC)     Acute respiratory failure (HCC) 5/14/2019    Arrhythmia     Arthritis     Aspiration pneumonitis (HCC)     Bipolar disorder (HCC)     Cancer (HCC)     rhabdomyosarcoma    Cancer ([address]     Cardiac arrest ([address] 05/2019    VF    Cardiac arrest with ventricular fibrillation (HCC)     Cardiomyopathy ([address] 05/2019    EF= 40% with global hypokinesis    Chronic kidney disease     COPD (chronic obstructive pulmonary disease) (HCC)     Depression     E. coli infection     Family history of early CAD     Hepatitis C     Hepatitis C antibody positive in blood 05/17/2019    Hyperlipidemia     Paranoia (psychosis) ([address]     Pneumonia due to infectious organism     Rhabdomyosarcoma ([address]     Scoliosis     Smoker     Tachycardia     VF (ventricular fibrillation) ([address]       Past Surgical History:   Procedure Laterality Date    ACHILLES TENDON SURGERY      BONE MARROW TRANSPLANT      BRONCHOSCOPY N/A 5/16/2019    BRONCHOSCOPY ALVEOLAR LAVAGE performed by Ghulam Moctezuma MD at [place] [address]  5/16/2019    BRONCHOSCOPY THERAPUTIC ASPIRATION INITIAL performed by Ghulam Moctezuma MD at [place] [address] N/A 5/19/2019    BRONCHOSCOPY ALVEOLAR LAVAGE performed by Ghulam Moctezuma MD at [place] [address]  5/19/2019    BRONCHOSCOPY THERAPUTIC ASPIRATION INITIAL performed by Ghulam Moctezuma MD at [address] [place] [address]  4/15/2014    Laparascopic    ENDOSCOPY, COLON, DIAGNOSTIC      FOOT SURGERY      GROWTH PLATE SURGERY      LEG SURGERY       Family History   Problem Relation Age of Onset    Emphysema Mother     Mental Illness Mother     Substance Abuse Mother     Stroke Mother     Heart Attack Father     Mental Illness Father     Arthritis Father     Heart Disease Father     High Blood Pressure Father     High Cholesterol Father      Social History     Tobacco Use    Smoking status: Former     Packs/day: 2.00     Years: 20.00     Pack years: 40.00     Types: Cigarettes    Smokeless tobacco: Never   Substance Use Topics    Alcohol use: Yes     Comment: social      Current Outpatient Medications   Medication Sig Dispense Refill    clonazePAM (KLONOPIN) 0.5 MG tablet Take 1 tablet by mouth nightly as needed for Anxiety for up to 15 doses. 15 tablet 0    [START ON 12/16/2022] HYDROcodone-acetaminophen (NORCO) 5-325 MG per tablet Take 1 tablet by mouth every 8 hours as needed for Pain for up to 30 days. Intended supply: 5 days. Take lowest dose possible to manage pain 90 tablet 0    gabapentin (NEURONTIN) 400 MG capsule Take 1 capsule by mouth in the morning, at noon, and at bedtime for 30 days. 90 capsule 2    atorvastatin (LIPITOR) 20 MG tablet take 1 tablet by mouth once daily 30 tablet 5    metoprolol succinate (TOPROL XL) 50 MG extended release tablet take 1 tablet by mouth twice a day 60 tablet 0    spironolactone (ALDACTONE) 25 MG tablet TAKE ONE TABLET BY MOUTH DAILY 90 tablet 1    OLANZapine (ZYPREXA) 15 MG tablet Take 1 tablet by mouth nightly Indications: 1/2 tab every morning and 1 tab at bedtime 30 tablet 5    lisinopril (PRINIVIL;ZESTRIL) 2.5 MG tablet TAKE ONE TABLET BY MOUTH DAILY 90 tablet 2    OLANZapine (ZYPREXA) 7.5 MG tablet Take 1 tablet by mouth every morning 30 tablet 5    aspirin EC 81 MG EC tablet Take 1 tablet by mouth daily 90 tablet 1    atorvastatin (LIPITOR) 20 MG tablet Take 20 mg by mouth daily        No current facility-administered medications for this visit.      Allergies   Allergen Reactions    Tape [Adhesive Tape]        There is no immunization history for the selected administration types on file for this patient.   Health Maintenance   Topic Date Due    COVID-19 Vaccine (1) Never done    Varicella vaccine (1 of 2 - 2-dose childhood series) Never done    Pneumococcal 0-64 years Vaccine (1 - PCV) Never done    DTaP/Tdap/Td vaccine (1 - Tdap) Never done    Cervical cancer screen  Never done    Diabetes screen  05/15/2022    Flu vaccine (1) Never done    Lipids  11/19/2022    Depression Screen  05/31/2023    Hepatitis C screen  Completed    HIV screen  Completed    Hepatitis A vaccine  Aged Out    Hib vaccine  Aged Out    Meningococcal (ACWY) vaccine  Aged Out       LABS  Lab Results   Component Value Date    LABA1C 5.1 05/15/2019     Lab Results   Component Value Date    EAG 99.7 05/15/2019     No components found for: CHLPL  Lab Results   Component Value Date    TRIG 70 11/19/2021    TRIG 193 07/17/2019    TRIG 232 (H) 05/22/2019     Lab Results   Component Value Date    HDL 44 11/19/2021    HDL 42 04/16/2020    HDL 52 07/17/2019     Lab Results   Component Value Date    LDLCALC 90 04/16/2020    LDLCALC 140 07/17/2019       Chemistry        Component Value Date/Time     08/16/2022 1433    K 4.0 08/16/2022 1433    K 4.8 05/30/2019 1526    CL 98 08/16/2022 1433    CO2 23 08/16/2022 1433    BUN 8 08/16/2022 1433    CREATININE 0.5 08/16/2022 1433        Component Value Date/Time    CALCIUM 10.3 08/16/2022 1433    ALKPHOS 129 (H) 08/16/2022 1433    AST 40 08/16/2022 1433    ALT 58 08/16/2022 1433    BILITOT 0.7 08/16/2022 1433          Lab Results   Component Value Date    LABMICR YES 05/14/2019     Lab Results   Component Value Date    TSH 1.250 08/16/2022     No results found for: PSA  Lab Results   Component Value Date    WBC 14.9 (H) 08/16/2022    HGB 14.5 08/16/2022    HCT 45.0 08/16/2022    MCV 89.5 08/16/2022     08/16/2022       Objective:  /68 (Site: Left Upper Arm, Position: Sitting, Cuff Size: Medium Adult)   Pulse 100   Temp 97.7 °F (36.5 °C)   Resp 16   Ht 5' (1.524 m)   Wt 184 lb 9.6 oz (83.7 kg)   SpO2 99%   BMI 36.05 kg/m²     Physical Exam  Constitutional:       General: She is not in acute distress. Appearance: Normal appearance. HENT:      Head: Normocephalic. Right Ear: External ear normal.      Left Ear: External ear normal.      Nose: Nose normal.      Mouth/Throat:      Mouth: Mucous membranes are moist.   Eyes:      General: No scleral icterus. Extraocular Movements: Extraocular movements intact. Cardiovascular:      Rate and Rhythm: Normal rate and regular rhythm. Pulses: Normal pulses. Heart sounds: Normal heart sounds. Pulmonary:      Effort: Pulmonary effort is normal.      Breath sounds: Normal breath sounds. Abdominal:      General: Abdomen is flat. There is no distension. Palpations: Abdomen is soft. Musculoskeletal:         General: Normal range of motion. Cervical back: Normal range of motion. Comments: Chronic left lower extremity changes noted   Skin:     General: Skin is warm and dry. Neurological:      Mental Status: She is alert. Motor: No weakness. Psychiatric:         Mood and Affect: Mood normal.         Behavior: Behavior normal.         Thought Content: Thought content normal.         Judgment: Judgment normal.     Controlled substances monitoring: possible medication side effects, risk of tolerance and/or dependence, and alternative treatments discussed, no signs of potential drug abuse or diversion identified, and OARRS report reviewed today- activity consistent with treatment plan. They voiced understanding. All questions answered. They agreed with treatment plan. See patient instructions for any educational materials that may have been given. Discussed use, benefit, and side effects of prescribed medications. Reviewed health maintenance.     (Please note that portions of this note may have been completed with a voice recognition program.  Efforts were made to edit the dictation but occasionally words are mis-transcribed.)      Electronically signed by Kailyn Oseguera DO on 12/13/2022 at 2:22 PM

## 2022-12-13 NOTE — PROGRESS NOTES
S: 42 y.o. female with   Chief Complaint   Patient presents with    Follow-up       HPI: please see resident note for HPI and ROS. 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 Pain is controlled on Norco every 8 hours as needed. She does follow with [facility] --Next appointment is scheduled for 1/9/2023. Also has a history of panic attacks that are stable on Klonopin 0.5 mg nightly as needed. BP Readings from Last 3 Encounters:   12/13/22 110/68   11/08/22 108/68   10/25/22 122/72     Wt Readings from Last 3 Encounters:   12/13/22 184 lb 9.6 oz (83.7 kg)   11/08/22 187 lb 12.8 oz (85.2 kg)   10/25/22 191 lb 9.6 oz (86.9 kg)       O: VS:  height is 5' (1.524 m) and weight is 184 lb 9.6 oz (83.7 kg). Her temperature is 97.7 °F (36.5 °C). Her blood pressure is 110/68 and her pulse is 100. Her respiration is 16 and oxygen saturation is 99%. AAO/NAD, appropriate affect for mood       Diagnosis Orders   1. Chronic pain after cancer treatment  HYDROcodone-acetaminophen (NORCO) 5-325 MG per tablet      2. Panic attacks  clonazePAM (KLONOPIN) 0.5 MG tablet      3. Deformity of lower extremity, left  HYDROcodone-acetaminophen (NORCO) 5-325 MG per tablet      4. History of rhabdomyosarcoma  HYDROcodone-acetaminophen (NORCO) 5-325 MG per tablet          Plan:  Please refer to resident note for full plan. Chronic pain, history of rhabdomyosarcoma: Chronic, stable. OK to continue Norco as prescribed. PDMP reviewed and appropriate. Follow-up in 1 month for recheck. Follow-up with [facility] as scheduled. Panic attacks: Chronic, stable. OK to continue Klonopin 0.5 mg nightly as needed; plan to wean as able. PDMP reviewed and appropriate. Resident physician planning to prescribe 15 tablets for patient to use in the next 1 month. Follow-up in 4 weeks for recheck.     Health Maintenance Due   Topic Date Due    COVID-19 Vaccine (1) Never done    Varicella vaccine (1 of 2 - 2-dose childhood series) Never done    Pneumococcal 0-64 years Vaccine (1 - PCV) Never done    DTaP/Tdap/Td vaccine (1 - Tdap) Never done    Cervical cancer screen  Never done    Diabetes screen  05/15/2022    Flu vaccine (1) Never done    Lipids  11/19/2022       Attending Physician Statement  I have discussed the case, including pertinent history and exam findings with the resident. I also have seen the patient and performed key portions of the examination. I agree with the documented assessment and plan as documented by the resident.         Fabian De Jesus DO 12/13/2022 2:49 PM

## 2022-12-14 NOTE — TELEPHONE ENCOUNTER
Patient's last appointment was : 12/13/2022  Patient's next appointment is : 1/10/2023  Future Appointments   Date Time Provider Shakira Mane   1/10/2023  1:00 PM Dhaval Martinez,  [facility]  [facility] [address]   1/31/2023  3:30 PM SCHEDULE, [facility] PACER NURSE N [facility] PACER [facility] - [place]   4/4/2023  2:00 PM Brian Reyes MD N [facility] - [facility]   10/2/2023  2:00 PM JAYLYN Sewell - CNP N [facility] CHF [facility] - [facility]     Last refilled:    Lab Results   Component Value Date    LABA1C 5.1 05/15/2019     Lab Results   Component Value Date    CHOL 157 11/19/2021    TRIG 70 11/19/2021    HDL 44 11/19/2021    LDLCALC 90 04/16/2020     Lab Results   Component Value Date     08/16/2022    K 4.0 08/16/2022    CL 98 08/16/2022    CO2 23 08/16/2022    BUN 8 08/16/2022    CREATININE 0.5 08/16/2022    GLUCOSE 111 (H) 08/16/2022    CALCIUM 10.3 08/16/2022    PROT 8.0 08/16/2022    LABALBU 4.7 08/16/2022    BILITOT 0.7 08/16/2022    ALKPHOS 129 (H) 08/16/2022    AST 40 08/16/2022    ALT 58 08/16/2022    LABGLOM >90 08/16/2022    GFRAA >60 11/19/2021    AGRATIO 1.3 07/07/2020    GLOB 3.4 07/07/2020     Lab Results   Component Value Date    TSH 1.250 08/16/2022    T4FREE 1.36 08/16/2022     Lab Results   Component Value Date    WBC 14.9 (H) 08/16/2022    HGB 14.5 08/16/2022    HCT 45.0 08/16/2022    MCV 89.5 08/16/2022     08/16/2022

## 2022-12-15 RX ORDER — METOPROLOL SUCCINATE 50 MG/1
TABLET, EXTENDED RELEASE ORAL
Qty: 60 TABLET | Refills: 0 | Status: SHIPPED | OUTPATIENT
Start: 2022-12-15

## 2022-12-20 DIAGNOSIS — G89.3 CHRONIC PAIN AFTER CANCER TREATMENT: ICD-10-CM

## 2022-12-20 DIAGNOSIS — M21.952 DEFORMITY OF LOWER EXTREMITY, LEFT: ICD-10-CM

## 2022-12-20 DIAGNOSIS — Z85.831 HISTORY OF RHABDOMYOSARCOMA: ICD-10-CM

## 2022-12-20 RX ORDER — HYDROCODONE BITARTRATE AND ACETAMINOPHEN 5; 325 MG/1; MG/1
1 TABLET ORAL EVERY 8 HOURS PRN
Qty: 90 TABLET | Refills: 0 | Status: SHIPPED | OUTPATIENT
Start: 2022-12-20 | End: 2023-01-10 | Stop reason: SDUPTHER

## 2022-12-20 NOTE — TELEPHONE ENCOUNTER
Refill sent, thanks! PDMP reviewed. 30 days of medication was sent after her visit 12/13, but only 7 days filled by pharmacy. Controlled substances monitoring: OARRS report reviewed today- activity consistent with treatment plan.       KL

## 2022-12-20 NOTE — TELEPHONE ENCOUNTER
Patient called requesting a refill on [place].    Patient's last appointment was : 12/13/2022  Patient's next appointment is : 01/10/2023  Future Appointments   Date Time Provider Shakira Mane   1/10/2023  1:00 PM Cassidy Dupont, DO [facility] [address]   1/31/2023  3:30 PM SCHEDULE, [facility] PACER NURSE N [facility] PACER [facility] [place]   4/4/2023  2:00 PM Lila Mckinley MD N [facility] [place]   10/2/2023  2:00 PM JAYLYN Garza - CNP N [facility]F [facility] [place]     Last refilled:12/13/2022    Lab Results   Component Value Date    LABA1C 5.1 05/15/2019     Lab Results   Component Value Date    CHOL 157 11/19/2021    TRIG 70 11/19/2021    HDL 44 11/19/2021    LDLCALC 90 04/16/2020     Lab Results   Component Value Date     08/16/2022    K 4.0 08/16/2022    CL 98 08/16/2022    CO2 23 08/16/2022    BUN 8 08/16/2022    CREATININE 0.5 08/16/2022    GLUCOSE 111 (H) 08/16/2022    CALCIUM 10.3 08/16/2022    PROT 8.0 08/16/2022    LABALBU 4.7 08/16/2022    BILITOT 0.7 08/16/2022    ALKPHOS 129 (H) 08/16/2022    AST 40 08/16/2022    ALT 58 08/16/2022    LABGLOM >90 08/16/2022    GFRAA >60 11/19/2021    AGRATIO 1.3 07/07/2020    GLOB 3.4 07/07/2020     Lab Results   Component Value Date    TSH 1.250 08/16/2022    T4FREE 1.36 08/16/2022     Lab Results   Component Value Date    WBC 14.9 (H) 08/16/2022    HGB 14.5 08/16/2022    HCT 45.0 08/16/2022    MCV 89.5 08/16/2022     08/16/2022

## 2023-01-10 ENCOUNTER — OFFICE VISIT (OUTPATIENT)
Dept: FAMILY MEDICINE CLINIC | Age: 38
End: 2023-01-10
Payer: MEDICAID

## 2023-01-10 VITALS
SYSTOLIC BLOOD PRESSURE: 118 MMHG | HEIGHT: 60 IN | WEIGHT: 187.4 LBS | TEMPERATURE: 97.4 F | HEART RATE: 87 BPM | BODY MASS INDEX: 36.79 KG/M2 | OXYGEN SATURATION: 98 % | RESPIRATION RATE: 12 BRPM | DIASTOLIC BLOOD PRESSURE: 68 MMHG

## 2023-01-10 DIAGNOSIS — F41.0 PANIC ATTACKS: ICD-10-CM

## 2023-01-10 DIAGNOSIS — Z85.831 HISTORY OF RHABDOMYOSARCOMA: ICD-10-CM

## 2023-01-10 DIAGNOSIS — G89.3 CHRONIC PAIN AFTER CANCER TREATMENT: Primary | ICD-10-CM

## 2023-01-10 DIAGNOSIS — M21.952 DEFORMITY OF LOWER EXTREMITY, LEFT: ICD-10-CM

## 2023-01-10 PROCEDURE — 99214 OFFICE O/P EST MOD 30 MIN: CPT | Performed by: STUDENT IN AN ORGANIZED HEALTH CARE EDUCATION/TRAINING PROGRAM

## 2023-01-10 PROCEDURE — G8427 DOCREV CUR MEDS BY ELIG CLIN: HCPCS | Performed by: STUDENT IN AN ORGANIZED HEALTH CARE EDUCATION/TRAINING PROGRAM

## 2023-01-10 PROCEDURE — 1036F TOBACCO NON-USER: CPT | Performed by: STUDENT IN AN ORGANIZED HEALTH CARE EDUCATION/TRAINING PROGRAM

## 2023-01-10 PROCEDURE — G8484 FLU IMMUNIZE NO ADMIN: HCPCS | Performed by: STUDENT IN AN ORGANIZED HEALTH CARE EDUCATION/TRAINING PROGRAM

## 2023-01-10 PROCEDURE — G8417 CALC BMI ABV UP PARAM F/U: HCPCS | Performed by: STUDENT IN AN ORGANIZED HEALTH CARE EDUCATION/TRAINING PROGRAM

## 2023-01-10 RX ORDER — HYDROCODONE BITARTRATE AND ACETAMINOPHEN 5; 325 MG/1; MG/1
1 TABLET ORAL EVERY 8 HOURS PRN
Qty: 90 TABLET | Refills: 0 | Status: SHIPPED | OUTPATIENT
Start: 2023-01-20 | End: 2023-02-19

## 2023-01-10 RX ORDER — CLONAZEPAM 0.5 MG/1
0.5 TABLET ORAL NIGHTLY PRN
Qty: 15 TABLET | Refills: 0 | Status: SHIPPED | OUTPATIENT
Start: 2023-01-10 | End: 2023-02-14

## 2023-01-10 ASSESSMENT — ENCOUNTER SYMPTOMS
COUGH: 0
ABDOMINAL PAIN: 0
SORE THROAT: 0
NAUSEA: 0
SHORTNESS OF BREATH: 0

## 2023-01-10 ASSESSMENT — PATIENT HEALTH QUESTIONNAIRE - PHQ9
SUM OF ALL RESPONSES TO PHQ QUESTIONS 1-9: 0
2. FEELING DOWN, DEPRESSED OR HOPELESS: 0
SUM OF ALL RESPONSES TO PHQ9 QUESTIONS 1 & 2: 0
1. LITTLE INTEREST OR PLEASURE IN DOING THINGS: 0

## 2023-01-10 NOTE — PROGRESS NOTES
[id number] [facility] [address] [address]  Dept: [phone]  Dept Fax: [phone]  Loc: [phone]  PROGRESS NOTE      Visit Date: 1/10/2023    Amaya Cummings is a 42 y.o. female who presents today for:  Chief Complaint   Patient presents with    1 Month Follow-Up     Chronic Conditions       Impression/Plan:  1. Chronic pain after cancer treatment  Chronic, controlled  Continue Norco 5-325 every 8 hours  Continue follow-up with [facility] as planned  - HYDROcodone-acetaminophen (NORCO) 5-325 MG per tablet; Take 1 tablet by mouth every 8 hours as needed for Pain for up to 30 days. Intended supply: 30 days. Take lowest dose possible to manage pain  Dispense: 90 tablet; Refill: 0    2. Panic attacks  Chronic, stable  Doing very well on current dose of Klonopin  Would like to avoid increasing if possible given her high doses previously  - clonazePAM (KLONOPIN) 0.5 MG tablet; Take 1 tablet by mouth nightly as needed for Anxiety for up to 15 doses. Dispense: 15 tablet; Refill: 0    3. History of rhabdomyosarcoma  Chronic, stable  Continue Norco  - HYDROcodone-acetaminophen (NORCO) 5-325 MG per tablet; Take 1 tablet by mouth every 8 hours as needed for Pain for up to 30 days. Intended supply: 30 days. Take lowest dose possible to manage pain  Dispense: 90 tablet; Refill: 0    4. Deformity of lower extremity, left  Chronic, stable  Continue Norco  Continue follow-up with Ortho  - HYDROcodone-acetaminophen (NORCO) 5-325 MG per tablet; Take 1 tablet by mouth every 8 hours as needed for Pain for up to 30 days. Intended supply: 30 days. Take lowest dose possible to manage pain  Dispense: 90 tablet; Refill: 0      Return in about 4 weeks (around 2/7/2023) for Chronic. Subjective:  HPI    Here for follow-up of chronic pain. She has a past medical history significant for rhabdomyosarcoma and lower left extremity deformity.   Also with history of panic attacks, dilated cardiomyopathy, chronic systolic CHF, and ICD in place. Left lower extremity pain: Ortho believes its may be related to nerve pain. There is also like to try a steroid injection. Doing ok on norco dose, pain is manageable. Panic attacks: Doing very well on Klonopin 15 doses total over the month. She was on much higher doses previously. However, we discussed the risks of long-term benzodiazepine use and she was amenable to trying to decrease usage. Following with cardiology. Follow-up with [facility] clinic on 1/31/2023. Controlled substances monitoring: possible medication side effects, risk of tolerance and/or dependence, and alternative treatments discussed and OARRS report reviewed today- activity consistent with treatment plan. No further questions of complaints. Review of Systems   Constitutional:  Negative for chills and fever. HENT:  Negative for congestion and sore throat. Eyes:  Negative for visual disturbance. Respiratory:  Negative for cough and shortness of breath. Cardiovascular:  Negative for chest pain. Gastrointestinal:  Negative for abdominal pain and nausea. Genitourinary:  Negative for dysuria. Musculoskeletal:         Chronic pain of left lower extremity is stable and overall well controlled   Skin:  Negative for rash. Neurological:  Negative for dizziness, light-headedness and headaches. Psychiatric/Behavioral:  The patient is not nervous/anxious.          Pain attacks are stable on current dose of Klonopin     Patient Active Problem List   Diagnosis    Electrolyte disturbance    Shock ([address]    Transaminitis    Hyperglycemia    Dilated cardiomyopathy (HCC)    Hypokalemia    ICD (implantable cardioverter-defibrillator) in place    Moderate malnutrition (HCC)    SOB (shortness of breath)    Pyelonephritis    Sepsis (HCC)    Methadone dependence (HCC)    Mood disorder (HCC)    PTSD (post-traumatic stress disorder)    Cholecystitis Ankle instability    Chronic systolic congestive heart failure (HCC)    Generalized anxiety disorder    Tobacco dependence syndrome    Chronic kidney disease, unspecified    Chronic insomnia    History of rhabdomyosarcoma    Chronic pain after cancer treatment    Primary localized osteoarthrosis of ankle and foot     Past Medical History:   Diagnosis Date    Acute on chronic systolic congestive heart failure (HCC)     Acute respiratory failure (HCC) 5/14/2019    Arrhythmia     Arthritis     Aspiration pneumonitis (HCC)     Bipolar disorder (HCC)     Cancer (HCC)     rhabdomyosarcoma    Cancer ([facility]     Cardiac arrest ([address] 05/2019    VF    Cardiac arrest with ventricular fibrillation (HCC)     Cardiomyopathy ([address] 05/2019    EF= 40% with global hypokinesis    Chronic kidney disease     COPD (chronic obstructive pulmonary disease) (HCC)     Depression     E. coli infection     Family history of early CAD     Hepatitis C     Hepatitis C antibody positive in blood 05/17/2019    Hyperlipidemia     Paranoia (psychosis) ([address]     Pneumonia due to infectious organism     Rhabdomyosarcoma ([address]     Scoliosis     Smoker     Tachycardia     VF (ventricular fibrillation) ([facility]       Past Surgical History:   Procedure Laterality Date    ACHILLES TENDON SURGERY      BONE MARROW TRANSPLANT      BRONCHOSCOPY N/A 5/16/2019    BRONCHOSCOPY ALVEOLAR LAVAGE performed by Serafin Quiñones MD at [address]  5/16/2019    BRONCHOSCOPY THERAPUTIC ASPIRATION INITIAL performed by Serafin Quiñones MD at [address] N/A 5/19/2019    BRONCHOSCOPY ALVEOLAR LAVAGE performed by Serafin Quiñones MD at [address]  5/19/2019    BRONCHOSCOPY THERAPUTIC ASPIRATION INITIAL performed by Serafin Quiñones MD at [address] [place] [address]  4/15/2014    Laparascopic    ENDOSCOPY, COLON, DIAGNOSTIC      FOOT SURGERY      GROWTH PLATE SURGERY      LEG SURGERY       Family History   Problem Relation Age of Onset    Emphysema Mother     Mental Illness Mother     Substance Abuse Mother     Stroke Mother     Heart Attack Father     Mental Illness Father     Arthritis Father     Heart Disease Father     High Blood Pressure Father     High Cholesterol Father      Social History     Tobacco Use    Smoking status: Former     Packs/day: 2.00     Years: 20.00     Pack years: 40.00     Types: Cigarettes    Smokeless tobacco: Never   Substance Use Topics    Alcohol use: Yes     Comment: social      Current Outpatient Medications   Medication Sig Dispense Refill    clonazePAM (KLONOPIN) 0.5 MG tablet Take 1 tablet by mouth nightly as needed for Anxiety for up to 15 doses. 15 tablet 0    [START ON 1/20/2023] HYDROcodone-acetaminophen (NORCO) 5-325 MG per tablet Take 1 tablet by mouth every 8 hours as needed for Pain for up to 30 days. Intended supply: 30 days. Take lowest dose possible to manage pain 90 tablet 0    metoprolol succinate (TOPROL XL) 50 MG extended release tablet take 1 tablet by mouth twice a day 60 tablet 0    gabapentin (NEURONTIN) 400 MG capsule Take 1 capsule by mouth in the morning, at noon, and at bedtime for 30 days. 90 capsule 2    atorvastatin (LIPITOR) 20 MG tablet take 1 tablet by mouth once daily 30 tablet 5    spironolactone (ALDACTONE) 25 MG tablet TAKE ONE TABLET BY MOUTH DAILY 90 tablet 1    OLANZapine (ZYPREXA) 15 MG tablet Take 1 tablet by mouth nightly Indications: 1/2 tab every morning and 1 tab at bedtime (Patient taking differently: Take 15 mg by mouth nightly) 30 tablet 5    lisinopril (PRINIVIL;ZESTRIL) 2.5 MG tablet TAKE ONE TABLET BY MOUTH DAILY 90 tablet 2    OLANZapine (ZYPREXA) 7.5 MG tablet Take 1 tablet by mouth every morning 30 tablet 5    aspirin EC 81 MG EC tablet Take 1 tablet by mouth daily 90 tablet 1     No current facility-administered medications for this visit.      Allergies   Allergen Reactions    Tape [Adhesive Tape]        There is no immunization history for the selected administration types on file for this patient.   Health Maintenance   Topic Date Due    COVID-19 Vaccine (1) Never done    Varicella vaccine (1 of 2 - 2-dose childhood series) Never done    Pneumococcal 0-64 years Vaccine (1 - PCV) Never done    DTaP/Tdap/Td vaccine (1 - Tdap) Never done    Cervical cancer screen  Never done    Diabetes screen  05/15/2022    Flu vaccine (1) Never done    Lipids  11/19/2022    Depression Screen  05/31/2023    GFR test (Diabetes, CKD 3-4, OR last GFR 15-59)  08/16/2023    Hepatitis C screen  Completed    HIV screen  Completed    Hepatitis A vaccine  Aged Out    Hib vaccine  Aged Out    Meningococcal (ACWY) vaccine  Aged Out       LABS  Lab Results   Component Value Date    LABA1C 5.1 05/15/2019     Lab Results   Component Value Date    EAG 99.7 05/15/2019     No components found for: CHLPL  Lab Results   Component Value Date    TRIG 70 11/19/2021    TRIG 193 07/17/2019    TRIG 232 (H) 05/22/2019     Lab Results   Component Value Date    HDL 44 11/19/2021    HDL 42 04/16/2020    HDL 52 07/17/2019     Lab Results   Component Value Date    LDLCALC 90 04/16/2020    LDLCALC 140 07/17/2019       Chemistry        Component Value Date/Time     08/16/2022 1433    K 4.0 08/16/2022 1433    K 4.8 05/30/2019 1526    CL 98 08/16/2022 1433    CO2 23 08/16/2022 1433    BUN 8 08/16/2022 1433    CREATININE 0.5 08/16/2022 1433        Component Value Date/Time    CALCIUM 10.3 08/16/2022 1433    ALKPHOS 129 (H) 08/16/2022 1433    AST 40 08/16/2022 1433    ALT 58 08/16/2022 1433    BILITOT 0.7 08/16/2022 1433          Lab Results   Component Value Date    LABMICR YES 05/14/2019     Lab Results   Component Value Date    TSH 1.250 08/16/2022     No results found for: PSA  Lab Results   Component Value Date    WBC 14.9 (H) 08/16/2022    HGB 14.5 08/16/2022    HCT 45.0 08/16/2022    MCV 89.5 08/16/2022     08/16/2022       Objective:  /68 (Site: Right Upper Arm, Position: Sitting, Cuff Size: Medium Adult)   Pulse 87   Temp 97.4 °F (36.3 °C) (Temporal)   Resp 12   Ht 5' (1.524 m)   Wt 187 lb 6.4 oz (85 kg)   LMP 06/03/2019   SpO2 98%   BMI 36.60 kg/m²     Physical Exam  Constitutional:       General: She is not in acute distress. Appearance: Normal appearance. Comments: Very friendly, social smile present. Well-groomed. HENT:      Head: Normocephalic. Right Ear: External ear normal.      Left Ear: External ear normal.      Nose: Nose normal.      Mouth/Throat:      Mouth: Mucous membranes are moist.   Eyes:      General: No scleral icterus. Extraocular Movements: Extraocular movements intact. Cardiovascular:      Rate and Rhythm: Normal rate and regular rhythm. Pulses: Normal pulses. Heart sounds: Normal heart sounds. Pulmonary:      Effort: Pulmonary effort is normal.      Breath sounds: Normal breath sounds. Abdominal:      General: Abdomen is flat. There is no distension. Palpations: Abdomen is soft. Musculoskeletal:         General: Normal range of motion. Cervical back: Normal range of motion. Comments: Chronic deformity of left lower extremity without changes. Walks with cane. Chronic abnormal gait. Skin:     General: Skin is warm and dry. Neurological:      Mental Status: She is alert. Motor: No weakness. Psychiatric:         Mood and Affect: Mood normal.       They voiced understanding. All questions answered. They agreed with treatment plan. See patient instructions for any educational materials that may have been given. Discussed use, benefit, and side effects of prescribed medications. Reviewed health maintenance.     (Please note that portions of this note may have been completed with a voice recognition program.  Efforts were made to edit the dictation but occasionally words are mis-transcribed.)      Electronically signed by Kelsy Lara DO on 1/10/2023 at 1:14 PM

## 2023-01-10 NOTE — PATIENT INSTRUCTIONS
Thank you   Thank you for trusting us with your healthcare needs. You may receive a survey regarding today's visit. It would help us out if you would take a few moments to provide your feedback. We value your input. Please bring in ALL medications BOTTLES, including inhalers, herbal supplements, over the counter, prescribed & non-prescribed medicine. The office would like actual medication bottles and a list.   Please note our OFFICE POLICIES:   Prior to getting your labs drawn, please check with your insurance company for benefits and eligibility of lab services. Often, insurance companies cover certain tests for preventative visits only. It is patient's responsibility to see what is covered. We are unable to change a diagnosis after the test has been performed. Lab orders will not be re-printed. Please hold onto your original lab orders and take them to your lab to be completed. If you no show your scheduled appointment three times, you will be dismissed from this practice. Reschedules must be completed 24 hours prior to your schedule appointment. If the list below has been completed, PLEASE FAX RECORDS TO OUR OFFICE @ [phone].  Once the records have been received we will update your records at our office:  Health Maintenance Due   Topic Date Due    COVID-19 Vaccine (1) Never done    Varicella vaccine (1 of 2 - 2-dose childhood series) Never done    Pneumococcal 0-64 years Vaccine (1 - PCV) Never done    DTaP/Tdap/Td vaccine (1 - Tdap) Never done    Cervical cancer screen  Never done    Diabetes screen  05/15/2022    Flu vaccine (1) Never done    Lipids  11/19/2022

## 2023-01-10 NOTE — PROGRESS NOTES
S: 42 y.o. female with   Chief Complaint   Patient presents with    1 Month Follow-Up     Chronic Conditions       HPI: please see resident note for HPI and ROS. BP Readings from Last 3 Encounters:   01/10/23 118/68   12/13/22 110/68   11/08/22 108/68     Wt Readings from Last 3 Encounters:   01/10/23 187 lb 6.4 oz (85 kg)   12/13/22 184 lb 9.6 oz (83.7 kg)   11/08/22 187 lb 12.8 oz (85.2 kg)       O: VS:  height is 5' (1.524 m) and weight is 187 lb 6.4 oz (85 kg). Her temporal temperature is 97.4 °F (36.3 °C). Her blood pressure is 118/68 and her pulse is 87. Her respiration is 12 and oxygen saturation is 98%. AAO/NAD, appropriate affect for mood       Diagnosis Orders   1. Chronic pain after cancer treatment  HYDROcodone-acetaminophen (NORCO) 5-325 MG per tablet      2. Panic attacks  clonazePAM (KLONOPIN) 0.5 MG tablet      3. History of rhabdomyosarcoma  HYDROcodone-acetaminophen (NORCO) 5-325 MG per tablet      4. Deformity of lower extremity, left  HYDROcodone-acetaminophen (NORCO) 5-325 MG per tablet          Plan:  Please refer to resident note for full plan. Chronic pain, history of rhabdomyosarcoma, deformity: Chronic, stable. OK to continue Norco as prescribed (post-dated Rx). Refill sent today. PDMP reviewed and appropriate. Follow-up with orthopedics as scheduled. Panic attacks: Chronic, stable. OK for refill of Klonopin at this time (15 doses/month). PDMP reviewed and appropriate. Follow up in 1 month for recheck.     Health Maintenance Due   Topic Date Due    COVID-19 Vaccine (1) Never done    Varicella vaccine (1 of 2 - 2-dose childhood series) Never done    Pneumococcal 0-64 years Vaccine (1 - PCV) Never done    DTaP/Tdap/Td vaccine (1 - Tdap) Never done    Cervical cancer screen  Never done    Diabetes screen  05/15/2022    Flu vaccine (1) Never done    Lipids  11/19/2022       Attending Physician Statement  I have discussed the case, including pertinent history and exam findings with the resident. I also have seen the patient and performed key portions of the examination. I agree with the documented assessment and plan as documented by the resident.         El Lynn DO 1/10/2023 1:47 PM

## 2023-01-11 RX ORDER — METOPROLOL SUCCINATE 50 MG/1
TABLET, EXTENDED RELEASE ORAL
Qty: 60 TABLET | Refills: 3 | Status: SHIPPED | OUTPATIENT
Start: 2023-01-11

## 2023-01-11 NOTE — TELEPHONE ENCOUNTER
Patient's last appointment was : 1/10/2023  Patient's next appointment is :  02/21/2023  Last refilled: 12/15/2022    Lab Results   Component Value Date    LABA1C 5.1 05/15/2019     Lab Results   Component Value Date    CHOL 157 11/19/2021    TRIG 70 11/19/2021    HDL 44 11/19/2021    LDLCALC 90 04/16/2020     Lab Results   Component Value Date     08/16/2022    K 4.0 08/16/2022    CL 98 08/16/2022    CO2 23 08/16/2022    BUN 8 08/16/2022    CREATININE 0.5 08/16/2022    GLUCOSE 111 (H) 08/16/2022    CALCIUM 10.3 08/16/2022    PROT 8.0 08/16/2022    LABALBU 4.7 08/16/2022    BILITOT 0.7 08/16/2022    ALKPHOS 129 (H) 08/16/2022    AST 40 08/16/2022    ALT 58 08/16/2022    LABGLOM >90 08/16/2022    GFRAA >60 11/19/2021    AGRATIO 1.3 07/07/2020    GLOB 3.4 07/07/2020     Lab Results   Component Value Date    TSH 1.250 08/16/2022    T4FREE 1.36 08/16/2022     Lab Results   Component Value Date    WBC 14.9 (H) 08/16/2022    HGB 14.5 08/16/2022    HCT 45.0 08/16/2022    MCV 89.5 08/16/2022     08/16/2022

## 2023-01-11 NOTE — TELEPHONE ENCOUNTER
----- Message from Bruna Martinez sent at 1/11/2023  1:29 PM EST -----  Subject: Medication Problem    Medication: metoprolol succinate (TOPROL XL) 50 MG extended release tablet  Dosage: take 1 tablet by mouth twice a day  Ordering Provider: Prem Haque    Question/Problem: Patient is asking why hasn't her medication been   refilled. She states that she is needing this medication to be filled   right away.       Pharmacy: [address] [place] [address] [id number] [facility],  Niesha Quinn [phone]    ---------------------------------------------------------------------------  --------------  Rosa ATKINSON  [phone]; OK to leave message on voicemail  ---------------------------------------------------------------------------  --------------    SCRIPT ANSWERS  Relationship to Patient: Self

## 2023-01-17 ENCOUNTER — HOSPITAL ENCOUNTER (INPATIENT)
Age: 38
LOS: 3 days | Discharge: HOME | DRG: 720 | End: 2023-01-21
Attending: STUDENT IN AN ORGANIZED HEALTH CARE EDUCATION/TRAINING PROGRAM | Admitting: STUDENT IN AN ORGANIZED HEALTH CARE EDUCATION/TRAINING PROGRAM
Payer: MEDICAID

## 2023-01-17 ENCOUNTER — APPOINTMENT (OUTPATIENT)
Dept: CT IMAGING | Age: 38
DRG: 720 | End: 2023-01-17
Payer: MEDICAID

## 2023-01-17 DIAGNOSIS — K62.5 RECTAL BLEEDING: Primary | ICD-10-CM

## 2023-01-17 DIAGNOSIS — D72.829 LEUKOCYTOSIS, UNSPECIFIED TYPE: ICD-10-CM

## 2023-01-17 DIAGNOSIS — K52.9 COLITIS: ICD-10-CM

## 2023-01-17 LAB
ALBUMIN SERPL-MCNC: 4 G/DL (ref 3.5–5.1)
ALP BLD-CCNC: 86 U/L (ref 38–126)
ALT SERPL-CCNC: 38 U/L (ref 11–66)
ANION GAP SERPL CALCULATED.3IONS-SCNC: 17 MEQ/L (ref 8–16)
AST SERPL-CCNC: 28 U/L (ref 5–40)
BACTERIA: ABNORMAL
BASOPHILS # BLD: 0.1 %
BASOPHILS ABSOLUTE: 0 THOU/MM3 (ref 0–0.1)
BILIRUB SERPL-MCNC: 0.5 MG/DL (ref 0.3–1.2)
BILIRUBIN URINE: NEGATIVE
BLOOD, URINE: NEGATIVE
BUN BLDV-MCNC: 16 MG/DL (ref 7–22)
CALCIUM SERPL-MCNC: 9.6 MG/DL (ref 8.5–10.5)
CASTS: ABNORMAL /LPF
CASTS: ABNORMAL /LPF
CHARACTER, URINE: CLEAR
CHLORIDE BLD-SCNC: 100 MEQ/L (ref 98–111)
CO2: 22 MEQ/L (ref 23–33)
COLOR: YELLOW
CREAT SERPL-MCNC: 0.6 MG/DL (ref 0.4–1.2)
CRYSTALS: ABNORMAL
EOSINOPHIL # BLD: 0 %
EOSINOPHILS ABSOLUTE: 0 THOU/MM3 (ref 0–0.4)
EPITHELIAL CELLS, UA: ABNORMAL /HPF
ERYTHROCYTE [DISTWIDTH] IN BLOOD BY AUTOMATED COUNT: 11.8 % (ref 11.5–14.5)
ERYTHROCYTE [DISTWIDTH] IN BLOOD BY AUTOMATED COUNT: 37.6 FL (ref 35–45)
GFR SERPL CREATININE-BSD FRML MDRD: > 60 ML/MIN/1.73M2
GLUCOSE BLD-MCNC: 136 MG/DL (ref 70–108)
GLUCOSE, URINE: NEGATIVE MG/DL
HCT VFR BLD CALC: 40 % (ref 37–47)
HEMOCCULT STL QL: POSITIVE
HEMOGLOBIN: 13.3 GM/DL (ref 12–16)
IMMATURE GRANS (ABS): 0.11 THOU/MM3 (ref 0–0.07)
IMMATURE GRANULOCYTES: 0.5 %
KETONES, URINE: 15
LEUKOCYTE EST, POC: ABNORMAL
LIPASE: 18.4 U/L (ref 5.6–51.3)
LYMPHOCYTES # BLD: 5.1 %
LYMPHOCYTES ABSOLUTE: 1.2 THOU/MM3 (ref 1–4.8)
MCH RBC QN AUTO: 29 PG (ref 26–33)
MCHC RBC AUTO-ENTMCNC: 33.3 GM/DL (ref 32.2–35.5)
MCV RBC AUTO: 87.3 FL (ref 81–99)
MISCELLANEOUS LAB TEST RESULT: ABNORMAL
MONOCYTES # BLD: 7.7 %
MONOCYTES ABSOLUTE: 1.8 THOU/MM3 (ref 0.4–1.3)
NITRITE, URINE: NEGATIVE
NUCLEATED RED BLOOD CELLS: 0 /100 WBC
OSMOLALITY CALCULATION: 280.8 MOSMOL/KG (ref 275–300)
PH UA: 7 (ref 5–9)
PLATELET # BLD: 340 THOU/MM3 (ref 130–400)
PMV BLD AUTO: 9.6 FL (ref 9.4–12.4)
POTASSIUM REFLEX MAGNESIUM: 3.7 MEQ/L (ref 3.5–5.2)
PROTEIN UA: NEGATIVE MG/DL
RBC # BLD: 4.58 MILL/MM3 (ref 4.2–5.4)
RBC URINE: ABNORMAL /HPF
RENAL EPITHELIAL, UA: ABNORMAL
SEG NEUTROPHILS: 86.6 %
SEGMENTED NEUTROPHILS ABSOLUTE COUNT: 19.9 THOU/MM3 (ref 1.8–7.7)
SODIUM BLD-SCNC: 139 MEQ/L (ref 135–145)
SPECIFIC GRAVITY UA: > 1.03 (ref 1–1.03)
TOTAL PROTEIN: 6.9 G/DL (ref 6.1–8)
UROBILINOGEN, URINE: 0.2 EU/DL (ref 0–1)
WBC # BLD: 23 THOU/MM3 (ref 4.8–10.8)
WBC UA: ABNORMAL /HPF
YEAST: ABNORMAL

## 2023-01-17 PROCEDURE — 85025 COMPLETE CBC W/AUTO DIFF WBC: CPT

## 2023-01-17 PROCEDURE — 96375 TX/PRO/DX INJ NEW DRUG ADDON: CPT

## 2023-01-17 PROCEDURE — 2580000003 HC RX 258: Performed by: EMERGENCY MEDICINE

## 2023-01-17 PROCEDURE — 6360000002 HC RX W HCPCS: Performed by: EMERGENCY MEDICINE

## 2023-01-17 PROCEDURE — 96374 THER/PROPH/DIAG INJ IV PUSH: CPT

## 2023-01-17 PROCEDURE — 81003 URINALYSIS AUTO W/O SCOPE: CPT

## 2023-01-17 PROCEDURE — A4216 STERILE WATER/SALINE, 10 ML: HCPCS | Performed by: EMERGENCY MEDICINE

## 2023-01-17 PROCEDURE — 6360000004 HC RX CONTRAST MEDICATION: Performed by: EMERGENCY MEDICINE

## 2023-01-17 PROCEDURE — 83690 ASSAY OF LIPASE: CPT

## 2023-01-17 PROCEDURE — 99285 EMERGENCY DEPT VISIT HI MDM: CPT

## 2023-01-17 PROCEDURE — 2580000003 HC RX 258: Performed by: STUDENT IN AN ORGANIZED HEALTH CARE EDUCATION/TRAINING PROGRAM

## 2023-01-17 PROCEDURE — 36415 COLL VENOUS BLD VENIPUNCTURE: CPT

## 2023-01-17 PROCEDURE — 74177 CT ABD & PELVIS W/CONTRAST: CPT

## 2023-01-17 PROCEDURE — 96361 HYDRATE IV INFUSION ADD-ON: CPT

## 2023-01-17 PROCEDURE — C9113 INJ PANTOPRAZOLE SODIUM, VIA: HCPCS | Performed by: EMERGENCY MEDICINE

## 2023-01-17 PROCEDURE — 80053 COMPREHEN METABOLIC PANEL: CPT

## 2023-01-17 PROCEDURE — 81001 URINALYSIS AUTO W/SCOPE: CPT

## 2023-01-17 PROCEDURE — 82272 OCCULT BLD FECES 1-3 TESTS: CPT

## 2023-01-17 RX ORDER — KETOROLAC TROMETHAMINE 30 MG/ML
15 INJECTION, SOLUTION INTRAMUSCULAR; INTRAVENOUS ONCE
Status: DISCONTINUED | OUTPATIENT
Start: 2023-01-17 | End: 2023-01-18

## 2023-01-17 RX ORDER — MORPHINE SULFATE 4 MG/ML
4 INJECTION, SOLUTION INTRAMUSCULAR; INTRAVENOUS ONCE
Status: COMPLETED | OUTPATIENT
Start: 2023-01-17 | End: 2023-01-17

## 2023-01-17 RX ORDER — FENTANYL CITRATE 50 UG/ML
25 INJECTION, SOLUTION INTRAMUSCULAR; INTRAVENOUS ONCE
Status: COMPLETED | OUTPATIENT
Start: 2023-01-17 | End: 2023-01-17

## 2023-01-17 RX ORDER — 0.9 % SODIUM CHLORIDE 0.9 %
500 INTRAVENOUS SOLUTION INTRAVENOUS ONCE
Status: COMPLETED | OUTPATIENT
Start: 2023-01-17 | End: 2023-01-18

## 2023-01-17 RX ADMIN — SODIUM CHLORIDE 40 MG: 9 INJECTION, SOLUTION INTRAMUSCULAR; INTRAVENOUS; SUBCUTANEOUS at 18:26

## 2023-01-17 RX ADMIN — FENTANYL CITRATE 25 MCG: 50 INJECTION, SOLUTION INTRAMUSCULAR; INTRAVENOUS at 18:26

## 2023-01-17 RX ADMIN — IOPAMIDOL 80 ML: 755 INJECTION, SOLUTION INTRAVENOUS at 20:50

## 2023-01-17 RX ADMIN — SODIUM CHLORIDE 500 ML: 9 INJECTION, SOLUTION INTRAVENOUS at 23:29

## 2023-01-17 RX ADMIN — MORPHINE SULFATE 4 MG: 4 INJECTION, SOLUTION INTRAMUSCULAR; INTRAVENOUS at 20:06

## 2023-01-17 ASSESSMENT — PAIN DESCRIPTION - ORIENTATION: ORIENTATION: LOWER

## 2023-01-17 ASSESSMENT — PAIN - FUNCTIONAL ASSESSMENT
PAIN_FUNCTIONAL_ASSESSMENT: 0-10

## 2023-01-17 ASSESSMENT — PAIN SCALES - GENERAL
PAINLEVEL_OUTOF10: 9
PAINLEVEL_OUTOF10: 8
PAINLEVEL_OUTOF10: 9
PAINLEVEL_OUTOF10: 8

## 2023-01-17 ASSESSMENT — PAIN DESCRIPTION - LOCATION: LOCATION: ABDOMEN

## 2023-01-17 NOTE — ED NOTES
Pt updated on POC. Pt medicated per MAR. No needs expressed at this time. Respirations even and unlabored, call light within reach.  [facility]  01/17/23 [id number]

## 2023-01-17 NOTE — ED TRIAGE NOTES
Pt presents to the ER with c/o lower abdominal pain and rectal bleeding that started this morning. Pt reports hx of IBS but states she has never had the bleeding. Pt took her Norco this morning, no other pain meds PTA. Pt denies N/V. Pt is alert and oriented, respirations even and unlabored.  VSS yes

## 2023-01-18 PROBLEM — K52.9 COLITIS: Status: ACTIVE | Noted: 2023-01-18

## 2023-01-18 LAB
AMYLASE: 52 U/L (ref 20–104)
ANION GAP SERPL CALCULATED.3IONS-SCNC: 15 MEQ/L (ref 8–16)
BASOPHILS # BLD: 0.2 %
BASOPHILS ABSOLUTE: 0 THOU/MM3 (ref 0–0.1)
BUN BLDV-MCNC: 11 MG/DL (ref 7–22)
CALCIUM SERPL-MCNC: 9 MG/DL (ref 8.5–10.5)
CHLORIDE BLD-SCNC: 101 MEQ/L (ref 98–111)
CO2: 23 MEQ/L (ref 23–33)
CREAT SERPL-MCNC: 0.5 MG/DL (ref 0.4–1.2)
EKG ATRIAL RATE: 101 BPM
EKG P AXIS: 30 DEGREES
EKG P-R INTERVAL: 174 MS
EKG Q-T INTERVAL: 350 MS
EKG QRS DURATION: 86 MS
EKG QTC CALCULATION (BAZETT): 453 MS
EKG R AXIS: -2 DEGREES
EKG T AXIS: 8 DEGREES
EKG VENTRICULAR RATE: 101 BPM
EOSINOPHIL # BLD: 0.1 %
EOSINOPHILS ABSOLUTE: 0 THOU/MM3 (ref 0–0.4)
ERYTHROCYTE [DISTWIDTH] IN BLOOD BY AUTOMATED COUNT: 11.8 % (ref 11.5–14.5)
ERYTHROCYTE [DISTWIDTH] IN BLOOD BY AUTOMATED COUNT: 37.2 FL (ref 35–45)
GFR SERPL CREATININE-BSD FRML MDRD: > 60 ML/MIN/1.73M2
GLUCOSE BLD-MCNC: 95 MG/DL (ref 70–108)
HCT VFR BLD CALC: 36.8 % (ref 37–47)
HCT VFR BLD CALC: 38.5 % (ref 37–47)
HCT VFR BLD CALC: 38.8 % (ref 37–47)
HEMOGLOBIN: 11.9 GM/DL (ref 12–16)
HEMOGLOBIN: 13 GM/DL (ref 12–16)
HEMOGLOBIN: 13 GM/DL (ref 12–16)
IMMATURE GRANS (ABS): 0.08 THOU/MM3 (ref 0–0.07)
IMMATURE GRANULOCYTES: 0.4 %
LACTIC ACID: 1.4 MMOL/L (ref 0.5–2)
LD: 223 U/L (ref 100–190)
LYMPHOCYTES # BLD: 19.3 %
LYMPHOCYTES ABSOLUTE: 3.5 THOU/MM3 (ref 1–4.8)
MAGNESIUM: 1 MG/DL (ref 1.6–2.4)
MCH RBC QN AUTO: 29 PG (ref 26–33)
MCHC RBC AUTO-ENTMCNC: 33.5 GM/DL (ref 32.2–35.5)
MCV RBC AUTO: 86.6 FL (ref 81–99)
MONOCYTES # BLD: 10 %
MONOCYTES ABSOLUTE: 1.8 THOU/MM3 (ref 0.4–1.3)
NUCLEATED RED BLOOD CELLS: 0 /100 WBC
PLATELET # BLD: 338 THOU/MM3 (ref 130–400)
PMV BLD AUTO: 9.8 FL (ref 9.4–12.4)
POTASSIUM SERPL-SCNC: 3.2 MEQ/L (ref 3.5–5.2)
RBC # BLD: 4.48 MILL/MM3 (ref 4.2–5.4)
SEG NEUTROPHILS: 70 %
SEGMENTED NEUTROPHILS ABSOLUTE COUNT: 12.7 THOU/MM3 (ref 1.8–7.7)
SODIUM BLD-SCNC: 139 MEQ/L (ref 135–145)
WBC # BLD: 18.1 THOU/MM3 (ref 4.8–10.8)

## 2023-01-18 PROCEDURE — 83605 ASSAY OF LACTIC ACID: CPT

## 2023-01-18 PROCEDURE — 6370000000 HC RX 637 (ALT 250 FOR IP): Performed by: NURSE PRACTITIONER

## 2023-01-18 PROCEDURE — 6360000002 HC RX W HCPCS: Performed by: STUDENT IN AN ORGANIZED HEALTH CARE EDUCATION/TRAINING PROGRAM

## 2023-01-18 PROCEDURE — 93005 ELECTROCARDIOGRAM TRACING: CPT | Performed by: STUDENT IN AN ORGANIZED HEALTH CARE EDUCATION/TRAINING PROGRAM

## 2023-01-18 PROCEDURE — 82150 ASSAY OF AMYLASE: CPT

## 2023-01-18 PROCEDURE — 36415 COLL VENOUS BLD VENIPUNCTURE: CPT

## 2023-01-18 PROCEDURE — 6370000000 HC RX 637 (ALT 250 FOR IP)

## 2023-01-18 PROCEDURE — 85014 HEMATOCRIT: CPT

## 2023-01-18 PROCEDURE — 87040 BLOOD CULTURE FOR BACTERIA: CPT

## 2023-01-18 PROCEDURE — C9113 INJ PANTOPRAZOLE SODIUM, VIA: HCPCS | Performed by: STUDENT IN AN ORGANIZED HEALTH CARE EDUCATION/TRAINING PROGRAM

## 2023-01-18 PROCEDURE — 80048 BASIC METABOLIC PNL TOTAL CA: CPT

## 2023-01-18 PROCEDURE — 6370000000 HC RX 637 (ALT 250 FOR IP): Performed by: STUDENT IN AN ORGANIZED HEALTH CARE EDUCATION/TRAINING PROGRAM

## 2023-01-18 PROCEDURE — 2580000003 HC RX 258: Performed by: NURSE PRACTITIONER

## 2023-01-18 PROCEDURE — 2580000003 HC RX 258: Performed by: STUDENT IN AN ORGANIZED HEALTH CARE EDUCATION/TRAINING PROGRAM

## 2023-01-18 PROCEDURE — 85018 HEMOGLOBIN: CPT

## 2023-01-18 PROCEDURE — 83735 ASSAY OF MAGNESIUM: CPT

## 2023-01-18 PROCEDURE — 6370000000 HC RX 637 (ALT 250 FOR IP): Performed by: HOSPITALIST

## 2023-01-18 PROCEDURE — 6360000002 HC RX W HCPCS: Performed by: HOSPITALIST

## 2023-01-18 PROCEDURE — 2140000000 HC CCU INTERMEDIATE R&B

## 2023-01-18 PROCEDURE — 99223 1ST HOSP IP/OBS HIGH 75: CPT | Performed by: STUDENT IN AN ORGANIZED HEALTH CARE EDUCATION/TRAINING PROGRAM

## 2023-01-18 PROCEDURE — 83615 LACTATE (LD) (LDH) ENZYME: CPT

## 2023-01-18 PROCEDURE — 85025 COMPLETE CBC W/AUTO DIFF WBC: CPT

## 2023-01-18 PROCEDURE — 93010 ELECTROCARDIOGRAM REPORT: CPT | Performed by: INTERNAL MEDICINE

## 2023-01-18 RX ORDER — MORPHINE SULFATE 2 MG/ML
1 INJECTION, SOLUTION INTRAMUSCULAR; INTRAVENOUS
Status: DISCONTINUED | OUTPATIENT
Start: 2023-01-18 | End: 2023-01-18

## 2023-01-18 RX ORDER — MORPHINE SULFATE 2 MG/ML
1 INJECTION, SOLUTION INTRAMUSCULAR; INTRAVENOUS EVERY 4 HOURS PRN
Status: DISCONTINUED | OUTPATIENT
Start: 2023-01-19 | End: 2023-01-21 | Stop reason: HOSPADM

## 2023-01-18 RX ORDER — DICYCLOMINE HYDROCHLORIDE 10 MG/1
20 CAPSULE ORAL 4 TIMES DAILY PRN
Status: DISCONTINUED | OUTPATIENT
Start: 2023-01-18 | End: 2023-01-21 | Stop reason: HOSPADM

## 2023-01-18 RX ORDER — CIPROFLOXACIN 2 MG/ML
400 INJECTION, SOLUTION INTRAVENOUS EVERY 12 HOURS
Status: DISCONTINUED | OUTPATIENT
Start: 2023-01-18 | End: 2023-01-18

## 2023-01-18 RX ORDER — SPIRONOLACTONE 25 MG/1
25 TABLET ORAL DAILY
Status: DISCONTINUED | OUTPATIENT
Start: 2023-01-18 | End: 2023-01-21 | Stop reason: HOSPADM

## 2023-01-18 RX ORDER — ACETAMINOPHEN 650 MG/1
650 SUPPOSITORY RECTAL EVERY 6 HOURS PRN
Status: DISCONTINUED | OUTPATIENT
Start: 2023-01-18 | End: 2023-01-21 | Stop reason: HOSPADM

## 2023-01-18 RX ORDER — FENTANYL CITRATE 50 UG/ML
25 INJECTION, SOLUTION INTRAMUSCULAR; INTRAVENOUS ONCE
Status: COMPLETED | OUTPATIENT
Start: 2023-01-18 | End: 2023-01-18

## 2023-01-18 RX ORDER — POTASSIUM CHLORIDE 7.45 MG/ML
10 INJECTION INTRAVENOUS PRN
Status: DISCONTINUED | OUTPATIENT
Start: 2023-01-18 | End: 2023-01-21 | Stop reason: HOSPADM

## 2023-01-18 RX ORDER — ACETAMINOPHEN 325 MG/1
650 TABLET ORAL EVERY 6 HOURS PRN
Status: DISCONTINUED | OUTPATIENT
Start: 2023-01-18 | End: 2023-01-21 | Stop reason: HOSPADM

## 2023-01-18 RX ORDER — SODIUM CHLORIDE 9 MG/ML
INJECTION, SOLUTION INTRAVENOUS CONTINUOUS
Status: DISCONTINUED | OUTPATIENT
Start: 2023-01-18 | End: 2023-01-18

## 2023-01-18 RX ORDER — MORPHINE SULFATE 2 MG/ML
2 INJECTION, SOLUTION INTRAMUSCULAR; INTRAVENOUS EVERY 4 HOURS PRN
Status: DISCONTINUED | OUTPATIENT
Start: 2023-01-19 | End: 2023-01-21 | Stop reason: HOSPADM

## 2023-01-18 RX ORDER — DICYCLOMINE HYDROCHLORIDE 10 MG/ML
20 INJECTION INTRAMUSCULAR 4 TIMES DAILY
Status: DISCONTINUED | OUTPATIENT
Start: 2023-01-18 | End: 2023-01-18

## 2023-01-18 RX ORDER — ONDANSETRON 4 MG/1
4 TABLET, ORALLY DISINTEGRATING ORAL EVERY 8 HOURS PRN
Status: DISCONTINUED | OUTPATIENT
Start: 2023-01-18 | End: 2023-01-21 | Stop reason: HOSPADM

## 2023-01-18 RX ORDER — SODIUM CHLORIDE 9 MG/ML
INJECTION, SOLUTION INTRAVENOUS CONTINUOUS
Status: ACTIVE | OUTPATIENT
Start: 2023-01-18 | End: 2023-01-20

## 2023-01-18 RX ORDER — SODIUM CHLORIDE 0.9 % (FLUSH) 0.9 %
5-40 SYRINGE (ML) INJECTION PRN
Status: DISCONTINUED | OUTPATIENT
Start: 2023-01-18 | End: 2023-01-21 | Stop reason: HOSPADM

## 2023-01-18 RX ORDER — POTASSIUM CHLORIDE 20 MEQ/1
40 TABLET, EXTENDED RELEASE ORAL PRN
Status: DISCONTINUED | OUTPATIENT
Start: 2023-01-18 | End: 2023-01-21 | Stop reason: HOSPADM

## 2023-01-18 RX ORDER — METOPROLOL SUCCINATE 50 MG/1
50 TABLET, EXTENDED RELEASE ORAL 2 TIMES DAILY
Status: DISCONTINUED | OUTPATIENT
Start: 2023-01-18 | End: 2023-01-21 | Stop reason: HOSPADM

## 2023-01-18 RX ORDER — DICYCLOMINE HYDROCHLORIDE 10 MG/ML
20 INJECTION INTRAMUSCULAR ONCE
Status: DISCONTINUED | OUTPATIENT
Start: 2023-01-18 | End: 2023-01-18

## 2023-01-18 RX ORDER — METRONIDAZOLE 500 MG/100ML
500 INJECTION, SOLUTION INTRAVENOUS EVERY 8 HOURS
Status: DISCONTINUED | OUTPATIENT
Start: 2023-01-18 | End: 2023-01-18

## 2023-01-18 RX ORDER — DICYCLOMINE HYDROCHLORIDE 10 MG/ML
20 INJECTION INTRAMUSCULAR 4 TIMES DAILY
Status: DISCONTINUED | OUTPATIENT
Start: 2023-01-18 | End: 2023-01-18 | Stop reason: CLARIF

## 2023-01-18 RX ORDER — CLONAZEPAM 0.5 MG/1
0.5 TABLET ORAL DAILY PRN
Status: DISCONTINUED | OUTPATIENT
Start: 2023-01-18 | End: 2023-01-21 | Stop reason: HOSPADM

## 2023-01-18 RX ORDER — LISINOPRIL 2.5 MG/1
2.5 TABLET ORAL DAILY
Status: DISCONTINUED | OUTPATIENT
Start: 2023-01-18 | End: 2023-01-21 | Stop reason: HOSPADM

## 2023-01-18 RX ORDER — CLONAZEPAM 0.5 MG/1
0.5 TABLET ORAL NIGHTLY PRN
Status: DISCONTINUED | OUTPATIENT
Start: 2023-01-18 | End: 2023-01-18

## 2023-01-18 RX ORDER — SODIUM CHLORIDE 0.9 % (FLUSH) 0.9 %
5-40 SYRINGE (ML) INJECTION EVERY 12 HOURS SCHEDULED
Status: DISCONTINUED | OUTPATIENT
Start: 2023-01-18 | End: 2023-01-21 | Stop reason: HOSPADM

## 2023-01-18 RX ORDER — MORPHINE SULFATE 2 MG/ML
1 INJECTION, SOLUTION INTRAMUSCULAR; INTRAVENOUS ONCE
Status: COMPLETED | OUTPATIENT
Start: 2023-01-18 | End: 2023-01-18

## 2023-01-18 RX ORDER — DICYCLOMINE HYDROCHLORIDE 10 MG/1
20 CAPSULE ORAL
Status: DISCONTINUED | OUTPATIENT
Start: 2023-01-18 | End: 2023-01-18

## 2023-01-18 RX ORDER — MAGNESIUM SULFATE IN WATER 40 MG/ML
2000 INJECTION, SOLUTION INTRAVENOUS PRN
Status: DISCONTINUED | OUTPATIENT
Start: 2023-01-18 | End: 2023-01-21 | Stop reason: HOSPADM

## 2023-01-18 RX ORDER — MORPHINE SULFATE 2 MG/ML
2 INJECTION, SOLUTION INTRAMUSCULAR; INTRAVENOUS
Status: DISCONTINUED | OUTPATIENT
Start: 2023-01-18 | End: 2023-01-18

## 2023-01-18 RX ORDER — ONDANSETRON 2 MG/ML
4 INJECTION INTRAMUSCULAR; INTRAVENOUS EVERY 6 HOURS PRN
Status: DISCONTINUED | OUTPATIENT
Start: 2023-01-18 | End: 2023-01-21 | Stop reason: HOSPADM

## 2023-01-18 RX ORDER — ATORVASTATIN CALCIUM 20 MG/1
20 TABLET, FILM COATED ORAL DAILY
Status: DISCONTINUED | OUTPATIENT
Start: 2023-01-18 | End: 2023-01-21 | Stop reason: HOSPADM

## 2023-01-18 RX ORDER — GABAPENTIN 400 MG/1
400 CAPSULE ORAL 3 TIMES DAILY
Status: DISCONTINUED | OUTPATIENT
Start: 2023-01-18 | End: 2023-01-21 | Stop reason: HOSPADM

## 2023-01-18 RX ORDER — SODIUM CHLORIDE 9 MG/ML
INJECTION, SOLUTION INTRAVENOUS PRN
Status: DISCONTINUED | OUTPATIENT
Start: 2023-01-18 | End: 2023-01-21 | Stop reason: HOSPADM

## 2023-01-18 RX ORDER — DICYCLOMINE HYDROCHLORIDE 10 MG/ML
20 INJECTION INTRAMUSCULAR ONCE
Status: COMPLETED | OUTPATIENT
Start: 2023-01-18 | End: 2023-01-18

## 2023-01-18 RX ADMIN — SODIUM CHLORIDE: 9 INJECTION, SOLUTION INTRAVENOUS at 00:31

## 2023-01-18 RX ADMIN — POTASSIUM CHLORIDE 40 MEQ: 1500 TABLET, EXTENDED RELEASE ORAL at 08:32

## 2023-01-18 RX ADMIN — MORPHINE SULFATE 2 MG: 2 INJECTION, SOLUTION INTRAMUSCULAR; INTRAVENOUS at 01:32

## 2023-01-18 RX ADMIN — GABAPENTIN 400 MG: 400 CAPSULE ORAL at 19:35

## 2023-01-18 RX ADMIN — CLONAZEPAM 0.5 MG: 0.5 TABLET ORAL at 10:01

## 2023-01-18 RX ADMIN — SPIRONOLACTONE 25 MG: 25 TABLET ORAL at 08:33

## 2023-01-18 RX ADMIN — OLANZAPINE 15 MG: 5 TABLET, FILM COATED ORAL at 19:35

## 2023-01-18 RX ADMIN — DICYCLOMINE HYDROCHLORIDE 20 MG: 10 INJECTION, SOLUTION INTRAMUSCULAR at 03:51

## 2023-01-18 RX ADMIN — GABAPENTIN 400 MG: 400 CAPSULE ORAL at 14:08

## 2023-01-18 RX ADMIN — MORPHINE SULFATE 2 MG: 2 INJECTION, SOLUTION INTRAMUSCULAR; INTRAVENOUS at 08:17

## 2023-01-18 RX ADMIN — SODIUM CHLORIDE: 9 INJECTION, SOLUTION INTRAVENOUS at 14:04

## 2023-01-18 RX ADMIN — MORPHINE SULFATE 2 MG: 2 INJECTION, SOLUTION INTRAMUSCULAR; INTRAVENOUS at 03:55

## 2023-01-18 RX ADMIN — MORPHINE SULFATE 2 MG: 2 INJECTION, SOLUTION INTRAMUSCULAR; INTRAVENOUS at 05:55

## 2023-01-18 RX ADMIN — MORPHINE SULFATE 2 MG: 2 INJECTION, SOLUTION INTRAMUSCULAR; INTRAVENOUS at 10:25

## 2023-01-18 RX ADMIN — FENTANYL CITRATE 25 MCG: 50 INJECTION, SOLUTION INTRAMUSCULAR; INTRAVENOUS at 01:48

## 2023-01-18 RX ADMIN — MORPHINE SULFATE 1 MG: 2 INJECTION, SOLUTION INTRAMUSCULAR; INTRAVENOUS at 00:37

## 2023-01-18 RX ADMIN — MAGNESIUM SULFATE HEPTAHYDRATE 2000 MG: 40 INJECTION, SOLUTION INTRAVENOUS at 17:13

## 2023-01-18 RX ADMIN — ATORVASTATIN CALCIUM 20 MG: 20 TABLET, FILM COATED ORAL at 19:35

## 2023-01-18 RX ADMIN — MORPHINE SULFATE 2 MG: 2 INJECTION, SOLUTION INTRAMUSCULAR; INTRAVENOUS at 12:34

## 2023-01-18 RX ADMIN — DICYCLOMINE HYDROCHLORIDE 20 MG: 10 CAPSULE ORAL at 17:17

## 2023-01-18 RX ADMIN — MAGNESIUM SULFATE HEPTAHYDRATE 2000 MG: 40 INJECTION, SOLUTION INTRAVENOUS at 14:07

## 2023-01-18 RX ADMIN — MORPHINE SULFATE 2 MG: 2 INJECTION, SOLUTION INTRAMUSCULAR; INTRAVENOUS at 17:14

## 2023-01-18 RX ADMIN — MORPHINE SULFATE 2 MG: 2 INJECTION, SOLUTION INTRAMUSCULAR; INTRAVENOUS at 22:02

## 2023-01-18 RX ADMIN — DICYCLOMINE HYDROCHLORIDE 20 MG: 10 CAPSULE ORAL at 08:16

## 2023-01-18 RX ADMIN — OLANZAPINE 7.5 MG: 5 TABLET, FILM COATED ORAL at 08:32

## 2023-01-18 RX ADMIN — GABAPENTIN 400 MG: 400 CAPSULE ORAL at 08:32

## 2023-01-18 RX ADMIN — LISINOPRIL 2.5 MG: 2.5 TABLET ORAL at 08:34

## 2023-01-18 RX ADMIN — PIPERACILLIN AND TAZOBACTAM 4500 MG: 4; .5 INJECTION, POWDER, FOR SOLUTION INTRAVENOUS at 03:59

## 2023-01-18 RX ADMIN — MORPHINE SULFATE 2 MG: 2 INJECTION, SOLUTION INTRAMUSCULAR; INTRAVENOUS at 19:35

## 2023-01-18 RX ADMIN — MORPHINE SULFATE 2 MG: 2 INJECTION, SOLUTION INTRAMUSCULAR; INTRAVENOUS at 14:40

## 2023-01-18 RX ADMIN — SODIUM CHLORIDE 80 MG: 9 INJECTION, SOLUTION INTRAVENOUS at 04:41

## 2023-01-18 RX ADMIN — METOPROLOL SUCCINATE 50 MG: 50 TABLET, EXTENDED RELEASE ORAL at 08:33

## 2023-01-18 ASSESSMENT — PAIN DESCRIPTION - ONSET
ONSET: ON-GOING

## 2023-01-18 ASSESSMENT — PAIN DESCRIPTION - LOCATION
LOCATION: ABDOMEN

## 2023-01-18 ASSESSMENT — PAIN DESCRIPTION - DESCRIPTORS
DESCRIPTORS: CRAMPING

## 2023-01-18 ASSESSMENT — PAIN SCALES - GENERAL
PAINLEVEL_OUTOF10: 8
PAINLEVEL_OUTOF10: 9
PAINLEVEL_OUTOF10: 9
PAINLEVEL_OUTOF10: 8
PAINLEVEL_OUTOF10: 7
PAINLEVEL_OUTOF10: 7
PAINLEVEL_OUTOF10: 8
PAINLEVEL_OUTOF10: 9
PAINLEVEL_OUTOF10: 8
PAINLEVEL_OUTOF10: 8

## 2023-01-18 ASSESSMENT — PAIN - FUNCTIONAL ASSESSMENT
PAIN_FUNCTIONAL_ASSESSMENT: ACTIVITIES ARE NOT PREVENTED
PAIN_FUNCTIONAL_ASSESSMENT: 0-10
PAIN_FUNCTIONAL_ASSESSMENT: ACTIVITIES ARE NOT PREVENTED
PAIN_FUNCTIONAL_ASSESSMENT: ACTIVITIES ARE NOT PREVENTED

## 2023-01-18 ASSESSMENT — LIFESTYLE VARIABLES
HOW MANY STANDARD DRINKS CONTAINING ALCOHOL DO YOU HAVE ON A TYPICAL DAY: 1 OR 2
HOW OFTEN DO YOU HAVE A DRINK CONTAINING ALCOHOL: 2-3 TIMES A WEEK

## 2023-01-18 ASSESSMENT — PAIN DESCRIPTION - ORIENTATION
ORIENTATION: MID
ORIENTATION: MID
ORIENTATION: LOWER

## 2023-01-18 ASSESSMENT — PAIN DESCRIPTION - FREQUENCY
FREQUENCY: CONTINUOUS

## 2023-01-18 ASSESSMENT — PAIN DESCRIPTION - PAIN TYPE
TYPE: ACUTE PAIN

## 2023-01-18 NOTE — ED PROVIDER NOTES
[address] [facility] [address] [address] EMERGENCY DEPT    EMERGENCY MEDICINE      Pt Name: Angeles Peace  MRN: [id number]  [place] 1985  Date of evaluation: 1/17/2023  Treating Resident Physician: Ish Trejo MD  Supervising Physician: James Khan DO    CHIEF COMPLAINT       Chief Complaint   Patient presents with    Abdominal Pain    Rectal Bleeding     History obtained from chart review and the patient. HISTORY OF PRESENT ILLNESS    HPI    Angeles Peace is a 42 y.o. female presents to the emergency department for evaluation of abdominal pain with hematochezia. Patient stated that she has a history of IBS and this morning it felt like her IBS was bothering her. She stated that she took to 800 mg doses of ibuprofen and Norco this morning without relief of pain. She stated that she had bright red blood in her stool and was concerned because she never had rectal bleeding before. Patient did admit to having 1 episode of vomiting stating that it was because the pain was so severe. She described the pain as epigastric going straight down her abdomen to her suprapubic area. Patient is denying any trauma, hematemesis, shortness of breath, chest pain, fever. The patient has no other acute complaints at this time.     PAST MEDICAL AND SURGICAL HISTORY     Past Medical History:   Diagnosis Date    Acute on chronic systolic congestive heart failure (HCC)     Acute respiratory failure ([address] 5/14/2019    Arrhythmia     Arthritis     Aspiration pneumonitis (HCC)     Bipolar disorder (HCC)     Cancer (HCC)     rhabdomyosarcoma    Cancer ([address]     Cardiac arrest ([address] 05/2019    VF    Cardiac arrest with ventricular fibrillation ([address]     Cardiomyopathy ([address] 05/2019    EF= 40% with global hypokinesis    Chronic kidney disease     COPD (chronic obstructive pulmonary disease) (HCC)     Depression     E. 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[facility]     Scoliosis     Smoker     Tachycardia     VF (ventricular fibrillation) ([facility]        Past Surgical History:   Procedure Laterality Date    ACHILLES TENDON SURGERY      BONE MARROW TRANSPLANT      BRONCHOSCOPY N/A 5/16/2019    BRONCHOSCOPY ALVEOLAR LAVAGE performed by Merissa Dye MD at [facility]  5/16/2019    BRONCHOSCOPY THERAPUTIC ASPIRATION INITIAL performed by Merissa Dye MD at [facility] N/A 5/19/2019    BRONCHOSCOPY ALVEOLAR LAVAGE performed by Merissa Dye MD at [facility]  5/19/2019    BRONCHOSCOPY THERAPUTIC ASPIRATION INITIAL performed by Merissa Dye MD at [address] [place] [address]  4/15/2014    Laparascopic    ENDOSCOPY, COLON, DIAGNOSTIC      FOOT SURGERY      GROWTH PLATE SURGERY      LEG SURGERY         CURRENT MEDICATIONS     Previous Medications    ASPIRIN EC 81 MG EC TABLET    Take 1 tablet by mouth daily    ATORVASTATIN (LIPITOR) 20 MG TABLET    take 1 tablet by mouth once daily    CLONAZEPAM (KLONOPIN) 0.5 MG TABLET    Take 1 tablet by mouth nightly as needed for Anxiety for up to 15 doses. GABAPENTIN (NEURONTIN) 400 MG CAPSULE    Take 1 capsule by mouth in the morning, at noon, and at bedtime for 30 days. HYDROCODONE-ACETAMINOPHEN (NORCO) 5-325 MG PER TABLET    Take 1 tablet by mouth every 8 hours as needed for Pain for up to 30 days. Intended supply: 30 days.  Take lowest dose possible to manage pain    LISINOPRIL (PRINIVIL;ZESTRIL) 2.5 MG TABLET    TAKE ONE TABLET BY MOUTH DAILY    METOPROLOL SUCCINATE (TOPROL XL) 50 MG EXTENDED RELEASE TABLET    take 1 tablet by mouth twice a day    OLANZAPINE (ZYPREXA) 15 MG TABLET    Take 1 tablet by mouth nightly Indications: 1/2 tab every morning and 1 tab at bedtime    OLANZAPINE (ZYPREXA) 7.5 MG TABLET    Take 1 tablet by mouth every morning    SPIRONOLACTONE (ALDACTONE) 25 MG TABLET    TAKE ONE TABLET BY MOUTH DAILY       ALLERGIES     Allergies   Allergen Reactions    Tape Rubia Blonder Tape]        FAMILY HISTORY     Family History   Problem Relation Age of Onset    Emphysema Mother     Mental Illness Mother     Substance Abuse Mother     Stroke Mother     Heart Attack Father     Mental Illness Father     Arthritis Father     Heart Disease Father     High Blood Pressure Father     High Cholesterol Father        SOCIAL HISTORY     Social History     Tobacco Use    Smoking status: Former     Packs/day: 2.00     Years: 20.00     Pack years: 40.00     Types: Cigarettes    Smokeless tobacco: Never   Vaping Use    Vaping Use: Never used   Substance Use Topics    Alcohol use: Yes     Comment: social    Drug use: Yes     Types: Marijuana (Weed)     Comment: daily for pain       PHYSICAL EXAM     ED Triage Vitals [01/17/23 1609]   BP Temp Temp Source Heart Rate Resp SpO2 Height Weight   (!) 112/58 98.4 °F (36.9 °C) Oral (!) 103 20 100 % 5' (1.524 m) 187 lb (84.8 kg)     Body mass index is 36.52 kg/m². Initial vital signs and nursing assessment reviewed and abnormal from tachycardia . Physical Exam  Vitals and nursing note reviewed. Constitutional:       General: She is not in acute distress. Appearance: Normal appearance. She is not ill-appearing, toxic-appearing or diaphoretic. HENT:      Head: Normocephalic and atraumatic. Right Ear: External ear normal.      Left Ear: External ear normal.      Nose: Nose normal. No congestion or rhinorrhea. Mouth/Throat:      Mouth: Mucous membranes are moist.      Pharynx: Oropharynx is clear. No oropharyngeal exudate or posterior oropharyngeal erythema. Eyes:      General: No scleral icterus. Right eye: No discharge. Left eye: No discharge. Extraocular Movements: Extraocular movements intact.       Conjunctiva/sclera: Conjunctivae normal.      Pupils: Pupils are equal, round, and reactive to light. Cardiovascular:      Rate and Rhythm: Normal rate and regular rhythm. Pulses: Normal pulses. Heart sounds: Normal heart sounds. No murmur heard. No gallop. Pulmonary:      Effort: Pulmonary effort is normal. No respiratory distress. Breath sounds: Normal breath sounds. No stridor. No wheezing, rhonchi or rales. Abdominal:      General: Bowel sounds are normal. There is no distension. Palpations: Abdomen is soft. Tenderness: There is abdominal tenderness in the epigastric area, periumbilical area and suprapubic area. There is no right CVA tenderness, left CVA tenderness, guarding or rebound. Negative signs include Luna's sign, Rovsing's sign, McBurney's sign, psoas sign and obturator sign. Genitourinary:     Rectum: Normal. No tenderness. Musculoskeletal:         General: No swelling, tenderness, deformity or signs of injury. Cervical back: Neck supple. No rigidity or tenderness. Right lower leg: No edema. Left lower leg: No edema. Lymphadenopathy:      Cervical: No cervical adenopathy. Skin:     General: Skin is warm and dry. Capillary Refill: Capillary refill takes less than 2 seconds. Coloration: Skin is not jaundiced or pale. Findings: No bruising, erythema, lesion or rash. Neurological:      General: No focal deficit present. Mental Status: She is alert and oriented to person, place, and time. Mental status is at baseline. Psychiatric:         Mood and Affect: Mood normal.         Behavior: Behavior normal.         Thought Content:  Thought content normal.         Judgment: Judgment normal.        FORMAL DIAGNOSTIC RESULTS     RADIOLOGY: Interpretation per the Radiologist below, if available at the time of this note (none if blank):    CT ABDOMEN PELVIS W IV CONTRAST Additional Contrast? None    (Results Pending)       LABS: (none if blank)  Labs Reviewed   CBC WITH AUTO DIFFERENTIAL - Abnormal; Notable for the following components:       Result Value    WBC 23.0 (*)     Segs Absolute 19.9 (*)     Monocytes Absolute 1.8 (*)     Immature Grans (Abs) 0.11 (*)     All other components within normal limits   COMPREHENSIVE METABOLIC PANEL W/ REFLEX TO MG FOR LOW K - Abnormal; Notable for the following components:    Glucose 136 (*)     CO2 22 (*)     All other components within normal limits   ANION GAP - Abnormal; Notable for the following components:    Anion Gap 17.0 (*)     All other components within normal limits   LIPASE   GLOMERULAR FILTRATION RATE, ESTIMATED   OSMOLALITY   BLOOD OCCULT STOOL SCREEN #1   URINALYSIS       (Any cultures that may have been sent were not resulted at the time of this patient visit)    MEDICAL DECISION MAKING     1) Number and Complexity of Problems            Problem List This Visit:         Chief Complaint   Patient presents with    Abdominal Pain    Rectal Bleeding           Differential Diagnosis includes (but not limited to):  IBS, pancreatitis, IBD, Colitis, GI bleed, Gastroduodenal ulcer, hemorrhoids,        Diagnoses Considered with low index of suspicion:   AAA, pyelonephritis             Pertinent Comorbid Conditions:    IBS, V-fib, Rhabdomyosarcoma, psychosis, Bipolar disorder, Hep, C    2)  Data Reviewed (none if blank or additional interpretation can be found in ED Course)          My Independent interpretations:     EKG:      Sinus tach normal segment length with no QT prolongation or STEMI noted. Imaging: CT abdomen ordered pending results    Labs:      Labs showing leukocytosis but otherwise no relevant changes noted. Decision Rules/Clinical Scores utilized:              External Documentation Reviewed:   Previous patient encounter documents & history available on EMR was reviewed as available.       3)  Treatment and Disposition         ED Reassessment: Patient reassessed multiple times pain was mildly manageable after fentanyl and morphine administered. Patient signed out to night shift for further evaluation, treatment and disposition. Case discussed with consulting clinician:           Shared Decision-Making was performed and disposition discussed with the        patient/caregiver at bedside with all questions answered. Social determinants of health impacting treatment or disposition:           Code Status: Full code      Summary of Patient Presentation:      MDM  Number of Diagnoses or Management Options  Colitis: new, needed workup  Leukocytosis, unspecified type: new, needed workup  Rectal bleeding: new, needed workup  Diagnosis management comments: Patient complaining of abdominal pain that is worse than normal and associated with hematochezia today per patient. Patient stated that she has never had hematochezia before but the pain initially felt like her normal IBS but did not resolve and in fact worsened. She had 1 episode of vomiting and is currently not nauseous. Pain was treated with fentanyl initially later offered Toradol but patient refused and requested morphine specifically. Patient then given morphine to manage her pain. Patient was refusing to go to CT multiple times stating that her pain was too severe. After patient's pain decreased with the morphine and I discussed with her importance of obtaining CT patient agreed. Occult stool tested showing positive blood and patient stool. Patient was signed out to night shift pending the results of CT and disposition.        Amount and/or Complexity of Data Reviewed  Clinical lab tests: ordered and reviewed  Tests in the radiology section of CPT®: ordered  Discuss the patient with other providers: yes    Risk of Complications, Morbidity, and/or Mortality  Presenting problems: moderate  Diagnostic procedures: minimal  Management options: moderate    /  ED Course as of 01/18/23 1456   Tue Jan 17, 2023 1955 Patient had refused CT multiple times stating that she did not want to go until her pain was gone. Discussed the importance of having CT performed despite presence of pain and then offered further pain management. Patient agreed to go to the CT there next available time. [AA]   1958 Patient refused Toradol stated that the only thing that helps her with her pain is morphine and patient refused to go to CT until her pain was managed. [AA]   2145 CT ABDOMEN PELVIS W IV CONTRAST Additional Contrast? None [CC]      ED Course User Index  [AA] Jozef Fulton MD  [CC] Mai Canela MD     Vitals Reviewed:    Vitals:    01/17/23 1609 01/17/23 1830   BP: (!) 112/58 115/83   Pulse: (!) 103 81   Resp: 20 22   Temp: 98.4 °F (36.9 °C)    TempSrc: Oral    SpO2: 100% 100%   Weight: 187 lb (84.8 kg)    Height: 5' (1.524 m)        The patient was seen and examined. Appropriate diagnostic testing was performed and results reviewed with the patient and/or caregivers. The results of pertinent diagnostic studies and exam findings were discussed. The patients provisional diagnosis and plan of care were discussed with the patient and present caregivers who expressed understanding. Any medications were reviewed and indications and risks of medications were discussed. Strict verbal and written return precautions, instructions and appropriate follow-up were provided to the patient. ED Medications administered this visit:  (None if blank)  Medications   pantoprazole (PROTONIX) 40 mg in sodium chloride (PF) 0.9 % 10 mL injection (40 mg IntraVENous Given 1/17/23 1826)   ketorolac (TORADOL) injection 15 mg (has no administration in time range)   fentaNYL (SUBLIMAZE) injection 25 mcg (25 mcg IntraVENous Given 1/17/23 1826)       PROCEDURES: (None if blank)  Procedures:     CRITICAL CARE: (None if blank)    DISCHARGE PRESCRIPTIONS: (None if blank)  New Prescriptions    No medications on file         FINAL IMPRESSION     Final diagnoses:   None     No diagnosis found.     Porsha Claire / JOAQUÍN DISPOSITION        OUTPATIENT FOLLOW UP THE PATIENT:  No follow-up provider specified. This transcription was electronically signed. Parts of this transcriptions may have been dictated by use of voice recognition software and electronically transcribed, and parts may have been transcribed with the assistance of an ED scribe. The transcription may contain errors not detected in proofreading. Please refer to my supervising physician's documentation if my documentation differs.     Electronically Signed: Jozef Fulton MD, 01/17/23, 7:57 PM        Jozef Fulton MD  Resident  01/18/23 6472

## 2023-01-18 NOTE — ED NOTES
Attempted to medicate pt, pt refusing Toradol. Pt states \"only morphine works for my pain. \" Provider made aware.       [facility]  01/17/23 1462

## 2023-01-18 NOTE — ED NOTES
Report given to [facility], [facility]  01/17/23 3491

## 2023-01-18 NOTE — ED NOTES
Pt ambulated to bathroom for urine specimen, tolerated well. Pt states her pain has improved \"a little bit. \" No other needs or concerns expressed at this time.  [facility]     [facility]  01/17/23 2129

## 2023-01-18 NOTE — PLAN OF CARE
37F w/ acute generalized abdo pain disproportionate to P/E findings w/ query acute bloody diarrhea w/ CT A/P findings significant for inflammatory changes from the splenic flexure into the rectum. A/P: c/w ARACELI ischemic colitis; GI panel and C.diff pending; even if non-C diff infectious etiology no Abx warranted at this time unless severely symptomatic w/ +++ bloody diarrhea; noted started on IV Abx on admission d/c'ed. NPO. Bowel rest; IVF and supportive care for now. Serum amylase and LDH sent. Lactate WNLs. Other W/U depending on clinical course and findings. No endoscopy warranted at this time especially given concerns for ischemic vs infectious colitis.   Pt reports gross blood w/ BM since yesterday although no BM since admission; denies melena/hematemesis/N/V.

## 2023-01-18 NOTE — ED PROVIDER NOTES
Transfer of Care Note:   Physician Signing out: Dr. Mey Hollis  Receiving Physician: Flory Chung MD  Sign out time: 8:30pm      Brief history:  Godwin Farrell is a 42 y.o. female presents to the emergency department for evaluation of abdominal pain with hematochezia. History of IBS, this morning she had bright red blood in her stool worse abdominal cramping. 1 episode of vomiting due pain from abdominal cramping. Denies trauma, hematemesis, shortness of breath, chest pain, fever. The patient has no other acute complaints at this time. Items pending that need to be checked:  CT abdomen pelvis    Tentative Impression of patient:  Patient comfortable with abdominal cramps but stable in no cardiopulmonary distress. Expected disposition of patient:  Pending results, admitted. Additional Assessment and results:   I have personally performed a face to face diagnostic evaluation on this patient. The patient's initial evaluation and plan have been discussed with the prior physician who initially evaluated the patient. Nursing Notes, Past Medical Hx, Past Surgical Hx, Social Hx, Allergies, vital signs and Family Hx were all reviewed. Vitals:    01/18/23 0035   BP: 113/87   Pulse: (!) 101   Resp: 22   Temp:    SpO2: 98%     Physical Exam  General appearance: No apparent distress, appears stated age and cooperative. HEENT: Pupils equal, round, and reactive to light. Conjunctivae/corneas clear. Neck: Supple, with full range of motion. No jugular venous distention. Trachea midline. Respiratory:  Normal respiratory effort. Clear to auscultation, bilaterally without Rales/Wheezes/Rhonchi. Cardiovascular: Regular rate and rhythm with normal S1/S2 without murmurs, rubs or gallops. Abdomen: Abdomen tender to palpation. Musculoskeletal: passive and active ROM x 4 extremities. Skin: Skin color, texture, turgor normal.  No rashes or lesions.    Neurologic:  Neurovascularly intact without any focal sensory/motor deficits.  Cranial nerves: II-XII intact, grossly non-focal.  Psychiatric: Alert and oriented, thought content appropriate, normal insight  Capillary Refill: Brisk,< 3 seconds   Peripheral Pulses: +2 palpable, equal bilaterally       Labs Reviewed   CBC WITH AUTO DIFFERENTIAL - Abnormal; Notable for the following components:       Result Value    WBC 23.0 (*)     Segs Absolute 19.9 (*)     Monocytes Absolute 1.8 (*)     Immature Grans (Abs) 0.11 (*)     All other components within normal limits   COMPREHENSIVE METABOLIC PANEL W/ REFLEX TO MG FOR LOW K - Abnormal; Notable for the following components:    Glucose 136 (*)     CO2 22 (*)     All other components within normal limits   ANION GAP - Abnormal; Notable for the following components:    Anion Gap 17.0 (*)     All other components within normal limits   MICROSCOPIC URINALYSIS - Abnormal; Notable for the following components:    Ketones, Urine 15 (*)     Specific Gravity, UA >1.030 (*)     Leukocytes, UA TRACE (*)     All other components within normal limits   CULTURE, BLOOD 1   CULTURE, BLOOD 2   LIPASE   GLOMERULAR FILTRATION RATE, ESTIMATED   OSMOLALITY   BLOOD OCCULT STOOL SCREEN #1         Medications   fentaNYL (SUBLIMAZE) injection 25 mcg (has no administration in time range)   0.9 % sodium chloride infusion (has no administration in time range)   morphine (PF) injection 1 mg (has no administration in time range)     Or   morphine (PF) injection 2 mg (has no administration in time range)   pantoprazole (PROTONIX) 80 mg in sodium chloride 0.9 % 50 mL bolus (has no administration in time range)   pantoprazole (PROTONIX) 40 mg in sodium chloride (PF) 0.9 % 10 mL injection (has no administration in time range)   dicyclomine (BENTYL) injection 20 mg (has no administration in time range)   piperacillin-tazobactam (ZOSYN) 4,500 mg in dextrose 5 % 100 mL IVPB (mini-bag) (has no administration in time range)   fentaNYL (SUBLIMAZE) injection 25 mcg (25 mcg IntraVENous Given 1/17/23 1826)   morphine injection 4 mg (4 mg IntraVENous Given 1/17/23 2006)   iopamidol (ISOVUE-370) 76 % injection 80 mL (80 mLs IntraVENous Given 1/17/23 2050)   0.9 % sodium chloride bolus (500 mLs IntraVENous New Bag 1/17/23 2329)   morphine (PF) injection 1 mg (1 mg IntraVENous Given 1/18/23 0037)         CT ABDOMEN PELVIS W IV CONTRAST Additional Contrast? None   Final Result   Impression:   Significant inflammatory changes from the splenic flexure into the rectum    concerning primarily for inflammatory bowel disease, infectious etiology    not excluded. This document has been electronically signed by: Baldo Lopez MD on    01/17/2023 09:29 PM      All CTs at this facility use dose modulation techniques and iterative    reconstructions, and/or weight-based dosing   when appropriate to reduce radiation to a low as reasonably achievable. ED Course as of 01/18/23 0124 Tue Jan 17, 2023 1955 Patient had refused CT multiple times stating that she did not want to go until her pain was gone. Discussed the importance of having CT performed despite presence of pain and then offered further pain management. Patient agreed to go to the CT there next available time. [AA]   1958 Patient refused Toradol stated that the only thing that helps her with her pain is morphine and patient refused to go to CT until her pain was managed. [AA]   2145 CT ABDOMEN PELVIS W IV CONTRAST Additional Contrast? None [CC]      ED Course User Index  [AA] Elisabet Calzada MD  [CC] Nkechi Marks MD         Further MDM and disposition:   Assessment:   Colitis with leukocytosis. Plan:   Patient reports a history of IBS, chart review shows positive hepatitis C antibody with family medicine and referral to Dr. Oc Acosta on 9/13/2022. Presented with significant abdominal pain and leukocytosis.   CT abdomen demonstrates significant bowel inflammation from the splenic flexure to the rectum with concerns of inflammatory bowel disease. Patient given fentanyl for pain control, home dose of her clonazepam given tonight for anxiety. In addition to Dr. Shannon Nicolas orders: Blood cultures x2, Zosyn and IV fluids. Case discussed with GI who agreed with admission and will follow patient. Discussed with admitting hospitalist for colitis with leukocytosis. Final diagnoses:   Rectal bleeding   Colitis   Leukocytosis, unspecified type     New Prescriptions    No medications on file       Condition: condition: stable  Dispo: Admit to med/surg floor    This transcription was electronically signed. It was dictated by use of voice recognition software and electronically transcribed. The transcription may contain errors not detected in proofreading.         Mey Pineda MD  Resident  01/18/23 7631

## 2023-01-18 NOTE — PROGRESS NOTES
Pharmacy Note - Extended Infusion Beta-Lactam Adjustment    Piperacillin/Tazobactam 4500 mg q6h ordered for treatment of intra-abdominal infection. Per [facility] Extended Infusion Beta-Lactam Policy, 6236 mg O0E will be changed to loading dose of 4500 mg x 1 then 3375 mg q8h. Estimated Creatinine Clearance: Estimated Creatinine Clearance: 121 mL/min (based on SCr of 0.6 mg/dL). BMI: Body mass index is 36.19 kg/m². Rationale for Adjustment: Agent demonstrates time-dependent effect on bacterial eradication. Extended-infusion dosing strategy aims to enhance microbiologic and clinical efficacy. Pharmacy will continue to monitor cultures and sensitivities (where available) and adjust dose as necessary. Please call with any questions.     Thank you,  Samantha Winchester [facility]  1/18/2023  3:24 AM

## 2023-01-18 NOTE — ED NOTES
Pt resting in bed, respirations even and unlabored. No needs expressed at this time. Call light within reach.  [facility]  01/17/23 3817

## 2023-01-18 NOTE — ED NOTES
Pt medicated per MAR at this time. Pt complains of pain 9/10. Telemetry in place. Call light ib reach.       Rachel Guy, DORIAN  01/17/23 2153

## 2023-01-18 NOTE — ED PROVIDER NOTES
Patient signed out to me by my evening colleague. 38-year-old female presenting to the emergency department for evaluation of abdominal pain with possible hematochezia. She has a history of IBS. It felt like her IBS was bothering her. She does not have any history of IBD. She took ibuprofen and Norco this morning with mild pain relief. She stated that she had bright red blood in her stool she was concerned because she had never had rectal bleeding before. Here today the patient is hemodynamically stable. Pending CT examinations. Sees Dr. Kwadwo [facility].     Labs Reviewed   CBC WITH AUTO DIFFERENTIAL - Abnormal; Notable for the following components:       Result Value    WBC 23.0 (*)     Segs Absolute 19.9 (*)     Monocytes Absolute 1.8 (*)     Immature Grans (Abs) 0.11 (*)     All other components within normal limits   COMPREHENSIVE METABOLIC PANEL W/ REFLEX TO MG FOR LOW K - Abnormal; Notable for the following components:    Glucose 136 (*)     CO2 22 (*)     All other components within normal limits   ANION GAP - Abnormal; Notable for the following components:    Anion Gap 17.0 (*)     All other components within normal limits   MICROSCOPIC URINALYSIS - Abnormal; Notable for the following components:    Ketones, Urine 15 (*)     Specific Gravity, UA >1.030 (*)     Leukocytes, UA TRACE (*)     All other components within normal limits   CBC WITH AUTO DIFFERENTIAL - Abnormal; Notable for the following components:    WBC 18.1 (*)     Segs Absolute 12.7 (*)     Monocytes Absolute 1.8 (*)     Immature Grans (Abs) 0.08 (*)     All other components within normal limits   BASIC METABOLIC PANEL - Abnormal; Notable for the following components:    Potassium 3.2 (*)     All other components within normal limits   CULTURE, BLOOD 1   CULTURE, BLOOD 2   GASTROINTESTINAL PANEL, MOLECULAR   LIPASE   GLOMERULAR FILTRATION RATE, ESTIMATED   OSMOLALITY   BLOOD OCCULT STOOL SCREEN #1 HEMOGLOBIN AND HEMATOCRIT   LACTIC ACID   ANION GAP   GLOMERULAR FILTRATION RATE, ESTIMATED   HEMOGLOBIN AND HEMATOCRIT     CT ABDOMEN PELVIS W IV CONTRAST Additional Contrast? None   Final Result   Impression:   Significant inflammatory changes from the splenic flexure into the rectum    concerning primarily for inflammatory bowel disease, infectious etiology    not excluded. This document has been electronically signed by: Nicole Mitchell MD on    01/17/2023 09:29 PM      All CTs at this facility use dose modulation techniques and iterative    reconstructions, and/or weight-based dosing   when appropriate to reduce radiation to a low as reasonably achievable. 1. Rectal bleeding    2. Colitis    3. Leukocytosis, unspecified type        I have seen this patient with Dr. Joe Griffin and agree with his assessment and plan.      Anabella Calzada, DO  01/18/23 1551

## 2023-01-18 NOTE — CONSULTS
Consult History & Physical      Patient:  Fabian Gates  YOB: 1985  MRN: [id number]     Acct: [de-identified]    Chief Complaint:    Chief Complaint   Patient presents with    Abdominal Pain    Rectal Bleeding       Date of Service: Pt seen/examined in consultation on 1/18/2023    History Of Present Illness:      42 y.o. female who we are asked to see/evaluate by Ale Dillon MD for medical management of colitis. She came to the ED yesterday for lower abdominal pain, nausea, vomiting, and bloody diarrhea. Her symptoms started yesterday. Her lower abdominal pain is intermittent and aggravated with certain positions. The pain medication helps alleviate some of the pain. She feels her nausea and vomiting are secondary to the pain. Denies hematemesis. She has a history of IBS-D with associated cramping and initially thought her \"IBS was flaring up. \" However she started passing bright red blood in her stools. She does endorse some rectal discomfort, but no itching or burning. She is not on anticoagulation. She takes ibuprofen and Norco BID for chronic pain and has for approximately 1 year. She denies recent travel, ATB use, and contact with sick persons. She has never had an EGD or colonoscopy. Hgb 13.0. Initial WBC elevated at 23. CT A/P demonstrated significant inflammatory changes from the splenic flexure into the rectum. She has a history of Hepatitis C for which she recently established care at [address] [place] [address] 09/2022. She was given orders for blood work to begin the process to get treatment, however she did not get the blood work done and has not followed up.     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 Authorizing Provider   metoprolol succinate (TOPROL XL) 50 MG extended release tablet take 1 tablet by mouth twice a day 1/11/23   Pavan Leroy DO   clonazePAM (KLONOPIN) 0.5 MG tablet Take 1 tablet by mouth nightly as needed for Anxiety for up to 15 doses. 1/10/23 2/14/23  Pavan Leroy DO   HYDROcodone-acetaminophen (NORCO) 5-325 MG per tablet Take 1 tablet by mouth every 8 hours as needed for Pain for up to 30 days. Intended supply: 30 days. Take lowest dose possible to manage pain 1/20/23 2/19/23  Pavan Leroy DO   gabapentin (NEURONTIN) 400 MG capsule Take 1 capsule by mouth in the morning, at noon, and at bedtime for 30 days.  11/21/22 1/18/23  Pavan Leroy DO   atorvastatin (LIPITOR) 20 MG tablet take 1 tablet by mouth once daily 11/8/22   Pavan Leroy DO   spironolactone (ALDACTONE) 25 MG tablet TAKE ONE TABLET BY MOUTH DAILY 11/8/22   Pavan Leroy DO   OLANZapine (ZYPREXA) 15 MG tablet Take 1 tablet by mouth nightly Indications: 1/2 tab every morning and 1 tab at bedtime  Patient taking differently: Take 15 mg by mouth nightly 10/11/22 4/9/23  Pavan Leroy DO   lisinopril (PRINIVIL;ZESTRIL) 2.5 MG tablet TAKE ONE TABLET BY MOUTH DAILY 10/11/22   Pavan Leroy DO   OLANZapine (ZYPREXA) 7.5 MG tablet Take 1 tablet by mouth every morning 10/11/22 4/9/23 Kailyn Baum,    aspirin EC 81 MG EC tablet Take 1 tablet by mouth daily 9/27/22   China Granados MD       Surgical History:  Past Surgical History:   Procedure Laterality Date    ACHILLES TENDON SURGERY      BONE MARROW TRANSPLANT      BRONCHOSCOPY N/A 5/16/2019    BRONCHOSCOPY ALVEOLAR LAVAGE performed by Robbie Medley MD at [facility]  5/16/2019    BRONCHOSCOPY THERAPUTIC ASPIRATION INITIAL performed by Robbie Medley MD at [facility] N/A 5/19/2019    BRONCHOSCOPY ALVEOLAR LAVAGE performed by Robbie Medley MD at [facility]  5/19/2019    BRONCHOSCOPY THERAPUTIC ASPIRATION INITIAL performed by Robbie Medley MD at [address] [place] [address]  4/15/2014    Laparascopic    ENDOSCOPY, COLON, DIAGNOSTIC      FOOT SURGERY      GROWTH PLATE SURGERY      LEG SURGERY         Family History:  Family History   Problem Relation Age of Onset    Emphysema Mother     Mental Illness Mother     Substance Abuse Mother     Stroke Mother     Heart Attack Father     Mental Illness Father     Arthritis Father     Heart Disease Father     High Blood Pressure Father     High Cholesterol Father        Past GI History:  IBS-D, Hepatitis C not yet treated  Dr. Uday Huitron patient    Allergies:  Tape [place] tape]    Social History:   TOBACCO:   reports that she has quit smoking. Her smoking use included cigarettes. She has a 40.00 pack-year smoking history. She has never used smokeless tobacco.  ETOH:   reports current alcohol use. Review Of Systems  GENERAL: No fever, chills or weight loss. EYES:  No blurred vision, double vision   CARDIOVASCULAR: No chest pain or palpitations. RESPIRATORY:  No dyspnea or cough. GI:  See HPI  MUSCULOSKELETAL: No new painful or swollen joints or myalgias. :   No dysuria or hematuria. SKIN:  No rashes or jaundice.     NEUROLOGIC:  No headaches or seizures, numbness or tingling of arms, or legs. PSYCH:  No anxiety or depression. ENDOCRINE:  No polyuria or polydipsia. BLOOD:  No anemia, bleeding disorder, blood or blood product transfusion. PHYSICAL EXAM:  /81   Pulse (!) 115   Temp 98.4 °F (36.9 °C) (Oral)   Resp 18   Ht 5' (1.524 m)   Wt 185 lb 4.8 oz (84.1 kg)   LMP 06/03/2019   SpO2 98%   BMI 36.19 kg/m²     General appearance: No apparent distress, appears stated age and cooperative. HEENT: Normal cephalic, atraumatic without obvious deformity. Pupils equal, round, and reactive to light. Neck: Supple, with full range of motion. No jugular venous distention. Trachea midline. Respiratory:  Normal respiratory effort. Clear to auscultation, bilaterally without Rales/Wheezes/Rhonchi. Cardiovascular: Regular rate and rhythm without murmurs, rubs or gallops. Abdomen: Soft, obese, tender to lower abdomen with palpation, non-distended with active bowel sounds. Musculoskeletal: No clubbing, cyanosis or edema bilaterally. LLE smaller in size from the knee down than RLE. Skin: Pink, warm, dry. No rashes or lesions. Neurologic: Alert and oriented, thought content appropriate, normal insight  Rectal: No masses, fissures, or hemorrhoids noted. No blood or stool returned. Labs:   Recent Labs     01/18/23  0345   WBC 18.1*   HGB 13.0  13.0   HCT 38.8  38.5        Recent Labs     01/18/23  0345      K 3.2*      CO2 23   BUN 11   CREATININE 0.5   CALCIUM 9.0     Recent Labs     01/17/23  1619   AST 28   ALT 38   BILITOT 0.5   ALKPHOS 86     No results for input(s): INR in the last 72 hours. Radiology:   CT abdomen/pelvis W IV contrast 01/17/23  Findings:   No remarkable acute changes at the lung bases. Normal cardiac volume. Status post cholecystectomy. Tiny hiatal hernia. No focal primal pathology    within the liver or spleen. Normal kidneys and adrenal glands. Mild    pancreatic atrophy.  Post cholecystectomy, bile duct distention. No obvious    gastric or duodenal pathology. Mild distention of the proximal jejunum just beyond the ligament of Treitz    with mild fold thickening is potentially significant in light of severe    fold thickening in the splenic flexure and extending into the left colon. Single wall thickness of the distal left colon is 7.7 mm in at the mid    left colon is 1.0 cm. These findings are concerning for inflammatory bowel    disease and ulcerative colitis should be considered primary there is    appropriate history or genetic predisposition. The stranding surrounding the affected segments of colon from the splenic    flexure into the rectum is most conspicuous surrounding the left colon. Recent antibiotic use would also suggest possibility of C. difficile    infection and pseudomembranous colitis, stool samples suggested for    exclusion of this consideration within the differential diagnosis. Urinary    bladder is grossly normal. There is no obvious uterine or adnexal    pathology. Normal appendix is seen in the right upper pelvis. There are no    other areas of concern for inflammatory changes within the small bowel    outside of the ligament of Treitz. The visualized skeletal structures are intact with normal mineralization. Impression   Impression:   Significant inflammatory changes from the splenic flexure into the rectum    concerning primarily for inflammatory bowel disease, infectious etiology    not excluded.      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  H/O nonischemic cardiomyopathy/CHF/MI s/p AICD placement    PLAN:    IVF @ 74  IVPB Cipro  IVPB Flagyl  Stop IVP PPI  NPO except ice chips, sips with meds, popsicles (not red)  Make Bentyl prn  No need for narcotic analgesics from GI standpoint  Pain & nausea control per primary  Electrolyte management per primary  NSAID cessation or decreasing amount discussed  Nursing to monitor stool output & document  Stool panel  Will need a colonoscopy OP  Hepatitis panel  Will need to complete labs & follow-up OP for Hepatitis C treatment  RN updated  Supportive care per primary team  Will follow       Case reviewed and impression/plan reviewed in collaboration with Dr. Anabella Winters  Electronically signed by JAYLYN Navarro - CNP on 1/18/2023 at [address] [place] [address]  Thank you for the consultation.       Late Entry:   IVPB Cipro & Flagyl discontinued to by primary team.  - Recommend IVPB ATBs, given symptomatic colitis with elevated WBC  - Will need a colonoscopy OP  - Follow-up with Jakob Vivas in 1 month  GI signing off

## 2023-01-18 NOTE — PLAN OF CARE
Problem: Discharge Planning  Goal: Discharge to home or other facility with appropriate resources  Outcome: Progressing  Flowsheets  Taken 1/18/2023 0615  Discharge to home or other facility with appropriate resources: Identify barriers to discharge with patient and caregiver  Taken 1/18/2023 0142  Discharge to home or other facility with appropriate resources: Identify barriers to discharge with patient and caregiver     Problem: Pain  Goal: Verbalizes/displays adequate comfort level or baseline comfort level  Outcome: Progressing  Flowsheets (Taken 1/18/2023 0615)  Verbalizes/displays adequate comfort level or baseline comfort level: Assess pain using appropriate pain scale     Problem: Safety - Adult  Goal: Free from fall injury  Outcome: Progressing  Flowsheets (Taken 1/18/2023 0615)  Free From Fall Injury: Instruct family/caregiver on patient safety

## 2023-01-18 NOTE — H&P
Hospitalist - History & Physical      Patient: Beverly Farfan    Unit/Bed:23/023A  YOB: 1985  MRN: [id number]   Acct: [de-identified]   PCP: Samson Amador DO    Date of Service: Pt seen/examined on 01/18/23  and Admitted to Inpatient with expected LOS greater than two midnights due to medical therapy. Chief Complaint:  abdominal pain and rectal bleeding. Assessment and Plan:-  Sepsis: SIRS 2/4 (leukocytosis of 23 with left shift and tachycardia) criteria met with LA of 1.4. Source of infection: possible infectious colitis on CT scan. On zosyn. Blood cx sent. Avoiding aggressive fluid resuscitation due to reduced EF. On IVF. Acute GI bleed/history of gastric ulcer : likely LGIB as colitis noted on CT, however cannot exclude UGIB as FOBT + and reports excessive NSAIDs use and has hx of gastric ulcer. On IV PPI. Hgb stable at 13.3. GI consulted by ER provider. Will monitor H&H. Holding DVT ppx and NPO for now till GI eval.   Colitis: CT was revealing of significant inflammatory changes from the splenic flexure into the rectum concerning for inflammatory bowel disease versus infectious etiology. Ddx: infectious vs IBD (no fhx or personal hx of IBD) vs radiation associated (hx of XRT). GI consulted by ER. On antibiotics as listed above. GI panel. Pain control: scheduled bentyl and morphine PRN. 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 Followed by Dr. Jade Gandara. Continue home statin, metoprolol succinate, lisinopril and spironolactone. Bipolar disorder: continue home olanzapine and gabapentin. Anxiety: continue home clonazepam PRN. History of rhabdomyosarcoma (in 1986, tx with chemo/XRT)  Hx of IBS: noted.        History Of Present Illness:    Ms. Beverly Farfan is a 42 y.o. female with PMHx of rhabdomyosarcoma (in 1986, tx with chemo/XRT), Non-ischemic cardiomyopathy/HFrEF (due to adriamycin), IBS who presents with abdominal pain and rectal bleeding. Symptoms started this morning. Abdominal pain is intermittent, cramping in nature, 9-10 in severity, located at suprapubic region. No alleviating/aggravating factors. Reports maroonish blood from rectum X 2, about coffee cup in amount. No rectal pain, but had abdominal pain preceding bm. No pruritis at anal region. No prior history of similar symptoms. No unusual food intake. No fhx of IBD. No fever/chills. Reports chest pain from anxiety. No SOB. Had one episode of vomiting (non bloody) this morning. No issues with urination. She takes ibuprofen 600 mg daily for few years now. No fhx of colorectal cancer. No prior EGD or colonoscopy. Also she has been lightheaded reports poor p.o. intake. At ED, she was tachycardic and had leukocytosis, thus concerning for sepsis and CT was revealing of significant inflammatory changes from the splenic flexure into the rectum concerning for inflammatory bowel disease versus infectious etiology. GI were consulted by ED provider, blood cultures were sent and patient was started on Zosyn. Additionally FOBT was positive at ED.     Past Medical History:        Diagnosis Date    Acute on chronic systolic congestive heart failure (HCC)     Acute respiratory failure (HCC) 5/14/2019    Arrhythmia     Arthritis     Aspiration pneumonitis (HCC)     Bipolar disorder ([address]     Cancer (HCC)     rhabdomyosarcoma    Cancer ([address]     Cardiac arrest ([address] 05/2019    VF    Cardiac arrest with ventricular fibrillation ([address]     Cardiomyopathy ([address] 05/2019    EF= 40% with global hypokinesis    Chronic kidney disease     COPD (chronic obstructive pulmonary disease) (HCC)     Depression     E. coli infection     Family history of early CAD     Hepatitis C     Hepatitis C antibody positive in blood 05/17/2019    Hyperlipidemia     Paranoia (psychosis) ([address]     Pneumonia due to infectious organism     Rhabdomyosarcoma ([facility]     Scoliosis     Smoker     Tachycardia     VF (ventricular fibrillation) ([facility]        Past Surgical History:        Procedure Laterality Date    ACHILLES TENDON SURGERY      BONE MARROW TRANSPLANT      BRONCHOSCOPY N/A 5/16/2019    BRONCHOSCOPY ALVEOLAR LAVAGE performed by Mandy Davidson MD at [place] [address]  5/16/2019    BRONCHOSCOPY THERAPUTIC ASPIRATION INITIAL performed by Mandy Davidson MD at [place] [address] N/A 5/19/2019    BRONCHOSCOPY ALVEOLAR LAVAGE performed by Mandy Davidson MD at [place] [address]  5/19/2019    BRONCHOSCOPY THERAPUTIC ASPIRATION INITIAL performed by Mandy Davidson MD at [address] [place] [address]  4/15/2014    Laparascopic    ENDOSCOPY, COLON, DIAGNOSTIC      FOOT SURGERY      GROWTH PLATE SURGERY      LEG SURGERY         Home Medications:   No current facility-administered medications on file prior to encounter. Current Outpatient Medications on File Prior to Encounter   Medication Sig Dispense Refill    metoprolol succinate (TOPROL XL) 50 MG extended release tablet take 1 tablet by mouth twice a day 60 tablet 3    clonazePAM (KLONOPIN) 0.5 MG tablet Take 1 tablet by mouth nightly as needed for Anxiety for up to 15 doses. 15 tablet 0    [START ON 1/20/2023] HYDROcodone-acetaminophen (NORCO) 5-325 MG per tablet Take 1 tablet by mouth every 8 hours as needed for Pain for up to 30 days. Intended supply: 30 days. Take lowest dose possible to manage pain 90 tablet 0    gabapentin (NEURONTIN) 400 MG capsule Take 1 capsule by mouth in the morning, at noon, and at bedtime for 30 days.  90 capsule 2    atorvastatin (LIPITOR) 20 MG tablet take 1 tablet by mouth once daily 30 tablet 5    spironolactone (ALDACTONE) 25 MG tablet TAKE ONE TABLET BY MOUTH DAILY 90 tablet 1    OLANZapine (ZYPREXA) 15 MG tablet Take 1 tablet by mouth nightly Indications: 1/2 tab every morning and 1 tab at bedtime (Patient taking differently: Take 15 mg by mouth nightly) 30 tablet 5    lisinopril (PRINIVIL;ZESTRIL) 2.5 MG tablet TAKE ONE TABLET BY MOUTH DAILY 90 tablet 2    OLANZapine (ZYPREXA) 7.5 MG tablet Take 1 tablet by mouth every morning 30 tablet 5    aspirin EC 81 MG EC tablet Take 1 tablet by mouth daily 90 tablet 1       Allergies:    Tape [adhesive tape]    Social History:    reports that she has quit smoking. Her smoking use included cigarettes. She has a 40.00 pack-year smoking history. She has never used smokeless tobacco. She reports current alcohol use. She reports current drug use. Drug: Marijuana Veryl Dan). Family History:       Problem Relation Age of Onset    Emphysema Mother     Mental Illness Mother     Substance Abuse Mother     Stroke Mother     Heart Attack Father     Mental Illness Father     Arthritis Father     Heart Disease Father     High Blood Pressure Father     High Cholesterol Father        Diet:  No diet orders on file    Review of systems:   Pertinent positives as noted in the HPI. All other systems reviewed and negative. PHYSICAL EXAM:  /80   Pulse (!) 116   Temp 98.4 °F (36.9 °C) (Oral)   Resp 17   Ht 5' (1.524 m)   Wt 187 lb (84.8 kg)   LMP 06/03/2019   SpO2 98%   BMI 36.52 kg/m²   General appearance: In significant distress due to pain  HEENT: Normal cephalic, atraumatic without obvious deformity. Pupils equal, round, and reactive to light. Extra ocular muscles intact. Conjunctivae/corneas clear. Dry oral mucosa  Neck: Supple, with full range of motion. No jugular venous distention. Trachea midline. Respiratory:  Normal respiratory effort. Clear to auscultation, bilaterally without Rales/Wheezes/Rhonchi. Cardiovascular: Regular rate and rhythm with normal S1/S2 without murmurs, rubs or gallops. Abdomen: Soft, tender on palpation at the lower abdominal/suprapubic region, non-distended with normal bowel sounds.   Musculoskeletal: No clubbing, cyanosis or edema bilaterally. Skin: Skin color, texture, turgor normal.  No rashes or lesions. Neurologic:  Neurovascularly intact without any focal sensory/motor deficits. Cranial nerves: II-XII intact, grossly non-focal.  Psychiatric: Alert and oriented, thought content appropriate, normal insight    Labs:   Recent Labs     01/17/23  1619   WBC 23.0*   HGB 13.3   HCT 40.0        Recent Labs     01/17/23  1619      K 3.7      CO2 22*   BUN 16   CREATININE 0.6   CALCIUM 9.6     Recent Labs     01/17/23  1619   AST 28   ALT 38   BILITOT 0.5   ALKPHOS 86     No results for input(s): INR in the last 72 hours. No results for input(s): Ken [place] in the last 72 hours. Urinalysis:    Lab Results   Component Value Date/Time    NITRU NEGATIVE 01/17/2023 09:20 PM    WBCUA 10-15 01/17/2023 09:20 PM    BACTERIA FEW 01/17/2023 09:20 PM    RBCUA 0-2 01/17/2023 09:20 PM    BLOODU NEGATIVE 01/17/2023 09:20 PM    SPECGRAV >1.030 01/17/2023 09:20 PM    GLUCOSEU 100 05/14/2019 06:12 PM    GLUCOSEU NEGATIVE 06/02/2010 07:35 PM       Radiology:   CT ABDOMEN PELVIS W IV CONTRAST Additional Contrast? None   Final Result   Impression:   Significant inflammatory changes from the splenic flexure into the rectum    concerning primarily for inflammatory bowel disease, infectious etiology    not excluded. This document has been electronically signed by: Veronica Luciano MD on    01/17/2023 09:29 PM      All CTs at this facility use dose modulation techniques and iterative    reconstructions, and/or weight-based dosing   when appropriate to reduce radiation to a low as reasonably achievable. CT ABDOMEN PELVIS W IV CONTRAST Additional Contrast? None    Result Date: 1/17/2023  CT abdomen and pelvis with contrast Comparison: None. Findings: No remarkable acute changes at the lung bases. Normal cardiac volume. Status post cholecystectomy. Tiny hiatal hernia.  No focal primal pathology within the liver or spleen. Normal kidneys and adrenal glands. Mild pancreatic atrophy. Post cholecystectomy, bile duct distention. No obvious gastric or duodenal pathology. Mild distention of the proximal jejunum just beyond the ligament of Treitz with mild fold thickening is potentially significant in light of severe fold thickening in the splenic flexure and extending into the left colon. Single wall thickness of the distal left colon is 7.7 mm in at the mid left colon is 1.0 cm. These findings are concerning for inflammatory bowel disease and ulcerative colitis should be considered primary there is appropriate history or genetic predisposition. The stranding surrounding the affected segments of colon from the splenic flexure into the rectum is most conspicuous surrounding the left colon. Recent antibiotic use would also suggest possibility of C. difficile infection and pseudomembranous colitis, stool samples suggested for exclusion of this consideration within the differential diagnosis. Urinary bladder is grossly normal. There is no obvious uterine or adnexal pathology. Normal appendix is seen in the right upper pelvis. There are no other areas of concern for inflammatory changes within the small bowel outside of the ligament of Treitz. The visualized skeletal structures are intact with normal mineralization. Impression: Significant inflammatory changes from the splenic flexure into the rectum concerning primarily for inflammatory bowel disease, infectious etiology not excluded. This document has been electronically signed by: Stan Leyden, MD on 01/17/2023 09:29 PM All CTs at this facility use dose modulation techniques and iterative reconstructions, and/or weight-based dosing when appropriate to reduce radiation to a low as reasonably achievable.         EKG: Pending    Electronically signed by Dev Valverde MD on 1/18/2023 at 12:32 AM

## 2023-01-19 LAB
ANION GAP SERPL CALCULATED.3IONS-SCNC: 13 MEQ/L (ref 8–16)
APTT: 27.4 SECONDS (ref 22–38)
BASOPHILS # BLD: 0.4 %
BASOPHILS ABSOLUTE: 0 THOU/MM3 (ref 0–0.1)
BUN BLDV-MCNC: 7 MG/DL (ref 7–22)
C CAYETANENSIS DNA SPEC QL NAA+PROBE: NOT DETECTED
C DIFF TOX GENS STL QL NAA+PROBE: NOT DETECTED
CALCIUM SERPL-MCNC: 8.3 MG/DL (ref 8.5–10.5)
CAMPY SP DNA.DIARRHEA STL QL NAA+PROBE: NOT DETECTED
CHLORIDE BLD-SCNC: 104 MEQ/L (ref 98–111)
CO2: 22 MEQ/L (ref 23–33)
CREAT SERPL-MCNC: 0.4 MG/DL (ref 0.4–1.2)
CRYPTOSP DNA SPEC QL NAA+PROBE: NOT DETECTED
E COLI O157H7 DNA SPEC QL NAA+PROBE: NORMAL
E HISTOLYT DNA SPEC QL NAA+PROBE: NOT DETECTED
EAEC PAA PLAS AGGR+AATA ST NAA+NON-PRB: NOT DETECTED
EC STX1+STX2 + H7 FLIC SPEC NAA+PROBE: NOT DETECTED
EOSINOPHIL # BLD: 0.9 %
EOSINOPHILS ABSOLUTE: 0.1 THOU/MM3 (ref 0–0.4)
EPEC EAE GENE STL QL NAA+NON-PROBE: NOT DETECTED
ERYTHROCYTE [DISTWIDTH] IN BLOOD BY AUTOMATED COUNT: 11.9 % (ref 11.5–14.5)
ERYTHROCYTE [DISTWIDTH] IN BLOOD BY AUTOMATED COUNT: 38.2 FL (ref 35–45)
ETEC LTA+ST1A+ST1B TOX ST NAA+NON-PROBE: NOT DETECTED
G LAMBLIA DNA SPEC QL NAA+PROBE: NOT DETECTED
GFR SERPL CREATININE-BSD FRML MDRD: > 60 ML/MIN/1.73M2
GLUCOSE BLD-MCNC: 71 MG/DL (ref 70–108)
HADV DNA SPEC QL NAA+PROBE: NOT DETECTED
HASTV RNA SPEC QL NAA+PROBE: NOT DETECTED
HAV IGM SER IA-ACNC: NEGATIVE
HCT VFR BLD AUTO: 36.1 % (ref 37–47)
HCT VFR BLD CALC: 36.3 % (ref 37–47)
HCT VFR BLD CALC: 36.4 % (ref 37–47)
HEMOGLOBIN: 11.9 GM/DL (ref 12–16)
HEMOGLOBIN: 12.1 GM/DL (ref 12–16)
HEPATITIS B CORE IGM ANTIBODY: NEGATIVE
HEPATITIS B SURFACE ANTIGEN: NEGATIVE
HEPATITIS C ANTIBODY: POSITIVE
HGB BLD-MCNC: 12 GM/DL (ref 12–16)
IMMATURE GRANS (ABS): 0.03 THOU/MM3 (ref 0–0.07)
IMMATURE GRANULOCYTES: 0.3 %
INR BLD: 1.02 (ref 0.85–1.13)
IRON: 95 UG/DL (ref 50–170)
LYMPHOCYTES # BLD: 17.6 %
LYMPHOCYTES ABSOLUTE: 1.9 THOU/MM3 (ref 1–4.8)
MCH RBC QN AUTO: 28.9 PG (ref 26–33)
MCHC RBC AUTO-ENTMCNC: 32.7 GM/DL (ref 32.2–35.5)
MCV RBC AUTO: 88.3 FL (ref 81–99)
MONOCYTES # BLD: 10.7 %
MONOCYTES ABSOLUTE: 1.2 THOU/MM3 (ref 0.4–1.3)
NOROVIRUS GI + GII RNA STL NAA+PROBE: NOT DETECTED
NUCLEATED RED BLOOD CELLS: 0 /100 WBC
P SHIGELLOIDES DNA STL QL NAA+PROBE: NOT DETECTED
PLATELET # BLD: 287 THOU/MM3 (ref 130–400)
PMV BLD AUTO: 9.7 FL (ref 9.4–12.4)
POTASSIUM SERPL-SCNC: 3.3 MEQ/L (ref 3.5–5.2)
RBC # BLD: 4.12 MILL/MM3 (ref 4.2–5.4)
RV RNA SPEC QL NAA+PROBE: NOT DETECTED
SALMONELLA DNA SPEC QL NAA+PROBE: NOT DETECTED
SAPOVIRUS RNA SPEC QL NAA+PROBE: NOT DETECTED
SEG NEUTROPHILS: 70.1 %
SEGMENTED NEUTROPHILS ABSOLUTE COUNT: 7.6 THOU/MM3 (ref 1.8–7.7)
SHIGELLA SP+EIEC IPAH ST NAA+NON-PROBE: NOT DETECTED
SODIUM BLD-SCNC: 139 MEQ/L (ref 135–145)
TOTAL IRON BINDING CAPACITY: 255 UG/DL (ref 171–450)
V CHOLERAE DNA SPEC QL NAA+PROBE: NOT DETECTED
VIBRIO DNA SPEC NAA+PROBE: NOT DETECTED
WBC # BLD: 10.9 THOU/MM3 (ref 4.8–10.8)
Y ENTERO RECN STL QL NAA+PROBE: NOT DETECTED

## 2023-01-19 PROCEDURE — 85025 COMPLETE CBC W/AUTO DIFF WBC: CPT

## 2023-01-19 PROCEDURE — 83540 ASSAY OF IRON: CPT

## 2023-01-19 PROCEDURE — 6370000000 HC RX 637 (ALT 250 FOR IP)

## 2023-01-19 PROCEDURE — 36415 COLL VENOUS BLD VENIPUNCTURE: CPT

## 2023-01-19 PROCEDURE — 83550 IRON BINDING TEST: CPT

## 2023-01-19 PROCEDURE — 80074 ACUTE HEPATITIS PANEL: CPT

## 2023-01-19 PROCEDURE — 2580000003 HC RX 258: Performed by: STUDENT IN AN ORGANIZED HEALTH CARE EDUCATION/TRAINING PROGRAM

## 2023-01-19 PROCEDURE — 85014 HEMATOCRIT: CPT

## 2023-01-19 PROCEDURE — 2580000003 HC RX 258: Performed by: NURSE PRACTITIONER

## 2023-01-19 PROCEDURE — 6360000002 HC RX W HCPCS

## 2023-01-19 PROCEDURE — 6370000000 HC RX 637 (ALT 250 FOR IP): Performed by: NURSE PRACTITIONER

## 2023-01-19 PROCEDURE — 6370000000 HC RX 637 (ALT 250 FOR IP): Performed by: STUDENT IN AN ORGANIZED HEALTH CARE EDUCATION/TRAINING PROGRAM

## 2023-01-19 PROCEDURE — 87507 IADNA-DNA/RNA PROBE TQ 12-25: CPT

## 2023-01-19 PROCEDURE — 99233 SBSQ HOSP IP/OBS HIGH 50: CPT | Performed by: HOSPITALIST

## 2023-01-19 PROCEDURE — 2140000000 HC CCU INTERMEDIATE R&B

## 2023-01-19 PROCEDURE — 85610 PROTHROMBIN TIME: CPT

## 2023-01-19 PROCEDURE — 80048 BASIC METABOLIC PNL TOTAL CA: CPT

## 2023-01-19 PROCEDURE — 85730 THROMBOPLASTIN TIME PARTIAL: CPT

## 2023-01-19 PROCEDURE — 6370000000 HC RX 637 (ALT 250 FOR IP): Performed by: HOSPITALIST

## 2023-01-19 PROCEDURE — 85018 HEMOGLOBIN: CPT

## 2023-01-19 RX ORDER — CLONAZEPAM 0.5 MG/1
0.5 TABLET ORAL ONCE
Status: COMPLETED | OUTPATIENT
Start: 2023-01-19 | End: 2023-01-19

## 2023-01-19 RX ADMIN — MORPHINE SULFATE 2 MG: 2 INJECTION, SOLUTION INTRAMUSCULAR; INTRAVENOUS at 06:12

## 2023-01-19 RX ADMIN — SODIUM CHLORIDE: 9 INJECTION, SOLUTION INTRAVENOUS at 06:17

## 2023-01-19 RX ADMIN — GABAPENTIN 400 MG: 400 CAPSULE ORAL at 16:09

## 2023-01-19 RX ADMIN — POTASSIUM CHLORIDE 40 MEQ: 1500 TABLET, EXTENDED RELEASE ORAL at 06:13

## 2023-01-19 RX ADMIN — OLANZAPINE 15 MG: 5 TABLET, FILM COATED ORAL at 20:04

## 2023-01-19 RX ADMIN — MORPHINE SULFATE 2 MG: 2 INJECTION, SOLUTION INTRAMUSCULAR; INTRAVENOUS at 22:31

## 2023-01-19 RX ADMIN — SPIRONOLACTONE 25 MG: 25 TABLET ORAL at 08:50

## 2023-01-19 RX ADMIN — OLANZAPINE 7.5 MG: 5 TABLET, FILM COATED ORAL at 08:50

## 2023-01-19 RX ADMIN — CLONAZEPAM 0.5 MG: 0.5 TABLET ORAL at 20:04

## 2023-01-19 RX ADMIN — GABAPENTIN 400 MG: 400 CAPSULE ORAL at 20:04

## 2023-01-19 RX ADMIN — DICYCLOMINE HYDROCHLORIDE 20 MG: 10 CAPSULE ORAL at 16:13

## 2023-01-19 RX ADMIN — MORPHINE SULFATE 2 MG: 2 INJECTION, SOLUTION INTRAMUSCULAR; INTRAVENOUS at 02:13

## 2023-01-19 RX ADMIN — ATORVASTATIN CALCIUM 20 MG: 20 TABLET, FILM COATED ORAL at 20:04

## 2023-01-19 RX ADMIN — SODIUM CHLORIDE: 9 INJECTION, SOLUTION INTRAVENOUS at 20:04

## 2023-01-19 RX ADMIN — CLONAZEPAM 0.5 MG: 0.5 TABLET ORAL at 02:16

## 2023-01-19 RX ADMIN — LISINOPRIL 2.5 MG: 2.5 TABLET ORAL at 08:50

## 2023-01-19 RX ADMIN — DICYCLOMINE HYDROCHLORIDE 20 MG: 10 CAPSULE ORAL at 08:50

## 2023-01-19 RX ADMIN — MORPHINE SULFATE 2 MG: 2 INJECTION, SOLUTION INTRAMUSCULAR; INTRAVENOUS at 14:18

## 2023-01-19 RX ADMIN — MORPHINE SULFATE 2 MG: 2 INJECTION, SOLUTION INTRAMUSCULAR; INTRAVENOUS at 18:31

## 2023-01-19 RX ADMIN — METOPROLOL SUCCINATE 50 MG: 50 TABLET, EXTENDED RELEASE ORAL at 20:04

## 2023-01-19 RX ADMIN — SODIUM CHLORIDE, PRESERVATIVE FREE 10 ML: 5 INJECTION INTRAVENOUS at 20:04

## 2023-01-19 RX ADMIN — METOPROLOL SUCCINATE 50 MG: 50 TABLET, EXTENDED RELEASE ORAL at 08:50

## 2023-01-19 RX ADMIN — MORPHINE SULFATE 2 MG: 2 INJECTION, SOLUTION INTRAMUSCULAR; INTRAVENOUS at 10:11

## 2023-01-19 RX ADMIN — GABAPENTIN 400 MG: 400 CAPSULE ORAL at 08:50

## 2023-01-19 ASSESSMENT — PAIN - FUNCTIONAL ASSESSMENT
PAIN_FUNCTIONAL_ASSESSMENT: ACTIVITIES ARE NOT PREVENTED

## 2023-01-19 ASSESSMENT — PAIN DESCRIPTION - LOCATION
LOCATION: ABDOMEN

## 2023-01-19 ASSESSMENT — PAIN DESCRIPTION - DESCRIPTORS
DESCRIPTORS: CRAMPING
DESCRIPTORS: ACHING
DESCRIPTORS: CRAMPING
DESCRIPTORS: ACHING
DESCRIPTORS: DULL;SHARP
DESCRIPTORS: SHARP
DESCRIPTORS: CRAMPING
DESCRIPTORS: SHARP
DESCRIPTORS: SHARP
DESCRIPTORS: DULL;SHARP

## 2023-01-19 ASSESSMENT — PAIN SCALES - GENERAL
PAINLEVEL_OUTOF10: 8
PAINLEVEL_OUTOF10: 8
PAINLEVEL_OUTOF10: 7
PAINLEVEL_OUTOF10: 8
PAINLEVEL_OUTOF10: 8
PAINLEVEL_OUTOF10: 9
PAINLEVEL_OUTOF10: 8
PAINLEVEL_OUTOF10: 9
PAINLEVEL_OUTOF10: 5
PAINLEVEL_OUTOF10: 8
PAINLEVEL_OUTOF10: 7
PAINLEVEL_OUTOF10: 8
PAINLEVEL_OUTOF10: 9
PAINLEVEL_OUTOF10: 8
PAINLEVEL_OUTOF10: 8
PAINLEVEL_OUTOF10: 6
PAINLEVEL_OUTOF10: 7
PAINLEVEL_OUTOF10: 9
PAINLEVEL_OUTOF10: 8
PAINLEVEL_OUTOF10: 7
PAINLEVEL_OUTOF10: 7

## 2023-01-19 ASSESSMENT — PAIN DESCRIPTION - PAIN TYPE
TYPE: ACUTE PAIN

## 2023-01-19 ASSESSMENT — PAIN DESCRIPTION - ORIENTATION
ORIENTATION: LEFT
ORIENTATION: LOWER
ORIENTATION: MID;LOWER
ORIENTATION: LOWER
ORIENTATION: MID;LOWER

## 2023-01-19 ASSESSMENT — PAIN DESCRIPTION - FREQUENCY
FREQUENCY: CONTINUOUS

## 2023-01-19 ASSESSMENT — PAIN DESCRIPTION - ONSET
ONSET: ON-GOING

## 2023-01-19 ASSESSMENT — PAIN SCALES - WONG BAKER
WONGBAKER_NUMERICALRESPONSE: 0
WONGBAKER_NUMERICALRESPONSE: 0

## 2023-01-19 NOTE — PROGRESS NOTES
Hospitalist Progress Note      Patient:  Carlos Lavelle    Unit/Bed:3B-32/032-A  YOB: 1985  MRN: [id number]   Acct: [de-identified]   PCP: Brian Garrido,   Date of Admission: 1/17/2023    Assessment/Plan:    Acute generalized abdo(Query bloody) diarrhea: Improved  Pain disproportionate to P/E findings w/ query acute bloody diarrhea w/ CT A/P findings significant for inflammatory changes from the splenic flexure into the rectum. A/P: c/w ARACELI ischemic colitis; GI panel and C.diff pending; even if non-C diff infectious etiology no Abx warranted at this time unless severely symptomatic w/ +++ bloody diarrhea; noted started on IV Abx on admission d/c'ed. NPO. Bowel rest; IVF and supportive care for now. Serum amylase and LDH sent. Lactate WNLs. Other W/U depending on clinical course and findings. No endoscopy warranted at this time especially given concerns for ischemic vs infectious colitis. However, pt would benefit from C-scope OP  Pt reports gross blood w/ BM since yesterday although no BM since admission; denies melena/hematemesis/N/V. Lastly, in context of HCV one should also consider PCT given chronic crampy abdo pain. However, pt denies photosensitivity. No recurrent diarrhea; abdo pain improved; trial of CL diet started; will advance gradually as tolerated after. 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 Followed by Dr. Keyshawn Romero. Continue home statin, metoprolol succinate, lisinopril and spironolactone. Hx of Bipolar/Anxiety disorder: continue home olanzapine and gabapentin and Clonazepam   .   History of rhabdomyosarcoma (in 1986, tx with chemo/XRT)    Known HCV +ve: known to [facility]; but post follow-up. Counseling offered about potential treatment availability. HIV screen negative. Hx of IBS: noted. Obesity w/ BMI 36.31; on diet w/ intentional Wt loss; encouragements offered. Chief Complaint: Abdp pain and diarrhea    Initial H and P:-    Admitted for evaluation and management of query bloody diarrhea and colitis. Noted Gi service consulted on admission. Subjective (past 24 hours):   Feeling improved. Abdo pain improved. Denies any bowel movements/N/V. Past medical history, family history, social history and allergies reviewed again and is unchanged since admission. ROS (All review of systems completed. Pertinent positives noted.  Otherwise All other systems reviewed and negative.)     Medications:  Reviewed    Infusion Medications    sodium chloride      sodium chloride 75 mL/hr at 01/19/23 0617     Scheduled Medications    atorvastatin  20 mg Oral Daily    gabapentin  400 mg Oral TID    lisinopril  2.5 mg Oral Daily    metoprolol succinate  50 mg Oral BID    OLANZapine  15 mg Oral Nightly    OLANZapine  7.5 mg Oral QAM    spironolactone  25 mg Oral Daily    sodium chloride flush  5-40 mL IntraVENous 2 times per day     PRN Meds: sodium chloride flush, sodium chloride, ondansetron **OR** ondansetron, acetaminophen **OR** acetaminophen, potassium chloride **OR** potassium alternative oral replacement **OR** potassium chloride, clonazePAM, dicyclomine, magnesium sulfate, morphine **OR** morphine      Intake/Output Summary (Last 24 hours) at 1/19/2023 8669  Last data filed at 1/19/2023 0207  Gross per 24 hour   Intake 50 ml   Output 0 ml   Net 50 ml       Diet:  Diet NPO Exceptions are: [employer], Sips of Water with Meds, Popsicles    Physical Exam:  /67   Pulse (!) 113   Temp 97.8 °F (36.6 °C) (Oral)   Resp 16   Ht 5' (1.524 m)   Wt 185 lb 14.4 oz (84.3 kg)   LMP 06/03/2019   SpO2 98%   BMI 36.31 kg/m²   General appearance: Obese. No apparent distress, appears stated age and cooperative. HEENT: Pupils equal, round, and reactive to light. Conjunctivae/corneas clear. Neck: Supple, with full range of motion. No jugular venous distention. Trachea midline. Respiratory:  Normal respiratory effort. Clear to auscultation, bilaterally without Rales/Wheezes/Rhonchi. Cardiovascular: Regular rate and rhythm with normal S1/S2 without murmurs, rubs or gallops. Abdomen: Soft, round, obese, non-tender, non-distended with normal bowel sounds. Musculoskeletal: passive and active ROM x 4 extremities. LLE muscle atroph; surgical site well-healed w/o signs of infection/drainage/ulcer  Skin: Skin color, texture, turgor normal.  No rashes or lesions. Neurologic:  Neurovascularly intact without any focal sensory/motor deficits. Cranial nerves: II-XII intact, grossly non-focal.  Psychiatric: Alert and oriented, thought content appropriate, normal insight  Capillary Refill: Brisk,< 3 seconds   Peripheral Pulses: +2 palpable, equal bilaterally     Labs:   Recent Labs     01/17/23  1619 01/18/23  0345 01/18/23  1846 01/19/23  0333 01/19/23  0706   WBC 23.0* 18.1*  --  10.9*  --    HGB 13.3 13.0  13.0 11.9* 11.9* 12.1   HCT 40.0 38.8  38.5 36.8* 36.4* 36.3*    338  --  287  --      Recent Labs     01/17/23  1619 01/18/23  0345 01/19/23  0333    139 139   K 3.7 3.2* 3.3*    101 104   CO2 22* 23 22*   BUN 16 11 7   CREATININE 0.6 0.5 0.4   CALCIUM 9.6 9.0 8.3*     Recent Labs     01/17/23 1619   AST 28   ALT 38   BILITOT 0.5   ALKPHOS 86     Recent Labs     01/19/23  0333   INR 1.02     No results for input(s): Lorenso Favre in the last 72 hours.     Microbiology:    Blood culture #1:   Lab Results Component Value Date/Time    BC No growth 24 hours. 01/18/2023 01:07 AM    BC No growth 24 hours. 01/18/2023 01:07 AM       Blood culture #2:  Lab Results   Component Value Date/Time    BLOODCULT2 No growth after 5 days of incubation. 11/06/2019 12:41 PM       Organism:  Lab Results   Component Value Date/Time    ORG Staphylococcus aureus 05/16/2019 11:50 AM    ORG Candida albicans 05/16/2019 11:50 AM         Lab Results   Component Value Date/Time    LABGRAM 2+ WBC's (Polymorphonuclear)  No organisms seen   05/23/2019 10:30 AM       MRSA culture only:No results found for: [address] [place] [address]    Urine culture:   Lab Results   Component Value Date/Time    LABURIN No growth at 18-36 hours 05/17/2019 11:52 AM       Respiratory culture:   Lab Results   Component Value Date/Time    CULTRESP No growth 36-48 hours 05/23/2019 10:30 AM       Aerobic and Anaerobic :  No results found for: LABAERO  No results found for: LABANAE    Urinalysis:      Lab Results   Component Value Date/Time    NITRU NEGATIVE 01/17/2023 09:20 PM    WBCUA 10-15 01/17/2023 09:20 PM    BACTERIA FEW 01/17/2023 09:20 PM    RBCUA 0-2 01/17/2023 09:20 PM    BLOODU NEGATIVE 01/17/2023 09:20 PM    SPECGRAV >1.030 01/17/2023 09:20 PM    GLUCOSEU 100 05/14/2019 06:12 PM    GLUCOSEU NEGATIVE 06/02/2010 07:35 PM       Radiology:  CT ABDOMEN PELVIS W IV CONTRAST Additional Contrast? None   Final Result   Impression:   Significant inflammatory changes from the splenic flexure into the rectum    concerning primarily for inflammatory bowel disease, infectious etiology    not excluded. This document has been electronically signed by: Tristen Marques MD on    01/17/2023 09:29 PM      All CTs at this facility use dose modulation techniques and iterative    reconstructions, and/or weight-based dosing   when appropriate to reduce radiation to a low as reasonably achievable.         CT ABDOMEN PELVIS W IV CONTRAST Additional Contrast? None    Result Date: 1/17/2023  CT abdomen and pelvis with contrast Comparison: None. Findings: No remarkable acute changes at the lung bases. Normal cardiac volume. Status post cholecystectomy. Tiny hiatal hernia. No focal primal pathology within the liver or spleen. Normal kidneys and adrenal glands. Mild pancreatic atrophy. Post cholecystectomy, bile duct distention. No obvious gastric or duodenal pathology. Mild distention of the proximal jejunum just beyond the ligament of Treitz with mild fold thickening is potentially significant in light of severe fold thickening in the splenic flexure and extending into the left colon. Single wall thickness of the distal left colon is 7.7 mm in at the mid left colon is 1.0 cm. These findings are concerning for inflammatory bowel disease and ulcerative colitis should be considered primary there is appropriate history or genetic predisposition. The stranding surrounding the affected segments of colon from the splenic flexure into the rectum is most conspicuous surrounding the left colon. Recent antibiotic use would also suggest possibility of C. difficile infection and pseudomembranous colitis, stool samples suggested for exclusion of this consideration within the differential diagnosis. Urinary bladder is grossly normal. There is no obvious uterine or adnexal pathology. Normal appendix is seen in the right upper pelvis. There are no other areas of concern for inflammatory changes within the small bowel outside of the ligament of Treitz. The visualized skeletal structures are intact with normal mineralization. Impression: Significant inflammatory changes from the splenic flexure into the rectum concerning primarily for inflammatory bowel disease, infectious etiology not excluded.  This document has been electronically signed by: Jay Jay Mcmullen MD on 01/17/2023 09:29 PM All CTs at this facility use dose modulation techniques and iterative reconstructions, and/or weight-based dosing when appropriate to reduce radiation to a low as reasonably achievable. With RN in room, patient was updated about and agreed upon the treatment plan, all the questions and concerns were addressed.     Electronically signed by Eugenia Garg MD on 1/19/2023 at 8:11 AM

## 2023-01-19 NOTE — PLAN OF CARE
Problem: Discharge Planning  Goal: Discharge to home or other facility with appropriate resources  Outcome: Progressing  Flowsheets (Taken 1/18/2023 2310)  Discharge to home or other facility with appropriate resources: Identify barriers to discharge with patient and caregiver     Problem: Pain  Goal: Verbalizes/displays adequate comfort level or baseline comfort level  Outcome: Progressing  Flowsheets (Taken 1/18/2023 2310)  Verbalizes/displays adequate comfort level or baseline comfort level: Assess pain using appropriate pain scale     Problem: Safety - Adult  Goal: Free from fall injury  Outcome: Progressing  Flowsheets (Taken 1/18/2023 2310)  Free From Fall Injury: Instruct family/caregiver on patient safety     Problem: Chronic Conditions and Co-morbidities  Goal: Patient's chronic conditions and co-morbidity symptoms are monitored and maintained or improved  Outcome: Progressing  Flowsheets (Taken 1/18/2023 2310)  Care Plan - Patient's Chronic Conditions and Co-Morbidity Symptoms are Monitored and Maintained or Improved: Monitor and assess patient's chronic conditions and comorbid symptoms for stability, deterioration, or improvement

## 2023-01-19 NOTE — CARE COORDINATION
1/19/23, 11:56 AM EST    DISCHARGE ON GOING EVALUATION    [facility] day: 1  Location: [facility]/032-A Reason for admit: Colitis [K52.9]  Rectal bleeding [K62.5]  Leukocytosis, unspecified type [D72.829]   Procedure:   1/17 CT abd/pelvis: Significant inflammatory changes from the splenic flexure into the rectum concerning primarily for inflammatory bowel disease, infectious etiology not excluded. Barriers to Discharge: Hospitalist following. WBC down to 10.9 (18.1). Hepatitis screen, positive for Hep C, others negative. IVF. Morphine iv prn pain. Room air. Afebrile. Starting CL diet today. Spoke with Hospitalist, possible discharge tomorrow if tolerating diet. PCP: Ruiz Saucedo DO  Readmission Risk Score: 10.5%  Patient Goals/Plan/Treatment Preferences: Plans home with her mother. May need a ride home.

## 2023-01-20 LAB
ANION GAP SERPL CALC-SCNC: 13 MEQ/L (ref 8–16)
BACTERIA BLD AEROBE CULT: NORMAL
BACTERIA BLD AEROBE CULT: NORMAL
BASOPHILS ABSOLUTE: 0 THOU/MM3 (ref 0–0.1)
BASOPHILS NFR BLD AUTO: 0.4 %
BUN SERPL-MCNC: 5 MG/DL (ref 7–22)
CALCIUM SERPL-MCNC: 9 MG/DL (ref 8.5–10.5)
CHLORIDE SERPL-SCNC: 104 MEQ/L (ref 98–111)
CO2 SERPL-SCNC: 24 MEQ/L (ref 23–33)
CREAT SERPL-MCNC: 0.5 MG/DL (ref 0.4–1.2)
DEPRECATED RDW RBC AUTO: 37.8 FL (ref 35–45)
EOSINOPHIL NFR BLD AUTO: 1.3 %
EOSINOPHILS ABSOLUTE: 0.1 THOU/MM3 (ref 0–0.4)
ERYTHROCYTE [DISTWIDTH] IN BLOOD BY AUTOMATED COUNT: 11.9 % (ref 11.5–14.5)
GFR SERPL CREATININE-BSD FRML MDRD: > 60 ML/MIN/1.73M2
GLUCOSE SERPL-MCNC: 84 MG/DL (ref 70–108)
HCT VFR BLD AUTO: 37.8 % (ref 37–47)
HCT VFR BLD AUTO: 38.3 % (ref 37–47)
HGB BLD-MCNC: 12.4 GM/DL (ref 12–16)
HGB BLD-MCNC: 12.6 GM/DL (ref 12–16)
IMM GRANULOCYTES # BLD AUTO: 0.04 THOU/MM3 (ref 0–0.07)
IMM GRANULOCYTES NFR BLD AUTO: 0.4 %
LYMPHOCYTES ABSOLUTE: 2.1 THOU/MM3 (ref 1–4.8)
LYMPHOCYTES NFR BLD AUTO: 22.3 %
MCH RBC QN AUTO: 29.3 PG (ref 26–33)
MCHC RBC AUTO-ENTMCNC: 32.8 GM/DL (ref 32.2–35.5)
MCV RBC AUTO: 89.4 FL (ref 81–99)
MONOCYTES ABSOLUTE: 1.1 THOU/MM3 (ref 0.4–1.3)
MONOCYTES NFR BLD AUTO: 11.3 %
NEUTROPHILS NFR BLD AUTO: 64.3 %
NRBC BLD AUTO-RTO: 0 /100 WBC
PLATELET # BLD AUTO: 307 THOU/MM3 (ref 130–400)
PMV BLD AUTO: 9.4 FL (ref 9.4–12.4)
POTASSIUM SERPL-SCNC: 4.2 MEQ/L (ref 3.5–5.2)
RBC # BLD AUTO: 4.23 MILL/MM3 (ref 4.2–5.4)
SEGMENTED NEUTROPHILS ABSOLUTE COUNT: 6 THOU/MM3 (ref 1.8–7.7)
SODIUM SERPL-SCNC: 141 MEQ/L (ref 135–145)
WBC # BLD AUTO: 9.3 THOU/MM3 (ref 4.8–10.8)

## 2023-01-20 PROCEDURE — 2580000003 HC RX 258: Performed by: STUDENT IN AN ORGANIZED HEALTH CARE EDUCATION/TRAINING PROGRAM

## 2023-01-20 PROCEDURE — 6370000000 HC RX 637 (ALT 250 FOR IP): Performed by: STUDENT IN AN ORGANIZED HEALTH CARE EDUCATION/TRAINING PROGRAM

## 2023-01-20 PROCEDURE — 6360000002 HC RX W HCPCS

## 2023-01-20 PROCEDURE — 36415 COLL VENOUS BLD VENIPUNCTURE: CPT

## 2023-01-20 PROCEDURE — 99233 SBSQ HOSP IP/OBS HIGH 50: CPT | Performed by: HOSPITALIST

## 2023-01-20 PROCEDURE — 85014 HEMATOCRIT: CPT

## 2023-01-20 PROCEDURE — 85025 COMPLETE CBC W/AUTO DIFF WBC: CPT

## 2023-01-20 PROCEDURE — 6370000000 HC RX 637 (ALT 250 FOR IP): Performed by: HOSPITALIST

## 2023-01-20 PROCEDURE — 85018 HEMOGLOBIN: CPT

## 2023-01-20 PROCEDURE — 2140000000 HC CCU INTERMEDIATE R&B

## 2023-01-20 PROCEDURE — 80048 BASIC METABOLIC PNL TOTAL CA: CPT

## 2023-01-20 RX ORDER — PANTOPRAZOLE SODIUM 40 MG/1
40 TABLET, DELAYED RELEASE ORAL
Status: DISCONTINUED | OUTPATIENT
Start: 2023-01-21 | End: 2023-01-21 | Stop reason: HOSPADM

## 2023-01-20 RX ADMIN — MORPHINE SULFATE 2 MG: 2 INJECTION, SOLUTION INTRAMUSCULAR; INTRAVENOUS at 15:13

## 2023-01-20 RX ADMIN — SODIUM CHLORIDE, PRESERVATIVE FREE 10 ML: 5 INJECTION INTRAVENOUS at 20:13

## 2023-01-20 RX ADMIN — GABAPENTIN 400 MG: 400 CAPSULE ORAL at 14:09

## 2023-01-20 RX ADMIN — METOPROLOL SUCCINATE 50 MG: 50 TABLET, EXTENDED RELEASE ORAL at 07:56

## 2023-01-20 RX ADMIN — MORPHINE SULFATE 2 MG: 2 INJECTION, SOLUTION INTRAMUSCULAR; INTRAVENOUS at 19:21

## 2023-01-20 RX ADMIN — GABAPENTIN 400 MG: 400 CAPSULE ORAL at 07:56

## 2023-01-20 RX ADMIN — MORPHINE SULFATE 2 MG: 2 INJECTION, SOLUTION INTRAMUSCULAR; INTRAVENOUS at 06:46

## 2023-01-20 RX ADMIN — LISINOPRIL 2.5 MG: 2.5 TABLET ORAL at 12:59

## 2023-01-20 RX ADMIN — MORPHINE SULFATE 2 MG: 2 INJECTION, SOLUTION INTRAMUSCULAR; INTRAVENOUS at 10:58

## 2023-01-20 RX ADMIN — ATORVASTATIN CALCIUM 20 MG: 20 TABLET, FILM COATED ORAL at 20:07

## 2023-01-20 RX ADMIN — OLANZAPINE 7.5 MG: 5 TABLET, FILM COATED ORAL at 07:56

## 2023-01-20 RX ADMIN — METOPROLOL SUCCINATE 50 MG: 50 TABLET, EXTENDED RELEASE ORAL at 20:08

## 2023-01-20 RX ADMIN — OLANZAPINE 15 MG: 5 TABLET, FILM COATED ORAL at 20:08

## 2023-01-20 RX ADMIN — CLONAZEPAM 0.5 MG: 0.5 TABLET ORAL at 20:08

## 2023-01-20 RX ADMIN — GABAPENTIN 400 MG: 400 CAPSULE ORAL at 20:08

## 2023-01-20 RX ADMIN — MORPHINE SULFATE 2 MG: 2 INJECTION, SOLUTION INTRAMUSCULAR; INTRAVENOUS at 23:50

## 2023-01-20 RX ADMIN — MORPHINE SULFATE 2 MG: 2 INJECTION, SOLUTION INTRAMUSCULAR; INTRAVENOUS at 02:42

## 2023-01-20 RX ADMIN — SPIRONOLACTONE 25 MG: 25 TABLET ORAL at 07:56

## 2023-01-20 ASSESSMENT — PAIN DESCRIPTION - LOCATION
LOCATION: ABDOMEN

## 2023-01-20 ASSESSMENT — PAIN SCALES - WONG BAKER: WONGBAKER_NUMERICALRESPONSE: 0

## 2023-01-20 ASSESSMENT — PAIN DESCRIPTION - ORIENTATION
ORIENTATION: MID;LOWER
ORIENTATION: MID
ORIENTATION: MID
ORIENTATION: MID;LOWER
ORIENTATION: LEFT;RIGHT
ORIENTATION: MID;LOWER
ORIENTATION: RIGHT;LEFT

## 2023-01-20 ASSESSMENT — PAIN SCALES - GENERAL
PAINLEVEL_OUTOF10: 7
PAINLEVEL_OUTOF10: 6
PAINLEVEL_OUTOF10: 8
PAINLEVEL_OUTOF10: 6
PAINLEVEL_OUTOF10: 7
PAINLEVEL_OUTOF10: 8
PAINLEVEL_OUTOF10: 7
PAINLEVEL_OUTOF10: 6
PAINLEVEL_OUTOF10: 7
PAINLEVEL_OUTOF10: 6
PAINLEVEL_OUTOF10: 6

## 2023-01-20 ASSESSMENT — PAIN DESCRIPTION - ONSET
ONSET: ON-GOING

## 2023-01-20 ASSESSMENT — PAIN - FUNCTIONAL ASSESSMENT
PAIN_FUNCTIONAL_ASSESSMENT: PREVENTS OR INTERFERES SOME ACTIVE ACTIVITIES AND ADLS
PAIN_FUNCTIONAL_ASSESSMENT: ACTIVITIES ARE NOT PREVENTED
PAIN_FUNCTIONAL_ASSESSMENT: ACTIVITIES ARE NOT PREVENTED
PAIN_FUNCTIONAL_ASSESSMENT: PREVENTS OR INTERFERES SOME ACTIVE ACTIVITIES AND ADLS
PAIN_FUNCTIONAL_ASSESSMENT: ACTIVITIES ARE NOT PREVENTED

## 2023-01-20 ASSESSMENT — PAIN DESCRIPTION - FREQUENCY
FREQUENCY: CONTINUOUS

## 2023-01-20 ASSESSMENT — PAIN DESCRIPTION - PAIN TYPE
TYPE: ACUTE PAIN

## 2023-01-20 ASSESSMENT — PAIN DESCRIPTION - DESCRIPTORS
DESCRIPTORS: CRAMPING

## 2023-01-20 NOTE — PLAN OF CARE
Problem: Discharge Planning  Goal: Discharge to home or other facility with appropriate resources  Outcome: Progressing  Flowsheets (Taken 1/19/2023 0845)  Discharge to home or other facility with appropriate resources: Identify barriers to discharge with patient and caregiver  Note: Continue discharge planning. Patient continue to c/o abdominal pain requiring prn Morphine. Diet advanced to clear liquid, then increase in pain, physician recommend return to NPO. Problem: Pain  Goal: Verbalizes/displays adequate comfort level or baseline comfort level  Outcome: Progressing  Flowsheets (Taken 1/19/2023 0839)  Verbalizes/displays adequate comfort level or baseline comfort level: Assess pain using appropriate pain scale  Note: Prn IV morphine for pain control for abdominal pain. Also, PO bentyl for pain management/cramping. Pain goal 5/10. Problem: Safety - Adult  Goal: Free from fall injury  Outcome: Progressing     Problem: Chronic Conditions and Co-morbidities  Goal: Patient's chronic conditions and co-morbidity symptoms are monitored and maintained or improved  Outcome: Progressing  Flowsheets (Taken 1/19/2023 0845)  Care Plan - Patient's Chronic Conditions and Co-Morbidity Symptoms are Monitored and Maintained or Improved: Monitor and assess patient's chronic conditions and comorbid symptoms for stability, deterioration, or improvement  Note: Continue to monitor for stools. Stool/GI panel sent to lab today. No blood noted to stool. Care plan reviewed with patient. Patient  verbalize understanding of the plan of care and contribute to goal setting.

## 2023-01-20 NOTE — CARE COORDINATION
1/20/23, 12:19 PM EST    DISCHARGE ON GOING EVALUATION    [facility] day: 2  Location: -32/032-A Reason for admit: Colitis [K52.9]  Rectal bleeding [K62.5]  Leukocytosis, unspecified type [D72.829]   Procedure:   1/17 CT abd/pelvis: Significant inflammatory changes from the splenic flexure into the rectum concerning primarily for inflammatory bowel disease, infectious etiology not excluded. Barriers to Discharge: Hospitalist following. Hepatitis screen, positive for Hep C, others negative. Started on clear liquid diet yesterday, complaining of pain whenever she eats. WBC down to 9.3. Morphine iv pain control. Spoke with Hospitalist, since pt is having pain, will continue to monitor. No discharge today. PCP: Macrina Hobbs DO  Readmission Risk Score: 10.8%  Patient Goals/Plan/Treatment Preferences: Plans home with her mother. May need a ride home. Possible weekend discharge. 1/20/23, 12:28 PM EST    Patient goals/plan/ treatment preferences discussed by  and . Patient goals/plan/ treatment preferences reviewed with patient/ family. Patient/ family verbalize understanding of discharge plan and are in agreement with goal/plan/treatment preferences. Understanding was demonstrated using the teach back method. AVS provided by RN at time of discharge, which includes all necessary medical information pertaining to the patients current course of illness, treatment, post-discharge goals of care, and treatment preferences.

## 2023-01-20 NOTE — PLAN OF CARE
Problem: Discharge Planning  Goal: Discharge to home or other facility with appropriate resources  1/20/2023 1500 by Derrell Azar RN  Outcome: Progressing  Flowsheets (Taken 1/20/2023 1182)  Discharge to home or other facility with appropriate resources: Identify barriers to discharge with patient and caregiver  Note: Continue to assess discharge needs. Advance diet as tolerated. Patient continues to c/o abdominal cramping/pain. Full liquid diet at present. Problem: Pain  Goal: Verbalizes/displays adequate comfort level or baseline comfort level  1/20/2023 1500 by Derrell Azar RN  Outcome: Progressing  Flowsheets (Taken 1/20/2023 1125)  Verbalizes/displays adequate comfort level or baseline comfort level: Encourage patient to monitor pain and request assistance  Note: Prn Morphine for abdominal pain control, patient rates pain 7-8/10, improved to 6/10 with prn Morphine. Also, heating pad used for pain management.       Problem: Safety - Adult  Goal: Free from fall injury  1/20/2023 1500 by Derrell Azar RN  Outcome: Progressing  Flowsheets (Taken 1/20/2023 0012 by Michael Foster RN)  Free From Fall Injury: Instruct family/caregiver on patient safety  Note: Continue fall precautions, up with assist .     Problem: Chronic Conditions and Co-morbidities  Goal: Patient's chronic conditions and co-morbidity symptoms are monitored and maintained or improved  1/20/2023 1500 by Derrell Azar RN  Outcome: Progressing  Flowsheets (Taken 1/20/2023 0759)  Care Plan - Patient's Chronic Conditions and Co-Morbidity Symptoms are Monitored and Maintained or Improved: Monitor and assess patient's chronic conditions and comorbid symptoms for stability, deterioration, or improvement     Problem: Gastrointestinal - Adult  Goal: Minimal or absence of nausea and vomiting  Outcome: Progressing  Flowsheets (Taken 1/20/2023 1500)  Minimal or absence of nausea and vomiting: Advance diet as tolerated, if ordered  Note: Patient denies nausea/vomiting. Continues to c/o abdominal pain/cramping. Full liquid diet, advance as tolerated. Problem: Gastrointestinal - Adult  Goal: Maintains or returns to baseline bowel function  Outcome: Progressing  Flowsheets (Taken 1/20/2023 1500)  Maintains or returns to baseline bowel function: Assess bowel function  Note: Small liquid BM noted yesterday, no BM today. Continue to assess for stools. Care plan reviewed with patient. Patient  verbalize understanding of the plan of care and contribute to goal setting.

## 2023-01-20 NOTE — PROGRESS NOTES
Hospitalist Progress Note      Patient:  Lloyd Dick    Unit/Bed:3B-32/032-A  YOB: 1985  MRN: [id number]   Acct: [de-identified]   PCP: Ruiz Saucedo DO  Date of Admission: 1/17/2023    Assessment/Plan:    Acute generalized abdo(Query bloody) diarrhea: Improved  Pain disproportionate to P/E findings w/ query acute bloody diarrhea w/ CT A/P findings significant for inflammatory changes from the splenic flexure into the rectum. A/P: c/w ARACELI ischemic colitis; GI panel and C.diff pending; even if non-C diff infectious etiology no Abx warranted at this time unless severely symptomatic w/ +++ bloody diarrhea; noted started on IV Abx on admission d/c'ed. NPO. Bowel rest; IVF and supportive care for now. Serum amylase and LDH sent. Lactate WNLs. Other W/U depending on clinical course and findings. No endoscopy warranted at this time especially given concerns for ischemic vs infectious colitis. However, pt would benefit from C-scope OP  Pt reports gross blood w/ BM since yesterday although no BM since admission; denies melena/hematemesis/N/V. Pt ofered CTA A/P to assess for ARACELI stenosis but refused. Lastly, in context of HCV one should also consider PCT given chronic crampy abdo pain. However, pt denies photosensitivity. No recurrent diarrhea; abdo pain significantly improved overall; trial of CL diet started yesterday which per pt was tolerated; will advance [facility] to [facility] today w/ ultimate goal of advancing to [facility] and discharge home tomorrow. As discussed, pt will need C-scope OP w/ GI F/U      Query sepsis vs SIRS: resolved    Leukocytosis (resolved): likely reactive vs infectious; management as above. WBC 9.3    HypoK (Resolved) mild, K 3.3 likely d/t diarrhea/poor Po intake; replace and trend. K 4.2. monitor    Query acute anemia 2/2 blood loss: bloody diarrhea vs dlutional 2/2 IVF; will trend Hb    Hx of PUD?  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 Followed by Dr. Jada Vo. Continue home statin, metoprolol succinate, lisinopril and spironolactone. Hx of Bipolar/Anxiety disorder: continue home olanzapine and gabapentin and Clonazepam   .   History of rhabdomyosarcoma (in 1986, tx with chemo/XRT)    Known HCV +ve: known to [facility]; but post follow-up. Counseling offered about potential treatment availability. HIV screen negative. Hx of IBS: noted. Obesity w/ BMI 36.31; on diet w/ intentional Wt loss; encouragements offered. Chronic pain syndrome/ likely narcotics tolerance/dependence  Counseled pt about NPT and also non-narcotic options; pt wishes to remain on current meds; consider Pain service consult      Query medical non-compliance: noticed not complying w/ doctors appointments and medical management; counseling offered          Chief Complaint: Abdp pain and diarrhea    Initial H and P:-    Admitted for evaluation and management of query bloody diarrhea and colitis. Noted Gi service consulted on admission. Subjective (past 24 hours):   Feeling improved overall. Continue complaining of non-specific \"dull AND sharp\" crampy abdo pain; Abdo pain admittedly much improved. Denies any bowel movements/N/V. Despite report of worsening pain w/ CL diet yesterday, pt voices could tolerate it. Past medical history, family history, social history and allergies reviewed again and is unchanged since admission. ROS (All review of systems completed. Pertinent positives noted.  Otherwise All other systems reviewed and negative.)     Medications:  Reviewed    Infusion Medications    sodium chloride      sodium chloride 75 mL/hr at 01/20/23 0051     Scheduled Medications    atorvastatin  20 mg Oral Daily    gabapentin  400 mg Oral TID    lisinopril  2.5 mg Oral Daily    metoprolol succinate  50 mg Oral BID    OLANZapine  15 mg Oral Nightly    OLANZapine  7.5 mg Oral QAM    spironolactone  25 mg Oral Daily    sodium chloride flush  5-40 mL IntraVENous 2 times per day     PRN Meds: sodium chloride flush, sodium chloride, ondansetron **OR** ondansetron, acetaminophen **OR** acetaminophen, potassium chloride **OR** potassium alternative oral replacement **OR** potassium chloride, clonazePAM, dicyclomine, magnesium sulfate, morphine **OR** morphine      Intake/Output Summary (Last 24 hours) at 1/20/2023 0826  Last data filed at 1/20/2023 0051  Gross per 24 hour   Intake 2836.74 ml   Output --   Net 2836.74 ml         Diet:  ADULT DIET; Clear Liquid    Physical Exam:  /64   Pulse 97   Temp 98.2 °F (36.8 °C) (Oral)   Resp 18   Ht 5' (1.524 m)   Wt 185 lb 14.4 oz (84.3 kg)   LMP 06/03/2019   SpO2 95%   BMI 36.31 kg/m²   General appearance: Obese. No apparent distress, appears stated age and cooperative. HEENT: Pupils equal, round, and reactive to light. Conjunctivae/corneas clear. Neck: Supple, with full range of motion. No jugular venous distention. Trachea midline. Respiratory:  Normal respiratory effort. Clear to auscultation, bilaterally without Rales/Wheezes/Rhonchi. Cardiovascular: Regular rate and rhythm with normal S1/S2 without murmurs, rubs or gallops. Abdomen: Soft, round, obese, non-tender, non-distended with normal bowel sounds. Musculoskeletal: passive and active ROM x 4 extremities. LLE muscle atroph; surgical site well-healed w/o signs of infection/drainage/ulcer  Skin: Skin color, texture, turgor normal.  No rashes or lesions. Neurologic:  Neurovascularly intact without any focal sensory/motor deficits.  Cranial nerves: II-XII intact, grossly non-focal.  Psychiatric: Alert and oriented, thought content appropriate, normal insight  Capillary Refill: Brisk,< 3 seconds   Peripheral Pulses: +2 palpable, equal bilaterally     Labs:   Recent Labs     01/18/23  0345 01/18/23  1846 01/19/23  0333 01/19/23  0706 01/19/23  1850 01/20/23  0701   WBC 18.1*  --  10.9*  --   --  9.3   HGB 13.0  13.0   < > 11.9* 12.1 12.0 12.6  12.4   HCT 38.8  38.5   < > 36.4* 36.3* 36.1* 38.3  37.8     --  287  --   --  307    < > = values in this interval not displayed. Recent Labs     01/18/23  0345 01/19/23  0333 01/20/23  0701    139 141   K 3.2* 3.3* 4.2    104 104   CO2 23 22* 24   BUN 11 7 5*   CREATININE 0.5 0.4 0.5   CALCIUM 9.0 8.3* 9.0       Recent Labs     01/17/23  1619   AST 28   ALT 38   BILITOT 0.5   ALKPHOS 86       Recent Labs     01/19/23  0333   INR 1.02       No results for input(s): Khushbu Lombardo in the last 72 hours. Microbiology:    Blood culture #1:   Lab Results   Component Value Date/Time    BC No growth 24 hours. No growth 48 hours. 01/18/2023 01:07 AM    BC No growth 24 hours. No growth 48 hours. 01/18/2023 01:07 AM       Blood culture #2:  Lab Results   Component Value Date/Time    BLOODCULT2 No growth after 5 days of incubation.  11/06/2019 12:41 PM       Organism:  Lab Results   Component Value Date/Time    ORG Staphylococcus aureus 05/16/2019 11:50 AM    ORG Candida albicans 05/16/2019 11:50 AM         Lab Results   Component Value Date/Time    LABGRAM 2+ WBC's (Polymorphonuclear)  No organisms seen   05/23/2019 10:30 AM       MRSA culture only:No results found for: [facility]    Urine culture:   Lab Results   Component Value Date/Time    LABURIN No growth at 18-36 hours 05/17/2019 11:52 AM       Respiratory culture:   Lab Results   Component Value Date/Time    CULTRESP No growth 36-48 hours 05/23/2019 10:30 AM       Aerobic and Anaerobic :  No results found for: LABAERO  No results found for: LABANAE    Urinalysis:      Lab Results   Component Value Date/Time    NITRU NEGATIVE 01/17/2023 09:20 PM    WBCUA 10-15 01/17/2023 09:20 PM    BACTERIA FEW 01/17/2023 09:20 PM    RBCUA 0-2 01/17/2023 09:20 PM    BLOODU NEGATIVE 01/17/2023 09:20 PM    SPECGRAV >1.030 01/17/2023 09:20 PM    GLUCOSEU 100 05/14/2019 06:12 PM    GLUCOSEU NEGATIVE 06/02/2010 07:35 PM       Radiology:  CT ABDOMEN PELVIS W IV CONTRAST Additional Contrast? None   Final Result   Impression:   Significant inflammatory changes from the splenic flexure into the rectum    concerning primarily for inflammatory bowel disease, infectious etiology    not excluded. This document has been electronically signed by: Stan Leyden, MD on    01/17/2023 09:29 PM      All CTs at this facility use dose modulation techniques and iterative    reconstructions, and/or weight-based dosing   when appropriate to reduce radiation to a low as reasonably achievable. CT ABDOMEN PELVIS W IV CONTRAST Additional Contrast? None    Result Date: 1/17/2023  CT abdomen and pelvis with contrast Comparison: None. Findings: No remarkable acute changes at the lung bases. Normal cardiac volume. Status post cholecystectomy. Tiny hiatal hernia. No focal primal pathology within the liver or spleen. Normal kidneys and adrenal glands. Mild pancreatic atrophy. Post cholecystectomy, bile duct distention. No obvious gastric or duodenal pathology. Mild distention of the proximal jejunum just beyond the ligament of Treitz with mild fold thickening is potentially significant in light of severe fold thickening in the splenic flexure and extending into the left colon. Single wall thickness of the distal left colon is 7.7 mm in at the mid left colon is 1.0 cm. These findings are concerning for inflammatory bowel disease and ulcerative colitis should be considered primary there is appropriate history or genetic predisposition. The stranding surrounding the affected segments of colon from the splenic flexure into the rectum is most conspicuous surrounding the left colon.  Recent antibiotic use would also suggest possibility of C. difficile infection and pseudomembranous colitis, stool samples suggested for exclusion of this consideration within the differential diagnosis. Urinary bladder is grossly normal. There is no obvious uterine or adnexal pathology. Normal appendix is seen in the right upper pelvis. There are no other areas of concern for inflammatory changes within the small bowel outside of the ligament of Treitz. The visualized skeletal structures are intact with normal mineralization. Impression: Significant inflammatory changes from the splenic flexure into the rectum concerning primarily for inflammatory bowel disease, infectious etiology not excluded. This document has been electronically signed by: Dana Elam MD on 01/17/2023 09:29 PM All CTs at this facility use dose modulation techniques and iterative reconstructions, and/or weight-based dosing when appropriate to reduce radiation to a low as reasonably achievable. With RN in room, patient was updated about and agreed upon the treatment plan, all the questions and concerns were addressed. More than 30 minutes of time spent on counseling.        Electronically signed by Raoul Monteiro MD on 1/20/2023 at 8:26 AM

## 2023-01-20 NOTE — PROGRESS NOTES
Pt was in bed and alone at the time of the visit. She was dealing with colitis. She was coping and was blessed. 01/20/23 1621   Encounter Summary   Encounter Overview/Reason  Initial Encounter   Service Provided For: Patient   Referral/Consult From: [address] Family members   Last Encounter  01/20/23   Complexity of Encounter Low   Spiritual/Emotional needs   Type Spiritual Support   Assessment/Intervention/Outcome   Assessment Calm;Coping   Intervention Empowerment; Active listening

## 2023-01-21 VITALS
BODY MASS INDEX: 36.5 KG/M2 | HEART RATE: 108 BPM | WEIGHT: 185.9 LBS | RESPIRATION RATE: 16 BRPM | SYSTOLIC BLOOD PRESSURE: 101 MMHG | DIASTOLIC BLOOD PRESSURE: 86 MMHG | OXYGEN SATURATION: 93 % | TEMPERATURE: 98 F | HEIGHT: 60 IN

## 2023-01-21 LAB
ANION GAP SERPL CALC-SCNC: 15 MEQ/L (ref 8–16)
BASOPHILS ABSOLUTE: 0 THOU/MM3 (ref 0–0.1)
BASOPHILS NFR BLD AUTO: 0.3 %
BUN SERPL-MCNC: 3 MG/DL (ref 7–22)
CALCIUM SERPL-MCNC: 9.3 MG/DL (ref 8.5–10.5)
CHLORIDE SERPL-SCNC: 101 MEQ/L (ref 98–111)
CO2 SERPL-SCNC: 24 MEQ/L (ref 23–33)
CREAT SERPL-MCNC: 0.6 MG/DL (ref 0.4–1.2)
DEPRECATED RDW RBC AUTO: 37.5 FL (ref 35–45)
EOSINOPHIL NFR BLD AUTO: 1.1 %
EOSINOPHILS ABSOLUTE: 0.1 THOU/MM3 (ref 0–0.4)
ERYTHROCYTE [DISTWIDTH] IN BLOOD BY AUTOMATED COUNT: 11.7 % (ref 11.5–14.5)
GFR SERPL CREATININE-BSD FRML MDRD: > 60 ML/MIN/1.73M2
GLUCOSE SERPL-MCNC: 99 MG/DL (ref 70–108)
HCT VFR BLD AUTO: 39.1 % (ref 37–47)
HGB BLD-MCNC: 13.1 GM/DL (ref 12–16)
IMM GRANULOCYTES # BLD AUTO: 0.03 THOU/MM3 (ref 0–0.07)
IMM GRANULOCYTES NFR BLD AUTO: 0.3 %
LYMPHOCYTES ABSOLUTE: 2.4 THOU/MM3 (ref 1–4.8)
LYMPHOCYTES NFR BLD AUTO: 20.2 %
MCH RBC QN AUTO: 29.5 PG (ref 26–33)
MCHC RBC AUTO-ENTMCNC: 33.5 GM/DL (ref 32.2–35.5)
MCV RBC AUTO: 88.1 FL (ref 81–99)
MONOCYTES ABSOLUTE: 1.6 THOU/MM3 (ref 0.4–1.3)
MONOCYTES NFR BLD AUTO: 13.6 %
NEUTROPHILS NFR BLD AUTO: 64.5 %
NRBC BLD AUTO-RTO: 0 /100 WBC
PLATELET # BLD AUTO: 329 THOU/MM3 (ref 130–400)
PMV BLD AUTO: 9.7 FL (ref 9.4–12.4)
POTASSIUM SERPL-SCNC: 4 MEQ/L (ref 3.5–5.2)
RBC # BLD AUTO: 4.44 MILL/MM3 (ref 4.2–5.4)
SEGMENTED NEUTROPHILS ABSOLUTE COUNT: 7.5 THOU/MM3 (ref 1.8–7.7)
SODIUM SERPL-SCNC: 140 MEQ/L (ref 135–145)
WBC # BLD AUTO: 11.7 THOU/MM3 (ref 4.8–10.8)

## 2023-01-21 PROCEDURE — 6370000000 HC RX 637 (ALT 250 FOR IP): Performed by: STUDENT IN AN ORGANIZED HEALTH CARE EDUCATION/TRAINING PROGRAM

## 2023-01-21 PROCEDURE — 80048 BASIC METABOLIC PNL TOTAL CA: CPT

## 2023-01-21 PROCEDURE — 6370000000 HC RX 637 (ALT 250 FOR IP): Performed by: INTERNAL MEDICINE

## 2023-01-21 PROCEDURE — 85025 COMPLETE CBC W/AUTO DIFF WBC: CPT

## 2023-01-21 PROCEDURE — 2580000003 HC RX 258: Performed by: STUDENT IN AN ORGANIZED HEALTH CARE EDUCATION/TRAINING PROGRAM

## 2023-01-21 PROCEDURE — 36415 COLL VENOUS BLD VENIPUNCTURE: CPT

## 2023-01-21 PROCEDURE — 6370000000 HC RX 637 (ALT 250 FOR IP): Performed by: HOSPITALIST

## 2023-01-21 PROCEDURE — 6360000002 HC RX W HCPCS

## 2023-01-21 PROCEDURE — 99239 HOSP IP/OBS DSCHRG MGMT >30: CPT | Performed by: INTERNAL MEDICINE

## 2023-01-21 RX ORDER — METRONIDAZOLE 500 MG/1
500 TABLET ORAL EVERY 8 HOURS SCHEDULED
Status: DISCONTINUED | OUTPATIENT
Start: 2023-01-21 | End: 2023-01-21 | Stop reason: HOSPADM

## 2023-01-21 RX ORDER — PANTOPRAZOLE SODIUM 40 MG/1
40 TABLET, DELAYED RELEASE ORAL
Qty: 30 TABLET | Refills: 3 | Status: SHIPPED | OUTPATIENT
Start: 2023-01-22

## 2023-01-21 RX ORDER — CIPROFLOXACIN 500 MG/1
500 TABLET, FILM COATED ORAL EVERY 12 HOURS SCHEDULED
Status: DISCONTINUED | OUTPATIENT
Start: 2023-01-21 | End: 2023-01-21 | Stop reason: HOSPADM

## 2023-01-21 RX ORDER — DICYCLOMINE HYDROCHLORIDE 10 MG/1
20 CAPSULE ORAL 4 TIMES DAILY PRN
Qty: 30 CAPSULE | Refills: 1 | Status: SHIPPED | OUTPATIENT
Start: 2023-01-21

## 2023-01-21 RX ORDER — CIPROFLOXACIN 500 MG/1
500 TABLET, FILM COATED ORAL EVERY 12 HOURS SCHEDULED
Qty: 20 TABLET | Refills: 0 | Status: SHIPPED | OUTPATIENT
Start: 2023-01-21 | End: 2023-01-31

## 2023-01-21 RX ORDER — METRONIDAZOLE 500 MG/1
500 TABLET ORAL EVERY 8 HOURS SCHEDULED
Qty: 30 TABLET | Refills: 0 | Status: SHIPPED | OUTPATIENT
Start: 2023-01-21 | End: 2023-01-31

## 2023-01-21 RX ADMIN — OLANZAPINE 7.5 MG: 5 TABLET, FILM COATED ORAL at 09:16

## 2023-01-21 RX ADMIN — LISINOPRIL 2.5 MG: 2.5 TABLET ORAL at 09:16

## 2023-01-21 RX ADMIN — PANTOPRAZOLE SODIUM 40 MG: 40 TABLET, DELAYED RELEASE ORAL at 09:16

## 2023-01-21 RX ADMIN — SPIRONOLACTONE 25 MG: 25 TABLET ORAL at 09:16

## 2023-01-21 RX ADMIN — MORPHINE SULFATE 1 MG: 2 INJECTION, SOLUTION INTRAMUSCULAR; INTRAVENOUS at 08:00

## 2023-01-21 RX ADMIN — METOPROLOL SUCCINATE 50 MG: 50 TABLET, EXTENDED RELEASE ORAL at 09:16

## 2023-01-21 RX ADMIN — METRONIDAZOLE 500 MG: 500 TABLET ORAL at 10:24

## 2023-01-21 RX ADMIN — SODIUM CHLORIDE, PRESERVATIVE FREE 10 ML: 5 INJECTION INTRAVENOUS at 09:17

## 2023-01-21 RX ADMIN — GABAPENTIN 400 MG: 400 CAPSULE ORAL at 09:16

## 2023-01-21 RX ADMIN — CIPROFLOXACIN 500 MG: 500 TABLET, FILM COATED ORAL at 10:24

## 2023-01-21 ASSESSMENT — PAIN SCALES - GENERAL
PAINLEVEL_OUTOF10: 7
PAINLEVEL_OUTOF10: 6
PAINLEVEL_OUTOF10: 7

## 2023-01-21 ASSESSMENT — PAIN DESCRIPTION - LOCATION
LOCATION: ABDOMEN

## 2023-01-21 ASSESSMENT — PAIN DESCRIPTION - DESCRIPTORS: DESCRIPTORS: CRAMPING

## 2023-01-21 ASSESSMENT — PAIN DESCRIPTION - ORIENTATION
ORIENTATION: MID;LOWER
ORIENTATION: MID;LOWER

## 2023-01-21 ASSESSMENT — PAIN - FUNCTIONAL ASSESSMENT: PAIN_FUNCTIONAL_ASSESSMENT: ACTIVITIES ARE NOT PREVENTED

## 2023-01-21 NOTE — PLAN OF CARE
Problem: Discharge Planning  Goal: Discharge to home or other facility with appropriate resources  1/21/2023 0417 by Jersey Pierre RN  Outcome: Progressing  Flowsheets (Taken 1/21/2023 3025)  Discharge to home or other facility with appropriate resources:   Identify barriers to discharge with patient and caregiver   Identify discharge learning needs (meds, wound care, etc)  1/20/2023 1500 by Bibiana Medina RN  Outcome: Progressing  Flowsheets (Taken 1/20/2023 3988)  Discharge to home or other facility with appropriate resources: Identify barriers to discharge with patient and caregiver  Note: Continue to assess discharge needs. Advance diet as tolerated. Patient continues to c/o abdominal cramping/pain. Full liquid diet at present. 1/20/2023 1500 by Bibiana Medina RN  Outcome: Progressing  Flowsheets (Taken 1/20/2023 2610)  Discharge to home or other facility with appropriate resources: Identify barriers to discharge with patient and caregiver     Problem: Pain  Goal: Verbalizes/displays adequate comfort level or baseline comfort level  1/21/2023 0417 by Jersey Pierre RN  Outcome: Progressing  Flowsheets (Taken 1/21/2023 0417)  Verbalizes/displays adequate comfort level or baseline comfort level:   Encourage patient to monitor pain and request assistance   Assess pain using appropriate pain scale   Administer analgesics based on type and severity of pain and evaluate response   Implement non-pharmacological measures as appropriate and evaluate response  Note: Ongoing assessment & interventions provided throughout shift. Reminded patient to report any pain, pressure, or shortness of breath to the nurse. Pain medications provided per physician's orders.    1/20/2023 1500 by Bibiana Medina RN  Outcome: Progressing  Flowsheets (Taken 1/20/2023 1125)  Verbalizes/displays adequate comfort level or baseline comfort level: Encourage patient to monitor pain and request assistance  Note: Prn Morphine for abdominal pain control, patient rates pain 7-8/10, improved to 6/10 with prn Morphine. Also, heating pad used for pain management. 1/20/2023 1500 by Niki Desai RN  Outcome: Progressing  Flowsheets  Taken 1/20/2023 1125  Verbalizes/displays adequate comfort level or baseline comfort level: Encourage patient to monitor pain and request assistance  Taken 1/20/2023 0749  Verbalizes/displays adequate comfort level or baseline comfort level: Encourage patient to monitor pain and request assistance     Problem: Safety - Adult  Goal: Free from fall injury  1/21/2023 0417 by Teja Mcrae RN  Outcome: Progressing  Note: Bed locked & in low position, call light in reach, side-rails up x2, bed/chair alarm utilized, non-slip socks on when ambulating, reminded patient to use call light to call for assistance.    1/20/2023 1500 by Niki Desai RN  Outcome: Progressing  Flowsheets (Taken 1/20/2023 0012 by Hawa Rodgers RN)  Free From Fall Injury: Instruct family/caregiver on patient safety  Note: Continue fall precautions, up with assist .  1/20/2023 1500 by Niki Desai RN  Outcome: Progressing     Problem: Chronic Conditions and Co-morbidities  Goal: Patient's chronic conditions and co-morbidity symptoms are monitored and maintained or improved  1/21/2023 0417 by Teja Mcare RN  Outcome: Progressing  Flowsheets (Taken 1/21/2023 0417)  Care Plan - Patient's Chronic Conditions and Co-Morbidity Symptoms are Monitored and Maintained or Improved:   Monitor and assess patient's chronic conditions and comorbid symptoms for stability, deterioration, or improvement   Collaborate with multidisciplinary team to address chronic and comorbid conditions and prevent exacerbation or deterioration  1/20/2023 1500 by Niki Desai RN  Outcome: Progressing  Flowsheets (Taken 1/20/2023 0759)  Care Plan - Patient's Chronic Conditions and Co-Morbidity Symptoms are Monitored and Maintained or Improved: Monitor and assess patient's chronic conditions and comorbid symptoms for stability, deterioration, or improvement  1/20/2023 1500 by Veronica Morel RN  Outcome: Progressing  Flowsheets (Taken 1/20/2023 0759)  Care Plan - Patient's Chronic Conditions and Co-Morbidity Symptoms are Monitored and Maintained or Improved: Monitor and assess patient's chronic conditions and comorbid symptoms for stability, deterioration, or improvement     Problem: Gastrointestinal - Adult  Goal: Minimal or absence of nausea and vomiting  1/21/2023 0417 by Perez Jj RN  Outcome: Progressing  1/20/2023 1500 by Veronica Morel RN  Outcome: Progressing  Flowsheets (Taken 1/20/2023 1500)  Minimal or absence of nausea and vomiting: Advance diet as tolerated, if ordered  Note: Patient denies nausea/vomiting. Continues to c/o abdominal pain/cramping. Full liquid diet, advance as tolerated. Goal: Maintains or returns to baseline bowel function  1/21/2023 0417 by Perez Jj RN  Outcome: Progressing  1/20/2023 1500 by Veronica Morel RN  Outcome: Progressing  Flowsheets (Taken 1/20/2023 1500)  Maintains or returns to baseline bowel function: Assess bowel function  Note: Small liquid BM noted yesterday, no BM today. Continue to assess for stools. Care plan reviewed with patient. Patient verbalizes understanding of the care plan and contributed to goal setting.

## 2023-01-21 NOTE — PLAN OF CARE
Problem: Discharge Planning  Goal: Discharge to home or other facility with appropriate resources  1/21/2023 1000 by Naun Toribio RN  Outcome: Progressing  Flowsheets (Taken 1/21/2023 3067)  Discharge to home or other facility with appropriate resources:   Arrange for needed discharge resources and transportation as appropriate   Identify barriers to discharge with patient and caregiver   Identify discharge learning needs (meds, wound care, etc)   Refer to discharge planning if patient needs post-hospital services based on physician order or complex needs related to functional status, cognitive ability or social support system     Problem: Pain  Goal: Verbalizes/displays adequate comfort level or baseline comfort level  1/21/2023 1000 by Naun Toribio RN  Outcome: Progressing  Flowsheets (Taken 1/21/2023 0417 by Romel Samaniego RN)  Verbalizes/displays adequate comfort level or baseline comfort level:   Encourage patient to monitor pain and request assistance   Assess pain using appropriate pain scale   Administer analgesics based on type and severity of pain and evaluate response   Implement non-pharmacological measures as appropriate and evaluate response     Problem: Safety - Adult  Goal: Free from fall injury  1/21/2023 1000 by Naun Toribio, DORIAN  Outcome: Progressing  Flowsheets (Taken 1/20/2023 0012 by Oliver Rodriguez RN)  Free From Fall Injury: Instruct family/caregiver on patient safety

## 2023-01-21 NOTE — DISCHARGE SUMMARY
Hospital Medicine Discharge Summary      Patient Identification:   Edwina Funk   : 1985  MRN: [id number]   Account: [de-identified]   Patient's PCP: Juan Cottrell DO    Admit Date: 2023   Discharge Date: 2023      Admitting Physician: Ria Mccabe MD  Discharge Physician: Adilia Callejas MD       Discharge Diagnoses:  Acute generalized abdo(Query bloody) diarrhea: Improved  Pain disproportionate to P/E findings w/ query acute bloody diarrhea w/ CT A/P findings significant for inflammatory changes from the splenic flexure into the rectum. A/P: c/w ARACELI ischemic colitis; GI panel and C.diff pending; even if non-C diff infectious etiology no Abx warranted at this time unless severely symptomatic w/ +++ bloody diarrhea; noted started on IV Abx on admission d/c'ed. NPO. Bowel rest; IVF and supportive care for now. Serum amylase and LDH sent. Lactate WNLs. Other W/U depending on clinical course and findings. No endoscopy warranted at this time especially given concerns for ischemic vs infectious colitis. However, pt would benefit from C-scope as OP, seen GI associates in the past.     Pt reports no gross blood w/ BM since yesterday although no BM since admission; denies melena/hematemesis/ N/V. , and anxious about going home. Pt offered CTA A/P to assess for ARACELI stenosis but refused. Lastly, in context of HCV one should also consider PCT given chronic crampy abdo pain. However, pt denies photosensitivity. No recurrent diarrhea; abdo pain significantly improved overall; trial of CL diet started yesterday which per pt was tolerated; will advance gradually to [facility] today w/ ultimate goal of advancing to [facility] and discharge home tomorrow. As discussed, pt will need C-scope OP w/ GI F/U        Query sepsis vs SIRS: resolved     Leukocytosis (resolved): likely reactive vs infectious; management as above.  WBC 9.3     HypoK (Resolved) mild, K 3.3 likely d/t diarrhea/poor Po intake; replace and trend.  K 4.2. monitor     Query acute anemia 2/2 blood loss: bloody diarrhea vs dlutional 2/2 IVF; will trend Hb     Hx of PUD? 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 Followed by Dr. Ousmane Us. Continue home statin, metoprolol succinate, lisinopril and spironolactone. Hx of Bipolar/Anxiety disorder: continue home olanzapine and gabapentin and Clonazepam   .   History of rhabdomyosarcoma (in 1986, tx with chemo/XRT) of LLE        With atrophy of the leg     Known HCV +ve: known to [facility]; but post follow-up. Counseling offered about potential treatment availability. HIV screen negative        Obesity w/ BMI 36.31; on diet w/ intentional Wt loss; encouragements offered. Hospital Course:     Yeny Hernandez is a 42 y.o. female with PMHx chronic pain syndrome and bipolar disorder and anemia admitted to [facility] on 1/17/2023 for  acute abd pain and diarrhea , extensively worked up , CT revealed inflammatory changes of splenic flexure to rectum and she was stared on cipro and flagyl. GI panel and c diff studies were negative, and we recommended outpatient colon work up with a GI specialist. Pt was offered CTA to R/O ARACELI stenosis, she refused that testing. She does have non ischemic CMP with low EF of 40-45 %  , which is chronic and has an ICD. No active cardiac issues during this admission . . she was on a tele floor during this visit, and discharged in stable   Condition. The patient was seen and examined on day of discharge and this discharge summary is in conjunction with any daily progress note from day of discharge. The patient is discharged in stable condition.        Exam:   Vitals:  Vitals:    01/20/23 2000 01/20/23 2343 01/21/23 0330 01/21/23 0730   BP: (!) 100/57 103/79 105/83 101/86   Pulse: (!) 104 100 92 (!) 108   Resp: 16 17 16    Temp: 98.3 °F (36.8 °C) 97.9 °F (36.6 °C) 97.7 °F (36.5 °C) 98 °F (36.7 °C)   TempSrc: Oral Oral Oral Oral   SpO2: 92% 93% 95% 93%   Weight:       Height:         Weight: Weight: 185 lb 14.4 oz (84.3 kg)     General appearance: No apparent distress, well developed, appears stated age. Eyes:  Pupils equal, round, and reactive to light. Conjunctivae/corneas clear. HENT: Head normal in appearance. External nares normal.  Oral mucosa moist without lesions. Hearing grossly intact. Neck: Supple, with full range of motion. Trachea midline. No gross JVD appreciated. Respiratory:  Normal respiratory effort. Clear to auscultation, bilaterally without rales or wheezes or rhonchi. Cardiovascular: Normal rate, regular rhythm with normal S1/S2 without murmurs. No lower extremity edema. Abdomen: Soft, non- specific tenderness,  non-distended with normal bowel sounds. Musculoskeletal: There is no joint swelling or tenderness. Normal tone. No abnormal movements., LLE with previous cancer resected, with scars and atrophy  Skin: Warm and dry. No rashes or lesions. Neurologic:  No focal sensory/motor deficits in the upper and lower extremities. Cranial nerves:  grossly non-focal 2-12. Psychiatric: Alert and oriented, normal insight and thought content. Capillary Refill: Brisk,< 3 seconds. Peripheral Pulses: +2 palpable, equal bilaterally. Significant Diagnostic Studies    Labs:  For convenience and continuity at follow-up the following most recent labs are provided:  CBC:    Lab Results   Component Value Date/Time    WBC 11.7 01/21/2023 03:54 AM    HGB 13.1 01/21/2023 03:54 AM    HCT 39.1 01/21/2023 03:54 AM     01/21/2023 03:54 AM     Renal:    Lab Results   Component Value Date/Time     01/21/2023 03:54 AM    K 4.0 01/21/2023 03:54 AM    K 3.7 01/17/2023 04:19 PM     01/21/2023 03:54 AM    CO2 24 01/21/2023 03:54 AM    BUN 3 01/21/2023 03:54 AM    CREATININE 0.6 01/21/2023 03:54 AM    CALCIUM 9.3 01/21/2023 03:54 AM    PHOS 4.2 06/03/2019 04:29 AM       Radiology:   CT ABDOMEN PELVIS W IV CONTRAST Additional Contrast? None   Final Result   Impression:   Significant inflammatory changes from the splenic flexure into the rectum    concerning primarily for inflammatory bowel disease, infectious etiology    not excluded. This document has been electronically signed by: Amanda Gupta MD on    01/17/2023 09:29 PM      All CTs at this facility use dose modulation techniques and iterative    reconstructions, and/or weight-based dosing   when appropriate to reduce radiation to a low as reasonably achievable. Consults:   STR ED TO IP CONSULT  STR ED TO IP CONSULT      Disposition: Home  Condition at Discharge: Stable    Code Status:  Prior     Patient Instructions:    Discharge lab work:    Activity: activity as tolerated  Diet: No diet orders on file      Follow-up visits:   Beatrice Stanley, [address] [place] [address]  [phone]    Schedule an appointment as soon as possible for a visit in 1 month(s)         Discharge Medications:        Medication List        START taking these medications      ciprofloxacin 500 MG tablet  Commonly known as: CIPRO  Take 1 tablet by mouth every 12 hours for 10 days     dicyclomine 10 MG capsule  Commonly known as: BENTYL  Take 2 capsules by mouth 4 times daily as needed (spasms)     metroNIDAZOLE 500 MG tablet  Commonly known as: FLAGYL  Take 1 tablet by mouth every 8 hours for 10 days     pantoprazole 40 MG tablet  Commonly known as: PROTONIX  Take 1 tablet by mouth every morning (before breakfast)            CHANGE how you take these medications      OLANZapine 15 MG tablet  Commonly known as: ZYPREXA  Take 1 tablet by mouth nightly Indications: 1/2 tab every morning and 1 tab at bedtime  What changed: Another medication with the same name was removed. Continue taking this medication, and follow the directions you see here. CONTINUE taking these medications      atorvastatin 20 MG tablet  Commonly known as: LIPITOR  take 1 tablet by mouth once daily     clonazePAM 0.5 MG tablet  Commonly known as: KLONOPIN  Take 1 tablet by mouth nightly as needed for Anxiety for up to 15 doses. gabapentin 400 MG capsule  Commonly known as: NEURONTIN  Take 1 capsule by mouth in the morning, at noon, and at bedtime for 30 days. HYDROcodone-acetaminophen 5-325 MG per tablet  Commonly known as: Norco  Take 1 tablet by mouth every 8 hours as needed for Pain for up to 30 days. Intended supply: 30 days. Take lowest dose possible to manage pain     lisinopril 2.5 MG tablet  Commonly known as: PRINIVIL;ZESTRIL  TAKE ONE TABLET BY MOUTH DAILY     metoprolol succinate 50 MG extended release tablet  Commonly known as: TOPROL XL  take 1 tablet by mouth twice a day     spironolactone 25 MG tablet  Commonly known as: ALDACTONE  TAKE ONE TABLET BY MOUTH DAILY            STOP taking these medications      aspirin EC 81 MG EC tablet               Where to Get Your Medications        These medications were sent to [address] [place], [place] [id number] [place] [id number]      Phone: [phone]   ciprofloxacin 500 MG tablet  dicyclomine 10 MG capsule  metroNIDAZOLE 500 MG tablet  pantoprazole 40 MG tablet                Time Spent on discharge is 37 minutes in the examination, evaluation, counseling and review of medications and discharge plan. Thank you Ambreen Quintanilla DO for the opportunity to be involved in this patient's care.       Signed:    Electronically signed by Justyna Ferguson MD on 1/21/23 at 10:03 AM EST

## 2023-01-22 PROBLEM — K62.5 RECTAL BLEEDING: Status: ACTIVE | Noted: 2023-01-22

## 2023-01-23 ENCOUNTER — TELEPHONE (OUTPATIENT)
Dept: FAMILY MEDICINE CLINIC | Age: 38
End: 2023-01-23

## 2023-01-23 LAB
BACTERIA BLD AEROBE CULT: NORMAL
BACTERIA BLD AEROBE CULT: NORMAL

## 2023-01-23 NOTE — TELEPHONE ENCOUNTER
Care Transitions Initial Follow Up Call    Outreach made within 2 business days of discharge: Yes    Patient: Olegario Retana Patient : 1985   MRN: [id number]  Reason for Admission: There are no discharge diagnoses documented for the most recent discharge.   Discharge Date: 23       Spoke with: attempted to contact pt voice mailbox full     Discharge department/facility: [facility]    TCM Interactive Patient Contact:      Scheduled appointment with PCP within 7-14 days    Follow Up  Future Appointments   Date Time Provider Shakira Mane   2023  3:30 PM NIKKI Garcias PACER NURSE [facility] PACER [facility] - [place] [facility] [place] [address]   2023 11:20 AM DO NIKKI Trujillo  [facility] [address]   2023  2:00 PM Evangelina Milton MD [facility] [place] [facility] [place]   10/2/2023  2:00 PM JAYLYN Christianson - CNP N [facility] CHF [facility]ST[facility] - Grecia Brannon LPN

## 2023-01-24 NOTE — ADT AUTH CERT
Gastroenteritis - Care Day 2 (1/19/2023) by Lluvia Soto RN       Review Entered Review Status   1/20/2023 1156 Completed      Criteria Review      Care Day: 2 Care Date: 1/19/2023 Level of Care: Intermediate Care    Guideline Day 2    Clinical Status    ( ) * Hypotension absent    1/20/2023 11:56 AM EST by Madonna Padilla      01/19/23 0839 98.7 (37.1) 16 104Abnormal 109/58Abnormal -- Semi fowlers -- -- 97 None (Room air) 7    01/19/23 1111 98.2 (36.8) 16 95 83/71 --    (X) * Bleeding absent    (X) * Vomiting absent or managed    (X) * Electrolyte levels normal or improved    Activity    ( ) * Ambulatory    Routes    ( ) * Oral hydration    1/20/2023 11:56 AM EST by Yariel Wang      IVF AT 75    ( ) * Oral medications    1/20/2023 11:56 AM EST by Madonna Padilla      MORPHINE IV X 6    (X) * Liquid diet, advance as tolerated    1/20/2023 11:56 AM EST by Madonna Padilla      CLD    Interventions    (X) Electrolytes    1/20/2023 11:56 AM EST by Madonna Padilla      WBC10.9thou/mm3   RBC4.12mill/mm3   Fthkzhzsnu70.9gm/dl   Dwqleaejks50.4%   Potassium 3.3    * Milestone   Additional Notes   DATE: 1/19      PERTINENT UPDATES:   Requiring frequent IV morphine      VITALS:     01/19/23 0839 98.7 (37.1) 16 104Abnormal 109/58Abnormal - Semi fowlers - - 97 None (Room air) 7     01/19/23 1111 98.2 (36.8) 16 95 83/71 -       ABNL/PERTINENT LABS/RADIOLOGY/DIAGNOSTIC STUDIES:    WBC10.9thou/mm3    RBC4.12mill/mm3    Rorbhoagss81.9gm/dl    Xtetgaxoyt60.4%    Potassium 3.3       PHYSICAL EXAM:   Lower quadrants abd tenderness   Pain 9/10      MD CONSULTS/ASSESSMENT AND PLAN:   Acute generalized abdo(Query bloody) diarrhea: Improved   Pain disproportionate to P/E findings w/ query acute bloody diarrhea w/ CT A/P findings significant for inflammatory changes from the splenic flexure into the rectum.    A/P: c/w ARACELI ischemic colitis; GI panel and C.diff pending; even if non-C diff infectious etiology no Abx warranted at this time unless severely symptomatic w/ +++ bloody diarrhea; noted started on IV Abx on admission d/c'ed. NPO. Bowel rest; IVF and supportive care for now. Serum amylase and LDH sent. Lactate WNLs. Other W/U depending on clinical course and findings. No endoscopy warranted at this time especially given concerns for ischemic vs infectious colitis. However, pt would benefit from C-scope OP   Pt reports gross blood w/ BM since yesterday although no BM since admission; denies melena/hematemesis/N/V. Lastly, in context of HCV one should also consider PCT given chronic crampy abdo pain. However, pt denies photosensitivity. No recurrent diarrhea; abdo pain improved; trial of CL diet started; will advance gradually as tolerated after. 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 Followed by Dr. Erich Simons. Continue home statin, metoprolol succinate, lisinopril and spironolactone. Hx of Bipolar/Anxiety disorder: continue home olanzapine and gabapentin and Clonazepam    .    History of rhabdomyosarcoma (in 1986, tx with chemo/XRT)       Known HCV +ve: known to [facility]; but post follow-up. Counseling offered about potential treatment availability. HIV screen negative. Hx of IBS: noted. Obesity w/ BMI 36.31; on diet w/ intentional Wt loss; encouragements offered.        MEDICATIONS:      0.9 % sodium chloride infusion   Rate: 75 mL/hr Freq: CONTINUOUS Route: IV   Start: 01/18/23 0100 End: 01/20/23 1059      morphine (PF) injection 2 mg   Dose: 2 mg   Freq: EVERY 4 HOURS PRN Route: IV   PRN Reason: Pain Severe (7-10)   Start: 01/19/23 0200   X 6           Gastroenteritis - Care Day 1 (1/18/2023) by Fouzia Lopez RN       Review Entered Review Status   1/20/2023 1144 Completed      Criteria Review      Care Day: 1 Care Date: 1/18/2023 Level of Care: Intermediate Care    Guideline Day 1    Clinical Status    (X) * Clinical Indications met    1/20/2023 11:44 AM EST by Thiago Larson      01/17/23 1609 98.4 (36.9) 20 103Abnormal 112/58Abnormal -- Semi fowlers -- -- 100 None (Room air) 9   01/17/23 2329 -- 17 116Abnormal 106/80    01/18/23 0350 -- 20 121Abnormal 105/80    Routes    (X) IV fluids    1/20/2023 11:44 AM EST by Thiago Larson      500cc ivfb  ivf at 75    (X) IV medications    1/20/2023 11:44 AM EST by Madonna Courtney      fentanyl iv  morphine iv  protonix iv  mg iv    Medications    (X) Possible antibiotics    1/20/2023 11:44 AM EST by Thiago Larson      zosyn iv    * Milestone   Additional Notes   DATE: 1/17-1/18      CHIEF COMPLAINT/ED PRESENTATION:   42 y.o. female presents to the emergency department for evaluation of abdominal pain with hematochezia. Patient stated that she has a history of IBS and this morning it felt like her IBS was bothering her. She stated that she took to 800 mg doses of ibuprofen and Norco this morning without relief of pain. She stated that she had bright red blood in her stool and was concerned because she never had rectal bleeding before. Patient did admit to having 1 episode of vomiting stating that it was because the pain was so severe.        VITALS:   01/17/23 1609 98.4 (36.9) 20 103Abnormal 112/58Abnormal - Semi fowlers - - 100 None (Room air) 9    01/17/23 2329 - 17 116Abnormal 106/80     01/18/23 0350 - 20 121Abnormal 105/80       ABNL/PERTINENT LABS/RADIOLOGY/DIAGNOSTIC STUDIES:   WBC 23.0 thou/mm3    Hemoglobin 13.3 gm/dl     Hematocrit 40.0 % >>   Hemoglobin 11.9 gm/dl     Hematocrit 36.8 %   Potassium 3.2    Magnesium 1.0    LD 223High       ED TREATMENT:   01/17/2023 2329 EST 0.9 % sodium chloride bolus 500 mL IntraVENous New Bag    01/18/2023 0031 EST 0.9 % sodium chloride infusion -- IntraVENous New Bag    01/17/2023 1826 EST fentaNYL (SUBLIMAZE) injection 25 mcg 25 mcg IntraVENous Given    01/17/2023 2050 EST iopamidol (ISOVUE-370) 76 % injection 80 mL 80 mL IntraVENous Given    01/18/2023 0037 EST morphine (PF) injection 1 mg 1 mg IntraVENous Given    01/17/2023 2006 EST morphine injection 4 mg 4 mg IntraVENous Given    01/17/2023 1826 EST pantoprazole (PROTONIX) 40 mg in sodium chloride (PF) 0.9 % 10 mL injection 40 mg IntraVENous Given       ADMISSION DIAGNOSIS/OP NOTE:   1. Rectal bleeding    2. Colitis    3. Leukocytosis, unspecified type       PERTINENT MEDICAL HISTORY:   rhabdomyosarcoma (in 1986, tx with chemo/XRT), Non-ischemic cardiomyopathy/HFrEF (due to adriamycin), IBS       PHYSICAL EXAM:   Constitutional:        General: She is not in acute distress.      Appearance: Normal appearance. She is not ill-appearing, toxic-appearing or diaphoretic.    HENT:       Head: Normocephalic and atraumatic.       Right Ear: External ear normal.       Left Ear: External ear normal.       Nose: Nose normal. No congestion or rhinorrhea.       Mouth/Throat:       Mouth: Mucous membranes are moist.       Pharynx: Oropharynx is clear. No oropharyngeal exudate or posterior oropharyngeal erythema.    Eyes:       General: No scleral icterus.         Right eye: No discharge.          Left eye: No discharge.       Extraocular Movements: Extraocular movements intact.       Conjunctiva/sclera: Conjunctivae normal.       Pupils: Pupils are equal, round, and reactive to light.    Cardiovascular:       Rate and Rhythm: Normal rate and regular rhythm.       Pulses: Normal pulses.       Heart sounds: Normal heart sounds. No murmur heard.     No gallop.    Pulmonary:      Effort: Pulmonary effort is normal. No respiratory distress. Breath sounds: Normal breath sounds. No stridor. No wheezing, rhonchi or rales. Abdominal:       General: Bowel sounds are normal. There is no distension. Palpations: Abdomen is soft. Tenderness: There is abdominal tenderness in the epigastric area, periumbilical area and suprapubic area. There is no right CVA tenderness, left CVA tenderness, guarding or rebound. Negative signs include Luna's sign, Rovsing's sign, McBurney's sign, psoas sign and obturator sign. Genitourinary:      Rectum: Normal. No tenderness. Musculoskeletal:          General: No swelling, tenderness, deformity or signs of injury. Cervical back: Neck supple. No rigidity or tenderness. Right lower leg: No edema. Left lower leg: No edema. Lymphadenopathy:       Cervical: No cervical adenopathy. Skin:      General: Skin is warm and dry. Capillary Refill: Capillary refill takes less than 2 seconds. Coloration: Skin is not jaundiced or pale. Findings: No bruising, erythema, lesion or rash. Neurological:       General: No focal deficit present. Mental Status: She is alert and oriented to person, place, and time. Mental status is at baseline. Psychiatric:          Mood and Affect: Mood normal.          Behavior: Behavior normal.          Thought Content: Thought content normal.          Judgment: Judgment normal.       MD CONSULTS/ASSESSMENTS & PLANS:   1. Sepsis: SIRS 2/4 (leukocytosis of 23 with left shift and tachycardia) criteria met with LA of 1.4. Source of infection: possible infectious colitis on CT scan. On zosyn. Blood cx sent. Avoiding aggressive fluid resuscitation due to reduced EF. On IVF. 2. Acute GI bleed/history of gastric ulcer : likely LGIB as colitis noted on CT, however cannot exclude UGIB as FOBT + and reports excessive NSAIDs use and has hx of gastric ulcer. On IV PPI.  Hgb stable at 13.3. GI consulted by ER provider. Will monitor H&H. Holding DVT ppx and NPO for now till GI eval.    3. Colitis: CT was revealing of significant inflammatory changes from the splenic flexure into the rectum concerning for inflammatory bowel disease versus infectious etiology. Ddx: infectious vs IBD (no fhx or personal hx of IBD) vs radiation associated (hx of XRT). GI consulted by ER. On antibiotics as listed above. GI panel. Pain control: scheduled bentyl and morphine PRN. 4. 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 Followed by Dr. Dagoberto Fernandez. Continue home statin, metoprolol succinate, lisinopril and spironolactone. 5. Bipolar disorder: continue home olanzapine and gabapentin. 6. Anxiety: continue home clonazepam PRN. 7. History of rhabdomyosarcoma (in 1986, tx with chemo/XRT)   8. Hx of IBS: noted. GI:   ASSESSMENT:   1. Lower abdominal pain- 2/2 colitis   2. Nausea & vomiting- 2/2 colitis & pain   3. Bloody diarrhea- 2/2 colitis   4. Significant inflammatory changes from the splenic flexure into the rectum noted on CT (colitis)   5. Hepatitis C, untreated   6. IBS-D per patient   7. Chronic NSAID use- takes ibuprofen BID for chronic pain   8. Chronic pain with chronic opioid use- takes Norco BID   9. Hypokalemia   10. Hypomagnesemia   11. Leukocytosis   12. H/O anxiety   13. H/O Bipolar disorder   14. H/O rhabdomyosarcoma s/p surgery s/p chemo & radiation   15.  H/O nonischemic cardiomyopathy/CHF/MI s/p AICD placement       PLAN:       · IVF @ 75   · IVPB Cipro   · IVPB Flagyl   · Stop IVP PPI   · NPO except ice chips, sips with meds, popsicles (not red)   · Make Bentyl prn   · No need for narcotic analgesics from GI standpoint   · Pain & nausea control per primary   · Electrolyte management per primary   · NSAID cessation or decreasing amount discussed   · Nursing to monitor stool output & document   · Stool panel   · Will need a colonoscopy OP   · Hepatitis panel   · Will need to complete labs & follow-up OP for Hepatitis C treatment   · RN updated   · Supportive care per primary team   Will follow      MEDICATIONS:      fentaNYL (SUBLIMAZE) injection 25 mcg   Dose: 25 mcg   Freq: ONCE Route: IV   Start: 01/18/23 0115 End: 01/18/23 0148      pantoprazole (PROTONIX) 80 mg in sodium chloride 0.9 % 50 mL bolus   Dose: 80 mg   Freq: ONCE Route: IV   Last Dose: Stopped (01/18/23 0617)   Start: 01/18/23 0400 End: 01/18/23 0617      piperacillin-tazobactam (ZOSYN) 4,500 mg in dextrose 5 % 100 mL IVPB (mini-bag)   Dose: 4,500 mg   Freq: ONCE Route: IV   Last Dose: Stopped (01/18/23 0429)   Start: 01/18/23 0400 End: 01/18/23 0446      0.9 % sodium chloride infusion   Rate: 75 mL/hr Freq: CONTINUOUS Route: IV   Start: 01/18/23 0100 End: 01/20/23 1059      magnesium sulfate 2000 mg in 50 mL IVPB premix   Dose: 2,000 mg   Freq: PRN Route: IV   PRN Reason: Other   PRN Comment: Magnesium Replacement   Start: 01/18/23 1302      morphine (PF) injection 2 mg   Dose: 2 mg   Freq: EVERY 2 HOURS PRN Route: IV   PRN Reason: Pain Severe (7-10)   Start: 01/18/23 0040 End: 01/18/23 2237   X 10      ORDERS:            CLD           ADMIT TO INPATIENT        Gastroenteritis - Clinical Indications for Admission to Inpatient Care by Herbert Parham RN       Review Entered Review Status   1/20/2023 1142 Completed      Criteria Review      Clinical Indications for Admission to Inpatient Care    Most Recent : Russell Otoole Most Recent Date: 1/20/2023 11:42 AM EST    (X) Admission is indicated for  1 or more  of the following  (1) (2) (3) (4) (5):       (X) Suspected severe infection (eg, bloody diarrhea)       1/20/2023 11:42 AM EST by Madonna Aranda         Sepsis: SIRS 2/4 (leukocytosis of 23 with left shift and tachycardia) criteria met with LA of 1.4.  Source of infection: possible infectious colitis on CT scan. On zosyn.   Avoiding aggressive fluid resuscitation due to reduced EF. On IVF.     Acute GI b       (X) Hemodynamic instability       1/20/2023 11:42 AM EST by Madonna Dave         01/17/23 1609 98.4 (36.9) 20 103Abnormal 112/58Abnormal -- Semi fowlers -- -- 100 None (Room air) 9   01/17/23 2329 -- 17 116Abnormal   01/18/23 0350 -- 20 121Abnormal   01/19/23 0207 97.8 (36.6) 16 113Abnormal    01/20/23 1124 98.2 (36.8) 18 74 80/59Abnormal       Definitions    Hemodynamic instability    (X) Hemodynamic instability, as indicated by  1 or more  of the following  (1) (2) (3) (4) (5):       (X) Vital sign abnormality not readily corrected by appropriate treatment, as indicated by  1       or more  of the following  [A]:          (X) Tachycardia that persists despite appropriate treatment (eg, volume repletion, treatment of          pain, treatment of underlying cause)          (X) Hypotension that persists despite appropriate treatment (eg, volume repletion, treatment of          underlying cause)         Last H&P Note    H&P by Ashita Behl, MD at 1/18/2023 12:32 AM    Author: Ashita Behl, MD Specialty: Internal Medicine Author Type: Physician   Filed: 1/18/2023  3:10 AM Date of Service: 1/18/2023 12:32 AM Status: Addendum   : Ashita Behl, MD (Physician)   Related Notes: Original Note by Ashita Behl, MD (Physician) filed at 1/18/2023  3:07 AM   Expand All Collapse All  Hospitalist - History & Physical        Patient: Rubi Robb                     Unit/Bed:23/023A  YOB: 1985  MRN: [id number]          Acct: [id number]   PCP: Kayleigh Dawn DO     Date of Service: Pt seen/examined on 01/18/23  and Admitted to Inpatient with expected LOS greater than two midnights due to medical therapy.      Chief Complaint:  abdominal pain and rectal bleeding.      Assessment and Plan:-  Sepsis: SIRS 2/4 (leukocytosis of 23 with left shift and tachycardia) criteria  met with LA of 1.4. Source of infection: possible infectious colitis on CT scan. On zosyn. Blood cx sent. Avoiding aggressive fluid resuscitation due to reduced EF. On IVF. Acute GI bleed/history of gastric ulcer : likely LGIB as colitis noted on CT, however cannot exclude UGIB as FOBT + and reports excessive NSAIDs use and has hx of gastric ulcer. On IV PPI. Hgb stable at 13.3. GI consulted by ER provider. Will monitor H&H. Holding DVT ppx and NPO for now till GI eval.   Colitis: CT was revealing of significant inflammatory changes from the splenic flexure into the rectum concerning for inflammatory bowel disease versus infectious etiology. Ddx: infectious vs IBD (no fhx or personal hx of IBD) vs radiation associated (hx of XRT). GI consulted by ER. On antibiotics as listed above. GI panel. Pain control: scheduled bentyl and morphine PRN. 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 Followed by Dr. Claudette Carcamo. Continue home statin, metoprolol succinate, lisinopril and spironolactone. Bipolar disorder: continue home olanzapine and gabapentin. Anxiety: continue home clonazepam PRN. History of rhabdomyosarcoma (in 1986, tx with chemo/XRT)  Hx of IBS: noted. History Of Present Illness:    Ms. Olegario Retana is a 42 y.o. female with PMHx of rhabdomyosarcoma (in 1986, tx with chemo/XRT), Non-ischemic cardiomyopathy/HFrEF (due to adriamycin), IBS who presents with abdominal pain and rectal bleeding. Symptoms started this morning. Abdominal pain is intermittent, cramping in nature, 9-10 in severity, located at suprapubic region. No alleviating/aggravating factors. Reports maroonish blood from rectum X 2, about coffee cup in amount. No rectal pain, but had abdominal pain preceding bm. No pruritis at anal region.  No prior history of similar symptoms. No unusual food intake. No fhx of IBD. No fever/chills. Reports chest pain from anxiety. No SOB. Had one episode of vomiting (non bloody) this morning. No issues with urination. She takes ibuprofen 600 mg daily for few years now. No fhx of colorectal cancer. No prior EGD or colonoscopy. Also she has been lightheaded reports poor p.o. intake. At ED, she was tachycardic and had leukocytosis, thus concerning for sepsis and CT was revealing of significant inflammatory changes from the splenic flexure into the rectum concerning for inflammatory bowel disease versus infectious etiology. GI were consulted by ED provider, blood cultures were sent and patient was started on Zosyn. Additionally FOBT was positive at ED.      Past Medical History:    Past Medical History             Diagnosis Date    Acute on chronic systolic congestive heart failure (HCC)      Acute respiratory failure (HCC) 5/14/2019    Arrhythmia      Arthritis      Aspiration pneumonitis (HCC)      Bipolar disorder (HCC)      Cancer (HCC)       rhabdomyosarcoma    Cancer ([address]      Cardiac arrest ([address] 05/2019     VF    Cardiac arrest with ventricular fibrillation ([address]      Cardiomyopathy ([address] 05/2019     EF= 40% with global hypokinesis    Chronic kidney disease      COPD (chronic obstructive pulmonary disease) (HCC)      Depression      E. coli infection      Family history of early CAD      Hepatitis C      Hepatitis C antibody positive in blood 05/17/2019    Hyperlipidemia      Paranoia (psychosis) ([address]      Pneumonia due to infectious organism      Rhabdomyosarcoma ([address]      Scoliosis      Smoker      Tachycardia      VF (ventricular fibrillation) ([address]              Past Surgical History:    Past Surgical History             Procedure Laterality Date    ACHILLES TENDON SURGERY        BONE MARROW TRANSPLANT        BRONCHOSCOPY N/A 5/16/2019     BRONCHOSCOPY ALVEOLAR LAVAGE performed by Rosa Bueno MD at [id number] [facility] BRONCHOSCOPY   5/16/2019     BRONCHOSCOPY THERAPUTIC ASPIRATION INITIAL performed by Candace Stone MD at [place] [address] N/A 5/19/2019     BRONCHOSCOPY ALVEOLAR LAVAGE performed by Candace Stone MD at [place] [address]   5/19/2019     BRONCHOSCOPY THERAPUTIC ASPIRATION INITIAL performed by Candace Stone MD at [address]   4/15/2014     Laparascopic    ENDOSCOPY, COLON, DIAGNOSTIC        FOOT SURGERY        GROWTH PLATE SURGERY        LEG SURGERY                Home Medications:   No current facility-administered medications on file prior to encounter. Current Outpatient Medications on File Prior to Encounter   Medication Sig Dispense Refill    metoprolol succinate (TOPROL XL) 50 MG extended release tablet take 1 tablet by mouth twice a day 60 tablet 3    clonazePAM (KLONOPIN) 0.5 MG tablet Take 1 tablet by mouth nightly as needed for Anxiety for up to 15 doses. 15 tablet 0    [START ON 1/20/2023] HYDROcodone-acetaminophen (NORCO) 5-325 MG per tablet Take 1 tablet by mouth every 8 hours as needed for Pain for up to 30 days. Intended supply: 30 days. Take lowest dose possible to manage pain 90 tablet 0    gabapentin (NEURONTIN) 400 MG capsule Take 1 capsule by mouth in the morning, at noon, and at bedtime for 30 days.  90 capsule 2    atorvastatin (LIPITOR) 20 MG tablet take 1 tablet by mouth once daily 30 tablet 5    spironolactone (ALDACTONE) 25 MG tablet TAKE ONE TABLET BY MOUTH DAILY 90 tablet 1    OLANZapine (ZYPREXA) 15 MG tablet Take 1 tablet by mouth nightly Indications: 1/2 tab every morning and 1 tab at bedtime (Patient taking differently: Take 15 mg by mouth nightly) 30 tablet 5    lisinopril (PRINIVIL;ZESTRIL) 2.5 MG tablet TAKE ONE TABLET BY MOUTH DAILY 90 tablet 2    OLANZapine (ZYPREXA) 7.5 MG tablet Take 1 tablet by mouth every morning 30 tablet 5    aspirin EC 81 MG EC tablet Take 1 tablet by mouth daily 90 tablet 1         Allergies:    Tape [adhesive tape]     Social History:    reports that she has quit smoking. Her smoking use included cigarettes. She has a 40.00 pack-year smoking history. She has never used smokeless tobacco. She reports current alcohol use. She reports current drug use. Drug: Marijuana [employer]). Family History:   Family History             Problem Relation Age of Onset    Emphysema Mother      Mental Illness Mother      Substance Abuse Mother      Stroke Mother      Heart Attack Father      Mental Illness Father      Arthritis Father      Heart Disease Father      High Blood Pressure Father      High Cholesterol Father              Diet:  No diet orders on file     Review of systems:   Pertinent positives as noted in the HPI. All other systems reviewed and negative. PHYSICAL EXAM:  /80   Pulse (!) 116   Temp 98.4 °F (36.9 °C) (Oral)   Resp 17   Ht 5' (1.524 m)   Wt 187 lb (84.8 kg)   LMP 06/03/2019   SpO2 98%   BMI 36.52 kg/m²   General appearance: In significant distress due to pain  HEENT: Normal cephalic, atraumatic without obvious deformity. Pupils equal, round, and reactive to light. Extra ocular muscles intact. Conjunctivae/corneas clear. Dry oral mucosa  Neck: Supple, with full range of motion. No jugular venous distention. Trachea midline. Respiratory:  Normal respiratory effort. Clear to auscultation, bilaterally without Rales/Wheezes/Rhonchi. Cardiovascular: Regular rate and rhythm with normal S1/S2 without murmurs, rubs or gallops. Abdomen: Soft, tender on palpation at the lower abdominal/suprapubic region, non-distended with normal bowel sounds. Musculoskeletal:  No clubbing, cyanosis or edema bilaterally. Skin: Skin color, texture, turgor normal.  No rashes or lesions. Neurologic:  Neurovascularly intact without any focal sensory/motor deficits.  Cranial nerves: II-XII intact, grossly non-focal.  Psychiatric: Alert and oriented, thought content appropriate, normal insight     Labs:       Recent Labs     01/17/23  1619   WBC 23.0*   HGB 13.3   HCT 40.0             Recent Labs     01/17/23  1619      K 3.7      CO2 22*   BUN 16   CREATININE 0.6   CALCIUM 9.6          Recent Labs     01/17/23  1619   AST 28   ALT 38   BILITOT 0.5   ALKPHOS 86      No results for input(s): INR in the last 72 hours. No results for input(s): [place] in the last 72 hours. Urinalysis:          Lab Results   Component Value Date/Time     NITRU NEGATIVE 01/17/2023 09:20 PM     WBCUA 10-15 01/17/2023 09:20 PM     BACTERIA FEW 01/17/2023 09:20 PM     RBCUA 0-2 01/17/2023 09:20 PM     BLOODU NEGATIVE 01/17/2023 09:20 PM     SPECGRAV >1.030 01/17/2023 09:20 PM     GLUCOSEU 100 05/14/2019 06:12 PM     GLUCOSEU NEGATIVE 06/02/2010 07:35 PM         Radiology:   CT ABDOMEN PELVIS W IV CONTRAST Additional Contrast? None   Final Result   Impression:   Significant inflammatory changes from the splenic flexure into the rectum    concerning primarily for inflammatory bowel disease, infectious etiology    not excluded. This document has been electronically signed by: Dana Elam MD on    01/17/2023 09:29 PM       All CTs at this facility use dose modulation techniques and iterative    reconstructions, and/or weight-based dosing   when appropriate to reduce radiation to a low as reasonably achievable. CT ABDOMEN PELVIS W IV CONTRAST Additional Contrast? None     Result Date: 1/17/2023  CT abdomen and pelvis with contrast Comparison: None. Findings: No remarkable acute changes at the lung bases. Normal cardiac volume. Status post cholecystectomy. Tiny hiatal hernia. No focal primal pathology within the liver or spleen. Normal kidneys and adrenal glands. Mild pancreatic atrophy. Post cholecystectomy, bile duct distention. No obvious gastric or duodenal pathology.  Mild distention of the proximal jejunum just beyond the ligament of Treitz with mild fold thickening is potentially significant in light of severe fold thickening in the splenic flexure and extending into the left colon. Single wall thickness of the distal left colon is 7.7 mm in at the mid left colon is 1.0 cm. These findings are concerning for inflammatory bowel disease and ulcerative colitis should be considered primary there is appropriate history or genetic predisposition. The stranding surrounding the affected segments of colon from the splenic flexure into the rectum is most conspicuous surrounding the left colon. Recent antibiotic use would also suggest possibility of C. difficile infection and pseudomembranous colitis, stool samples suggested for exclusion of this consideration within the differential diagnosis. Urinary bladder is grossly normal. There is no obvious uterine or adnexal pathology. Normal appendix is seen in the right upper pelvis. There are no other areas of concern for inflammatory changes within the small bowel outside of the ligament of Treitz. The visualized skeletal structures are intact with normal mineralization. Impression: Significant inflammatory changes from the splenic flexure into the rectum concerning primarily for inflammatory bowel disease, infectious etiology not excluded. This document has been electronically signed by: Jluis Nguyen MD on 01/17/2023 09:29 PM All CTs at this facility use dose modulation techniques and iterative reconstructions, and/or weight-based dosing when appropriate to reduce radiation to a low as reasonably achievable.            EKG: Pending     Electronically signed by Clayton Sheffield MD on 1/18/2023 at 12:32 AM                            Consult Notes    Consults by JAYLYN Quintana CNP at 1/18/2023  9:22 AM    Author: JAYLYN Quintana CNP Specialty: Nurse Practitioner, Gastroenterology Author Type: Nurse Practitioner   Filed: 1/18/2023  3:02 PM Date of Service: 1/18/2023  9:22 AM Status: Addendum   : Danni Best APRN - CNP (Nurse Practitioner)   Related Notes: Original Note by JAYLYN Brush CNP (Nurse Practitioner) filed at 1/18/2023  9:38 AM   Cosigner: Kylie Hopper MD at 1/23/2023 10:21 AM   Consult Orders   1. STR ED to IP Consult [[id number]] ordered by Devonda Phalen, DO at 01/17/23 2356     Expand All Collapse All        Consult History & Physical       Patient:  Anamaria Shannon  YOB: 1985  MRN: [id number]                                  Acct: [de-identified]     Chief Complaint:        Chief Complaint   Patient presents with    Abdominal Pain    Rectal Bleeding         Date of Service: Pt seen/examined in consultation on 1/18/2023     History Of Present Illness:       42 y.o. female who we are asked to see/evaluate by Virgie Hernandez MD for medical management of colitis. She came to the ED yesterday for lower abdominal pain, nausea, vomiting, and bloody diarrhea. Her symptoms started yesterday. Her lower abdominal pain is intermittent and aggravated with certain positions. The pain medication helps alleviate some of the pain. She feels her nausea and vomiting are secondary to the pain. Denies hematemesis. She has a history of IBS-D with associated cramping and initially thought her \"IBS was flaring up. \" However she started passing bright red blood in her stools. She does endorse some rectal discomfort, but no itching or burning. She is not on anticoagulation. She takes ibuprofen and Norco BID for chronic pain and has for approximately 1 year. She denies recent travel, ATB use, and contact with sick persons. She has never had an EGD or colonoscopy. Hgb 13.0. Initial WBC elevated at 23. CT A/P demonstrated significant inflammatory changes from the splenic flexure into the rectum. She has a history of Hepatitis C for which she recently established care at [facility] 09/2022.  She was given orders for blood work to begin the process to get treatment, however she did not get the blood work done and has not followed up. Past Medical History:    Past Medical History        Past Medical History:   Diagnosis Date    Acute on chronic systolic congestive heart failure (HCC)      Acute respiratory failure (HCC) 5/14/2019    Arrhythmia      Arthritis      Aspiration pneumonitis (HCC)      Bipolar disorder ([facility]      Cancer (HCC)       rhabdomyosarcoma    Cancer ([facility]      Cardiac arrest ([facility] 05/2019     VF    Cardiac arrest with ventricular fibrillation ([facility]      Cardiomyopathy ([facility] 05/2019     EF= 40% with global hypokinesis    Chronic kidney disease      COPD (chronic obstructive pulmonary disease) (HCC)      Depression      E. coli infection      Family history of early CAD      Hepatitis C      Hepatitis C antibody positive in blood 05/17/2019    Hyperlipidemia      Paranoia (psychosis) ([facility]      Pneumonia due to infectious organism      Rhabdomyosarcoma ([facility]      Scoliosis      Smoker      Tachycardia      VF (ventricular fibrillation) ([facility]              Home Medications:  Home Medications           Prior to Admission medications    Medication Sig Start Date End Date Taking? Authorizing Provider   metoprolol succinate (TOPROL XL) 50 MG extended release tablet take 1 tablet by mouth twice a day 1/11/23     Tarah German DO   clonazePAM (KLONOPIN) 0.5 MG tablet Take 1 tablet by mouth nightly as needed for Anxiety for up to 15 doses. 1/10/23 2/14/23   Tarah German DO   HYDROcodone-acetaminophen (NORCO) 5-325 MG per tablet Take 1 tablet by mouth every 8 hours as needed for Pain for up to 30 days. Intended supply: 30 days. Take lowest dose possible to manage pain 1/20/23 2/19/23   Tarah German DO   gabapentin (NEURONTIN) 400 MG capsule Take 1 capsule by mouth in the morning, at noon, and at bedtime for 30 days.  11/21/22 1/18/23   Tarah German DO   atorvastatin (LIPITOR) 20 MG tablet take 1 tablet by mouth once daily 11/8/22     Tarah German DO   spironolactone (ALDACTONE) 25 MG tablet TAKE ONE TABLET BY MOUTH DAILY 11/8/22     Arlana Forget, DO   OLANZapine (ZYPREXA) 15 MG tablet Take 1 tablet by mouth nightly Indications: 1/2 tab every morning and 1 tab at bedtime  Patient taking differently: Take 15 mg by mouth nightly 10/11/22 4/9/23   Arlana Forget, DO   lisinopril (PRINIVIL;ZESTRIL) 2.5 MG tablet TAKE ONE TABLET BY MOUTH DAILY 10/11/22     Arlana Forget, DO   OLANZapine (ZYPREXA) 7.5 MG tablet Take 1 tablet by mouth every morning 10/11/22 4/9/23   Arlana Forget, DO   aspirin EC 81 MG EC tablet Take 1 tablet by mouth daily 9/27/22     Evangelina Milton MD            Surgical History:  Past Surgical History         Past Surgical History:   Procedure Laterality Date    ACHILLES TENDON SURGERY        BONE MARROW TRANSPLANT        BRONCHOSCOPY N/A 5/16/2019     BRONCHOSCOPY ALVEOLAR LAVAGE performed by Fredy Esparza MD at [place] [address]   5/16/2019     BRONCHOSCOPY THERAPUTIC ASPIRATION INITIAL performed by Fredy Esparza MD at [place] [address] N/A 5/19/2019     BRONCHOSCOPY ALVEOLAR LAVAGE performed by Fredy Esparza MD at [place] [address]   5/19/2019     BRONCHOSCOPY THERAPUTIC ASPIRATION INITIAL performed by Fredy Esparza MD at [place] [address]   4/15/2014     Laparascopic    ENDOSCOPY, COLON, DIAGNOSTIC        FOOT SURGERY        GROWTH PLATE SURGERY        LEG SURGERY                Family History:  Family History         Family History   Problem Relation Age of Onset    Emphysema Mother      Mental Illness Mother      Substance Abuse Mother      Stroke Mother      Heart Attack Father      Mental Illness Father      Arthritis Father      Heart Disease Father      High Blood Pressure Father      High Cholesterol Father              Past GI History:  IBS-D, Hepatitis C not yet treated  Dr. Juan C Dwyer patient     Allergies:  Tape Eldonna Southmichelle tape]     Social History:   TOBACCO:   reports that she has quit smoking. Her smoking use included cigarettes. She has a 40.00 pack-year smoking history. She has never used smokeless tobacco.  ETOH:   reports current alcohol use. Review Of Systems  GENERAL: No fever, chills or weight loss. EYES:  No blurred vision, double vision   CARDIOVASCULAR: No chest pain or palpitations. RESPIRATORY:  No dyspnea or cough. GI:  See HPI  MUSCULOSKELETAL: No new painful or swollen joints or myalgias. :   No dysuria or hematuria. SKIN:  No rashes or jaundice. NEUROLOGIC:  No headaches or seizures, numbness or tingling of arms, or legs. PSYCH:  No anxiety or depression. ENDOCRINE:  No polyuria or polydipsia. BLOOD:  No anemia, bleeding disorder, blood or blood product transfusion. PHYSICAL EXAM:  /81   Pulse (!) 115   Temp 98.4 °F (36.9 °C) (Oral)   Resp 18   Ht 5' (1.524 m)   Wt 185 lb 4.8 oz (84.1 kg)   LMP 06/03/2019   SpO2 98%   BMI 36.19 kg/m²      General appearance: No apparent distress, appears stated age and cooperative. HEENT: Normal cephalic, atraumatic without obvious deformity. Pupils equal, round, and reactive to light. Neck: Supple, with full range of motion. No jugular venous distention. Trachea midline. Respiratory:  Normal respiratory effort. Clear to auscultation, bilaterally without Rales/Wheezes/Rhonchi. Cardiovascular: Regular rate and rhythm without murmurs, rubs or gallops. Abdomen: Soft, obese, tender to lower abdomen with palpation, non-distended with active bowel sounds. Musculoskeletal: No clubbing, cyanosis or edema bilaterally. LLE smaller in size from the knee down than RLE. Skin: Pink, warm, dry. No rashes or lesions. Neurologic: Alert and oriented, thought content appropriate, normal insight  Rectal: No masses, fissures, or hemorrhoids noted. No blood or stool returned.      Labs:       Recent Labs     01/18/23  0345   WBC 18.1*   HGB 13.0  13.0   HCT 38.8  38.5             Recent Labs 01/18/23  0345      K 3.2*      CO2 23   BUN 11   CREATININE 0.5   CALCIUM 9.0          Recent Labs     01/17/23  1619   AST 28   ALT 38   BILITOT 0.5   ALKPHOS 86      No results for input(s): INR in the last 72 hours. Radiology:   CT abdomen/pelvis W IV contrast 01/17/23  Findings:   No remarkable acute changes at the lung bases. Normal cardiac volume. Status post cholecystectomy. Tiny hiatal hernia. No focal primal pathology    within the liver or spleen. Normal kidneys and adrenal glands. Mild    pancreatic atrophy. Post cholecystectomy, bile duct distention. No obvious    gastric or duodenal pathology. Mild distention of the proximal jejunum just beyond the ligament of Treitz    with mild fold thickening is potentially significant in light of severe    fold thickening in the splenic flexure and extending into the left colon. Single wall thickness of the distal left colon is 7.7 mm in at the mid    left colon is 1.0 cm. These findings are concerning for inflammatory bowel    disease and ulcerative colitis should be considered primary there is    appropriate history or genetic predisposition. The stranding surrounding the affected segments of colon from the splenic    flexure into the rectum is most conspicuous surrounding the left colon. Recent antibiotic use would also suggest possibility of C. difficile    infection and pseudomembranous colitis, stool samples suggested for    exclusion of this consideration within the differential diagnosis. Urinary    bladder is grossly normal. There is no obvious uterine or adnexal    pathology. Normal appendix is seen in the right upper pelvis. There are no    other areas of concern for inflammatory changes within the small bowel    outside of the ligament of Treitz. The visualized skeletal structures are intact with normal mineralization.            Impression   Impression:   Significant inflammatory changes from the splenic flexure into the rectum    concerning primarily for inflammatory bowel disease, infectious etiology    not excluded. 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  H/O nonischemic cardiomyopathy/CHF/MI s/p AICD placement     PLAN:     IVF @ 73  IVPB Cipro  IVPB Flagyl  Stop IVP PPI  NPO except ice chips, sips with meds, popsicles (not red)  Make Bentyl prn  No need for narcotic analgesics from GI standpoint  Pain & nausea control per primary  Electrolyte management per primary  NSAID cessation or decreasing amount discussed  Nursing to monitor stool output & document  Stool panel  Will need a colonoscopy OP  Hepatitis panel  Will need to complete labs & follow-up OP for Hepatitis C treatment  RN updated  Supportive care per primary team  Will follow        Case reviewed and impression/plan reviewed in collaboration with Dr. Darlene Livingston  Electronically signed by JAYLYN Rodrigez - CNP on 2023 at [facility]  Thank you for the consultation.         Late Entry:   IVPB Cipro & Flagyl discontinued to by primary team.  - Recommend IVPB ATBs, given symptomatic colitis with elevated WBC  - Will need a colonoscopy OP  - Follow-up with Mariano Caul in 1 month  GI signing off                Discharge Summary    Fior Alvarado MD  2023 17:43  Expand [address] [facility] Medicine Discharge Summary        Patient Identification:   Jose Farrell   : 1985  MRN: [id number]   Account: [de-identified]   Patient's PCP: Cassidy Dupont DO     Admit Date: 1/17/2023   Discharge Date: 1/21/2023 1/22/2023        Admitting Physician: Bill Meredith MD  Discharge Physician: Selvin Diallo MD         Discharge Diagnoses:  Acute generalized abdo(Query bloody) diarrhea: Improved  Pain disproportionate to P/E findings w/ query acute bloody diarrhea w/ CT A/P findings significant for inflammatory changes from the splenic flexure into the rectum. A/P: c/w ARACELI ischemic colitis; GI panel and C.diff pending; even if non-C diff infectious etiology no Abx warranted at this time unless severely symptomatic w/ +++ bloody diarrhea; noted started on IV Abx on admission d/c'ed. NPO. Bowel rest; IVF and supportive care for now. Serum amylase and LDH sent. Lactate WNLs. Other W/U depending on clinical course and findings. No endoscopy warranted at this time especially given concerns for ischemic vs infectious colitis. However, pt would benefit from C-scope as OP, seen GI associates in the past.      Pt reports no gross blood w/ BM since yesterday although no BM since admission; denies melena/hematemesis/ N/V. , and anxious about going home. Pt offered CTA A/P to assess for ARACELI stenosis but refused. Lastly, in context of HCV one should also consider PCT given chronic crampy abdo pain. However, pt denies photosensitivity. No recurrent diarrhea; abdo pain significantly improved overall; trial of CL diet started yesterday which per pt was tolerated; will advance gradually to [facility] today w/ ultimate goal of advancing to [facility] and discharge home tomorrow. As discussed, pt will need C-scope OP w/ GI F/U        Query sepsis vs SIRS: resolved     Leukocytosis (resolved): likely reactive vs infectious; management as above. WBC 9.3     HypoK (Resolved) mild, K 3.3 likely d/t diarrhea/poor Po intake; replace and trend. K 4.2. monitor     Query acute anemia 2/2 blood loss: bloody diarrhea vs dlutional 2/2 IVF; will trend Hb     Hx of PUD?  Placed on PPI PO QD Non-ischemic cardiomyopathy/HFrEF (due to adriamycin)/history of cardiac arrest due to VF on 5/2019 status post AICD placement: TTE on 10/7/22 with EF of 40-45% with mild global hypokinesis of left ventricle. Followed by Dr. Alin Cote. Continue home statin, metoprolol succinate, lisinopril and spironolactone. Hx of Bipolar/Anxiety disorder: continue home olanzapine and gabapentin and Clonazepam   .   History of rhabdomyosarcoma (in 1986, tx with chemo/XRT) of LLE        With atrophy of the leg     Known HCV +ve: known to [facility]; but post follow-up. Counseling offered about potential treatment availability. HIV screen negative         Obesity w/ BMI 36.31; on diet w/ intentional Wt loss; encouragements offered. Hospital Course:      Malorie Pena is a 42 y.o. female with PMHx chronic pain syndrome and bipolar disorder and anemia admitted to [address] [place] [address] on 1/17/2023 for  acute abd pain and diarrhea , extensively worked up , CT revealed inflammatory changes of splenic flexure to rectum and she was stared on cipro and flagyl. GI panel and c diff studies were negative, and we recommended outpatient colon work up with a GI specialist. Pt was offered CTA to R/O ARACELI stenosis, she refused that testing. She does have non ischemic CMP with low EF of 40-45 %  , which is chronic and has an ICD. No active cardiac issues during this admission . . she was on a tele floor during this visit, and discharged in stable   Condition. The patient was seen and examined on day of discharge and this discharge summary is in conjunction with any daily progress note from day of discharge. The patient is discharged in stable condition.          Exam:   Vitals:  Vitals          Vitals:     01/20/23 2000 01/20/23 2343 01/21/23 0330 01/21/23 0730   BP: (!) 100/57 103/79 105/83 101/86   Pulse: (!) 104 100 92 (!) 108   Resp: 16 17 16     Temp: 98.3 °F (36.8 °C) 97.9 °F (36.6 °C) 97.7 °F (36.5 °C) 98 °F (36.7 °C)   TempSrc: Oral Oral Oral Oral   SpO2: 92% 93% 95% 93%   Weight:           Height:                 Weight: Weight: 185 lb 14.4 oz (84.3 kg)      General appearance: No apparent distress, well developed, appears stated age. Eyes:  Pupils equal, round, and reactive to light. Conjunctivae/corneas clear. HENT: Head normal in appearance. External nares normal.  Oral mucosa moist without lesions. Hearing grossly intact. Neck: Supple, with full range of motion. Trachea midline. No gross JVD appreciated. Respiratory:  Normal respiratory effort. Clear to auscultation, bilaterally without rales or wheezes or rhonchi. Cardiovascular: Normal rate, regular rhythm with normal S1/S2 without murmurs. No lower extremity edema. Abdomen: Soft, non- specific tenderness,  non-distended with normal bowel sounds. Musculoskeletal: There is no joint swelling or tenderness. Normal tone. No abnormal movements., LLE with previous cancer resected, with scars and atrophy  Skin: Warm and dry. No rashes or lesions. Neurologic:  No focal sensory/motor deficits in the upper and lower extremities. Cranial nerves:  grossly non-focal 2-12. Psychiatric: Alert and oriented, normal insight and thought content. Capillary Refill: Brisk,< 3 seconds. Peripheral Pulses: +2 palpable, equal bilaterally. Significant Diagnostic Studies     Labs:  For convenience and continuity at follow-up the following most recent labs are provided:  CBC:          Lab Results   Component Value Date/Time     WBC 11.7 01/21/2023 03:54 AM     HGB 13.1 01/21/2023 03:54 AM     HCT 39.1 01/21/2023 03:54 AM      01/21/2023 03:54 AM      Renal:          Lab Results   Component Value Date/Time      01/21/2023 03:54 AM     K 4.0 01/21/2023 03:54 AM     K 3.7 01/17/2023 04:19 PM      01/21/2023 03:54 AM     CO2 24 01/21/2023 03:54 AM     BUN 3 01/21/2023 03:54 AM     CREATININE 0.6 01/21/2023 03:54 AM     CALCIUM 9.3 01/21/2023 03:54 SHELDON     PHOS 4.2 06/03/2019 04:29 AM         Radiology:   CT ABDOMEN PELVIS W IV CONTRAST Additional Contrast? None   Final Result   Impression:   Significant inflammatory changes from the splenic flexure into the rectum    concerning primarily for inflammatory bowel disease, infectious etiology    not excluded. This document has been electronically signed by: Venancio Smallwood MD on    01/17/2023 09:29 PM       All CTs at this facility use dose modulation techniques and iterative    reconstructions, and/or weight-based dosing   when appropriate to reduce radiation to a low as reasonably achievable. Consults:   STR ED TO IP CONSULT  STR ED TO IP CONSULT        Disposition: Home  Condition at Discharge: Stable     Code Status:  Prior      Patient Instructions:    Discharge lab work: Activity: activity as tolerated  Diet: No diet orders on file       Follow-up visits:   Sony Gomez, [address] [place] [id number] [phone]     Schedule an appointment as soon as possible for a visit in 1 month(s)           Discharge Medications:          Medication List          START taking these medications       ciprofloxacin 500 MG tablet  Commonly known as: CIPRO  Take 1 tablet by mouth every 12 hours for 10 days      dicyclomine 10 MG capsule  Commonly known as: BENTYL  Take 2 capsules by mouth 4 times daily as needed (spasms)      metroNIDAZOLE 500 MG tablet  Commonly known as: FLAGYL  Take 1 tablet by mouth every 8 hours for 10 days      pantoprazole 40 MG tablet  Commonly known as: PROTONIX  Take 1 tablet by mouth every morning (before breakfast)                CHANGE how you take these medications       OLANZapine 15 MG tablet  Commonly known as: ZYPREXA  Take 1 tablet by mouth nightly Indications: 1/2 tab every morning and 1 tab at bedtime  What changed: Another medication with the same name was removed.  Continue taking this medication, and follow the directions you see here. CONTINUE taking these medications       atorvastatin 20 MG tablet  Commonly known as: LIPITOR  take 1 tablet by mouth once daily      clonazePAM 0.5 MG tablet  Commonly known as: KLONOPIN  Take 1 tablet by mouth nightly as needed for Anxiety for up to 15 doses. gabapentin 400 MG capsule  Commonly known as: NEURONTIN  Take 1 capsule by mouth in the morning, at noon, and at bedtime for 30 days. HYDROcodone-acetaminophen 5-325 MG per tablet  Commonly known as: Norco  Take 1 tablet by mouth every 8 hours as needed for Pain for up to 30 days. Intended supply: 30 days. Take lowest dose possible to manage pain      lisinopril 2.5 MG tablet  Commonly known as: PRINIVIL;ZESTRIL  TAKE ONE TABLET BY MOUTH DAILY      metoprolol succinate 50 MG extended release tablet  Commonly known as: TOPROL XL  take 1 tablet by mouth twice a day      spironolactone 25 MG tablet  Commonly known as: ALDACTONE  TAKE ONE TABLET BY MOUTH DAILY                STOP taking these medications       aspirin EC 81 MG EC tablet                    Where to Get Your Medications          These medications were sent to [address] [place], [place] [id number] [place] [id number]        Phone: [phone]   ciprofloxacin 500 MG tablet  dicyclomine 10 MG capsule  metroNIDAZOLE 500 MG tablet  pantoprazole 40 MG tablet                     Time Spent on discharge is 37 minutes in the examination, evaluation, counseling and review of medications and discharge plan. Thank you Lisa Saleh DO for the opportunity to be involved in this patient's care.         Signed:     Electronically signed by Paulina Santana MD on 1/21/23 at 10:03 AM EST

## 2023-01-24 NOTE — TELEPHONE ENCOUNTER
Care Transitions Initial Follow Up Call    Outreach made within 2 business days of discharge: No    Patient: Alfie Bolden Patient : 1985   MRN: [id number]  Reason for Admission: There are no discharge diagnoses documented for the most recent discharge. Discharge Date: 23       Spoke with: MEE VALLE Doss    Discharge department/facility: [facility]    TCM Interactive Patient Contact:  Was patient able to fill all prescriptions: Yes  Was patient instructed to bring all medications to the follow-up visit: Yes  Is patient taking all medications as directed in the discharge summary? Yes  Does patient understand their discharge instructions: Yes  Does patient have questions or concerns that need addressed prior to 7-14 day follow up office visit: no. Offered hospital follow up appointment pt declined.      Scheduled appointment with PCP within 7-14 days    Follow Up  Future Appointments   Date Time Provider Shakira Mane   2023  3:30 PM NIKKI Garcias PACER NURSE N [facility] PACER [facility]   2023 11:20 AM DO DANA Arriaga MaX FM RES [facility] [place]   2023  2:00 PM George Dean MD N SRPX Heart [facility]   10/2/2023  2:00 PM JAYLYN Savage - CNP N SRPX CHF P - Angelique Galicia LPN

## 2023-01-25 NOTE — TELEPHONE ENCOUNTER
Care Transitions Initial Follow Up Call    Outreach made within 2 business days of discharge: Yes    Patient: Patricia Christine Patient : 1985   MRN: [id number]  Reason for Admission: There are no discharge diagnoses documented for the most recent discharge. Discharge Date: 23       Spoke with: MEE VALLE [place]    Discharge department/facility: [facility]    TCM Interactive Patient Contact:  Was patient able to fill all prescriptions: Yes  Was patient instructed to bring all medications to the follow-up visit: Yes  Is patient taking all medications as directed in the discharge summary?  Yes  Does patient understand their discharge instructions: Yes  Does patient have questions or concerns that need addressed prior to 7-14 day follow up office visit: no    Scheduled appointment with PCP within 7-14 days    Follow Up  Future Appointments   Date Time Provider Shakira Mane   2023  3:30 PM [address] [facility] [place]   2023 11:20 AM DO DANA RootX  RES [address] [place] [address]   2023  2:00 PM MD DIEGO JoelX Heart [facility] [place]   10/2/2023  2:00 PM JAYLYN Alexandre - CNP N SRPX CHF [address]

## 2023-02-13 ENCOUNTER — TELEPHONE (OUTPATIENT)
Dept: FAMILY MEDICINE CLINIC | Age: 38
End: 2023-02-13

## 2023-02-13 DIAGNOSIS — F41.0 PANIC ATTACKS: ICD-10-CM

## 2023-02-13 DIAGNOSIS — F39 MOOD DISORDER (HCC): ICD-10-CM

## 2023-02-13 RX ORDER — OLANZAPINE 15 MG/1
15 TABLET ORAL NIGHTLY
Qty: 30 TABLET | Refills: 5 | Status: SHIPPED | OUTPATIENT
Start: 2023-02-13 | End: 2023-02-13

## 2023-02-13 RX ORDER — OLANZAPINE 15 MG/1
15 TABLET ORAL NIGHTLY
Qty: 45 TABLET | Refills: 3 | Status: SHIPPED | OUTPATIENT
Start: 2023-02-13 | End: 2023-08-12

## 2023-02-13 RX ORDER — OLANZAPINE 15 MG/1
TABLET ORAL
Qty: 30 TABLET | OUTPATIENT
Start: 2023-02-13

## 2023-02-13 RX ORDER — OLANZAPINE 7.5 MG/1
7.5 TABLET ORAL EVERY MORNING
Qty: 30 TABLET | OUTPATIENT
Start: 2023-02-13

## 2023-02-13 RX ORDER — CLONAZEPAM 0.5 MG/1
0.5 TABLET ORAL NIGHTLY PRN
Qty: 15 TABLET | Refills: 0 | Status: SHIPPED | OUTPATIENT
Start: 2023-02-13 | End: 2023-03-24

## 2023-02-13 NOTE — TELEPHONE ENCOUNTER
Patient's last appointment was : 12/13/22  Patient's next appointment is :  2/21/23  Last refilled: 1/10/23,1/12/22,8/12/22  [address] Verified    Lab Results   Component Value Date    LABA1C 5.1 05/15/2019     Lab Results   Component Value Date    CHOL 157 11/19/2021    TRIG 70 11/19/2021    HDL 44 11/19/2021    LDLCALC 90 04/16/2020     Lab Results   Component Value Date     01/21/2023    K 4.0 01/21/2023     01/21/2023    CO2 24 01/21/2023    BUN 3 (L) 01/21/2023    CREATININE 0.6 01/21/2023    GLUCOSE 99 01/21/2023    CALCIUM 9.3 01/21/2023    PROT 6.9 01/17/2023    LABALBU 4.0 01/17/2023    BILITOT 0.5 01/17/2023    ALKPHOS 86 01/17/2023    AST 28 01/17/2023    ALT 38 01/17/2023    LABGLOM >60 01/21/2023    GFRAA >60 11/19/2021    AGRATIO 1.3 07/07/2020    GLOB 3.4 07/07/2020     Lab Results   Component Value Date    TSH 1.250 08/16/2022    T4FREE 1.36 08/16/2022     Lab Results   Component Value Date    WBC 11.7 (H) 01/21/2023    HGB 13.1 01/21/2023    HCT 39.1 01/21/2023    MCV 88.1 01/21/2023     01/21/2023

## 2023-02-13 NOTE — TELEPHONE ENCOUNTER
Dr. Christina Rob needs to have #45 if wanting patient to take 1.5 tabs per day. Please review and Advise.          OLANZapine (ZYPREXA) 15 MG tablet [[id number]]     Order Details  Dose: 15 mg Route: Oral Frequency: NIGHTLY   Dispense Quantity: 30 tablet Refills: 5    Indications of Use: 1/2 tab every morning and 1 tab at bedtime

## 2023-02-21 ENCOUNTER — OFFICE VISIT (OUTPATIENT)
Dept: FAMILY MEDICINE CLINIC | Age: 38
End: 2023-02-21
Payer: MEDICAID

## 2023-02-21 VITALS
BODY MASS INDEX: 35.61 KG/M2 | TEMPERATURE: 97.2 F | OXYGEN SATURATION: 98 % | SYSTOLIC BLOOD PRESSURE: 118 MMHG | DIASTOLIC BLOOD PRESSURE: 62 MMHG | RESPIRATION RATE: 16 BRPM | HEIGHT: 60 IN | HEART RATE: 92 BPM | WEIGHT: 181.4 LBS

## 2023-02-21 DIAGNOSIS — M21.952 DEFORMITY OF LOWER EXTREMITY, LEFT: ICD-10-CM

## 2023-02-21 DIAGNOSIS — G89.3 CHRONIC PAIN AFTER CANCER TREATMENT: ICD-10-CM

## 2023-02-21 DIAGNOSIS — Z85.831 HISTORY OF RHABDOMYOSARCOMA: ICD-10-CM

## 2023-02-21 PROCEDURE — G8484 FLU IMMUNIZE NO ADMIN: HCPCS | Performed by: STUDENT IN AN ORGANIZED HEALTH CARE EDUCATION/TRAINING PROGRAM

## 2023-02-21 PROCEDURE — 1036F TOBACCO NON-USER: CPT | Performed by: STUDENT IN AN ORGANIZED HEALTH CARE EDUCATION/TRAINING PROGRAM

## 2023-02-21 PROCEDURE — G8427 DOCREV CUR MEDS BY ELIG CLIN: HCPCS | Performed by: STUDENT IN AN ORGANIZED HEALTH CARE EDUCATION/TRAINING PROGRAM

## 2023-02-21 PROCEDURE — G8417 CALC BMI ABV UP PARAM F/U: HCPCS | Performed by: STUDENT IN AN ORGANIZED HEALTH CARE EDUCATION/TRAINING PROGRAM

## 2023-02-21 PROCEDURE — 99214 OFFICE O/P EST MOD 30 MIN: CPT | Performed by: STUDENT IN AN ORGANIZED HEALTH CARE EDUCATION/TRAINING PROGRAM

## 2023-02-21 RX ORDER — HYDROCODONE BITARTRATE AND ACETAMINOPHEN 5; 325 MG/1; MG/1
1 TABLET ORAL EVERY 8 HOURS PRN
Qty: 90 TABLET | Refills: 0 | Status: SHIPPED | OUTPATIENT
Start: 2023-02-21 | End: 2023-03-23

## 2023-02-21 RX ORDER — GABAPENTIN 400 MG/1
400 CAPSULE ORAL 3 TIMES DAILY
Qty: 90 CAPSULE | Refills: 2 | Status: SHIPPED | OUTPATIENT
Start: 2023-02-21 | End: 2023-03-23

## 2023-02-21 SDOH — ECONOMIC STABILITY: INCOME INSECURITY: HOW HARD IS IT FOR YOU TO PAY FOR THE VERY BASICS LIKE FOOD, HOUSING, MEDICAL CARE, AND HEATING?: SOMEWHAT HARD

## 2023-02-21 SDOH — ECONOMIC STABILITY: FOOD INSECURITY: WITHIN THE PAST 12 MONTHS, THE FOOD YOU BOUGHT JUST DIDN'T LAST AND YOU DIDN'T HAVE MONEY TO GET MORE.: OFTEN TRUE

## 2023-02-21 SDOH — ECONOMIC STABILITY: FOOD INSECURITY: WITHIN THE PAST 12 MONTHS, YOU WORRIED THAT YOUR FOOD WOULD RUN OUT BEFORE YOU GOT MONEY TO BUY MORE.: SOMETIMES TRUE

## 2023-02-21 SDOH — ECONOMIC STABILITY: HOUSING INSECURITY
IN THE LAST 12 MONTHS, WAS THERE A TIME WHEN YOU DID NOT HAVE A STEADY PLACE TO SLEEP OR SLEPT IN A SHELTER (INCLUDING NOW)?: NO

## 2023-02-21 ASSESSMENT — ENCOUNTER SYMPTOMS
NAUSEA: 0
SORE THROAT: 0
ABDOMINAL PAIN: 0
COUGH: 0
SHORTNESS OF BREATH: 0

## 2023-02-21 NOTE — PATIENT INSTRUCTIONS
Thank you   Thank you for trusting us with your healthcare needs. You may receive a survey regarding today's visit. It would help us out if you would take a few moments to provide your feedback. We value your input. Please bring in ALL medications BOTTLES, including inhalers, herbal supplements, over the counter, prescribed & non-prescribed medicine. The office would like actual medication bottles and a list.   Please note our OFFICE POLICIES:   Prior to getting your labs drawn, please check with your insurance company for benefits and eligibility of lab services. Often, insurance companies cover certain tests for preventative visits only. It is patient's responsibility to see what is covered. We are unable to change a diagnosis after the test has been performed. Lab orders will not be re-printed. Please hold onto your original lab orders and take them to your lab to be completed. If you no show your scheduled appointment three times, you will be dismissed from this practice. Reschedules must be completed 24 hours prior to your schedule appointment. If the list below has been completed, PLEASE FAX RECORDS TO OUR OFFICE @ [phone].  Once the records have been received we will update your records at our office:  Health Maintenance Due   Topic Date Due    COVID-19 Vaccine (1) Never done    Varicella vaccine (1 of 2 - 2-dose childhood series) Never done    Pneumococcal 0-64 years Vaccine (1 - PCV) Never done    DTaP/Tdap/Td vaccine (1 - Tdap) Never done    Cervical cancer screen  Never done    Flu vaccine (1) Never done    Lipids  11/19/2022

## 2023-02-21 NOTE — PROGRESS NOTES
S: 42 y.o. female with   Chief Complaint   Patient presents with    1 Month Follow-Up     Chronic       HPI: please see resident note for HPI and ROS. BP Readings from Last 3 Encounters:   02/21/23 118/62   01/21/23 101/86   01/10/23 118/68     Wt Readings from Last 3 Encounters:   02/21/23 181 lb 6.4 oz (82.3 kg)   01/19/23 185 lb 14.4 oz (84.3 kg)   01/10/23 187 lb 6.4 oz (85 kg)       O: VS:  height is 5' (1.524 m) and weight is 181 lb 6.4 oz (82.3 kg). Her temporal temperature is 97.2 °F (36.2 °C). Her blood pressure is 118/62 and her pulse is 92. Her respiration is 16 and oxygen saturation is 98%. AAO/NAD, appropriate affect for mood       Diagnosis Orders   1. History of rhabdomyosarcoma  HYDROcodone-acetaminophen (NORCO) 5-325 MG per tablet      2. Deformity of lower extremity, left  gabapentin (NEURONTIN) 400 MG capsule    HYDROcodone-acetaminophen (NORCO) 5-325 MG per tablet      3. Chronic pain after cancer treatment  gabapentin (NEURONTIN) 400 MG capsule    HYDROcodone-acetaminophen (NORCO) 5-325 MG per tablet          Plan:  Please refer to resident note for full plan. History of rhabdomyosarcoma, lower extremity deformity, chronic pain: Chronic, stable. Continue gabapentin and Norco as needed for pain control. PDMP reviewed and appropriate. Follow-up with Dr. Hernando Pitts as scheduled in 3/2023 for bone spur removal/surgery. Follow-up in 1 month for recheck or sooner if needed. Health Maintenance Due   Topic Date Due    COVID-19 Vaccine (1) Never done    Varicella vaccine (1 of 2 - 2-dose childhood series) Never done    Pneumococcal 0-64 years Vaccine (1 - PCV) Never done    DTaP/Tdap/Td vaccine (1 - Tdap) Never done    Cervical cancer screen  Never done    Flu vaccine (1) Never done    Lipids  11/19/2022       Attending Physician Statement  I have discussed the case, including pertinent history and exam findings with the resident.  I also have seen the patient and performed key portions of the examination. I agree with the documented assessment and plan as documented by the resident.         Ricco Au DO 2/21/2023 1:32 PM

## 2023-02-21 NOTE — PROGRESS NOTES
Health Maintenance Due   Topic Date Due    COVID-19 Vaccine (1) Never done    Varicella vaccine (1 of 2 - 2-dose childhood series) Never done    Pneumococcal 0-64 years Vaccine (1 - PCV) Never done    DTaP/Tdap/Td vaccine (1 - Tdap) Never done    Cervical cancer screen  Never done    Flu vaccine (1) Never done    Lipids  11/19/2022

## 2023-02-21 NOTE — PROGRESS NOTES
[id number] [facility] [address] [address]  Dept: [phone]  Dept Fax: [phone]  Loc: [phone]  PROGRESS NOTE      Visit Date: 2/21/2023    Abhay Vee is a 42 y.o. female who presents today for:  Chief Complaint   Patient presents with    1 Month Follow-Up     Chronic       Impression/Plan:  1. History of rhabdomyosarcoma  Chronic, overall stable  Continues to have pain in left lower extremity. Planning to have surgery at [facility] in March. We will await preoperative risk assessment paperwork  She also needs risk assessment completed by cardiology  Luna refill today. May need to increase frequency during perioperative period.  - HYDROcodone-acetaminophen (NORCO) 5-325 MG per tablet; Take 1 tablet by mouth every 8 hours as needed for Pain for up to 30 days. Intended supply: 30 days. Take lowest dose possible to manage pain  Dispense: 90 tablet; Refill: 0    2. Deformity of lower extremity, left  Chronic, overall stable  Plan as above  - gabapentin (NEURONTIN) 400 MG capsule; Take 1 capsule by mouth in the morning, at noon, and at bedtime for 30 days. Dispense: 90 capsule; Refill: 2  - HYDROcodone-acetaminophen (NORCO) 5-325 MG per tablet; Take 1 tablet by mouth every 8 hours as needed for Pain for up to 30 days. Intended supply: 30 days. Take lowest dose possible to manage pain  Dispense: 90 tablet; Refill: 0    3. Chronic pain after cancer treatment  Chronic, overall stable  Plan as above  - gabapentin (NEURONTIN) 400 MG capsule; Take 1 capsule by mouth in the morning, at noon, and at bedtime for 30 days. Dispense: 90 capsule; Refill: 2  - HYDROcodone-acetaminophen (NORCO) 5-325 MG per tablet; Take 1 tablet by mouth every 8 hours as needed for Pain for up to 30 days. Intended supply: 30 days. Take lowest dose possible to manage pain  Dispense: 90 tablet;  Refill: 0      Return in about 4 weeks (around 3/21/2023) for virtual exam for chronic pain. Subjective:  HPI    Here today for follow-up of chronic pain. Having surgery on 3/14/23 with [facility] to have bone and forefoot removed and bone spurs removed from left lower extremity. She is looking forward to the surgery, but has reasonable expectations that it may not resolve her pain. She is still hoping that this can prevent or at least postpone amputation in the future. Continues to struggle with pain in the lower extremity, but it is tolerable with Norco.    Taking 'dragon's blood' every morning, which has helped her IBS significantly. Has been doing very well since discharge. No further episodes of blood in stool. Weight is stable. No further questions of complaints. Review of Systems   Constitutional:  Negative for chills and fever. HENT:  Negative for congestion and sore throat. Eyes:  Negative for visual disturbance. Respiratory:  Negative for cough and shortness of breath. Cardiovascular:  Negative for chest pain. Gastrointestinal:  Negative for abdominal pain and nausea. Genitourinary:  Negative for dysuria. Musculoskeletal:  Positive for arthralgias. Chronic left lower extremity pain present   Skin:  Negative for rash. Neurological:  Negative for dizziness, light-headedness and headaches. Psychiatric/Behavioral:  The patient is not nervous/anxious.       Patient Active Problem List   Diagnosis    Electrolyte disturbance    Shock ([address]    Transaminitis    Hyperglycemia    Dilated cardiomyopathy (HCC)    Hypokalemia    ICD (implantable cardioverter-defibrillator) in place    Moderate malnutrition (HCC)    SOB (shortness of breath)    Pyelonephritis    Sepsis (HCC)    Methadone dependence (HCC)    Mood disorder (HCC)    PTSD (post-traumatic stress disorder)    Cholecystitis    Ankle instability    Chronic systolic congestive heart failure (HCC)    Generalized anxiety disorder    Tobacco dependence syndrome    Chronic kidney disease, unspecified    Chronic insomnia    History of rhabdomyosarcoma    Chronic pain after cancer treatment    Primary localized osteoarthrosis of ankle and foot    Colitis    Rectal bleeding     Past Medical History:   Diagnosis Date    Acute on chronic systolic congestive heart failure (HCC)     Acute respiratory failure (HCC) 5/14/2019    Arrhythmia     Arthritis     Aspiration pneumonitis (HCC)     Bipolar disorder (HCC)     Cancer (HCC)     rhabdomyosarcoma    Cancer ([address]     Cardiac arrest ([address] 05/2019    VF    Cardiac arrest with ventricular fibrillation ([address]     Cardiomyopathy ([address] 05/2019    EF= 40% with global hypokinesis    Chronic kidney disease     COPD (chronic obstructive pulmonary disease) (HCC)     Depression     E. coli infection     Family history of early CAD     Hepatitis C     Hepatitis C antibody positive in blood 05/17/2019    Hyperlipidemia     Paranoia (psychosis) ([address]     Pneumonia due to infectious organism     Rhabdomyosarcoma ([address]     Scoliosis     Smoker     Tachycardia     VF (ventricular fibrillation) ([address]       Past Surgical History:   Procedure Laterality Date    ACHILLES TENDON SURGERY      BONE MARROW TRANSPLANT      BRONCHOSCOPY N/A 05/16/2019    BRONCHOSCOPY ALVEOLAR LAVAGE performed by Li Adams MD at [place] [address]  05/16/2019    BRONCHOSCOPY THERAPUTIC ASPIRATION INITIAL performed by Li Adams MD at [place] [address] N/A 05/19/2019    BRONCHOSCOPY ALVEOLAR LAVAGE performed by Li Adams MD at [place] [address]  05/19/2019    BRONCHOSCOPY THERAPUTIC ASPIRATION INITIAL performed by Li Adams MD at [address] [place] [address]  04/15/2014    Laparascopic    ENDOSCOPY, COLON, DIAGNOSTIC      FOOT SURGERY      GROWTH PLATE SURGERY      INJECTION  01/31/2023    Left Mid Foot Steroid Injection at [facility] with     LEG SURGERY       Family History   Problem Relation Age of Onset Emphysema Mother     Mental Illness Mother     Substance Abuse Mother     Stroke Mother     Heart Attack Father     Mental Illness Father     Arthritis Father     Heart Disease Father     High Blood Pressure Father     High Cholesterol Father      Social History     Tobacco Use    Smoking status: Former     Packs/day: 2.00     Years: 20.00     Pack years: 40.00     Types: Cigarettes    Smokeless tobacco: Never   Substance Use Topics    Alcohol use: Yes     Comment: social      Current Outpatient Medications   Medication Sig Dispense Refill    gabapentin (NEURONTIN) 400 MG capsule Take 1 capsule by mouth in the morning, at noon, and at bedtime for 30 days. 90 capsule 2    HYDROcodone-acetaminophen (NORCO) 5-325 MG per tablet Take 1 tablet by mouth every 8 hours as needed for Pain for up to 30 days. Intended supply: 30 days. Take lowest dose possible to manage pain 90 tablet 0    clonazePAM (KLONOPIN) 0.5 MG tablet Take 1 tablet by mouth nightly as needed for Anxiety for up to 15 doses. 15 tablet 0    OLANZapine (ZYPREXA) 15 MG tablet Take 1 tablet by mouth nightly Indications: 1/2 tab every morning and 1 tab at bedtime 45 tablet 3    dicyclomine (BENTYL) 10 MG capsule Take 2 capsules by mouth 4 times daily as needed (spasms) 30 capsule 1    pantoprazole (PROTONIX) 40 MG tablet Take 1 tablet by mouth every morning (before breakfast) 30 tablet 3    metoprolol succinate (TOPROL XL) 50 MG extended release tablet take 1 tablet by mouth twice a day 60 tablet 3    atorvastatin (LIPITOR) 20 MG tablet take 1 tablet by mouth once daily 30 tablet 5    spironolactone (ALDACTONE) 25 MG tablet TAKE ONE TABLET BY MOUTH DAILY 90 tablet 1    lisinopril (PRINIVIL;ZESTRIL) 2.5 MG tablet TAKE ONE TABLET BY MOUTH DAILY 90 tablet 2     No current facility-administered medications for this visit.      Allergies   Allergen Reactions    Tape [Adhesive Tape]        There is no immunization history for the selected administration types on file for this patient.   Health Maintenance   Topic Date Due    COVID-19 Vaccine (1) Never done    Varicella vaccine (1 of 2 - 2-dose childhood series) Never done    Pneumococcal 0-64 years Vaccine (1 - PCV) Never done    DTaP/Tdap/Td vaccine (1 - Tdap) Never done    Cervical cancer screen  Never done    Flu vaccine (1) Never done    Lipids  11/19/2022    Depression Screen  01/10/2024    GFR test (Diabetes, CKD 3-4, OR last GFR 15-59)  01/21/2024    Hepatitis C screen  Completed    HIV screen  Completed    Hepatitis A vaccine  Aged Out    Hib vaccine  Aged Out    Meningococcal (ACWY) vaccine  Aged Out       LABS  Lab Results   Component Value Date    LABA1C 5.1 05/15/2019     Lab Results   Component Value Date    EAG 99.7 05/15/2019     No components found for: CHLPL  Lab Results   Component Value Date    TRIG 70 11/19/2021    TRIG 193 07/17/2019    TRIG 232 (H) 05/22/2019     Lab Results   Component Value Date    HDL 44 11/19/2021    HDL 42 04/16/2020    HDL 52 07/17/2019     Lab Results   Component Value Date    LDLCALC 90 04/16/2020    LDLCALC 140 07/17/2019       Chemistry        Component Value Date/Time     01/21/2023 0354    K 4.0 01/21/2023 0354    K 3.7 01/17/2023 1619     01/21/2023 0354    CO2 24 01/21/2023 0354    BUN 3 (L) 01/21/2023 0354    CREATININE 0.6 01/21/2023 0354        Component Value Date/Time    CALCIUM 9.3 01/21/2023 0354    ALKPHOS 86 01/17/2023 1619    AST 28 01/17/2023 1619    ALT 38 01/17/2023 1619    BILITOT 0.5 01/17/2023 1619          Lab Results   Component Value Date    LABMICR YES 05/14/2019     Lab Results   Component Value Date    TSH 1.250 08/16/2022     No results found for: PSA  Lab Results   Component Value Date    WBC 11.7 (H) 01/21/2023    HGB 13.1 01/21/2023    HCT 39.1 01/21/2023    MCV 88.1 01/21/2023     01/21/2023       Objective:  /62 (Site: Left Upper Arm, Position: Sitting, Cuff Size: Medium Adult)   Pulse 92   Temp 97.2 °F (36.2 °C) (Temporal) Resp 16   Ht 5' (1.524 m)   Wt 181 lb 6.4 oz (82.3 kg)   LMP 06/03/2019   SpO2 98%   BMI 35.43 kg/m²     Physical Exam  Constitutional:       General: She is not in acute distress. Appearance: Normal appearance. HENT:      Head: Normocephalic. Right Ear: External ear normal.      Left Ear: External ear normal.      Nose: Nose normal.      Mouth/Throat:      Mouth: Mucous membranes are moist.   Eyes:      General: No scleral icterus. Extraocular Movements: Extraocular movements intact. Cardiovascular:      Rate and Rhythm: Normal rate and regular rhythm. Pulses: Normal pulses. Heart sounds: Normal heart sounds. Pulmonary:      Effort: Pulmonary effort is normal.      Breath sounds: Normal breath sounds. Abdominal:      General: Abdomen is flat. There is no distension. Palpations: Abdomen is soft. Musculoskeletal:         General: Normal range of motion. Cervical back: Normal range of motion. Comments: Chronic deformity of left lower extremity present. No changes. Skin:     General: Skin is warm and dry. Neurological:      Mental Status: She is alert. Motor: No weakness. Gait: Gait abnormal (chronic). Psychiatric:         Mood and Affect: Mood normal.       They voiced understanding. All questions answered. They agreed with treatment plan. See patient instructions for any educational materials that may have been given. Discussed use, benefit, and side effects of prescribed medications. Reviewed health maintenance.     (Please note that portions of this note may have been completed with a voice recognition program.  Efforts were made to edit the dictation but occasionally words are mis-transcribed.)      Electronically signed by Javier Odmo DO on 2/21/2023 at 1:24 PM

## 2023-03-01 NOTE — PROGRESS NOTES
[address]  [place] [address]  Dept: [phone]  Dept Fax: [phone]  Loc: [phone]    Visit Date: 3/2/2023    Ms. Brigid Mercado is a 42 y.o. female  who presented for:  CHF  Preoperative cardiac risk assessment   HPI:   SINGH Peacock is a pleasant 42year old female patient who  has a past medical history of Acute on chronic systolic congestive heart failure ([address] Acute respiratory failure ([address] Arrhythmia, Arthritis, Aspiration pneumonitis ([address] Bipolar disorder ([address] Cancer ([address] Cancer ([address] Cardiac arrest ([address] Cardiac arrest with ventricular fibrillation ([address] Cardiomyopathy ([address] Chronic kidney disease, COPD (chronic obstructive pulmonary disease) ([address] Depression, E. 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 Echo in 2011 revealed an EF of 50-55%, stress test was negative. On 5/2019, patient was admitted to OSH with VF cardiac arrest, severely reduced EF. She underwent AICD placement in 2019. Echocardiogram on 8/2020 revealed an EF of 35-40%. Patient does not recall having a [facility]. She was previously seen in office to establish care after she moved from [place] to [address] [place] [address]. Stress test on 10/2022 was negative for ischemia. Echocardiogram on 10/2022 revealed an EF of 40-45%. Patient was seen at CHF clinic, refused SGLT2 inhibitors. Patient is scheduled for foot surgery, preoperative cardiac risk assessment was requested. Patient denies chest pain, shortness of breath, dyspnea on exertion.        Current Outpatient Medications:     gabapentin (NEURONTIN) 400 MG capsule, Take 1 capsule by mouth in the morning, at noon, and at bedtime for 30 days. , Disp: 90 capsule, Rfl: 2    HYDROcodone-acetaminophen (NORCO) 5-325 MG per tablet, Take 1 tablet by mouth every 8 hours as needed for Pain for up to 30 days. Intended supply: 30 days. Take lowest dose possible to manage pain, Disp: 90 tablet, Rfl: 0    clonazePAM (KLONOPIN) 0.5 MG tablet, Take 1 tablet by mouth nightly as needed for Anxiety for up to 15 doses. , Disp: 15 tablet, Rfl: 0    OLANZapine (ZYPREXA) 15 MG tablet, Take 1 tablet by mouth nightly Indications: 1/2 tab every morning and 1 tab at bedtime, Disp: 45 tablet, Rfl: 3    dicyclomine (BENTYL) 10 MG capsule, Take 2 capsules by mouth 4 times daily as needed (spasms), Disp: 30 capsule, Rfl: 1    pantoprazole (PROTONIX) 40 MG tablet, Take 1 tablet by mouth every morning (before breakfast), Disp: 30 tablet, Rfl: 3    metoprolol succinate (TOPROL XL) 50 MG extended release tablet, take 1 tablet by mouth twice a day, Disp: 60 tablet, Rfl: 3    atorvastatin (LIPITOR) 20 MG tablet, take 1 tablet by mouth once daily, Disp: 30 tablet, Rfl: 5    spironolactone (ALDACTONE) 25 MG tablet, TAKE ONE TABLET BY MOUTH DAILY, Disp: 90 tablet, Rfl: 1    lisinopril (PRINIVIL;ZESTRIL) 2.5 MG tablet, TAKE ONE TABLET BY MOUTH DAILY, Disp: 90 tablet, Rfl: 2    Past Medical History  Rubi  has a past medical history of Acute on chronic systolic congestive heart failure (HCC), Acute respiratory failure ([address] Arrhythmia, Arthritis, Aspiration pneumonitis (HCC), Bipolar disorder ([address] Cancer ([address] Cancer ([address] Cardiac arrest ([address] Cardiac arrest with ventricular fibrillation ([address] Cardiomyopathy ([address] Chronic kidney disease, COPD (chronic obstructive pulmonary disease) ([address] Depression, E. coli infection, Family history of early CAD, Hepatitis C, Hepatitis C antibody positive in blood, Hyperlipidemia, Paranoia (psychosis) ([facility] Pneumonia due to infectious organism, Rhabdomyosarcoma ([facility] Scoliosis, Smoker, Tachycardia, and VF (ventricular fibrillation) ([facility] Social History  Rubi  reports that she has quit smoking. Her smoking use included cigarettes. She has a 40.00 pack-year smoking history. She has never used smokeless tobacco. She reports current alcohol use. She reports current drug use. Drug: Marijuana [employer]). Family History  Rubi family history includes Arthritis in her father; Emphysema in her mother; Heart Attack in her father; Heart Disease in her father; High Blood Pressure in her father; High Cholesterol in her father; Mental Illness in her father and mother; Stroke in her mother; Substance Abuse in her mother. Past Surgical History   Past Surgical History:   Procedure Laterality Date    ACHILLES TENDON SURGERY      BONE MARROW TRANSPLANT      BRONCHOSCOPY N/A 05/16/2019    BRONCHOSCOPY ALVEOLAR LAVAGE performed by Alexa Garza MD at [address] [place] [address]  05/16/2019    BRONCHOSCOPY THERAPUTIC ASPIRATION INITIAL performed by Alexa Garza MD at [address] [place] [address] N/A 05/19/2019    BRONCHOSCOPY ALVEOLAR LAVAGE performed by Alexa Garza MD at [address] [place] [address]  05/19/2019    BRONCHOSCOPY THERAPUTIC ASPIRATION INITIAL performed by Alexa Garza MD at [address] [place] [address]  04/15/2014    Laparascopic    ENDOSCOPY, COLON, DIAGNOSTIC      FOOT SURGERY      GROWTH PLATE SURGERY      INJECTION  01/31/2023    Left Mid Foot Steroid Injection at [facility] with     LEG SURGERY         Review of Systems   Constitutional: Negative for chills and fever  HENT: Negative for congestion, sinus pressure, sneezing and sore throat. Eyes: Negative for pain, discharge, redness and itching. Respiratory: Negative for apnea, cough  Gastrointestinal: Negative for blood in stool, constipation, diarrhea   Endocrine: Negative for cold intolerance, heat intolerance, polydipsia.   Genitourinary: Negative for dysuria, enuresis, flank pain and hematuria. Musculoskeletal: Negative for arthralgias, joint swelling and neck pain. Neurological: Negative for numbness and headaches. Psychiatric/Behavioral: Negative for agitation, confusion, decreased concentration and dysphoric mood. Objective:     LMP 06/03/2019     Wt Readings from Last 3 Encounters:   02/21/23 181 lb 6.4 oz (82.3 kg)   01/19/23 185 lb 14.4 oz (84.3 kg)   01/10/23 187 lb 6.4 oz (85 kg)     BP Readings from Last 3 Encounters:   02/21/23 118/62   01/21/23 101/86   01/10/23 118/68       Nursing note and vitals reviewed. Physical Exam   Constitutional: Oriented to person, place, and time. Appears well-developed and well-nourished. ENT: Moist mucous membranes. No bleeding. Tongue is midline. Head: Normocephalic and atraumatic. Eyes: EOM are normal. Pupils are equal, round, and reactive to light. Neck: Normal range of motion. Neck supple. No JVD present. Cardiovascular: Normal rate, regular rhythm, no murmur, no rubs, and intact distal pulses. Pulmonary/Chest: Effort normal and breath sounds normal. No respiratory distress. No wheezes. No rales. Abdominal: Soft. Bowel sounds are normal. No distension. There is no tenderness. Musculoskeletal: Normal range of motion. no edema. Neurological: Alert and oriented to person, place, and time. No cranial nerve deficit. Coordination normal.   Skin: Skin is warm and dry. Psychiatric: Normal mood and affect.        Lab Results   Component Value Date/Time    CKTOTAL 116 05/23/2019 05:00 AM    CKTOTAL 46 05/22/2019 05:53 AM    CKTOTAL 32 05/21/2019 06:20 AM       Lab Results   Component Value Date/Time    WBC 11.7 01/21/2023 03:54 AM    RBC 4.44 01/21/2023 03:54 AM    HGB 13.1 01/21/2023 03:54 AM    HCT 39.1 01/21/2023 03:54 AM    MCV 88.1 01/21/2023 03:54 AM    MCH 29.5 01/21/2023 03:54 AM    MCHC 33.5 01/21/2023 03:54 AM    RDW 11.7 11/19/2021 03:14 PM     01/21/2023 03:54 AM    MPV 9.7 01/21/2023 03:54 AM       Lab Results   Component Value Date/Time     01/21/2023 03:54 AM    K 4.0 01/21/2023 03:54 AM    K 3.7 01/17/2023 04:19 PM     01/21/2023 03:54 AM    CO2 24 01/21/2023 03:54 AM    BUN 3 01/21/2023 03:54 AM    LABALBU 4.0 01/17/2023 04:19 PM    CREATININE 0.6 01/21/2023 03:54 AM    CALCIUM 9.3 01/21/2023 03:54 AM    GFRAA >60 11/19/2021 03:14 PM    GFRAA >60 06/08/2013 02:30 PM    LABGLOM >60 01/21/2023 03:54 AM    GLUCOSE 99 01/21/2023 03:54 AM       Lab Results   Component Value Date/Time    ALKPHOS 86 01/17/2023 04:19 PM    ALT 38 01/17/2023 04:19 PM    AST 28 01/17/2023 04:19 PM    PROT 6.9 01/17/2023 04:19 PM    PROT 8.2 03/10/2013 11:41 PM    BILITOT 0.5 01/17/2023 04:19 PM    BILIDIR <0.2 05/23/2019 12:22 PM    IBILI see below 05/23/2019 12:22 PM    LABALBU 4.0 01/17/2023 04:19 PM       Lab Results   Component Value Date/Time    MG 1.0 01/18/2023 03:45 AM       Lab Results   Component Value Date    INR 1.02 01/19/2023    INR 1.18 (H) 06/09/2019    INR 1.30 (H) 05/26/2019    PROTIME 13.4 (H) 06/09/2019    PROTIME 14.8 (H) 05/26/2019    PROTIME 13.0 05/24/2019         Lab Results   Component Value Date/Time    LABA1C 5.1 05/15/2019 06:25 AM       Lab Results   Component Value Date/Time    TRIG 70 11/19/2021 03:14 PM    HDL 44 11/19/2021 03:14 PM    LDLCALC 90 04/16/2020 01:39 PM    LABVLDL 24 04/16/2020 01:39 PM       Lab Results   Component Value Date/Time    TSH 1.250 08/16/2022 02:33 PM         Testing Reviewed:      I have individually reviewed the cardiac test below:    ECHO:   Results for orders placed or performed during the hospital encounter of 10/07/22   Echo 2D w doppler w color complete   Result Value Ref Range    Left Ventricular Ejection Fraction 43     LVEF MODALITY ECHO     Narrative    Transthoracic Echocardiography Report (TTE)     Demographics      Patient Name    Shayna Curtis Gender                Female      MR #            [id number]      Race                   Ethnicity      Account #       [de-identified]      Room Number      Accession       [id number]     Date of Study         10/07/2022   Number      Date of Birth   1985     Referring Physician   Rodrick Davenport MD                                                        Milli Becerra DO      Age             42 year(s)     [facility]                                     Interpreting          Rodrick Davenport MD                                  Physician     Procedure    Type of Study      TTE procedure:ECHOCARDIOGRAM COMPLETE 2D W DOPPLER W COLOR. Procedure Date  Date: 10/07/2022 Start: 12:52 PM    Study Location: Echo Lab  Technical Quality: Adequate visualization    Indications:Congestive heart failure. Additional Medical History:Bipolar, ARthritis, Congestive heart failure,  Dilated Cardiomyopathy, Shortness of breath, Sepsis, Shock, Ex Smoker,  History of V-fib, COPD, Hep C, Tachycardia, ICD, Rhabdomyosarcoma, History  of chemo. Patient Status: Routine    Height: 60 inches Weight: 189 pounds BSA: 1.82 m^2 BMI: 36.91 kg/m^2    BP: 99/71 mmHg     Conclusions      Summary   Left ventricle size is normal.   Normal left ventricular wall thickness. There was mild global hypokinesis of the left ventricle. Ejection fraction is visually estimated in the range of 40% to 45%. Signature      ----------------------------------------------------------------   Electronically signed by Rodrick Davenport MD (Interpreting   physician) on 10/07/2022 at 02:55 PM   ----------------------------------------------------------------      Findings      Mitral Valve   The mitral valve structure was normal with normal leaflet separation. DOPPLER: The transmitral velocity was within the normal range with no   evidence for mitral stenosis. There was no evidence of mitral   regurgitation.       Aortic Valve   The aortic valve was trileaflet with normal thickness and cuspal separation. DOPPLER: Transaortic velocity was within the normal range with   no evidence of aortic stenosis. There was no evidence of aortic   regurgitation. Tricuspid Valve   Tricuspid valve is structurally normal.   Mild tricuspid regurgitation visualized. Pulmonic Valve   The pulmonic valve leaflets exhibited normal thickness, no calcification,   and normal cuspal separation. DOPPLER: The transpulmonic velocity was   within the normal range with no evidence for regurgitation. Left Atrium   Mildly dilated left atrium. Left Ventricle   Left ventricle size is normal.   Normal left ventricular wall thickness. There was mild global hypokinesis of the left ventricle. Ejection fraction is visually estimated in the range of 40% to 45%. Right Atrium   Right atrial size was normal.      Right Ventricle   The right ventricular size was normal with normal systolic function and   wall thickness. Pacer Wire visualized in right ventricle. Pericardial Effusion   The pericardium was normal in appearance with no evidence of a pericardial   effusion. Pleural Effusion   No evidence of pleural effusion. Aorta / Great Vessels   -Aortic root dimension within normal limits.   -The Pulmonary artery is within normal limits. -IVC size is within normal limits with normal respiratory phasic changes.      M-Mode/2D Measurements & Calculations      LV Diastolic   LV Systolic Dimension:    AV Cusp Separation: 1.7 cmLA   Dimension: 4.8 3.8 cm                    Dimension: 4 cmAO Root Dimension:   cm             LV Volume Diastolic:      2.4 cmLA Area: 17.7 cm^2   LV FS:20.8 %   73.07 ml   LV PW          LV Volume Systolic: 63.38   Diastolic: 0.8 ml   cm             LV EDV/LV EDV Index:      RV Diastolic Dimension: 2.6 cm   Septum         73.07 ml/40 L^3VP ESV/LV   Diastolic: 0.9 ESV Index: 77.44 ml/23    LA/Aorta: 1.67   cm             m^2                       Ascending Aorta: 2.9 cm EF Calculated: 42.7 %     LA volume/Index: 52.1 ml /29m^2     Doppler Measurements & Calculations      MV Peak E-Wave: 96.9 cm/s  AV Peak Velocity: 179  LVOT Peak Velocity: 102   MV Peak A-Wave: 77.6 cm/s  cm/s                   cm/s   MV E/A Ratio: 1.25         AV Peak Gradient:      LVOT Peak Gradient: 4   MV Peak Gradient: 3.76     12.82 mmHg             mmHg   mmHg                                                     TV Peak E-Wave: 92.8   MV Deceleration Time: 282                         cm/s   msec                                              TV Peak A-Wave: 78.1                              IVRT: 67 msec          cm/s      MV E' Septal Velocity: 7.2                        TV Peak Gradient: 3.44   cm/s                       AV DVI (Vmax):0.57     mmHg   MV A' Septal Velocity: 9.7                        TR Velocity:236 cm/s   cm/s                                              TR Gradient:22.28 mmHg   MV E' Lateral Velocity:                           PV Peak Velocity: 98   12.8 cm/s                                         cm/s   MV A' Lateral Velocity:                           PV Peak Gradient: 3.84   10.1 cm/s                                         mmHg   E/E' septal: 13.46   E/E' lateral: 7.57     [email or web address] [id number]   ]  ECHO 8/19/2020:   Left Ventricle   Left ventricular systolic function is moderately reduced with a visually   estimated ejection fraction of 35-40 %. EF estimated by Koch's method at 38 %. The left ventricle is normal in size with normal wall thickness. Mild to moderate global hypokinesis more prominent on the anterior,   anterolateral, and anteroseptal walls. Grade I diastolic dysfunction with normal LV pressure. There is no evidence of a left ventricular thrombus. Cardiac Monitor: No results found for this or any previous visit.     Stress Test:10/2022  Imaging Results:Calculated gated LVEF 41 %. The T.I.D. ratio was 1.08 . There was a small sized, mild in intensity, fixed myocardial perfusion  defect of the apical wall. Myocardial perfusion imaging is not suggestive for myocardial ischemia. Conclusions      Summary   There was a small sized, mild in intensity, fixed myocardial perfusion   defect of the apical wall. Myocardial perfusion imaging is not suggestive for myocardial ischemia. Recommendation   Clinical correlation is recommended. Signatures      ----------------------------------------------------------------   Electronically signed by Yamini Plaza MD (Interpreting   Cardiologist) on 10/07/2022 at 16:54   ----------------------------------------------------------------    AssessmentPlan:   Rosibel Campa is a pleasant 42year old female patient who  has a past medical history of Acute on chronic systolic congestive heart failure ([address] Acute respiratory failure ([address] Arrhythmia, Arthritis, Aspiration pneumonitis ([address] Bipolar disorder ([address] Cancer ([address] Cancer ([address] Cardiac arrest [facility]), Cardiac arrest with ventricular fibrillation ([address] Cardiomyopathy ([address] Chronic kidney disease, COPD (chronic obstructive pulmonary disease) ([address] Depression, E. 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 Echo in 2011 revealed an EF of 50-55%, stress test was negative. On 5/2019, patient was admitted to OSH with VF cardiac arrest, severely reduced EF. She underwent AICD placement in 2019. Echocardiogram on 8/2020 revealed an EF of 35-40%. Patient does not recall having a C.  She was previously seen in office to establish care after she moved from [facility] [place]. Stress test on 10/2022 was negative for ischemia. Echocardiogram on 10/2022 revealed an EF of 40-45%. Patient was seen at CHF clinic, refused SGLT2 inhibitors. Patient is scheduled for foot surgery, preoperative cardiac risk assessment was requested. Patient denies chest pain, shortness of breath, dyspnea on exertion. Preoperative cardiac risk assessment    H/o ventricular fibrillation, cardiac arrest 2019  S/p AICD placement 2021  H/o severe NICMP (though to be 2/2 Adriamycin Rx)  H/O Gasstric ulcer  Bipolar disorder history   FH of premature CAD (her father had first MI at age 45,  from [place] at age 54)    On 2019, patient was admitted to OSH with VF cardiac arrest, severely reduced EF  She underwent AICD placement in   Follow at pacemaker clinic   On lisinopril, Toprol XL, Aldactone   Has h/o borderline/low BP limiting GDMT optimization   BP today is 102/62   Patient was seen at CHF clinic, refused SGLT2 inhibitors   Has no signs of volume overload   Patient is scheduled for foot surgery, preoperative cardiac risk assessment was requested   She denies chest pain   adequate functional capacity   Stress test on 10/2022 was negative for ischemia  Echocardiogram on 10/2022 revealed an EF of 40-45%  low to moderate cardiac risk   Patient accepts the risk of the planned procedure and understands the possibility of carmen-operative cardiac event, including but not limited to MI, VT/VF, cardiac arrest, and death, and wishes to proceed with the procedure     Above findings and plan of care were discussed with patient in details, patient's questions were answered.      Disposition:  RTC in 12 months             Electronically signed by Serafin Lazo MD, [facility]    3/1/2023 at 6:43 PM EST

## 2023-03-02 ENCOUNTER — OFFICE VISIT (OUTPATIENT)
Dept: CARDIOLOGY CLINIC | Age: 38
End: 2023-03-02
Payer: MEDICAID

## 2023-03-02 VITALS
HEART RATE: 68 BPM | BODY MASS INDEX: 37.03 KG/M2 | HEIGHT: 60 IN | SYSTOLIC BLOOD PRESSURE: 102 MMHG | WEIGHT: 188.6 LBS | DIASTOLIC BLOOD PRESSURE: 62 MMHG

## 2023-03-02 DIAGNOSIS — Z01.818 PREOP TESTING: Primary | ICD-10-CM

## 2023-03-02 PROCEDURE — G8427 DOCREV CUR MEDS BY ELIG CLIN: HCPCS | Performed by: INTERNAL MEDICINE

## 2023-03-02 PROCEDURE — 1036F TOBACCO NON-USER: CPT | Performed by: INTERNAL MEDICINE

## 2023-03-02 PROCEDURE — G8417 CALC BMI ABV UP PARAM F/U: HCPCS | Performed by: INTERNAL MEDICINE

## 2023-03-02 PROCEDURE — 93000 ELECTROCARDIOGRAM COMPLETE: CPT | Performed by: INTERNAL MEDICINE

## 2023-03-02 PROCEDURE — G8484 FLU IMMUNIZE NO ADMIN: HCPCS | Performed by: INTERNAL MEDICINE

## 2023-03-02 PROCEDURE — 99214 OFFICE O/P EST MOD 30 MIN: CPT | Performed by: INTERNAL MEDICINE

## 2023-03-06 ENCOUNTER — OFFICE VISIT (OUTPATIENT)
Dept: FAMILY MEDICINE CLINIC | Age: 38
End: 2023-03-06
Payer: MEDICAID

## 2023-03-06 VITALS
TEMPERATURE: 97.3 F | RESPIRATION RATE: 18 BRPM | HEIGHT: 60 IN | SYSTOLIC BLOOD PRESSURE: 100 MMHG | BODY MASS INDEX: 36.2 KG/M2 | WEIGHT: 184.4 LBS | OXYGEN SATURATION: 96 % | HEART RATE: 75 BPM | DIASTOLIC BLOOD PRESSURE: 72 MMHG

## 2023-03-06 DIAGNOSIS — I42.0 DILATED CARDIOMYOPATHY (HCC): ICD-10-CM

## 2023-03-06 DIAGNOSIS — I50.22 CHRONIC SYSTOLIC CONGESTIVE HEART FAILURE (HCC): ICD-10-CM

## 2023-03-06 DIAGNOSIS — M77.52 BONE SPUR OF LEFT FOOT: Primary | ICD-10-CM

## 2023-03-06 DIAGNOSIS — Z95.810 ICD (IMPLANTABLE CARDIOVERTER-DEFIBRILLATOR) IN PLACE: ICD-10-CM

## 2023-03-06 PROBLEM — E44.0 MODERATE MALNUTRITION (HCC): Status: ACTIVE | Noted: 2019-06-04

## 2023-03-06 PROBLEM — E44.0 MODERATE MALNUTRITION (HCC): Status: RESOLVED | Noted: 2019-06-04 | Resolved: 2023-03-06

## 2023-03-06 PROBLEM — F17.200 TOBACCO DEPENDENCE SYNDROME: Status: RESOLVED | Noted: 2021-11-19 | Resolved: 2023-03-06

## 2023-03-06 PROCEDURE — G8417 CALC BMI ABV UP PARAM F/U: HCPCS | Performed by: STUDENT IN AN ORGANIZED HEALTH CARE EDUCATION/TRAINING PROGRAM

## 2023-03-06 PROCEDURE — G8484 FLU IMMUNIZE NO ADMIN: HCPCS | Performed by: STUDENT IN AN ORGANIZED HEALTH CARE EDUCATION/TRAINING PROGRAM

## 2023-03-06 PROCEDURE — G8427 DOCREV CUR MEDS BY ELIG CLIN: HCPCS | Performed by: STUDENT IN AN ORGANIZED HEALTH CARE EDUCATION/TRAINING PROGRAM

## 2023-03-06 PROCEDURE — 1036F TOBACCO NON-USER: CPT | Performed by: STUDENT IN AN ORGANIZED HEALTH CARE EDUCATION/TRAINING PROGRAM

## 2023-03-06 PROCEDURE — 99214 OFFICE O/P EST MOD 30 MIN: CPT | Performed by: STUDENT IN AN ORGANIZED HEALTH CARE EDUCATION/TRAINING PROGRAM

## 2023-03-06 ASSESSMENT — ENCOUNTER SYMPTOMS
DIARRHEA: 0
SHORTNESS OF BREATH: 1
CONSTIPATION: 0
VOMITING: 0
NAUSEA: 0
COUGH: 0
ABDOMINAL PAIN: 0
BACK PAIN: 1

## 2023-03-06 NOTE — PROGRESS NOTES
S: 42 y.o. female with   Chief Complaint   Patient presents with    Pre-op Exam     Pt presents for pre-op surgery for her L foot with Dr. Morris Robledo. Left foot surgery. Anesthesia not stated. H/o MI, cardiomyopathy EF 40%. Dr. Khurram Luna follows for cardiology   Stress test 10/2022, echo 9/2022. H/o rhabdomyosarcoma, CKD, former smoker    BP Readings from Last 3 Encounters:   03/06/23 100/72   03/02/23 102/62   02/21/23 118/62     Wt Readings from Last 3 Encounters:   03/06/23 184 lb 6.4 oz (83.6 kg)   03/02/23 188 lb 9.6 oz (85.5 kg)   02/21/23 181 lb 6.4 oz (82.3 kg)       O: VS:   Vitals:    03/06/23 0935   BP: 100/72   Pulse: 75   Resp: 18   Temp: 97.3 °F (36.3 °C)   SpO2: 96%   Weight: 184 lb 6.4 oz (83.6 kg)   Height: 5' (1.524 m)     Body mass index is 36.01 kg/m². AAO/NAD, appropriate affect for mood  Normocephalic, atraumatic, eyes - conjunctiva and sclera normal,   skin no rashes on exposed areas   Insight, judgement normal and in no acute distress      Lab Results   Component Value Date    WBC 11.7 (H) 01/21/2023    HGB 13.1 01/21/2023    HCT 39.1 01/21/2023     01/21/2023    CHOL 157 11/19/2021    TRIG 70 11/19/2021    HDL 44 11/19/2021    LDLCALC 90 04/16/2020    AST 28 01/17/2023     01/21/2023    K 4.0 01/21/2023     01/21/2023    CREATININE 0.6 01/21/2023    BUN 3 (L) 01/21/2023    CO2 24 01/21/2023    TSH 1.250 08/16/2022    INR 1.02 01/19/2023    LABA1C 5.1 05/15/2019    LABMICR YES 05/14/2019    LABGLOM >60 01/21/2023    MG 1.0 (L) 01/18/2023    CALCIUM 9.3 01/21/2023    VITD25 21.7 07/17/2019       No results found. Diagnosis Orders   1. Bone spur of left foot            Plan    High risk due to history, but appears to be stable and optimized for surgery. Anesthesia is not likely to be general.   No labs -- they are to be done soon. Return if symptoms worsen or fail to improve.     Orders Placed:  No orders of the defined types were placed in this encounter. Medications Prescribed:  No orders of the defined types were placed in this encounter. Future Appointments   Date Time Provider Shakira Brunsoni   3/21/2023 11:40 AM Olayinka Harris DO SRPX FM RES [facility] [place]   10/2/2023  2:00 PM JAYLYN Christianson CNP N SRPX CHF [facility] [place]   3/5/2024  1:00 PM Evangelina Milton MD N SRPX Heart [facility] [place] Maintenance Due   Topic Date Due    COVID-19 Vaccine (1) Never done    Varicella vaccine (1 of 2 - 2-dose childhood series) Never done    Pneumococcal 0-64 years Vaccine (1 - PCV) Never done    DTaP/Tdap/Td vaccine (1 - Tdap) Never done    Cervical cancer screen  Never done    Flu vaccine (1) Never done    Lipids  11/19/2022         Attending Physician Statement  I have discussed the case, including pertinent history and exam findings with the resident. I also have seen the patient and performed key portions of the examination. I agree with the documented assessment and plan as documented by the resident.   GE modifier added to this encounter      Marjan Castañeda MD 3/6/2023 9:57 AM

## 2023-03-06 NOTE — PROGRESS NOTES
[address] [facility]. [address]  [facility] [address]  Dept: [phone]  Loc: Laith William is a 42 y.o. female who presents today for:  Chief Complaint   Patient presents with    Pre-op Exam     Pt presents for pre-op surgery for her L foot with Dr. Christella Sandhoff. Goals    None         HPI:     HPI  Patient is a 77-year-old female with significant PMH of CAD s/p MI, CHF with EF 40-45% s/p AICD placement, former smoker (quit 2019), left LE rhabdomyosarcoma s/p radiation, CKD who presents today for pre-op for L osteophyte excision, left navicular osteophyte excision with Dr. Christella Sandhoff for left foot bone spurs. She will be casted for 6 weeks and boot for 6 weeks. Due to her significant cardiac history, she already saw Dr. Sharon Correa 3/2/23, who already did cardiac risk assessment. She had Echo and stress done in 10/2022. Current Outpatient Medications   Medication Sig Dispense Refill    gabapentin (NEURONTIN) 400 MG capsule Take 1 capsule by mouth in the morning, at noon, and at bedtime for 30 days. 90 capsule 2    HYDROcodone-acetaminophen (NORCO) 5-325 MG per tablet Take 1 tablet by mouth every 8 hours as needed for Pain for up to 30 days. Intended supply: 30 days. Take lowest dose possible to manage pain 90 tablet 0    clonazePAM (KLONOPIN) 0.5 MG tablet Take 1 tablet by mouth nightly as needed for Anxiety for up to 15 doses.  15 tablet 0    OLANZapine (ZYPREXA) 15 MG tablet Take 1 tablet by mouth nightly Indications: 1/2 tab every morning and 1 tab at bedtime 45 tablet 3    metoprolol succinate (TOPROL XL) 50 MG extended release tablet take 1 tablet by mouth twice a day 60 tablet 3    atorvastatin (LIPITOR) 20 MG tablet take 1 tablet by mouth once daily 30 tablet 5    spironolactone (ALDACTONE) 25 MG tablet TAKE ONE TABLET BY MOUTH DAILY 90 tablet 1    lisinopril (PRINIVIL;ZESTRIL) 2.5 MG tablet TAKE ONE TABLET BY MOUTH DAILY 90 tablet 2     No current facility-administered medications for this visit. Food Insecurity: Food Insecurity Present    Worried About Running Out of Food in the Last Year: Sometimes true    Ran Out of Food in the Last Year: Often true       Health Maintenance   Topic Date Due    COVID-19 Vaccine (1) Never done    Varicella vaccine (1 of 2 - 2-dose childhood series) Never done    Pneumococcal 0-64 years Vaccine (1 - PCV) Never done    DTaP/Tdap/Td vaccine (1 - Tdap) Never done    Cervical cancer screen  Never done    Flu vaccine (1) Never done    Lipids  11/19/2022    Depression Screen  01/10/2024    GFR test (Diabetes, CKD 3-4, OR last GFR 15-59)  01/21/2024    Hepatitis C screen  Completed    HIV screen  Completed    Hepatitis A vaccine  Aged Out    Hib vaccine  Aged Out    Meningococcal (ACWY) vaccine  Aged Out       ROS:      Review of Systems   Constitutional:  Negative for chills, fatigue and fever. HENT:  Negative for congestion. Eyes:  Negative for visual disturbance. Respiratory:  Positive for shortness of breath. Negative for cough. Cardiovascular:  Negative for chest pain and palpitations. Gastrointestinal:  Negative for abdominal pain, constipation, diarrhea, nausea and vomiting. Genitourinary:  Negative for dysuria and menstrual problem. Musculoskeletal:  Positive for arthralgias, back pain and gait problem. Skin:  Negative for rash. Neurological:  Negative for dizziness, light-headedness and headaches. Psychiatric/Behavioral:  Negative for dysphoric mood and sleep disturbance. The patient is nervous/anxious. Objective:     Vitals:    03/06/23 0935   BP: 100/72   Pulse: 75   Resp: 18   Temp: 97.3 °F (36.3 °C)   SpO2: 96%   Weight: 184 lb 6.4 oz (83.6 kg)   Height: 5' (1.524 m)       Body mass index is 36.01 kg/m².     Wt Readings from Last 3 Encounters:   03/06/23 184 lb 6.4 oz (83.6 kg)   03/02/23 188 lb 9.6 oz (85.5 kg)   02/21/23 181 lb 6.4 oz (82.3 kg) BP Readings from Last 3 Encounters:   03/06/23 100/72   03/02/23 102/62   02/21/23 118/62       Physical Exam  Vitals reviewed. Constitutional:       General: She is not in acute distress. Appearance: Normal appearance. She is obese. She is not ill-appearing. HENT:      Head: Normocephalic and atraumatic. Right Ear: External ear normal.      Left Ear: External ear normal.      Nose: Nose normal.      Mouth/Throat:      Mouth: Mucous membranes are moist.   Eyes:      General:         Right eye: No discharge. Left eye: No discharge. Conjunctiva/sclera: Conjunctivae normal.   Cardiovascular:      Rate and Rhythm: Normal rate and regular rhythm. Pulses: Normal pulses. Heart sounds: Normal heart sounds. No murmur heard. Pulmonary:      Effort: Pulmonary effort is normal. No respiratory distress. Breath sounds: Normal breath sounds. No wheezing, rhonchi or rales. Abdominal:      General: Abdomen is flat. Bowel sounds are normal. There is no distension. Palpations: Abdomen is soft. Tenderness: There is no abdominal tenderness. Musculoskeletal:         General: Deformity present. Normal range of motion. Cervical back: Normal range of motion and neck supple. Right lower leg: No edema. Left lower leg: No edema. Comments: Left calf muscle mass deformity secondary to surgical resection. Skin:     General: Skin is warm and dry. Findings: No rash. Neurological:      General: No focal deficit present. Mental Status: She is alert and oriented to person, place, and time. Psychiatric:         Mood and Affect: Mood normal.         Behavior: Behavior normal.         Thought Content: Thought content normal.         Judgment: Judgment normal.       Assessment / Plan:     1.  Bone spur of left foot  - Patient presents today for preoperative risk assessment for left talus osteophyte excision, left navicular osteophyte excision secondary to bone spurs of left foot. She has significant cardiac history including CAD with MI and CHF s/p AICD placement. She is currently on Toprol, Lisinopril, Aldactone. Preoperative labs to be done at [facility] on 3/8/23. Revised cardiac risk index score 2 points, which confers a class III risk. Discussed with patient that this is due to her history of CHF and ischemic heart disease and lends itself to 10.1% 30-day risk of death, MI or cardiac arrest. Discussed these risks with patient. She elected to proceed with procedure. She appears optimized to proceed with surgery. Copies of stress test and echo to be sent with preoperative papers. 2. Chronic systolic congestive heart failure (HCC)  - Chronic, stable. Continue current management with Toprol, Lisinopril, Aldactone    3. ICD (implantable cardioverter-defibrillator) in place  - Chronic, stable. 4. Dilated cardiomyopathy (HCC)  - Chronic, stable. Continue current management with Toprol, Lisinopril, Aldactone      Health Maintenance Due   Topic Date Due    COVID-19 Vaccine (1) Never done    Varicella vaccine (1 of 2 - 2-dose childhood series) Never done    Pneumococcal 0-64 years Vaccine (1 - PCV) Never done    DTaP/Tdap/Td vaccine (1 - Tdap) Never done    Cervical cancer screen  Never done    Flu vaccine (1) Never done    Lipids  11/19/2022           Return if symptoms worsen or fail to improve. Medications Prescribed:  No orders of the defined types were placed in this encounter. Future Appointments   Date Time Provider Shakira Mane   3/21/2023 11:40 AM Jacques Beck DO [facility] FM RES P - [place]   10/2/2023  2:00 PM JAYLYN Arzate - CNP N [facility] [place] [facility]   3/5/2024  1:00 PM Kanika Khan MD N [facility] Heart [facility] [place] [facility]       Patient given educational materials - see patient instructions. Discussed use, benefit, and side effects of prescribed medications. All patient questions answered. Patient voiced understanding. Reviewed health maintenance. Instructed to continue current medications, diet and exercise. Patient agreed with treatment plan. Follow up as directed.      Electronically signed by Lala Melgar MD on 3/7/2023 at 8:21 AM

## 2023-03-13 DIAGNOSIS — F41.0 PANIC ATTACKS: ICD-10-CM

## 2023-03-13 RX ORDER — CLONAZEPAM 0.5 MG/1
0.5 TABLET ORAL NIGHTLY PRN
Qty: 15 TABLET | Refills: 0 | Status: SHIPPED | OUTPATIENT
Start: 2023-03-13 | End: 2023-04-21

## 2023-03-13 NOTE — TELEPHONE ENCOUNTER
Called for a refill on 2/13/2023.     Patient's last appointment was : 3/6/23  Patient's next appointment is :  3/21/23  Last refilled: 2/13/23  [facility] on [address] Verified    Lab Results   Component Value Date    LABA1C 5.1 05/15/2019     Lab Results   Component Value Date    CHOL 157 11/19/2021    TRIG 70 11/19/2021    HDL 44 11/19/2021    LDLCALC 90 04/16/2020     Lab Results   Component Value Date     01/21/2023    K 4.0 01/21/2023     01/21/2023    CO2 24 01/21/2023    BUN 3 (L) 01/21/2023    CREATININE 0.6 01/21/2023    GLUCOSE 99 01/21/2023    CALCIUM 9.3 01/21/2023    PROT 6.9 01/17/2023    LABALBU 4.0 01/17/2023    BILITOT 0.5 01/17/2023    ALKPHOS 86 01/17/2023    AST 28 01/17/2023    ALT 38 01/17/2023    LABGLOM >60 01/21/2023    GFRAA >60 11/19/2021    AGRATIO 1.3 07/07/2020    GLOB 3.4 07/07/2020     Lab Results   Component Value Date    TSH 1.250 08/16/2022    T4FREE 1.36 08/16/2022     Lab Results   Component Value Date    WBC 11.7 (H) 01/21/2023    HGB 13.1 01/21/2023    HCT 39.1 01/21/2023    MCV 88.1 01/21/2023     01/21/2023

## 2023-03-13 NOTE — TELEPHONE ENCOUNTER
Controlled substances monitoring: OARRS report reviewed today- activity consistent with treatment plan. Last prescription written 2/13 for 30 days. Will refill for 3/14.     Electronically signed by Memo Billings DO on 3/13/2023 at 3:47 PM

## 2023-03-14 ENCOUNTER — TELEPHONE (OUTPATIENT)
Dept: FAMILY MEDICINE CLINIC | Age: 38
End: 2023-03-14

## 2023-03-14 DIAGNOSIS — Z85.831 HISTORY OF RHABDOMYOSARCOMA: ICD-10-CM

## 2023-03-14 DIAGNOSIS — G89.3 CHRONIC PAIN AFTER CANCER TREATMENT: ICD-10-CM

## 2023-03-14 DIAGNOSIS — M21.952 DEFORMITY OF LOWER EXTREMITY, LEFT: ICD-10-CM

## 2023-03-14 NOTE — TELEPHONE ENCOUNTER
Patient is having issues getting her pain medication refilled at [facility] on [facility].

## 2023-03-14 NOTE — TELEPHONE ENCOUNTER
Patient called again, I informed the patient Dr. Jey Leigh is currently seeing patients and that once she sees the message I will give her a call back.

## 2023-03-15 RX ORDER — HYDROCODONE BITARTRATE AND ACETAMINOPHEN 5; 325 MG/1; MG/1
1 TABLET ORAL EVERY 6 HOURS PRN
Qty: 120 TABLET | Refills: 0 | Status: SHIPPED | OUTPATIENT
Start: 2023-03-15 | End: 2023-03-15 | Stop reason: ALTCHOICE

## 2023-03-15 NOTE — TELEPHONE ENCOUNTER
Controlled substances monitoring: OARRS report reviewed today- activity consistent with treatment plan. We had instructed her to increase the frequency of her norco after her surgery and let us know when she is due for refill. Refilling today, continue q6 frequency for one month. I called patient and discussed this with her directly.     Dr. Ayden Hudson

## 2023-03-15 NOTE — TELEPHONE ENCOUNTER
Patient called stating [facility] will not give her the medication because it is too early but the patient said she is out because she is in so much pain. Patient is wondering if she can get something else called In for her pain until Tuesday. Please advise.

## 2023-03-15 NOTE — TELEPHONE ENCOUNTER
Patient has called about twice since the last message from 50 minutes ago needing her norco due to her just having surgery.

## 2023-03-16 NOTE — TELEPHONE ENCOUNTER
I called and spoke with [facility] and then patient about this yesterday. Per Heather Newman, Dr. Vickey Dinero had sent in a prescription for norco 5-325 q6 hours for 7 days. Patient's surgery was 3/14. Per our agreement, she was to increase her frequency of norco from q8 to q6 following her surgery. However, she did this prior due to incredible pain. She did not update us regarding this change and has therefore broken our pain agreement. I did directly inform patient of this. She understands. We will keep our appointment for 3/21 to discuss this further.     Dr. Hitesh Chiang

## 2023-03-21 ENCOUNTER — TELEPHONE (OUTPATIENT)
Dept: FAMILY MEDICINE CLINIC | Age: 38
End: 2023-03-21

## 2023-03-28 ENCOUNTER — TELEMEDICINE (OUTPATIENT)
Dept: FAMILY MEDICINE CLINIC | Age: 38
End: 2023-03-28

## 2023-03-28 DIAGNOSIS — M25.775 OSTEOPHYTE OF LEFT FOOT: ICD-10-CM

## 2023-03-28 DIAGNOSIS — Z09 POSTOPERATIVE FOLLOW-UP: Primary | ICD-10-CM

## 2023-03-28 ASSESSMENT — ENCOUNTER SYMPTOMS
COUGH: 0
SHORTNESS OF BREATH: 0
ABDOMINAL PAIN: 0
NAUSEA: 0
SORE THROAT: 0

## 2023-03-31 NOTE — PROGRESS NOTES
I reviewed with the resident the medical history and the resident's findings on the physical examination. I discussed with the resident the patient's diagnosis and concur with the plan.
through a synchronous (real-time) audio-video encounter. The patient (or guardian if applicable) is aware that this is a billable service, which includes applicable co-pays. This Virtual Visit was conducted with patient's (and/or legal guardian's) consent. The visit was conducted pursuant to the emergency declaration under the [address] [place] [address], [address] [place] [address] authority and the [email or web address] and [employer] General Act. Patient identification was verified, and a caregiver was present when appropriate. The patient was located at Home: [address] [place] [address]  [address] [place] [address] Road [address]  Provider was located at [facility] ([place] [address]): [address] [place] [address] [facility].Trang GARRISON [address], DO

## 2023-04-05 ENCOUNTER — TELEPHONE (OUTPATIENT)
Dept: FAMILY MEDICINE CLINIC | Age: 38
End: 2023-04-05

## 2023-04-05 NOTE — TELEPHONE ENCOUNTER
Patient called stating she had surgery 03/27/23 and needs a refill on her [place].  She was seen virtually 03/28/23 with Dr. Ace Abel and it states in the notes that the patient will need to make an appt. When she is due for refills. Is this patient okay to have a virtual visit set up with any of the residents or does she need to only see Dr. Ace Abel. I did not see any soon VV with Dr. Ace Abel other than the one she has set up on 04/25/23, patient is in pain.       Please advise

## 2023-04-05 NOTE — TELEPHONE ENCOUNTER
Surgeon is Dr. Samantha Collins. We have not received any recent appointment documentation from his office regarding Ms. Robb's management. Please call [facility] to find out if they are still planning to manage her ongoing pain or not. I cannot fill her medication until we have verified this. Thanks.     KL

## 2023-04-05 NOTE — TELEPHONE ENCOUNTER
Spoke with [facility], they will be handling the patient's pain meds for 90 days. Patient was also informed.

## 2023-04-25 ENCOUNTER — TELEMEDICINE (OUTPATIENT)
Dept: FAMILY MEDICINE CLINIC | Age: 38
End: 2023-04-25
Payer: MEDICAID

## 2023-04-25 DIAGNOSIS — F39 MOOD DISORDER (HCC): ICD-10-CM

## 2023-04-25 DIAGNOSIS — F41.0 PANIC ATTACKS: ICD-10-CM

## 2023-04-25 PROCEDURE — 99214 OFFICE O/P EST MOD 30 MIN: CPT | Performed by: STUDENT IN AN ORGANIZED HEALTH CARE EDUCATION/TRAINING PROGRAM

## 2023-04-25 PROCEDURE — G8428 CUR MEDS NOT DOCUMENT: HCPCS | Performed by: STUDENT IN AN ORGANIZED HEALTH CARE EDUCATION/TRAINING PROGRAM

## 2023-04-25 RX ORDER — OLANZAPINE 20 MG/1
20 TABLET ORAL NIGHTLY
Qty: 30 TABLET | Refills: 5 | Status: CANCELLED | OUTPATIENT
Start: 2023-04-25 | End: 2023-10-22

## 2023-04-25 NOTE — PROGRESS NOTES
Yesenia Chung (:  1985) is a Established patient, presenting virtually for evaluation of the following:    Assessment & Plan   Below is the assessment and plan developed based on review of pertinent history, physical exam, labs, studies, and medications. 1. Mood disorder (HCC)  -     OLANZapine (ZYPREXA) 15 MG tablet; Take 1 tablet by mouth in the morning and at bedtime, Disp-30 tablet, R-3Normal  2. Panic attacks  -     clonazePAM (KLONOPIN) 0.5 MG tablet; Take 1 tablet by mouth 2 times daily as needed for Anxiety for up to 30 days. Max Daily Amount: 1 mg, Disp-60 tablet, R-0Normal    Acute on chronic worsening of bipolar with anxiety as well as panic attacks  Will increase zyprexa and klonopin during acute period. Long discussion about not continuing klonopin at high frequency for longer than a month    Controlled substances monitoring: possible medication side effects, risk of tolerance and/or dependence, and alternative treatments discussed, OARRS report reviewed today- activity consistent with treatment plan, and discussed the importance of watching for interactions between her pain medications and klonopin . Return in about 4 weeks (around 2023) for anxiety follow-up. Subjective   HPI    Seen virtually. Orho planning to go back into surgery. Sees Jaden May 15th. She is feeling incredibly anxious about this. Having daily panic attacks. Poor sleep. Extremely upset about having to have an additional surgery and continues to worry that she may need amputation in the future. Denies SI/HI, but is struggling to function with her daily activities due to anxiety at this time. Review of Systems   Constitutional:  Negative for chills and fever. HENT:  Negative for congestion and sore throat. Eyes:  Negative for visual disturbance. Respiratory:  Negative for cough and shortness of breath. Cardiovascular:  Negative for chest pain.    Gastrointestinal:  Negative for

## 2023-04-26 ENCOUNTER — TELEPHONE (OUTPATIENT)
Dept: FAMILY MEDICINE CLINIC | Age: 38
End: 2023-04-26

## 2023-04-26 RX ORDER — OLANZAPINE 15 MG/1
15 TABLET ORAL 2 TIMES DAILY
Qty: 30 TABLET | Refills: 3 | Status: SHIPPED | OUTPATIENT
Start: 2023-04-26

## 2023-04-26 RX ORDER — CLONAZEPAM 0.5 MG/1
0.5 TABLET ORAL 2 TIMES DAILY PRN
Qty: 60 TABLET | Refills: 0 | Status: SHIPPED | OUTPATIENT
Start: 2023-04-26 | End: 2023-05-26

## 2023-04-26 ASSESSMENT — ENCOUNTER SYMPTOMS
ABDOMINAL PAIN: 0
SHORTNESS OF BREATH: 0
COUGH: 0
SORE THROAT: 0
NAUSEA: 0

## 2023-04-26 NOTE — TELEPHONE ENCOUNTER
[address] called stating that you discussed increasing the Klonopin dosing at her appointment with you yesterday and she checked with her pharmacy [facility]. and they have not received the script. Please Advise.

## 2023-05-15 DIAGNOSIS — G89.3 CHRONIC PAIN AFTER CANCER TREATMENT: ICD-10-CM

## 2023-05-15 DIAGNOSIS — M21.952 DEFORMITY OF LOWER EXTREMITY, LEFT: ICD-10-CM

## 2023-05-15 RX ORDER — GABAPENTIN 400 MG/1
400 CAPSULE ORAL 3 TIMES DAILY
Qty: 90 CAPSULE | Refills: 2 | Status: SHIPPED | OUTPATIENT
Start: 2023-05-15 | End: 2023-06-14

## 2023-05-15 NOTE — TELEPHONE ENCOUNTER
Patient's last appointment was : 4/25/23  Patient's next appointment is :  5/30/23  Last refilled: 2/21/23  [facility] Verified    Lab Results   Component Value Date    LABA1C 5.1 05/15/2019     Lab Results   Component Value Date    CHOL 157 11/19/2021    TRIG 70 11/19/2021    HDL 44 11/19/2021    LDLCALC 90 04/16/2020     Lab Results   Component Value Date     01/21/2023    K 4.0 01/21/2023     01/21/2023    CO2 24 01/21/2023    BUN 3 (L) 01/21/2023    CREATININE 0.6 01/21/2023    GLUCOSE 99 01/21/2023    CALCIUM 9.3 01/21/2023    PROT 6.9 01/17/2023    LABALBU 4.0 01/17/2023    BILITOT 0.5 01/17/2023    ALKPHOS 86 01/17/2023    AST 28 01/17/2023    ALT 38 01/17/2023    LABGLOM >60 01/21/2023    GFRAA >60 11/19/2021    AGRATIO 1.3 07/07/2020    GLOB 3.4 07/07/2020     Lab Results   Component Value Date    TSH 1.250 08/16/2022    T4FREE 1.36 08/16/2022     Lab Results   Component Value Date    WBC 11.7 (H) 01/21/2023    HGB 13.1 01/21/2023    HCT 39.1 01/21/2023    MCV 88.1 01/21/2023     01/21/2023

## 2023-05-18 DIAGNOSIS — I42.0 DILATED CARDIOMYOPATHY (HCC): ICD-10-CM

## 2023-05-18 DIAGNOSIS — I50.22 CHRONIC SYSTOLIC CONGESTIVE HEART FAILURE (HCC): ICD-10-CM

## 2023-05-18 RX ORDER — METOPROLOL SUCCINATE 50 MG/1
TABLET, EXTENDED RELEASE ORAL
Qty: 60 TABLET | Refills: 3 | Status: SHIPPED | OUTPATIENT
Start: 2023-05-18

## 2023-05-18 RX ORDER — LISINOPRIL 2.5 MG/1
TABLET ORAL
Qty: 90 TABLET | Refills: 2 | Status: SHIPPED | OUTPATIENT
Start: 2023-05-18

## 2023-05-18 NOTE — TELEPHONE ENCOUNTER
Message  Received: Today  [facility] Staff  Subject: Appointment Request     Reason for Call: Established Patient Appointment needed: Routine Existing   Condition Follow Up     QUESTIONS     Reason for appointment request? Other - Needing particle refill on   medication until she can get in for her appointment       Additional Information for Provider? Spoke with patient, she is needing an   particle refill on her medication that are needing to be refilled by   5/24/2023. Her appointment is not until 5/30/2023.  Please return her call   to assist, thank you.   ---------------------------------------------------------------------------   --------------   [id number] [email or web address]   [phone]; OK to leave message on voicemail   ---------------------------------------------------------------------------   --------------   SCRIPT ANSWERS   COVID Screen: Yanelis Bowens

## 2023-05-18 NOTE — TELEPHONE ENCOUNTER
Patient's last appointment was : 4/25/23  Patient's next appointment is :  5/30/23  Last refilled: 1/1/23  [facility] Verified    Lab Results   Component Value Date    LABA1C 5.1 05/15/2019     Lab Results   Component Value Date    CHOL 157 11/19/2021    TRIG 70 11/19/2021    HDL 44 11/19/2021    LDLCALC 90 04/16/2020     Lab Results   Component Value Date     01/21/2023    K 4.0 01/21/2023     01/21/2023    CO2 24 01/21/2023    BUN 3 (L) 01/21/2023    CREATININE 0.6 01/21/2023    GLUCOSE 99 01/21/2023    CALCIUM 9.3 01/21/2023    PROT 6.9 01/17/2023    LABALBU 4.0 01/17/2023    BILITOT 0.5 01/17/2023    ALKPHOS 86 01/17/2023    AST 28 01/17/2023    ALT 38 01/17/2023    LABGLOM >60 01/21/2023    GFRAA >60 11/19/2021    AGRATIO 1.3 07/07/2020    GLOB 3.4 07/07/2020     Lab Results   Component Value Date    TSH 1.250 08/16/2022    T4FREE 1.36 08/16/2022     Lab Results   Component Value Date    WBC 11.7 (H) 01/21/2023    HGB 13.1 01/21/2023    HCT 39.1 01/21/2023    MCV 88.1 01/21/2023     01/21/2023

## 2023-05-24 DIAGNOSIS — M21.952 DEFORMITY OF LOWER EXTREMITY, LEFT: ICD-10-CM

## 2023-05-24 DIAGNOSIS — Z85.831 HISTORY OF RHABDOMYOSARCOMA: ICD-10-CM

## 2023-05-24 DIAGNOSIS — G89.3 CHRONIC PAIN AFTER CANCER TREATMENT: ICD-10-CM

## 2023-05-24 RX ORDER — HYDROCODONE BITARTRATE AND ACETAMINOPHEN 5; 325 MG/1; MG/1
1 TABLET ORAL EVERY 6 HOURS PRN
Qty: 120 TABLET | Refills: 0 | OUTPATIENT
Start: 2023-05-24 | End: 2023-06-23

## 2023-05-26 RX ORDER — HYDROCODONE BITARTRATE AND ACETAMINOPHEN 5; 325 MG/1; MG/1
1 TABLET ORAL EVERY 6 HOURS PRN
Qty: 24 TABLET | Refills: 0 | Status: SHIPPED | OUTPATIENT
Start: 2023-05-26 | End: 2023-06-01

## 2023-05-31 DIAGNOSIS — F41.0 PANIC ATTACKS: ICD-10-CM

## 2023-05-31 RX ORDER — CLONAZEPAM 0.5 MG/1
0.5 TABLET ORAL 2 TIMES DAILY PRN
Qty: 30 TABLET | Refills: 0 | Status: SHIPPED | OUTPATIENT
Start: 2023-05-31 | End: 2023-06-13 | Stop reason: SDUPTHER

## 2023-06-22 ENCOUNTER — OFFICE VISIT (OUTPATIENT)
Dept: FAMILY MEDICINE CLINIC | Age: 38
End: 2023-06-22
Payer: MEDICAID

## 2023-06-22 VITALS
HEIGHT: 60 IN | SYSTOLIC BLOOD PRESSURE: 126 MMHG | WEIGHT: 174.4 LBS | TEMPERATURE: 97.5 F | OXYGEN SATURATION: 98 % | HEART RATE: 96 BPM | BODY MASS INDEX: 34.24 KG/M2 | RESPIRATION RATE: 12 BRPM | DIASTOLIC BLOOD PRESSURE: 72 MMHG

## 2023-06-22 DIAGNOSIS — F41.1 GENERALIZED ANXIETY DISORDER: ICD-10-CM

## 2023-06-22 DIAGNOSIS — F39 MOOD DISORDER (HCC): Primary | ICD-10-CM

## 2023-06-22 DIAGNOSIS — F41.0 PANIC ATTACKS: ICD-10-CM

## 2023-06-22 PROCEDURE — G8427 DOCREV CUR MEDS BY ELIG CLIN: HCPCS

## 2023-06-22 PROCEDURE — G8417 CALC BMI ABV UP PARAM F/U: HCPCS

## 2023-06-22 PROCEDURE — 1036F TOBACCO NON-USER: CPT

## 2023-06-22 PROCEDURE — 99214 OFFICE O/P EST MOD 30 MIN: CPT

## 2023-06-22 RX ORDER — CLONAZEPAM 0.5 MG/1
0.25 TABLET ORAL 2 TIMES DAILY PRN
Qty: 15 TABLET | Refills: 0 | Status: SHIPPED | OUTPATIENT
Start: 2023-06-22 | End: 2023-07-07

## 2023-06-22 ASSESSMENT — ENCOUNTER SYMPTOMS
COUGH: 0
SINUS PRESSURE: 0
DIARRHEA: 0
CONSTIPATION: 0
ABDOMINAL PAIN: 0

## 2023-06-22 NOTE — PROGRESS NOTES
MG tablet    Nuria Duran MD, Psychiatry, [facility]      2. Generalized anxiety disorder  clonazePAM (KLONOPIN) 0.5 MG tablet    Nuria - Jennifer Duran MD, Psychiatry, [facility]      3. Panic attacks  clonazePAM (KLONOPIN) 0.5 MG tablet    Nuria - Jennifer Duran MD, Psychiatry, [facility]      After discussion with pt and resident physician, we agreed on plan as follows:    Decrease Klonopin to 0.5 mg - 1/2 pill twice daily as needed. #15/no refills  We will place referral to psychiatry for management of anxiety  We will consider hydroxyzine in future, although it appears patient has tried this in the past with little benefit  Follow-up in 2 weeks or sooner if any further problems. Attending Physician Statement  I have discussed the case, including pertinent history and exam findings with the resident. I also have seen the patient and performed key portions of the examination. I agree with the documented assessment and plan as documented by the resident.   GC modifier added  to this encounter     Electronically signed by Jhonny Hickey MD on 6/22/2023 at 6:04 PM

## 2023-06-22 NOTE — PROGRESS NOTES
Pulses: Normal pulses. Heart sounds: Normal heart sounds. No murmur heard. No gallop. Pulmonary:      Effort: Pulmonary effort is normal.      Breath sounds: Normal breath sounds. No wheezing, rhonchi or rales. Abdominal:      General: Bowel sounds are normal. There is no distension. Tenderness: There is no abdominal tenderness. Musculoskeletal:      Right lower leg: No edema. Left lower leg: No edema. Skin:     General: Skin is warm. Neurological:      General: No focal deficit present. Mental Status: She is alert. Psychiatric:         Mood and Affect: Mood normal.         Thought Content: Thought content normal.       Lab Results   Component Value Date    WBC 11.7 (H) 01/21/2023    HGB 13.1 01/21/2023    HCT 39.1 01/21/2023     01/21/2023    CHOL 157 11/19/2021    TRIG 70 11/19/2021    HDL 44 11/19/2021    ALT 38 01/17/2023    AST 28 01/17/2023     01/21/2023    K 4.0 01/21/2023     01/21/2023    CREATININE 0.6 01/21/2023    BUN 3 (L) 01/21/2023    CO2 24 01/21/2023    TSH 1.250 08/16/2022    INR 1.02 01/19/2023    LABA1C 5.1 05/15/2019    LABMICR YES 05/14/2019       Dagoberto Bustillos:   1. Mood disorder (HCC)  - clonazePAM (KLONOPIN) 0.5 MG tablet; Take 0.5 tablets by mouth 2 times daily as needed for Anxiety for up to 15 days. Max Daily Amount: 0.5 mg  Dispense: 15 tablet; Refill: 0  - Nuria Morales MD, Psychiatry, [facility]    2. Generalized anxiety disorder  - clonazePAM (KLONOPIN) 0.5 MG tablet; Take 0.5 tablets by mouth 2 times daily as needed for Anxiety for up to 15 days. Max Daily Amount: 0.5 mg  Dispense: 15 tablet; Refill: 0  Bronwyn Morales MD, Psychiatry, [facility]    3. Panic attacks  - clonazePAM (KLONOPIN) 0.5 MG tablet; Take 0.5 tablets by mouth 2 times daily as needed for Anxiety for up to 15 days. Max Daily Amount: 0.5 mg  Dispense: 15 tablet; Refill: 0  - Nuria Morales MD, Psychiatry, [facility]    Overall, discussion with patient regarding

## 2023-09-16 ENCOUNTER — HOSPITAL ENCOUNTER (INPATIENT)
Age: 38
LOS: 4 days | Discharge: HOME | DRG: 756 | End: 2023-09-20
Attending: EMERGENCY MEDICINE | Admitting: STUDENT IN AN ORGANIZED HEALTH CARE EDUCATION/TRAINING PROGRAM
Payer: MEDICAID

## 2023-09-16 ENCOUNTER — APPOINTMENT (OUTPATIENT)
Dept: GENERAL RADIOLOGY | Age: 38
DRG: 756 | End: 2023-09-16
Payer: MEDICAID

## 2023-09-16 DIAGNOSIS — E83.42 HYPOMAGNESEMIA: ICD-10-CM

## 2023-09-16 DIAGNOSIS — R55 SYNCOPE, CARDIOGENIC: ICD-10-CM

## 2023-09-16 DIAGNOSIS — R56.9 SEIZURE-LIKE ACTIVITY (HCC): ICD-10-CM

## 2023-09-16 DIAGNOSIS — I47.20 VENTRICULAR TACHYCARDIA (HCC): Primary | ICD-10-CM

## 2023-09-16 LAB
ALBUMIN SERPL BCG-MCNC: 3.6 G/DL (ref 3.5–5.1)
ALP SERPL-CCNC: 99 U/L (ref 38–126)
ALT SERPL W/O P-5'-P-CCNC: 50 U/L (ref 11–66)
ANION GAP SERPL CALC-SCNC: 13 MEQ/L (ref 8–16)
AST SERPL-CCNC: 42 U/L (ref 5–40)
BASOPHILS ABSOLUTE: 0 THOU/MM3 (ref 0–0.1)
BASOPHILS NFR BLD AUTO: 0.4 %
BILIRUB SERPL-MCNC: 0.5 MG/DL (ref 0.3–1.2)
BUN SERPL-MCNC: 9 MG/DL (ref 7–22)
CALCIUM SERPL-MCNC: 8.7 MG/DL (ref 8.5–10.5)
CHLORIDE SERPL-SCNC: 102 MEQ/L (ref 98–111)
CO2 SERPL-SCNC: 23 MEQ/L (ref 23–33)
CREAT SERPL-MCNC: 0.5 MG/DL (ref 0.4–1.2)
DEPRECATED RDW RBC AUTO: 40.8 FL (ref 35–45)
EOSINOPHIL NFR BLD AUTO: 0.9 %
EOSINOPHILS ABSOLUTE: 0.1 THOU/MM3 (ref 0–0.4)
ERYTHROCYTE [DISTWIDTH] IN BLOOD BY AUTOMATED COUNT: 12.2 % (ref 11.5–14.5)
GFR SERPL CREATININE-BSD FRML MDRD: > 60 ML/MIN/1.73M2
GLUCOSE SERPL-MCNC: 100 MG/DL (ref 70–108)
HCT VFR BLD AUTO: 37.5 % (ref 37–47)
HGB BLD-MCNC: 11.6 GM/DL (ref 12–16)
IMM GRANULOCYTES # BLD AUTO: 0.02 THOU/MM3 (ref 0–0.07)
IMM GRANULOCYTES NFR BLD AUTO: 0.2 %
LIPASE SERPL-CCNC: 39.8 U/L (ref 5.6–51.3)
LYMPHOCYTES ABSOLUTE: 2.5 THOU/MM3 (ref 1–4.8)
LYMPHOCYTES NFR BLD AUTO: 27.8 %
MAGNESIUM SERPL-MCNC: 1.3 MG/DL (ref 1.6–2.4)
MCH RBC QN AUTO: 28.5 PG (ref 26–33)
MCHC RBC AUTO-ENTMCNC: 30.9 GM/DL (ref 32.2–35.5)
MCV RBC AUTO: 92.1 FL (ref 81–99)
MONOCYTES ABSOLUTE: 1.1 THOU/MM3 (ref 0.4–1.3)
MONOCYTES NFR BLD AUTO: 12.2 %
NEUTROPHILS NFR BLD AUTO: 58.5 %
NRBC BLD AUTO-RTO: 0 /100 WBC
NT-PROBNP SERPL IA-MCNC: 207.1 PG/ML (ref 0–124)
OSMOLALITY SERPL CALC.SUM OF ELEC: 274.4 MOSMOL/KG (ref 275–300)
PHOSPHATE SERPL-MCNC: 3.9 MG/DL (ref 2.4–4.7)
PLATELET # BLD AUTO: 282 THOU/MM3 (ref 130–400)
PMV BLD AUTO: 9.5 FL (ref 9.4–12.4)
POTASSIUM SERPL-SCNC: 3.9 MEQ/L (ref 3.5–5.2)
PROT SERPL-MCNC: 6.8 G/DL (ref 6.1–8)
RBC # BLD AUTO: 4.07 MILL/MM3 (ref 4.2–5.4)
SEGMENTED NEUTROPHILS ABSOLUTE COUNT: 5.3 THOU/MM3 (ref 1.8–7.7)
SODIUM SERPL-SCNC: 138 MEQ/L (ref 135–145)
TROPONIN, HIGH SENSITIVITY: < 6 NG/L (ref 0–12)
WBC # BLD AUTO: 9.1 THOU/MM3 (ref 4.8–10.8)

## 2023-09-16 PROCEDURE — 6370000000 HC RX 637 (ALT 250 FOR IP): Performed by: STUDENT IN AN ORGANIZED HEALTH CARE EDUCATION/TRAINING PROGRAM

## 2023-09-16 PROCEDURE — 71045 X-RAY EXAM CHEST 1 VIEW: CPT

## 2023-09-16 PROCEDURE — 83735 ASSAY OF MAGNESIUM: CPT

## 2023-09-16 PROCEDURE — 6360000002 HC RX W HCPCS

## 2023-09-16 PROCEDURE — 93005 ELECTROCARDIOGRAM TRACING: CPT | Performed by: STUDENT IN AN ORGANIZED HEALTH CARE EDUCATION/TRAINING PROGRAM

## 2023-09-16 PROCEDURE — 85025 COMPLETE CBC W/AUTO DIFF WBC: CPT

## 2023-09-16 PROCEDURE — 93005 ELECTROCARDIOGRAM TRACING: CPT

## 2023-09-16 PROCEDURE — 83690 ASSAY OF LIPASE: CPT

## 2023-09-16 PROCEDURE — 99285 EMERGENCY DEPT VISIT HI MDM: CPT

## 2023-09-16 PROCEDURE — 83880 ASSAY OF NATRIURETIC PEPTIDE: CPT

## 2023-09-16 PROCEDURE — 2580000003 HC RX 258

## 2023-09-16 PROCEDURE — 84100 ASSAY OF PHOSPHORUS: CPT

## 2023-09-16 PROCEDURE — 84484 ASSAY OF TROPONIN QUANT: CPT

## 2023-09-16 PROCEDURE — 2140000000 HC CCU INTERMEDIATE R&B

## 2023-09-16 PROCEDURE — 36415 COLL VENOUS BLD VENIPUNCTURE: CPT

## 2023-09-16 PROCEDURE — 80053 COMPREHEN METABOLIC PANEL: CPT

## 2023-09-16 RX ORDER — ASPIRIN 81 MG/1
324 TABLET, CHEWABLE ORAL ONCE
Status: COMPLETED | OUTPATIENT
Start: 2023-09-16 | End: 2023-09-16

## 2023-09-16 RX ORDER — IBUPROFEN 400 MG/1
400 TABLET ORAL EVERY 6 HOURS PRN
COMMUNITY
Start: 2023-06-15

## 2023-09-16 RX ORDER — IBUPROFEN 200 MG
400 TABLET ORAL EVERY 6 HOURS PRN
Status: DISCONTINUED | OUTPATIENT
Start: 2023-09-16 | End: 2023-09-19

## 2023-09-16 RX ORDER — HYDROCODONE BITARTRATE AND ACETAMINOPHEN 5; 325 MG/1; MG/1
1 TABLET ORAL EVERY 6 HOURS PRN
Status: DISCONTINUED | OUTPATIENT
Start: 2023-09-17 | End: 2023-09-20 | Stop reason: HOSPADM

## 2023-09-16 RX ORDER — ACETAMINOPHEN 650 MG/1
650 SUPPOSITORY RECTAL EVERY 6 HOURS PRN
Status: DISCONTINUED | OUTPATIENT
Start: 2023-09-16 | End: 2023-09-20 | Stop reason: HOSPADM

## 2023-09-16 RX ORDER — METOPROLOL SUCCINATE 50 MG/1
50 TABLET, EXTENDED RELEASE ORAL DAILY
Status: DISCONTINUED | OUTPATIENT
Start: 2023-09-17 | End: 2023-09-20 | Stop reason: HOSPADM

## 2023-09-16 RX ORDER — ENOXAPARIN SODIUM 100 MG/ML
40 INJECTION SUBCUTANEOUS DAILY
Status: DISCONTINUED | OUTPATIENT
Start: 2023-09-17 | End: 2023-09-20 | Stop reason: HOSPADM

## 2023-09-16 RX ORDER — CLONAZEPAM 0.5 MG/1
0.5 TABLET ORAL ONCE
Status: COMPLETED | OUTPATIENT
Start: 2023-09-16 | End: 2023-09-16

## 2023-09-16 RX ORDER — ONDANSETRON 2 MG/ML
4 INJECTION INTRAMUSCULAR; INTRAVENOUS EVERY 6 HOURS PRN
Status: DISCONTINUED | OUTPATIENT
Start: 2023-09-16 | End: 2023-09-20 | Stop reason: HOSPADM

## 2023-09-16 RX ORDER — SODIUM CHLORIDE 0.9 % (FLUSH) 0.9 %
5-40 SYRINGE (ML) INJECTION PRN
Status: DISCONTINUED | OUTPATIENT
Start: 2023-09-16 | End: 2023-09-20 | Stop reason: HOSPADM

## 2023-09-16 RX ORDER — SODIUM CHLORIDE 0.9 % (FLUSH) 0.9 %
5-40 SYRINGE (ML) INJECTION EVERY 12 HOURS SCHEDULED
Status: DISCONTINUED | OUTPATIENT
Start: 2023-09-16 | End: 2023-09-20 | Stop reason: HOSPADM

## 2023-09-16 RX ORDER — HYDROCODONE BITARTRATE AND ACETAMINOPHEN 5; 325 MG/1; MG/1
1 TABLET ORAL ONCE
Status: COMPLETED | OUTPATIENT
Start: 2023-09-16 | End: 2023-09-16

## 2023-09-16 RX ORDER — CLONAZEPAM 0.5 MG/1
0.5 TABLET ORAL 2 TIMES DAILY PRN
Status: DISCONTINUED | OUTPATIENT
Start: 2023-09-17 | End: 2023-09-20 | Stop reason: HOSPADM

## 2023-09-16 RX ORDER — MAGNESIUM SULFATE IN WATER 40 MG/ML
4000 INJECTION, SOLUTION INTRAVENOUS ONCE
Status: COMPLETED | OUTPATIENT
Start: 2023-09-16 | End: 2023-09-17

## 2023-09-16 RX ORDER — SPIRONOLACTONE 25 MG/1
25 TABLET ORAL DAILY
Status: DISCONTINUED | OUTPATIENT
Start: 2023-09-17 | End: 2023-09-20 | Stop reason: HOSPADM

## 2023-09-16 RX ORDER — OLANZAPINE 7.5 MG/1
7.5 TABLET ORAL EVERY MORNING
COMMUNITY
Start: 2023-09-07

## 2023-09-16 RX ORDER — ATORVASTATIN CALCIUM 20 MG/1
20 TABLET, FILM COATED ORAL DAILY
Status: DISCONTINUED | OUTPATIENT
Start: 2023-09-17 | End: 2023-09-20 | Stop reason: HOSPADM

## 2023-09-16 RX ORDER — ACETAMINOPHEN 325 MG/1
650 TABLET ORAL EVERY 6 HOURS PRN
Status: DISCONTINUED | OUTPATIENT
Start: 2023-09-16 | End: 2023-09-20 | Stop reason: HOSPADM

## 2023-09-16 RX ORDER — HYDROCODONE BITARTRATE AND ACETAMINOPHEN 5; 325 MG/1; MG/1
1 TABLET ORAL EVERY 6 HOURS PRN
COMMUNITY
Start: 2023-09-07

## 2023-09-16 RX ORDER — SODIUM CHLORIDE 9 MG/ML
INJECTION, SOLUTION INTRAVENOUS PRN
Status: DISCONTINUED | OUTPATIENT
Start: 2023-09-16 | End: 2023-09-20 | Stop reason: HOSPADM

## 2023-09-16 RX ORDER — GABAPENTIN 600 MG/1
600 TABLET ORAL 3 TIMES DAILY
COMMUNITY
Start: 2023-09-07

## 2023-09-16 RX ORDER — ONDANSETRON 4 MG/1
4 TABLET, ORALLY DISINTEGRATING ORAL EVERY 8 HOURS PRN
Status: DISCONTINUED | OUTPATIENT
Start: 2023-09-16 | End: 2023-09-20 | Stop reason: HOSPADM

## 2023-09-16 RX ORDER — LISINOPRIL 2.5 MG/1
2.5 TABLET ORAL DAILY
Status: DISCONTINUED | OUTPATIENT
Start: 2023-09-17 | End: 2023-09-20 | Stop reason: HOSPADM

## 2023-09-16 RX ORDER — POLYETHYLENE GLYCOL 3350 17 G/17G
17 POWDER, FOR SOLUTION ORAL DAILY PRN
Status: DISCONTINUED | OUTPATIENT
Start: 2023-09-16 | End: 2023-09-20 | Stop reason: HOSPADM

## 2023-09-16 RX ADMIN — HYDROCODONE BITARTRATE AND ACETAMINOPHEN 1 TABLET: 5; 325 TABLET ORAL at 20:49

## 2023-09-16 RX ADMIN — ASPIRIN 324 MG: 81 TABLET, CHEWABLE ORAL at 20:48

## 2023-09-16 RX ADMIN — OLANZAPINE 15 MG: 5 TABLET, FILM COATED ORAL at 20:12

## 2023-09-16 RX ADMIN — MAGNESIUM SULFATE HEPTAHYDRATE 4000 MG: 4 INJECTION, SOLUTION INTRAVENOUS at 22:32

## 2023-09-16 RX ADMIN — CLONAZEPAM 0.5 MG: 0.5 TABLET ORAL at 20:12

## 2023-09-16 RX ADMIN — SODIUM CHLORIDE: 9 INJECTION, SOLUTION INTRAVENOUS at 22:27

## 2023-09-16 ASSESSMENT — PAIN SCALES - GENERAL
PAINLEVEL_OUTOF10: 7
PAINLEVEL_OUTOF10: 9
PAINLEVEL_OUTOF10: 7

## 2023-09-16 ASSESSMENT — PAIN DESCRIPTION - ORIENTATION: ORIENTATION: MID

## 2023-09-16 ASSESSMENT — PAIN DESCRIPTION - LOCATION
LOCATION: CHEST
LOCATION: CHEST

## 2023-09-16 ASSESSMENT — PAIN DESCRIPTION - DESCRIPTORS: DESCRIPTORS: PRESSURE

## 2023-09-16 ASSESSMENT — PAIN - FUNCTIONAL ASSESSMENT
PAIN_FUNCTIONAL_ASSESSMENT: 0-10
PAIN_FUNCTIONAL_ASSESSMENT: 0-10

## 2023-09-16 ASSESSMENT — PAIN DESCRIPTION - PAIN TYPE: TYPE: ACUTE PAIN

## 2023-09-16 NOTE — ED TRIAGE NOTES
Pt to ED due to loss of consciousness. EMS state that the pts initial blood pressure was 70s over 40s. EMS states that the patient was unresponsive and very pale. EMS was able to arouse the pt. EMS started fluids, BP increased to 112/67. Upon arrival pt states that she feels weak and her chest hurts. Pt describes the pain like heart burn. Pt states that before she lost consciouness she felt dizzy and her chest started to hurt really bad. Pt states that she had an MI and cardiac arrest in 2019. EKG done.

## 2023-09-16 NOTE — ED PROVIDER NOTES
MG per tablet 1 tablet (1 tablet Oral Given 9/16/23 2049)       ED Course as of 09/16/23 2207   Sat Sep 16, 2023   1902 Pacer Interrogate  Non-sustained Ventricular Tachycardia on ICD interrogation. [SC]   1906 Collaterol from mom, epigastric pain, didn't feel right, eyes rolled up and to the right, turned snow white, body jerking, arms and legs jerking. Right leg would jerk, than left leg jerk, legs and arms not jerking together. Jerking lasted for 1.5 minutes. Jerking stopped than patient passed out. Not responding for 2-3 minutes, was breathing, but labored. [SC]   1938 Magnesium(!): 1.3 [SC]   2042 Referral for admission sent to Haider Barrios NP (Hospitalist Service) via [address] [place] [address]. [SC]      ED Course User Index  [SC] Savanna Oliver MD       Procedures: (None if left blank)  Procedures:     Consultants:  None    Documentation:      MEDICATION CHANGES     Current Discharge Medication List          FINAL IMPRESSION     Final diagnoses:   Ventricular tachycardia ([address])   Hypomagnesemia       DISPOSITION   DISPOSITION Admitted 09/16/2023 08:47:18 PM      This transcription was electronically signed. Parts of this transcriptions may have been dictated by use of voice recognition software and electronically transcribed. The transcription may contain errors not detected in proofreading. Please refer to my supervising physician's documentation if my documentation differs.     Electronically Signed: Alan Alford MD, 09/16/23, 10:07 PM         Savanna Oliver MD  Resident  09/16/23 2207

## 2023-09-17 PROBLEM — I47.20 VENTRICULAR TACHYCARDIA (HCC): Status: ACTIVE | Noted: 2023-09-17

## 2023-09-17 LAB
ANION GAP SERPL CALC-SCNC: 11 MEQ/L (ref 8–16)
BASOPHILS ABSOLUTE: 0 THOU/MM3 (ref 0–0.1)
BASOPHILS NFR BLD AUTO: 0.5 %
BUN SERPL-MCNC: 8 MG/DL (ref 7–22)
CALCIUM SERPL-MCNC: 9 MG/DL (ref 8.5–10.5)
CHLORIDE SERPL-SCNC: 104 MEQ/L (ref 98–111)
CO2 SERPL-SCNC: 24 MEQ/L (ref 23–33)
CREAT SERPL-MCNC: 0.5 MG/DL (ref 0.4–1.2)
D DIMER PPP IA.FEU-MCNC: 318 NG/ML FEU (ref 0–500)
DEPRECATED RDW RBC AUTO: 40.8 FL (ref 35–45)
EOSINOPHIL NFR BLD AUTO: 1.1 %
EOSINOPHILS ABSOLUTE: 0.1 THOU/MM3 (ref 0–0.4)
ERYTHROCYTE [DISTWIDTH] IN BLOOD BY AUTOMATED COUNT: 12.2 % (ref 11.5–14.5)
GFR SERPL CREATININE-BSD FRML MDRD: > 60 ML/MIN/1.73M2
GLUCOSE SERPL-MCNC: 108 MG/DL (ref 70–108)
HCT VFR BLD AUTO: 39.3 % (ref 37–47)
HGB BLD-MCNC: 12.5 GM/DL (ref 12–16)
IMM GRANULOCYTES # BLD AUTO: 0.03 THOU/MM3 (ref 0–0.07)
IMM GRANULOCYTES NFR BLD AUTO: 0.3 %
LYMPHOCYTES ABSOLUTE: 3.4 THOU/MM3 (ref 1–4.8)
LYMPHOCYTES NFR BLD AUTO: 35.4 %
MAGNESIUM SERPL-MCNC: 2.8 MG/DL (ref 1.6–2.4)
MCH RBC QN AUTO: 29.3 PG (ref 26–33)
MCHC RBC AUTO-ENTMCNC: 31.8 GM/DL (ref 32.2–35.5)
MCV RBC AUTO: 92 FL (ref 81–99)
MONOCYTES ABSOLUTE: 1 THOU/MM3 (ref 0.4–1.3)
MONOCYTES NFR BLD AUTO: 11 %
NEUTROPHILS NFR BLD AUTO: 51.7 %
NRBC BLD AUTO-RTO: 0 /100 WBC
PLATELET # BLD AUTO: 314 THOU/MM3 (ref 130–400)
PMV BLD AUTO: 9.6 FL (ref 9.4–12.4)
POTASSIUM SERPL-SCNC: 3.7 MEQ/L (ref 3.5–5.2)
RBC # BLD AUTO: 4.27 MILL/MM3 (ref 4.2–5.4)
SEGMENTED NEUTROPHILS ABSOLUTE COUNT: 4.9 THOU/MM3 (ref 1.8–7.7)
SODIUM SERPL-SCNC: 139 MEQ/L (ref 135–145)
TROPONIN, HIGH SENSITIVITY: < 6 NG/L (ref 0–12)
TROPONIN, HIGH SENSITIVITY: < 6 NG/L (ref 0–12)
WBC # BLD AUTO: 9.5 THOU/MM3 (ref 4.8–10.8)

## 2023-09-17 PROCEDURE — 6370000000 HC RX 637 (ALT 250 FOR IP): Performed by: INTERNAL MEDICINE

## 2023-09-17 PROCEDURE — 93010 ELECTROCARDIOGRAM REPORT: CPT | Performed by: INTERNAL MEDICINE

## 2023-09-17 PROCEDURE — 36415 COLL VENOUS BLD VENIPUNCTURE: CPT

## 2023-09-17 PROCEDURE — 85025 COMPLETE CBC W/AUTO DIFF WBC: CPT

## 2023-09-17 PROCEDURE — 2580000003 HC RX 258: Performed by: INTERNAL MEDICINE

## 2023-09-17 PROCEDURE — 6360000002 HC RX W HCPCS

## 2023-09-17 PROCEDURE — 99223 1ST HOSP IP/OBS HIGH 75: CPT | Performed by: INTERNAL MEDICINE

## 2023-09-17 PROCEDURE — 85379 FIBRIN DEGRADATION QUANT: CPT

## 2023-09-17 PROCEDURE — 6370000000 HC RX 637 (ALT 250 FOR IP)

## 2023-09-17 PROCEDURE — 2580000003 HC RX 258

## 2023-09-17 PROCEDURE — 93005 ELECTROCARDIOGRAM TRACING: CPT | Performed by: INTERNAL MEDICINE

## 2023-09-17 PROCEDURE — 2140000000 HC CCU INTERMEDIATE R&B

## 2023-09-17 PROCEDURE — 83735 ASSAY OF MAGNESIUM: CPT

## 2023-09-17 PROCEDURE — 80048 BASIC METABOLIC PNL TOTAL CA: CPT

## 2023-09-17 PROCEDURE — 84484 ASSAY OF TROPONIN QUANT: CPT

## 2023-09-17 PROCEDURE — 6360000002 HC RX W HCPCS: Performed by: INTERNAL MEDICINE

## 2023-09-17 RX ORDER — MORPHINE SULFATE 2 MG/ML
1 INJECTION, SOLUTION INTRAMUSCULAR; INTRAVENOUS ONCE
Status: COMPLETED | OUTPATIENT
Start: 2023-09-17 | End: 2023-09-17

## 2023-09-17 RX ORDER — MORPHINE SULFATE 2 MG/ML
1 INJECTION, SOLUTION INTRAMUSCULAR; INTRAVENOUS EVERY 4 HOURS PRN
Status: DISCONTINUED | OUTPATIENT
Start: 2023-09-17 | End: 2023-09-17

## 2023-09-17 RX ORDER — TRAMADOL HYDROCHLORIDE 50 MG/1
50 TABLET ORAL EVERY 6 HOURS PRN
Status: DISCONTINUED | OUTPATIENT
Start: 2023-09-17 | End: 2023-09-17

## 2023-09-17 RX ORDER — MAGNESIUM SULFATE IN WATER 40 MG/ML
2000 INJECTION, SOLUTION INTRAVENOUS PRN
Status: DISCONTINUED | OUTPATIENT
Start: 2023-09-17 | End: 2023-09-20 | Stop reason: HOSPADM

## 2023-09-17 RX ORDER — LANOLIN ALCOHOL/MO/W.PET/CERES
400 CREAM (GRAM) TOPICAL DAILY
Status: DISCONTINUED | OUTPATIENT
Start: 2023-09-18 | End: 2023-09-20 | Stop reason: HOSPADM

## 2023-09-17 RX ORDER — SODIUM CHLORIDE 9 MG/ML
INJECTION, SOLUTION INTRAVENOUS CONTINUOUS
Status: DISCONTINUED | OUTPATIENT
Start: 2023-09-17 | End: 2023-09-20 | Stop reason: HOSPADM

## 2023-09-17 RX ORDER — LIDOCAINE 4 G/G
1 PATCH TOPICAL DAILY
Status: DISCONTINUED | OUTPATIENT
Start: 2023-09-17 | End: 2023-09-20 | Stop reason: HOSPADM

## 2023-09-17 RX ORDER — KETOROLAC TROMETHAMINE 30 MG/ML
15 INJECTION, SOLUTION INTRAMUSCULAR; INTRAVENOUS EVERY 6 HOURS
Status: DISPENSED | OUTPATIENT
Start: 2023-09-17 | End: 2023-09-20

## 2023-09-17 RX ORDER — TRAMADOL HYDROCHLORIDE 50 MG/1
100 TABLET ORAL EVERY 6 HOURS PRN
Status: DISCONTINUED | OUTPATIENT
Start: 2023-09-17 | End: 2023-09-17

## 2023-09-17 RX ORDER — GABAPENTIN 300 MG/1
600 CAPSULE ORAL 3 TIMES DAILY
Status: DISCONTINUED | OUTPATIENT
Start: 2023-09-17 | End: 2023-09-20 | Stop reason: HOSPADM

## 2023-09-17 RX ORDER — POTASSIUM CHLORIDE 20 MEQ/1
20 TABLET, EXTENDED RELEASE ORAL PRN
Status: DISCONTINUED | OUTPATIENT
Start: 2023-09-17 | End: 2023-09-20 | Stop reason: HOSPADM

## 2023-09-17 RX ORDER — POTASSIUM CHLORIDE 20 MEQ/1
40 TABLET, EXTENDED RELEASE ORAL PRN
Status: DISCONTINUED | OUTPATIENT
Start: 2023-09-17 | End: 2023-09-20 | Stop reason: HOSPADM

## 2023-09-17 RX ORDER — POTASSIUM CHLORIDE 7.45 MG/ML
10 INJECTION INTRAVENOUS PRN
Status: DISCONTINUED | OUTPATIENT
Start: 2023-09-17 | End: 2023-09-20 | Stop reason: HOSPADM

## 2023-09-17 RX ADMIN — WATER 40 MG: 1 INJECTION INTRAMUSCULAR; INTRAVENOUS; SUBCUTANEOUS at 13:18

## 2023-09-17 RX ADMIN — HYDROCODONE BITARTRATE AND ACETAMINOPHEN 1 TABLET: 5; 325 TABLET ORAL at 22:17

## 2023-09-17 RX ADMIN — GABAPENTIN 600 MG: 300 CAPSULE ORAL at 16:00

## 2023-09-17 RX ADMIN — CLONAZEPAM 0.5 MG: 0.5 TABLET ORAL at 20:13

## 2023-09-17 RX ADMIN — IBUPROFEN 400 MG: 200 TABLET, FILM COATED ORAL at 10:21

## 2023-09-17 RX ADMIN — WATER 40 MG: 1 INJECTION INTRAMUSCULAR; INTRAVENOUS; SUBCUTANEOUS at 18:54

## 2023-09-17 RX ADMIN — OLANZAPINE 15 MG: 5 TABLET, FILM COATED ORAL at 20:12

## 2023-09-17 RX ADMIN — CLONAZEPAM 0.5 MG: 0.5 TABLET ORAL at 08:20

## 2023-09-17 RX ADMIN — ATORVASTATIN CALCIUM 20 MG: 20 TABLET, FILM COATED ORAL at 08:09

## 2023-09-17 RX ADMIN — Medication: at 13:28

## 2023-09-17 RX ADMIN — ENOXAPARIN SODIUM 40 MG: 100 INJECTION SUBCUTANEOUS at 08:09

## 2023-09-17 RX ADMIN — HYDROCODONE BITARTRATE AND ACETAMINOPHEN 1 TABLET: 5; 325 TABLET ORAL at 04:16

## 2023-09-17 RX ADMIN — SODIUM CHLORIDE: 9 INJECTION, SOLUTION INTRAVENOUS at 04:14

## 2023-09-17 RX ADMIN — IBUPROFEN 400 MG: 200 TABLET, FILM COATED ORAL at 20:13

## 2023-09-17 RX ADMIN — OLANZAPINE 15 MG: 5 TABLET, FILM COATED ORAL at 08:09

## 2023-09-17 RX ADMIN — HYDROCODONE BITARTRATE AND ACETAMINOPHEN 1 TABLET: 5; 325 TABLET ORAL at 10:21

## 2023-09-17 RX ADMIN — KETOROLAC TROMETHAMINE 15 MG: 30 INJECTION, SOLUTION INTRAMUSCULAR at 13:18

## 2023-09-17 RX ADMIN — HYDROCODONE BITARTRATE AND ACETAMINOPHEN 1 TABLET: 5; 325 TABLET ORAL at 16:00

## 2023-09-17 RX ADMIN — POTASSIUM BICARBONATE 20 MEQ: 782 TABLET, EFFERVESCENT ORAL at 14:22

## 2023-09-17 RX ADMIN — MORPHINE SULFATE 1 MG: 2 INJECTION, SOLUTION INTRAMUSCULAR; INTRAVENOUS at 01:20

## 2023-09-17 RX ADMIN — KETOROLAC TROMETHAMINE 15 MG: 30 INJECTION, SOLUTION INTRAMUSCULAR at 18:54

## 2023-09-17 RX ADMIN — GABAPENTIN 600 MG: 300 CAPSULE ORAL at 20:14

## 2023-09-17 ASSESSMENT — PAIN - FUNCTIONAL ASSESSMENT
PAIN_FUNCTIONAL_ASSESSMENT: PREVENTS OR INTERFERES SOME ACTIVE ACTIVITIES AND ADLS

## 2023-09-17 ASSESSMENT — PAIN DESCRIPTION - PAIN TYPE
TYPE: ACUTE PAIN
TYPE: ACUTE PAIN;CHRONIC PAIN

## 2023-09-17 ASSESSMENT — PAIN DESCRIPTION - LOCATION
LOCATION: CHEST
LOCATION: FOOT
LOCATION: CHEST
LOCATION: CHEST;FOOT

## 2023-09-17 ASSESSMENT — PAIN SCALES - GENERAL
PAINLEVEL_OUTOF10: 10
PAINLEVEL_OUTOF10: 5
PAINLEVEL_OUTOF10: 6
PAINLEVEL_OUTOF10: 7
PAINLEVEL_OUTOF10: 6
PAINLEVEL_OUTOF10: 5
PAINLEVEL_OUTOF10: 6
PAINLEVEL_OUTOF10: 5
PAINLEVEL_OUTOF10: 3
PAINLEVEL_OUTOF10: 5

## 2023-09-17 ASSESSMENT — PAIN DESCRIPTION - FREQUENCY: FREQUENCY: CONTINUOUS

## 2023-09-17 ASSESSMENT — PAIN DESCRIPTION - DESCRIPTORS
DESCRIPTORS: PRESSURE
DESCRIPTORS: ACHING
DESCRIPTORS: PRESSURE
DESCRIPTORS: ACHING
DESCRIPTORS: ACHING;DISCOMFORT
DESCRIPTORS: ACHING;SHARP

## 2023-09-17 ASSESSMENT — PAIN DESCRIPTION - ORIENTATION
ORIENTATION: LEFT
ORIENTATION: LEFT
ORIENTATION: MID;LEFT
ORIENTATION: LEFT
ORIENTATION: LEFT;MID
ORIENTATION: MID;LEFT

## 2023-09-17 NOTE — H&P
Internal Medicine Resident History and Physical          Name: Austin Rodriguez, female, : 1985, MRN: [id number]    PCP: Luis M Beckman DO    Date of Admission: 2023  Date of Service: Pt seen/examined on 23  and Admitted to Inpatient with expected LOS greater than two midnights due to medical therapy. Assessment and Plan:  Syncope, cardiogenic: Patient has history of ventricular fibrillation and s/p ICD placement in . Syncope preceded by \"acid reflux\"-like chest pain followed by prodromal symptoms of nausea, diaphoresis, and vision loss followed by loss of loss of consciousness for several minutes. Per patient's mother, upper extremity movement initially noted during loss of consciousness and no apneic breathing throughout. Pacer interrogated in ED noted for non-sustained ventricular tachycardia. ED initial EKG normal. Seizure unlikely given no post-ictal state. Echo ordered  Recommendation for cardiology consult, but will defer to day team  Started on gentle fluids and encourage PO intake as tolerated  Will continue to monitor vitals and labs  Acute chest pain, non-cardiac: Noted new onset left chest wall and sternal chest pain that is worse with inspiration and reproducible with palpation. EKG was NSR. Patient given Ontiveros Jarek in ED with mild improvement. Given 1mg morphine for breakthrough pain, 5% of MME per record  Continue to manage with home Norco and ibuprofen   HFrEF: Prior echo on 10/2022 noted EF of 40 to 45%. Repeat echo pending  GDMT: metoprolol succinate, spirinolactone, and lisinopril  History of ventricular fibrillation cardiac arrest s/p ICD placement : Followed by Dr. Mallory Almaraz and being seen at pacemaker clinic. 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   Given 1mg morphine for breakthrough pain  Pain management with home medications of Norco 5/325 and

## 2023-09-17 NOTE — ED NOTES
Pt put on call light stating she was having chest pain again. This RN to bedside to complete ekg. NS noted on the monitor. Provider made aware.      Edith Lawrence RN  09/16/23 2007

## 2023-09-17 NOTE — ED NOTES
Pt medicated per MAR. Pt resting on cot comfortably at this time. No needs expressed.      Adrienne Petty RN  09/16/23 2054

## 2023-09-18 ENCOUNTER — APPOINTMENT (OUTPATIENT)
Dept: NON INVASIVE DIAGNOSTICS | Age: 38
DRG: 756 | End: 2023-09-18
Payer: MEDICAID

## 2023-09-18 LAB
ANION GAP SERPL CALC-SCNC: 9 MEQ/L (ref 8–16)
BASOPHILS ABSOLUTE: 0 THOU/MM3 (ref 0–0.1)
BASOPHILS NFR BLD AUTO: 0.1 %
BUN SERPL-MCNC: 6 MG/DL (ref 7–22)
CALCIUM SERPL-MCNC: 9.5 MG/DL (ref 8.5–10.5)
CHLORIDE SERPL-SCNC: 104 MEQ/L (ref 98–111)
CO2 SERPL-SCNC: 24 MEQ/L (ref 23–33)
CREAT SERPL-MCNC: 0.4 MG/DL (ref 0.4–1.2)
DEPRECATED RDW RBC AUTO: 40.1 FL (ref 35–45)
EOSINOPHIL NFR BLD AUTO: 0 %
EOSINOPHILS ABSOLUTE: 0 THOU/MM3 (ref 0–0.4)
ERYTHROCYTE [DISTWIDTH] IN BLOOD BY AUTOMATED COUNT: 11.9 % (ref 11.5–14.5)
GFR SERPL CREATININE-BSD FRML MDRD: > 60 ML/MIN/1.73M2
GLUCOSE SERPL-MCNC: 128 MG/DL (ref 70–108)
HCT VFR BLD AUTO: 37.9 % (ref 37–47)
HGB BLD-MCNC: 11.9 GM/DL (ref 12–16)
IMM GRANULOCYTES # BLD AUTO: 0.02 THOU/MM3 (ref 0–0.07)
IMM GRANULOCYTES NFR BLD AUTO: 0.2 %
LYMPHOCYTES ABSOLUTE: 1.3 THOU/MM3 (ref 1–4.8)
LYMPHOCYTES NFR BLD AUTO: 12.9 %
MAGNESIUM SERPL-MCNC: 2 MG/DL (ref 1.6–2.4)
MCH RBC QN AUTO: 28.8 PG (ref 26–33)
MCHC RBC AUTO-ENTMCNC: 31.4 GM/DL (ref 32.2–35.5)
MCV RBC AUTO: 91.8 FL (ref 81–99)
MONOCYTES ABSOLUTE: 0.5 THOU/MM3 (ref 0.4–1.3)
MONOCYTES NFR BLD AUTO: 4.8 %
NEUTROPHILS NFR BLD AUTO: 82 %
NRBC BLD AUTO-RTO: 0 /100 WBC
PLATELET # BLD AUTO: 334 THOU/MM3 (ref 130–400)
PMV BLD AUTO: 9.7 FL (ref 9.4–12.4)
POTASSIUM SERPL-SCNC: 4.4 MEQ/L (ref 3.5–5.2)
RBC # BLD AUTO: 4.13 MILL/MM3 (ref 4.2–5.4)
SEGMENTED NEUTROPHILS ABSOLUTE COUNT: 8.2 THOU/MM3 (ref 1.8–7.7)
SODIUM SERPL-SCNC: 137 MEQ/L (ref 135–145)
WBC # BLD AUTO: 10 THOU/MM3 (ref 4.8–10.8)

## 2023-09-18 PROCEDURE — 83735 ASSAY OF MAGNESIUM: CPT

## 2023-09-18 PROCEDURE — 6360000002 HC RX W HCPCS

## 2023-09-18 PROCEDURE — 6370000000 HC RX 637 (ALT 250 FOR IP)

## 2023-09-18 PROCEDURE — 93017 CV STRESS TEST TRACING ONLY: CPT | Performed by: INTERNAL MEDICINE

## 2023-09-18 PROCEDURE — 36415 COLL VENOUS BLD VENIPUNCTURE: CPT

## 2023-09-18 PROCEDURE — 2140000000 HC CCU INTERMEDIATE R&B

## 2023-09-18 PROCEDURE — 6360000002 HC RX W HCPCS: Performed by: INTERNAL MEDICINE

## 2023-09-18 PROCEDURE — 85025 COMPLETE CBC W/AUTO DIFF WBC: CPT

## 2023-09-18 PROCEDURE — 93306 TTE W/DOPPLER COMPLETE: CPT

## 2023-09-18 PROCEDURE — 2580000003 HC RX 258

## 2023-09-18 PROCEDURE — 3430000000 HC RX DIAGNOSTIC RADIOPHARMACEUTICAL: Performed by: STUDENT IN AN ORGANIZED HEALTH CARE EDUCATION/TRAINING PROGRAM

## 2023-09-18 PROCEDURE — A9500 TC99M SESTAMIBI: HCPCS | Performed by: STUDENT IN AN ORGANIZED HEALTH CARE EDUCATION/TRAINING PROGRAM

## 2023-09-18 PROCEDURE — 6370000000 HC RX 637 (ALT 250 FOR IP): Performed by: INTERNAL MEDICINE

## 2023-09-18 PROCEDURE — 80048 BASIC METABOLIC PNL TOTAL CA: CPT

## 2023-09-18 PROCEDURE — 78452 HT MUSCLE IMAGE SPECT MULT: CPT | Performed by: INTERNAL MEDICINE

## 2023-09-18 PROCEDURE — 99233 SBSQ HOSP IP/OBS HIGH 50: CPT | Performed by: INTERNAL MEDICINE

## 2023-09-18 PROCEDURE — 99232 SBSQ HOSP IP/OBS MODERATE 35: CPT | Performed by: STUDENT IN AN ORGANIZED HEALTH CARE EDUCATION/TRAINING PROGRAM

## 2023-09-18 RX ORDER — TETRAKIS(2-METHOXYISOBUTYLISOCYANIDE)COPPER(I) TETRAFLUOROBORATE 1 MG/ML
30.7 INJECTION, POWDER, LYOPHILIZED, FOR SOLUTION INTRAVENOUS
Status: COMPLETED | OUTPATIENT
Start: 2023-09-18 | End: 2023-09-18

## 2023-09-18 RX ORDER — LIDOCAINE 4 G/G
1 PATCH TOPICAL ONCE
Status: COMPLETED | OUTPATIENT
Start: 2023-09-18 | End: 2023-09-18

## 2023-09-18 RX ORDER — TETRAKIS(2-METHOXYISOBUTYLISOCYANIDE)COPPER(I) TETRAFLUOROBORATE 1 MG/ML
8.5 INJECTION, POWDER, LYOPHILIZED, FOR SOLUTION INTRAVENOUS
Status: COMPLETED | OUTPATIENT
Start: 2023-09-18 | End: 2023-09-18

## 2023-09-18 RX ORDER — MORPHINE SULFATE 2 MG/ML
1 INJECTION, SOLUTION INTRAMUSCULAR; INTRAVENOUS ONCE
Status: COMPLETED | OUTPATIENT
Start: 2023-09-18 | End: 2023-09-18

## 2023-09-18 RX ADMIN — ENOXAPARIN SODIUM 40 MG: 100 INJECTION SUBCUTANEOUS at 07:54

## 2023-09-18 RX ADMIN — SODIUM CHLORIDE: 9 INJECTION, SOLUTION INTRAVENOUS at 00:16

## 2023-09-18 RX ADMIN — OLANZAPINE 15 MG: 5 TABLET, FILM COATED ORAL at 07:53

## 2023-09-18 RX ADMIN — IBUPROFEN 400 MG: 200 TABLET, FILM COATED ORAL at 15:45

## 2023-09-18 RX ADMIN — HYDROCODONE BITARTRATE AND ACETAMINOPHEN 1 TABLET: 5; 325 TABLET ORAL at 20:14

## 2023-09-18 RX ADMIN — SODIUM CHLORIDE, PRESERVATIVE FREE 10 ML: 5 INJECTION INTRAVENOUS at 07:54

## 2023-09-18 RX ADMIN — KETOROLAC TROMETHAMINE 15 MG: 30 INJECTION, SOLUTION INTRAMUSCULAR at 00:13

## 2023-09-18 RX ADMIN — GABAPENTIN 600 MG: 300 CAPSULE ORAL at 20:13

## 2023-09-18 RX ADMIN — HYDROCODONE BITARTRATE AND ACETAMINOPHEN 1 TABLET: 5; 325 TABLET ORAL at 07:54

## 2023-09-18 RX ADMIN — Medication 400 MG: at 07:53

## 2023-09-18 RX ADMIN — KETOROLAC TROMETHAMINE 15 MG: 30 INJECTION, SOLUTION INTRAMUSCULAR at 06:07

## 2023-09-18 RX ADMIN — MORPHINE SULFATE 1 MG: 2 INJECTION, SOLUTION INTRAMUSCULAR; INTRAVENOUS at 03:11

## 2023-09-18 RX ADMIN — IBUPROFEN 400 MG: 200 TABLET, FILM COATED ORAL at 01:38

## 2023-09-18 RX ADMIN — GABAPENTIN 600 MG: 300 CAPSULE ORAL at 13:35

## 2023-09-18 RX ADMIN — Medication 30.7 MILLICURIE: at 12:27

## 2023-09-18 RX ADMIN — ATORVASTATIN CALCIUM 20 MG: 20 TABLET, FILM COATED ORAL at 07:54

## 2023-09-18 RX ADMIN — OLANZAPINE 15 MG: 5 TABLET, FILM COATED ORAL at 20:14

## 2023-09-18 RX ADMIN — Medication 8.5 MILLICURIE: at 11:22

## 2023-09-18 RX ADMIN — CLONAZEPAM 0.5 MG: 0.5 TABLET ORAL at 07:54

## 2023-09-18 RX ADMIN — GABAPENTIN 600 MG: 300 CAPSULE ORAL at 07:53

## 2023-09-18 RX ADMIN — HYDROCODONE BITARTRATE AND ACETAMINOPHEN 1 TABLET: 5; 325 TABLET ORAL at 13:57

## 2023-09-18 ASSESSMENT — PAIN DESCRIPTION - LOCATION
LOCATION: LEG
LOCATION: LEG
LOCATION: CHEST
LOCATION: LEG
LOCATION: CHEST
LOCATION: CHEST

## 2023-09-18 ASSESSMENT — PAIN DESCRIPTION - PAIN TYPE
TYPE: ACUTE PAIN
TYPE: ACUTE PAIN
TYPE: CHRONIC PAIN

## 2023-09-18 ASSESSMENT — PAIN SCALES - GENERAL
PAINLEVEL_OUTOF10: 0
PAINLEVEL_OUTOF10: 7
PAINLEVEL_OUTOF10: 10
PAINLEVEL_OUTOF10: 3
PAINLEVEL_OUTOF10: 6
PAINLEVEL_OUTOF10: 10
PAINLEVEL_OUTOF10: 10

## 2023-09-18 ASSESSMENT — PAIN DESCRIPTION - DESCRIPTORS
DESCRIPTORS: ACHING;DISCOMFORT
DESCRIPTORS: DISCOMFORT
DESCRIPTORS: DISCOMFORT
DESCRIPTORS: SHARP
DESCRIPTORS: SHARP
DESCRIPTORS: PRESSURE

## 2023-09-18 ASSESSMENT — PAIN DESCRIPTION - FREQUENCY: FREQUENCY: CONTINUOUS

## 2023-09-18 ASSESSMENT — PAIN - FUNCTIONAL ASSESSMENT
PAIN_FUNCTIONAL_ASSESSMENT: PREVENTS OR INTERFERES SOME ACTIVE ACTIVITIES AND ADLS
PAIN_FUNCTIONAL_ASSESSMENT: PREVENTS OR INTERFERES SOME ACTIVE ACTIVITIES AND ADLS
PAIN_FUNCTIONAL_ASSESSMENT: ACTIVITIES ARE NOT PREVENTED

## 2023-09-18 ASSESSMENT — PAIN DESCRIPTION - ORIENTATION
ORIENTATION: LEFT

## 2023-09-18 ASSESSMENT — PAIN DESCRIPTION - ONSET: ONSET: ON-GOING

## 2023-09-19 PROBLEM — R56.9 SEIZURE-LIKE ACTIVITY (HCC): Status: ACTIVE | Noted: 2023-09-19

## 2023-09-19 LAB
ANION GAP SERPL CALC-SCNC: 8 MEQ/L (ref 8–16)
BASOPHILS ABSOLUTE: 0 THOU/MM3 (ref 0–0.1)
BASOPHILS NFR BLD AUTO: 0.3 %
BUN SERPL-MCNC: 10 MG/DL (ref 7–22)
CALCIUM SERPL-MCNC: 8.7 MG/DL (ref 8.5–10.5)
CHLORIDE SERPL-SCNC: 107 MEQ/L (ref 98–111)
CO2 SERPL-SCNC: 23 MEQ/L (ref 23–33)
CREAT SERPL-MCNC: 0.5 MG/DL (ref 0.4–1.2)
DEPRECATED RDW RBC AUTO: 43.2 FL (ref 35–45)
EOSINOPHIL NFR BLD AUTO: 0.4 %
EOSINOPHILS ABSOLUTE: 0 THOU/MM3 (ref 0–0.4)
ERYTHROCYTE [DISTWIDTH] IN BLOOD BY AUTOMATED COUNT: 12.3 % (ref 11.5–14.5)
GFR SERPL CREATININE-BSD FRML MDRD: > 60 ML/MIN/1.73M2
GLUCOSE SERPL-MCNC: 94 MG/DL (ref 70–108)
HCT VFR BLD AUTO: 34 % (ref 37–47)
HGB BLD-MCNC: 10.3 GM/DL (ref 12–16)
IMM GRANULOCYTES # BLD AUTO: 0.02 THOU/MM3 (ref 0–0.07)
IMM GRANULOCYTES NFR BLD AUTO: 0.2 %
LYMPHOCYTES ABSOLUTE: 4.5 THOU/MM3 (ref 1–4.8)
LYMPHOCYTES NFR BLD AUTO: 46.1 %
MAGNESIUM SERPL-MCNC: 1.6 MG/DL (ref 1.6–2.4)
MCH RBC QN AUTO: 28.8 PG (ref 26–33)
MCHC RBC AUTO-ENTMCNC: 30.3 GM/DL (ref 32.2–35.5)
MCV RBC AUTO: 95 FL (ref 81–99)
MONOCYTES ABSOLUTE: 0.8 THOU/MM3 (ref 0.4–1.3)
MONOCYTES NFR BLD AUTO: 8 %
NEUTROPHILS NFR BLD AUTO: 45 %
NRBC BLD AUTO-RTO: 0 /100 WBC
PLATELET # BLD AUTO: 278 THOU/MM3 (ref 130–400)
PMV BLD AUTO: 9.6 FL (ref 9.4–12.4)
POTASSIUM SERPL-SCNC: 3.4 MEQ/L (ref 3.5–5.2)
RBC # BLD AUTO: 3.58 MILL/MM3 (ref 4.2–5.4)
SEGMENTED NEUTROPHILS ABSOLUTE COUNT: 4.4 THOU/MM3 (ref 1.8–7.7)
SODIUM SERPL-SCNC: 138 MEQ/L (ref 135–145)
WBC # BLD AUTO: 9.7 THOU/MM3 (ref 4.8–10.8)

## 2023-09-19 PROCEDURE — 2580000003 HC RX 258

## 2023-09-19 PROCEDURE — 36415 COLL VENOUS BLD VENIPUNCTURE: CPT

## 2023-09-19 PROCEDURE — 6370000000 HC RX 637 (ALT 250 FOR IP): Performed by: INTERNAL MEDICINE

## 2023-09-19 PROCEDURE — 2140000000 HC CCU INTERMEDIATE R&B

## 2023-09-19 PROCEDURE — 94761 N-INVAS EAR/PLS OXIMETRY MLT: CPT

## 2023-09-19 PROCEDURE — 85025 COMPLETE CBC W/AUTO DIFF WBC: CPT

## 2023-09-19 PROCEDURE — 6370000000 HC RX 637 (ALT 250 FOR IP): Performed by: NURSE PRACTITIONER

## 2023-09-19 PROCEDURE — 83735 ASSAY OF MAGNESIUM: CPT

## 2023-09-19 PROCEDURE — 6360000002 HC RX W HCPCS: Performed by: INTERNAL MEDICINE

## 2023-09-19 PROCEDURE — 95816 EEG AWAKE AND DROWSY: CPT | Performed by: PSYCHIATRY & NEUROLOGY

## 2023-09-19 PROCEDURE — 80048 BASIC METABOLIC PNL TOTAL CA: CPT

## 2023-09-19 PROCEDURE — 6360000002 HC RX W HCPCS

## 2023-09-19 PROCEDURE — 6370000000 HC RX 637 (ALT 250 FOR IP)

## 2023-09-19 PROCEDURE — 95816 EEG AWAKE AND DROWSY: CPT

## 2023-09-19 PROCEDURE — 99233 SBSQ HOSP IP/OBS HIGH 50: CPT | Performed by: INTERNAL MEDICINE

## 2023-09-19 RX ORDER — ACETAMINOPHEN 500 MG
500 TABLET ORAL NIGHTLY
Status: DISCONTINUED | OUTPATIENT
Start: 2023-09-19 | End: 2023-09-20 | Stop reason: HOSPADM

## 2023-09-19 RX ORDER — DIPHENHYDRAMINE HCL 25 MG
25 TABLET ORAL NIGHTLY PRN
Status: DISCONTINUED | OUTPATIENT
Start: 2023-09-19 | End: 2023-09-20 | Stop reason: HOSPADM

## 2023-09-19 RX ORDER — IBUPROFEN 200 MG
400 TABLET ORAL EVERY 6 HOURS PRN
Status: DISCONTINUED | OUTPATIENT
Start: 2023-09-20 | End: 2023-09-20 | Stop reason: HOSPADM

## 2023-09-19 RX ORDER — CHOLECALCIFEROL (VITAMIN D3) 125 MCG
500 CAPSULE ORAL DAILY
COMMUNITY

## 2023-09-19 RX ADMIN — POTASSIUM CHLORIDE 40 MEQ: 1500 TABLET, EXTENDED RELEASE ORAL at 04:37

## 2023-09-19 RX ADMIN — GABAPENTIN 600 MG: 300 CAPSULE ORAL at 14:11

## 2023-09-19 RX ADMIN — DIPHENHYDRAMINE HYDROCHLORIDE 25 MG: 25 TABLET ORAL at 23:06

## 2023-09-19 RX ADMIN — CLONAZEPAM 0.5 MG: 0.5 TABLET ORAL at 20:26

## 2023-09-19 RX ADMIN — CLONAZEPAM 0.5 MG: 0.5 TABLET ORAL at 08:13

## 2023-09-19 RX ADMIN — SODIUM CHLORIDE: 9 INJECTION, SOLUTION INTRAVENOUS at 00:54

## 2023-09-19 RX ADMIN — ATORVASTATIN CALCIUM 20 MG: 20 TABLET, FILM COATED ORAL at 08:12

## 2023-09-19 RX ADMIN — ENOXAPARIN SODIUM 40 MG: 100 INJECTION SUBCUTANEOUS at 08:12

## 2023-09-19 RX ADMIN — ACETAMINOPHEN 500 MG: 500 TABLET ORAL at 23:06

## 2023-09-19 RX ADMIN — HYDROCODONE BITARTRATE AND ACETAMINOPHEN 1 TABLET: 5; 325 TABLET ORAL at 08:13

## 2023-09-19 RX ADMIN — GABAPENTIN 600 MG: 300 CAPSULE ORAL at 20:26

## 2023-09-19 RX ADMIN — Medication 400 MG: at 08:12

## 2023-09-19 RX ADMIN — HYDROCODONE BITARTRATE AND ACETAMINOPHEN 1 TABLET: 5; 325 TABLET ORAL at 20:26

## 2023-09-19 RX ADMIN — HYDROCODONE BITARTRATE AND ACETAMINOPHEN 1 TABLET: 5; 325 TABLET ORAL at 02:14

## 2023-09-19 RX ADMIN — HYDROCODONE BITARTRATE AND ACETAMINOPHEN 1 TABLET: 5; 325 TABLET ORAL at 14:12

## 2023-09-19 RX ADMIN — SODIUM CHLORIDE, PRESERVATIVE FREE 10 ML: 5 INJECTION INTRAVENOUS at 08:11

## 2023-09-19 RX ADMIN — OLANZAPINE 15 MG: 5 TABLET, FILM COATED ORAL at 08:11

## 2023-09-19 RX ADMIN — OLANZAPINE 15 MG: 5 TABLET, FILM COATED ORAL at 20:26

## 2023-09-19 RX ADMIN — SODIUM CHLORIDE: 9 INJECTION, SOLUTION INTRAVENOUS at 23:08

## 2023-09-19 RX ADMIN — MAGNESIUM SULFATE HEPTAHYDRATE 2000 MG: 40 INJECTION, SOLUTION INTRAVENOUS at 10:58

## 2023-09-19 RX ADMIN — GABAPENTIN 600 MG: 300 CAPSULE ORAL at 08:12

## 2023-09-19 ASSESSMENT — PAIN DESCRIPTION - FREQUENCY
FREQUENCY: CONTINUOUS

## 2023-09-19 ASSESSMENT — PAIN DESCRIPTION - ONSET
ONSET: ON-GOING

## 2023-09-19 ASSESSMENT — PAIN DESCRIPTION - DESCRIPTORS
DESCRIPTORS: ACHING;DISCOMFORT
DESCRIPTORS: ACHING;DISCOMFORT
DESCRIPTORS: ACHING
DESCRIPTORS: ACHING;DISCOMFORT
DESCRIPTORS: ACHING;SHARP
DESCRIPTORS: ACHING;DISCOMFORT
DESCRIPTORS: ACHING;SHARP
DESCRIPTORS: ACHING;SHARP
DESCRIPTORS: ACHING;DISCOMFORT

## 2023-09-19 ASSESSMENT — PAIN DESCRIPTION - PAIN TYPE
TYPE: CHRONIC PAIN

## 2023-09-19 ASSESSMENT — PAIN DESCRIPTION - LOCATION
LOCATION: LEG
LOCATION: FOOT
LOCATION: LEG
LOCATION: LEG
LOCATION: FOOT
LOCATION: LEG
LOCATION: LEG

## 2023-09-19 ASSESSMENT — PAIN SCALES - GENERAL
PAINLEVEL_OUTOF10: 7
PAINLEVEL_OUTOF10: 6
PAINLEVEL_OUTOF10: 6
PAINLEVEL_OUTOF10: 7
PAINLEVEL_OUTOF10: 7
PAINLEVEL_OUTOF10: 5
PAINLEVEL_OUTOF10: 6
PAINLEVEL_OUTOF10: 4
PAINLEVEL_OUTOF10: 7
PAINLEVEL_OUTOF10: 4
PAINLEVEL_OUTOF10: 6
PAINLEVEL_OUTOF10: 7

## 2023-09-19 ASSESSMENT — ENCOUNTER SYMPTOMS
ABDOMINAL PAIN: 0
VOMITING: 0
SORE THROAT: 0
RHINORRHEA: 0
SHORTNESS OF BREATH: 0
NAUSEA: 0
COUGH: 1

## 2023-09-19 ASSESSMENT — PAIN - FUNCTIONAL ASSESSMENT
PAIN_FUNCTIONAL_ASSESSMENT: PREVENTS OR INTERFERES SOME ACTIVE ACTIVITIES AND ADLS
PAIN_FUNCTIONAL_ASSESSMENT: ACTIVITIES ARE NOT PREVENTED
PAIN_FUNCTIONAL_ASSESSMENT: PREVENTS OR INTERFERES SOME ACTIVE ACTIVITIES AND ADLS

## 2023-09-19 ASSESSMENT — PAIN DESCRIPTION - ORIENTATION
ORIENTATION: LEFT

## 2023-09-19 NOTE — PLAN OF CARE
Problem: Discharge Planning  Goal: Discharge to home or other facility with appropriate resources  Outcome: Progressing  Flowsheets (Taken 9/19/2023 0800 by Bill Serrano, RN)  Discharge to home or other facility with appropriate resources: Identify barriers to discharge with patient and caregiver  Note: To be discharge home with Mom after successful treatment. EEG done today, continuous EEG for tonight. Stress test done yesterday. Problem: Safety - Adult  Goal: Free from fall injury  Outcome: Progressing  Flowsheets (Taken 9/19/2023 1850)  Free From Fall Injury: Based on caregiver fall risk screen, instruct family/caregiver to ask for assistance with transferring infant if caregiver noted to have fall risk factors  Note: Standard measures implemented to prevent fall. Kept bed in low position, call button and bedside table kept within patient's reach. Problem: Chronic Conditions and Co-morbidities  Goal: Patient's chronic conditions and co-morbidity symptoms are monitored and maintained or improved  Outcome: Progressing  Flowsheets (Taken 9/19/2023 0800 by Bill Serrano RN)  Care Plan - Patient's Chronic Conditions and Co-Morbidity Symptoms are Monitored and Maintained or Improved: Monitor and assess patient's chronic conditions and comorbid symptoms for stability, deterioration, or improvement  Note: Monitored for chronic co morbidities. Check blood pressure and heart rate. Problem: Cardiovascular - Adult  Goal: Maintains optimal cardiac output and hemodynamic stability  Outcome: Progressing  Flowsheets (Taken 9/19/2023 1850)  Maintains optimal cardiac output and hemodynamic stability:   Monitor blood pressure and heart rate   Administer fluid and/or volume expanders as ordered  Note: Monitored blood pressure, still on continuous IV fluid normal saline.       Problem: Cardiovascular - Adult  Goal: Absence of cardiac dysrhythmias or at baseline  Outcome: Progressing  Flowsheets (Taken 9/19/2023

## 2023-09-19 NOTE — PROCEDURES
EEG was reviewed up to 6:30 PM    This EEG was normal. No focal or epileptiform abnormalities were seen.     Electronically signed by Hugh Graves MD on 9/19/2023 at 6:30 PM

## 2023-09-19 NOTE — PROGRESS NOTES
[id number] Laboratory Technician Worksheet      EEG Date: 2023    Name: Alma Ruano   : 1985   Age: 34 y.o. SEX: female    ROOM: 10 MRN: [id number]           CSN: [id number]      Ordering Provider: NORA verdin  EEG Number: [id number] Time of Test:  8629    Hand: Right   Sedation: no    H.V. Done: Yes with fair effort Photic: No    Sleep: No  Drowsy: Yes   Sleep Deprived: Yes    Seizures observed: no    Mentality: alert      Clinical History:pt Hx of Vfib w/ ICD placement  and   rhabdomyosarcoma s/p chemotherapy. Pt had PNES during childhood. CT of the head 2019  IMPRESSION:  No acute intracranial abnormality.   Past Medical History:       Diagnosis Date    Acute on chronic systolic congestive heart failure ([address])     Arthritis     Bipolar disorder ([address])     Cancer ([address])     rhabdomyosarcoma    Cardiac arrest ([address]) 2019    VF    Cardiac arrest with ventricular fibrillation ([address])     Cardiomyopathy ([address]) 2019    EF= 40% with global hypokinesis    COPD (chronic obstructive pulmonary disease) ([address])     Depression     Family history of early CAD     Hepatitis C     Hyperlipidemia     Paranoia (psychosis) ([address])     Pneumonia due to infectious organism     Rhabdomyosarcoma ([address])     Scoliosis     Smoker     VF (ventricular fibrillation) (HCC)        Scheduled Meds:   ketorolac  15 mg IntraVENous Q6H    lidocaine  1 patch TransDERmal Daily    magnesium oxide  400 mg Oral Daily    gabapentin  600 mg Oral TID    sodium chloride flush  5-40 mL IntraVENous 2 times per day    enoxaparin  40 mg SubCUTAneous Daily    atorvastatin  20 mg Oral Daily    lisinopril  2.5 mg Oral Daily    metoprolol succinate  50 mg Oral Daily    OLANZapine  15 mg Oral BID    [Held by provider] OLANZapine  7.5 mg Oral QAM    spironolactone  25 mg Oral Daily     Continuous Infusions:   sodium chloride 50 mL/hr at 23 0609    sodium chloride 10 mL/hr at 23     PRN
[facility] Laboratory Technician Worksheet      EEG Date: 2023    Name: Valentino James   : 1985   Age: 34 y.o. SEX: female    ROOM: [facility] MRN: [id number]           CSN: [id number]      Ordering Provider: Alexis Stacy  EEG Number: [id number] Time of Test:  6744    Hand: Right   Sedation: no    H.V. Done: No  done with prior test  Photic: No    Sleep: No  Drowsy: No   Sleep Deprived: No    Seizures observed: no    Mentality: alert      Clinical History: HX of ventricular fibrillation and s/p ICD placement in  no HX of seizures, had PNES during teen years.    CT of the head 2019  IMPRESSION:  No acute intracranial abnormality  Past Medical History:       Diagnosis Date    Acute on chronic systolic congestive heart failure (HCC)     Arthritis     Bipolar disorder ([address])     Cancer ([address])     rhabdomyosarcoma    Cardiac arrest ([address]) 2019    VF    Cardiac arrest with ventricular fibrillation ([address])     Cardiomyopathy ([address]) 2019    EF= 40% with global hypokinesis    COPD (chronic obstructive pulmonary disease) ([address])     Depression     Family history of early CAD     Hepatitis C     Hyperlipidemia     Paranoia (psychosis) ([address])     Pneumonia due to infectious organism     Rhabdomyosarcoma ([address])     Scoliosis     Smoker     VF (ventricular fibrillation) (HCC)        Scheduled Meds:   ketorolac  15 mg IntraVENous Q6H    lidocaine  1 patch TransDERmal Daily    magnesium oxide  400 mg Oral Daily    gabapentin  600 mg Oral TID    sodium chloride flush  5-40 mL IntraVENous 2 times per day    enoxaparin  40 mg SubCUTAneous Daily    atorvastatin  20 mg Oral Daily    lisinopril  2.5 mg Oral Daily    metoprolol succinate  50 mg Oral Daily    OLANZapine  15 mg Oral BID    [Held by provider] OLANZapine  7.5 mg Oral QAM    spironolactone  25 mg Oral Daily     Continuous Infusions:   sodium chloride 50 mL/hr at 23 0609    sodium chloride 10 mL/hr at 23     PRN
Called [employer] to verify if patient's ICD is MRI compatible. Patient states that ICD is not MRI compatible. [employer] rep stated that the device is MRI compatible, however there is data missing regarding lead placement which affects whether the leads are MRI compatible. [employer] rep recommended to contact patient's cardiologist who inserted the ICD: Dr. Karen Shah [phone] at [facility] to confirm lead placement via op note, or have a cardiologist confirm the lead placement location.
Cardiology Progress Note      Patient:  Page Sarmiento  YOB: 1985  MRN: [id number]   Acct: [de-identified]  Admit Date:  9/16/2023  Primary Cardiologist: Stephie Thomas MD  Note per Dr Son Tay:  Elfego Stakes:  Chest pain, syncope. PRIMARY CARDIOLOGIST:  Dr. Myranda Villanueva. HISTORY OF PRESENT ILLNESS:  This is a 47-year-old  female  patient was in usual state of health until 5:30 p.m. yesterday when she  started to have a sudden onset of  epigastric pain followed by chest pain and feeling a little nauseous and  subsequently, the patient felt dizzy, lightheaded and informed her  mother that she is about to pass out and then basically apparently lost  consciousness. The patient stated that she regained her consciousness  in the squad. Basically, history obtained from the review of the record  and discussion with the patient as well as with her mother over the  phone. Basically, the patient passed out. When the patient was not responsive,  she was sitting on the couch and she leaned backwards and there was no  response. She called 911, squad came there within four minutes and came  to the emergency room, evaluated. First note in the emergency room was  documented at 6:27 p.m., so basically, per the information from the  mother, the patient did not take more than 15 or 20 minutes to get to  the hospital.  On waking up, the patient started to complain chest pain. The chest pain was severe 7/10, nonradiating, no associated symptoms,  cannot stand, _____ and still having chest pain, worse with deep  breathing, worse with change of position and worse with palpation. It has been informed also by her mother, that the patient has some jerky  movements on her extremities when she lost her consciousness. She has a  history of seizure disorder before, but not on any medication, but the  seizure disorder was several years back.      The patient has history of cardiomyopathy, nonischemic,
Echo completed, Dr Salina Mustafa to read.
Internal Medicine Progress Note          Name: Cyndee Zarate, female, : 1985, MRN: [id number]    PCP: Cookie Mistry DO    Date of Admission: 2023  Date of Service: Pt seen/examined on 23        Assessment and Plan:  Syncope, cardiogenic: Patient has history of ventricular fibrillation and s/p ICD placement in . Syncope preceded by \"acid reflux\"-like chest pain followed by prodromal symptoms of nausea, diaphoresis, and vision loss followed by loss of loss of consciousness for several minutes. Per patient's mother, upper extremity movement initially noted during loss of consciousness and no apneic breathing throughout. Pacer interrogated in ED noted for non-sustained ventricular tachycardia. Possibly related to hypomagnesemia? ED initial EKG normal. Seizure unlikely given no post-ictal state. Echo ordered  Cardiology consulted. On telemetry  Started on gentle fluids and encourage PO intake as tolerated  Will continue to monitor vitals and labs  Acute chest pain, non-cardiac: Noted new onset left chest wall and sternal chest pain that is worse with inspiration and reproducible with palpation. EKG was NSR. Patient given Darlina Warren in ED with mild improvement. Given 1mg morphine for breakthrough pain, 5% of MME per record  Continue to manage with home Norco and ibuprofen   Toradol added and a short steroid burst. Troponin <6 for three separate occasions. D-dimer normal. Trial GI cocktail without success. Will try lidocaine patch given reproducibility on palpation. HFrEF: Prior echo on 10/2022 noted EF of 40 to 45%. Repeat echo pending  GDMT: metoprolol succinate, spirinolactone, and lisinopril. Patient is chronically slightly hypotensive. Monitor GDMT meds. History of ventricular fibrillation cardiac arrest s/p ICD placement : Followed by Dr. Kamila Linton and being seen at pacemaker clinic.   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
Internal Medicine Progress Note          Name: Renetta Garcia, female, : 1985, MRN: [id number]    PCP: JAYLYN Poe CNP    Date of Admission: 2023  Date of Service: Pt seen/examined on 23        Assessment and Plan:  Syncope, Consider seizure/PNES: Patient has history of ventricular fibrillation and s/p ICD placement in . Syncope preceded by \"acid reflux\"-like chest pain followed by prodromal symptoms of nausea, diaphoresis, and vision loss followed by loss of loss of consciousness for several minutes. Per patient's mother, upper extremity movement initially noted during loss of consciousness and no apneic breathing throughout. Pacer interrogated in ED noted for non-sustained ventricular tachycardia. Possibly related to hypomagnesemia? ED initial EKG normal. Seizure unlikely given no post-ictal state. Pacer interrogation completed and found unlikely to be cardiogenic syncope. Patient endorses she had spells similar to PNES when a teenager. Endorses significant stressors lately. Echo ordered  Cardiology consulted. On telemetry  Started on gentle fluids and encourage PO intake as tolerated  Will continue to monitor vitals and labs  Consider EEG/Neuro consult. May need to consider psych too for PNES. Acute chest pain, non-cardiac: Noted new onset left chest wall and sternal chest pain that is worse with inspiration and reproducible with palpation. EKG was NSR. Patient given Florestine Albe in ED with mild improvement. Given 1mg morphine for breakthrough pain, 5% of MME per record  Continue to manage with home Norco and ibuprofen   Toradol added and a short steroid burst. Troponin <6 for three separate occasions. D-dimer normal. Trial GI cocktail without success. Will try lidocaine patch given reproducibility on palpation. Stress test pend. HFrEF: Prior echo on 10/2022 noted EF of 40 to 45%. Repeat echo pending  GDMT: metoprolol succinate, spirinolactone, and lisinopril.  Patient is
Patient informed, at request of Dr. Dwayne Kumar, that d-dimer was negative \"so it's unlikely she has a blood clot. \"
Patient received IV Toradol and a GI cocktail and states she is still experiencing chest pain. Perfect serve sent to Dr. Atiya Onofre, at patient's request, for additional pain relief.
Perfect serve to Dr. Whitfield Andre him that patient's chest pain, which has subsided overnight, has now returned. 5/10 chest pain, left side.
Pharmacy Medication History Note      List of current medications patient is taking is complete. Source of information: epic dispense history, patient interview    Changes made to medication list:    Medications added/doses adjusted:  Clonazepam 0.5 mg: taking 1 tablet by mouth twice daily for anxiety  Vitamin B12 500 mcg take 1 tablet by mouth daily (patient unsure of dose)    Other notes (ex. Recent course of antibiotics, Coumadin dosing):  Metoprolol succinate 50 mg: patient is taking 1 tablet twice daily but most recent script is for once daily  Olanzapine 15 mg: most recent prescription states take 1 tablet at bedtime  Olanzapine 7.5 mg: most recent prescription states take 1 tablet every morning  Denies use of other OTC or herbal medications.       Allergies reviewed  Adhesive tape reaction: rash that itched and burned      Electronically signed by Joe Toribio on 9/19/2023 at 1:28 PM
patient has had toradol, norco and ibuprofen. short lived relief from each of those. In tears c/o  10/10 upper midsternal chest pain. Was epigastric pain before. requesting morphine because it's the only thing that works. Hospitalist service messaged. Declined heat/ice.
of recovery is unlikely,     the patient would NOT desire the use of a ventilator (breathing machine). Resuscitation:  In the event the patient's heart stopped as a result of an underlying serious health condition, the patient communicates a preference for      resuscitative attempts (CPR). Outcomes/Plan:  ACP Discussion: Completed  New advance directive completed.     Electronically signed by Charleen Samuels, [address] [place] [address] on 9/17/2023 at 5:42 PM
Capillary Refill: Brisk,< 3 seconds. Peripheral Pulses: +2 palpable, equal bilaterally. EKG:  I have reviewed the EKG with the following interpretation: NSR      Labs:     Recent Labs     09/17/23 0357 09/18/23 0424 09/19/23 0324   WBC 9.5 10.0 9.7   HGB 12.5 11.9* 10.3*   HCT 39.3 37.9 34.0*    334 278       Recent Labs     09/16/23 1854 09/17/23 0357 09/18/23 0424 09/19/23  0324    139 137 138   K 3.9 3.7 4.4 3.4*    104 104 107   CO2 23 24 24 23   BUN 9 8 6* 10   CREATININE 0.5 0.5 0.4 0.5   CALCIUM 8.7 9.0 9.5 8.7   PHOS 3.9  --   --   --        Recent Labs     09/16/23 1854   AST 42*   ALT 50   BILITOT 0.5   ALKPHOS 99       No results for input(s): \"INR\" in the last 72 hours. No results for input(s): \"TROPONINT\" in the last 72 hours. No results for input(s): \"PROCAL\" in the last 72 hours. Lab Results   Component Value Date/Time    NITRU NEGATIVE 01/17/2023 09:20 PM    WBCUA 10-15 01/17/2023 09:20 PM    BACTERIA FEW 01/17/2023 09:20 PM    RBCUA 0-2 01/17/2023 09:20 PM    BLOODU NEGATIVE 01/17/2023 09:20 PM    SPECGRAV >1.030 01/17/2023 09:20 PM    GLUCOSEU 100 05/14/2019 06:12 PM    GLUCOSEU NEGATIVE 06/02/2010 07:35 PM         Radiology:   XR CHEST PORTABLE   Final Result      No acute intrathoracic process. **This report has been created using voice recognition software. It may contain minor errors which are inherent in voice recognition technology. **      Final report electronically signed by Dr. Bill Henley on 9/16/2023 7:14 PM      MRI BRAIN [facility]    (Results Pending)          Past Social History  The patient currently lives at home with mother. Tobacco use:   reports that she does not have a smoking history on file. She has never used smokeless tobacco.  Alcohol use:   reports current alcohol use. Drug use:  reports current drug use. Drug: Marijuana Juan Jadrian Sanford).      Medical Hx      Diagnosis Date    Acute on chronic systolic congestive

## 2023-09-19 NOTE — PROCEDURES
EEG REPORT       Patient: Moris Haq Age: 34 y.o. MRN: [id number]      Referring Provider: No ref. provider found    History: This routine 30 minute scalp EEG was recorded with video- monitoring for a 34 y.o. Ralph Johnson female  who presented with encephalopathy. This EEG was performed to evaluate for focal and epileptiform abnormalities.      Moris Haq   Current Facility-Administered Medications   Medication Dose Route Frequency Provider Last Rate Last Admin    0.9 % sodium chloride infusion   IntraVENous Continuous Heladio Petty MD 50 mL/hr at 09/19/23 0609 Rate Verify at 09/19/23 0609    ketorolac (TORADOL) injection 15 mg  15 mg IntraVENous Ayan Sutherland MD   15 mg at 09/18/23 4643    magnesium sulfate 2000 mg in 50 mL IVPB premix  2,000 mg IntraVENous PRN Amadeo Pierre MD 25 mL/hr at 09/19/23 1058 2,000 mg at 09/19/23 1058    potassium chloride (KLOR-CON M) extended release tablet 40 mEq  40 mEq Oral PRN Amadeo Pierre MD   40 mEq at 09/19/23 0437    Or    potassium bicarb-citric acid (EFFER-K) effervescent tablet 40 mEq  40 mEq Oral PRN Amadeo Pierre MD   20 mEq at 09/17/23 1422    Or    potassium chloride 10 mEq/100 mL IVPB (Peripheral Line)  10 mEq IntraVENous PRN Amadeo Pierre MD        potassium chloride (KLOR-CON M) extended release tablet 20 mEq  20 mEq Oral PRN Amadeo Pierre MD        lidocaine 4 % external patch 1 patch  1 patch TransDERmal Daily Tony Norton MD        magnesium oxide (MAG-OX) tablet 400 mg  400 mg Oral Daily Tony Norton MD   400 mg at 09/19/23 2296    gabapentin (NEURONTIN) capsule 600 mg  600 mg Oral TID Tony Norton MD   600 mg at 09/19/23 0370    sodium chloride flush 0.9 % injection 5-40 mL  5-40 mL IntraVENous 2 times per day Heladio Petty MD   10 mL at 09/19/23 0811    sodium chloride flush 0.9 % injection 5-40 mL  5-40 mL IntraVENous PRN Heladio Petty MD        0.9 % sodium chloride infusion   IntraVENous PRN Heladio Petty MD 10 mL/hr

## 2023-09-19 NOTE — PROCEDURES
EEG REPORT       Patient: Austin Rodriguez Age: 34 y.o. MRN: [id number]      Referring Provider: No ref. provider found    History: This LT video scalp EEG  of 16 hours was recorded with video- monitoring for a 34 y.o. Fooracio Myles female  who presented with encephalopathy. This EEG was performed to evaluate for focal and epileptiform abnormalities.      Austin Rodriguez   Current Facility-Administered Medications   Medication Dose Route Frequency Provider Last Rate Last Admin    [START ON 9/20/2023] ibuprofen (ADVIL;MOTRIN) tablet 400 mg  400 mg Oral Q6H PRN Mya Tomas MD        0.9 % sodium chloride infusion   IntraVENous Continuous Mya Tomas MD 50 mL/hr at 09/19/23 0609 Rate Verify at 09/19/23 0609    ketorolac (TORADOL) injection 15 mg  15 mg IntraVENous Q6H Leann Delgado MD   15 mg at 09/18/23 3380    magnesium sulfate 2000 mg in 50 mL IVPB premix  2,000 mg IntraVENous PRN Leann Delgado MD   Stopped at 09/19/23 1258    potassium chloride (KLOR-CON M) extended release tablet 40 mEq  40 mEq Oral PRN Leann Delgado MD   40 mEq at 09/19/23 0437    Or    potassium bicarb-citric acid (EFFER-K) effervescent tablet 40 mEq  40 mEq Oral PRN Leann Delgado MD   20 mEq at 09/17/23 1422    Or    potassium chloride 10 mEq/100 mL IVPB (Peripheral Line)  10 mEq IntraVENous PRN Leann Delgado MD        potassium chloride (KLOR-CON M) extended release tablet 20 mEq  20 mEq Oral PRN Leann Delgado MD        lidocaine 4 % external patch 1 patch  1 patch TransDERmal Daily Chino Matthews MD        magnesium oxide (MAG-OX) tablet 400 mg  400 mg Oral Daily Chino Matthews MD   400 mg at 09/19/23 0296    gabapentin (NEURONTIN) capsule 600 mg  600 mg Oral TID Chino Matthews MD   600 mg at 09/19/23 1411    sodium chloride flush 0.9 % injection 5-40 mL  5-40 mL IntraVENous 2 times per day Mya Tomas MD   10 mL at 09/19/23 0811    sodium chloride flush 0.9 % injection 5-40 mL  5-40 mL IntraVENous PRN Mya Tomas

## 2023-09-20 VITALS
HEART RATE: 94 BPM | WEIGHT: 190.26 LBS | RESPIRATION RATE: 20 BRPM | TEMPERATURE: 97.9 F | SYSTOLIC BLOOD PRESSURE: 104 MMHG | OXYGEN SATURATION: 98 % | DIASTOLIC BLOOD PRESSURE: 78 MMHG | BODY MASS INDEX: 37.35 KG/M2 | HEIGHT: 60 IN

## 2023-09-20 LAB
AMPHETAMINES UR QL SCN: NEGATIVE
ANION GAP SERPL CALC-SCNC: 10 MEQ/L (ref 8–16)
BARBITURATES UR QL SCN: NEGATIVE
BENZODIAZ UR QL SCN: NEGATIVE
BUN SERPL-MCNC: 6 MG/DL (ref 7–22)
BZE UR QL SCN: NEGATIVE
CALCIUM SERPL-MCNC: 9.5 MG/DL (ref 8.5–10.5)
CANNABINOIDS UR QL SCN: POSITIVE
CHLORIDE SERPL-SCNC: 104 MEQ/L (ref 98–111)
CO2 SERPL-SCNC: 26 MEQ/L (ref 23–33)
CREAT SERPL-MCNC: 0.5 MG/DL (ref 0.4–1.2)
EKG ATRIAL RATE: 107 BPM
EKG ATRIAL RATE: 69 BPM
EKG ATRIAL RATE: 70 BPM
EKG ATRIAL RATE: 89 BPM
EKG P AXIS: 22 DEGREES
EKG P AXIS: 32 DEGREES
EKG P AXIS: 35 DEGREES
EKG P AXIS: 52 DEGREES
EKG P-R INTERVAL: 156 MS
EKG P-R INTERVAL: 178 MS
EKG P-R INTERVAL: 178 MS
EKG P-R INTERVAL: 184 MS
EKG Q-T INTERVAL: 352 MS
EKG Q-T INTERVAL: 356 MS
EKG Q-T INTERVAL: 398 MS
EKG Q-T INTERVAL: 400 MS
EKG QRS DURATION: 78 MS
EKG QRS DURATION: 82 MS
EKG QRS DURATION: 84 MS
EKG QRS DURATION: 86 MS
EKG QTC CALCULATION (BAZETT): 426 MS
EKG QTC CALCULATION (BAZETT): 432 MS
EKG QTC CALCULATION (BAZETT): 433 MS
EKG QTC CALCULATION (BAZETT): 469 MS
EKG R AXIS: -5 DEGREES
EKG R AXIS: 1 DEGREES
EKG R AXIS: 18 DEGREES
EKG R AXIS: 33 DEGREES
EKG T AXIS: 10 DEGREES
EKG T AXIS: 24 DEGREES
EKG T AXIS: 38 DEGREES
EKG T AXIS: 42 DEGREES
EKG VENTRICULAR RATE: 107 BPM
EKG VENTRICULAR RATE: 69 BPM
EKG VENTRICULAR RATE: 70 BPM
EKG VENTRICULAR RATE: 89 BPM
FENTANYL: NEGATIVE
GFR SERPL CREATININE-BSD FRML MDRD: > 60 ML/MIN/1.73M2
GLUCOSE SERPL-MCNC: 131 MG/DL (ref 70–108)
MAGNESIUM SERPL-MCNC: 1.7 MG/DL (ref 1.6–2.4)
OPIATES UR QL SCN: POSITIVE
OXYCODONE: NEGATIVE
PCP UR QL SCN: NEGATIVE
POTASSIUM SERPL-SCNC: 4.2 MEQ/L (ref 3.5–5.2)
SODIUM SERPL-SCNC: 140 MEQ/L (ref 135–145)

## 2023-09-20 PROCEDURE — 83735 ASSAY OF MAGNESIUM: CPT

## 2023-09-20 PROCEDURE — 95714 VEEG EA 12-26 HR UNMNTR: CPT

## 2023-09-20 PROCEDURE — 6370000000 HC RX 637 (ALT 250 FOR IP)

## 2023-09-20 PROCEDURE — 99239 HOSP IP/OBS DSCHRG MGMT >30: CPT | Performed by: INTERNAL MEDICINE

## 2023-09-20 PROCEDURE — 80307 DRUG TEST PRSMV CHEM ANLYZR: CPT

## 2023-09-20 PROCEDURE — 6360000002 HC RX W HCPCS

## 2023-09-20 PROCEDURE — 6360000002 HC RX W HCPCS: Performed by: INTERNAL MEDICINE

## 2023-09-20 PROCEDURE — 36415 COLL VENOUS BLD VENIPUNCTURE: CPT

## 2023-09-20 PROCEDURE — 80048 BASIC METABOLIC PNL TOTAL CA: CPT

## 2023-09-20 PROCEDURE — 95720 EEG PHY/QHP EA INCR W/VEEG: CPT | Performed by: PSYCHIATRY & NEUROLOGY

## 2023-09-20 PROCEDURE — 6370000000 HC RX 637 (ALT 250 FOR IP): Performed by: INTERNAL MEDICINE

## 2023-09-20 RX ORDER — MAGNESIUM SULFATE IN WATER 40 MG/ML
4000 INJECTION, SOLUTION INTRAVENOUS ONCE
Status: COMPLETED | OUTPATIENT
Start: 2023-09-20 | End: 2023-09-20

## 2023-09-20 RX ORDER — OLANZAPINE 15 MG/1
15 TABLET ORAL NIGHTLY
COMMUNITY

## 2023-09-20 RX ADMIN — MAGNESIUM SULFATE HEPTAHYDRATE 4000 MG: 40 INJECTION, SOLUTION INTRAVENOUS at 10:30

## 2023-09-20 RX ADMIN — ENOXAPARIN SODIUM 40 MG: 100 INJECTION SUBCUTANEOUS at 08:35

## 2023-09-20 RX ADMIN — Medication 400 MG: at 08:35

## 2023-09-20 RX ADMIN — METOPROLOL SUCCINATE 50 MG: 50 TABLET, EXTENDED RELEASE ORAL at 08:35

## 2023-09-20 RX ADMIN — HYDROCODONE BITARTRATE AND ACETAMINOPHEN 1 TABLET: 5; 325 TABLET ORAL at 02:33

## 2023-09-20 RX ADMIN — OLANZAPINE 15 MG: 5 TABLET, FILM COATED ORAL at 08:35

## 2023-09-20 RX ADMIN — GABAPENTIN 600 MG: 300 CAPSULE ORAL at 08:35

## 2023-09-20 RX ADMIN — GABAPENTIN 600 MG: 300 CAPSULE ORAL at 13:16

## 2023-09-20 RX ADMIN — CLONAZEPAM 0.5 MG: 0.5 TABLET ORAL at 11:29

## 2023-09-20 RX ADMIN — ATORVASTATIN CALCIUM 20 MG: 20 TABLET, FILM COATED ORAL at 08:35

## 2023-09-20 ASSESSMENT — PAIN - FUNCTIONAL ASSESSMENT
PAIN_FUNCTIONAL_ASSESSMENT: ACTIVITIES ARE NOT PREVENTED
PAIN_FUNCTIONAL_ASSESSMENT: ACTIVITIES ARE NOT PREVENTED

## 2023-09-20 ASSESSMENT — PAIN SCALES - GENERAL
PAINLEVEL_OUTOF10: 7
PAINLEVEL_OUTOF10: 4
PAINLEVEL_OUTOF10: 7

## 2023-09-20 ASSESSMENT — PAIN DESCRIPTION - ONSET
ONSET: ON-GOING

## 2023-09-20 ASSESSMENT — PAIN DESCRIPTION - FREQUENCY
FREQUENCY: CONTINUOUS

## 2023-09-20 ASSESSMENT — PAIN DESCRIPTION - ORIENTATION
ORIENTATION: LEFT

## 2023-09-20 ASSESSMENT — PAIN DESCRIPTION - DESCRIPTORS
DESCRIPTORS: ACHING;DISCOMFORT

## 2023-09-20 ASSESSMENT — PAIN DESCRIPTION - PAIN TYPE
TYPE: CHRONIC PAIN

## 2023-09-20 ASSESSMENT — PAIN DESCRIPTION - LOCATION
LOCATION: LEG

## 2023-09-20 NOTE — PLAN OF CARE
Problem: Discharge Planning  Goal: Discharge to home or other facility with appropriate resources  9/19/2023 2252 by Radha Rajan RN  Outcome: Progressing  9/19/2023 1850 by Joselo Bradley RN  Outcome: Progressing  Flowsheets (Taken 9/19/2023 0800 by Daija George RN)  Discharge to home or other facility with appropriate resources: Identify barriers to discharge with patient and caregiver  Note: To be discharge home with Mom after successful treatment. Problem: Pain  Goal: Verbalizes/displays adequate comfort level or baseline comfort level  9/19/2023 2252 by Radha Rajan RN  Outcome: Progressing  9/19/2023 1850 by Joselo Bradley RN  Outcome: Progressing  Flowsheets (Taken 9/19/2023 1850)  Verbalizes/displays adequate comfort level or baseline comfort level:   Encourage patient to monitor pain and request assistance   Assess pain using appropriate pain scale   Administer analgesics based on type and severity of pain and evaluate response  Note: Assess patients pain and given analgesics according to level of pain. Non therapeutic intervention provided. Problem: Safety - Adult  Goal: Free from fall injury  9/19/2023 2252 by Radha Rajan RN  Outcome: Progressing  9/19/2023 1850 by Joselo Bradley RN  Outcome: Progressing  Flowsheets (Taken 9/19/2023 1850)  Free From Fall Injury: Based on caregiver fall risk screen, instruct family/caregiver to ask for assistance with transferring infant if caregiver noted to have fall risk factors  Note: Standard measures implemented to prevent fall. Kept bed in low position, call button and bedside table kept within patient's reach.      Problem: Chronic Conditions and Co-morbidities  Goal: Patient's chronic conditions and co-morbidity symptoms are monitored and maintained or improved  9/19/2023 2252 by Radha Rajan RN  Outcome: Progressing  9/19/2023 1850 by Joselo Bradley RN  Outcome: Progressing  Flowsheets (Taken 9/19/2023 0800 by Daija George

## 2023-09-20 NOTE — DISCHARGE SUMMARY
Nephrology  [] Hemo onco   [] GI   [] ID  [] Endocrine  [] Pulm    [x] Neuro    [x] Psych   [] Urology  [] ENT   [] G SURGERY   []Ortho    []CV surg    [] Palliative  [] Hospice [] Pain management   []    []TCU   [] PT/OT  OTHERS:-    Disposition: home  Condition at Discharge: Stable    Time Spent:- 50 minutes    Electronically signed by Flores Chaudhary DO on 9/20/23 at 1:14 PM EDT   Discharging Hospitalist

## 2023-09-20 NOTE — CARE COORDINATION
9/19/23, 2:11 PM EDT    DISCHARGE ON GOING EVALUATION    [facility] day: 3  Location: [facility]/036-A Reason for admit: Ventricular tachycardia ([address]) [I47.20]  Syncope, cardiogenic [R55]   Procedure:   9/18 ECHO: EF 40%. 9/18 Stress test: EF 33%. There was a small sized, moderate in intensity, fixed myocardial perfusion defect of the ismael-apical wall. This study was negative for ischemia. 9/19 EEG: Normal.   9/19 MRI brain: ordered. 9/19 Tilt table: ordered. Barriers to Discharge: Hospitalist and Cardiology following. [address] Neurology and Psychology. Afebrile. Room air. IVF. Lovenox. Toradol iv q6hr. Electrolyte replacements. PCP: Charlott Lanes, APRN - CNP  Readmission Risk Score: 10.1%  Patient Goals/Plan/Treatment Preferences: Plans home with her mother. Monitor for needs.
9/20/23, 1:19 PM EDT    Patient goals/plan/ treatment preferences discussed by  and . Patient goals/plan/ treatment preferences reviewed with patient/ family. Patient/ family verbalize understanding of discharge plan and are in agreement with goal/plan/treatment preferences. Understanding was demonstrated using the teach back method. AVS provided by RN at time of discharge, which includes all necessary medical information pertaining to the patients current course of illness, treatment, post-discharge goals of care, and treatment preferences. Services At/After Discharge: None             Planning home with mother. No needs.     Electronically signed by Raphael Emerson RN on 9/20/2023 at 1:20 PM
N/A            Potential DME:    Patient expects to discharge to: Trailer/mobile home  Plan for transportation at discharge: Family (Grandmother)    Financial    Payor: Óscar White / Plan: Kim Espana / Product Type: *No Product type* /     Does insurance require precert for SNF: Yes    Potential assistance Purchasing Medications: No  Meds-to-Beds request: Yes      [address] [place] [address] [id number] [address]  Phone: [phone] Fax: [phone]      Notes:    Factors facilitating achievement of predicted outcomes: Family support, Cooperative, Pleasant, and Has needed Durable Medical Equipment at home    Barriers to discharge: Cardiac work up    Additional Case Management Notes: Admitted through ED s/p syncopal episode. History of ICD secondary to Vfib. Troponins negative. IVF. Lovenox. Toradol iv q6hr. Lidocaine patch. Consulting Cardiology. Procedure:   9/18 ECHO: ordered. 9/18 Stress test: ordered. The Plan for Transition of Care is related to the following treatment goals of Ventricular tachycardia ([address]) [I47.20]  Syncope, cardiogenic [R55]    Patient Goals/Plan/Treatment Preferences: Spoke with pt, she lives at home with her mother and plans to return there. Denies needs at discharge. Pt states that her PCP is no longer Dr. Gerhardt Copier, [facility] Added to Care Team.   Transportation/Food Security/Housekeeping Addressed: No issues identified.      Marquis Goodpasture, RN  Case Management Department

## 2023-09-20 NOTE — CONSULTS
[place] [address]                                  CONSULTATION    PATIENT NAME: Anatoliy Green                     :        1985  MED REC NO:   [id number]                           ROOM:       0036  ACCOUNT NO:   [de-identified]                           ADMIT DATE: 2023  PROVIDER:     Marichuy Clifford. SALINA Herrmann Bounds:  2023    REASON FOR CONSULTATION:  Chest pain, syncope. PRIMARY CARDIOLOGIST:  Dr. Penny Love. HISTORY OF PRESENT ILLNESS:  This is a 40-year-old  female  patient was in usual state of health until 5:30 p.m. yesterday when she  started to have a sudden onset of  epigastric pain followed by chest pain and feeling a little nauseous and  subsequently, the patient felt dizzy, lightheaded and informed her  mother that she is about to pass out and then basically apparently lost  consciousness. The patient stated that she regained her consciousness  in the squad. Basically, history obtained from the review of the record  and discussion with the patient as well as with her mother over the  phone. Basically, the patient passed out. When the patient was not responsive,  she was sitting on the couch and she leaned backwards and there was no  response. She called 911, squad came there within four minutes and came  to the emergency room, evaluated. First note in the emergency room was  documented at 6:27 p.m., so basically, per the information from the  mother, the patient did not take more than 15 or 20 minutes to get to  the hospital.  On waking up, the patient started to complain chest pain. The chest pain was severe 7/10, nonradiating, no associated symptoms,  cannot stand, _____ and still having chest pain, worse with deep  breathing, worse with change of position and worse with palpation.     It has been informed also by her mother, that the patient has some jerky  movements on her
Consult complete. Please see noted documented by resident physician and attested by me.
Department of Psychiatry   Psychiatric Assessment      Thank you very much for allowing us to participate in the care of this patient. Reason for Consult:  Possible PNES    HISTORY OF PRESENT ILLNESS:          The patient is a 34 y.o. female with significant history of Vfib s/p ICD placement, HFrEF, who is admitted medically for evaluation of a syncopal episode. Psychiatry was consulted for evaluation of possible PNES. Patient reports that she was watching television with her mother when all of a sudden she had \"acid-reflux-like symptoms\", felt dizzy, and lost consciousness for a few minutes. Per chart review, mother reported that patient's eyes rolled up and back, she noticed some shakiness in arms and legs. Denies incontinence, tongue biting during the episode. Pt herself has little memory of her symptoms. Reports similar episode of seizure when she was a teenager. Reports severe stressors recently that could be contributing to symptoms. Patient's mother recently suffered a stroke in December 2022, which also affected them negatively financially. Pt moved to [facility] from [facility] 2 years ago -- still adjusting to the environment and managing her illnesses. Also dealing with her CA diagnosis. Denies any suicidal or homicidal ideation at this time. Reports feeling safe to report any symptoms or SI to staff. Denies any visual or auditory hallucinations at this time. PSYCHIATRIC HISTORY:      Outpatient psychiatric provider:  Dr. Penny Toledo (PCP)   Suicide attempts: reports multiple in the past (none since 2011)  Inpatient psychiatric admissions: reports multiple in the past (none since 2011)     Past psychiatric medications includes:   Currently on Zyprexa and Klonopin. Reports stable.     Adverse reactions from psychotropic medications:    --    Lifetime Psychiatric Review of Systems         Obsessions and Compulsions: Denies       Anisha or Hypomania: Denies     Hallucinations:
Syncope around 5:30 pm and after- due to ? ?   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
Exam   Muscle bulk: normal  Overall muscle tone: normal  Right arm tone: normal  Left arm tone: normal  Right arm pronator drift: absent  Left arm pronator drift: absent  Right leg tone: normal  Left leg tone: decreased  Notable muscle atrophy noted to LLE  BUE: 5/5  RLE: 5/5  Weaker in LLE comparatively: 4/5     Sensory Exam   Light touch normal.     Gait, Coordination, and Reflexes     Coordination   Finger to nose coordination: normal  Heel to shin coordination: normal       Labs/Imaging/Diagnostics   Labs:  CBC:  Recent Labs     09/17/23 0357 09/18/23 0424 09/19/23  0324   WBC 9.5 10.0 9.7   RBC 4.27 4.13* 3.58*   HGB 12.5 11.9* 10.3*   HCT 39.3 37.9 34.0*   MCV 92.0 91.8 95.0    334 278     CHEMISTRIES:  Recent Labs     09/16/23 1854 09/17/23 0357 09/18/23 0424 09/19/23  0324    139 137 138   K 3.9 3.7 4.4 3.4*    104 104 107   CO2 23 24 24 23   BUN 9 8 6* 10   CREATININE 0.5 0.5 0.4 0.5   GLUCOSE 100 108 128* 94   PHOS 3.9  --   --   --    MG 1.3* 2.8* 2.0 1.6     COAGULATION STUDIES:No results for input(s): \"PROTIME\", \"INR\", \"APTT\" in the last 72 hours. LIVER PROFILE:  Recent Labs     09/16/23 1854   AST 42*   ALT 50   BILITOT 0.5   ALKPHOS 99     CHOLESTEROL AND A1C:No results for input(s): \"LDLCALC\", \"HDL\", \"CHOL\", \"TRIG\", \"LABA1C\" in the last 720 hours. Imaging Last 24 Hours:  No results found. 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 Patient with an ICD. Recommend determining if it is MRI compatible or not. 30 minute EEG was normal in wakefulness and sleep. Recommend keeping patient hooked up and attempting to catch an event to fully characterize these events. Ultimately patient will benefit from an EMU stay. Recommend adequate hydration via IVF per primary team.  Neurology following. Please reach out with any further questions or concerns.      This case was discussed with Dr. Juanito Blancas and he is in agreement with the

## 2023-09-29 ENCOUNTER — TELEPHONE (OUTPATIENT)
Dept: CARDIOLOGY CLINIC | Age: 38
End: 2023-09-29

## 2023-09-29 NOTE — TELEPHONE ENCOUNTER
Called pt to confirm appt for 10/02/23. Pt states they have another appt that same day with the pain doctor. Pt wanted this appt cancelled, and would like a call back to get this rescheduled.

## 2023-10-09 ENCOUNTER — TELEPHONE (OUTPATIENT)
Dept: CARDIOLOGY CLINIC | Age: 38
End: 2023-10-09

## 2023-10-09 NOTE — TELEPHONE ENCOUNTER
Patient had blood work overdue from hospitalization. Called x2 to remind patient. Said she will try to go, not sure she can make it.

## 2023-10-12 ENCOUNTER — TELEPHONE (OUTPATIENT)
Dept: CARDIOLOGY CLINIC | Age: 38
End: 2023-10-12

## 2023-10-12 NOTE — TELEPHONE ENCOUNTER
Rubi called to RS her appt with Blake and her pacer check for 10-17 her mother is having surgery this day. She was coming in to see Blake for hosp f/u/mercy/DC 09-20, syncope cardiogenic, has stress test done, I didn't feel comfortable RS scheduling for Melhem's first available which is 11-14. Please call Rubi to RS her Melhem and pacer appts.

## 2023-10-24 ENCOUNTER — TELEPHONE (OUTPATIENT)
Dept: FAMILY MEDICINE CLINIC | Age: 38
End: 2023-10-24

## 2023-10-24 NOTE — TELEPHONE ENCOUNTER
Requesting to establish care. Family members are [employer] and [place]. Asking if she can become a patient. Requesting Dr Praneeth Armendariz.      [phone]  CPB

## 2023-12-06 ENCOUNTER — TELEPHONE (OUTPATIENT)
Dept: CARDIOLOGY CLINIC | Age: 38
End: 2023-12-06

## 2024-01-03 ENCOUNTER — TELEPHONE (OUTPATIENT)
Dept: FAMILY MEDICINE CLINIC | Age: 39
End: 2024-01-03

## 2024-01-03 ENCOUNTER — OFFICE VISIT (OUTPATIENT)
Dept: FAMILY MEDICINE CLINIC | Age: 39
End: 2024-01-03
Payer: MEDICAID

## 2024-01-03 ENCOUNTER — TELEPHONE (OUTPATIENT)
Dept: ADMINISTRATIVE | Age: 39
End: 2024-01-03

## 2024-01-03 VITALS
HEART RATE: 90 BPM | BODY MASS INDEX: 32.35 KG/M2 | DIASTOLIC BLOOD PRESSURE: 64 MMHG | OXYGEN SATURATION: 98 % | HEIGHT: 60 IN | WEIGHT: 164.8 LBS | SYSTOLIC BLOOD PRESSURE: 136 MMHG

## 2024-01-03 DIAGNOSIS — M79.672 LEFT FOOT PAIN: ICD-10-CM

## 2024-01-03 DIAGNOSIS — F41.1 GENERALIZED ANXIETY DISORDER: ICD-10-CM

## 2024-01-03 DIAGNOSIS — I42.0 DILATED CARDIOMYOPATHY (HCC): ICD-10-CM

## 2024-01-03 DIAGNOSIS — I50.22 CHRONIC SYSTOLIC CONGESTIVE HEART FAILURE (HCC): ICD-10-CM

## 2024-01-03 DIAGNOSIS — G89.3 CHRONIC PAIN AFTER CANCER TREATMENT: ICD-10-CM

## 2024-01-03 DIAGNOSIS — F39 MOOD DISORDER (HCC): Primary | ICD-10-CM

## 2024-01-03 DIAGNOSIS — F41.0 PANIC ATTACKS: ICD-10-CM

## 2024-01-03 DIAGNOSIS — I47.20 VENTRICULAR TACHYCARDIA (HCC): ICD-10-CM

## 2024-01-03 PROCEDURE — 1036F TOBACCO NON-USER: CPT | Performed by: STUDENT IN AN ORGANIZED HEALTH CARE EDUCATION/TRAINING PROGRAM

## 2024-01-03 PROCEDURE — G8417 CALC BMI ABV UP PARAM F/U: HCPCS | Performed by: STUDENT IN AN ORGANIZED HEALTH CARE EDUCATION/TRAINING PROGRAM

## 2024-01-03 PROCEDURE — G8484 FLU IMMUNIZE NO ADMIN: HCPCS | Performed by: STUDENT IN AN ORGANIZED HEALTH CARE EDUCATION/TRAINING PROGRAM

## 2024-01-03 PROCEDURE — 99204 OFFICE O/P NEW MOD 45 MIN: CPT | Performed by: STUDENT IN AN ORGANIZED HEALTH CARE EDUCATION/TRAINING PROGRAM

## 2024-01-03 PROCEDURE — G8427 DOCREV CUR MEDS BY ELIG CLIN: HCPCS | Performed by: STUDENT IN AN ORGANIZED HEALTH CARE EDUCATION/TRAINING PROGRAM

## 2024-01-03 RX ORDER — GABAPENTIN 600 MG/1
600 TABLET ORAL 3 TIMES DAILY
Qty: 270 TABLET | Refills: 1 | Status: SHIPPED | OUTPATIENT
Start: 2024-01-03 | End: 2024-07-01

## 2024-01-03 RX ORDER — HYDROCODONE BITARTRATE AND ACETAMINOPHEN 5; 325 MG/1; MG/1
1 TABLET ORAL EVERY 8 HOURS PRN
Qty: 90 TABLET | Refills: 0 | Status: SHIPPED | OUTPATIENT
Start: 2024-01-03 | End: 2024-02-02

## 2024-01-03 RX ORDER — CLONAZEPAM 0.5 MG/1
0.5 TABLET ORAL 2 TIMES DAILY PRN
Qty: 60 TABLET | Refills: 2 | Status: SHIPPED | OUTPATIENT
Start: 2024-01-03 | End: 2024-04-02

## 2024-01-03 ASSESSMENT — PATIENT HEALTH QUESTIONNAIRE - PHQ9
SUM OF ALL RESPONSES TO PHQ QUESTIONS 1-9: 2
SUM OF ALL RESPONSES TO PHQ QUESTIONS 1-9: 2
1. LITTLE INTEREST OR PLEASURE IN DOING THINGS: 1
SUM OF ALL RESPONSES TO PHQ QUESTIONS 1-9: 2
SUM OF ALL RESPONSES TO PHQ9 QUESTIONS 1 & 2: 2
2. FEELING DOWN, DEPRESSED OR HOPELESS: 1
SUM OF ALL RESPONSES TO PHQ QUESTIONS 1-9: 2

## 2024-01-03 NOTE — TELEPHONE ENCOUNTER
Pt called stating the pharmacy told her these medications need to have a PA done on them. Meds were sent to pharmacy today. Pt states that she really needs these ASAP.    Hydrocodone-acetaminophen 5-325 mg  Gabapentin 600 mg

## 2024-01-03 NOTE — PROGRESS NOTES
[facility]  Establish care visit   1/3/2024    Rubi Robb (:  1985) is a 38 y.o. female, here to establish care.    Chief Complaint   Patient presents with    Establish Care    Discuss Medications     Anxiety meds and pain meds        ASSESSMENT/ PLAN  1. Mood disorder (HCC)   2. Generalized anxiety disorder  3. Panic attacks  - clonazePAM (KLONOPIN) 0.5 MG tablet; Take 1 tablet by mouth 2 times daily as needed for Anxiety for up to 90 days. Max Daily Amount: 1 mg  Dispense: 60 tablet; Refill: 2  - gabapentin (NEURONTIN) 600 MG tablet; Take 1 tablet by mouth 3 times daily for 180 days.  Dispense: 270 tablet; Refill: 1  Chronic.  Stable.  On clonazepam and gabapentin.  Needs a refill.  Will provide at this time.  Will also have patient follow-up with psychiatrist office.    4. Dilated cardiomyopathy (HCC)  5. Chronic systolic congestive heart failure (HCC)  6. Ventricular tachycardia (HCC)  Chronic.  Stable.  On ACE inhibitor, beta-blocker, diuretic.  Was previously following with cardiology.  Needs a referral at this time.  - Charles Huang MD, Cardiology, [facility]    7. 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  Would like opinion on potential interventions for her foot.  - Emma Brown MD, Orthopedic Surgery (Foot, Ankle), [facility]    8. Chronic pain after cancer treatment  Chronic.  Related to significant orthopedic issues in her left foot.  Have referred to an orthopedist.  Will refill pain medication.  May consider pain management referral for potential procedural intervention  - HYDROcodone-acetaminophen (NORCO) 5-325 MG per tablet; Take 1 tablet by mouth every 8 hours as needed for Pain for up to 30 days. Max Daily Amount: 3 tablets  Dispense: 90 tablet; Refill: 0       No follow-ups on file.    HPI  Patient is a 38-year-old female with a complex past medical history,

## 2024-01-03 NOTE — TELEPHONE ENCOUNTER
Submitted PA for Gabapentin 600MG tablets  Via CM Key: [id number] STATUS: PENDING.    Follow up done daily; if no decision with in three days we will refax.  If another three days goes by with no decision will call the insurance for status.

## 2024-01-03 NOTE — TELEPHONE ENCOUNTER
Submitted PA for HYDROcodone-Acetaminophen 5-325MG tablets  Via CM Key: [id number] STATUS: PENDING.    Follow up done daily; if no decision with in three days we will refax.  If another three days goes by with no decision will call the insurance for status.

## 2024-02-01 DIAGNOSIS — I50.22 CHRONIC SYSTOLIC CONGESTIVE HEART FAILURE (HCC): ICD-10-CM

## 2024-02-01 DIAGNOSIS — I42.0 DILATED CARDIOMYOPATHY (HCC): ICD-10-CM

## 2024-02-01 RX ORDER — METOPROLOL SUCCINATE 50 MG/1
TABLET, EXTENDED RELEASE ORAL
Qty: 60 TABLET | Refills: 3 | Status: SHIPPED | OUTPATIENT
Start: 2024-02-01

## 2024-02-01 RX ORDER — OLANZAPINE 15 MG/1
15 TABLET ORAL NIGHTLY
Qty: 30 TABLET | Refills: 0 | Status: SHIPPED | OUTPATIENT
Start: 2024-02-01

## 2024-02-01 RX ORDER — ATORVASTATIN CALCIUM 20 MG/1
TABLET, FILM COATED ORAL
Qty: 30 TABLET | Refills: 5 | Status: SHIPPED | OUTPATIENT
Start: 2024-02-01

## 2024-02-01 RX ORDER — SPIRONOLACTONE 25 MG/1
TABLET ORAL
Qty: 90 TABLET | Refills: 1 | Status: SHIPPED | OUTPATIENT
Start: 2024-02-01

## 2024-02-01 RX ORDER — OLANZAPINE 7.5 MG/1
7.5 TABLET, FILM COATED ORAL EVERY MORNING
Qty: 30 TABLET | Refills: 0 | Status: SHIPPED | OUTPATIENT
Start: 2024-02-01

## 2024-02-01 RX ORDER — LISINOPRIL 2.5 MG/1
TABLET ORAL
Qty: 90 TABLET | Refills: 2 | Status: SHIPPED | OUTPATIENT
Start: 2024-02-01

## 2024-02-01 NOTE — TELEPHONE ENCOUNTER
Refill Request     Last Seen: 1/3/2024        Next Appointment:   Future Appointments   Date Time Provider Department Center   2/6/2024  8:30 AM Emma Vieira MD AND ORTHO [facility]   2/6/2024 10:00 AM Mamie Vail, PhD  FM PSYCHG [facility]   2/13/2024  2:30 PM Charles Dorsey MD Patrick Car [facility]   2/20/2024  2:00 PM Damaso Barnard MD F [facility]             Requested Prescriptions     Pending Prescriptions Disp Refills    atorvastatin (LIPITOR) 20 MG tablet 30 tablet 5     Sig: take 1 tablet by mouth once daily    lisinopril (PRINIVIL;ZESTRIL) 2.5 MG tablet 90 tablet 2     Sig: TAKE ONE TABLET BY MOUTH DAILY    metoprolol succinate (TOPROL XL) 50 MG extended release tablet 60 tablet 3     Sig: take 1 tablet by mouth twice a day    spironolactone (ALDACTONE) 25 MG tablet 90 tablet 1     Sig: TAKE ONE TABLET BY MOUTH DAILY    OLANZapine (ZYPREXA) 15 MG tablet 30 tablet 0     Sig: Take 1 tablet by mouth nightly    OLANZapine (ZYPREXA) 7.5 MG tablet 30 tablet 0     Sig: Take 1 tablet by mouth every morning

## 2024-02-02 ENCOUNTER — TELEPHONE (OUTPATIENT)
Dept: FAMILY MEDICINE CLINIC | Age: 39
End: 2024-02-02

## 2024-02-02 NOTE — TELEPHONE ENCOUNTER
Herlinda from the pharmacy [facility] in [place] called to ask if this pt is having her blood drawn on a regular basis due to the meds she is taking.    Diane Sanz  [phone]

## 2024-02-02 NOTE — TELEPHONE ENCOUNTER
Spoke to pharmacy. They want to make sure you doing labs to measure sodium and potassium levels due to spironolactone (ALDACTONE) 25 MG tablet and     lisinopril (PRINIVIL;ZESTRIL) 2.5 MG tablet   Can cause levels to spike.

## 2024-02-02 NOTE — TELEPHONE ENCOUNTER
FYI only, thank you for this information, patient does have an upcoming appointment later this month and we can discuss at that appointment.

## 2024-02-20 ENCOUNTER — OFFICE VISIT (OUTPATIENT)
Dept: FAMILY MEDICINE CLINIC | Age: 39
End: 2024-02-20
Payer: MEDICAID

## 2024-02-20 VITALS
HEIGHT: 60 IN | OXYGEN SATURATION: 98 % | SYSTOLIC BLOOD PRESSURE: 110 MMHG | DIASTOLIC BLOOD PRESSURE: 60 MMHG | BODY MASS INDEX: 31.22 KG/M2 | WEIGHT: 159 LBS | HEART RATE: 109 BPM

## 2024-02-20 DIAGNOSIS — F31.9 BIPOLAR AFFECTIVE DISORDER, REMISSION STATUS UNSPECIFIED (HCC): ICD-10-CM

## 2024-02-20 DIAGNOSIS — G89.3 CHRONIC PAIN AFTER CANCER TREATMENT: Primary | ICD-10-CM

## 2024-02-20 PROBLEM — R56.9 SEIZURE-LIKE ACTIVITY (HCC): Status: RESOLVED | Noted: 2023-09-19 | Resolved: 2024-02-20

## 2024-02-20 PROCEDURE — G8417 CALC BMI ABV UP PARAM F/U: HCPCS | Performed by: STUDENT IN AN ORGANIZED HEALTH CARE EDUCATION/TRAINING PROGRAM

## 2024-02-20 PROCEDURE — G8427 DOCREV CUR MEDS BY ELIG CLIN: HCPCS | Performed by: STUDENT IN AN ORGANIZED HEALTH CARE EDUCATION/TRAINING PROGRAM

## 2024-02-20 PROCEDURE — G8484 FLU IMMUNIZE NO ADMIN: HCPCS | Performed by: STUDENT IN AN ORGANIZED HEALTH CARE EDUCATION/TRAINING PROGRAM

## 2024-02-20 PROCEDURE — 1036F TOBACCO NON-USER: CPT | Performed by: STUDENT IN AN ORGANIZED HEALTH CARE EDUCATION/TRAINING PROGRAM

## 2024-02-20 PROCEDURE — 99214 OFFICE O/P EST MOD 30 MIN: CPT | Performed by: STUDENT IN AN ORGANIZED HEALTH CARE EDUCATION/TRAINING PROGRAM

## 2024-02-20 RX ORDER — HYDROCODONE BITARTRATE AND ACETAMINOPHEN 5; 325 MG/1; MG/1
1 TABLET ORAL EVERY 8 HOURS PRN
Qty: 90 TABLET | Refills: 0 | Status: SHIPPED | OUTPATIENT
Start: 2024-02-20 | End: 2024-03-21

## 2024-02-20 NOTE — PROGRESS NOTES
Rubi Robb (:  1985) is a 38 y.o. female,Established patient, here for evaluation of the following chief complaint(s):  Follow-up and Medication Refill (norco)         ASSESSMENT/PLAN:  1. Chronic pain after cancer treatment  -     HYDROcodone-acetaminophen (LORCET) 5-325 MG per tablet; Take 1 tablet by mouth every 8 hours as needed for Pain for up to 30 days. Intended supply: 3 days. Take lowest dose possible to manage pain Max Daily Amount: 3 tablets, Disp-90 tablet, R-0Normal  Chronic.  Stable.  On [place].  Needs refill.  Will provide at this time.    2. Bipolar affective disorder, remission status unspecified (HCC)  Also with PTSD, insomnia, anxiety.  Sees a therapist.  Is on clonazepam and Zyprexa.  Continue these medications at this time.  Has been referred to our psychiatrist in office.    No follow-ups on file.         Subjective   SUBJECTIVE/OBJECTIVE:  HPI  Patient is a 38-year-old female, who presents to clinic for interval follow-up for chronic pain after cancer treatment.  Patient continues to have left foot pain with likely extensive arthritis in the foot.  Has had pain management provider with previous injections of steroids in the past which have remained unhelpful.  Patient is doing well on Norco.  She is not requiring any more or less at this medication she would like a refill at this time.     Patient with bipolar, PTSD, insomnia, anxiety history.  Has been seeing therapist.  Is on clonazepam and Zyprexa.  Had been referred to psychiatrist through our office, but has yet to attend.    Review of Systems   All other systems reviewed and are negative.         Objective     Vitals:    24 1404   BP: 110/60   Pulse: (!) 109   SpO2: 98%   Weight: 72.1 kg (159 lb)   Height: 1.524 m (5')       Physical Exam  Vitals reviewed.   Constitutional:       General: She is not in acute distress.     Appearance: Normal appearance. She is not ill-appearing, toxic-appearing or diaphoretic.   HENT:

## 2024-03-11 ENCOUNTER — TELEPHONE (OUTPATIENT)
Dept: FAMILY MEDICINE CLINIC | Age: 39
End: 2024-03-11

## 2024-03-11 NOTE — TELEPHONE ENCOUNTER
----- Message from Nataliya Ontiveros sent at 3/11/2024 10:29 AM EDT -----  Subject: Referral Request    Reason for referral request? Pt is asking to be referred to a   psychologist. Please advise  Provider patient wants to be referred to(if known):     Provider Phone Number(if known):    Additional Information for Provider?   ---------------------------------------------------------------------------  --------------  CALL BACK INFO    [phone]; OK to leave message on voicemail  ---------------------------------------------------------------------------  --------------

## 2024-03-12 NOTE — TELEPHONE ENCOUNTER
I have referred the patient to our psychiatrist in the office in the past, so she can absolutely be scheduled with Dr. Nichols.

## 2024-03-18 ENCOUNTER — OFFICE VISIT (OUTPATIENT)
Dept: PSYCHIATRY | Age: 39
End: 2024-03-18
Payer: MEDICAID

## 2024-03-18 VITALS
DIASTOLIC BLOOD PRESSURE: 68 MMHG | HEART RATE: 84 BPM | WEIGHT: 159 LBS | HEIGHT: 60 IN | SYSTOLIC BLOOD PRESSURE: 108 MMHG | BODY MASS INDEX: 31.22 KG/M2

## 2024-03-18 DIAGNOSIS — F29 PSYCHOSIS, UNSPECIFIED PSYCHOSIS TYPE (HCC): ICD-10-CM

## 2024-03-18 DIAGNOSIS — F43.10 COMPLEX POSTTRAUMATIC STRESS DISORDER: Primary | ICD-10-CM

## 2024-03-18 PROCEDURE — G8484 FLU IMMUNIZE NO ADMIN: HCPCS | Performed by: STUDENT IN AN ORGANIZED HEALTH CARE EDUCATION/TRAINING PROGRAM

## 2024-03-18 PROCEDURE — 99205 OFFICE O/P NEW HI 60 MIN: CPT | Performed by: STUDENT IN AN ORGANIZED HEALTH CARE EDUCATION/TRAINING PROGRAM

## 2024-03-18 PROCEDURE — G8427 DOCREV CUR MEDS BY ELIG CLIN: HCPCS | Performed by: STUDENT IN AN ORGANIZED HEALTH CARE EDUCATION/TRAINING PROGRAM

## 2024-03-18 PROCEDURE — G8417 CALC BMI ABV UP PARAM F/U: HCPCS | Performed by: STUDENT IN AN ORGANIZED HEALTH CARE EDUCATION/TRAINING PROGRAM

## 2024-03-18 PROCEDURE — 1036F TOBACCO NON-USER: CPT | Performed by: STUDENT IN AN ORGANIZED HEALTH CARE EDUCATION/TRAINING PROGRAM

## 2024-03-18 RX ORDER — CLONAZEPAM 0.5 MG/1
0.5 TABLET ORAL 2 TIMES DAILY PRN
Qty: 60 TABLET | Refills: 2 | Status: SHIPPED | OUTPATIENT
Start: 2024-04-01 | End: 2024-06-30

## 2024-03-18 RX ORDER — BUSPIRONE HYDROCHLORIDE 10 MG/1
TABLET ORAL
Qty: 60 TABLET | Refills: 2 | Status: SHIPPED | OUTPATIENT
Start: 2024-03-18

## 2024-03-18 RX ORDER — OLANZAPINE 7.5 MG/1
7.5 TABLET, FILM COATED ORAL EVERY MORNING
Qty: 90 TABLET | Refills: 1 | Status: SHIPPED | OUTPATIENT
Start: 2024-03-18

## 2024-03-18 RX ORDER — OLANZAPINE 15 MG/1
15 TABLET ORAL NIGHTLY
Qty: 90 TABLET | Refills: 1 | Status: SHIPPED | OUTPATIENT
Start: 2024-03-18

## 2024-03-18 ASSESSMENT — PATIENT HEALTH QUESTIONNAIRE - PHQ9
SUM OF ALL RESPONSES TO PHQ QUESTIONS 1-9: 16
SUM OF ALL RESPONSES TO PHQ QUESTIONS 1-9: 16
SUM OF ALL RESPONSES TO PHQ9 QUESTIONS 1 & 2: 4
SUM OF ALL RESPONSES TO PHQ QUESTIONS 1-9: 14
6. FEELING BAD ABOUT YOURSELF - OR THAT YOU ARE A FAILURE OR HAVE LET YOURSELF OR YOUR FAMILY DOWN: MORE THAN HALF THE DAYS
SUM OF ALL RESPONSES TO PHQ QUESTIONS 1-9: 16
4. FEELING TIRED OR HAVING LITTLE ENERGY: MORE THAN HALF THE DAYS
5. POOR APPETITE OR OVEREATING: MORE THAN HALF THE DAYS
9. THOUGHTS THAT YOU WOULD BE BETTER OFF DEAD, OR OF HURTING YOURSELF: MORE THAN HALF THE DAYS
7. TROUBLE CONCENTRATING ON THINGS, SUCH AS READING THE NEWSPAPER OR WATCHING TELEVISION: MORE THAN HALF THE DAYS
8. MOVING OR SPEAKING SO SLOWLY THAT OTHER PEOPLE COULD HAVE NOTICED. OR THE OPPOSITE, BEING SO FIGETY OR RESTLESS THAT YOU HAVE BEEN MOVING AROUND A LOT MORE THAN USUAL: NOT AT ALL
3. TROUBLE FALLING OR STAYING ASLEEP: MORE THAN HALF THE DAYS
1. LITTLE INTEREST OR PLEASURE IN DOING THINGS: MORE THAN HALF THE DAYS
2. FEELING DOWN, DEPRESSED OR HOPELESS: MORE THAN HALF THE DAYS

## 2024-03-18 ASSESSMENT — ANXIETY QUESTIONNAIRES
5. BEING SO RESTLESS THAT IT IS HARD TO SIT STILL: NEARLY EVERY DAY
6. BECOMING EASILY ANNOYED OR IRRITABLE: NEARLY EVERY DAY
3. WORRYING TOO MUCH ABOUT DIFFERENT THINGS: NEARLY EVERY DAY
7. FEELING AFRAID AS IF SOMETHING AWFUL MIGHT HAPPEN: NEARLY EVERY DAY
1. FEELING NERVOUS, ANXIOUS, OR ON EDGE: NEARLY EVERY DAY
4. TROUBLE RELAXING: NEARLY EVERY DAY
GAD7 TOTAL SCORE: 21
2. NOT BEING ABLE TO STOP OR CONTROL WORRYING: NEARLY EVERY DAY

## 2024-03-18 NOTE — PROGRESS NOTES
New Patient is here establishing in person today, and would like to establish.    Referred By:   Work: Disabled   Family:Lives with boyfriend   Children: No   Education: High School   Legal History or Arrest: No   Social Support: Boyfriend   Access to Firearms or Weapons: No   Pets: No   Hobbies: Not at the moment   Abuse Trauma History: Mental (Mother)   Plans or Goals: Become Established   Alcohol Use: No   Smoke: No   Medicinal Marijuana or Controlled Substances: Yes   Any Attempted Suicide: Yes   Any Previous Psychiatric Admissions: Yes   Previous Diagnosis: Bipolar   Family Psychiatric History: Bipolar (Mother & Father) Substance Abuse (Mom)     PHQ:16  STEFAN:21    Psychiatric Medications tried in past : Seroquel Wellbutrin Celexa Depakote       
documenting clinical information in the electronic health record.      Emily Nichols MD  Psychiatrist

## 2024-03-18 NOTE — PATIENT INSTRUCTIONS
[facility]: [phone]  Call to schedule for trauma-focused therapy.

## 2024-03-19 ENCOUNTER — OFFICE VISIT (OUTPATIENT)
Dept: CARDIOLOGY CLINIC | Age: 39
End: 2024-03-19
Payer: MEDICAID

## 2024-03-19 VITALS
OXYGEN SATURATION: 96 % | WEIGHT: 159 LBS | DIASTOLIC BLOOD PRESSURE: 70 MMHG | HEART RATE: 106 BPM | SYSTOLIC BLOOD PRESSURE: 108 MMHG | HEIGHT: 60 IN | BODY MASS INDEX: 31.22 KG/M2

## 2024-03-19 DIAGNOSIS — Z76.89 ESTABLISHING CARE WITH NEW DOCTOR, ENCOUNTER FOR: Primary | ICD-10-CM

## 2024-03-19 DIAGNOSIS — I50.22 CHRONIC SYSTOLIC CONGESTIVE HEART FAILURE (HCC): ICD-10-CM

## 2024-03-19 DIAGNOSIS — I42.0 DILATED CARDIOMYOPATHY (HCC): ICD-10-CM

## 2024-03-19 PROCEDURE — G8417 CALC BMI ABV UP PARAM F/U: HCPCS | Performed by: INTERNAL MEDICINE

## 2024-03-19 PROCEDURE — 4004F PT TOBACCO SCREEN RCVD TLK: CPT | Performed by: INTERNAL MEDICINE

## 2024-03-19 PROCEDURE — G8427 DOCREV CUR MEDS BY ELIG CLIN: HCPCS | Performed by: INTERNAL MEDICINE

## 2024-03-19 PROCEDURE — 93000 ELECTROCARDIOGRAM COMPLETE: CPT | Performed by: INTERNAL MEDICINE

## 2024-03-19 PROCEDURE — 99204 OFFICE O/P NEW MOD 45 MIN: CPT | Performed by: INTERNAL MEDICINE

## 2024-03-19 PROCEDURE — G8484 FLU IMMUNIZE NO ADMIN: HCPCS | Performed by: INTERNAL MEDICINE

## 2024-03-19 RX ORDER — METOPROLOL SUCCINATE 50 MG/1
50 TABLET, EXTENDED RELEASE ORAL 2 TIMES DAILY
Qty: 180 TABLET | Refills: 3 | Status: SHIPPED | OUTPATIENT
Start: 2024-03-19

## 2024-03-19 RX ORDER — LISINOPRIL 2.5 MG/1
2.5 TABLET ORAL 2 TIMES DAILY
Qty: 180 TABLET | Refills: 3 | Status: SHIPPED | OUTPATIENT
Start: 2024-03-19

## 2024-03-19 RX ORDER — SPIRONOLACTONE 25 MG/1
12.5 TABLET ORAL DAILY
Qty: 90 TABLET | Refills: 1
Start: 2024-03-19

## 2024-03-19 NOTE — PROGRESS NOTES
excluded.   3. The left ventricular ejection fraction is at the lower limits   of normal for this technique, possibly artifactually low due to GI   tract activity.         Echocardiogram 5/15/2019  Summary   Definity contrast administered.   Left ventricular systolic function is reduced with ejection fraction   estimated at 25-30 %.   Elevated left ventricular diastolic filling pressure: Septal E/e'' = 12.6   (for sinus tachycardia) .   Right ventricular systolic function is moderately reduced .   Mild mitral regurgitation.   Unable to obtain SPAP due to lack of tricuspid regurgitation.     Echocardiogram 5/22/2019  Summary   This is a limited study for EF follow up.   Left ventricular systolic function is reduced with ejection fraction   estimated at 40 %.   There is mild to moderate global hypokinesis present.   There is mild concentric left ventricular hypertrophy.      Procedure 6/3/2019  Procedures performed:  Insertion of right ventricular [defibrillator/pacing] lead under fluoroscopy.  Insertion of a single chamber ICD.  Electronic analysis of lead and device.  IV conscious sedation.    Echocardiogram 8/19/2020  Summary   Technically difficult examination due to body habitus. Definity® used for   myocardial border enhancement.   Left ventricular systolic function is moderately reduced with a visually   estimated ejection fraction of 35-40 %.   EF estimated by Koch's method at 38 %.   The left ventricle is normal in size with normal wall thickness.   Mild to moderate global hypokinesis more prominent on the anterior,   anterolateral, and anteroseptal walls.   Grade I diastolic dysfunction with normal LV pressure.   Mild tricuspid regurgitation.   Systolic pulmonary artery pressure (SPAP) is normal and estimated at 27 mmHg   (right atrial pressure 3 mmHg).    Echocardiogram 10/7/2022  Summary   Left ventricle size is normal.   Normal left ventricular wall thickness.   There was mild global hypokinesis of the

## 2024-03-19 NOTE — PATIENT INSTRUCTIONS
Plan:  ~Labs- CMP, fasting lipids and BNP  ~Increase Metoprolol to 50 mg twice a day   ~Increase Lisinopril to 2.5 mg twice a day   ~Start Jardiance 10 mg daily    ~Wait until you have been taking increase in Metoprolol and Lisinopril for at least a week   ~Decrease spironolactone to 12.5 mg daily to allow more blood pressure room for other medications   Cardiac medications reviewed including indications and pertinent side effects. Medication list updated at this visit.   Check blood pressure and heart rate at home a few times per week- keep a log with dates and times and bring to office visit   Regular exercise and following a healthy diet encouraged   Follow up with me in 6 months   ~Follow up with Maria A in 4-6 weeks   ~Follow up with EP team and device clinic in a few months

## 2024-03-20 ENCOUNTER — TELEPHONE (OUTPATIENT)
Dept: FAMILY MEDICINE CLINIC | Age: 39
End: 2024-03-20

## 2024-03-20 DIAGNOSIS — G89.3 CHRONIC PAIN AFTER CANCER TREATMENT: Primary | ICD-10-CM

## 2024-03-20 RX ORDER — HYDROCODONE BITARTRATE AND ACETAMINOPHEN 5; 325 MG/1; MG/1
1 TABLET ORAL EVERY 8 HOURS PRN
Qty: 90 TABLET | Refills: 0 | Status: SHIPPED | OUTPATIENT
Start: 2024-03-20 | End: 2024-04-19

## 2024-03-20 NOTE — TELEPHONE ENCOUNTER
Last Office Visit  -  2/20/24  Next Office Visit  -  n/a    Last Filled  -  2/20/24  Last UDS -  9/20/23  Contract -  n/a

## 2024-03-20 NOTE — TELEPHONE ENCOUNTER
Patient contacted the office req a refill on Hydrocodone   Last visit 2/20/24  No future  Myles Mancuso

## 2024-04-04 DIAGNOSIS — F39 MOOD DISORDER (HCC): ICD-10-CM

## 2024-04-04 RX ORDER — OLANZAPINE 15 MG/1
15 TABLET ORAL NIGHTLY
Qty: 90 TABLET | Refills: 1 | OUTPATIENT
Start: 2024-04-04

## 2024-04-04 RX ORDER — GABAPENTIN 600 MG/1
600 TABLET ORAL 3 TIMES DAILY
Qty: 270 TABLET | Refills: 1 | Status: SHIPPED | OUTPATIENT
Start: 2024-04-04 | End: 2024-10-01

## 2024-04-04 RX ORDER — OLANZAPINE 7.5 MG/1
7.5 TABLET, FILM COATED ORAL EVERY MORNING
Qty: 90 TABLET | Refills: 1 | OUTPATIENT
Start: 2024-04-04

## 2024-04-04 RX ORDER — CLONAZEPAM 0.5 MG/1
0.5 TABLET ORAL 2 TIMES DAILY PRN
Qty: 60 TABLET | Refills: 2 | OUTPATIENT
Start: 2024-04-04 | End: 2024-07-03

## 2024-04-04 NOTE — TELEPHONE ENCOUNTER
Last Office Visit  -  02/20/2024  Next Office Visit  -  n/a    Last Filled  -  04/01/2024 01/03/2024  Last UDS -  11/15/2023  Contract -  n/a

## 2024-04-04 NOTE — TELEPHONE ENCOUNTER
Pt called office requesting the following to be sent to [facility] [address]:    OLANZapine (ZYPREXA) 15 MG tablet    clonazePAM (KLONOPIN) 0.5 MG tablet    gabapentin (NEURONTIN) 600 MG tablet    OLANZapine (ZYPREXA) 7.5 MG tablet        Last Office Visit  -  2/20/2024    Next Office Visit  -  Visit date not found

## 2024-04-05 ENCOUNTER — TELEPHONE (OUTPATIENT)
Dept: CARDIOLOGY CLINIC | Age: 39
End: 2024-04-05

## 2024-04-05 NOTE — TELEPHONE ENCOUNTER
Pt called and stated she seen [facility] a few weeks ago and he prescriber her Jardiance and she has not started medication yet due to being scared to do so. Stated she just needs a little reassurance. Stated last time she was put on a cardiac medication it made her pass out,. Please advise

## 2024-04-16 ENCOUNTER — OFFICE VISIT (OUTPATIENT)
Dept: PSYCHIATRY | Age: 39
End: 2024-04-16
Payer: MEDICAID

## 2024-04-16 VITALS
HEIGHT: 60 IN | DIASTOLIC BLOOD PRESSURE: 76 MMHG | BODY MASS INDEX: 31.22 KG/M2 | WEIGHT: 159 LBS | SYSTOLIC BLOOD PRESSURE: 102 MMHG | HEART RATE: 96 BPM

## 2024-04-16 DIAGNOSIS — F43.10 COMPLEX POSTTRAUMATIC STRESS DISORDER: Primary | ICD-10-CM

## 2024-04-16 DIAGNOSIS — F29 PSYCHOSIS, UNSPECIFIED PSYCHOSIS TYPE (HCC): ICD-10-CM

## 2024-04-16 DIAGNOSIS — F51.04 CHRONIC INSOMNIA: ICD-10-CM

## 2024-04-16 PROCEDURE — G8417 CALC BMI ABV UP PARAM F/U: HCPCS | Performed by: STUDENT IN AN ORGANIZED HEALTH CARE EDUCATION/TRAINING PROGRAM

## 2024-04-16 PROCEDURE — G8427 DOCREV CUR MEDS BY ELIG CLIN: HCPCS | Performed by: STUDENT IN AN ORGANIZED HEALTH CARE EDUCATION/TRAINING PROGRAM

## 2024-04-16 PROCEDURE — 99214 OFFICE O/P EST MOD 30 MIN: CPT | Performed by: STUDENT IN AN ORGANIZED HEALTH CARE EDUCATION/TRAINING PROGRAM

## 2024-04-16 PROCEDURE — 4004F PT TOBACCO SCREEN RCVD TLK: CPT | Performed by: STUDENT IN AN ORGANIZED HEALTH CARE EDUCATION/TRAINING PROGRAM

## 2024-04-16 PROCEDURE — 90833 PSYTX W PT W E/M 30 MIN: CPT | Performed by: STUDENT IN AN ORGANIZED HEALTH CARE EDUCATION/TRAINING PROGRAM

## 2024-04-16 RX ORDER — HYDROXYZINE HYDROCHLORIDE 25 MG/1
TABLET, FILM COATED ORAL
Qty: 60 TABLET | Refills: 3 | Status: SHIPPED | OUTPATIENT
Start: 2024-04-16

## 2024-04-16 ASSESSMENT — ANXIETY QUESTIONNAIRES
7. FEELING AFRAID AS IF SOMETHING AWFUL MIGHT HAPPEN: NEARLY EVERY DAY
5. BEING SO RESTLESS THAT IT IS HARD TO SIT STILL: NEARLY EVERY DAY
GAD7 TOTAL SCORE: 20
2. NOT BEING ABLE TO STOP OR CONTROL WORRYING: NEARLY EVERY DAY
4. TROUBLE RELAXING: NEARLY EVERY DAY
3. WORRYING TOO MUCH ABOUT DIFFERENT THINGS: NEARLY EVERY DAY
1. FEELING NERVOUS, ANXIOUS, OR ON EDGE: NEARLY EVERY DAY
6. BECOMING EASILY ANNOYED OR IRRITABLE: MORE THAN HALF THE DAYS

## 2024-04-16 ASSESSMENT — PATIENT HEALTH QUESTIONNAIRE - PHQ9
SUM OF ALL RESPONSES TO PHQ QUESTIONS 1-9: 8
7. TROUBLE CONCENTRATING ON THINGS, SUCH AS READING THE NEWSPAPER OR WATCHING TELEVISION: SEVERAL DAYS
8. MOVING OR SPEAKING SO SLOWLY THAT OTHER PEOPLE COULD HAVE NOTICED. OR THE OPPOSITE, BEING SO FIGETY OR RESTLESS THAT YOU HAVE BEEN MOVING AROUND A LOT MORE THAN USUAL: SEVERAL DAYS
5. POOR APPETITE OR OVEREATING: SEVERAL DAYS
4. FEELING TIRED OR HAVING LITTLE ENERGY: SEVERAL DAYS
SUM OF ALL RESPONSES TO PHQ QUESTIONS 1-9: 8
9. THOUGHTS THAT YOU WOULD BE BETTER OFF DEAD, OR OF HURTING YOURSELF: NOT AT ALL
SUM OF ALL RESPONSES TO PHQ QUESTIONS 1-9: 8
2. FEELING DOWN, DEPRESSED OR HOPELESS: SEVERAL DAYS
3. TROUBLE FALLING OR STAYING ASLEEP: SEVERAL DAYS
SUM OF ALL RESPONSES TO PHQ9 QUESTIONS 1 & 2: 2
1. LITTLE INTEREST OR PLEASURE IN DOING THINGS: SEVERAL DAYS
SUM OF ALL RESPONSES TO PHQ QUESTIONS 1-9: 8
6. FEELING BAD ABOUT YOURSELF - OR THAT YOU ARE A FAILURE OR HAVE LET YOURSELF OR YOUR FAMILY DOWN: SEVERAL DAYS

## 2024-04-19 DIAGNOSIS — G89.3 CHRONIC PAIN AFTER CANCER TREATMENT: ICD-10-CM

## 2024-04-19 RX ORDER — HYDROCODONE BITARTRATE AND ACETAMINOPHEN 5; 325 MG/1; MG/1
1 TABLET ORAL EVERY 8 HOURS PRN
Qty: 90 TABLET | Refills: 0 | Status: SHIPPED | OUTPATIENT
Start: 2024-04-19 | End: 2024-05-19

## 2024-04-19 NOTE — TELEPHONE ENCOUNTER
Last Office Visit  -  2/20/24  Next Office Visit  -  5/21/24    Last Filled  -  3/20/24  Last UDS -  9/20/23  Contract -  n/a

## 2024-04-19 NOTE — TELEPHONE ENCOUNTER
Pt requesting refill    HYDROcodone-acetaminophen (LORCET) 5-325 MG per tablet     [facility] [address]

## 2024-05-16 ENCOUNTER — TELEPHONE (OUTPATIENT)
Dept: FAMILY MEDICINE CLINIC | Age: 39
End: 2024-05-16

## 2024-05-16 NOTE — TELEPHONE ENCOUNTER
Pt moved to [place], she would like to know if there is any drs(PCP) in [facility] that you could recommend for her to see in [place]

## 2024-05-17 NOTE — TELEPHONE ENCOUNTER
Spoke with Pt, informed Pt that she can still see Dr. Barnard with her current insurance. Schedule Pt for Tuesday

## 2024-05-21 ENCOUNTER — OFFICE VISIT (OUTPATIENT)
Dept: FAMILY MEDICINE CLINIC | Age: 39
End: 2024-05-21
Payer: MEDICAID

## 2024-05-21 ENCOUNTER — TELEMEDICINE (OUTPATIENT)
Dept: PSYCHIATRY | Age: 39
End: 2024-05-21
Payer: MEDICAID

## 2024-05-21 VITALS
HEART RATE: 93 BPM | HEIGHT: 60 IN | WEIGHT: 156.4 LBS | DIASTOLIC BLOOD PRESSURE: 62 MMHG | BODY MASS INDEX: 30.7 KG/M2 | OXYGEN SATURATION: 98 % | SYSTOLIC BLOOD PRESSURE: 110 MMHG

## 2024-05-21 DIAGNOSIS — F43.10 COMPLEX POSTTRAUMATIC STRESS DISORDER: Primary | ICD-10-CM

## 2024-05-21 DIAGNOSIS — F51.04 CHRONIC INSOMNIA: ICD-10-CM

## 2024-05-21 DIAGNOSIS — Z79.899 MEDICATION MANAGEMENT: ICD-10-CM

## 2024-05-21 DIAGNOSIS — G89.3 CHRONIC PAIN AFTER CANCER TREATMENT: ICD-10-CM

## 2024-05-21 DIAGNOSIS — F29 PSYCHOSIS, UNSPECIFIED PSYCHOSIS TYPE (HCC): ICD-10-CM

## 2024-05-21 PROBLEM — E66.9 OBESITY (BMI 30.0-34.9): Status: ACTIVE | Noted: 2023-11-15

## 2024-05-21 PROBLEM — E66.811 OBESITY (BMI 30.0-34.9): Status: ACTIVE | Noted: 2023-11-15

## 2024-05-21 PROBLEM — F25.0: Status: ACTIVE | Noted: 2019-02-28

## 2024-05-21 PROCEDURE — G8417 CALC BMI ABV UP PARAM F/U: HCPCS | Performed by: STUDENT IN AN ORGANIZED HEALTH CARE EDUCATION/TRAINING PROGRAM

## 2024-05-21 PROCEDURE — 99213 OFFICE O/P EST LOW 20 MIN: CPT | Performed by: STUDENT IN AN ORGANIZED HEALTH CARE EDUCATION/TRAINING PROGRAM

## 2024-05-21 PROCEDURE — G8427 DOCREV CUR MEDS BY ELIG CLIN: HCPCS | Performed by: STUDENT IN AN ORGANIZED HEALTH CARE EDUCATION/TRAINING PROGRAM

## 2024-05-21 PROCEDURE — 1036F TOBACCO NON-USER: CPT | Performed by: STUDENT IN AN ORGANIZED HEALTH CARE EDUCATION/TRAINING PROGRAM

## 2024-05-21 PROCEDURE — 90833 PSYTX W PT W E/M 30 MIN: CPT | Performed by: STUDENT IN AN ORGANIZED HEALTH CARE EDUCATION/TRAINING PROGRAM

## 2024-05-21 PROCEDURE — 99214 OFFICE O/P EST MOD 30 MIN: CPT | Performed by: STUDENT IN AN ORGANIZED HEALTH CARE EDUCATION/TRAINING PROGRAM

## 2024-05-21 RX ORDER — HYDROCODONE BITARTRATE AND ACETAMINOPHEN 5; 325 MG/1; MG/1
1 TABLET ORAL EVERY 8 HOURS PRN
Qty: 90 TABLET | Refills: 0 | Status: SHIPPED | OUTPATIENT
Start: 2024-05-21 | End: 2024-06-20

## 2024-05-21 SDOH — ECONOMIC STABILITY: FOOD INSECURITY: WITHIN THE PAST 12 MONTHS, YOU WORRIED THAT YOUR FOOD WOULD RUN OUT BEFORE YOU GOT MONEY TO BUY MORE.: NEVER TRUE

## 2024-05-21 SDOH — ECONOMIC STABILITY: INCOME INSECURITY: HOW HARD IS IT FOR YOU TO PAY FOR THE VERY BASICS LIKE FOOD, HOUSING, MEDICAL CARE, AND HEATING?: NOT HARD AT ALL

## 2024-05-21 SDOH — ECONOMIC STABILITY: FOOD INSECURITY: WITHIN THE PAST 12 MONTHS, THE FOOD YOU BOUGHT JUST DIDN'T LAST AND YOU DIDN'T HAVE MONEY TO GET MORE.: NEVER TRUE

## 2024-05-21 ASSESSMENT — PATIENT HEALTH QUESTIONNAIRE - PHQ9
5. POOR APPETITE OR OVEREATING: SEVERAL DAYS
SUM OF ALL RESPONSES TO PHQ QUESTIONS 1-9: 6
2. FEELING DOWN, DEPRESSED OR HOPELESS: SEVERAL DAYS
1. LITTLE INTEREST OR PLEASURE IN DOING THINGS: NOT AT ALL
4. FEELING TIRED OR HAVING LITTLE ENERGY: SEVERAL DAYS
SUM OF ALL RESPONSES TO PHQ QUESTIONS 1-9: 6
6. FEELING BAD ABOUT YOURSELF - OR THAT YOU ARE A FAILURE OR HAVE LET YOURSELF OR YOUR FAMILY DOWN: NOT AT ALL
9. THOUGHTS THAT YOU WOULD BE BETTER OFF DEAD, OR OF HURTING YOURSELF: NOT AT ALL
7. TROUBLE CONCENTRATING ON THINGS, SUCH AS READING THE NEWSPAPER OR WATCHING TELEVISION: NEARLY EVERY DAY
3. TROUBLE FALLING OR STAYING ASLEEP: NOT AT ALL
8. MOVING OR SPEAKING SO SLOWLY THAT OTHER PEOPLE COULD HAVE NOTICED. OR THE OPPOSITE, BEING SO FIGETY OR RESTLESS THAT YOU HAVE BEEN MOVING AROUND A LOT MORE THAN USUAL: NOT AT ALL
SUM OF ALL RESPONSES TO PHQ QUESTIONS 1-9: 6
SUM OF ALL RESPONSES TO PHQ QUESTIONS 1-9: 6
SUM OF ALL RESPONSES TO PHQ9 QUESTIONS 1 & 2: 1

## 2024-05-21 ASSESSMENT — ANXIETY QUESTIONNAIRES
3. WORRYING TOO MUCH ABOUT DIFFERENT THINGS: NEARLY EVERY DAY
7. FEELING AFRAID AS IF SOMETHING AWFUL MIGHT HAPPEN: SEVERAL DAYS
1. FEELING NERVOUS, ANXIOUS, OR ON EDGE: NEARLY EVERY DAY
GAD7 TOTAL SCORE: 19
2. NOT BEING ABLE TO STOP OR CONTROL WORRYING: NEARLY EVERY DAY
4. TROUBLE RELAXING: NEARLY EVERY DAY
6. BECOMING EASILY ANNOYED OR IRRITABLE: NEARLY EVERY DAY

## 2024-05-21 NOTE — PROGRESS NOTES
Outpatient Psychiatry Integrated Care   [facility]     Patient name: Rubi Robb  YOB: 1985  MRN: [id number]  Day of Service: 5/21/2024      Referring Primary Care Clinician: Damaso Barnard MD    The patient was evaluated through a synchronous (real-time) audio-video encounter. The patient (or guardian if applicable) is aware that this is a billable service, which includes applicable co-pays. This Virtual Visit was conducted with patient's (and/or legal guardian's) consent. Patient was deemed to be appropriate for Virtual Visit. Patient identification was verified, and a caregiver was present when appropriate. This provider is licensed in the [facility].    The patient was located at: parking lot at [employer] in the [facility]      Reason for Visit:     Chief Complaint   Patient presents with    Follow-up       CC: anxiety    HPI:     Rubi Robb is a 38 y.o. White (non-) female with a history of bipolar disorder, PTSD, STEFAN, rhabdomyosarcoma, myocardal infarction x2, CHF who presents to outpatient primary care psychiatry on 5/21/2024 for psychiatric medication management.      Today, the patient reports her mom went missing. She has no idea where she is. Patient says she is scared for her mom, doesn't know if she \"fell off the wagon.\"     Her anxiety is so bad right now, she is shaking all the time, especially r/t what's going on with mom, but anxiety has been longstanding. Tried hydroxyzine 25 mg, but it didn't help; also did not have any side effects. Has not tried 50 mg yet to see if this might help better; would like to try this. Has not tried prazosin either, but is open to this as a back-up if the hydroxyzine still does not help.     Klonopin working great for panic.     Taking gabapentin 600 mg TID and Zyprexa 7.5 mg qam / 15 mg qhs. The gabapentin helps with nerve pain and her anger. The Zyprexa keeps the voices in her head

## 2024-05-21 NOTE — PROGRESS NOTES
Patient is following up today via virtual:    PHQ:6  STEFAN:19    Previous Scores from OV 4.16.2024  PHQ:8  STEFAN:20

## 2024-05-21 NOTE — PROGRESS NOTES
Rubi Robb (:  1985) is a 38 y.o. female,Established patient, here for evaluation of the following chief complaint(s):  Follow-up and Medication Refill      Assessment & Plan   1. Chronic pain after cancer treatment  -     HYDROcodone-acetaminophen (LORCET) 5-325 MG per tablet; Take 1 tablet by mouth every 8 hours as needed for Pain for up to 30 days. Intended supply: 3 days. Take lowest dose possible to manage pain Max Daily Amount: 3 tablets, Disp-90 tablet, R-0Normal  Chronic.  Stable.  On Dafter.  Needs refill.  Will provide at this time.  Follow-up in 90 days.    No follow-ups on file.       Subjective   HPI  Patient is a 38-year-old female, who presents with her sister, to follow-up for chronic pain after cancer treatment.  Since this provider last saw the patient, she has had some follow-ups with her psychiatrist, who is currently working through her psychiatric medications.  Most recent change was hydroxyzine, which the patient states did not work well for her.  She does have a follow-up with her psychiatrist later today to discuss this.  Patient also followed up with her cardiologist, states that her cardiologist wanted her to take Jardiance, she was prescribed and has it at home, but has not started it yet because of anxiety and because she heard that it was a diabetes medication.  Clarified with the patient that while it was initially developed as a diabetes medication, they have found a lot of benefit from it in heart failure patients.    In regard to patient's chronic pain, she takes Norco.  She is on 5 mg 3 times daily as needed.  She states this medication works well for her and she has not noticed any side effects.  She is requesting a refill at this time.    It is also of note that the patient quit smoking.  Congratulated the patient and she was very excited about this.      Review of Systems   All other systems reviewed and are negative.         Objective     Vitals:    24 [phone]

## 2024-06-20 ENCOUNTER — TELEPHONE (OUTPATIENT)
Dept: FAMILY MEDICINE CLINIC | Age: 39
End: 2024-06-20

## 2024-06-20 DIAGNOSIS — G89.3 CHRONIC PAIN AFTER CANCER TREATMENT: ICD-10-CM

## 2024-06-20 RX ORDER — HYDROCODONE BITARTRATE AND ACETAMINOPHEN 5; 325 MG/1; MG/1
1 TABLET ORAL EVERY 8 HOURS PRN
Qty: 90 TABLET | Refills: 0 | Status: SHIPPED | OUTPATIENT
Start: 2024-06-20 | End: 2024-07-20

## 2024-06-20 NOTE — TELEPHONE ENCOUNTER
Last Office Visit  -  5/21/24  Next Office Visit  -  7/18/24    Last Filled  -    Last UDS -    Contract -

## 2024-06-20 NOTE — TELEPHONE ENCOUNTER
Sent in a prescription for the patient's hydrocodone/acetaminophen today.  Patient had recently switched from [place] Medicaid to [place] Medicaid and this medication required a prior authorization.  Patient was out of medication so she paid for the full supply today.    Because she filled this medication and paid cash, do I need to submit a new prescription so that we can attempt to run through prior authorization?  What are the next steps?

## 2024-06-20 NOTE — TELEPHONE ENCOUNTER
To proceed with this PA I would need the patients [place] medicaid information. I cannot do anything without that.     Thank you

## 2024-06-20 NOTE — TELEPHONE ENCOUNTER
Pt called in and asked if practice takes [place] medicaid. The  was asked and they stated NO. Pt than said that Dr. Barnard said he does accept it. Pt would like a call back from his MA or the pcp himself with clarification

## 2024-06-21 NOTE — TELEPHONE ENCOUNTER
[facility] does not take this insurance therefore we cannot do a PA for this medication.      [facility] doctors are not participating providers.    If this requires a response please respond to the pool ( P [facility] MEDICATION PRE-AUTH).      Thank you please advise patient.

## 2024-06-21 NOTE — TELEPHONE ENCOUNTER
I need to see a copy of the patient's card. I am unable to locate a [employer]. Are you able to get a copy of the patient's card?

## 2024-06-21 NOTE — TELEPHONE ENCOUNTER
Please excuse me if I am lacking knowledge in this area, however there are multiple providers in my office that do except [place] Medicaid and therefore there are [facility] doctors that are participating providers in this insurance.  Because we except [place] Medicaid at our office, it would be appropriate for us to do a PA for this medication.  Please let me know if this is not the case.

## 2024-08-20 ENCOUNTER — TELEMEDICINE (OUTPATIENT)
Dept: FAMILY MEDICINE CLINIC | Age: 39
End: 2024-08-20

## 2024-08-20 DIAGNOSIS — G89.3 CHRONIC PAIN AFTER CANCER TREATMENT: Primary | ICD-10-CM

## 2024-08-20 RX ORDER — HYDROCODONE BITARTRATE AND ACETAMINOPHEN 5; 325 MG/1; MG/1
1 TABLET ORAL EVERY 8 HOURS PRN
Qty: 90 TABLET | Refills: 0 | Status: SHIPPED | OUTPATIENT
Start: 2024-08-20 | End: 2024-09-19

## 2024-08-20 NOTE — ASSESSMENT & PLAN NOTE
Well-controlled, continue current medications.  Follow-up in 90 days.    Orders:    HYDROcodone-acetaminophen (LORCET) 5-325 MG per tablet; Take 1 tablet by mouth every 8 hours as needed for Pain for up to 30 days. Intended supply: 3 days. Take lowest dose possible to manage pain Max Daily Amount: 3 tablets

## 2024-08-20 NOTE — PROGRESS NOTES
Rubi Robb, was evaluated through a synchronous (real-time) audio-video encounter. The patient (or guardian if applicable) is aware that this is a billable service, which includes applicable co-pays. This Virtual Visit was conducted with patient's (and/or legal guardian's) consent. Patient identification was verified, and a caregiver was present when appropriate.   The patient was located in [facility].   Provider was located at Facility (Appt Dept): [address] [place], [place] [id number]  Confirm you are appropriately licensed, registered, or certified to deliver care in the state where the patient is located as indicated above. If you are not or unsure, please re-schedule the visit: Yes, I confirm.     Rubi Robb (:  1985) is a Established patient, presenting virtually for evaluation of the following:      Below is the assessment and plan developed based on review of pertinent history, physical exam, labs, studies, and medications.     Assessment & Plan  Chronic pain after cancer treatment   Well-controlled, continue current medications.  Follow-up in 90 days.    Orders:    HYDROcodone-acetaminophen (LORCET) 5-325 MG per tablet; Take 1 tablet by mouth every 8 hours as needed for Pain for up to 30 days. Intended supply: 3 days. Take lowest dose possible to manage pain Max Daily Amount: 3 tablets      No follow-ups on file.       Subjective   HPI  Patient is a 39-year-old female who presents over the telephone for A/V communication to discuss chronic pain after cancer treatment of her lower extremity.  Patient is on Norco for this.  She states this medication works well for her and she has not noticed any side effects.  She is requesting a refill at this time.  PDMP reviewed.    Review of Systems   All other systems reviewed and are negative.         Objective   Patient-Reported Vitals: None  No data recorded     Physical Exam  Cardiovascular:      Comments: Patient appears

## 2024-08-28 RX ORDER — SPIRONOLACTONE 25 MG/1
25 TABLET ORAL DAILY
Qty: 90 TABLET | Refills: 1 | Status: SHIPPED | OUTPATIENT
Start: 2024-08-28

## 2024-08-28 NOTE — TELEPHONE ENCOUNTER
Last Office Visit  -  08/20/2024  Next Office Visit  -  09/19/2024    Last Filled  -    Last UDS -    Contract -

## 2024-09-03 DIAGNOSIS — F41.0 PANIC ATTACKS: ICD-10-CM

## 2024-09-03 DIAGNOSIS — F41.1 GENERALIZED ANXIETY DISORDER: Primary | ICD-10-CM

## 2024-09-03 RX ORDER — CLONAZEPAM 0.5 MG/1
0.5 TABLET ORAL 2 TIMES DAILY PRN
Qty: 60 TABLET | Refills: 2 | Status: SHIPPED | OUTPATIENT
Start: 2024-09-03 | End: 2024-12-02

## 2024-09-03 NOTE — TELEPHONE ENCOUNTER
Last Office Visit  -  08/20/2024  Next Office Visit  -  09/19/2024    Last Filled  -  04/01/2024  Last UDS -    Contract -

## 2024-09-03 NOTE — TELEPHONE ENCOUNTER
Patient is requesting a rx for klonopin to the [facility] pharmacy on file     Last seen 8/20/2024      Next visit 9/19/2024

## 2024-09-19 ENCOUNTER — TELEPHONE (OUTPATIENT)
Dept: FAMILY MEDICINE CLINIC | Age: 39
End: 2024-09-19

## 2024-09-19 ENCOUNTER — OFFICE VISIT (OUTPATIENT)
Dept: FAMILY MEDICINE CLINIC | Age: 39
End: 2024-09-19
Payer: MEDICAID

## 2024-09-19 VITALS
SYSTOLIC BLOOD PRESSURE: 116 MMHG | BODY MASS INDEX: 31.18 KG/M2 | DIASTOLIC BLOOD PRESSURE: 68 MMHG | WEIGHT: 158.8 LBS | HEIGHT: 60 IN

## 2024-09-19 DIAGNOSIS — F39 MOOD DISORDER (HCC): ICD-10-CM

## 2024-09-19 DIAGNOSIS — G89.3 CHRONIC PAIN AFTER CANCER TREATMENT: ICD-10-CM

## 2024-09-19 PROCEDURE — 99214 OFFICE O/P EST MOD 30 MIN: CPT | Performed by: STUDENT IN AN ORGANIZED HEALTH CARE EDUCATION/TRAINING PROGRAM

## 2024-09-19 RX ORDER — HYDROCODONE BITARTRATE AND ACETAMINOPHEN 5; 325 MG/1; MG/1
1 TABLET ORAL EVERY 6 HOURS PRN
Qty: 120 TABLET | Refills: 0 | Status: SHIPPED | OUTPATIENT
Start: 2024-09-19 | End: 2024-09-19

## 2024-09-19 RX ORDER — HYDROCODONE BITARTRATE AND ACETAMINOPHEN 5; 325 MG/1; MG/1
1 TABLET ORAL EVERY 6 HOURS PRN
Qty: 120 TABLET | Refills: 0 | Status: SHIPPED | OUTPATIENT
Start: 2024-09-19 | End: 2024-10-19

## 2024-09-19 RX ORDER — GABAPENTIN 600 MG/1
600 TABLET ORAL 3 TIMES DAILY
Qty: 270 TABLET | Refills: 0 | Status: SHIPPED | OUTPATIENT
Start: 2024-09-19 | End: 2024-12-18

## 2024-10-17 DIAGNOSIS — G89.3 CHRONIC PAIN AFTER CANCER TREATMENT: ICD-10-CM

## 2024-10-17 RX ORDER — HYDROCODONE BITARTRATE AND ACETAMINOPHEN 5; 325 MG/1; MG/1
1 TABLET ORAL EVERY 6 HOURS PRN
Qty: 120 TABLET | Refills: 0 | Status: SHIPPED | OUTPATIENT
Start: 2024-10-17 | End: 2024-11-16

## 2024-10-17 NOTE — TELEPHONE ENCOUNTER
Last Office Visit  -  09/19/2024  Next Office Visit  -  n/a    Last Filled  -  09/19/2024  Last UDS -    Contract -

## 2024-10-17 NOTE — TELEPHONE ENCOUNTER
Patient is requesting a rx for hydrocodone to pharmacy on file     Last seen 9/19/2024          Next visit Visit date not found

## 2024-10-17 NOTE — TELEPHONE ENCOUNTER
Refill request for HYDROcodone-acetaminophen (LORCET) 5-325 MG per tablet medication.     Name of Pharmacy- [facility]      Last visit - 9/19/24     Pending visit - None    Last refill - 9/19/24      Medication Contract signed - PDMP Monitoring:    Last PDMP Alin as Reviewed:  Review User Review Instant Review Result   OTTO PAGE 8/20/2024  1:44 PM Reviewed PDMP [1]     [unfilled]  Urine Drug Screenings (1 yr)       Urine Drug Screen  Collected: 9/20/2023  9:15 AM (Final result)              Drug screen multi urine  Collected: 5/14/2019  6:12 PM (Final result)   Narrative: Performed at:  [facility]  [address] [place] [address]   Phone [phone]             Drug screen multi urine  Collected: 3/10/2013 11:40 PM (Final result)   Narrative:    This method is a screening test to detect only these drug classes as  part of a medical workup. Confirmatory testing by another method should  be ordered if clinically indicated.             Drug screen multi urine  Collected: 2/4/2013  6:20 PM (Final result)   Narrative:    This method is a screening test to detect only these drug classes as  part of a medical workup. Confirmatory testing by another method should  be ordered if clinically indicated.             DRUG SCREEN MULTI URINE  Collected: 6/2/2010  7:35 PM (Final result)   Narrative:    This method is a screening test to detect only these drug classes as  part of a medical workup. Confirmatory testing by another method should  be ordered if clinically indicated.                 Medication Contract and Consent for Opioid Use Documents Filed        No documents found                   Last Oarrs ran-         Additional Comments

## 2024-11-25 DIAGNOSIS — G89.3 CHRONIC PAIN AFTER CANCER TREATMENT: ICD-10-CM

## 2024-11-25 NOTE — TELEPHONE ENCOUNTER
Last Office Visit  -  9/19/24  Next Office Visit  -  12/20/24    Last Filled  -  10/17/24  Last UDS -  9/20/23  Contract -  n/a

## 2024-11-25 NOTE — TELEPHONE ENCOUNTER
Patient is requesting a rx for     HYDROcodone-acetaminophen (LORCET) 5-325 MG per table     Last seen 9/19/2024    Next visit 12/20/2024

## 2024-11-26 RX ORDER — HYDROCODONE BITARTRATE AND ACETAMINOPHEN 5; 325 MG/1; MG/1
1 TABLET ORAL EVERY 6 HOURS PRN
Qty: 120 TABLET | Refills: 0 | Status: SHIPPED | OUTPATIENT
Start: 2024-11-26 | End: 2024-12-26

## 2024-12-02 DIAGNOSIS — F41.1 GENERALIZED ANXIETY DISORDER: ICD-10-CM

## 2024-12-02 DIAGNOSIS — F41.0 PANIC ATTACKS: ICD-10-CM

## 2024-12-02 RX ORDER — CLONAZEPAM 0.5 MG/1
0.5 TABLET ORAL 2 TIMES DAILY PRN
Qty: 60 TABLET | Refills: 2 | Status: SHIPPED | OUTPATIENT
Start: 2024-12-02 | End: 2025-03-02

## 2024-12-02 NOTE — TELEPHONE ENCOUNTER
Patient is requesting a rx for   clonazePAM (KLONOPIN) 0.5 MG tablet .    Last seen 09/19/2024  Next visit 12/20/2024    [facility] [place]

## 2024-12-02 NOTE — TELEPHONE ENCOUNTER
Last Office Visit  -  9/19/24  Next Office Visit  -  12/20/24    Last Filled  -  9/3/24  Last UDS -  7/3/24  Contract -  n/a

## 2024-12-04 ENCOUNTER — TELEPHONE (OUTPATIENT)
Dept: CARDIOLOGY CLINIC | Age: 39
End: 2024-12-04

## 2024-12-04 NOTE — TELEPHONE ENCOUNTER
Spoke with PM aMxine regarding this--appointment was approved by Maxine. Patient has not been seen by AGK since 10/2020 so she is considered a \"new patient\". Maxine said you had the soonest availability so she placed her with you due to patient's concerns regarding device. OK to keep as scheduled?

## 2024-12-04 NOTE — TELEPHONE ENCOUNTER
I called and spoke with patient and rescheduled appointment from K to K as K has seen patient in the past and per [facility] request.     Patient gave V/U and confirmed she will be here for 9:45AM appt with TIENK.

## 2024-12-04 NOTE — TELEPHONE ENCOUNTER
I do not see a cancellation at 945a with agk on 12/5/24. According to chart she hasn't seen agk since 2020, this would be considered a new pt 30 min. Should  still add pt into that time slot?

## 2024-12-04 NOTE — TELEPHONE ENCOUNTER
Actually--EM had a patient cancel their appointment tomorrow and therefore would be able to see this patient tomorrow.     Please call patient and reschedule [facility] appointment to EM appointment at 9:45AM

## 2024-12-05 ENCOUNTER — OFFICE VISIT (OUTPATIENT)
Dept: CARDIOLOGY CLINIC | Age: 39
End: 2024-12-05

## 2024-12-05 ENCOUNTER — NURSE ONLY (OUTPATIENT)
Dept: CARDIOLOGY CLINIC | Age: 39
End: 2024-12-05

## 2024-12-05 VITALS
HEART RATE: 87 BPM | BODY MASS INDEX: 30.14 KG/M2 | WEIGHT: 153.5 LBS | DIASTOLIC BLOOD PRESSURE: 70 MMHG | OXYGEN SATURATION: 99 % | HEIGHT: 60 IN | SYSTOLIC BLOOD PRESSURE: 108 MMHG

## 2024-12-05 DIAGNOSIS — I47.20 VENTRICULAR TACHYCARDIA (HCC): ICD-10-CM

## 2024-12-05 DIAGNOSIS — Z95.810 ICD (IMPLANTABLE CARDIOVERTER-DEFIBRILLATOR) IN PLACE: Primary | ICD-10-CM

## 2024-12-05 DIAGNOSIS — I47.20 VENTRICULAR TACHYCARDIA (HCC): Primary | ICD-10-CM

## 2024-12-05 NOTE — PROGRESS NOTES
3/19/24   Seth Estevez MD       REVIEW OF SYSTEMS:    All 14-point review of systems are completed and  pertinent positives are mentioned in the history of present illness.  Other  systems are reviewed and are negative.    Physical Examination:    /70   Pulse 87   Ht 1.524 m (5')   Wt 69.6 kg (153 lb 8 oz)   LMP 05/01/2019   SpO2 99%   BMI 29.98 kg/m²      Constitutional and General Appearance:    alert, cooperative, no distress and appears stated age  HEENT:    PERRLA, no cervical lymphadenopathy. No masses palpable. Normal oral  mucosa  Respiratory:  Normal excursion and expansion without use of accessory muscles  Resp Auscultation: Normal breath sounds without dullness or wheezing  Cardiovascular:  The apical impulse is not displaced  RRR without M/G/R  Abdomen:  No masses or tenderness  Bowel sounds present  Extremities:   No Cyanosis or Clubbing   Lower extremity edema: No  Skin: Warm and dry  Neurological:  Alert and oriented.  Moves all extremities well  No abnormalities of mood, affect, memory, mentation, or behavior are noted    DATA:      ECG 12/5/24: Personally reviewed.     ECHO 9/2023  Summary   Technically difficult examination.   Left ventricular size is normal and systolic function is moderately   reduced. Ejection fraction was estimated at 40%. LV wall thickness is   within normal limits.   The right ventricular size was normal with normal systolic function and   wall thickness.   Pacer Wire visualized in right ventricle.     STRESS TEST 9/2023   Summary   Calculated gated LVEF 33 %.   The T.I.D. ratio was 1.19 .   There was a small sized, moderate in intensity, fixed myocardial perfusion   defect of the ismael-apical wall.   This study was negative for ischemia.    ECHO 8/19/2020:   Left Ventricle   Left ventricular systolic function is moderately reduced with a visually   estimated ejection fraction of 35-40 %.   EF estimated by Koch's method at 38 %.   The left ventricle is

## 2024-12-05 NOTE — PATIENT INSTRUCTIONS
RECOMMENDATIONS:  1. Discussed risks and benefits of device upgrade with the addition of A lead and pocket revision (subpectoral).    -Patient would like to proceed with this option.   2. Remote device checks every 3 months.   3. Continue cardiac medications as prescribed.   4. Follow up after procedure.

## 2024-12-09 ENCOUNTER — TELEPHONE (OUTPATIENT)
Dept: CARDIOLOGY CLINIC | Age: 39
End: 2024-12-09

## 2024-12-09 DIAGNOSIS — I47.29 VENTRICULAR TACHYCARDIA (PAROXYSMAL) (HCC): ICD-10-CM

## 2024-12-09 DIAGNOSIS — I42.0 DILATED CARDIOMYOPATHY (HCC): Primary | ICD-10-CM

## 2024-12-09 PROBLEM — I42.9 CARDIOMYOPATHY (HCC): Status: ACTIVE | Noted: 2024-12-09

## 2024-12-09 NOTE — TELEPHONE ENCOUNTER
Discussed risks and benefits of device upgrade (currently has medtronic single ICD) with the addition of A lead and pocket revision (subpectoral) at [facility] office visit 12/5. Please call patient to schedule ([employer], ANESTHESIA)- Patient would like done ASAP. Case request created.     MEDICATIONS INSTRUCTIONS:   HOLD jardiance day prior and day of procedure.   HOLD klonopin, hydrocodone, spironolactone, ibuprofen, and all other OTC medications and supplements morning of procedure.

## 2024-12-09 NOTE — TELEPHONE ENCOUNTER
Procedure:  Upgrade Dual ICD/Pocket Revision  Doctor:  Dr. Purcell  Date:  12/18/24  Time:  12:30pm  Arrival:  11am  Reps:  Medtronic  Anesthesia:  Yes      Spoke with patient. Please have patient arrive to the main entrance of [facility] ([address] [place], [place] [id number]) and check in with the registration desk.  They will be directed to the Cath Lab. Remind patient to be NPO after midnight (8 hours prior). Do not apply lotions/creams on skin the day of procedure.

## 2024-12-09 NOTE — TELEPHONE ENCOUNTER
Pts last ov with agk they discussed adding new lead to her device. Pt would like this completed asap to not feeling well. Please advise.     Discussed risks and benefits of device upgrade with the addition of A lead and pocket revision (subpectoral).

## 2024-12-17 ENCOUNTER — TELEMEDICINE (OUTPATIENT)
Dept: FAMILY MEDICINE CLINIC | Age: 39
End: 2024-12-17
Payer: COMMERCIAL

## 2024-12-17 DIAGNOSIS — G89.3 CHRONIC PAIN AFTER CANCER TREATMENT: ICD-10-CM

## 2024-12-17 DIAGNOSIS — F41.1 GENERALIZED ANXIETY DISORDER: ICD-10-CM

## 2024-12-17 DIAGNOSIS — I50.22 CHRONIC SYSTOLIC CONGESTIVE HEART FAILURE (HCC): Primary | ICD-10-CM

## 2024-12-17 PROCEDURE — 99214 OFFICE O/P EST MOD 30 MIN: CPT | Performed by: STUDENT IN AN ORGANIZED HEALTH CARE EDUCATION/TRAINING PROGRAM

## 2024-12-17 RX ORDER — HYDROCODONE BITARTRATE AND ACETAMINOPHEN 5; 325 MG/1; MG/1
1 TABLET ORAL EVERY 6 HOURS PRN
Qty: 120 TABLET | Refills: 0 | Status: SHIPPED | OUTPATIENT
Start: 2024-12-17 | End: 2025-01-16

## 2024-12-17 RX ORDER — SPIRONOLACTONE 25 MG/1
25 TABLET ORAL DAILY
Qty: 90 TABLET | Refills: 3 | Status: SHIPPED | OUTPATIENT
Start: 2024-12-17

## 2024-12-17 NOTE — PROGRESS NOTES
Rubi Robb, was evaluated through a synchronous (real-time) audio-video encounter. The patient (or guardian if applicable) is aware that this is a billable service, which includes applicable co-pays. This Virtual Visit was conducted with patient's (and/or legal guardian's) consent. Patient identification was verified, and a caregiver was present when appropriate.   The patient was [address] [facility]  [place] [id number]  Provider was located at Facility (Appt Dept): [address] [place] [address]  Confirm you are appropriately licensed, registered, or certified to deliver care in the state where the patient is located as indicated above. If you are not or unsure, please re-schedule the visit: Yes, I confirm.     Rubi Robb (:  1985) is a Established patient, presenting virtually for evaluation of the following:      Below is the assessment and plan developed based on review of pertinent history, physical exam, labs, studies, and medications.     Assessment & Plan  Chronic systolic congestive heart failure (HCC)   Chronic, at goal (stable), follows with cardiology.  Needs refill spironolactone.  Will provide at this time.    Orders:    spironolactone (ALDACTONE) 25 MG tablet; Take 1 tablet by mouth daily    Chronic pain after cancer treatment  Chronic.  Stable.  On hydrocodone.  Needs refill.  Will provide at this time.  Follow-up in 90 days.    Orders:    HYDROcodone-acetaminophen (LORCET) 5-325 MG per tablet; Take 1 tablet by mouth every 6 hours as needed for Pain for up to 30 days. Intended supply: 3 days. Take lowest dose possible to manage pain Max Daily Amount: 4 tablets    Generalized anxiety disorder    Chronic.  Stable.  On clonazepam.  Does not need a refill.  Will continue at this time.  Follow-up in 90 days.           No follow-ups on file.       Subjective   HPI    Patient is a 39-year-old female, who presents to clinic over the telephone for A/V

## 2024-12-17 NOTE — ASSESSMENT & PLAN NOTE
Chronic, at goal (stable), follows with cardiology.  Needs refill spironolactone.  Will provide at this time.    Orders:    spironolactone (ALDACTONE) 25 MG tablet; Take 1 tablet by mouth daily

## 2024-12-17 NOTE — ASSESSMENT & PLAN NOTE
Chronic.  Stable.  On hydrocodone.  Needs refill.  Will provide at this time.  Follow-up in 90 days.    Orders:    HYDROcodone-acetaminophen (LORCET) 5-325 MG per tablet; Take 1 tablet by mouth every 6 hours as needed for Pain for up to 30 days. Intended supply: 3 days. Take lowest dose possible to manage pain Max Daily Amount: 4 tablets

## 2024-12-17 NOTE — ASSESSMENT & PLAN NOTE
Chronic.  Stable.  On clonazepam.  Does not need a refill.  Will continue at this time.  Follow-up in 90 days.

## 2024-12-18 ENCOUNTER — HOSPITAL ENCOUNTER (OUTPATIENT)
Age: 39
Discharge: HOME | End: 2024-12-18
Attending: INTERNAL MEDICINE | Admitting: INTERNAL MEDICINE
Payer: COMMERCIAL

## 2024-12-18 ENCOUNTER — NURSE ONLY (OUTPATIENT)
Dept: CARDIOLOGY CLINIC | Age: 39
End: 2024-12-18

## 2024-12-18 ENCOUNTER — ANESTHESIA (OUTPATIENT)
Dept: CARDIAC CATH/INVASIVE PROCEDURES | Age: 39
End: 2024-12-18
Payer: COMMERCIAL

## 2024-12-18 ENCOUNTER — ANESTHESIA EVENT (OUTPATIENT)
Dept: CARDIAC CATH/INVASIVE PROCEDURES | Age: 39
End: 2024-12-18
Payer: COMMERCIAL

## 2024-12-18 VITALS
HEART RATE: 97 BPM | WEIGHT: 157.1 LBS | OXYGEN SATURATION: 98 % | SYSTOLIC BLOOD PRESSURE: 96 MMHG | DIASTOLIC BLOOD PRESSURE: 64 MMHG | HEIGHT: 60 IN | RESPIRATION RATE: 20 BRPM | BODY MASS INDEX: 30.84 KG/M2

## 2024-12-18 DIAGNOSIS — I42.9 CARDIOMYOPATHY (HCC): ICD-10-CM

## 2024-12-18 LAB
ANION GAP SERPL CALCULATED.3IONS-SCNC: 12 MMOL/L (ref 3–16)
BUN SERPL-MCNC: 6 MG/DL (ref 7–20)
CALCIUM SERPL-MCNC: 8.8 MG/DL (ref 8.3–10.6)
CHLORIDE SERPL-SCNC: 105 MMOL/L (ref 99–110)
CO2 SERPL-SCNC: 25 MMOL/L (ref 21–32)
CREAT SERPL-MCNC: 0.5 MG/DL (ref 0.6–1.1)
DEPRECATED RDW RBC AUTO: 12.4 % (ref 12.4–15.4)
EKG ATRIAL RATE: 87 BPM
EKG DIAGNOSIS: NORMAL
EKG P AXIS: 49 DEGREES
EKG P-R INTERVAL: 170 MS
EKG Q-T INTERVAL: 356 MS
EKG QRS DURATION: 80 MS
EKG QTC CALCULATION (BAZETT): 428 MS
EKG R AXIS: 6 DEGREES
EKG T AXIS: 27 DEGREES
EKG VENTRICULAR RATE: 87 BPM
GFR SERPLBLD CREATININE-BSD FMLA CKD-EPI: >90 ML/MIN/{1.73_M2}
GLUCOSE SERPL-MCNC: 98 MG/DL (ref 70–99)
HCG UR QL: NEGATIVE
HCT VFR BLD AUTO: 37.5 % (ref 36–48)
HGB BLD-MCNC: 12.6 G/DL (ref 12–16)
MCH RBC QN AUTO: 29.4 PG (ref 26–34)
MCHC RBC AUTO-ENTMCNC: 33.7 G/DL (ref 31–36)
MCV RBC AUTO: 87.3 FL (ref 80–100)
PLATELET # BLD AUTO: 387 K/UL (ref 135–450)
PMV BLD AUTO: 7.2 FL (ref 5–10.5)
POTASSIUM SERPL-SCNC: 3.9 MMOL/L (ref 3.5–5.1)
RBC # BLD AUTO: 4.3 M/UL (ref 4–5.2)
SODIUM SERPL-SCNC: 142 MMOL/L (ref 136–145)
WBC # BLD AUTO: 9.5 K/UL (ref 4–11)

## 2024-12-18 PROCEDURE — 36005 INJECTION EXT VENOGRAPHY: CPT | Performed by: INTERNAL MEDICINE

## 2024-12-18 PROCEDURE — 75820 VEIN X-RAY ARM/LEG: CPT | Performed by: INTERNAL MEDICINE

## 2024-12-18 PROCEDURE — 85027 COMPLETE CBC AUTOMATED: CPT

## 2024-12-18 PROCEDURE — 6360000002 HC RX W HCPCS: Performed by: ANESTHESIOLOGY

## 2024-12-18 PROCEDURE — 93010 ELECTROCARDIOGRAM REPORT: CPT | Performed by: INTERNAL MEDICINE

## 2024-12-18 PROCEDURE — 7100000010 HC PHASE II RECOVERY - FIRST 15 MIN: Performed by: INTERNAL MEDICINE

## 2024-12-18 PROCEDURE — 84703 CHORIONIC GONADOTROPIN ASSAY: CPT

## 2024-12-18 PROCEDURE — 93005 ELECTROCARDIOGRAM TRACING: CPT | Performed by: INTERNAL MEDICINE

## 2024-12-18 PROCEDURE — 80048 BASIC METABOLIC PNL TOTAL CA: CPT

## 2024-12-18 PROCEDURE — 7100000011 HC PHASE II RECOVERY - ADDTL 15 MIN: Performed by: INTERNAL MEDICINE

## 2024-12-18 RX ORDER — OXYCODONE HYDROCHLORIDE 5 MG/1
10 TABLET ORAL PRN
Status: CANCELLED | OUTPATIENT
Start: 2024-12-18

## 2024-12-18 RX ORDER — MEPERIDINE HYDROCHLORIDE 50 MG/ML
12.5 INJECTION INTRAMUSCULAR; INTRAVENOUS; SUBCUTANEOUS EVERY 5 MIN PRN
Status: CANCELLED | OUTPATIENT
Start: 2024-12-18

## 2024-12-18 RX ORDER — SODIUM CHLORIDE 0.9 % (FLUSH) 0.9 %
5-40 SYRINGE (ML) INJECTION PRN
Status: CANCELLED | OUTPATIENT
Start: 2024-12-18

## 2024-12-18 RX ORDER — SODIUM CHLORIDE 0.9 % (FLUSH) 0.9 %
5-40 SYRINGE (ML) INJECTION EVERY 12 HOURS SCHEDULED
Status: CANCELLED | OUTPATIENT
Start: 2024-12-18

## 2024-12-18 RX ORDER — SODIUM CHLORIDE 9 MG/ML
INJECTION, SOLUTION INTRAVENOUS PRN
Status: DISCONTINUED | OUTPATIENT
Start: 2024-12-18 | End: 2024-12-18 | Stop reason: HOSPADM

## 2024-12-18 RX ORDER — SODIUM CHLORIDE 0.9 % (FLUSH) 0.9 %
5-40 SYRINGE (ML) INJECTION PRN
Status: DISCONTINUED | OUTPATIENT
Start: 2024-12-18 | End: 2024-12-18 | Stop reason: HOSPADM

## 2024-12-18 RX ORDER — NALOXONE HYDROCHLORIDE 0.4 MG/ML
INJECTION, SOLUTION INTRAMUSCULAR; INTRAVENOUS; SUBCUTANEOUS PRN
Status: CANCELLED | OUTPATIENT
Start: 2024-12-18

## 2024-12-18 RX ORDER — SODIUM CHLORIDE 0.9 % (FLUSH) 0.9 %
5-40 SYRINGE (ML) INJECTION EVERY 12 HOURS SCHEDULED
Status: DISCONTINUED | OUTPATIENT
Start: 2024-12-18 | End: 2024-12-18 | Stop reason: HOSPADM

## 2024-12-18 RX ORDER — SODIUM CHLORIDE 9 MG/ML
INJECTION, SOLUTION INTRAVENOUS PRN
Status: CANCELLED | OUTPATIENT
Start: 2024-12-18

## 2024-12-18 RX ORDER — MIDAZOLAM HYDROCHLORIDE 1 MG/ML
2 INJECTION, SOLUTION INTRAMUSCULAR; INTRAVENOUS ONCE
Status: COMPLETED | OUTPATIENT
Start: 2024-12-18 | End: 2024-12-18

## 2024-12-18 RX ORDER — ONDANSETRON 2 MG/ML
4 INJECTION INTRAMUSCULAR; INTRAVENOUS ONCE
Status: CANCELLED | OUTPATIENT
Start: 2024-12-18 | End: 2024-12-18

## 2024-12-18 RX ORDER — OXYCODONE HYDROCHLORIDE 5 MG/1
5 TABLET ORAL PRN
Status: CANCELLED | OUTPATIENT
Start: 2024-12-18

## 2024-12-18 RX ORDER — MIDAZOLAM HYDROCHLORIDE 1 MG/ML
2 INJECTION, SOLUTION INTRAMUSCULAR; INTRAVENOUS
Status: CANCELLED | OUTPATIENT
Start: 2024-12-18 | End: 2024-12-19

## 2024-12-18 RX ORDER — DIPHENHYDRAMINE HYDROCHLORIDE 50 MG/ML
6.25 INJECTION INTRAMUSCULAR; INTRAVENOUS
Status: CANCELLED | OUTPATIENT
Start: 2024-12-18 | End: 2024-12-19

## 2024-12-18 RX ADMIN — MIDAZOLAM 2 MG: 1 INJECTION INTRAMUSCULAR; INTRAVENOUS at 11:39

## 2024-12-18 ASSESSMENT — ENCOUNTER SYMPTOMS: SHORTNESS OF BREATH: 1

## 2024-12-18 NOTE — PROGRESS NOTES
Patient procedure cancelled. Patient given discharge paperwork. Patient and family have spoken with physician. All questions answered at this time. Patient requested to ambulate independently from department.

## 2024-12-18 NOTE — ANESTHESIA PRE PROCEDURE
Obesity (BMI 30.0-34.9) E66.811   • Cardiomyopathy (HCC) I42.9       Past Medical History:        Diagnosis Date   • Acute on chronic systolic congestive heart failure (HCC)    • Arthritis    • Bipolar disorder (HCC)    • Cancer (HCC)     rhabdomyosarcoma   • Cardiac arrest 05/2019    VF   • Cardiac arrest with ventricular fibrillation    • Cardiomyopathy (HCC) 05/2019    EF= 40% with global hypokinesis   • COPD (chronic obstructive pulmonary disease) (HCC)    • Depression    • Family history of early CAD    • Hepatitis C    • Hyperlipidemia    • Paranoia (psychosis) (HCC)    • Pneumonia due to infectious organism    • Rhabdomyosarcoma (HCC)    • Scoliosis    • Smoker    • VF (ventricular fibrillation)        Past Surgical History:        Procedure Laterality Date   • ACHILLES TENDON SURGERY     • BONE MARROW TRANSPLANT     • BRONCHOSCOPY N/A 05/16/2019    BRONCHOSCOPY ALVEOLAR LAVAGE performed by Yvan Harden MD at [facility] ENDOSCOPY   • BRONCHOSCOPY  05/16/2019    BRONCHOSCOPY THERAPUTIC ASPIRATION INITIAL performed by Yvan Harden MD at [facility] ENDOSCOPY   • BRONCHOSCOPY N/A 05/19/2019    BRONCHOSCOPY ALVEOLAR LAVAGE performed by Yvan Harden MD at [facility] ENDOSCOPY   • BRONCHOSCOPY  05/19/2019    BRONCHOSCOPY THERAPUTIC ASPIRATION INITIAL performed by Yvan Harden MD at [facility] ENDOSCOPY   • CHOLECYSTECTOMY     • CHOLECYSTECTOMY  04/15/2014    Laparascopic   • ENDOSCOPY, COLON, DIAGNOSTIC     • FOOT SURGERY     • GROWTH PLATE SURGERY     • INJECTION  01/31/2023    Left Mid Foot Steroid Injection at [facility] with    • LEG SURGERY         Social History:    Social History     Tobacco Use   • Smoking status: Former     Types: Cigarettes     Passive exposure: Past   • Smokeless tobacco: Never   • Tobacco comments:     1/4 ppd   Substance Use Topics   • Alcohol use: Not Currently     Comment: social                                Counseling given: Not Answered  Tobacco comments: 1/4 ppd      Vital Signs

## 2024-12-18 NOTE — TELEPHONE ENCOUNTER
[facility]K was not able to do procedure. Pt was advised that [facility]K will not be doing procedure but another close doctor will be. Pt would like to know who is. Follow up with pt.

## 2024-12-18 NOTE — PROGRESS NOTES
Was going to be an upgrade but Binh believes the current lead needs to be extracted and a dual device needs to be implanted.

## 2024-12-19 ENCOUNTER — PROCEDURE VISIT (OUTPATIENT)
Dept: CARDIOLOGY CLINIC | Age: 39
End: 2024-12-19

## 2024-12-19 ENCOUNTER — TELEPHONE (OUTPATIENT)
Dept: CARDIOLOGY CLINIC | Age: 39
End: 2024-12-19

## 2024-12-19 DIAGNOSIS — Z95.810 ICD (IMPLANTABLE CARDIOVERTER-DEFIBRILLATOR) IN PLACE: Primary | ICD-10-CM

## 2024-12-19 DIAGNOSIS — I47.20 VENTRICULAR TACHYCARDIA (HCC): ICD-10-CM

## 2024-12-19 LAB — ECHO BSA: 1.74 M2

## 2024-12-19 NOTE — TELEPHONE ENCOUNTER
Called the patient and went straight to VM and the VM box is full and can't accept any messages. Will try again later.

## 2024-12-20 ENCOUNTER — TELEPHONE (OUTPATIENT)
Dept: CARDIAC CATH/INVASIVE PROCEDURES | Age: 39
End: 2024-12-20

## 2024-12-20 DIAGNOSIS — I49.01 VF (VENTRICULAR FIBRILLATION): Primary | ICD-10-CM

## 2024-12-20 DIAGNOSIS — I47.20 VT (VENTRICULAR TACHYCARDIA) (HCC): ICD-10-CM

## 2024-12-20 DIAGNOSIS — T82.198A: ICD-10-CM

## 2024-12-20 NOTE — TELEPHONE ENCOUNTER
Can you please provide medication instructions for procedure? Going to send by email per her request.

## 2024-12-20 NOTE — TELEPHONE ENCOUNTER
Appears per chart review that Dr. Garcia's office attempted to call patient in regards to get surgery scheduled per separate telephone encounter from yesterday. Will send message in that thread for them to reach back out to patient for scheduling

## 2024-12-20 NOTE — TELEPHONE ENCOUNTER
Message  Received: Yesterday  AMARILIS Purcell Jr., MD P [facility] Ep  Patient will call tomorrow inquiring about providers that would be doing her RV lead revision.  The providers to be Dr. Gunderson and Dr. Garcia.

## 2024-12-20 NOTE — TELEPHONE ENCOUNTER
Hello,     Patient called back into [facility] office. Please give patient another call regarding scheduling of procedure. Thank you!

## 2024-12-20 NOTE — TELEPHONE ENCOUNTER
AGK- are we planning on device explant with lead extraction as well as medtronic subpectoral dual ICD, general anesthesia?

## 2024-12-20 NOTE — TELEPHONE ENCOUNTER
MEDICATIONS INSTRUCTIONS:   HOLD jardiance day prior and day of procedure.   HOLD klonopin, hydrocodone, spironolactone, ibuprofen, and all other OTC medications and supplements morning of procedure.

## 2024-12-20 NOTE — TELEPHONE ENCOUNTER
----- Message from Dr. AMARILIS Purcell MD sent at 12/18/2024  4:20 PM EST -----  Regarding: lead revision and device upgrade  Good afternoon Latesha, this patient needs single chamber ICD lead explant with implant of DC ICD with subpectoral implant given severe twiddlers and discomfort.  Dr. Garcia and Dr. Gunderson have agreed team at .  We will need CTS to be present and available to assist.  It may be best to reach out to  at  to see what their protocol is and when we can get done.  Thanks so much for your help.  Once this is arranged, please [place] In Dr. Gunderson and Dr. Garcia so they are aware and ready.  Call me with any questions or concerns.  Thanks!

## 2024-12-20 NOTE — TELEPHONE ENCOUNTER
Pt sts she got a call from EM himself 12/19/24 about her procedure that was canceled. Please call pt back. TY

## 2024-12-23 ENCOUNTER — TELEPHONE (OUTPATIENT)
Dept: CARDIOLOGY CLINIC | Age: 39
End: 2024-12-23

## 2024-12-26 PROBLEM — I49.01 VF (VENTRICULAR FIBRILLATION): Status: ACTIVE | Noted: 2024-12-20

## 2024-12-26 PROBLEM — T82.198A: Status: ACTIVE | Noted: 2024-12-20

## 2024-12-26 PROBLEM — I47.20 VT (VENTRICULAR TACHYCARDIA) (HCC): Status: ACTIVE | Noted: 2024-12-20

## 2024-12-26 NOTE — TELEPHONE ENCOUNTER
Latesha Robison Ep1 hour ago (7:41 AM)     AC  Please create the case for FF. I'll pass info onto . Thanks!

## 2024-12-26 NOTE — TELEPHONE ENCOUNTER
Date Of Procedure:  02/14/25     Time Of Arrival: 645am     Procedure Time: 730am        Called and spoke to patient and she is agreeable to the date and time. Reviewed the Pre-Procedure instructions and they verbalized understanding. Encouraged to call with any questions or concerns.       Published on [employer] / emailed to Cath lab / note in chart / scheduled in Epic/Cupid/Carto

## 2024-12-27 NOTE — TELEPHONE ENCOUNTER
Procedure:  Device explant with lead extraction as well as medtronic subpectoral dual ICD   Doctor:  Dr. Fontenot & Dr. Gunderson  Date:  2/14/25  Time:  7:30am  Arrival:  6:45am  Reps:  Medtronic  Anesthesia:  Yes      Please have patient arrive to the main entrance of [place] [address] and check in with the registration desk.  They will be directed to the Cath Lab.  Remind patient to be NPO after midnight (8 hours prior). Do not apply lotions/creams on skin the day of procedure.    Instructions emailed and mailed.    Rubi - fyi only.

## 2025-01-02 DIAGNOSIS — F41.0 PANIC ATTACKS: ICD-10-CM

## 2025-01-02 DIAGNOSIS — F41.1 GENERALIZED ANXIETY DISORDER: ICD-10-CM

## 2025-01-02 RX ORDER — CLONAZEPAM 0.5 MG/1
0.5 TABLET ORAL 2 TIMES DAILY PRN
Qty: 60 TABLET | Refills: 2 | Status: SHIPPED | OUTPATIENT
Start: 2025-01-02 | End: 2025-04-02

## 2025-01-02 NOTE — TELEPHONE ENCOUNTER
Last Office Visit  -  12/17/2024  Next Office Visit  -  n/a    Last Filled  -  12/02/2024  Last UDS -    Contract -

## 2025-01-02 NOTE — TELEPHONE ENCOUNTER
Patient is requesting a rx for metoprolol, lisinopril, lipitor, olanzapine and klonopin    To [facility]     Last seen 12/17/2024    Next visit  Visit date not found

## 2025-01-08 DIAGNOSIS — F39 MOOD DISORDER (HCC): ICD-10-CM

## 2025-01-08 RX ORDER — OLANZAPINE 7.5 MG/1
7.5 TABLET, FILM COATED ORAL EVERY MORNING
Qty: 90 TABLET | Refills: 1 | Status: SHIPPED | OUTPATIENT
Start: 2025-01-08

## 2025-01-08 RX ORDER — GABAPENTIN 600 MG/1
600 TABLET ORAL 3 TIMES DAILY
Qty: 270 TABLET | Refills: 0 | Status: SHIPPED | OUTPATIENT
Start: 2025-01-08 | End: 2025-04-08

## 2025-01-13 ENCOUNTER — TELEPHONE (OUTPATIENT)
Dept: FAMILY MEDICINE CLINIC | Age: 40
End: 2025-01-13

## 2025-01-13 NOTE — TELEPHONE ENCOUNTER
Pt called back to apologize. She states her bottle does show only 7.5mg but she would like to request 15mg.

## 2025-01-13 NOTE — TELEPHONE ENCOUNTER
Patient is requesting a rx for     OLANZapine (ZYPREXA) 7.5 MG tablet      Last seen 12/17/2024    Next visit 3/25/2025

## 2025-01-13 NOTE — TELEPHONE ENCOUNTER
Pt called saying we called in wrong strength for OLANZapine (ZYPREXA). Pt states CS has been prescribing 15mg and she wants 15mg called in. Advised pt 15mg was from Dr. Nichols, pt ssd she is not going to [facility] and she told CS that she will not go to her and that he is supposed to give her 15mg. Advised pt the 7.5 was sent to pharmacy on 1/8 an was from Dr. Barnard. Pt yelling and very angry.

## 2025-01-14 RX ORDER — OLANZAPINE 15 MG/1
15 TABLET ORAL NIGHTLY
Qty: 90 TABLET | Refills: 1 | Status: SHIPPED | OUTPATIENT
Start: 2025-01-14

## 2025-01-14 NOTE — TELEPHONE ENCOUNTER
For clarification purposes, it seems that the patient is supposed to be on 7.5 mg in the morning and 15 mg at night.  I have sent in the 15 mg at night dose.  Patient already has the 7.5 mg dose for the morning.

## 2025-01-24 DIAGNOSIS — G89.3 CHRONIC PAIN AFTER CANCER TREATMENT: ICD-10-CM

## 2025-01-24 RX ORDER — HYDROCODONE BITARTRATE AND ACETAMINOPHEN 5; 325 MG/1; MG/1
1 TABLET ORAL EVERY 6 HOURS PRN
Qty: 120 TABLET | Refills: 0 | Status: SHIPPED | OUTPATIENT
Start: 2025-01-24 | End: 2025-02-23

## 2025-01-24 NOTE — TELEPHONE ENCOUNTER
Last Office Visit  -  12/17/2024  Next Office Visit  -  03/25/2025    Last Filled  -  12/17/2024  Last UDS -    Contract -

## 2025-01-24 NOTE — TELEPHONE ENCOUNTER
Patient is requesting a rx for   HYDROcodone-acetaminophen (LORCET) 5-325 MG per tablet     Last seen  12/17/2024    Next visit 3/25/2025

## 2025-02-14 ENCOUNTER — NURSE ONLY (OUTPATIENT)
Dept: CARDIOLOGY CLINIC | Age: 40
End: 2025-02-14

## 2025-02-14 ENCOUNTER — ANESTHESIA (OUTPATIENT)
Age: 40
End: 2025-02-14
Payer: COMMERCIAL

## 2025-02-14 ENCOUNTER — ANESTHESIA EVENT (OUTPATIENT)
Age: 40
End: 2025-02-14
Payer: COMMERCIAL

## 2025-02-14 ENCOUNTER — APPOINTMENT (OUTPATIENT)
Age: 40
End: 2025-02-14
Attending: INTERNAL MEDICINE
Payer: COMMERCIAL

## 2025-02-14 ENCOUNTER — APPOINTMENT (OUTPATIENT)
Dept: GENERAL RADIOLOGY | Age: 40
End: 2025-02-14
Attending: INTERNAL MEDICINE
Payer: COMMERCIAL

## 2025-02-14 ENCOUNTER — HOSPITAL ENCOUNTER (INPATIENT)
Age: 40
LOS: 1 days | Discharge: HOSPICE | End: 2025-02-15
Attending: INTERNAL MEDICINE | Admitting: INTERNAL MEDICINE
Payer: COMMERCIAL

## 2025-02-14 DIAGNOSIS — I47.20 VT (VENTRICULAR TACHYCARDIA) (HCC): ICD-10-CM

## 2025-02-14 DIAGNOSIS — T82.198A: ICD-10-CM

## 2025-02-14 DIAGNOSIS — Z95.810 ICD (IMPLANTABLE CARDIOVERTER-DEFIBRILLATOR) IN PLACE: Primary | ICD-10-CM

## 2025-02-14 DIAGNOSIS — I49.01 VF (VENTRICULAR FIBRILLATION) (HCC): ICD-10-CM

## 2025-02-14 DIAGNOSIS — R52 PAIN IN PACEMAKER POCKET: ICD-10-CM

## 2025-02-14 DIAGNOSIS — I46.9 CARDIAC ARREST (HCC): ICD-10-CM

## 2025-02-14 LAB
ABO + RH BLD: NORMAL
ALBUMIN SERPL-MCNC: 4 G/DL (ref 3.4–5)
ALBUMIN/GLOB SERPL: 1 {RATIO} (ref 1.1–2.2)
ALP SERPL-CCNC: 130 U/L (ref 40–129)
ALT SERPL-CCNC: 43 U/L (ref 10–40)
ANION GAP SERPL CALCULATED.3IONS-SCNC: 9 MMOL/L (ref 3–16)
AST SERPL-CCNC: 55 U/L (ref 15–37)
BILIRUB SERPL-MCNC: 0.4 MG/DL (ref 0–1)
BLD GP AB SCN SERPL QL: NORMAL
BLOOD BANK DISPENSE STATUS: NORMAL
BLOOD BANK PRODUCT CODE: NORMAL
BPU ID: NORMAL
BUN SERPL-MCNC: 5 MG/DL (ref 7–20)
CALCIUM SERPL-MCNC: 9.8 MG/DL (ref 8.3–10.6)
CHLORIDE SERPL-SCNC: 101 MMOL/L (ref 99–110)
CO2 SERPL-SCNC: 29 MMOL/L (ref 21–32)
CREAT SERPL-MCNC: 0.5 MG/DL (ref 0.6–1.1)
DEPRECATED RDW RBC AUTO: 11.8 % (ref 12.4–15.4)
DESCRIPTION BLOOD BANK: NORMAL
ECHO BSA: 1.74 M2
EKG ATRIAL RATE: 107 BPM
EKG DIAGNOSIS: NORMAL
EKG P AXIS: 58 DEGREES
EKG P-R INTERVAL: 164 MS
EKG Q-T INTERVAL: 326 MS
EKG QRS DURATION: 76 MS
EKG QTC CALCULATION (BAZETT): 435 MS
EKG R AXIS: 13 DEGREES
EKG T AXIS: 73 DEGREES
EKG VENTRICULAR RATE: 107 BPM
GFR SERPLBLD CREATININE-BSD FMLA CKD-EPI: >90 ML/MIN/{1.73_M2}
GLUCOSE SERPL-MCNC: 112 MG/DL (ref 70–99)
HCT VFR BLD AUTO: 41.6 % (ref 36–48)
HGB BLD-MCNC: 13.6 G/DL (ref 12–16)
INR PPP: 0.94 (ref 0.85–1.15)
MCH RBC QN AUTO: 28.3 PG (ref 26–34)
MCHC RBC AUTO-ENTMCNC: 32.6 G/DL (ref 31–36)
MCV RBC AUTO: 86.9 FL (ref 80–100)
PLATELET # BLD AUTO: 392 K/UL (ref 135–450)
PMV BLD AUTO: 6.8 FL (ref 5–10.5)
POTASSIUM SERPL-SCNC: 3.7 MMOL/L (ref 3.5–5.1)
PROT SERPL-MCNC: 8.2 G/DL (ref 6.4–8.2)
PROTHROMBIN TIME: 12.7 SEC (ref 11.9–14.9)
RBC # BLD AUTO: 4.79 M/UL (ref 4–5.2)
SODIUM SERPL-SCNC: 139 MMOL/L (ref 136–145)
WBC # BLD AUTO: 11.2 K/UL (ref 4–11)

## 2025-02-14 PROCEDURE — 86901 BLOOD TYPING SEROLOGIC RH(D): CPT

## 2025-02-14 PROCEDURE — C1769 GUIDE WIRE: HCPCS | Performed by: INTERNAL MEDICINE

## 2025-02-14 PROCEDURE — 93005 ELECTROCARDIOGRAM TRACING: CPT | Performed by: INTERNAL MEDICINE

## 2025-02-14 PROCEDURE — 6370000000 HC RX 637 (ALT 250 FOR IP)

## 2025-02-14 PROCEDURE — 6360000002 HC RX W HCPCS: Performed by: INTERNAL MEDICINE

## 2025-02-14 PROCEDURE — 86900 BLOOD TYPING SEROLOGIC ABO: CPT

## 2025-02-14 PROCEDURE — C1713 ANCHOR/SCREW BN/BN,TIS/BN: HCPCS | Performed by: INTERNAL MEDICINE

## 2025-02-14 PROCEDURE — C1898 LEAD, PMKR, OTHER THAN TRANS: HCPCS | Performed by: INTERNAL MEDICINE

## 2025-02-14 PROCEDURE — 2500000003 HC RX 250 WO HCPCS: Performed by: INTERNAL MEDICINE

## 2025-02-14 PROCEDURE — 71045 X-RAY EXAM CHEST 1 VIEW: CPT

## 2025-02-14 PROCEDURE — 6360000002 HC RX W HCPCS: Performed by: NURSE ANESTHETIST, CERTIFIED REGISTERED

## 2025-02-14 PROCEDURE — 02H63JZ INSERTION OF PACEMAKER LEAD INTO RIGHT ATRIUM, PERCUTANEOUS APPROACH: ICD-10-PCS | Performed by: STUDENT IN AN ORGANIZED HEALTH CARE EDUCATION/TRAINING PROGRAM

## 2025-02-14 PROCEDURE — 7100000001 HC PACU RECOVERY - ADDTL 15 MIN: Performed by: INTERNAL MEDICINE

## 2025-02-14 PROCEDURE — 2709999900 HC NON-CHARGEABLE SUPPLY: Performed by: INTERNAL MEDICINE

## 2025-02-14 PROCEDURE — 93010 ELECTROCARDIOGRAM REPORT: CPT | Performed by: INTERNAL MEDICINE

## 2025-02-14 PROCEDURE — 0JH609Z INSERTION OF CARDIAC RESYNCHRONIZATION DEFIBRILLATOR PULSE GENERATOR INTO CHEST SUBCUTANEOUS TISSUE AND FASCIA, OPEN APPROACH: ICD-10-PCS | Performed by: STUDENT IN AN ORGANIZED HEALTH CARE EDUCATION/TRAINING PROGRAM

## 2025-02-14 PROCEDURE — 6370000000 HC RX 637 (ALT 250 FOR IP): Performed by: INTERNAL MEDICINE

## 2025-02-14 PROCEDURE — 36415 COLL VENOUS BLD VENIPUNCTURE: CPT

## 2025-02-14 PROCEDURE — C1777 LEAD, AICD, ENDO SINGLE COIL: HCPCS | Performed by: INTERNAL MEDICINE

## 2025-02-14 PROCEDURE — 85027 COMPLETE CBC AUTOMATED: CPT

## 2025-02-14 PROCEDURE — 3700000001 HC ADD 15 MINUTES (ANESTHESIA): Performed by: INTERNAL MEDICINE

## 2025-02-14 PROCEDURE — 33249 INSJ/RPLCMT DEFIB W/LEAD(S): CPT | Performed by: INTERNAL MEDICINE

## 2025-02-14 PROCEDURE — 33241 REMOVE PULSE GENERATOR: CPT | Performed by: INTERNAL MEDICINE

## 2025-02-14 PROCEDURE — 86850 RBC ANTIBODY SCREEN: CPT

## 2025-02-14 PROCEDURE — 2720000010 HC SURG SUPPLY STERILE: Performed by: INTERNAL MEDICINE

## 2025-02-14 PROCEDURE — 36620 INSERTION CATHETER ARTERY: CPT | Performed by: INTERNAL MEDICINE

## 2025-02-14 PROCEDURE — C1894 INTRO/SHEATH, NON-LASER: HCPCS | Performed by: INTERNAL MEDICINE

## 2025-02-14 PROCEDURE — 80053 COMPREHEN METABOLIC PANEL: CPT

## 2025-02-14 PROCEDURE — 85610 PROTHROMBIN TIME: CPT

## 2025-02-14 PROCEDURE — C2628 CATHETER, OCCLUSION: HCPCS | Performed by: INTERNAL MEDICINE

## 2025-02-14 PROCEDURE — C1721 AICD, DUAL CHAMBER: HCPCS | Performed by: INTERNAL MEDICINE

## 2025-02-14 PROCEDURE — 6370000000 HC RX 637 (ALT 250 FOR IP): Performed by: ANESTHESIOLOGY

## 2025-02-14 PROCEDURE — 6370000000 HC RX 637 (ALT 250 FOR IP): Performed by: STUDENT IN AN ORGANIZED HEALTH CARE EDUCATION/TRAINING PROGRAM

## 2025-02-14 PROCEDURE — 6360000002 HC RX W HCPCS: Performed by: STUDENT IN AN ORGANIZED HEALTH CARE EDUCATION/TRAINING PROGRAM

## 2025-02-14 PROCEDURE — 02HK3JZ INSERTION OF PACEMAKER LEAD INTO RIGHT VENTRICLE, PERCUTANEOUS APPROACH: ICD-10-PCS | Performed by: STUDENT IN AN ORGANIZED HEALTH CARE EDUCATION/TRAINING PROGRAM

## 2025-02-14 PROCEDURE — 02PA3MZ REMOVAL OF CARDIAC LEAD FROM HEART, PERCUTANEOUS APPROACH: ICD-10-PCS | Performed by: STUDENT IN AN ORGANIZED HEALTH CARE EDUCATION/TRAINING PROGRAM

## 2025-02-14 PROCEDURE — 2500000003 HC RX 250 WO HCPCS: Performed by: NURSE ANESTHETIST, CERTIFIED REGISTERED

## 2025-02-14 PROCEDURE — 6360000002 HC RX W HCPCS

## 2025-02-14 PROCEDURE — 0JPT0PZ REMOVAL OF CARDIAC RHYTHM RELATED DEVICE FROM TRUNK SUBCUTANEOUS TISSUE AND FASCIA, OPEN APPROACH: ICD-10-PCS | Performed by: STUDENT IN AN ORGANIZED HEALTH CARE EDUCATION/TRAINING PROGRAM

## 2025-02-14 PROCEDURE — 33244 REMOVE ELCTRD TRANSVENOUSLY: CPT | Performed by: INTERNAL MEDICINE

## 2025-02-14 PROCEDURE — C1892 INTRO/SHEATH,FIXED,PEEL-AWAY: HCPCS | Performed by: INTERNAL MEDICINE

## 2025-02-14 PROCEDURE — B24BZZ4 ULTRASONOGRAPHY OF HEART WITH AORTA, TRANSESOPHAGEAL: ICD-10-PCS | Performed by: STUDENT IN AN ORGANIZED HEALTH CARE EDUCATION/TRAINING PROGRAM

## 2025-02-14 PROCEDURE — C1773 RET DEV, INSERTABLE: HCPCS | Performed by: INTERNAL MEDICINE

## 2025-02-14 PROCEDURE — 3700000000 HC ANESTHESIA ATTENDED CARE: Performed by: INTERNAL MEDICINE

## 2025-02-14 PROCEDURE — 36556 INSERT NON-TUNNEL CV CATH: CPT | Performed by: INTERNAL MEDICINE

## 2025-02-14 PROCEDURE — 2580000003 HC RX 258: Performed by: NURSE ANESTHETIST, CERTIFIED REGISTERED

## 2025-02-14 PROCEDURE — 86923 COMPATIBILITY TEST ELECTRIC: CPT

## 2025-02-14 PROCEDURE — C1889 IMPLANT/INSERT DEVICE, NOC: HCPCS | Performed by: INTERNAL MEDICINE

## 2025-02-14 PROCEDURE — 1200000000 HC SEMI PRIVATE

## 2025-02-14 PROCEDURE — 3E0102A INTRODUCTION OF ANTI-INFECTIVE ENVELOPE INTO SUBCUTANEOUS TISSUE, OPEN APPROACH: ICD-10-PCS | Performed by: STUDENT IN AN ORGANIZED HEALTH CARE EDUCATION/TRAINING PROGRAM

## 2025-02-14 PROCEDURE — 7100000000 HC PACU RECOVERY - FIRST 15 MIN: Performed by: INTERNAL MEDICINE

## 2025-02-14 DEVICE — ENVELOPE CMRM6133 ABSORB LRG MR
Type: IMPLANTABLE DEVICE | Site: CHEST  WALL | Status: FUNCTIONAL
Brand: TYRX™

## 2025-02-14 DEVICE — LEAD 5076-45 MRI US RCMCRD
Type: IMPLANTABLE DEVICE | Site: HEART | Status: FUNCTIONAL
Brand: CAPSUREFIX NOVUS MRI™ SURESCAN®

## 2025-02-14 DEVICE — LEAD 6935M55 QUATTRO SECURE S MRI US
Type: IMPLANTABLE DEVICE | Site: HEART | Status: FUNCTIONAL
Brand: SPRINT QUATTRO SECURE S MRI™ SURESCAN™

## 2025-02-14 DEVICE — ICD DDPA2D4 COBALT XT DR MRI DF4 USA
Type: IMPLANTABLE DEVICE | Site: CHEST  WALL | Status: FUNCTIONAL
Brand: COBALT™ XT DR MRI SURESCAN™

## 2025-02-14 RX ORDER — MIDAZOLAM HYDROCHLORIDE 1 MG/ML
INJECTION, SOLUTION INTRAMUSCULAR; INTRAVENOUS
Status: DISCONTINUED | OUTPATIENT
Start: 2025-02-14 | End: 2025-02-14 | Stop reason: SDUPTHER

## 2025-02-14 RX ORDER — SODIUM CHLORIDE 0.9 % (FLUSH) 0.9 %
5-40 SYRINGE (ML) INJECTION PRN
Status: DISCONTINUED | OUTPATIENT
Start: 2025-02-14 | End: 2025-02-15 | Stop reason: HOSPADM

## 2025-02-14 RX ORDER — SODIUM CHLORIDE 0.9 % (FLUSH) 0.9 %
5-40 SYRINGE (ML) INJECTION EVERY 12 HOURS SCHEDULED
Status: DISCONTINUED | OUTPATIENT
Start: 2025-02-14 | End: 2025-02-15 | Stop reason: HOSPADM

## 2025-02-14 RX ORDER — FENTANYL CITRATE 50 UG/ML
INJECTION, SOLUTION INTRAMUSCULAR; INTRAVENOUS
Status: COMPLETED
Start: 2025-02-14 | End: 2025-02-14

## 2025-02-14 RX ORDER — SODIUM CHLORIDE 0.9 % (FLUSH) 0.9 %
5-40 SYRINGE (ML) INJECTION PRN
Status: DISCONTINUED | OUTPATIENT
Start: 2025-02-14 | End: 2025-02-14 | Stop reason: HOSPADM

## 2025-02-14 RX ORDER — OXYCODONE HYDROCHLORIDE 5 MG/1
10 TABLET ORAL PRN
Status: COMPLETED | OUTPATIENT
Start: 2025-02-14 | End: 2025-02-14

## 2025-02-14 RX ORDER — PHENYLEPHRINE HYDROCHLORIDE 10 MG/ML
INJECTION INTRAVENOUS
Status: DISCONTINUED | OUTPATIENT
Start: 2025-02-14 | End: 2025-02-14 | Stop reason: SDUPTHER

## 2025-02-14 RX ORDER — VANCOMYCIN HYDROCHLORIDE 1 G/20ML
INJECTION, POWDER, LYOPHILIZED, FOR SOLUTION INTRAVENOUS
Status: DISCONTINUED | OUTPATIENT
Start: 2025-02-14 | End: 2025-02-14 | Stop reason: SDUPTHER

## 2025-02-14 RX ORDER — MIDAZOLAM HYDROCHLORIDE 1 MG/ML
INJECTION, SOLUTION INTRAMUSCULAR; INTRAVENOUS
Status: DISPENSED
Start: 2025-02-14 | End: 2025-02-14

## 2025-02-14 RX ORDER — PROPOFOL 10 MG/ML
INJECTION, EMULSION INTRAVENOUS
Status: DISCONTINUED | OUTPATIENT
Start: 2025-02-14 | End: 2025-02-14 | Stop reason: SDUPTHER

## 2025-02-14 RX ORDER — CLONAZEPAM 0.5 MG/1
0.5 TABLET ORAL 2 TIMES DAILY PRN
Status: DISCONTINUED | OUTPATIENT
Start: 2025-02-14 | End: 2025-02-15

## 2025-02-14 RX ORDER — OXYCODONE HYDROCHLORIDE 5 MG/1
5 TABLET ORAL EVERY 6 HOURS PRN
Status: DISCONTINUED | OUTPATIENT
Start: 2025-02-14 | End: 2025-02-15 | Stop reason: HOSPADM

## 2025-02-14 RX ORDER — ONDANSETRON 2 MG/ML
INJECTION INTRAMUSCULAR; INTRAVENOUS
Status: DISCONTINUED | OUTPATIENT
Start: 2025-02-14 | End: 2025-02-14 | Stop reason: SDUPTHER

## 2025-02-14 RX ORDER — SODIUM CHLORIDE 9 MG/ML
INJECTION, SOLUTION INTRAVENOUS PRN
Status: DISCONTINUED | OUTPATIENT
Start: 2025-02-14 | End: 2025-02-15 | Stop reason: HOSPADM

## 2025-02-14 RX ORDER — OLANZAPINE 5 MG/1
15 TABLET ORAL NIGHTLY
Status: DISCONTINUED | OUTPATIENT
Start: 2025-02-14 | End: 2025-02-15

## 2025-02-14 RX ORDER — OXYCODONE HYDROCHLORIDE 5 MG/1
5 TABLET ORAL PRN
Status: COMPLETED | OUTPATIENT
Start: 2025-02-14 | End: 2025-02-14

## 2025-02-14 RX ORDER — ONDANSETRON 2 MG/ML
4 INJECTION INTRAMUSCULAR; INTRAVENOUS
Status: DISCONTINUED | OUTPATIENT
Start: 2025-02-14 | End: 2025-02-14 | Stop reason: HOSPADM

## 2025-02-14 RX ORDER — ACETAMINOPHEN 325 MG/1
650 TABLET ORAL EVERY 4 HOURS PRN
Status: DISCONTINUED | OUTPATIENT
Start: 2025-02-14 | End: 2025-02-15 | Stop reason: HOSPADM

## 2025-02-14 RX ORDER — OXYCODONE HYDROCHLORIDE 5 MG/1
5 TABLET ORAL ONCE
Status: COMPLETED | OUTPATIENT
Start: 2025-02-14 | End: 2025-02-14

## 2025-02-14 RX ORDER — SODIUM CHLORIDE 9 MG/ML
INJECTION, SOLUTION INTRAVENOUS PRN
Status: DISCONTINUED | OUTPATIENT
Start: 2025-02-14 | End: 2025-02-14 | Stop reason: HOSPADM

## 2025-02-14 RX ORDER — HYDROCODONE BITARTRATE AND ACETAMINOPHEN 5; 325 MG/1; MG/1
1 TABLET ORAL EVERY 6 HOURS PRN
Status: DISCONTINUED | OUTPATIENT
Start: 2025-02-14 | End: 2025-02-15

## 2025-02-14 RX ORDER — NALOXONE HYDROCHLORIDE 0.4 MG/ML
INJECTION, SOLUTION INTRAMUSCULAR; INTRAVENOUS; SUBCUTANEOUS PRN
Status: DISCONTINUED | OUTPATIENT
Start: 2025-02-14 | End: 2025-02-14 | Stop reason: HOSPADM

## 2025-02-14 RX ORDER — ROCURONIUM BROMIDE 10 MG/ML
INJECTION, SOLUTION INTRAVENOUS
Status: DISCONTINUED | OUTPATIENT
Start: 2025-02-14 | End: 2025-02-14 | Stop reason: SDUPTHER

## 2025-02-14 RX ORDER — DEXAMETHASONE SODIUM PHOSPHATE 4 MG/ML
INJECTION, SOLUTION INTRA-ARTICULAR; INTRALESIONAL; INTRAMUSCULAR; INTRAVENOUS; SOFT TISSUE
Status: DISCONTINUED | OUTPATIENT
Start: 2025-02-14 | End: 2025-02-14 | Stop reason: SDUPTHER

## 2025-02-14 RX ORDER — SODIUM CHLORIDE 0.9 % (FLUSH) 0.9 %
5-40 SYRINGE (ML) INJECTION EVERY 12 HOURS SCHEDULED
Status: DISCONTINUED | OUTPATIENT
Start: 2025-02-14 | End: 2025-02-14 | Stop reason: HOSPADM

## 2025-02-14 RX ORDER — FENTANYL CITRATE 50 UG/ML
25 INJECTION, SOLUTION INTRAMUSCULAR; INTRAVENOUS EVERY 5 MIN PRN
Status: COMPLETED | OUTPATIENT
Start: 2025-02-14 | End: 2025-02-14

## 2025-02-14 RX ORDER — FENTANYL CITRATE 50 UG/ML
INJECTION, SOLUTION INTRAMUSCULAR; INTRAVENOUS
Status: DISCONTINUED | OUTPATIENT
Start: 2025-02-14 | End: 2025-02-14 | Stop reason: SDUPTHER

## 2025-02-14 RX ORDER — OXYCODONE HYDROCHLORIDE 5 MG/1
TABLET ORAL
Status: COMPLETED
Start: 2025-02-14 | End: 2025-02-14

## 2025-02-14 RX ORDER — FENTANYL CITRATE 50 UG/ML
25 INJECTION, SOLUTION INTRAMUSCULAR; INTRAVENOUS EVERY 5 MIN PRN
Status: DISCONTINUED | OUTPATIENT
Start: 2025-02-14 | End: 2025-02-15 | Stop reason: HOSPADM

## 2025-02-14 RX ORDER — SODIUM CHLORIDE 9 MG/ML
INJECTION, SOLUTION INTRAVENOUS
Status: DISCONTINUED | OUTPATIENT
Start: 2025-02-14 | End: 2025-02-14 | Stop reason: SDUPTHER

## 2025-02-14 RX ORDER — LIDOCAINE HYDROCHLORIDE 20 MG/ML
INJECTION, SOLUTION EPIDURAL; INFILTRATION; INTRACAUDAL; PERINEURAL
Status: DISCONTINUED | OUTPATIENT
Start: 2025-02-14 | End: 2025-02-14 | Stop reason: SDUPTHER

## 2025-02-14 RX ORDER — HYDROMORPHONE HYDROCHLORIDE 2 MG/ML
0.5 INJECTION, SOLUTION INTRAMUSCULAR; INTRAVENOUS; SUBCUTANEOUS EVERY 5 MIN PRN
Status: COMPLETED | OUTPATIENT
Start: 2025-02-14 | End: 2025-02-14

## 2025-02-14 RX ORDER — DEXMEDETOMIDINE HYDROCHLORIDE 100 UG/ML
INJECTION, SOLUTION INTRAVENOUS
Status: DISCONTINUED | OUTPATIENT
Start: 2025-02-14 | End: 2025-02-14 | Stop reason: SDUPTHER

## 2025-02-14 RX ORDER — METOCLOPRAMIDE HYDROCHLORIDE 5 MG/ML
10 INJECTION INTRAMUSCULAR; INTRAVENOUS
Status: DISCONTINUED | OUTPATIENT
Start: 2025-02-14 | End: 2025-02-14 | Stop reason: HOSPADM

## 2025-02-14 RX ORDER — BUPIVACAINE HYDROCHLORIDE 5 MG/ML
INJECTION, SOLUTION EPIDURAL; INTRACAUDAL PRN
Status: DISCONTINUED | OUTPATIENT
Start: 2025-02-14 | End: 2025-02-14 | Stop reason: HOSPADM

## 2025-02-14 RX ORDER — GLYCOPYRROLATE 0.2 MG/ML
INJECTION INTRAMUSCULAR; INTRAVENOUS
Status: DISCONTINUED | OUTPATIENT
Start: 2025-02-14 | End: 2025-02-14 | Stop reason: SDUPTHER

## 2025-02-14 RX ADMIN — OXYCODONE 5 MG: 5 TABLET ORAL at 15:19

## 2025-02-14 RX ADMIN — FENTANYL CITRATE 25 MCG: 50 INJECTION, SOLUTION INTRAMUSCULAR; INTRAVENOUS at 09:25

## 2025-02-14 RX ADMIN — PHENYLEPHRINE HYDROCHLORIDE 100 MCG: 10 INJECTION INTRAVENOUS at 10:56

## 2025-02-14 RX ADMIN — FENTANYL CITRATE 25 MCG: 50 INJECTION, SOLUTION INTRAMUSCULAR; INTRAVENOUS at 11:30

## 2025-02-14 RX ADMIN — PHENYLEPHRINE HYDROCHLORIDE 30 MCG/MIN: 10 INJECTION INTRAVENOUS at 08:45

## 2025-02-14 RX ADMIN — CLONAZEPAM 0.5 MG: 0.5 TABLET ORAL at 16:13

## 2025-02-14 RX ADMIN — SODIUM CHLORIDE: 9 INJECTION, SOLUTION INTRAVENOUS at 07:40

## 2025-02-14 RX ADMIN — ROCURONIUM BROMIDE 10 MG: 10 INJECTION INTRAVENOUS at 10:08

## 2025-02-14 RX ADMIN — MIDAZOLAM 2 MG: 1 INJECTION INTRAMUSCULAR; INTRAVENOUS at 07:52

## 2025-02-14 RX ADMIN — VANCOMYCIN HYDROCHLORIDE 1000 MG: 1 INJECTION, POWDER, LYOPHILIZED, FOR SOLUTION INTRAVENOUS at 08:25

## 2025-02-14 RX ADMIN — FENTANYL CITRATE 25 MCG: 50 INJECTION, SOLUTION INTRAMUSCULAR; INTRAVENOUS at 09:54

## 2025-02-14 RX ADMIN — PHENYLEPHRINE HYDROCHLORIDE 100 MCG: 10 INJECTION INTRAVENOUS at 08:39

## 2025-02-14 RX ADMIN — ROCURONIUM BROMIDE 10 MG: 10 INJECTION INTRAVENOUS at 09:26

## 2025-02-14 RX ADMIN — DEXMEDETOMIDINE HYDROCHLORIDE 4 MCG: 100 INJECTION, SOLUTION INTRAVENOUS at 10:36

## 2025-02-14 RX ADMIN — ONDANSETRON 4 MG: 2 INJECTION INTRAMUSCULAR; INTRAVENOUS at 09:53

## 2025-02-14 RX ADMIN — DEXMEDETOMIDINE HYDROCHLORIDE 4 MCG: 100 INJECTION, SOLUTION INTRAVENOUS at 09:26

## 2025-02-14 RX ADMIN — MIDAZOLAM 1 MG: 1 INJECTION INTRAMUSCULAR; INTRAVENOUS at 11:15

## 2025-02-14 RX ADMIN — HYDROMORPHONE HYDROCHLORIDE 0.5 MG: 2 INJECTION, SOLUTION INTRAMUSCULAR; INTRAVENOUS; SUBCUTANEOUS at 11:46

## 2025-02-14 RX ADMIN — OXYCODONE 5 MG: 5 TABLET ORAL at 14:01

## 2025-02-14 RX ADMIN — HYDROMORPHONE HYDROCHLORIDE 0.5 MG: 1 INJECTION, SOLUTION INTRAMUSCULAR; INTRAVENOUS; SUBCUTANEOUS at 13:09

## 2025-02-14 RX ADMIN — SODIUM CHLORIDE, PRESERVATIVE FREE 10 ML: 5 INJECTION INTRAVENOUS at 21:00

## 2025-02-14 RX ADMIN — HYDROMORPHONE HYDROCHLORIDE 0.5 MG: 1 INJECTION, SOLUTION INTRAMUSCULAR; INTRAVENOUS; SUBCUTANEOUS at 12:04

## 2025-02-14 RX ADMIN — DEXMEDETOMIDINE HYDROCHLORIDE 4 MCG: 100 INJECTION, SOLUTION INTRAVENOUS at 10:37

## 2025-02-14 RX ADMIN — FENTANYL CITRATE 50 MCG: 50 INJECTION, SOLUTION INTRAMUSCULAR; INTRAVENOUS at 07:53

## 2025-02-14 RX ADMIN — OXYCODONE 5 MG: 5 TABLET ORAL at 20:58

## 2025-02-14 RX ADMIN — DEXMEDETOMIDINE HYDROCHLORIDE 4 MCG: 100 INJECTION, SOLUTION INTRAVENOUS at 10:05

## 2025-02-14 RX ADMIN — FENTANYL CITRATE 25 MCG: 50 INJECTION, SOLUTION INTRAMUSCULAR; INTRAVENOUS at 11:35

## 2025-02-14 RX ADMIN — DEXMEDETOMIDINE HYDROCHLORIDE 4 MCG: 100 INJECTION, SOLUTION INTRAVENOUS at 09:53

## 2025-02-14 RX ADMIN — PHENYLEPHRINE HYDROCHLORIDE 200 MCG: 10 INJECTION INTRAVENOUS at 11:08

## 2025-02-14 RX ADMIN — ROCURONIUM BROMIDE 10 MG: 10 INJECTION INTRAVENOUS at 08:54

## 2025-02-14 RX ADMIN — HYDROCODONE BITARTRATE AND ACETAMINOPHEN 1 TABLET: 5; 325 TABLET ORAL at 18:56

## 2025-02-14 RX ADMIN — ROCURONIUM BROMIDE 30 MG: 10 INJECTION INTRAVENOUS at 08:20

## 2025-02-14 RX ADMIN — PHENYLEPHRINE HYDROCHLORIDE 100 MCG: 10 INJECTION INTRAVENOUS at 11:06

## 2025-02-14 RX ADMIN — OLANZAPINE 15 MG: 5 TABLET, FILM COATED ORAL at 20:57

## 2025-02-14 RX ADMIN — SODIUM CHLORIDE: 9 INJECTION, SOLUTION INTRAVENOUS at 10:40

## 2025-02-14 RX ADMIN — HYDROMORPHONE HYDROCHLORIDE 0.5 MG: 2 INJECTION, SOLUTION INTRAMUSCULAR; INTRAVENOUS; SUBCUTANEOUS at 11:38

## 2025-02-14 RX ADMIN — LIDOCAINE HYDROCHLORIDE 80 MG: 20 INJECTION, SOLUTION EPIDURAL; INFILTRATION; INTRACAUDAL; PERINEURAL at 07:53

## 2025-02-14 RX ADMIN — PHENYLEPHRINE HYDROCHLORIDE 100 MCG: 10 INJECTION INTRAVENOUS at 10:58

## 2025-02-14 RX ADMIN — GLYCOPYRROLATE 0.2 MG: 0.2 INJECTION, SOLUTION INTRAMUSCULAR; INTRAVENOUS at 11:11

## 2025-02-14 RX ADMIN — SUGAMMADEX 200 MG: 100 INJECTION, SOLUTION INTRAVENOUS at 10:45

## 2025-02-14 RX ADMIN — MIDAZOLAM 1 MG: 1 INJECTION INTRAMUSCULAR; INTRAVENOUS at 10:43

## 2025-02-14 RX ADMIN — PROPOFOL 200 MG: 10 INJECTION, EMULSION INTRAVENOUS at 07:53

## 2025-02-14 RX ADMIN — ROCURONIUM BROMIDE 50 MG: 10 INJECTION INTRAVENOUS at 07:54

## 2025-02-14 RX ADMIN — OXYCODONE HYDROCHLORIDE 5 MG: 5 TABLET ORAL at 15:19

## 2025-02-14 RX ADMIN — DEXMEDETOMIDINE HYDROCHLORIDE 4 MCG: 100 INJECTION, SOLUTION INTRAVENOUS at 09:25

## 2025-02-14 RX ADMIN — DEXAMETHASONE SODIUM PHOSPHATE 8 MG: 4 INJECTION, SOLUTION INTRAMUSCULAR; INTRAVENOUS at 08:15

## 2025-02-14 ASSESSMENT — PAIN DESCRIPTION - DESCRIPTORS
DESCRIPTORS: ACHING
DESCRIPTORS: BURNING
DESCRIPTORS: BURNING
DESCRIPTORS: BURNING;CRAMPING
DESCRIPTORS: THROBBING;SHARP

## 2025-02-14 ASSESSMENT — PAIN DESCRIPTION - LOCATION
LOCATION: CHEST
LOCATION: ARM
LOCATION: CHEST
LOCATION: ARM
LOCATION: CHEST
LOCATION: ARM
LOCATION: ARM

## 2025-02-14 ASSESSMENT — PAIN DESCRIPTION - PAIN TYPE
TYPE: ACUTE PAIN
TYPE: ACUTE PAIN

## 2025-02-14 ASSESSMENT — PAIN DESCRIPTION - ORIENTATION
ORIENTATION: RIGHT

## 2025-02-14 ASSESSMENT — PAIN SCALES - GENERAL
PAINLEVEL_OUTOF10: 10
PAINLEVEL_OUTOF10: 7
PAINLEVEL_OUTOF10: 10
PAINLEVEL_OUTOF10: 9
PAINLEVEL_OUTOF10: 8
PAINLEVEL_OUTOF10: 10
PAINLEVEL_OUTOF10: 10
PAINLEVEL_OUTOF10: 9
PAINLEVEL_OUTOF10: 10
PAINLEVEL_OUTOF10: 10
PAINLEVEL_OUTOF10: 9

## 2025-02-14 ASSESSMENT — PAIN - FUNCTIONAL ASSESSMENT
PAIN_FUNCTIONAL_ASSESSMENT: PREVENTS OR INTERFERES SOME ACTIVE ACTIVITIES AND ADLS
PAIN_FUNCTIONAL_ASSESSMENT: PREVENTS OR INTERFERES SOME ACTIVE ACTIVITIES AND ADLS

## 2025-02-14 ASSESSMENT — ENCOUNTER SYMPTOMS: SHORTNESS OF BREATH: 1

## 2025-02-14 NOTE — PROGRESS NOTES
Patient states she is having a panic attack- requesting to take home dose of klonopin. Telephone orders per anesthesia can give 0.5 mg of klonopin

## 2025-02-14 NOTE — ANESTHESIA POSTPROCEDURE EVALUATION
Department of Anesthesiology  Postprocedure Note    Patient: Rubi Robb  MRN: [id number]  YOB: 1985  Date of evaluation: 2/14/2025    Procedure Summary       Date: 02/14/25 Room / Location: [facility] EP LAB 5 / [facility] CARDIAC CATH LAB    Anesthesia Start: 0740 Anesthesia Stop: 1133    Procedures:       Laser lead extraction      Insert ICD dual Diagnosis:       VT (ventricular tachycardia) (HCC)      VF (ventricular fibrillation)      Defibrillator twiddler's syndrome      (VT (ventricular tachycardia) (HCC) [I47.20])      (VF (ventricular fibrillation) [I49.01])      (Defibrillator twiddler's syndrome [T82.198A])    Providers: Doug Ahmadi MD Responsible Provider: Skyler Escamilla DO    Anesthesia Type: general ASA Status: 3            Anesthesia Type: No value filed.    Samantha Phase I: Samantha Score: 8    Samantha Phase II:      Anesthesia Post Evaluation    Patient location during evaluation: PACU  Patient participation: complete - patient participated  Level of consciousness: awake and alert  Airway patency: patent  Nausea & Vomiting: no nausea  Cardiovascular status: hemodynamically stable  Respiratory status: acceptable  Hydration status: stable  Pain management: adequate    There were no known notable events for this encounter.

## 2025-02-14 NOTE — PROGRESS NOTES
Patient to PACU from Cath lab. Drowsy. VSS. ST/NSR on tele. Right groin site intact. Surgical dressing to right chest intact. Will monitor.

## 2025-02-14 NOTE — PROGRESS NOTES
Patient asleep with RR > 10. VSS- on 2L NC satting mid 90s. Dressing to R chest CDI, ice in place. Dressing to R groin CDI, area soft. R pedal pulse 1+ with extremity warm. Xray done. Patient remains in supine position- on bedrest for 3 more hours. Family updated. Phase one criteria, awaiting bed on 5C. Will continue to monitor

## 2025-02-14 NOTE — ANESTHESIA PRE PROCEDURE
Department of Anesthesiology  Preprocedure Note       Name:  Rubi Robb   Age:  39 y.o.  :  1985                                          MRN:  [id number]         Date:  2025      Surgeon: Surgeon(s):  Po Garcia MD Tugulan, Carmen I, MD Karim, Saima, DO    Procedure: Procedure(s):  Laser lead extraction  Insert ICD dual    Medications prior to admission:   Prior to Admission medications    Medication Sig Start Date End Date Taking? Authorizing Provider   HYDROcodone-acetaminophen (LORCET) 5-325 MG per tablet Take 1 tablet by mouth every 6 hours as needed for Pain for up to 30 days. Intended supply: 3 days. Take lowest dose possible to manage pain Max Daily Amount: 4 tablets 25  Damaso Barnard MD   OLANZapine (ZYPREXA) 15 MG tablet Take 1 tablet by mouth nightly 25   Damaso Barnard MD   gabapentin (NEURONTIN) 600 MG tablet Take 1 tablet by mouth 3 times daily for 90 days. 25  Damaso Barnard MD   OLANZapine (ZYPREXA) 7.5 MG tablet Take 1 tablet by mouth every morning 25   Damaso Barnard MD   clonazePAM (KLONOPIN) 0.5 MG tablet Take 1 tablet by mouth 2 times daily as needed for Anxiety for up to 90 days. Max Daily Amount: 1 mg 25  Damaso Barnard MD   spironolactone (ALDACTONE) 25 MG tablet Take 1 tablet by mouth daily 24   Damaso Barnard MD   hydrOXYzine HCl (ATARAX) 25 MG tablet Take 1 or 2 tablets at bedtime as needed for sleep and/or anxiety.  Patient not taking: Reported on 2024   Emily Nichols MD   lisinopril (PRINIVIL;ZESTRIL) 2.5 MG tablet Take 1 tablet by mouth in the morning and at bedtime TAKE ONE TABLET BY MOUTH DAILY 3/19/24   Seth Estevez MD   metoprolol succinate (TOPROL XL) 50 MG extended release tablet Take 1 tablet by mouth in the morning and at bedtime take 1 tablet by mouth twice a day 3/19/24   Seth Estevez MD   empagliflozin (JARDIANCE) 10 MG tablet Take 1 tablet by mouth

## 2025-02-14 NOTE — PROGRESS NOTES
VSS. Dressing to R groin CDI, area soft. Dressing to R chest intact with very small amount of drainage on dressing. [place] with yellow output. A total of 10 mg of oxycodone given for pain. Pt denies nausea. Family no longer at bedside. Bed ready on 5C, will transfer

## 2025-02-14 NOTE — H&P
CARDIOLOGY CONSULT NOTE  02/14/25  Patient Name: Rubi Robb  YOB: 1985    Primary Care Physician: Damaso Barnard MD     Rubi Robb was seen today on 2/14/2025 in consultation due to chief complaint of need for RV lead extraction and repositioning.    Patient is a 39 y.o. female with PMH of  VF arrest s/p right sided dual coil Medtronic ICD implanted in June of 2019, NICM, CHFrEF of 40%, syncope, sarcoma, bipolar disorder, who was seen today for RV lead extraction and implantation of new RV lead.    She has had episodes of NSVT in the past on her device interrogation.      In 2019, she had a VF arrest and had to get intubated with subsequent aspiration pneumonia. She then had a right sided dual coil Medtronic ICD implanted.    Her RV lead had retracted significantly when she was seen by Dr. Purcell and she was subsequently sent for RV lead removal and implantation of a different RV lead.       She had multiple episodes of SVT detection as well.      ALLERGIES:   Allergies   Allergen Reactions    Entresto [Sacubitril-Valsartan]      lighheaded    Tape [Adhesive Tape] Rash     Rash that \"itched and burned\"        MEDICATIONS:   Current Facility-Administered Medications   Medication Dose Route Frequency Provider Last Rate Last Admin    ceFAZolin (ANCEF) 2,000 mg in sterile water 20 mL IV syringe  2,000 mg IntraVENous Once Po Garcia MD        0.9 % sodium chloride infusion   IntraVENous PRN Doug Ahmadi MD            Past Medical History:   Past Medical History:   Diagnosis Date    Acute on chronic systolic congestive heart failure (HCC)     Arthritis     Bipolar disorder (HCC)     Cancer (HCC)     rhabdomyosarcoma    Cardiac arrest 05/2019    VF    Cardiac arrest with ventricular fibrillation     Cardiomyopathy (HCC) 05/2019    EF= 40% with global hypokinesis    COPD (chronic obstructive pulmonary disease) (HCC)     Depression     Family history of early CAD     Hepatitis C      inferior wall, basal inferoseptal, mid inferoseptal, and mid   anteroseptal are hypokinetic.     * All other walls appear normal.        IMPRESSIONS:  1. ICD (implantable cardioverter-defibrillator) in place    2. VT (ventricular tachycardia) (HCC)    3. VF (ventricular fibrillation)    4. Defibrillator twiddler's syndrome           PLAN:   -Will remove current RV ead and implant dual chamber right sided defibrillator.   -Risks, benefits, specifics, pre and post-operative care of the procedure including follow up and precautions was discussed with the patient in detail who verbalized understanding on the procedure. All questions were addressed.      Patient understands that there is a risk associated with sedation with anesthesia as well as small risk of injury to various structures including her heart, lungs, vasculature, esophagus needing surgical correction including a possible need for cardiothoracic surgery. Patient understands that there may be a need for emergent blood product transfusion and there are risks associated with transfusion of blood products. Patient acknowledges there is a risk of stroke and death with this procedure as well. The consent form has been signed.    Thank you for allowing me to participate in the care of this patient. If you have any further questions please feel free to contact me.      Jess Gunderson DO, RS, FACC, FAHA  02/14/25  8:02 AM   [facility] [place]   [phone] [facility]   [phone] [facility]

## 2025-02-14 NOTE — PROCEDURES
0800 Bedside and Verbal shift change report given to Ascencion Navas RN (oncoming nurse) by Rebecca Chavez RN (offgoing nurse). Report included the following information SBAR, Kardex, ED Summary, Intake/Output, MAR, Recent Results and Cardiac Rhythm NSR. Assumed care of pt. Assessment complete. [address] Dr. Lenz Erps at pt's bedside. Dr. Lenz Erps discussing with this RN that pt can transfer to NSTU, Q4 hr neuro checks, after ST assesses and pt passes, can have diet. This RN asking for PRN BP meds to keep pt's SBP <140. Dr. Lenz Erps to put in orders. 1530 Bedside and Verbal shift change report given to MICHELLE Garcia (oncoming nurse) by Ascencion Navas RN (offgoing nurse). Report included the following information SBAR, Kardex, ED Summary, Intake/Output, MAR, Recent Results and Cardiac Rhythm NSR. [facility]     Electrophysiology Procedure Note       Date of Procedure: 2/14/2025  Patient's Name: Rubi Robb  YOB: 1985   Medical Record Number: [id number]  Procedure Performed by: Doug Ahmadi MD and Dr Jess Gunderson MD    Procedures performed:   Lead extraction of the right ventricular defibrillator lead     Removal of ICD pulse generator   Insertion of a new right ventricular defibrillator lead under fluoroscopy   Insertion of a new right atrial lead under fluoroscopy   Implantation of a new MRI compatible dual chamber ICD   Relocation of pocket with submuscular position  Insertion of right femoral venous central line   Insertion of right femoral arterial line     Electronic analysis of lead and device   Ultrasound for venous and arterial access  Insertion of Bridge balloon      Indication of the procedure:   Rubi Robb is 39 y.o. female   with Hx of VF and lead dislodgement. She has SVT and therefore needs atrial lead    Details of procedure:   The patient was brought to the electrophysiology laboratory in stable condition. The patient was in a fasting and non-sedated state. The risks, benefits and alternatives of the lead extraction were discussed with patient and his family members. The risks including, but not limited to, the risks of bleeding, infection, radiation exposure, injury to vascular, cardiac and surrounding structures (including pneumothorax), stroke, cardiac perforation, tamponade, need for emergent open heart surgery, myocardial infarction and death were discussed in detail. The patient opted to proceed with the procedure. A timeout protocol was completed to identify the patient and the procedure being performed. Patient underwent general anesthesia. Cardiothoracic surgery was informed.      The alternative was implanting a new system on the left  however, this would potentially can cause right sided venous occlusion too and since he already has left subclavian  occlusion, it puts him at high risk of SVC syndrome with no future options for potential implants/upgrades. Patient and family members decided to proceed with lead extraction, and implanting a new lead on the same side of the old implant. Written informed consent was signed and placed in the chart. Prophylactic antibiotic was given.     Selective venography of the left upper extremity was done previously which showed patent vein        Chest and groin was prepped and draped in the usual sterile fashion.  A 12F and 6F right femoral venous sheath was placed inside the right femoral vein using modified seldinger technique.      Device was interrogated, and programmed to VVI 40.    Device was interrogated, tachy-therapies turned off, and programmed to VVI 40.    An amplantz wire was advanced from the  right  12F femoral sheath to the right subclavian vein for potential deployment of balloon in SVC if needed.       JUAN probe was inserted and images were obtained.      A 4F right femoral arterial line was placed inside the right femoral artery for hemodynamic monitoring during the procedure.    After injection of 2% lidocaine in the right pectoral area, an incision was made over the old scar and extended to to the pocket using electrocautery and blunt dissection. The old pulse generator was explanted. Lead and device were released from the scar tissues inside the pocket using electrocautery.       Using modified Seldinger technique a wire was advanced inside the axillary vein and placed inside the IVC.          Thestylet was advanced inside the ventricular lead and under fluoroscopy we tried to retract the screw at the tip of the lead which was  successful.      The lead was cut. A locking stylet was advanced inside the lead and locked in.          Suture tie was made around the lead. Tightrail sheath over the lead and using gentle traction and counter-traction and advancement of the  sheath over the lead, it was released

## 2025-02-14 NOTE — PROGRESS NOTES
Device explant with lead extraction as well as medtronic subpectoral dual ICD Doctor: Dr. Fontenot & Dr. Gunderson    DEVICE MODEL  [id number] Cobalt™ XT DR  DEVICE SERIAL NUMBER  [id number]  DEVICE TYPE  ICD  DATE OF IMPLANT  14-Feb-2025  Monitor Information  MONITOR STATUS  Cancelled    This patient’s current monitor is not compatible with the replacement device model.  Medtronic Get Connected service will contact the patient to order and set up their new monitor and device. You do not need to order one at this time.

## 2025-02-14 NOTE — PROGRESS NOTES
Pt with last menstrual period in 2019. Pt refusing pregnancy test. Provider and anesthesia aware.

## 2025-02-15 VITALS
DIASTOLIC BLOOD PRESSURE: 66 MMHG | SYSTOLIC BLOOD PRESSURE: 103 MMHG | HEIGHT: 60 IN | RESPIRATION RATE: 18 BRPM | WEIGHT: 163.58 LBS | TEMPERATURE: 98.3 F | HEART RATE: 87 BPM | OXYGEN SATURATION: 98 % | BODY MASS INDEX: 32.12 KG/M2

## 2025-02-15 LAB
ANION GAP SERPL CALCULATED.3IONS-SCNC: 11 MMOL/L (ref 3–16)
BASOPHILS # BLD: 0.1 K/UL (ref 0–0.2)
BASOPHILS NFR BLD: 0.4 %
BUN SERPL-MCNC: 4 MG/DL (ref 7–20)
CALCIUM SERPL-MCNC: 9.2 MG/DL (ref 8.3–10.6)
CHLORIDE SERPL-SCNC: 104 MMOL/L (ref 99–110)
CO2 SERPL-SCNC: 25 MMOL/L (ref 21–32)
CREAT SERPL-MCNC: 0.4 MG/DL (ref 0.6–1.1)
DEPRECATED RDW RBC AUTO: 12.2 % (ref 12.4–15.4)
EKG ATRIAL RATE: 73 BPM
EKG DIAGNOSIS: NORMAL
EKG P AXIS: 20 DEGREES
EKG P-R INTERVAL: 162 MS
EKG Q-T INTERVAL: 394 MS
EKG QRS DURATION: 84 MS
EKG QTC CALCULATION (BAZETT): 434 MS
EKG R AXIS: 27 DEGREES
EKG T AXIS: 9 DEGREES
EKG VENTRICULAR RATE: 73 BPM
EOSINOPHIL # BLD: 0 K/UL (ref 0–0.6)
EOSINOPHIL NFR BLD: 0 %
GFR SERPLBLD CREATININE-BSD FMLA CKD-EPI: >90 ML/MIN/{1.73_M2}
GLUCOSE SERPL-MCNC: 109 MG/DL (ref 70–99)
HCT VFR BLD AUTO: 38.1 % (ref 36–48)
HGB BLD-MCNC: 12.6 G/DL (ref 12–16)
LYMPHOCYTES # BLD: 1.7 K/UL (ref 1–5.1)
LYMPHOCYTES NFR BLD: 10 %
MAGNESIUM SERPL-MCNC: 1.12 MG/DL (ref 1.8–2.4)
MCH RBC QN AUTO: 28.6 PG (ref 26–34)
MCHC RBC AUTO-ENTMCNC: 33 G/DL (ref 31–36)
MCV RBC AUTO: 86.7 FL (ref 80–100)
MONOCYTES # BLD: 1.2 K/UL (ref 0–1.3)
MONOCYTES NFR BLD: 7.4 %
NEUTROPHILS # BLD: 13.9 K/UL (ref 1.7–7.7)
NEUTROPHILS NFR BLD: 82.2 %
PLATELET # BLD AUTO: 300 K/UL (ref 135–450)
PMV BLD AUTO: 7 FL (ref 5–10.5)
POTASSIUM SERPL-SCNC: 3.5 MMOL/L (ref 3.5–5.1)
RBC # BLD AUTO: 4.4 M/UL (ref 4–5.2)
SODIUM SERPL-SCNC: 140 MMOL/L (ref 136–145)
WBC # BLD AUTO: 16.9 K/UL (ref 4–11)

## 2025-02-15 PROCEDURE — 6370000000 HC RX 637 (ALT 250 FOR IP): Performed by: INTERNAL MEDICINE

## 2025-02-15 PROCEDURE — 6360000002 HC RX W HCPCS: Performed by: NURSE PRACTITIONER

## 2025-02-15 PROCEDURE — 36415 COLL VENOUS BLD VENIPUNCTURE: CPT

## 2025-02-15 PROCEDURE — 83735 ASSAY OF MAGNESIUM: CPT

## 2025-02-15 PROCEDURE — 80048 BASIC METABOLIC PNL TOTAL CA: CPT

## 2025-02-15 PROCEDURE — 6370000000 HC RX 637 (ALT 250 FOR IP): Performed by: NURSE PRACTITIONER

## 2025-02-15 PROCEDURE — 2500000003 HC RX 250 WO HCPCS: Performed by: INTERNAL MEDICINE

## 2025-02-15 PROCEDURE — 85025 COMPLETE CBC W/AUTO DIFF WBC: CPT

## 2025-02-15 PROCEDURE — 93010 ELECTROCARDIOGRAM REPORT: CPT | Performed by: INTERNAL MEDICINE

## 2025-02-15 RX ORDER — POTASSIUM CHLORIDE 1500 MG/1
40 TABLET, EXTENDED RELEASE ORAL ONCE
Status: COMPLETED | OUTPATIENT
Start: 2025-02-15 | End: 2025-02-15

## 2025-02-15 RX ORDER — GABAPENTIN 300 MG/1
600 CAPSULE ORAL 3 TIMES DAILY
Status: DISCONTINUED | OUTPATIENT
Start: 2025-02-15 | End: 2025-02-15 | Stop reason: HOSPADM

## 2025-02-15 RX ORDER — HYDROCODONE BITARTRATE AND ACETAMINOPHEN 5; 325 MG/1; MG/1
1 TABLET ORAL EVERY 6 HOURS PRN
Status: DISCONTINUED | OUTPATIENT
Start: 2025-02-15 | End: 2025-02-15 | Stop reason: HOSPADM

## 2025-02-15 RX ORDER — OLANZAPINE 5 MG/1
7.5 TABLET ORAL EVERY MORNING
Status: DISCONTINUED | OUTPATIENT
Start: 2025-02-15 | End: 2025-02-15 | Stop reason: HOSPADM

## 2025-02-15 RX ORDER — KETOROLAC TROMETHAMINE 30 MG/ML
30 INJECTION, SOLUTION INTRAMUSCULAR; INTRAVENOUS ONCE
Status: COMPLETED | OUTPATIENT
Start: 2025-02-15 | End: 2025-02-15

## 2025-02-15 RX ORDER — METOPROLOL SUCCINATE 50 MG/1
50 TABLET, EXTENDED RELEASE ORAL 2 TIMES DAILY
Status: DISCONTINUED | OUTPATIENT
Start: 2025-02-15 | End: 2025-02-15 | Stop reason: HOSPADM

## 2025-02-15 RX ORDER — SPIRONOLACTONE 25 MG/1
25 TABLET ORAL DAILY
Status: DISCONTINUED | OUTPATIENT
Start: 2025-02-15 | End: 2025-02-15 | Stop reason: HOSPADM

## 2025-02-15 RX ORDER — LISINOPRIL 5 MG/1
2.5 TABLET ORAL 2 TIMES DAILY
Status: DISCONTINUED | OUTPATIENT
Start: 2025-02-15 | End: 2025-02-15 | Stop reason: HOSPADM

## 2025-02-15 RX ORDER — CLONAZEPAM 0.5 MG/1
0.5 TABLET ORAL 2 TIMES DAILY PRN
Status: DISCONTINUED | OUTPATIENT
Start: 2025-02-15 | End: 2025-02-15 | Stop reason: HOSPADM

## 2025-02-15 RX ORDER — OXYCODONE HYDROCHLORIDE 5 MG/1
5 TABLET ORAL EVERY 6 HOURS PRN
Qty: 12 TABLET | Refills: 0 | Status: SHIPPED | OUTPATIENT
Start: 2025-02-15 | End: 2025-02-18

## 2025-02-15 RX ORDER — ATORVASTATIN CALCIUM 20 MG/1
20 TABLET, FILM COATED ORAL DAILY
Status: DISCONTINUED | OUTPATIENT
Start: 2025-02-15 | End: 2025-02-15 | Stop reason: HOSPADM

## 2025-02-15 RX ORDER — OLANZAPINE 5 MG/1
15 TABLET ORAL NIGHTLY
Status: DISCONTINUED | OUTPATIENT
Start: 2025-02-15 | End: 2025-02-15 | Stop reason: HOSPADM

## 2025-02-15 RX ORDER — MAGNESIUM SULFATE IN WATER 40 MG/ML
2000 INJECTION, SOLUTION INTRAVENOUS ONCE
Status: COMPLETED | OUTPATIENT
Start: 2025-02-15 | End: 2025-02-15

## 2025-02-15 RX ORDER — GABAPENTIN 300 MG/1
600 CAPSULE ORAL 3 TIMES DAILY
Status: DISCONTINUED | OUTPATIENT
Start: 2025-02-15 | End: 2025-02-15

## 2025-02-15 RX ADMIN — OXYCODONE 5 MG: 5 TABLET ORAL at 14:57

## 2025-02-15 RX ADMIN — MAGNESIUM SULFATE HEPTAHYDRATE 2000 MG: 40 INJECTION, SOLUTION INTRAVENOUS at 12:15

## 2025-02-15 RX ADMIN — METOPROLOL SUCCINATE 50 MG: 50 TABLET, FILM COATED, EXTENDED RELEASE ORAL at 12:03

## 2025-02-15 RX ADMIN — OXYCODONE 5 MG: 5 TABLET ORAL at 08:28

## 2025-02-15 RX ADMIN — ATORVASTATIN CALCIUM 20 MG: 20 TABLET, FILM COATED ORAL at 12:03

## 2025-02-15 RX ADMIN — HYDROCODONE BITARTRATE AND ACETAMINOPHEN 1 TABLET: 5; 325 TABLET ORAL at 12:03

## 2025-02-15 RX ADMIN — KETOROLAC TROMETHAMINE 30 MG: 30 INJECTION, SOLUTION INTRAMUSCULAR at 13:45

## 2025-02-15 RX ADMIN — GABAPENTIN 600 MG: 300 CAPSULE ORAL at 12:03

## 2025-02-15 RX ADMIN — POTASSIUM CHLORIDE 40 MEQ: 1500 TABLET, EXTENDED RELEASE ORAL at 12:03

## 2025-02-15 RX ADMIN — SODIUM CHLORIDE, PRESERVATIVE FREE 10 ML: 5 INJECTION INTRAVENOUS at 09:00

## 2025-02-15 RX ADMIN — OLANZAPINE 7.5 MG: 5 TABLET, FILM COATED ORAL at 12:10

## 2025-02-15 RX ADMIN — HYDROCODONE BITARTRATE AND ACETAMINOPHEN 1 TABLET: 5; 325 TABLET ORAL at 05:10

## 2025-02-15 ASSESSMENT — PAIN DESCRIPTION - DESCRIPTORS
DESCRIPTORS: ACHING

## 2025-02-15 ASSESSMENT — PAIN SCALES - GENERAL
PAINLEVEL_OUTOF10: 8
PAINLEVEL_OUTOF10: 8
PAINLEVEL_OUTOF10: 10
PAINLEVEL_OUTOF10: 8
PAINLEVEL_OUTOF10: 6
PAINLEVEL_OUTOF10: 7
PAINLEVEL_OUTOF10: 8
PAINLEVEL_OUTOF10: 7
PAINLEVEL_OUTOF10: 7

## 2025-02-15 ASSESSMENT — PAIN DESCRIPTION - LOCATION
LOCATION: CHEST
LOCATION: ARM
LOCATION: CHEST
LOCATION: CHEST

## 2025-02-15 ASSESSMENT — PAIN DESCRIPTION - ORIENTATION
ORIENTATION: RIGHT

## 2025-02-15 ASSESSMENT — PAIN DESCRIPTION - PAIN TYPE: TYPE: ACUTE PAIN

## 2025-02-15 ASSESSMENT — PAIN - FUNCTIONAL ASSESSMENT: PAIN_FUNCTIONAL_ASSESSMENT: PREVENTS OR INTERFERES SOME ACTIVE ACTIVITIES AND ADLS

## 2025-02-15 NOTE — DISCHARGE SUMMARY
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 female with a past medical history of COPD, HLD, NICM, CHF, syncope, and VF arrest s/p dual chamber AICD (2019)    Interval HX: Patient presented for lead extraction.     Patient seen and examined. Notes, labs, and recent testing reviewed.   Telemetry reviewed, pt in NSR  No new complaints today and no major events overnight.   Denies having chest pain, palpitations, shortness of breath, edema or dizziness at the time of this visit.    Consults: none    Objective:   /76   Pulse 90   Temp 98 °F (36.7 °C) (Oral)   Resp 16   Ht 1.524 m (5')   Wt 74.2 kg (163 lb 9.3 oz)   LMP 05/01/2019   SpO2 100%   BMI 31.95 kg/m²      Intake/Output Summary (Last 24 hours) at 2/15/2025 1132  Last data filed at 2/15/2025 0900  Gross per 24 hour   Intake 820 ml   Output 2000 ml   Net -1180 ml       Physical Exam:  Telemetry: NSR/Sinus tachycardia  General: Alert & Oriented x4 in no distress  Skin: Warm and dry, no cyanosis or bruising. Right chest AICD site soft, no hematoma/swelling/oozing noted.  Neck: JVD <8, no bruit  Respiratory: Respirations symmetric and unlabored. Lungs clear to auscultation bilaterally, no wheezing, rhonchi, or crackles  Cardiovascular: Regular rate and rhythm. S1/S2 present without murmurs, rubs, or gallops.   Abdomen: Soft, nontender, +bowel sounds  Extremities: No edema.      Significant Diagnostic Studies:

## 2025-02-15 NOTE — DISCHARGE INSTRUCTIONS
Post-Device Implant     1. Wound Care    -Bathe daily but keep incision dry and clean until your 7-10 day follow up    -Do not shower until you are told to do so by your physician.    -Do not remove the outer dressing unless it is soiled    -Allow the thin pieces of tape (Steri-Strips) to fall off naturally. Do not pick or pull at these unless instructed to do so by your physician.     2. Contact the cardiologists office for these concerns:    -Increased swelling and/or tenderness in incision and/or extending down the arm on the same side as implant site    -Feeling increased palpitations or irregular heart beat    -Redness of incision or drainage from incision.    -Bleeding that does not stop or increases.    -Skin pimples along incision.    -If a suture works its way through the incision.    -Fever greater than 100.5     3. Do not rub or twist the device site     4. Avoid lifting objects heavier than 10 pounds for one month. Do not raise the arm on the side where the device was implanted over your head for one month. These rules help to heal the implant site and stabilize the heart lead wires.     5. Avoid tight clothing over the incision.     6. No driving for one week or until initial visit after your procedure.     7. Carry your temporary Device  I.D. Card with you until your permanent card arrives in four to six weeks. An I.D.Card should be with you always.     8. If you have a defibrillator (AICD) and receive a shock or hear a tone from the device call the doctor's office     9. An implanted device does not replace any medications, diet or activity restrictions that you had before the implant. Check with your cardiologist regarding questions     10.Reasons to seek medical attention immediately: Call 911                -Sudden onset of chest pain, shortness of breath, dizziness, or loss of balance or coordination                -Sudden Change in vision                -Sudden confusion or trouble speaking

## 2025-02-15 NOTE — PLAN OF CARE
Problem: Pain  Goal: Verbalizes/displays adequate comfort level or baseline comfort level  2/15/2025 0952 by Emily Ruiz RN  Outcome: Progressing     Problem: Chronic Conditions and Co-morbidities  Goal: Patient's chronic conditions and co-morbidity symptoms are monitored and maintained or improved  2/15/2025 0952 by Emily Ruiz RN  Outcome: Progressing     Problem: Discharge Planning  Goal: Discharge to home or other facility with appropriate resources  2/15/2025 0952 by Emily Ruiz RN  Outcome: Progressing     Problem: ABCDS Injury Assessment  Goal: Absence of physical injury  2/15/2025 0952 by Emily Ruiz RN  Outcome: Progressing     Problem: Safety - Adult  Goal: Free from fall injury  2/15/2025 0952 by Emily Ruiz RN  Outcome: Progressing     Problem: Skin/Tissue Integrity - Adult  Goal: Incisions, wounds, or drain sites healing without S/S of infection  Outcome: Progressing     Problem: Musculoskeletal - Adult  Goal: Return mobility to safest level of function  Outcome: Progressing     Problem: Genitourinary - Adult  Goal: Absence of urinary retention  Outcome: Progressing     Problem: Infection - Adult  Goal: Absence of infection at discharge  Outcome: Progressing     Problem: Metabolic/Fluid and Electrolytes - Adult  Goal: Electrolytes maintained within normal limits  Outcome: Progressing     Problem: Metabolic/Fluid and Electrolytes - Adult  Goal: Hemodynamic stability and optimal renal function maintained  Outcome: Progressing     Problem: Metabolic/Fluid and Electrolytes - Adult  Goal: Glucose maintained within prescribed range  Outcome: Progressing     Problem: Hematologic - Adult  Goal: Maintains hematologic stability  Outcome: Progressing

## 2025-02-15 NOTE — PROGRESS NOTES
Data- discharge order received, pt verbalized agreement to discharge, disposition to previous residence, no needs for HHC/DME.     Action- discharge instructions prepared and given to pt, pt verbalized understanding. Medication information packet given r/t NEW and/or CHANGED prescriptions emphasizing name/purpose/side effects, pt verbalized understanding. Discharge instruction summary: Diet- cardiac, Activity- as shelia, Primary Care Physician as follows: Damaso Barnard -[phone] f/u appointment , immunizations reviewed , prescription medications filled pt's pharmacy. Inpatient surgical procedure precautions reviewed: post PM and groin site care.   1. WEIGHT: Admit Weight - Scale: 71.2 kg (157 lb) (02/14/25 0700)        Today  Weight - Scale: 74.2 kg (163 lb 9.3 oz) (02/15/25 0545)       2. O2 SAT.: SpO2: 98 % (02/15/25 1152)    Response- Pt belongings gathered, IV removed. Disposition is home (no HHC/DME needs), transported with mom, taken to lobby via w/c w/ this nurse, no complications.

## 2025-02-15 NOTE — PLAN OF CARE
Problem: Pain  Goal: Verbalizes/displays adequate comfort level or baseline comfort level  Outcome: Progressing     Problem: Chronic Conditions and Co-morbidities  Goal: Patient's chronic conditions and co-morbidity symptoms are monitored and maintained or improved  Outcome: Progressing     Problem: Discharge Planning  Goal: Discharge to home or other facility with appropriate resources  Outcome: Progressing     Problem: ABCDS Injury Assessment  Goal: Absence of physical injury  Outcome: Progressing     Problem: Safety - Adult  Goal: Free from fall injury  Outcome: Progressing

## 2025-02-15 NOTE — FLOWSHEET NOTE
02/14/25 1652   Vital Signs   Pulse 89   Heart Rate Source Telemetry   /70   BP Location Left upper arm   BP Method Automatic   MAP (mmHg) 81   MAP (Calculated) 82   Patient Position Semi fowlers   Respirations 16   SpO2 98 %   O2 Device None (Room air)   Pain Assessment   Pain Assessment 0-10   Pain Level 10   Pain Location Arm   Pain Orientation Right   Pain Descriptors Throbbing;Sharp   RASS Score   Gimenez Agitation Sedation Scale (RASS) 0     Pt up to 5c in 10/10 pain. R upper chest wound with minimal swelling. Pt complaining of pain in armpit and chest. Reached out to cardiology on call and home meds were ordered. Pt up to floor with JH rashid, pt alert and oriented to room and aware of how to get the nurse. Pt resting in room with call light in reach.

## 2025-02-18 LAB
BLOOD BANK DISPENSE STATUS: NORMAL
BLOOD BANK PRODUCT CODE: NORMAL
BPU ID: NORMAL
DESCRIPTION BLOOD BANK: NORMAL

## 2025-02-18 NOTE — PROGRESS NOTES
CARELINK monitor ordered-[employer] Relay™ [id number]    MONITOR STATUS  Ordered  DISTRIBUTION METHOD  Shipped from [employer]  ORDER DATE  18-Feb-2025  Shipping Information  MAILING ADDRESS  Rubi Clarisse   [address]  [place] [address]

## 2025-02-21 ENCOUNTER — NURSE ONLY (OUTPATIENT)
Dept: CARDIOLOGY CLINIC | Age: 40
End: 2025-02-21

## 2025-02-21 ENCOUNTER — TELEPHONE (OUTPATIENT)
Dept: CARDIOLOGY CLINIC | Age: 40
End: 2025-02-21

## 2025-02-21 DIAGNOSIS — I47.20 VT (VENTRICULAR TACHYCARDIA) (HCC): ICD-10-CM

## 2025-02-21 DIAGNOSIS — Z95.810 ICD (IMPLANTABLE CARDIOVERTER-DEFIBRILLATOR) IN PLACE: Primary | ICD-10-CM

## 2025-02-21 DIAGNOSIS — Z95.810 ICD (IMPLANTABLE CARDIOVERTER-DEFIBRILLATOR), DUAL, IN SITU: Primary | ICD-10-CM

## 2025-02-21 DIAGNOSIS — I42.8 OTHER CARDIOMYOPATHY (HCC): ICD-10-CM

## 2025-02-21 DIAGNOSIS — I42.9 CARDIOMYOPATHY, UNSPECIFIED TYPE (HCC): ICD-10-CM

## 2025-02-21 NOTE — PROGRESS NOTES
Device check completed by [employer] rep.     Site check:   Incision is clean and dry with all dressings/steri strips removed. Site left open to the air. Incision with small opening noted to mid-incision area. Applied 3 steri strips for closure assistance.  No s/s of infection. Patient educated on post op instructions and given print out education for post op care. Informed patient to avoid getting area wet for another week-scheduled for recheck of site in one week to reassess incision.     Per Jarvis, [employer] rep, would also like to re-check device at recheck appointment--noted in appt notes.

## 2025-02-21 NOTE — TELEPHONE ENCOUNTER
Patient seen today for device/site check s/p device explant with lead extraction as well as medtronic subpectoral dual ICD 2/14/2025.    Per [employer] Rep Gabrea-need to keep an eye on RV lead. Per [facility], wanting to speak with Dr. Ahmadi and Dr. Gunderson regarding this.     Of note, patient is scheduled 2/28/2025 for repeat device/site check.

## 2025-02-21 NOTE — TELEPHONE ENCOUNTER
Good afternoon Jess, thanks so much for working on Mrs. Robb for me.  I'm worried about perforation of her RV lead.  She's going to come back for another check next week.  Let me know that you all think.  She clinically is doing well.  I'm happy to do revision or have you do it if we think she needs.  Thanks for review my friend.

## 2025-02-21 NOTE — PATIENT INSTRUCTIONS
New Cardiac Device Implant (Pacemaker and/or Defibrillator) Post Op Instructions  Bathe with water indirectly hitting the incision site. Water and soap may run over the incision site. Do not scrub. Pat dry with a clean towel after bathing.   Leave incision open to the air; do not apply any dressings, ointments, or bandages to the area. Do not apply lotion, perfume/cologne, or powders to the area until it is completely healed.   Any scabbing or skin glue that is noted will fall off within 1-2 weeks after the post op appointment.   If any oozing, bleeding, or pus drainage occurs, please call the office immediately at [phone].       Patient has movement restrictions in place until 4 weeks post op (to the day of implant) unless otherwise instructed by physician.   Patient may not lift the device side arm above shoulder height.   Do not far reach or stretch across body or behind body with effected side.   Do not use this arm for pushing, pulling, or lifting body.   Do not use cane on the effected side.   Patient may not lift anything heavier than a gallon of milk with the associated arm.     Appointments to expect going forward:  Post operatively the patient will have had a 1-week post op check, a 1 month follow up with NP/MD, and a 3 month follow up with NP/MD and the device clinic.       Remote Monitoring Instructions    Within 2-3 weeks of your device being implanted, you will receive a call from the  of your device. Please answer this call as it is to set up remote monitoring for your device. Once you receive your in-home monitor, please follow the instructions provided to sync the home monitor to your implanted device. Once you have paired your home monitor to your implanted device, keep your monitor plugged in within 6 feet of where you sleep. Your monitor will routinely check in with your device during sleep hours and transmit any urgent events to the Device Clinic for review.     Please do not

## 2025-02-24 DIAGNOSIS — G89.3 CHRONIC PAIN AFTER CANCER TREATMENT: ICD-10-CM

## 2025-02-24 NOTE — TELEPHONE ENCOUNTER
Jess Gunderson DO Kpaeyeh, J Alvin Jr., MD; Erichx Patrick Ep6 minutes ago (11:44 AM)       Can we please arrange for limited echocardiogram stat and PA and lateral CXR prior to device check when she comes in this week? It should be when I am in clinic. I can speak to rep after check prior to her leaving. Thank you      Jess Gunderson DO Kpaeyeh, J Alvin Jr., MD3 hours ago (8:14 AM)       Hello, worse the repeat device check?  I contacted Jarvis about it as well, but if there are concerns, I would make sure CT surgery is aware as you do this. Thank you.

## 2025-02-24 NOTE — TELEPHONE ENCOUNTER
Testing ordered per CHESTER.     Spoke with Robert Echo Tech, who was agreeable to add patient on to schedule 2/28/2025 @ 12:30PM.    Called and spoke with patient. She is aware to go to [facility] Main Entrance and stop at Registration Desk to get Chest Xray. She states she will do this at 11:30AM. She will then come over to office to have limited echo at 12:30PM with device check/site recheck to follow all testing at 1:00PM. Patient gave V/U of plan. CHESTER does need to be present and discuss device check with rep.

## 2025-02-25 RX ORDER — HYDROCODONE BITARTRATE AND ACETAMINOPHEN 5; 325 MG/1; MG/1
1 TABLET ORAL EVERY 6 HOURS PRN
Qty: 120 TABLET | Refills: 0 | Status: ON HOLD | OUTPATIENT
Start: 2025-02-25 | End: 2025-03-27

## 2025-02-25 NOTE — TELEPHONE ENCOUNTER
Last Office Visit  -  12/17/24  Next Office Visit  -  3/25/25    Last Filled  -  1/24/25  Last UDS -  9/20/23  Contract -  n/a

## 2025-02-25 NOTE — PROGRESS NOTES
Rep check    Changes This Session   RV Sense Polarity Bipolar Tip to Coil    See MURJ report under cardiology tab once EP reviews.

## 2025-02-27 ENCOUNTER — APPOINTMENT (OUTPATIENT)
Age: 40
End: 2025-02-27
Attending: INTERNAL MEDICINE
Payer: COMMERCIAL

## 2025-02-27 ENCOUNTER — APPOINTMENT (OUTPATIENT)
Dept: GENERAL RADIOLOGY | Age: 40
End: 2025-02-27
Payer: COMMERCIAL

## 2025-02-27 ENCOUNTER — HOSPITAL ENCOUNTER (INPATIENT)
Age: 40
LOS: 8 days | Discharge: HOME | End: 2025-03-07
Attending: EMERGENCY MEDICINE | Admitting: INTERNAL MEDICINE
Payer: COMMERCIAL

## 2025-02-27 ENCOUNTER — PROCEDURE VISIT (OUTPATIENT)
Dept: CARDIOLOGY CLINIC | Age: 40
End: 2025-02-27

## 2025-02-27 ENCOUNTER — ANESTHESIA EVENT (OUTPATIENT)
Dept: CARDIAC CATH/INVASIVE PROCEDURES | Age: 40
End: 2025-02-27
Payer: COMMERCIAL

## 2025-02-27 DIAGNOSIS — R07.9 CHEST PAIN, UNSPECIFIED TYPE: ICD-10-CM

## 2025-02-27 DIAGNOSIS — T82.111A MALFUNCTION OF CARDIAC PACEMAKER, INITIAL ENCOUNTER: ICD-10-CM

## 2025-02-27 DIAGNOSIS — R07.89 OTHER CHEST PAIN: ICD-10-CM

## 2025-02-27 DIAGNOSIS — I42.0 DILATED CARDIOMYOPATHY (HCC): ICD-10-CM

## 2025-02-27 DIAGNOSIS — I49.01 VENTRICULAR FIBRILLATION (HCC): ICD-10-CM

## 2025-02-27 DIAGNOSIS — Z95.810 ICD (IMPLANTABLE CARDIOVERTER-DEFIBRILLATOR) IN PLACE: ICD-10-CM

## 2025-02-27 DIAGNOSIS — I42.8 OTHER CARDIOMYOPATHY (HCC): ICD-10-CM

## 2025-02-27 DIAGNOSIS — I49.9 ARRHYTHMIA: ICD-10-CM

## 2025-02-27 DIAGNOSIS — Z95.810 ICD (IMPLANTABLE CARDIOVERTER-DEFIBRILLATOR) IN PLACE: Primary | ICD-10-CM

## 2025-02-27 DIAGNOSIS — T82.198A MALFUNCTION OF IMPLANTABLE DEFIBRILLATOR VENTRICULAR (ICD) LEAD: Primary | ICD-10-CM

## 2025-02-27 DIAGNOSIS — I50.22 CHRONIC SYSTOLIC CONGESTIVE HEART FAILURE (HCC): ICD-10-CM

## 2025-02-27 PROBLEM — T82.118A AICD MALFUNCTION: Status: ACTIVE | Noted: 2025-02-27

## 2025-02-27 LAB
ALBUMIN SERPL-MCNC: 4.1 G/DL (ref 3.4–5)
ALBUMIN/GLOB SERPL: 1.1 {RATIO} (ref 1.1–2.2)
ALP SERPL-CCNC: 115 U/L (ref 40–129)
ALT SERPL-CCNC: 45 U/L (ref 10–40)
ANION GAP SERPL CALCULATED.3IONS-SCNC: 12 MMOL/L (ref 3–16)
AST SERPL-CCNC: 49 U/L (ref 15–37)
BASOPHILS # BLD: 0.1 K/UL (ref 0–0.2)
BASOPHILS NFR BLD: 0.3 %
BILIRUB SERPL-MCNC: 0.4 MG/DL (ref 0–1)
BUN SERPL-MCNC: 6 MG/DL (ref 7–20)
CALCIUM SERPL-MCNC: 10 MG/DL (ref 8.3–10.6)
CHLORIDE SERPL-SCNC: 99 MMOL/L (ref 99–110)
CO2 SERPL-SCNC: 26 MMOL/L (ref 21–32)
CREAT SERPL-MCNC: 0.4 MG/DL (ref 0.6–1.1)
DEPRECATED RDW RBC AUTO: 12 % (ref 12.4–15.4)
ECHO BSA: 1.75 M2
ECHO LA AREA 4C: 16.3 CM2
ECHO LA MAJOR AXIS: 5 CM
ECHO LA VOL MOD A4C: 42 ML (ref 22–52)
ECHO LA VOLUME INDEX MOD A4C: 25 ML/M2 (ref 16–34)
ECHO LV EF PHYSICIAN: 50 %
ECHO RA AREA 4C: 10.4 CM2
ECHO RA END SYSTOLIC VOLUME APICAL 4 CHAMBER INDEX BSA: 12 ML/M2
ECHO RA VOLUME: 21 ML
ECHO TV REGURGITANT MAX VELOCITY: 2.28 M/S
ECHO TV REGURGITANT PEAK GRADIENT: 21 MMHG
EKG ATRIAL RATE: 87 BPM
EKG DIAGNOSIS: NORMAL
EKG P AXIS: 66 DEGREES
EKG P-R INTERVAL: 170 MS
EKG Q-T INTERVAL: 378 MS
EKG QRS DURATION: 80 MS
EKG QTC CALCULATION (BAZETT): 454 MS
EKG R AXIS: 49 DEGREES
EKG T AXIS: 65 DEGREES
EKG VENTRICULAR RATE: 87 BPM
EOSINOPHIL # BLD: 0.1 K/UL (ref 0–0.6)
EOSINOPHIL NFR BLD: 0.9 %
GFR SERPLBLD CREATININE-BSD FMLA CKD-EPI: >90 ML/MIN/{1.73_M2}
GLUCOSE SERPL-MCNC: 140 MG/DL (ref 70–99)
HCG SERPL QL: NEGATIVE
HCT VFR BLD AUTO: 40.1 % (ref 36–48)
HGB BLD-MCNC: 13.6 G/DL (ref 12–16)
LYMPHOCYTES # BLD: 1.6 K/UL (ref 1–5.1)
LYMPHOCYTES NFR BLD: 9.9 %
MAGNESIUM SERPL-MCNC: 1.2 MG/DL (ref 1.8–2.4)
MCH RBC QN AUTO: 29.4 PG (ref 26–34)
MCHC RBC AUTO-ENTMCNC: 33.8 G/DL (ref 31–36)
MCV RBC AUTO: 86.9 FL (ref 80–100)
MONOCYTES # BLD: 1.2 K/UL (ref 0–1.3)
MONOCYTES NFR BLD: 7.2 %
NEUTROPHILS # BLD: 13.2 K/UL (ref 1.7–7.7)
NEUTROPHILS NFR BLD: 81.7 %
NT-PROBNP SERPL-MCNC: 243 PG/ML (ref 0–124)
PLATELET # BLD AUTO: 317 K/UL (ref 135–450)
PMV BLD AUTO: 7.5 FL (ref 5–10.5)
POTASSIUM SERPL-SCNC: 3.7 MMOL/L (ref 3.5–5.1)
PROT SERPL-MCNC: 7.9 G/DL (ref 6.4–8.2)
RBC # BLD AUTO: 4.62 M/UL (ref 4–5.2)
SODIUM SERPL-SCNC: 137 MMOL/L (ref 136–145)
TROPONIN, HIGH SENSITIVITY: <6 NG/L (ref 0–14)
WBC # BLD AUTO: 16.2 K/UL (ref 4–11)

## 2025-02-27 PROCEDURE — 96375 TX/PRO/DX INJ NEW DRUG ADDON: CPT

## 2025-02-27 PROCEDURE — 6370000000 HC RX 637 (ALT 250 FOR IP): Performed by: INTERNAL MEDICINE

## 2025-02-27 PROCEDURE — 2500000003 HC RX 250 WO HCPCS: Performed by: INTERNAL MEDICINE

## 2025-02-27 PROCEDURE — 96366 THER/PROPH/DIAG IV INF ADDON: CPT

## 2025-02-27 PROCEDURE — 6360000002 HC RX W HCPCS: Performed by: EMERGENCY MEDICINE

## 2025-02-27 PROCEDURE — 71045 X-RAY EXAM CHEST 1 VIEW: CPT

## 2025-02-27 PROCEDURE — 6360000002 HC RX W HCPCS

## 2025-02-27 PROCEDURE — 85025 COMPLETE CBC W/AUTO DIFF WBC: CPT

## 2025-02-27 PROCEDURE — 93325 DOPPLER ECHO COLOR FLOW MAPG: CPT | Performed by: INTERNAL MEDICINE

## 2025-02-27 PROCEDURE — 93308 TTE F-UP OR LMTD: CPT

## 2025-02-27 PROCEDURE — 2060000000 HC ICU INTERMEDIATE R&B

## 2025-02-27 PROCEDURE — 93321 DOPPLER ECHO F-UP/LMTD STD: CPT | Performed by: INTERNAL MEDICINE

## 2025-02-27 PROCEDURE — 93005 ELECTROCARDIOGRAM TRACING: CPT | Performed by: EMERGENCY MEDICINE

## 2025-02-27 PROCEDURE — 84703 CHORIONIC GONADOTROPIN ASSAY: CPT

## 2025-02-27 PROCEDURE — 84484 ASSAY OF TROPONIN QUANT: CPT

## 2025-02-27 PROCEDURE — 80053 COMPREHEN METABOLIC PANEL: CPT

## 2025-02-27 PROCEDURE — 99222 1ST HOSP IP/OBS MODERATE 55: CPT | Performed by: INTERNAL MEDICINE

## 2025-02-27 PROCEDURE — 93308 TTE F-UP OR LMTD: CPT | Performed by: INTERNAL MEDICINE

## 2025-02-27 PROCEDURE — 96365 THER/PROPH/DIAG IV INF INIT: CPT

## 2025-02-27 PROCEDURE — 83735 ASSAY OF MAGNESIUM: CPT

## 2025-02-27 PROCEDURE — 36415 COLL VENOUS BLD VENIPUNCTURE: CPT

## 2025-02-27 PROCEDURE — 93010 ELECTROCARDIOGRAM REPORT: CPT | Performed by: INTERNAL MEDICINE

## 2025-02-27 PROCEDURE — 99285 EMERGENCY DEPT VISIT HI MDM: CPT

## 2025-02-27 PROCEDURE — 96372 THER/PROPH/DIAG INJ SC/IM: CPT

## 2025-02-27 PROCEDURE — 83880 ASSAY OF NATRIURETIC PEPTIDE: CPT

## 2025-02-27 RX ORDER — OLANZAPINE 5 MG/1
7.5 TABLET ORAL EVERY MORNING
Status: DISCONTINUED | OUTPATIENT
Start: 2025-02-28 | End: 2025-03-07 | Stop reason: HOSPADM

## 2025-02-27 RX ORDER — SPIRONOLACTONE 25 MG/1
25 TABLET ORAL DAILY
Status: DISCONTINUED | OUTPATIENT
Start: 2025-02-28 | End: 2025-03-07 | Stop reason: HOSPADM

## 2025-02-27 RX ORDER — SODIUM CHLORIDE 9 MG/ML
INJECTION, SOLUTION INTRAVENOUS PRN
Status: DISCONTINUED | OUTPATIENT
Start: 2025-02-27 | End: 2025-02-28

## 2025-02-27 RX ORDER — GABAPENTIN 300 MG/1
600 CAPSULE ORAL 3 TIMES DAILY
Status: DISCONTINUED | OUTPATIENT
Start: 2025-02-27 | End: 2025-03-07 | Stop reason: HOSPADM

## 2025-02-27 RX ORDER — SODIUM CHLORIDE 0.9 % (FLUSH) 0.9 %
5-40 SYRINGE (ML) INJECTION EVERY 12 HOURS SCHEDULED
Status: DISCONTINUED | OUTPATIENT
Start: 2025-02-27 | End: 2025-03-07 | Stop reason: HOSPADM

## 2025-02-27 RX ORDER — ENOXAPARIN SODIUM 100 MG/ML
40 INJECTION SUBCUTANEOUS DAILY
Status: DISCONTINUED | OUTPATIENT
Start: 2025-02-27 | End: 2025-02-27

## 2025-02-27 RX ORDER — LISINOPRIL 5 MG/1
2.5 TABLET ORAL DAILY
Status: DISCONTINUED | OUTPATIENT
Start: 2025-02-28 | End: 2025-03-07 | Stop reason: HOSPADM

## 2025-02-27 RX ORDER — SODIUM CHLORIDE 0.9 % (FLUSH) 0.9 %
5-40 SYRINGE (ML) INJECTION PRN
Status: DISCONTINUED | OUTPATIENT
Start: 2025-02-27 | End: 2025-02-28

## 2025-02-27 RX ORDER — MAGNESIUM SULFATE IN WATER 40 MG/ML
2000 INJECTION, SOLUTION INTRAVENOUS ONCE
Status: COMPLETED | OUTPATIENT
Start: 2025-02-27 | End: 2025-02-27

## 2025-02-27 RX ORDER — IBUPROFEN 400 MG/1
400 TABLET, FILM COATED ORAL EVERY 6 HOURS PRN
Status: DISCONTINUED | OUTPATIENT
Start: 2025-02-27 | End: 2025-03-07 | Stop reason: HOSPADM

## 2025-02-27 RX ORDER — ATORVASTATIN CALCIUM 10 MG/1
20 TABLET, FILM COATED ORAL DAILY
Status: DISCONTINUED | OUTPATIENT
Start: 2025-02-28 | End: 2025-03-07 | Stop reason: HOSPADM

## 2025-02-27 RX ORDER — ENOXAPARIN SODIUM 100 MG/ML
40 INJECTION SUBCUTANEOUS 2 TIMES DAILY
Status: DISCONTINUED | OUTPATIENT
Start: 2025-02-27 | End: 2025-03-06

## 2025-02-27 RX ORDER — ONDANSETRON 2 MG/ML
4 INJECTION INTRAMUSCULAR; INTRAVENOUS EVERY 6 HOURS PRN
Status: DISCONTINUED | OUTPATIENT
Start: 2025-02-27 | End: 2025-03-07 | Stop reason: HOSPADM

## 2025-02-27 RX ORDER — OLANZAPINE 5 MG/1
15 TABLET ORAL NIGHTLY
Status: DISCONTINUED | OUTPATIENT
Start: 2025-02-27 | End: 2025-03-07 | Stop reason: HOSPADM

## 2025-02-27 RX ORDER — ACETAMINOPHEN 650 MG/1
650 SUPPOSITORY RECTAL EVERY 6 HOURS PRN
Status: DISCONTINUED | OUTPATIENT
Start: 2025-02-27 | End: 2025-03-04

## 2025-02-27 RX ORDER — SODIUM CHLORIDE 0.9 % (FLUSH) 0.9 %
5-40 SYRINGE (ML) INJECTION PRN
Status: DISCONTINUED | OUTPATIENT
Start: 2025-02-27 | End: 2025-03-07 | Stop reason: HOSPADM

## 2025-02-27 RX ORDER — COLCHICINE 0.6 MG/1
0.6 TABLET ORAL 2 TIMES DAILY
Status: DISCONTINUED | OUTPATIENT
Start: 2025-02-27 | End: 2025-03-07 | Stop reason: HOSPADM

## 2025-02-27 RX ORDER — POLYETHYLENE GLYCOL 3350 17 G/17G
17 POWDER, FOR SOLUTION ORAL DAILY PRN
Status: DISCONTINUED | OUTPATIENT
Start: 2025-02-27 | End: 2025-03-07 | Stop reason: HOSPADM

## 2025-02-27 RX ORDER — LORAZEPAM 2 MG/ML
1 INJECTION INTRAMUSCULAR ONCE
Status: COMPLETED | OUTPATIENT
Start: 2025-02-27 | End: 2025-02-27

## 2025-02-27 RX ORDER — ACETAMINOPHEN 325 MG/1
650 TABLET ORAL EVERY 6 HOURS PRN
Status: DISCONTINUED | OUTPATIENT
Start: 2025-02-27 | End: 2025-03-04

## 2025-02-27 RX ORDER — METOPROLOL SUCCINATE 50 MG/1
50 TABLET, EXTENDED RELEASE ORAL EVERY MORNING
Status: DISCONTINUED | OUTPATIENT
Start: 2025-02-28 | End: 2025-03-07 | Stop reason: HOSPADM

## 2025-02-27 RX ORDER — HYDROCODONE BITARTRATE AND ACETAMINOPHEN 5; 325 MG/1; MG/1
1 TABLET ORAL EVERY 6 HOURS PRN
Status: DISCONTINUED | OUTPATIENT
Start: 2025-02-27 | End: 2025-03-04

## 2025-02-27 RX ORDER — SODIUM CHLORIDE 0.9 % (FLUSH) 0.9 %
5-40 SYRINGE (ML) INJECTION EVERY 12 HOURS SCHEDULED
Status: DISCONTINUED | OUTPATIENT
Start: 2025-02-27 | End: 2025-02-28

## 2025-02-27 RX ORDER — ONDANSETRON 4 MG/1
4 TABLET, ORALLY DISINTEGRATING ORAL EVERY 8 HOURS PRN
Status: DISCONTINUED | OUTPATIENT
Start: 2025-02-27 | End: 2025-03-07 | Stop reason: HOSPADM

## 2025-02-27 RX ORDER — ESCITALOPRAM OXALATE 10 MG/1
10 TABLET ORAL DAILY
Status: ON HOLD | COMMUNITY
Start: 2024-07-03 | End: 2025-03-01 | Stop reason: HOSPADM

## 2025-02-27 RX ORDER — CLONAZEPAM 0.5 MG/1
0.5 TABLET ORAL 2 TIMES DAILY PRN
Status: DISCONTINUED | OUTPATIENT
Start: 2025-02-27 | End: 2025-03-07 | Stop reason: HOSPADM

## 2025-02-27 RX ORDER — KETOROLAC TROMETHAMINE 30 MG/ML
15 INJECTION, SOLUTION INTRAMUSCULAR; INTRAVENOUS ONCE
Status: COMPLETED | OUTPATIENT
Start: 2025-02-27 | End: 2025-02-27

## 2025-02-27 RX ADMIN — GABAPENTIN 600 MG: 300 CAPSULE ORAL at 20:12

## 2025-02-27 RX ADMIN — KETOROLAC TROMETHAMINE 15 MG: 30 INJECTION, SOLUTION INTRAMUSCULAR at 08:58

## 2025-02-27 RX ADMIN — CLONAZEPAM 0.5 MG: 0.5 TABLET ORAL at 11:15

## 2025-02-27 RX ADMIN — GABAPENTIN 600 MG: 300 CAPSULE ORAL at 11:15

## 2025-02-27 RX ADMIN — SODIUM CHLORIDE, PRESERVATIVE FREE 10 ML: 5 INJECTION INTRAVENOUS at 10:38

## 2025-02-27 RX ADMIN — LORAZEPAM 1 MG: 2 INJECTION INTRAMUSCULAR; INTRAVENOUS at 07:05

## 2025-02-27 RX ADMIN — GABAPENTIN 600 MG: 300 CAPSULE ORAL at 14:27

## 2025-02-27 RX ADMIN — SODIUM CHLORIDE, PRESERVATIVE FREE 10 ML: 5 INJECTION INTRAVENOUS at 20:13

## 2025-02-27 RX ADMIN — CLONAZEPAM 0.5 MG: 0.5 TABLET ORAL at 15:21

## 2025-02-27 RX ADMIN — HYDROCODONE BITARTRATE AND ACETAMINOPHEN 1 TABLET: 5; 325 TABLET ORAL at 20:17

## 2025-02-27 RX ADMIN — HYDROCODONE BITARTRATE AND ACETAMINOPHEN 1 TABLET: 5; 325 TABLET ORAL at 13:31

## 2025-02-27 RX ADMIN — OLANZAPINE 15 MG: 5 TABLET, FILM COATED ORAL at 20:12

## 2025-02-27 RX ADMIN — MAGNESIUM SULFATE HEPTAHYDRATE 2000 MG: 40 INJECTION, SOLUTION INTRAVENOUS at 07:25

## 2025-02-27 ASSESSMENT — PAIN DESCRIPTION - ORIENTATION
ORIENTATION: LEFT
ORIENTATION: MID
ORIENTATION: LEFT

## 2025-02-27 ASSESSMENT — PAIN - FUNCTIONAL ASSESSMENT: PAIN_FUNCTIONAL_ASSESSMENT: 0-10

## 2025-02-27 ASSESSMENT — PAIN DESCRIPTION - DESCRIPTORS
DESCRIPTORS: SORE
DESCRIPTORS: HEAVINESS;CRUSHING

## 2025-02-27 ASSESSMENT — PAIN DESCRIPTION - LOCATION
LOCATION: CHEST
LOCATION: FOOT
LOCATION: CHEST;LEG
LOCATION: CHEST
LOCATION: CHEST

## 2025-02-27 ASSESSMENT — PAIN SCALES - GENERAL
PAINLEVEL_OUTOF10: 7
PAINLEVEL_OUTOF10: 8
PAINLEVEL_OUTOF10: 6
PAINLEVEL_OUTOF10: 2
PAINLEVEL_OUTOF10: 8
PAINLEVEL_OUTOF10: 0
PAINLEVEL_OUTOF10: 7

## 2025-02-27 ASSESSMENT — LIFESTYLE VARIABLES
HOW MANY STANDARD DRINKS CONTAINING ALCOHOL DO YOU HAVE ON A TYPICAL DAY: PATIENT DOES NOT DRINK
HOW OFTEN DO YOU HAVE A DRINK CONTAINING ALCOHOL: 2-4 TIMES A MONTH

## 2025-02-27 ASSESSMENT — PAIN DESCRIPTION - PAIN TYPE: TYPE: ACUTE PAIN

## 2025-02-27 NOTE — CARE COORDINATION
Case Management Assessment  Initial Evaluation    Date/Time of Evaluation: 2/27/2025 3:49 PM  Assessment Completed by: Ayla Toscano RN    If patient is discharged prior to next notation, then this note serves as note for discharge by case management.    Patient Name: Rubi Robb                   YOB: 1985  Diagnosis: Arrhythmia [I49.9]  Malfunction of cardiac pacemaker, initial encounter [T82.111A]  ICD (implantable cardioverter-defibrillator) in place [Z95.810]  Chest pain, unspecified type [R07.9]  Malfunction of implantable defibrillator ventricular (ICD) lead [T82.198A]                   Date / Time: 2/27/2025  5:39 AM    Patient Admission Status: Inpatient   Readmission Risk (Low < 19, Mod (19-27), High > 27): Readmission Risk Score: 11.4    Current PCP: Damaso Barnard MD  PCP verified by CM? Yes (Damaso Barnard MD)    Chart Reviewed: Yes      History Provided by: Patient, Medical Record  Patient Orientation: Alert and Oriented    Patient Cognition: Alert    Hospitalization in the last 30 days (Readmission):  No    If yes, Readmission Assessment in CM Navigator will be completed.    Advance Directives:      Code Status: Full Code   Patient's Primary Decision Maker is: Patient Declined (Legal Next of Kin Remains as Decision Maker)    Primary Decision Maker: Jorje Robb - Parent - [phone]    Discharge Planning:    Patient lives with: Parent Type of Home: Apartment  Primary Care Giver: Self  Patient Support Systems include: Parent, Friends/Neighbors   Current Financial resources: Medicaid  Current community resources: None  Current services prior to admission: Durable Medical Equipment            Current DME: Cane            Type of Home Care services:  None    ADLS  Prior functional level: Independent in ADLs/IADLs  Current functional level: Independent in ADLs/IADLs    PT AM-PAC:   /24  OT AM-PAC:   /24    Family can provide assistance at DC: Yes  Would you like Case  Management to discuss the discharge plan with any other family members/significant others, and if so, who? No  Plans to Return to Present Housing: Yes  Other Identified Issues/Barriers to RETURNING to current housing: None  Potential Assistance needed at discharge: N/A            Potential DME:    Patient expects to discharge to: Apartment  Plan for transportation at discharge: Family    Financial    Payor: [employer] [place] / Plan: [employer] [place] / Product Type: *No Product type* /     Does insurance require precert for SNF: Yes    Potential assistance Purchasing Medications: No  Meds-to-Beds request:        [facility] [place] - [address] - P [phone] - F [phone]  [address] [facility] [address]  Phone: [phone] Fax: [phone]      Notes:    Factors facilitating achievement of predicted outcomes: Family support, Cooperative, and Pleasant    Barriers to discharge: Decreased endurance, Medical complications, and Malfunctioning ICD lead    Additional Case Management Notes: IPTA; Lives in first floor Apt. With parent. Has cane if needed. No Home care preferences. No needs at this time Will follow    The Plan for Transition of Care is related to the following treatment goals of Arrhythmia [I49.9]  Malfunction of cardiac pacemaker, initial encounter [T82.111A]  ICD (implantable cardioverter-defibrillator) in place [Z95.810]  Chest pain, unspecified type [R07.9]  Malfunction of implantable defibrillator ventricular (ICD) lead [T82.198A]    IF APPLICABLE: The Patient and/or patient representative Rubi and her family were provided with a choice of provider and agrees with the discharge plan. Freedom of choice list with basic dialogue that supports the patient's individualized plan of care/goals and shares the quality data associated with the providers was provided to:     Patient Representative Name:       The Patient and/or Patient Representative Agree with the

## 2025-02-27 NOTE — ED NOTES
Called @ 0733  Per Dr. Linda  RE:  pacemaker problem  Called again @ 0814  Dr. Gunderson came and spoke to Dr. Fowler @ 0863

## 2025-02-27 NOTE — H&P
[facility] Medicine History & Physical    V 1.6    Date of Admission: 2/27/2025    Date of Service:  Pt seen/examined on 2/27/2025     [x]Admitted to Inpatient with expected LOS greater than two midnights due to medical therapy.  []Placed in Observation status.    Chief Admission Complaint:   Pacemaker/defibrillator fired 2 times on the morning of admission    Presenting Admission History:      39 y.o. 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  She has a remote history of rhabdomyosarcoma of left lower extremity.  This was at age 1 she was hospitalized at [facility] for more than 1 year for treatment of this.  She underwent surgery, chemotherapy and radiation therapy.    She did have a right sided dual coil Medtronic ICD implanted in 6/2019 due to her history of V-fib arrest.  Of pertinence is that on 2/14/2025 she was admitted to [facility] for lead extraction of right ventricular defibrillator lead, removal of ICD pulse generator.  Insertion of new right ventricular defibrillator lead and insertion of new right atrial lead.  Implantation of new MRI compatible dual-chamber ICD.  She was discharged home and doing well until the day of admission.  She had impedance alarms overnight and came into the ED because she was concerned about the pacemaker.  She denies any shortness of breath states she has mild chest discomfort.  There is been no fevers no chills, the site of the device in the anterior chest wall is clean and healing well    At this time it seems she has retraction of her right ventricular lead slightly.  She will likely need lead revision or removal and reimplantation during this admission.  She is now admitted for further evaluation management.  Cardiology consulted and appreciate their input       Assessment/Plan:

## 2025-02-27 NOTE — ED PROVIDER NOTES
EMERGENCY DEPARTMENT ENCOUNTER     [facility] EMERGENCY DEPARTMENT     Pt Name: Rubi Robb   MRN: [id number]   Birthdate 1985   Date of evaluation: 2/27/2025   Provider: Rocael Fowler DO   PCP: Damaso Barnard MD   Note Started: 6:01 AM EST 2/27/25     CHIEF COMPLAINT     Chief Complaint   Patient presents with    Pacemaker Problem     Concern about her newly placed defilbrilator/ pacemaker firing twice once at 3am and the other one at 415am. No cp during the time of triage or sob        HISTORY OF PRESENT ILLNESS:  History from : Patient   Limitations to history : None     Rubi Robb is a 39 y.o. female with past medical history of anxiety, CKD, bipolar, PTSD, dilated cardiomyopathy, ICD, CHF, V. tach, V-fib who presents with her pacemaker firing at 3 AM and 4:15 AM.  Patient states that she has had a pacemaker for 3 years but a recent x-ray showed that one of the leads was not in the correct place.  Her cardiologist put in new pacemaker on 2/14.  She was supposed to follow-up with them recently because per her cardiologist, something was wrong with her left ventricle.  She was planned to have an echo tomorrow.  Currently she reports some constant chest pressure that started after she woke up in the center of her chest.  No radiation.  She denies fever, chills, chest pain, shortness of breath, nausea, vomiting, diarrhea, constipation.    Nursing Notes were all reviewed and agreed with or any disagreements were addressed in the HPI.     ROS: Positives and Pertinent negatives as per HPI.    PAST MEDICAL HISTORY     PHYSICAL EXAM:  ED Triage Vitals [02/27/25 0548]   BP Systolic BP Percentile Diastolic BP Percentile Temp Temp Source Pulse Respirations SpO2   106/75 -- -- 97.8 °F (36.6 °C) Oral 90 17 99 %      Height Weight - Scale         1.524 m (5') 72.6 kg (160 lb)              Physical Exam  Constitutional:       Appearance: Normal appearance.   HENT:      Head: Normocephalic and  medications:   Medications   magnesium sulfate 2000 mg in 50 mL IVPB premix (2,000 mg IntraVENous New Bag 2/27/25 6675)   LORazepam (ATIVAN) injection 1 mg (1 mg IntraVENous Given 2/27/25 0705)             CC/HPI Summary, DDx, ED Course, and Reassessment:     Rubi Robb is a 39 y.o. female with past medical history of anxiety, CKD, bipolar, PTSD, dilated cardiomyopathy, ICD, CHF, V. tach, V-fib who presents with her pacemaker firing at 3 AM and 4:15 AM.  She also reports chest pressure.  Differential diagnoses include ACS, pacemaker issue, pacemaker placement issue, CHF exacerbation.  EKG normal sinus rhythm with nonspecific ST abnormality.  Chest x-ray showed right subclavian pacemaker/AICD with leads projecting over the right atrium and right ventricle. The position of the right atrial lead appears changed compared to prior study with medial orientation.  CBC showed WBC 16.2, CMP showed glucose 140 and ALT/AST 45/49, troponin <6, Mg 1.2, BNP, HCG negative.  Cardio ordered a stat two-view chest x-ray and echo.  RV lead possibly out of place.  [employer] rep to come in person and evaluate device.  Cardio would like to admit patient for overnight monitoring to evaluate pacemaker and echo.    CONSULTS: Cardiology  Social Determinants: None   Chronic Conditions: NA    Disposition Considerations: None      I am the primary physician of Record.     FINAL IMPRESSION    1. Malfunction of cardiac pacemaker, initial encounter    2. Chest pain, unspecified type    3. ICD (implantable cardioverter-defibrillator) in place-[employer]TRONIC [place] implant 2/14/2025-MRI compatible         DISPOSITION/PLAN   DISPOSITION Decision To Admit 02/27/2025 08:22:08 AM   DISPOSITION CONDITION Stable            PATIENT REFERRED TO:   No follow-up provider specified.   DISCHARGE MEDICATIONS:   New Prescriptions    No medications on file      DISCONTINUED MEDICATIONS:   Discontinued Medications    No medications on file            (Please note

## 2025-02-27 NOTE — PROGRESS NOTES
Scheduled for RV lead revision 2/28/2025.  Same RV lead in use.    Changes This Session   SVT V. Limit 260 ms 240 ms  RV Amplitude 5.00 V 3.50 V  RV Pulse Width 1.00 ms 0.40 ms  RV Sense Polarity Tip to Coil Bipolar    Pt seen today on 2/27/2025 in consultation due to chief complaint of device alarm for RV lead impedence change and higher threshold.     Patient is a 39 y.o. female with PMH of 39 y.o. female with PMH of  VF arrest s/p right sided dual coil Medtronic ICD implanted in June of 2019, NICM, CHFrEF of 40%, syncope, sarcoma, bipolar disorder, who had RV lead extraction and implantation of new RV lead and new RA lead on 2/14/2025..     She has had episodes of NSVT in the past on her device interrogation. In 2019, she had a VF arrest and had to get intubated with subsequent aspiration pneumonia. She then had a right sided dual coil Medtronic ICD implanted. Her RV lead had retracted significantly when she was seen by Dr. Purcell and she was subsequently sent for RV lead removal and implantation of a different RV lead.  She also had multiple episodes of SVT detection.     Therefore, she had an extraction of RV lead and reimplantation of a new right sided dual-chamber Medtronic ICD system.     However, she had retraction of her RV lead slightly.  Echocardiogram that was done this morning while I was in the room does not show any significant effusion.  Her RV lead is still in the distal septum.  However, the device interrogation that was done shows a lower impedance and high threshold.  She had impedance alarms overnight.  She did not have any shocks

## 2025-02-27 NOTE — CONSULTS
CARDIOLOGY CONSULT NOTE  02/27/25  Patient Name: Rubi Robb  YOB: 1985    Primary Care Physician: Damaso Barnard MD     Rubi Robb was seen today on 2/27/2025 in consultation due to chief complaint of device alarm for RV lead impedence change and higher threshold.    Patient is a 39 y.o. female with PMH of 39 y.o. female with PMH of  VF arrest s/p right sided dual coil Medtronic ICD implanted in June of 2019, NICM, CHFrEF of 40%, syncope, sarcoma, bipolar disorder, who had RV lead extraction and implantation of new RV lead and new RA lead on 2/14/2025..     She has had episodes of NSVT in the past on her device interrogation. In 2019, she had a VF arrest and had to get intubated with subsequent aspiration pneumonia. She then had a right sided dual coil Medtronic ICD implanted. Her RV lead had retracted significantly when she was seen by Dr. Purcell and she was subsequently sent for RV lead removal and implantation of a different RV lead.  She also had multiple episodes of SVT detection.     Therefore, she had an extraction of RV lead and reimplantation of a new right sided dual-chamber Medtronic ICD system.    However, she had retraction of her RV lead slightly.  Echocardiogram that was done this morning while I was in the room does not show any significant effusion.  Her RV lead is still in the distal septum.  However, the device interrogation that was done shows a lower impedance and high threshold.  She had impedance alarms overnight.  She did not have any shocks, but did have alarm and is in the ER because of that.  She also complains of chest discomfort.     ALLERGIES:   Allergies   Allergen Reactions    Entresto [Sacubitril-Valsartan]      lighheaded    Tape [Adhesive Tape] Rash     Rash that \"itched and burned\"        MEDICATIONS:   Current Facility-Administered Medications   Medication Dose Route Frequency Provider Last Rate Last Admin    magnesium sulfate 2000 mg in 50 mL IVPB  premix  2,000 mg IntraVENous Once Rocael Fowler DO 25 mL/hr at 02/27/25 0725 2,000 mg at 02/27/25 0725    ketorolac (TORADOL) injection 15 mg  15 mg IntraMUSCular Once Rocael Fowler DO         Current Outpatient Medications   Medication Sig Dispense Refill    HYDROcodone-acetaminophen (LORCET) 5-325 MG per tablet Take 1 tablet by mouth every 6 hours as needed for Pain for up to 30 days. Intended supply: 3 days. Take lowest dose possible to manage pain Max Daily Amount: 4 tablets 120 tablet 0    Magnesium Oxide (MAGNESIUM-OXIDE) 250 MG TABS tablet Take 1 tablet by mouth daily 30 tablet 1    OLANZapine (ZYPREXA) 15 MG tablet Take 1 tablet by mouth nightly 90 tablet 1    gabapentin (NEURONTIN) 600 MG tablet Take 1 tablet by mouth 3 times daily for 90 days. 270 tablet 0    OLANZapine (ZYPREXA) 7.5 MG tablet Take 1 tablet by mouth every morning 90 tablet 1    clonazePAM (KLONOPIN) 0.5 MG tablet Take 1 tablet by mouth 2 times daily as needed for Anxiety for up to 90 days. Max Daily Amount: 1 mg 60 tablet 2    spironolactone (ALDACTONE) 25 MG tablet Take 1 tablet by mouth daily 90 tablet 3    lisinopril (PRINIVIL;ZESTRIL) 2.5 MG tablet Take 1 tablet by mouth in the morning and at bedtime TAKE ONE TABLET BY MOUTH DAILY 180 tablet 3    metoprolol succinate (TOPROL XL) 50 MG extended release tablet Take 1 tablet by mouth in the morning and at bedtime take 1 tablet by mouth twice a day (Patient taking differently: Take 1 tablet by mouth in the morning and at bedtime Only takes it once a day) 180 tablet 3    atorvastatin (LIPITOR) 20 MG tablet take 1 tablet by mouth once daily 30 tablet 5    ibuprofen (ADVIL;MOTRIN) 400 MG tablet Take 1 tablet by mouth every 6 hours as needed          Past Medical History:   Past Medical History:   Diagnosis Date    Acute on chronic systolic congestive heart failure (HCC)     Arthritis     Bipolar disorder (HCC)     Cancer (HCC)     rhabdomyosarcoma    Cardiac arrest (HCC) 05/2019    VF

## 2025-02-27 NOTE — ED PROVIDER NOTES
I independently examined and evaluated Rubi Robb.    In brief, patient is a 39-year-old female presents to the emergency department for evaluation of pacemaker issue.  Patient reports having cardiomyopathy, history of V. tach and V-fib and had a pacemaker that will was placed on February 14.  Reports waking up from sleep and feeling like she had the pacemaker that fired at 3 AM and 4:15 AM.  Reports having a lot of anxiety currently.  EKG showed normal sinus rhythm with nonspecific T wave abnormality.  Rate was 87.  The pacemaker was interrogated.  Per the [employer] rep, the defibrillator did not fire today.  Labs with troponin less than 6.  Glucose 140.  Pregnancy test negative.  Magnesium 1.2.  Patient was given magnesium repletion's.  Hospitalist consulted for admission for further evaluation and treatment.    All diagnostic, treatment, and disposition decisions were made by myself in conjunction with the advanced practice provider/resident physician.     I personally saw the patient and performed a substantive portion of the visit including aspects of the medical decision making. I approved management plan and take responsibility for the patient management.     I personally saw the patient and independently provided 10 minutes of non-concurrent critical care out of the total shared critical care time provided.    Comment: Please note this report has been produced using speech recognition software and may contain errors related to that system including errors in grammar, punctuation, and spelling, as well as words and phrases that may be inappropriate. If there are any questions or concerns please feel free to contact the dictating provider for clarification.    For all further details of the patient's emergency department visit, please see the advanced practice provider's documentation.        Liz Linda MD  02/28/25 0614

## 2025-02-28 ENCOUNTER — ANESTHESIA (OUTPATIENT)
Dept: CARDIAC CATH/INVASIVE PROCEDURES | Age: 40
End: 2025-02-28
Payer: COMMERCIAL

## 2025-02-28 ENCOUNTER — NURSE ONLY (OUTPATIENT)
Dept: CARDIOLOGY CLINIC | Age: 40
End: 2025-02-28

## 2025-02-28 ENCOUNTER — APPOINTMENT (OUTPATIENT)
Dept: GENERAL RADIOLOGY | Age: 40
End: 2025-02-28
Payer: COMMERCIAL

## 2025-02-28 DIAGNOSIS — Z95.810 ICD (IMPLANTABLE CARDIOVERTER-DEFIBRILLATOR), DUAL, IN SITU: Primary | ICD-10-CM

## 2025-02-28 DIAGNOSIS — I47.20 VT (VENTRICULAR TACHYCARDIA) (HCC): ICD-10-CM

## 2025-02-28 LAB
ANION GAP SERPL CALCULATED.3IONS-SCNC: 10 MMOL/L (ref 3–16)
BUN SERPL-MCNC: 6 MG/DL (ref 7–20)
CALCIUM SERPL-MCNC: 9.3 MG/DL (ref 8.3–10.6)
CHLORIDE SERPL-SCNC: 101 MMOL/L (ref 99–110)
CO2 SERPL-SCNC: 25 MMOL/L (ref 21–32)
CREAT SERPL-MCNC: 0.4 MG/DL (ref 0.6–1.1)
DEPRECATED RDW RBC AUTO: 12.2 % (ref 12.4–15.4)
ECHO BSA: 1.75 M2
GFR SERPLBLD CREATININE-BSD FMLA CKD-EPI: >90 ML/MIN/{1.73_M2}
GLUCOSE SERPL-MCNC: 93 MG/DL (ref 70–99)
HCT VFR BLD AUTO: 37.4 % (ref 36–48)
HGB BLD-MCNC: 12.7 G/DL (ref 12–16)
MAGNESIUM SERPL-MCNC: 1.56 MG/DL (ref 1.8–2.4)
MCH RBC QN AUTO: 29.5 PG (ref 26–34)
MCHC RBC AUTO-ENTMCNC: 34 G/DL (ref 31–36)
MCV RBC AUTO: 86.8 FL (ref 80–100)
PLATELET # BLD AUTO: 303 K/UL (ref 135–450)
PMV BLD AUTO: 7.8 FL (ref 5–10.5)
POTASSIUM SERPL-SCNC: 3.2 MMOL/L (ref 3.5–5.1)
RBC # BLD AUTO: 4.31 M/UL (ref 4–5.2)
SODIUM SERPL-SCNC: 136 MMOL/L (ref 136–145)
WBC # BLD AUTO: 9.9 K/UL (ref 4–11)

## 2025-02-28 PROCEDURE — 2580000003 HC RX 258: Performed by: STUDENT IN AN ORGANIZED HEALTH CARE EDUCATION/TRAINING PROGRAM

## 2025-02-28 PROCEDURE — 4B02XSZ MEASUREMENT OF CARDIAC PACEMAKER, EXTERNAL APPROACH: ICD-10-PCS | Performed by: INTERNAL MEDICINE

## 2025-02-28 PROCEDURE — 2500000003 HC RX 250 WO HCPCS: Performed by: STUDENT IN AN ORGANIZED HEALTH CARE EDUCATION/TRAINING PROGRAM

## 2025-02-28 PROCEDURE — 6360000004 HC RX CONTRAST MEDICATION: Performed by: INTERNAL MEDICINE

## 2025-02-28 PROCEDURE — 75820 VEIN X-RAY ARM/LEG: CPT | Performed by: INTERNAL MEDICINE

## 2025-02-28 PROCEDURE — 2500000003 HC RX 250 WO HCPCS

## 2025-02-28 PROCEDURE — 85027 COMPLETE CBC AUTOMATED: CPT

## 2025-02-28 PROCEDURE — 6360000002 HC RX W HCPCS: Performed by: STUDENT IN AN ORGANIZED HEALTH CARE EDUCATION/TRAINING PROGRAM

## 2025-02-28 PROCEDURE — 80048 BASIC METABOLIC PNL TOTAL CA: CPT

## 2025-02-28 PROCEDURE — 36415 COLL VENOUS BLD VENIPUNCTURE: CPT

## 2025-02-28 PROCEDURE — 33215 REPOSITION PACING-DEFIB LEAD: CPT | Performed by: INTERNAL MEDICINE

## 2025-02-28 PROCEDURE — 3700000000 HC ANESTHESIA ATTENDED CARE: Performed by: INTERNAL MEDICINE

## 2025-02-28 PROCEDURE — 02WA0MZ REVISION OF CARDIAC LEAD IN HEART, OPEN APPROACH: ICD-10-PCS | Performed by: INTERNAL MEDICINE

## 2025-02-28 PROCEDURE — 83735 ASSAY OF MAGNESIUM: CPT

## 2025-02-28 PROCEDURE — 6360000002 HC RX W HCPCS: Performed by: INTERNAL MEDICINE

## 2025-02-28 PROCEDURE — 3700000001 HC ADD 15 MINUTES (ANESTHESIA): Performed by: INTERNAL MEDICINE

## 2025-02-28 PROCEDURE — 6360000002 HC RX W HCPCS

## 2025-02-28 PROCEDURE — 2709999900 HC NON-CHARGEABLE SUPPLY: Performed by: INTERNAL MEDICINE

## 2025-02-28 PROCEDURE — 6370000000 HC RX 637 (ALT 250 FOR IP): Performed by: INTERNAL MEDICINE

## 2025-02-28 PROCEDURE — L3660 SO 8 AB RSTR CAN/WEB PRE OTS: HCPCS | Performed by: INTERNAL MEDICINE

## 2025-02-28 PROCEDURE — 2720000010 HC SURG SUPPLY STERILE: Performed by: INTERNAL MEDICINE

## 2025-02-28 PROCEDURE — 71045 X-RAY EXAM CHEST 1 VIEW: CPT

## 2025-02-28 PROCEDURE — C1889 IMPLANT/INSERT DEVICE, NOC: HCPCS | Performed by: INTERNAL MEDICINE

## 2025-02-28 PROCEDURE — 7100000000 HC PACU RECOVERY - FIRST 15 MIN: Performed by: INTERNAL MEDICINE

## 2025-02-28 PROCEDURE — C1769 GUIDE WIRE: HCPCS | Performed by: INTERNAL MEDICINE

## 2025-02-28 PROCEDURE — 2060000000 HC ICU INTERMEDIATE R&B

## 2025-02-28 PROCEDURE — 93005 ELECTROCARDIOGRAM TRACING: CPT | Performed by: INTERNAL MEDICINE

## 2025-02-28 PROCEDURE — 7100000001 HC PACU RECOVERY - ADDTL 15 MIN: Performed by: INTERNAL MEDICINE

## 2025-02-28 PROCEDURE — 2580000003 HC RX 258: Performed by: INTERNAL MEDICINE

## 2025-02-28 DEVICE — ENVELOPE CMRM6133 ABSORB LRG MR
Type: IMPLANTABLE DEVICE | Site: CHEST  WALL | Status: FUNCTIONAL
Brand: TYRX™

## 2025-02-28 RX ORDER — SODIUM CHLORIDE 0.9 % (FLUSH) 0.9 %
5-40 SYRINGE (ML) INJECTION PRN
Status: DISCONTINUED | OUTPATIENT
Start: 2025-02-28 | End: 2025-02-28 | Stop reason: HOSPADM

## 2025-02-28 RX ORDER — SODIUM CHLORIDE 9 MG/ML
INJECTION, SOLUTION INTRAVENOUS PRN
Status: DISCONTINUED | OUTPATIENT
Start: 2025-02-28 | End: 2025-03-07 | Stop reason: HOSPADM

## 2025-02-28 RX ORDER — LIDOCAINE HYDROCHLORIDE 10 MG/ML
1 INJECTION, SOLUTION INFILTRATION; PERINEURAL
Status: DISCONTINUED | OUTPATIENT
Start: 2025-02-28 | End: 2025-02-28 | Stop reason: HOSPADM

## 2025-02-28 RX ORDER — DROPERIDOL 2.5 MG/ML
0.62 INJECTION, SOLUTION INTRAMUSCULAR; INTRAVENOUS
Status: DISCONTINUED | OUTPATIENT
Start: 2025-02-28 | End: 2025-02-28 | Stop reason: HOSPADM

## 2025-02-28 RX ORDER — NALOXONE HYDROCHLORIDE 0.4 MG/ML
INJECTION, SOLUTION INTRAMUSCULAR; INTRAVENOUS; SUBCUTANEOUS PRN
Status: DISCONTINUED | OUTPATIENT
Start: 2025-02-28 | End: 2025-02-28 | Stop reason: HOSPADM

## 2025-02-28 RX ORDER — ONDANSETRON 2 MG/ML
INJECTION INTRAMUSCULAR; INTRAVENOUS
Status: DISCONTINUED | OUTPATIENT
Start: 2025-02-28 | End: 2025-02-28 | Stop reason: SDUPTHER

## 2025-02-28 RX ORDER — DIPHENHYDRAMINE HYDROCHLORIDE 50 MG/ML
12.5 INJECTION INTRAMUSCULAR; INTRAVENOUS
Status: DISCONTINUED | OUTPATIENT
Start: 2025-02-28 | End: 2025-02-28 | Stop reason: HOSPADM

## 2025-02-28 RX ORDER — SODIUM CHLORIDE 0.9 % (FLUSH) 0.9 %
5-40 SYRINGE (ML) INJECTION EVERY 12 HOURS SCHEDULED
Status: DISCONTINUED | OUTPATIENT
Start: 2025-02-28 | End: 2025-02-28 | Stop reason: HOSPADM

## 2025-02-28 RX ORDER — SODIUM CHLORIDE, SODIUM LACTATE, POTASSIUM CHLORIDE, CALCIUM CHLORIDE 600; 310; 30; 20 MG/100ML; MG/100ML; MG/100ML; MG/100ML
INJECTION, SOLUTION INTRAVENOUS CONTINUOUS
Status: DISCONTINUED | OUTPATIENT
Start: 2025-02-28 | End: 2025-02-28 | Stop reason: HOSPADM

## 2025-02-28 RX ORDER — SODIUM CHLORIDE 0.9 % (FLUSH) 0.9 %
5-40 SYRINGE (ML) INJECTION PRN
Status: DISCONTINUED | OUTPATIENT
Start: 2025-02-28 | End: 2025-02-28

## 2025-02-28 RX ORDER — MAGNESIUM SULFATE IN WATER 40 MG/ML
2000 INJECTION, SOLUTION INTRAVENOUS ONCE
Status: COMPLETED | OUTPATIENT
Start: 2025-02-28 | End: 2025-02-28

## 2025-02-28 RX ORDER — IOPAMIDOL 755 MG/ML
INJECTION, SOLUTION INTRAVASCULAR PRN
Status: DISCONTINUED | OUTPATIENT
Start: 2025-02-28 | End: 2025-02-28 | Stop reason: HOSPADM

## 2025-02-28 RX ORDER — BUPIVACAINE HYDROCHLORIDE 5 MG/ML
INJECTION, SOLUTION EPIDURAL; INTRACAUDAL PRN
Status: DISCONTINUED | OUTPATIENT
Start: 2025-02-28 | End: 2025-02-28 | Stop reason: HOSPADM

## 2025-02-28 RX ORDER — SODIUM CHLORIDE 0.9 % (FLUSH) 0.9 %
5-40 SYRINGE (ML) INJECTION PRN
Status: DISCONTINUED | OUTPATIENT
Start: 2025-02-28 | End: 2025-02-28 | Stop reason: SDUPTHER

## 2025-02-28 RX ORDER — LIDOCAINE HYDROCHLORIDE 20 MG/ML
INJECTION, SOLUTION EPIDURAL; INFILTRATION; INTRACAUDAL; PERINEURAL PRN
Status: DISCONTINUED | OUTPATIENT
Start: 2025-02-28 | End: 2025-02-28 | Stop reason: HOSPADM

## 2025-02-28 RX ORDER — OXYCODONE HYDROCHLORIDE 5 MG/1
10 TABLET ORAL PRN
Status: DISCONTINUED | OUTPATIENT
Start: 2025-02-28 | End: 2025-02-28 | Stop reason: HOSPADM

## 2025-02-28 RX ORDER — SODIUM CHLORIDE 9 MG/ML
INJECTION, SOLUTION INTRAVENOUS PRN
Status: DISCONTINUED | OUTPATIENT
Start: 2025-02-28 | End: 2025-02-28 | Stop reason: SDUPTHER

## 2025-02-28 RX ORDER — SODIUM CHLORIDE 9 MG/ML
INJECTION, SOLUTION INTRAVENOUS PRN
Status: DISCONTINUED | OUTPATIENT
Start: 2025-02-28 | End: 2025-02-28 | Stop reason: HOSPADM

## 2025-02-28 RX ORDER — POTASSIUM CHLORIDE 1500 MG/1
40 TABLET, EXTENDED RELEASE ORAL ONCE
Status: COMPLETED | OUTPATIENT
Start: 2025-02-28 | End: 2025-02-28

## 2025-02-28 RX ORDER — KETOROLAC TROMETHAMINE 30 MG/ML
INJECTION, SOLUTION INTRAMUSCULAR; INTRAVENOUS
Status: DISCONTINUED | OUTPATIENT
Start: 2025-02-28 | End: 2025-02-28 | Stop reason: SDUPTHER

## 2025-02-28 RX ORDER — MIDAZOLAM HYDROCHLORIDE 1 MG/ML
INJECTION, SOLUTION INTRAMUSCULAR; INTRAVENOUS
Status: DISCONTINUED | OUTPATIENT
Start: 2025-02-28 | End: 2025-02-28 | Stop reason: SDUPTHER

## 2025-02-28 RX ORDER — VANCOMYCIN 1.75 G/350ML
1250 INJECTION, SOLUTION INTRAVENOUS ONCE
Status: COMPLETED | OUTPATIENT
Start: 2025-02-28 | End: 2025-02-28

## 2025-02-28 RX ORDER — DEXAMETHASONE SODIUM PHOSPHATE 4 MG/ML
INJECTION, SOLUTION INTRA-ARTICULAR; INTRALESIONAL; INTRAMUSCULAR; INTRAVENOUS; SOFT TISSUE
Status: DISCONTINUED | OUTPATIENT
Start: 2025-02-28 | End: 2025-02-28 | Stop reason: SDUPTHER

## 2025-02-28 RX ORDER — LABETALOL HYDROCHLORIDE 5 MG/ML
10 INJECTION, SOLUTION INTRAVENOUS
Status: DISCONTINUED | OUTPATIENT
Start: 2025-02-28 | End: 2025-02-28 | Stop reason: HOSPADM

## 2025-02-28 RX ORDER — OXYCODONE HYDROCHLORIDE 5 MG/1
5 TABLET ORAL PRN
Status: DISCONTINUED | OUTPATIENT
Start: 2025-02-28 | End: 2025-02-28 | Stop reason: HOSPADM

## 2025-02-28 RX ORDER — PROPOFOL 10 MG/ML
INJECTION, EMULSION INTRAVENOUS
Status: DISCONTINUED | OUTPATIENT
Start: 2025-02-28 | End: 2025-02-28 | Stop reason: SDUPTHER

## 2025-02-28 RX ORDER — MIDAZOLAM HYDROCHLORIDE 5 MG/ML
2 INJECTION, SOLUTION INTRAMUSCULAR; INTRAVENOUS
Status: DISCONTINUED | OUTPATIENT
Start: 2025-02-28 | End: 2025-02-28 | Stop reason: HOSPADM

## 2025-02-28 RX ORDER — POTASSIUM CHLORIDE 7.45 MG/ML
INJECTION INTRAVENOUS
Status: DISCONTINUED | OUTPATIENT
Start: 2025-02-28 | End: 2025-02-28 | Stop reason: SDUPTHER

## 2025-02-28 RX ORDER — SODIUM CHLORIDE 0.9 % (FLUSH) 0.9 %
5-40 SYRINGE (ML) INJECTION EVERY 12 HOURS SCHEDULED
Status: DISCONTINUED | OUTPATIENT
Start: 2025-02-28 | End: 2025-03-07 | Stop reason: HOSPADM

## 2025-02-28 RX ORDER — FENTANYL CITRATE 50 UG/ML
INJECTION, SOLUTION INTRAMUSCULAR; INTRAVENOUS
Status: DISCONTINUED | OUTPATIENT
Start: 2025-02-28 | End: 2025-02-28 | Stop reason: SDUPTHER

## 2025-02-28 RX ORDER — SODIUM CHLORIDE 9 MG/ML
INJECTION, SOLUTION INTRAVENOUS CONTINUOUS
Status: DISCONTINUED | OUTPATIENT
Start: 2025-02-28 | End: 2025-03-01

## 2025-02-28 RX ORDER — ONDANSETRON 2 MG/ML
4 INJECTION INTRAMUSCULAR; INTRAVENOUS
Status: DISCONTINUED | OUTPATIENT
Start: 2025-02-28 | End: 2025-02-28 | Stop reason: HOSPADM

## 2025-02-28 RX ORDER — SODIUM CHLORIDE 0.9 % (FLUSH) 0.9 %
5-40 SYRINGE (ML) INJECTION PRN
Status: DISCONTINUED | OUTPATIENT
Start: 2025-02-28 | End: 2025-03-07 | Stop reason: HOSPADM

## 2025-02-28 RX ORDER — PHENYLEPHRINE HCL IN 0.9% NACL 1 MG/10 ML
SYRINGE (ML) INTRAVENOUS
Status: DISCONTINUED | OUTPATIENT
Start: 2025-02-28 | End: 2025-02-28 | Stop reason: SDUPTHER

## 2025-02-28 RX ORDER — SODIUM CHLORIDE 0.9 % (FLUSH) 0.9 %
5-40 SYRINGE (ML) INJECTION EVERY 12 HOURS SCHEDULED
Status: DISCONTINUED | OUTPATIENT
Start: 2025-02-28 | End: 2025-02-28 | Stop reason: SDUPTHER

## 2025-02-28 RX ORDER — SODIUM CHLORIDE 0.9 % (FLUSH) 0.9 %
5-40 SYRINGE (ML) INJECTION EVERY 12 HOURS SCHEDULED
Status: DISCONTINUED | OUTPATIENT
Start: 2025-02-28 | End: 2025-02-28

## 2025-02-28 RX ORDER — MAGNESIUM SULFATE IN WATER 40 MG/ML
INJECTION, SOLUTION INTRAVENOUS
Status: DISCONTINUED | OUTPATIENT
Start: 2025-02-28 | End: 2025-02-28 | Stop reason: SDUPTHER

## 2025-02-28 RX ADMIN — HYDROCODONE BITARTRATE AND ACETAMINOPHEN 1 TABLET: 5; 325 TABLET ORAL at 20:24

## 2025-02-28 RX ADMIN — GABAPENTIN 600 MG: 300 CAPSULE ORAL at 20:24

## 2025-02-28 RX ADMIN — HYDROMORPHONE HYDROCHLORIDE 0.5 MG: 1 INJECTION, SOLUTION INTRAMUSCULAR; INTRAVENOUS; SUBCUTANEOUS at 17:16

## 2025-02-28 RX ADMIN — PROPOFOL 200 MG: 10 INJECTION, EMULSION INTRAVENOUS at 14:38

## 2025-02-28 RX ADMIN — GABAPENTIN 600 MG: 300 CAPSULE ORAL at 09:05

## 2025-02-28 RX ADMIN — KETOROLAC TROMETHAMINE 30 MG: 30 INJECTION, SOLUTION INTRAMUSCULAR; INTRAVENOUS at 17:07

## 2025-02-28 RX ADMIN — FENTANYL CITRATE 50 MCG: 50 INJECTION, SOLUTION INTRAMUSCULAR; INTRAVENOUS at 15:25

## 2025-02-28 RX ADMIN — MIDAZOLAM 2 MG: 1 INJECTION INTRAMUSCULAR; INTRAVENOUS at 14:27

## 2025-02-28 RX ADMIN — COLCHICINE 0.6 MG: 0.6 TABLET, FILM COATED ORAL at 20:24

## 2025-02-28 RX ADMIN — POTASSIUM CHLORIDE 10 MEQ: 7.46 INJECTION, SOLUTION INTRAVENOUS at 15:52

## 2025-02-28 RX ADMIN — LISINOPRIL 2.5 MG: 5 TABLET ORAL at 09:05

## 2025-02-28 RX ADMIN — HYDROMORPHONE HYDROCHLORIDE 0.5 MG: 1 INJECTION, SOLUTION INTRAMUSCULAR; INTRAVENOUS; SUBCUTANEOUS at 18:35

## 2025-02-28 RX ADMIN — HYDROMORPHONE HYDROCHLORIDE 0.5 MG: 1 INJECTION, SOLUTION INTRAMUSCULAR; INTRAVENOUS; SUBCUTANEOUS at 17:37

## 2025-02-28 RX ADMIN — OLANZAPINE 15 MG: 5 TABLET, FILM COATED ORAL at 20:24

## 2025-02-28 RX ADMIN — ATORVASTATIN CALCIUM 20 MG: 10 TABLET, FILM COATED ORAL at 09:05

## 2025-02-28 RX ADMIN — FENTANYL CITRATE 25 MCG: 50 INJECTION, SOLUTION INTRAMUSCULAR; INTRAVENOUS at 16:02

## 2025-02-28 RX ADMIN — MAGNESIUM SULFATE HEPTAHYDRATE 2000 MG: 40 INJECTION, SOLUTION INTRAVENOUS at 19:06

## 2025-02-28 RX ADMIN — CLONAZEPAM 0.5 MG: 0.5 TABLET ORAL at 10:53

## 2025-02-28 RX ADMIN — POTASSIUM CHLORIDE 10 MEQ: 7.46 INJECTION, SOLUTION INTRAVENOUS at 15:20

## 2025-02-28 RX ADMIN — SODIUM CHLORIDE: 9 INJECTION, SOLUTION INTRAVENOUS at 09:53

## 2025-02-28 RX ADMIN — FENTANYL CITRATE 25 MCG: 50 INJECTION, SOLUTION INTRAMUSCULAR; INTRAVENOUS at 16:49

## 2025-02-28 RX ADMIN — Medication 50 MG: at 14:35

## 2025-02-28 RX ADMIN — ONDANSETRON 4 MG: 2 INJECTION INTRAMUSCULAR; INTRAVENOUS at 14:55

## 2025-02-28 RX ADMIN — PROPOFOL 150 MCG/KG/MIN: 10 INJECTION, EMULSION INTRAVENOUS at 14:35

## 2025-02-28 RX ADMIN — MAGNESIUM SULFATE IN WATER 2000 MG: 40 INJECTION, SOLUTION INTRAVENOUS at 15:15

## 2025-02-28 RX ADMIN — Medication 100 MCG: at 14:45

## 2025-02-28 RX ADMIN — CLONAZEPAM 0.5 MG: 0.5 TABLET ORAL at 22:13

## 2025-02-28 RX ADMIN — DEXMEDETOMIDINE HYDROCHLORIDE 4 MCG: 100 INJECTION, SOLUTION INTRAVENOUS at 17:29

## 2025-02-28 RX ADMIN — OLANZAPINE 7.5 MG: 5 TABLET, FILM COATED ORAL at 09:04

## 2025-02-28 RX ADMIN — DEXMEDETOMIDINE HYDROCHLORIDE 6 MCG: 100 INJECTION, SOLUTION INTRAVENOUS at 17:24

## 2025-02-28 RX ADMIN — PROPOFOL 100 MG: 10 INJECTION, EMULSION INTRAVENOUS at 16:37

## 2025-02-28 RX ADMIN — POTASSIUM CHLORIDE 40 MEQ: 1500 TABLET, EXTENDED RELEASE ORAL at 18:34

## 2025-02-28 RX ADMIN — DEXAMETHASONE SODIUM PHOSPHATE 4 MG: 4 INJECTION, SOLUTION INTRAMUSCULAR; INTRAVENOUS at 14:55

## 2025-02-28 RX ADMIN — METHOCARBAMOL 1000 MG: 100 INJECTION, SOLUTION INTRAMUSCULAR; INTRAVENOUS at 20:20

## 2025-02-28 RX ADMIN — SPIRONOLACTONE 25 MG: 25 TABLET ORAL at 09:04

## 2025-02-28 RX ADMIN — HYDROMORPHONE HYDROCHLORIDE 0.5 MG: 1 INJECTION, SOLUTION INTRAMUSCULAR; INTRAVENOUS; SUBCUTANEOUS at 17:21

## 2025-02-28 RX ADMIN — VANCOMYCIN 1250 MG: 1.75 INJECTION, SOLUTION INTRAVENOUS at 14:37

## 2025-02-28 RX ADMIN — COLCHICINE 0.6 MG: 0.6 TABLET, FILM COATED ORAL at 09:05

## 2025-02-28 ASSESSMENT — PAIN - FUNCTIONAL ASSESSMENT
PAIN_FUNCTIONAL_ASSESSMENT: 0-10
PAIN_FUNCTIONAL_ASSESSMENT: ACTIVITIES ARE NOT PREVENTED

## 2025-02-28 ASSESSMENT — PAIN DESCRIPTION - PAIN TYPE: TYPE: SURGICAL PAIN

## 2025-02-28 ASSESSMENT — PAIN DESCRIPTION - LOCATION
LOCATION: CHEST
LOCATION: CHEST

## 2025-02-28 ASSESSMENT — PAIN DESCRIPTION - ORIENTATION: ORIENTATION: RIGHT

## 2025-02-28 ASSESSMENT — PAIN SCALES - GENERAL
PAINLEVEL_OUTOF10: 0
PAINLEVEL_OUTOF10: 10
PAINLEVEL_OUTOF10: 8
PAINLEVEL_OUTOF10: 10
PAINLEVEL_OUTOF10: 10
PAINLEVEL_OUTOF10: 0
PAINLEVEL_OUTOF10: 10

## 2025-02-28 ASSESSMENT — PAIN DESCRIPTION - FREQUENCY: FREQUENCY: CONTINUOUS

## 2025-02-28 ASSESSMENT — PAIN DESCRIPTION - DESCRIPTORS
DESCRIPTORS: ACHING
DESCRIPTORS: BURNING

## 2025-02-28 ASSESSMENT — PAIN DESCRIPTION - ONSET: ONSET: ON-GOING

## 2025-02-28 ASSESSMENT — LIFESTYLE VARIABLES: SMOKING_STATUS: 1

## 2025-02-28 NOTE — ANESTHESIA POSTPROCEDURE EVALUATION
Department of Anesthesiology  Postprocedure Note    Patient: Rubi Robb  MRN: [id number]  YOB: 1985  Date of evaluation: 2/28/2025    Procedure Summary       Date: 02/28/25 Room / Location: [facility] CATH/EP LAB 4 / [facility] CARDIAC CATH LAB    Anesthesia Start: 1427 Anesthesia Stop: 1720    Procedure: Insert or replace single lead Diagnosis:       Arrhythmia      (Arrhythmia [I49.9])    Providers: Jess Gunderson DO Responsible Provider: Brayan Peterson MD    Anesthesia Type: General ASA Status: 3            Anesthesia Type: General    Samantha Phase I: Samantha Score: 10    Samantha Phase II:      Anesthesia Post Evaluation    Comments: Postoperative Anesthesia Note    Name:    Rubi Robb  MRN:      [id number]    Patient Vitals in the past 12 hrs:  02/28/25 1752, BP:102/75, Pulse:69, Resp:(!) 7, SpO2:99 %  02/28/25 1742, BP:103/76, Pulse:80, Resp:15, SpO2:99 %  02/28/25 1737, BP:110/73, Pulse:93, Resp:(!) 46, SpO2:97 %  02/28/25 1732, BP:108/80, Pulse:81, Resp:21, SpO2:99 %  02/28/25 1728, BP:105/83, Temp:98 °F (36.7 °C), Temp src:Temporal, Pulse:94, Resp:(!) 9, SpO2:98 %  02/28/25 1723, BP:(!) 119/102, Pulse:93, Resp:19, SpO2:98 %  02/28/25 1717, BP:(!) 118/92, Temp:97.7 °F (36.5 °C), Temp src:Oral, Pulse:(!) 102, Resp:22, SpO2:95 %  02/28/25 1406, Pulse:79, Resp:17, SpO2:100 %  02/28/25 1400, BP:102/67, Pulse:92, Resp:17, SpO2:97 %  02/28/25 1045, BP:110/71, Pulse:81, Resp:16  02/28/25 0958, BP:93/71, Pulse:85, Resp:16 02/28/25 0900, BP:97/63, Pulse:85, Resp:16, SpO2:98 %  02/28/25 0700, BP:110/73, Temp:98.4 °F (36.9 °C), Temp src:Oral, Pulse:(!) 103, Resp:16, SpO2:97 %     LABS:    CBC  Lab Results       Component                Value               Date/Time                  WBC                      9.9                 02/28/2025 04:08 AM        HGB                      12.7                02/28/2025 04:08 AM        HCT                      37.4                02/28/2025 04:08 AM        PLT

## 2025-02-28 NOTE — PROGRESS NOTES
[facility] Medicine Progress Note  V 1.6      Date of Admission: 2/27/2025    Hospital Day: 2      Chief Admission Complaint:  Pacemaker/defibrillator fired 2 times on the morning of admission     Subjective: She is seen after she is returned from the Cath Lab she had revision of RV lead, pocket revision.  Complaining of a lot of pain at the pacemaker site    Presenting Admission History:       39 y.o. 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  She has a remote history of rhabdomyosarcoma of left lower extremity.  This was at age 1 she was hospitalized at [facility] for more than 1 year for treatment of this.  She underwent surgery, chemotherapy and radiation therapy.     She did have a right sided dual coil Medtronic ICD implanted in 6/2019 due to her history of V-fib arrest.  Of pertinence is that on 2/14/2025 she was admitted to [facility] for lead extraction of right ventricular defibrillator lead, removal of ICD pulse generator.  Insertion of new right ventricular defibrillator lead and insertion of new right atrial lead.  Implantation of new MRI compatible dual-chamber ICD.  She was discharged home and doing well until the day of admission.  She had impedance alarms overnight and came into the ED because she was concerned about the pacemaker.  She denies any shortness of breath states she has mild chest discomfort.  There is been no fevers no chills, the site of the device in the anterior chest wall is clean and healing well     At this time it seems she has retraction of her right ventricular lead slightly.  She will  need lead revision or removal and reimplantation during this admission.  She is admitted for further evaluation management.  Cardiology consulted and appreciate their input       Assessment/Plan:   the last 72 hours.  Recent Labs     02/27/25  0618   AST 49*   ALT 45*   BILITOT 0.4   ALKPHOS 115     No results for input(s): \"INR\", \"LACTA\", \"TSH\" in the last 72 hours.    Urine Cultures:   Lab Results   Component Value Date/Time    LABURIN No growth at 18-36 hours 05/17/2019 11:52 AM     Blood Cultures:   Lab Results   Component Value Date/Time    BC  01/18/2023 01:07 AM     No growth 24 hours. No growth 48 hours. No growth at 5 days    BC  01/18/2023 01:07 AM     No growth 24 hours. No growth 48 hours. No growth at 5 days     Lab Results   Component Value Date/Time    BLOODCULT2 No growth after 5 days of incubation. 11/06/2019 12:41 PM     Organism:   Lab Results   Component Value Date/Time    ORG Staphylococcus aureus 05/16/2019 11:50 AM    ORG Candida albicans 05/16/2019 11:50 AM         MADELINE MURCIA MD

## 2025-02-28 NOTE — ANESTHESIA PRE PROCEDURE
performed by Yvan Harden MD at [facility] ENDOSCOPY    CHOLECYSTECTOMY      CHOLECYSTECTOMY  04/15/2014    Laparascopic    ENDOSCOPY, COLON, DIAGNOSTIC      EP DEVICE PROCEDURE N/A 2/14/2025    Laser lead extraction performed by Doug Ahmadi MD at [facility] CARDIAC CATH LAB    EP DEVICE PROCEDURE N/A 2/14/2025    Insert ICD dual performed by Doug Ahmadi MD at [facility] CARDIAC CATH LAB    FOOT SURGERY      GROWTH PLATE SURGERY      INJECTION  01/31/2023    Left Mid Foot Steroid Injection at [facility] with     LEG SURGERY         Social History:    Social History     Tobacco Use    Smoking status: Former     Types: Cigarettes     Passive exposure: Past    Smokeless tobacco: Never    Tobacco comments:     1/4 ppd   Substance Use Topics    Alcohol use: Not Currently     Comment: social                                Counseling given: Not Answered  Tobacco comments: 1/4 ppd      Vital Signs (Current):   Vitals:    02/27/25 2309 02/28/25 0445 02/28/25 0700 02/28/25 0900   BP:   110/73 97/63   Pulse:   (!) 103 85   Resp:   16 16   Temp:   98.4 °F (36.9 °C)    TempSrc:   Oral    SpO2: 94%  97% 98%   Weight:  68.3 kg (150 lb 8 oz)     Height:                                                  BP Readings from Last 3 Encounters:   02/28/25 97/63   02/15/25 103/66   12/18/24 96/64       NPO Status:                                                                                 BMI:   Wt Readings from Last 3 Encounters:   02/28/25 68.3 kg (150 lb 8 oz)   02/15/25 74.2 kg (163 lb 9.3 oz)   12/18/24 71.3 kg (157 lb 1.6 oz)     Body mass index is 29.39 kg/m².    CBC:   Lab Results   Component Value Date/Time    WBC 9.9 02/28/2025 04:08 AM    RBC 4.31 02/28/2025 04:08 AM    HGB 12.7 02/28/2025 04:08 AM    HCT 37.4 02/28/2025 04:08 AM    MCV 86.8 02/28/2025 04:08 AM    RDW 12.2 02/28/2025 04:08 AM     02/28/2025 04:08 AM       CMP:   Lab Results   Component Value Date/Time     02/28/2025 04:08 AM    K 3.2 02/28/2025 04:08

## 2025-02-28 NOTE — PROCEDURES
PROCEDURE NOTE  Date: 2/28/2025   Name: Rubi Robb  YOB: 1985    Procedures    EP DEVICE IMPLANT NOTE  Rubi Robb  1985  [id number]     Procedure: Revision of RV lead, pocket revision, venogram of upper extremity     Attending Physician:Jess Gunderson    Sedation mode and time: Per anesthesia     Indication:  RV ICD lead retraction with low impedance and high threshold     Patient History:  39 y.o. female with PMH of 39 y.o. female with PMH of  VF arrest s/p right sided dual coil Medtronic ICD implanted in June of 2019, NICM, CHFrEF of 40%, syncope, sarcoma, bipolar disorder, who had RV lead extraction and implantation of new RV lead and new RA lead on 2/14/2025..  She presented to the emergency room with chest discomfort on 2/27/2025.  Echo did not any pericardial effusion.  Chest x-ray that was done showed retraction of the lead.  She does report that she moves a lot at night-especially her upper extremities.  Device check showed low impedance high pressure of the RV lead.         Past Medical History  Past Medical History:   Diagnosis Date    Acute on chronic systolic congestive heart failure (HCC)     Arthritis     Bipolar disorder (HCC)     Cancer (HCC)     rhabdomyosarcoma    Cardiac arrest (HCC) 05/2019    VF    Cardiac arrest with ventricular fibrillation (HCC)     Cardiomyopathy (HCC) 05/2019    EF= 40% with global hypokinesis    COPD (chronic obstructive pulmonary disease) (HCC)     Depression     Family history of early CAD     Hepatitis C     Hyperlipidemia     Paranoia (psychosis) (HCC)     Pneumonia due to infectious organism     Rhabdomyosarcoma (HCC)     Scoliosis     Smoker     VF (ventricular fibrillation) (HCC)         Family History  Family History   Problem Relation Age of Onset    Emphysema Mother     Mental Illness Mother     Substance Abuse Mother     Stroke Mother     Heart Attack Father     Mental Illness Father     Arthritis Father     Heart Disease Father     High

## 2025-02-28 NOTE — PLAN OF CARE
Problem: Chronic Conditions and Co-morbidities  Goal: Patient's chronic conditions and co-morbidity symptoms are monitored and maintained or improved  Outcome: Progressing  Flowsheets (Taken 2/28/2025 0000 by Nomi Calderon RN)  Care Plan - Patient's Chronic Conditions and Co-Morbidity Symptoms are Monitored and Maintained or Improved: Monitor and assess patient's chronic conditions and comorbid symptoms for stability, deterioration, or improvement     Problem: Discharge Planning  Goal: Discharge to home or other facility with appropriate resources  Outcome: Progressing  Flowsheets (Taken 2/28/2025 0000 by Nomi Calderon RN)  Discharge to home or other facility with appropriate resources: Refer to discharge planning if patient needs post-hospital services based on physician order or complex needs related to functional status, cognitive ability or social support system     Problem: Pain  Goal: Verbalizes/displays adequate comfort level or baseline comfort level  Outcome: Progressing     Problem: Safety - Adult  Goal: Free from fall injury  Outcome: Progressing

## 2025-02-28 NOTE — PLAN OF CARE
Problem: Chronic Conditions and Co-morbidities  Goal: Patient's chronic conditions and co-morbidity symptoms are monitored and maintained or improved  2/27/2025 2129 by Nita Rausch, RN  Outcome: Progressing  Flowsheets (Taken 2/27/2025 2127)  Care Plan - Patient's Chronic Conditions and Co-Morbidity Symptoms are Monitored and Maintained or Improved:   Monitor and assess patient's chronic conditions and comorbid symptoms for stability, deterioration, or improvement   Collaborate with multidisciplinary team to address chronic and comorbid conditions and prevent exacerbation or deterioration   Update acute care plan with appropriate goals if chronic or comorbid symptoms are exacerbated and prevent overall improvement and discharge  2/27/2025 1711 by Tara Sin RN  Outcome: Progressing     Problem: Discharge Planning  Goal: Discharge to home or other facility with appropriate resources  2/27/2025 2129 by Nita Rausch, RN  Outcome: Progressing  Flowsheets (Taken 2/27/2025 2127)  Discharge to home or other facility with appropriate resources:   Refer to discharge planning if patient needs post-hospital services based on physician order or complex needs related to functional status, cognitive ability or social support system   Arrange for interpreters to assist at discharge as needed   Arrange for needed discharge resources and transportation as appropriate  2/27/2025 1711 by Tara Sin, RN  Outcome: Progressing     Problem: Pain  Goal: Verbalizes/displays adequate comfort level or baseline comfort level  2/27/2025 2129 by Nita Rausch, RN  Outcome: Progressing  Flowsheets (Taken 2/27/2025 2008)  Verbalizes/displays adequate comfort level or baseline comfort level:   Assess pain using appropriate pain scale   Encourage patient to monitor pain and request assistance   Administer analgesics based on type and severity of pain and evaluate response  2/27/2025 1711 by Tara Sin,  RN  Outcome: Progressing     Problem: Safety - Adult  Goal: Free from fall injury  2/27/2025 2129 by Nita Rausch, RN  Outcome: Progressing  2/27/2025 1711 by Tara Sin, RN  Outcome: Progressing

## 2025-03-01 ENCOUNTER — APPOINTMENT (OUTPATIENT)
Dept: GENERAL RADIOLOGY | Age: 40
End: 2025-03-01
Payer: COMMERCIAL

## 2025-03-01 LAB
ANION GAP SERPL CALCULATED.3IONS-SCNC: 9 MMOL/L (ref 3–16)
BUN SERPL-MCNC: 8 MG/DL (ref 7–20)
CALCIUM SERPL-MCNC: 8.8 MG/DL (ref 8.3–10.6)
CHLORIDE SERPL-SCNC: 105 MMOL/L (ref 99–110)
CO2 SERPL-SCNC: 21 MMOL/L (ref 21–32)
CREAT SERPL-MCNC: 0.5 MG/DL (ref 0.6–1.1)
DEPRECATED RDW RBC AUTO: 12.1 % (ref 12.4–15.4)
EKG ATRIAL RATE: 109 BPM
EKG DIAGNOSIS: NORMAL
EKG P AXIS: 72 DEGREES
EKG P-R INTERVAL: 166 MS
EKG Q-T INTERVAL: 350 MS
EKG QRS DURATION: 70 MS
EKG QTC CALCULATION (BAZETT): 471 MS
EKG R AXIS: 46 DEGREES
EKG T AXIS: 48 DEGREES
EKG VENTRICULAR RATE: 109 BPM
GFR SERPLBLD CREATININE-BSD FMLA CKD-EPI: >90 ML/MIN/{1.73_M2}
GLUCOSE SERPL-MCNC: 129 MG/DL (ref 70–99)
HCT VFR BLD AUTO: 35.1 % (ref 36–48)
HGB BLD-MCNC: 11.6 G/DL (ref 12–16)
MAGNESIUM SERPL-MCNC: 2.06 MG/DL (ref 1.8–2.4)
MCH RBC QN AUTO: 28.8 PG (ref 26–34)
MCHC RBC AUTO-ENTMCNC: 33.1 G/DL (ref 31–36)
MCV RBC AUTO: 87 FL (ref 80–100)
PLATELET # BLD AUTO: 303 K/UL (ref 135–450)
PMV BLD AUTO: 7.8 FL (ref 5–10.5)
POTASSIUM SERPL-SCNC: 4.5 MMOL/L (ref 3.5–5.1)
RBC # BLD AUTO: 4.03 M/UL (ref 4–5.2)
SODIUM SERPL-SCNC: 135 MMOL/L (ref 136–145)
WBC # BLD AUTO: 22 K/UL (ref 4–11)

## 2025-03-01 PROCEDURE — 85027 COMPLETE CBC AUTOMATED: CPT

## 2025-03-01 PROCEDURE — 2060000000 HC ICU INTERMEDIATE R&B

## 2025-03-01 PROCEDURE — 2500000003 HC RX 250 WO HCPCS: Performed by: INTERNAL MEDICINE

## 2025-03-01 PROCEDURE — 6360000002 HC RX W HCPCS: Performed by: INTERNAL MEDICINE

## 2025-03-01 PROCEDURE — 99233 SBSQ HOSP IP/OBS HIGH 50: CPT | Performed by: NURSE PRACTITIONER

## 2025-03-01 PROCEDURE — 80048 BASIC METABOLIC PNL TOTAL CA: CPT

## 2025-03-01 PROCEDURE — 83735 ASSAY OF MAGNESIUM: CPT

## 2025-03-01 PROCEDURE — 6370000000 HC RX 637 (ALT 250 FOR IP): Performed by: INTERNAL MEDICINE

## 2025-03-01 PROCEDURE — 71045 X-RAY EXAM CHEST 1 VIEW: CPT

## 2025-03-01 PROCEDURE — 93010 ELECTROCARDIOGRAM REPORT: CPT | Performed by: INTERNAL MEDICINE

## 2025-03-01 PROCEDURE — 36415 COLL VENOUS BLD VENIPUNCTURE: CPT

## 2025-03-01 RX ORDER — COLCHICINE 0.6 MG/1
0.6 TABLET ORAL 2 TIMES DAILY
Qty: 10 TABLET | Refills: 0 | Status: SHIPPED | OUTPATIENT
Start: 2025-03-01 | End: 2025-03-06

## 2025-03-01 RX ORDER — METOPROLOL SUCCINATE 50 MG/1
50 TABLET, EXTENDED RELEASE ORAL EVERY MORNING
Qty: 30 TABLET | Refills: 3 | Status: SHIPPED | OUTPATIENT
Start: 2025-03-01

## 2025-03-01 RX ADMIN — HYDROMORPHONE HYDROCHLORIDE 0.5 MG: 1 INJECTION, SOLUTION INTRAMUSCULAR; INTRAVENOUS; SUBCUTANEOUS at 01:36

## 2025-03-01 RX ADMIN — COLCHICINE 0.6 MG: 0.6 TABLET, FILM COATED ORAL at 09:09

## 2025-03-01 RX ADMIN — COLCHICINE 0.6 MG: 0.6 TABLET, FILM COATED ORAL at 20:17

## 2025-03-01 RX ADMIN — METOPROLOL SUCCINATE 50 MG: 50 TABLET, FILM COATED, EXTENDED RELEASE ORAL at 09:08

## 2025-03-01 RX ADMIN — LISINOPRIL 2.5 MG: 5 TABLET ORAL at 09:08

## 2025-03-01 RX ADMIN — ATORVASTATIN CALCIUM 20 MG: 10 TABLET, FILM COATED ORAL at 09:08

## 2025-03-01 RX ADMIN — OLANZAPINE 7.5 MG: 5 TABLET, FILM COATED ORAL at 09:08

## 2025-03-01 RX ADMIN — Medication 10 ML: at 20:13

## 2025-03-01 RX ADMIN — HYDROMORPHONE HYDROCHLORIDE 0.5 MG: 1 INJECTION, SOLUTION INTRAMUSCULAR; INTRAVENOUS; SUBCUTANEOUS at 19:21

## 2025-03-01 RX ADMIN — GABAPENTIN 600 MG: 300 CAPSULE ORAL at 09:08

## 2025-03-01 RX ADMIN — SPIRONOLACTONE 25 MG: 25 TABLET ORAL at 09:09

## 2025-03-01 RX ADMIN — HYDROCODONE BITARTRATE AND ACETAMINOPHEN 1 TABLET: 5; 325 TABLET ORAL at 15:28

## 2025-03-01 RX ADMIN — HYDROMORPHONE HYDROCHLORIDE 0.5 MG: 1 INJECTION, SOLUTION INTRAMUSCULAR; INTRAVENOUS; SUBCUTANEOUS at 14:26

## 2025-03-01 RX ADMIN — OLANZAPINE 15 MG: 5 TABLET, FILM COATED ORAL at 20:13

## 2025-03-01 RX ADMIN — HYDROCODONE BITARTRATE AND ACETAMINOPHEN 1 TABLET: 5; 325 TABLET ORAL at 09:09

## 2025-03-01 RX ADMIN — HYDROMORPHONE HYDROCHLORIDE 0.5 MG: 1 INJECTION, SOLUTION INTRAMUSCULAR; INTRAVENOUS; SUBCUTANEOUS at 23:38

## 2025-03-01 RX ADMIN — GABAPENTIN 600 MG: 300 CAPSULE ORAL at 20:14

## 2025-03-01 RX ADMIN — HYDROMORPHONE HYDROCHLORIDE 0.5 MG: 1 INJECTION, SOLUTION INTRAMUSCULAR; INTRAVENOUS; SUBCUTANEOUS at 10:16

## 2025-03-01 RX ADMIN — CLONAZEPAM 0.5 MG: 0.5 TABLET ORAL at 16:17

## 2025-03-01 RX ADMIN — HYDROCODONE BITARTRATE AND ACETAMINOPHEN 1 TABLET: 5; 325 TABLET ORAL at 21:37

## 2025-03-01 RX ADMIN — CLONAZEPAM 0.5 MG: 0.5 TABLET ORAL at 09:09

## 2025-03-01 RX ADMIN — GABAPENTIN 600 MG: 300 CAPSULE ORAL at 14:25

## 2025-03-01 ASSESSMENT — PAIN SCALES - GENERAL
PAINLEVEL_OUTOF10: 9
PAINLEVEL_OUTOF10: 9
PAINLEVEL_OUTOF10: 8
PAINLEVEL_OUTOF10: 9
PAINLEVEL_OUTOF10: 8
PAINLEVEL_OUTOF10: 7
PAINLEVEL_OUTOF10: 8
PAINLEVEL_OUTOF10: 9
PAINLEVEL_OUTOF10: 6
PAINLEVEL_OUTOF10: 8
PAINLEVEL_OUTOF10: 9
PAINLEVEL_OUTOF10: 8

## 2025-03-01 ASSESSMENT — PAIN DESCRIPTION - LOCATION
LOCATION: INCISION

## 2025-03-01 ASSESSMENT — PAIN DESCRIPTION - DESCRIPTORS
DESCRIPTORS: ACHING

## 2025-03-01 ASSESSMENT — PAIN SCALES - WONG BAKER
WONGBAKER_NUMERICALRESPONSE: HURTS WHOLE LOT
WONGBAKER_NUMERICALRESPONSE: HURTS WHOLE LOT

## 2025-03-01 ASSESSMENT — PAIN DESCRIPTION - ORIENTATION
ORIENTATION: RIGHT

## 2025-03-01 ASSESSMENT — PAIN - FUNCTIONAL ASSESSMENT
PAIN_FUNCTIONAL_ASSESSMENT: ACTIVITIES ARE NOT PREVENTED

## 2025-03-01 NOTE — PROGRESS NOTES
Hospitalist Progress Note    CC: Malfunction of implantable defibrillator ventricular (ICD) lead    Name:  Rubi Robb /Age/Sex: 1985  (39 y.o. female)   MRN & CSN:  [id number] & [id number] Encounter Date/Time: 3/1/2025 2:22 PM EST   Location:  [facility]- PCP: Damaso Barnard MD     Attending:Alayna Eli MD         Hospital course:  40yo wf with PMHx sig for HLD, HTN, NICM, VFib arrest, anxiety, depression, chronic pain on narcotics presents for revision of ICD. She did have a right sided dual coil Medtronic ICD implanted in 2019 due to her history of V-fib arrest.  Of pertinence is that on 2025 she was admitted to [facility] for lead extraction of right ventricular defibrillator lead, removal of ICD pulse generator.  Insertion of new right ventricular defibrillator lead and insertion of new right atrial lead.  Implantation of new MRI compatible dual-chamber ICD.  She was discharged home and doing well until the day of admission.  She had impedance alarms overnight and came into the ED because she was concerned about the pacemaker.  There was some retraction of the R ventricular lead and this was replaced on , however this AM, it is not sensing appropriately    Admit date: 2025  Days in hospital:  Hospital Day: 3  Status:  Inpatient       24 Hour Events: device not fully sensing    Subjective: pt tearful, but I explained that is why we keep people an additional day after placement of ED device    ROS:   A comprehensive review of systems was negative except for: tearful, anxious, some chest pain at site of device insertion       Objective:    /71   Pulse 88   Temp 98.2 °F (36.8 °C) (Oral)   Resp 18   Ht 1.524 m (5')   Wt 71.4 kg (157 lb 6.5 oz)   LMP 2019   SpO2 96%   BMI 30.74 kg/m²     Gen: alert, cooperative, crying  HEENT: NC/AT, moist mucous membranes, no oropharyngeal erythema or  the last 72 hours.    Invalid input(s): \"KETONESU\"  Urine Cultures:   Lab Results   Component Value Date/Time    LABURIN No growth at 18-36 hours 05/17/2019 11:52 AM     Blood Cultures:   Lab Results   Component Value Date/Time    BC  01/18/2023 01:07 AM     No growth 24 hours. No growth 48 hours. No growth at 5 days    BC  01/18/2023 01:07 AM     No growth 24 hours. No growth 48 hours. No growth at 5 days     Lab Results   Component Value Date/Time    BLOODCULT2 No growth after 5 days of incubation. 11/06/2019 12:41 PM     Organism:   Lab Results   Component Value Date/Time    ORG Staphylococcus aureus 05/16/2019 11:50 AM    ORG Candida albicans 05/16/2019 11:50 AM         Invalid input(s): \"ABG\"        Electronically signed by Alayna Eli MD on 3/1/2025 at 2:22 PM    ------------------------------------------------------------------------------------------------------------------------------------------------------------------------

## 2025-03-01 NOTE — PLAN OF CARE
Problem: Chronic Conditions and Co-morbidities  Goal: Patient's chronic conditions and co-morbidity symptoms are monitored and maintained or improved  3/1/2025 0009 by Martha Wells RN  Outcome: Progressing     Problem: Discharge Planning  Goal: Discharge to home or other facility with appropriate resources  3/1/2025 0009 by Martha Wells RN  Outcome: Progressing     Problem: Pain  Goal: Verbalizes/displays adequate comfort level or baseline comfort level  3/1/2025 0009 by Martha Wells RN  Outcome: Progressing   Pt will be satisfied with pain control. Pt uses numeric pain rating scale with reassessments after pain med administration. Will continue to monitor progression throughout shift.     Problem: Safety - Adult  Goal: Free from fall injury  3/1/2025 0009 by Martha Wells RN  Outcome: Progressing   Pt will remain free from falls throughout hospital stay. Fall precautions in place, bed alarm on, bed in lowest position with wheels locked and side rails 2/4 up. Room door open and hourly rounding completed. Will continue to monitor throughout shift.

## 2025-03-01 NOTE — PROGRESS NOTES
[facility]   Heart Failure Daily Progress Note    Admit Date:  2/27/2025  HPI:    Chief Complaint   Patient presents with    Pacemaker Problem     Concern about her newly placed defilbrilator/ pacemaker firing twice once at 3am and the other one at 415am. No cp during the time of triage or sob        Rubi Robb is being followed for chest pain s/p pacemaker lead revision.   PMH of VF arrest s/p right sided dual coil Medtronic ICD implanted in June of 2019, NICM, CHFrEF of 40%, syncope, sarcoma, bipolar disorder, who had RV lead extraction and implantation of new RV lead and new RA lead on 2/14/2025.  Device check showed low impedance of RV lead.     Interval history: s/p lead and pocket revision on 2/28/25.     Subjective:  Ms. Robb having right pectoral pain and incisional pain of subclavian pacer site.     Objective:   /78   Pulse 84   Temp 98.3 °F (36.8 °C) (Oral)   Resp 20   Ht 1.524 m (5')   Wt 71.4 kg (157 lb 6.5 oz)   LMP 05/01/2019   SpO2 97%   BMI 30.74 kg/m²     Intake/Output Summary (Last 24 hours) at 3/1/2025 1828  Last data filed at 3/1/2025 1733  Gross per 24 hour   Intake 700 ml   Output 700 ml   Net 0 ml       NYHA: III    Physical Exam:  General:  Awake, alert, NAD  Skin:  Warm and dry  Neck:  JVD<8  Chest:  Clear to auscultation, no wheezes/rhonchi/rales  Pressure dressing removed, + edema of the surrounding right side tissues of the right subclavian site with small hematoma noted laterally. Incision is well approximated and staples intact with no oozing or drainage. New sterile dressing placed pressure dressing on subclavian site.   Cardiovascular:  RRR S1S2, no m/r/g   Abdomen:  Soft, nontender, +bowel sounds  Extremities:  no bilateral lower extremity edema    Medications:    sodium chloride flush  5-40 mL IntraVENous 2 times per day    colchicine  0.6 mg Oral BID    atorvastatin  20 mg Oral Daily    gabapentin  600 mg Oral TID    lisinopril  2.5 mg Oral Daily  2019  NICM  HFrEF  Sarcoma  Leukocytosis    Plan:  Continue pain control; suspect some degree of pain given subpectoral implant of device requiring revision. Replaced dressing/pressure dressing along with sling for another 24 hours.   Continue lisinopril  Continue toprol   Continue Spironolactone (aldactone)    Discussed with EP - plan to monitor patient over the weekend and re-evaluate device leads and plan of care on Monday with EP team.     Education provided today Disease process and medications discussed. Questions answered fully.  Time spent educating today 35 minutes     JAYLYN Benedict - CNP,  3/1/2025, 6:28 PM

## 2025-03-01 NOTE — PLAN OF CARE
Problem: Chronic Conditions and Co-morbidities  Goal: Patient's chronic conditions and co-morbidity symptoms are monitored and maintained or improved  3/1/2025 1137 by Tara Sin RN  Outcome: Progressing  3/1/2025 0009 by Martha Wells RN  Outcome: Progressing  2/28/2025 2224 by Sandy Merida RN  Outcome: Progressing     Problem: Discharge Planning  Goal: Discharge to home or other facility with appropriate resources  3/1/2025 1137 by Tara Sin RN  Outcome: Progressing  3/1/2025 0009 by Martha Wells RN  Outcome: Progressing  2/28/2025 2224 by Sandy Merida RN  Outcome: Progressing     Problem: Pain  Goal: Verbalizes/displays adequate comfort level or baseline comfort level  3/1/2025 1137 by Tara Sin RN  Outcome: Progressing  3/1/2025 0009 by Martha Wells RN  Outcome: Progressing  2/28/2025 2224 by Sandy Merida RN  Outcome: Progressing     Problem: Safety - Adult  Goal: Free from fall injury  3/1/2025 1137 by Tara Sin RN  Outcome: Progressing  3/1/2025 0009 by Martha Wells RN  Outcome: Progressing  2/28/2025 2224 by Sandy Merida RN  Outcome: Progressing

## 2025-03-01 NOTE — PLAN OF CARE
Problem: Chronic Conditions and Co-morbidities  Goal: Patient's chronic conditions and co-morbidity symptoms are monitored and maintained or improved  2/28/2025 2224 by Sandy Merida RN  Outcome: Progressing  2/28/2025 1330 by Mouna Lackey RN  Outcome: Progressing  Flowsheets (Taken 2/28/2025 0000 by Nomi Calderon, RN)  Care Plan - Patient's Chronic Conditions and Co-Morbidity Symptoms are Monitored and Maintained or Improved: Monitor and assess patient's chronic conditions and comorbid symptoms for stability, deterioration, or improvement     Problem: Discharge Planning  Goal: Discharge to home or other facility with appropriate resources  2/28/2025 2224 by Sandy Merida RN  Outcome: Progressing  2/28/2025 1330 by Mouna Lackey RN  Outcome: Progressing  Flowsheets (Taken 2/28/2025 0000 by Nomi Calderon, RN)  Discharge to home or other facility with appropriate resources: Refer to discharge planning if patient needs post-hospital services based on physician order or complex needs related to functional status, cognitive ability or social support system     Problem: Pain  Goal: Verbalizes/displays adequate comfort level or baseline comfort level  2/28/2025 2224 by Sandy Merida RN  Outcome: Progressing  2/28/2025 1330 by Mouna Lackey RN  Outcome: Progressing     Problem: Safety - Adult  Goal: Free from fall injury  2/28/2025 2224 by Sandy Merida RN  Outcome: Progressing  2/28/2025 1330 by Mouna Lackey RN  Outcome: Progressing

## 2025-03-02 PROCEDURE — 2060000000 HC ICU INTERMEDIATE R&B

## 2025-03-02 PROCEDURE — 99232 SBSQ HOSP IP/OBS MODERATE 35: CPT | Performed by: NURSE PRACTITIONER

## 2025-03-02 PROCEDURE — 2500000003 HC RX 250 WO HCPCS: Performed by: INTERNAL MEDICINE

## 2025-03-02 PROCEDURE — 6370000000 HC RX 637 (ALT 250 FOR IP): Performed by: INTERNAL MEDICINE

## 2025-03-02 PROCEDURE — 6360000002 HC RX W HCPCS: Performed by: INTERNAL MEDICINE

## 2025-03-02 RX ORDER — SENNA AND DOCUSATE SODIUM 50; 8.6 MG/1; MG/1
2 TABLET, FILM COATED ORAL 2 TIMES DAILY PRN
Status: DISCONTINUED | OUTPATIENT
Start: 2025-03-02 | End: 2025-03-07 | Stop reason: HOSPADM

## 2025-03-02 RX ADMIN — OLANZAPINE 7.5 MG: 5 TABLET, FILM COATED ORAL at 08:21

## 2025-03-02 RX ADMIN — OLANZAPINE 15 MG: 5 TABLET, FILM COATED ORAL at 20:04

## 2025-03-02 RX ADMIN — CLONAZEPAM 0.5 MG: 0.5 TABLET ORAL at 08:22

## 2025-03-02 RX ADMIN — SODIUM CHLORIDE, PRESERVATIVE FREE 10 ML: 5 INJECTION INTRAVENOUS at 08:24

## 2025-03-02 RX ADMIN — COLCHICINE 0.6 MG: 0.6 TABLET, FILM COATED ORAL at 20:04

## 2025-03-02 RX ADMIN — HYDROMORPHONE HYDROCHLORIDE 0.5 MG: 1 INJECTION, SOLUTION INTRAMUSCULAR; INTRAVENOUS; SUBCUTANEOUS at 14:51

## 2025-03-02 RX ADMIN — GABAPENTIN 600 MG: 300 CAPSULE ORAL at 08:21

## 2025-03-02 RX ADMIN — HYDROMORPHONE HYDROCHLORIDE 0.5 MG: 1 INJECTION, SOLUTION INTRAMUSCULAR; INTRAVENOUS; SUBCUTANEOUS at 20:08

## 2025-03-02 RX ADMIN — LISINOPRIL 2.5 MG: 5 TABLET ORAL at 08:22

## 2025-03-02 RX ADMIN — CLONAZEPAM 0.5 MG: 0.5 TABLET ORAL at 14:51

## 2025-03-02 RX ADMIN — METOPROLOL SUCCINATE 50 MG: 50 TABLET, FILM COATED, EXTENDED RELEASE ORAL at 08:22

## 2025-03-02 RX ADMIN — GABAPENTIN 600 MG: 300 CAPSULE ORAL at 20:04

## 2025-03-02 RX ADMIN — HYDROMORPHONE HYDROCHLORIDE 0.5 MG: 1 INJECTION, SOLUTION INTRAMUSCULAR; INTRAVENOUS; SUBCUTANEOUS at 05:29

## 2025-03-02 RX ADMIN — SPIRONOLACTONE 25 MG: 25 TABLET ORAL at 08:22

## 2025-03-02 RX ADMIN — GABAPENTIN 600 MG: 300 CAPSULE ORAL at 14:51

## 2025-03-02 RX ADMIN — HYDROCODONE BITARTRATE AND ACETAMINOPHEN 1 TABLET: 5; 325 TABLET ORAL at 08:22

## 2025-03-02 RX ADMIN — Medication 10 ML: at 08:23

## 2025-03-02 RX ADMIN — COLCHICINE 0.6 MG: 0.6 TABLET, FILM COATED ORAL at 08:21

## 2025-03-02 RX ADMIN — HYDROMORPHONE HYDROCHLORIDE 0.5 MG: 1 INJECTION, SOLUTION INTRAMUSCULAR; INTRAVENOUS; SUBCUTANEOUS at 10:22

## 2025-03-02 RX ADMIN — Medication 10 ML: at 20:04

## 2025-03-02 RX ADMIN — HYDROCODONE BITARTRATE AND ACETAMINOPHEN 1 TABLET: 5; 325 TABLET ORAL at 16:21

## 2025-03-02 RX ADMIN — ATORVASTATIN CALCIUM 20 MG: 10 TABLET, FILM COATED ORAL at 08:22

## 2025-03-02 ASSESSMENT — PAIN DESCRIPTION - ORIENTATION
ORIENTATION: RIGHT
ORIENTATION: RIGHT
ORIENTATION: RIGHT;INNER
ORIENTATION: RIGHT

## 2025-03-02 ASSESSMENT — PAIN SCALES - GENERAL
PAINLEVEL_OUTOF10: 7
PAINLEVEL_OUTOF10: 8
PAINLEVEL_OUTOF10: 7
PAINLEVEL_OUTOF10: 7
PAINLEVEL_OUTOF10: 8
PAINLEVEL_OUTOF10: 9

## 2025-03-02 ASSESSMENT — PAIN DESCRIPTION - LOCATION
LOCATION: INCISION
LOCATION: CHEST;ARM
LOCATION: CHEST;ARM
LOCATION: INCISION
LOCATION: CHEST;ARM
LOCATION: CHEST;ARM
LOCATION: ARM;CHEST

## 2025-03-02 ASSESSMENT — PAIN DESCRIPTION - DESCRIPTORS
DESCRIPTORS: ACHING

## 2025-03-02 NOTE — PLAN OF CARE
Problem: Chronic Conditions and Co-morbidities  Goal: Patient's chronic conditions and co-morbidity symptoms are monitored and maintained or improved  3/2/2025 1013 by Tara Sin RN  Outcome: Progressing  3/2/2025 0015 by Martha Wells RN  Outcome: Progressing     Problem: Discharge Planning  Goal: Discharge to home or other facility with appropriate resources  3/2/2025 1013 by Tara Sin RN  Outcome: Progressing  3/2/2025 0015 by Martha Wells RN  Outcome: Progressing     Problem: Pain  Goal: Verbalizes/displays adequate comfort level or baseline comfort level  3/2/2025 1013 by Tara Sin RN  Outcome: Progressing  3/2/2025 0015 by Martha Wells RN  Outcome: Progressing     Problem: Safety - Adult  Goal: Free from fall injury  3/2/2025 1013 by Tara Sin RN  Outcome: Progressing  3/2/2025 0015 by Martha Wells RN  Outcome: Progressing

## 2025-03-02 NOTE — PROGRESS NOTES
Hospitalist Progress Note    CC: Malfunction of implantable defibrillator ventricular (ICD) lead    Name:  Rubi Robb /Age/Sex: 1985  (39 y.o. female)   MRN & CSN:  [id number] & [id number] Encounter Date/Time: 3/2/2025 2:22 PM EST   Location:  [facility]- PCP: Damaso Barnard MD     Attending:Alayna Eli MD         Hospital course:  40yo wf with PMHx sig for HLD, HTN, NICM, VFib arrest, anxiety, depression, chronic pain on narcotics presents for revision of ICD. She did have a right sided dual coil Medtronic ICD implanted in 2019 due to her history of V-fib arrest.  Of pertinence is that on 2025 she was admitted to [facility] for lead extraction of right ventricular defibrillator lead, removal of ICD pulse generator.  Insertion of new right ventricular defibrillator lead and insertion of new right atrial lead.  Implantation of new MRI compatible dual-chamber ICD.  She was discharged home and doing well until the day of admission.  She had impedance alarms overnight and came into the ED because she was concerned about the pacemaker.  There was some retraction of the R ventricular lead and this was replaced on , however morning of 3.1 it was not sensing appropriately    Admit date: 2025  Days in hospital:  Hospital Day: 4  Status:  Inpatient       24 Hour Events: device not fully sensing    Subjective: pt calmer this AM, she has not had BM since Friday - she is on extra pain medication for post-procedural pain    ROS:   A comprehensive review of systems was negative except for: constipation, some pain at ICD site       Objective:    /71   Pulse 78   Temp 97.5 °F (36.4 °C) (Oral)   Resp 19   Ht 1.524 m (5')   Wt 71.6 kg (157 lb 13.6 oz)   LMP 2019   SpO2 96%   BMI 30.83 kg/m²     Gen: alert, cooperative, calmer this AM  HEENT: NC/AT, moist mucous membranes, no oropharyngeal erythema or  reviewed in detail in conjunction w/ labs as documented for evidence of drug toxicity.     Infusion Medications    sodium chloride       Scheduled Medications    sodium chloride flush  5-40 mL IntraVENous 2 times per day    colchicine  0.6 mg Oral BID    atorvastatin  20 mg Oral Daily    gabapentin  600 mg Oral TID    lisinopril  2.5 mg Oral Daily    metoprolol succinate  50 mg Oral QAM    OLANZapine  15 mg Oral Nightly    OLANZapine  7.5 mg Oral QAM    spironolactone  25 mg Oral Daily    sodium chloride flush  5-40 mL IntraVENous 2 times per day    [Held by provider] enoxaparin  40 mg SubCUTAneous BID     PRN Meds: sodium chloride flush, sodium chloride, HYDROmorphone, clonazePAM, HYDROcodone-acetaminophen, ibuprofen, sodium chloride flush, ondansetron **OR** ondansetron, acetaminophen **OR** acetaminophen, polyethylene glycol     Labs:  Personally reviewed and interpreted for clinical significance.     CBC:   Recent Labs     02/27/25 0618 02/28/25 0408 03/01/25  0410   WBC 16.2* 9.9 22.0*   HGB 13.6 12.7 11.6*   HCT 40.1 37.4 35.1*   MCV 86.9 86.8 87.0    303 303     BMP, Mag, Phos:    Recent Labs     02/27/25 0618 02/28/25  0408 03/01/25  0410    136 135*   K 3.7 3.2* 4.5   CL 99 101 105   CO2 26 25 21   BUN 6* 6* 8   CREATININE 0.4* 0.4* 0.5*   CALCIUM 10.0 9.3 8.8   MG 1.20* 1.56* 2.06     Pro-BNP, Trop:    Recent Labs     02/27/25 0618   PROBNP 243*   TROPHS <6     LFTs:    Recent Labs     02/27/25 0618   AST 49*   ALT 45*   BILITOT 0.4   ALKPHOS 115     PT/INR, Lactic acid, TSH:  No results for input(s): \"INR\", \"LACTA\", \"TSH\" in the last 72 hours.  HgbA1c:  No results for input(s): \"LABA1C\" in the last 72 hours.  Glucose trend:  No results found for: \"GLU\"    UA:No results for input(s): \"NITRITE\", \"COLORU\", \"PHUR\", \"LABCAST\", \"WBCUA\", \"RBCUA\", \"MUCUS\", \"TRICHOMONAS\", \"YEAST\", \"BACTERIA\", \"CLARITYU\", \"SPECGRAV\", \"LEUKOCYTESUR\", \"UROBILINOGEN\", \"BILIRUBINUR\", \"BLOODU\", \"GLUCOSEU\", \"AMORPHOUS\"

## 2025-03-02 NOTE — PROGRESS NOTES
[facility]   Heart Failure Daily Progress Note    Admit Date:  2/27/2025  HPI:    Chief Complaint   Patient presents with    Pacemaker Problem     Concern about her newly placed defilbrilator/ pacemaker firing twice once at 3am and the other one at 415am. No cp during the time of triage or sob        Rubi Robb is being followed for chest pain s/p pacemaker lead revision.   PMH of VF arrest s/p right sided dual coil Medtronic ICD implanted in June of 2019, NICM, CHFrEF of 40%, syncope, sarcoma, bipolar disorder, who had RV lead extraction and implantation of new RV lead and new RA lead on 2/14/2025.  Device check showed low impedance of RV lead.     Interval history: s/p lead and pocket revision on 2/28/25.     Subjective:  Ms. Robb continues with 7-8/10 pain in the right breast tissue under the pacer site. Dilaudid is helping the pain.   Pain better with sling in place     Objective:   /69   Pulse 93   Temp 98.1 °F (36.7 °C) (Oral)   Resp 18   Ht 1.524 m (5')   Wt 71.6 kg (157 lb 13.6 oz)   LMP 05/01/2019   SpO2 97%   BMI 30.83 kg/m²     Intake/Output Summary (Last 24 hours) at 3/2/2025 1040  Last data filed at 3/2/2025 0938  Gross per 24 hour   Intake 880 ml   Output 625 ml   Net 255 ml       NYHA: III    Physical Exam:  General:  Awake, alert, NAD  Skin:  Warm and dry  Neck:  JVD<8  Chest:  Clear to auscultation, no wheezes/rhonchi/rales  Tenderness of right subclavian site, no hematoma or bruising noted.   Cardiovascular:  RRR S1S2, no m/r/g   Abdomen:  Soft, nontender, +bowel sounds  Extremities:  no bilateral lower extremity edema    Medications:    sodium chloride flush  5-40 mL IntraVENous 2 times per day    colchicine  0.6 mg Oral BID    atorvastatin  20 mg Oral Daily    gabapentin  600 mg Oral TID    lisinopril  2.5 mg Oral Daily    metoprolol succinate  50 mg Oral QAM    OLANZapine  15 mg Oral Nightly    OLANZapine  7.5 mg Oral QAM    spironolactone  25 mg Oral Daily     Spironolactone (aldactone)    Discussed with EP - plan to monitor patient over the weekend and re-evaluate device leads and plan of care on Monday with EP team.     Sharlene Carey, APRN - CNP,  3/2/2025, 11:04 AM

## 2025-03-02 NOTE — PLAN OF CARE
Problem: Chronic Conditions and Co-morbidities  Goal: Patient's chronic conditions and co-morbidity symptoms are monitored and maintained or improved  3/2/2025 0015 by Martha Wells RN  Outcome: Progressing     Problem: Discharge Planning  Goal: Discharge to home or other facility with appropriate resources  3/2/2025 0015 by Martha Wells RN  Outcome: Progressing     Problem: Pain  Goal: Verbalizes/displays adequate comfort level or baseline comfort level  3/2/2025 0015 by Martha Wells RN  Outcome: Progressing   Pt will be satisfied with pain control. Pt uses numeric pain rating scale with reassessments after pain med administration. Will continue to monitor progression throughout shift.     Problem: Safety - Adult  Goal: Free from fall injury  3/2/2025 0015 by Martha Wells RN  Outcome: Progressing   Pt will remain free from falls throughout hospital stay. Fall precautions in place, bed alarm on, bed in lowest position with wheels locked and side rails 2/4 up. Room door open and hourly rounding completed. Will continue to monitor throughout shift.

## 2025-03-03 ENCOUNTER — PROCEDURE VISIT (OUTPATIENT)
Dept: CARDIOLOGY CLINIC | Age: 40
End: 2025-03-03

## 2025-03-03 ENCOUNTER — APPOINTMENT (OUTPATIENT)
Dept: GENERAL RADIOLOGY | Age: 40
End: 2025-03-03
Payer: COMMERCIAL

## 2025-03-03 DIAGNOSIS — I47.20 VT (VENTRICULAR TACHYCARDIA) (HCC): ICD-10-CM

## 2025-03-03 DIAGNOSIS — Z95.810 ICD (IMPLANTABLE CARDIOVERTER-DEFIBRILLATOR), DUAL, IN SITU: Primary | ICD-10-CM

## 2025-03-03 LAB
ANION GAP SERPL CALCULATED.3IONS-SCNC: 11 MMOL/L (ref 3–16)
BUN SERPL-MCNC: 5 MG/DL (ref 7–20)
CALCIUM SERPL-MCNC: 9 MG/DL (ref 8.3–10.6)
CHLORIDE SERPL-SCNC: 104 MMOL/L (ref 99–110)
CO2 SERPL-SCNC: 25 MMOL/L (ref 21–32)
CREAT SERPL-MCNC: 0.4 MG/DL (ref 0.6–1.1)
DEPRECATED RDW RBC AUTO: 12.3 % (ref 12.4–15.4)
GFR SERPLBLD CREATININE-BSD FMLA CKD-EPI: >90 ML/MIN/{1.73_M2}
GLUCOSE SERPL-MCNC: 93 MG/DL (ref 70–99)
HCT VFR BLD AUTO: 33.1 % (ref 36–48)
HGB BLD-MCNC: 11.1 G/DL (ref 12–16)
MAGNESIUM SERPL-MCNC: 1.15 MG/DL (ref 1.8–2.4)
MCH RBC QN AUTO: 29.7 PG (ref 26–34)
MCHC RBC AUTO-ENTMCNC: 33.7 G/DL (ref 31–36)
MCV RBC AUTO: 88.1 FL (ref 80–100)
PLATELET # BLD AUTO: 294 K/UL (ref 135–450)
PMV BLD AUTO: 8.1 FL (ref 5–10.5)
POTASSIUM SERPL-SCNC: 3.8 MMOL/L (ref 3.5–5.1)
RBC # BLD AUTO: 3.76 M/UL (ref 4–5.2)
SODIUM SERPL-SCNC: 140 MMOL/L (ref 136–145)
WBC # BLD AUTO: 11.8 K/UL (ref 4–11)

## 2025-03-03 PROCEDURE — 6360000002 HC RX W HCPCS: Performed by: INTERNAL MEDICINE

## 2025-03-03 PROCEDURE — 85027 COMPLETE CBC AUTOMATED: CPT

## 2025-03-03 PROCEDURE — 2580000003 HC RX 258: Performed by: INTERNAL MEDICINE

## 2025-03-03 PROCEDURE — 2060000000 HC ICU INTERMEDIATE R&B

## 2025-03-03 PROCEDURE — 36415 COLL VENOUS BLD VENIPUNCTURE: CPT

## 2025-03-03 PROCEDURE — 99232 SBSQ HOSP IP/OBS MODERATE 35: CPT

## 2025-03-03 PROCEDURE — 83735 ASSAY OF MAGNESIUM: CPT

## 2025-03-03 PROCEDURE — 2500000003 HC RX 250 WO HCPCS: Performed by: INTERNAL MEDICINE

## 2025-03-03 PROCEDURE — 71046 X-RAY EXAM CHEST 2 VIEWS: CPT

## 2025-03-03 PROCEDURE — 80048 BASIC METABOLIC PNL TOTAL CA: CPT

## 2025-03-03 PROCEDURE — 6370000000 HC RX 637 (ALT 250 FOR IP): Performed by: INTERNAL MEDICINE

## 2025-03-03 RX ORDER — MAGNESIUM SULFATE 1 G/100ML
1000 INJECTION INTRAVENOUS PRN
Status: DISCONTINUED | OUTPATIENT
Start: 2025-03-03 | End: 2025-03-07

## 2025-03-03 RX ORDER — POTASSIUM CHLORIDE 7.45 MG/ML
10 INJECTION INTRAVENOUS PRN
Status: DISCONTINUED | OUTPATIENT
Start: 2025-03-03 | End: 2025-03-07 | Stop reason: HOSPADM

## 2025-03-03 RX ORDER — POTASSIUM CHLORIDE 1500 MG/1
40 TABLET, EXTENDED RELEASE ORAL PRN
Status: DISCONTINUED | OUTPATIENT
Start: 2025-03-03 | End: 2025-03-07 | Stop reason: HOSPADM

## 2025-03-03 RX ADMIN — ATORVASTATIN CALCIUM 20 MG: 10 TABLET, FILM COATED ORAL at 08:24

## 2025-03-03 RX ADMIN — GABAPENTIN 600 MG: 300 CAPSULE ORAL at 20:55

## 2025-03-03 RX ADMIN — Medication 10 ML: at 21:43

## 2025-03-03 RX ADMIN — CLONAZEPAM 0.5 MG: 0.5 TABLET ORAL at 15:03

## 2025-03-03 RX ADMIN — HYDROMORPHONE HYDROCHLORIDE 0.5 MG: 1 INJECTION, SOLUTION INTRAMUSCULAR; INTRAVENOUS; SUBCUTANEOUS at 17:10

## 2025-03-03 RX ADMIN — HYDROMORPHONE HYDROCHLORIDE 0.5 MG: 1 INJECTION, SOLUTION INTRAMUSCULAR; INTRAVENOUS; SUBCUTANEOUS at 21:43

## 2025-03-03 RX ADMIN — CLONAZEPAM 0.5 MG: 0.5 TABLET ORAL at 09:34

## 2025-03-03 RX ADMIN — COLCHICINE 0.6 MG: 0.6 TABLET, FILM COATED ORAL at 08:25

## 2025-03-03 RX ADMIN — GABAPENTIN 600 MG: 300 CAPSULE ORAL at 08:23

## 2025-03-03 RX ADMIN — MAGNESIUM SULFATE HEPTAHYDRATE 1000 MG: 1 INJECTION, SOLUTION INTRAVENOUS at 11:51

## 2025-03-03 RX ADMIN — SPIRONOLACTONE 25 MG: 25 TABLET ORAL at 08:23

## 2025-03-03 RX ADMIN — MAGNESIUM SULFATE HEPTAHYDRATE 1000 MG: 1 INJECTION, SOLUTION INTRAVENOUS at 08:34

## 2025-03-03 RX ADMIN — Medication 10 ML: at 08:25

## 2025-03-03 RX ADMIN — HYDROCODONE BITARTRATE AND ACETAMINOPHEN 1 TABLET: 5; 325 TABLET ORAL at 13:31

## 2025-03-03 RX ADMIN — HYDROMORPHONE HYDROCHLORIDE 0.5 MG: 1 INJECTION, SOLUTION INTRAMUSCULAR; INTRAVENOUS; SUBCUTANEOUS at 08:24

## 2025-03-03 RX ADMIN — COLCHICINE 0.6 MG: 0.6 TABLET, FILM COATED ORAL at 20:55

## 2025-03-03 RX ADMIN — HYDROMORPHONE HYDROCHLORIDE 0.5 MG: 1 INJECTION, SOLUTION INTRAMUSCULAR; INTRAVENOUS; SUBCUTANEOUS at 04:15

## 2025-03-03 RX ADMIN — HYDROMORPHONE HYDROCHLORIDE 0.5 MG: 1 INJECTION, SOLUTION INTRAMUSCULAR; INTRAVENOUS; SUBCUTANEOUS at 12:25

## 2025-03-03 RX ADMIN — OLANZAPINE 7.5 MG: 5 TABLET, FILM COATED ORAL at 08:24

## 2025-03-03 RX ADMIN — MAGNESIUM SULFATE HEPTAHYDRATE 1000 MG: 1 INJECTION, SOLUTION INTRAVENOUS at 10:42

## 2025-03-03 RX ADMIN — METOPROLOL SUCCINATE 50 MG: 50 TABLET, FILM COATED, EXTENDED RELEASE ORAL at 08:23

## 2025-03-03 RX ADMIN — LISINOPRIL 2.5 MG: 5 TABLET ORAL at 08:23

## 2025-03-03 RX ADMIN — GABAPENTIN 600 MG: 300 CAPSULE ORAL at 13:31

## 2025-03-03 RX ADMIN — MAGNESIUM SULFATE HEPTAHYDRATE 1000 MG: 1 INJECTION, SOLUTION INTRAVENOUS at 09:36

## 2025-03-03 RX ADMIN — OLANZAPINE 15 MG: 5 TABLET, FILM COATED ORAL at 20:55

## 2025-03-03 RX ADMIN — SODIUM CHLORIDE: 9 INJECTION, SOLUTION INTRAVENOUS at 08:33

## 2025-03-03 ASSESSMENT — PAIN DESCRIPTION - LOCATION
LOCATION: CHEST;INCISION
LOCATION: CHEST
LOCATION: INCISION
LOCATION: CHEST
LOCATION: INCISION
LOCATION: INCISION

## 2025-03-03 ASSESSMENT — PAIN SCALES - GENERAL
PAINLEVEL_OUTOF10: 8
PAINLEVEL_OUTOF10: 5
PAINLEVEL_OUTOF10: 8
PAINLEVEL_OUTOF10: 5
PAINLEVEL_OUTOF10: 6

## 2025-03-03 ASSESSMENT — PAIN - FUNCTIONAL ASSESSMENT
PAIN_FUNCTIONAL_ASSESSMENT: ACTIVITIES ARE NOT PREVENTED
PAIN_FUNCTIONAL_ASSESSMENT: ACTIVITIES ARE NOT PREVENTED
PAIN_FUNCTIONAL_ASSESSMENT: PREVENTS OR INTERFERES SOME ACTIVE ACTIVITIES AND ADLS
PAIN_FUNCTIONAL_ASSESSMENT: PREVENTS OR INTERFERES SOME ACTIVE ACTIVITIES AND ADLS
PAIN_FUNCTIONAL_ASSESSMENT: ACTIVITIES ARE NOT PREVENTED
PAIN_FUNCTIONAL_ASSESSMENT: ACTIVITIES ARE NOT PREVENTED

## 2025-03-03 ASSESSMENT — PAIN DESCRIPTION - PAIN TYPE: TYPE: ACUTE PAIN

## 2025-03-03 ASSESSMENT — PAIN DESCRIPTION - DESCRIPTORS
DESCRIPTORS: SHARP
DESCRIPTORS: ACHING;SHOOTING
DESCRIPTORS: SHARP
DESCRIPTORS: ACHING
DESCRIPTORS: SHARP
DESCRIPTORS: ACHING

## 2025-03-03 ASSESSMENT — PAIN DESCRIPTION - ORIENTATION
ORIENTATION: RIGHT
ORIENTATION: RIGHT
ORIENTATION: MID;RIGHT
ORIENTATION: RIGHT
ORIENTATION: RIGHT
ORIENTATION: RIGHT;MID
ORIENTATION: RIGHT
ORIENTATION: RIGHT

## 2025-03-03 NOTE — PLAN OF CARE
Problem: Chronic Conditions and Co-morbidities  Goal: Patient's chronic conditions and co-morbidity symptoms are monitored and maintained or improved  Outcome: Progressing     Problem: Discharge Planning  Goal: Discharge to home or other facility with appropriate resources  Outcome: Progressing     Problem: Pain  Goal: Verbalizes/displays adequate comfort level or baseline comfort level  Outcome: Progressing   Pt will be satisfied with pain control. Pt uses numeric pain rating scale with reassessments after pain med administration. Will continue to monitor progression throughout shift.     Problem: Safety - Adult  Goal: Free from fall injury  Outcome: Progressing   Pt will remain free from falls throughout hospital stay. Fall precautions in place, bed alarm on, bed in lowest position with wheels locked and side rails 2/4 up. Room door open and hourly rounding completed. Will continue to monitor throughout shift.    No

## 2025-03-03 NOTE — PROGRESS NOTES
[facility]     Electrophysiology                                     Progress Note    Admission date:  2025    Reason for follow up visit: Device alarm for RV lead impedance change and higher threshold    HPI/CC: Rubi Robb was admitted on 2025 with device alarm for RV lead impedance change and higher threshold who had RV lead extraction and implantation of new RV lead and new RA lead on 2025.  2025 patient underwent RV lead revision along with pocket revision.  Device check revealed low impedance of RV lead.  3/3 CXR--RV lead pointing more inferiorly--plan for revision 3/5/2025    Rhythm has been SR.     Subjective: Patient lying in bed complains of pain to her right upper chest device site.  Long discussion with patient regarding RV lead revision.  Patient is agreeable to proceed on 3/20/2025    Vitals:  Blood pressure 114/75, pulse 82, temperature 97.9 °F (36.6 °C), temperature source Oral, resp. rate 18, height 1.524 m (5'), weight 67.1 kg (148 lb), last menstrual period 2019, SpO2 97%.  Temp  Av.1 °F (36.7 °C)  Min: 97.3 °F (36.3 °C)  Max: 98.4 °F (36.9 °C)  Pulse  Av  Min: 72  Max: 112  BP  Min: 90/74  Max: 120/78  SpO2  Av %  Min: 95 %  Max: 99 %    24 hour I/O    Intake/Output Summary (Last 24 hours) at 3/3/2025 1218  Last data filed at 3/3/2025 0439  Gross per 24 hour   Intake 240 ml   Output 800 ml   Net -560 ml     Current Facility-Administered Medications   Medication Dose Route Frequency Provider Last Rate Last Admin    magnesium sulfate 1000 mg in dextrose 5% 100 mL IVPB  1,000 mg IntraVENous PRN Alayna Eli  mL/hr at 25 1151 1,000 mg at 25 1151    potassium chloride (KLOR-CON M) extended release tablet 40 mEq  40 mEq Oral PRN Alayna Eli MD        Or    potassium bicarb-citric acid (EFFER-K) effervescent tablet 40 mEq  40 mEq Oral PRN Alayna Eli MD        Or    potassium chloride 10 mEq/100  Results   Component Value Date/Time    TSH 1.250 08/16/2022 02:33 PM       Assessment:  Nonischemic cardiomyopathy    -2019 Medtronic DC ICD; 2/14/2025 new RA & RV lead; 2/28/2025 revision of RV lead & pocket revision  VF arrest  HFrEF  NSVT  Hyperlipidemia  Hypertension        Plan:   Continue Toprol-XL 50 mg daily  Continue lisinopril 2.5 mg daily  Continue Aldactone 25 mg daily  Continue Lipitor 20 mg daily  Plan for RV lead revision 3/5/2025 with Dr. Purcell  Continue to monitor on telemetry  Daily labs replace electrolytes as needed  Discussed plan at length with patient    Seen outside global device for nonischemic cardiomyopathy, HFrEF, and NSVT    Kayleigh Osman APRN-CNP  [facility] [place]  [phone]

## 2025-03-03 NOTE — CARE COORDINATION
Chart reviewed day #4.  Per chart review and RN, plan is for patient to have lead revision Wednesday.  Spoke with patient at bedside for introduction.  She is aware of the plan.   Discussed possibility of home care due to her mom will only be able to assist her minimally as her mother has had a stroke in the past.   She is unsure of home care at this time but is agreeable to discuss at a later time prior to discharge.  CM will continue to follow.

## 2025-03-03 NOTE — PLAN OF CARE
Problem: Discharge Planning  Goal: Discharge to home or other facility with appropriate resources  3/3/2025 1011 by Lola Eastman RN  Outcome: Progressing     Problem: Chronic Conditions and Co-morbidities  Goal: Patient's chronic conditions and co-morbidity symptoms are monitored and maintained or improved  3/3/2025 1011 by Lola Eastman RN  Outcome: Progressing     Problem: Pain  Goal: Verbalizes/displays adequate comfort level or baseline comfort level  3/3/2025 1011 by Lola Eastman RN  Outcome: Progressing     Problem: Safety - Adult  Goal: Free from fall injury  3/3/2025 1011 by Lola Eastman RN  Outcome: Progressing

## 2025-03-03 NOTE — PROGRESS NOTES
on 2/14/2025 she was admitted to [facility] for lead extraction of right ventricular defibrillator lead, removal of ICD pulse generator.  Insertion of new right ventricular defibrillator lead and insertion of new right atrial lead.  Implantation of new MRI compatible dual-chamber ICD.  She was discharged home and doing well until the day of admission.  She had impedance alarms overnight and came into the ED because she was concerned about the pacemaker.  There was some retraction of the R ventricular lead and this was replaced on 2/28, however morning of 3.1 it was not sensing appropriately       Plan:   Malfunction of implantable defibrillator lead - device not sensing high enough - EP will re-evaluate Monday - will make NPO except meds after midnight in case of need for procedure

## 2025-03-03 NOTE — PROGRESS NOTES
Hospitalist Progress Note    CC: Malfunction of implantable defibrillator ventricular (ICD) lead    Name:  Rubi Robb /Age/Sex: 1985  (39 y.o. female)   MRN & CSN:  [id number] & [id number] Encounter Date/Time: 3/3/2025 2:22 PM EST   Location:  [facility] PCP: Damaso Barnard MD     Attending:Alayna Eli MD         Hospital course:  40yo wf with PMHx sig for HLD, HTN, NICM, VFib arrest, anxiety, depression, chronic pain on narcotics presents for revision of ICD. She did have a right sided dual coil Medtronic ICD implanted in 2019 due to her history of V-fib arrest.  Of pertinence is that on 2025 she was admitted to [facility] for lead extraction of right ventricular defibrillator lead, removal of ICD pulse generator.  Insertion of new right ventricular defibrillator lead and insertion of new right atrial lead.  Implantation of new MRI compatible dual-chamber ICD.  She was discharged home and doing well until the day of admission.  She had impedance alarms overnight and came into the ED because she was concerned about the pacemaker.  There was some retraction of the R ventricular lead and this was replaced on , however morning of 3/1 it was not sensing appropriately.    Admit date: 2025  Days in hospital:  Hospital Day: 5  Status:  Inpatient       24 Hour Events: device not fully sensing    Subjective: pt still with a lot of R chest wall pain from ICD placement - awaiting plan for ICD from EP cardiology - pt is NPO - she did have BM yesterday    ROS:   A comprehensive review of systems was negative except for: tearful some pain at ICD site       Objective:    /87   Pulse (!) 112   Temp 98.2 °F (36.8 °C) (Oral)   Resp 19   Ht 1.524 m (5')   Wt 67.1 kg (148 lb)   LMP 2019   SpO2 97%   BMI 28.90 kg/m²     Gen: alert, cooperative, calmer this AM  HEENT: NC/AT, moist mucous membranes, no  \"BACTERIA\", \"CLARITYU\", \"SPECGRAV\", \"LEUKOCYTESUR\", \"UROBILINOGEN\", \"BILIRUBINUR\", \"BLOODU\", \"GLUCOSEU\", \"AMORPHOUS\" in the last 72 hours.    Invalid input(s): \"KETONESU\"  Urine Cultures:   Lab Results   Component Value Date/Time    LABURIN No growth at 18-36 hours 05/17/2019 11:52 AM     Blood Cultures:   Lab Results   Component Value Date/Time    BC  01/18/2023 01:07 AM     No growth 24 hours. No growth 48 hours. No growth at 5 days    BC  01/18/2023 01:07 AM     No growth 24 hours. No growth 48 hours. No growth at 5 days     Lab Results   Component Value Date/Time    BLOODCULT2 No growth after 5 days of incubation. 11/06/2019 12:41 PM     Organism:   Lab Results   Component Value Date/Time    ORG Staphylococcus aureus 05/16/2019 11:50 AM    ORG Candida albicans 05/16/2019 11:50 AM         Invalid input(s): \"ABG\"        Electronically signed by Alayna Eli MD on 3/3/2025 at 6:59 AM    ------------------------------------------------------------------------------------------------------------------------------------------------------------------------

## 2025-03-04 LAB
ANION GAP SERPL CALCULATED.3IONS-SCNC: 12 MMOL/L (ref 3–16)
BUN SERPL-MCNC: 5 MG/DL (ref 7–20)
CALCIUM SERPL-MCNC: 9.3 MG/DL (ref 8.3–10.6)
CHLORIDE SERPL-SCNC: 103 MMOL/L (ref 99–110)
CO2 SERPL-SCNC: 24 MMOL/L (ref 21–32)
CREAT SERPL-MCNC: 0.5 MG/DL (ref 0.6–1.1)
GFR SERPLBLD CREATININE-BSD FMLA CKD-EPI: >90 ML/MIN/{1.73_M2}
GLUCOSE SERPL-MCNC: 111 MG/DL (ref 70–99)
MAGNESIUM SERPL-MCNC: 1.38 MG/DL (ref 1.8–2.4)
MAGNESIUM SERPL-MCNC: 3.3 MG/DL (ref 1.8–2.4)
POTASSIUM SERPL-SCNC: 4.1 MMOL/L (ref 3.5–5.1)
SODIUM SERPL-SCNC: 139 MMOL/L (ref 136–145)

## 2025-03-04 PROCEDURE — 6360000002 HC RX W HCPCS: Performed by: STUDENT IN AN ORGANIZED HEALTH CARE EDUCATION/TRAINING PROGRAM

## 2025-03-04 PROCEDURE — 6360000002 HC RX W HCPCS: Performed by: INTERNAL MEDICINE

## 2025-03-04 PROCEDURE — 83735 ASSAY OF MAGNESIUM: CPT

## 2025-03-04 PROCEDURE — 80048 BASIC METABOLIC PNL TOTAL CA: CPT

## 2025-03-04 PROCEDURE — 6370000000 HC RX 637 (ALT 250 FOR IP): Performed by: STUDENT IN AN ORGANIZED HEALTH CARE EDUCATION/TRAINING PROGRAM

## 2025-03-04 PROCEDURE — 6370000000 HC RX 637 (ALT 250 FOR IP): Performed by: INTERNAL MEDICINE

## 2025-03-04 PROCEDURE — 2060000000 HC ICU INTERMEDIATE R&B

## 2025-03-04 PROCEDURE — 2500000003 HC RX 250 WO HCPCS: Performed by: INTERNAL MEDICINE

## 2025-03-04 PROCEDURE — 36415 COLL VENOUS BLD VENIPUNCTURE: CPT

## 2025-03-04 RX ORDER — OXYCODONE HYDROCHLORIDE 5 MG/1
5 TABLET ORAL EVERY 4 HOURS PRN
Status: DISCONTINUED | OUTPATIENT
Start: 2025-03-04 | End: 2025-03-07 | Stop reason: HOSPADM

## 2025-03-04 RX ORDER — MAGNESIUM SULFATE IN WATER 40 MG/ML
4000 INJECTION, SOLUTION INTRAVENOUS ONCE
Status: COMPLETED | OUTPATIENT
Start: 2025-03-04 | End: 2025-03-04

## 2025-03-04 RX ORDER — ACETAMINOPHEN 500 MG
1000 TABLET ORAL EVERY 8 HOURS SCHEDULED
Status: DISCONTINUED | OUTPATIENT
Start: 2025-03-04 | End: 2025-03-07 | Stop reason: HOSPADM

## 2025-03-04 RX ORDER — HYDROMORPHONE HYDROCHLORIDE 1 MG/ML
1 INJECTION, SOLUTION INTRAMUSCULAR; INTRAVENOUS; SUBCUTANEOUS
Status: DISCONTINUED | OUTPATIENT
Start: 2025-03-04 | End: 2025-03-06

## 2025-03-04 RX ORDER — OXYCODONE HYDROCHLORIDE 5 MG/1
10 TABLET ORAL EVERY 4 HOURS PRN
Status: DISCONTINUED | OUTPATIENT
Start: 2025-03-04 | End: 2025-03-07 | Stop reason: HOSPADM

## 2025-03-04 RX ADMIN — CLONAZEPAM 0.5 MG: 0.5 TABLET ORAL at 16:37

## 2025-03-04 RX ADMIN — HYDROMORPHONE HYDROCHLORIDE 1 MG: 1 INJECTION, SOLUTION INTRAMUSCULAR; INTRAVENOUS; SUBCUTANEOUS at 13:18

## 2025-03-04 RX ADMIN — COLCHICINE 0.6 MG: 0.6 TABLET, FILM COATED ORAL at 07:56

## 2025-03-04 RX ADMIN — OXYCODONE 10 MG: 5 TABLET ORAL at 16:38

## 2025-03-04 RX ADMIN — Medication 10 ML: at 07:58

## 2025-03-04 RX ADMIN — LISINOPRIL 2.5 MG: 5 TABLET ORAL at 07:56

## 2025-03-04 RX ADMIN — MAGNESIUM SULFATE HEPTAHYDRATE 1000 MG: 1 INJECTION, SOLUTION INTRAVENOUS at 15:36

## 2025-03-04 RX ADMIN — METOPROLOL SUCCINATE 50 MG: 50 TABLET, FILM COATED, EXTENDED RELEASE ORAL at 07:56

## 2025-03-04 RX ADMIN — CLONAZEPAM 0.5 MG: 0.5 TABLET ORAL at 09:14

## 2025-03-04 RX ADMIN — HYDROCODONE BITARTRATE AND ACETAMINOPHEN 1 TABLET: 5; 325 TABLET ORAL at 07:56

## 2025-03-04 RX ADMIN — SPIRONOLACTONE 25 MG: 25 TABLET ORAL at 07:56

## 2025-03-04 RX ADMIN — HYDROMORPHONE HYDROCHLORIDE 0.5 MG: 1 INJECTION, SOLUTION INTRAMUSCULAR; INTRAVENOUS; SUBCUTANEOUS at 06:34

## 2025-03-04 RX ADMIN — ACETAMINOPHEN 1000 MG: 500 TABLET ORAL at 21:25

## 2025-03-04 RX ADMIN — COLCHICINE 0.6 MG: 0.6 TABLET, FILM COATED ORAL at 21:26

## 2025-03-04 RX ADMIN — MAGNESIUM SULFATE HEPTAHYDRATE 1000 MG: 1 INJECTION, SOLUTION INTRAVENOUS at 10:04

## 2025-03-04 RX ADMIN — HYDROMORPHONE HYDROCHLORIDE 1 MG: 1 INJECTION, SOLUTION INTRAMUSCULAR; INTRAVENOUS; SUBCUTANEOUS at 21:24

## 2025-03-04 RX ADMIN — HYDROMORPHONE HYDROCHLORIDE 0.5 MG: 1 INJECTION, SOLUTION INTRAMUSCULAR; INTRAVENOUS; SUBCUTANEOUS at 02:05

## 2025-03-04 RX ADMIN — HYDROMORPHONE HYDROCHLORIDE 1 MG: 1 INJECTION, SOLUTION INTRAMUSCULAR; INTRAVENOUS; SUBCUTANEOUS at 18:36

## 2025-03-04 RX ADMIN — HYDROMORPHONE HYDROCHLORIDE 0.5 MG: 1 INJECTION, SOLUTION INTRAMUSCULAR; INTRAVENOUS; SUBCUTANEOUS at 09:57

## 2025-03-04 RX ADMIN — GABAPENTIN 600 MG: 300 CAPSULE ORAL at 07:56

## 2025-03-04 RX ADMIN — GABAPENTIN 600 MG: 300 CAPSULE ORAL at 13:18

## 2025-03-04 RX ADMIN — HYDROMORPHONE HYDROCHLORIDE 1 MG: 1 INJECTION, SOLUTION INTRAMUSCULAR; INTRAVENOUS; SUBCUTANEOUS at 15:37

## 2025-03-04 RX ADMIN — MAGNESIUM SULFATE HEPTAHYDRATE 4000 MG: 4 INJECTION, SOLUTION INTRAVENOUS at 11:35

## 2025-03-04 RX ADMIN — OLANZAPINE 7.5 MG: 5 TABLET, FILM COATED ORAL at 07:55

## 2025-03-04 RX ADMIN — OLANZAPINE 15 MG: 5 TABLET, FILM COATED ORAL at 21:25

## 2025-03-04 RX ADMIN — ATORVASTATIN CALCIUM 20 MG: 10 TABLET, FILM COATED ORAL at 07:56

## 2025-03-04 RX ADMIN — HYDROMORPHONE HYDROCHLORIDE 1 MG: 1 INJECTION, SOLUTION INTRAMUSCULAR; INTRAVENOUS; SUBCUTANEOUS at 10:41

## 2025-03-04 RX ADMIN — OXYCODONE 10 MG: 5 TABLET ORAL at 11:35

## 2025-03-04 RX ADMIN — GABAPENTIN 600 MG: 300 CAPSULE ORAL at 21:25

## 2025-03-04 RX ADMIN — SODIUM CHLORIDE, PRESERVATIVE FREE 10 ML: 5 INJECTION INTRAVENOUS at 21:26

## 2025-03-04 ASSESSMENT — PAIN DESCRIPTION - LOCATION
LOCATION: CHEST

## 2025-03-04 ASSESSMENT — PAIN SCALES - GENERAL
PAINLEVEL_OUTOF10: 8
PAINLEVEL_OUTOF10: 8
PAINLEVEL_OUTOF10: 9
PAINLEVEL_OUTOF10: 10
PAINLEVEL_OUTOF10: 5
PAINLEVEL_OUTOF10: 10
PAINLEVEL_OUTOF10: 5
PAINLEVEL_OUTOF10: 6
PAINLEVEL_OUTOF10: 7
PAINLEVEL_OUTOF10: 9
PAINLEVEL_OUTOF10: 9
PAINLEVEL_OUTOF10: 10
PAINLEVEL_OUTOF10: 8

## 2025-03-04 ASSESSMENT — PAIN DESCRIPTION - DESCRIPTORS
DESCRIPTORS: ACHING

## 2025-03-04 ASSESSMENT — PAIN DESCRIPTION - ORIENTATION
ORIENTATION: RIGHT;UPPER
ORIENTATION: RIGHT
ORIENTATION: RIGHT;UPPER
ORIENTATION: RIGHT

## 2025-03-04 NOTE — PROGRESS NOTES
[facility] Medicine Progress Note  V 1.6      Date of Admission: 2/27/2025    Hospital Day: 6      Chief Admission Complaint:   Pacemaker/defibrillator fired 2 times on the morning of admission     Subjective: Patient reports significant right-sided chest pain around the site of pacemaker.  Denies any other symptoms.    Presenting Admission History:       39 y.o. 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  She has a remote history of rhabdomyosarcoma of left lower extremity.  This was at age 1 she was hospitalized at [facility] for more than 1 year for treatment of this.  She underwent surgery, chemotherapy and radiation therapy.     She did have a right sided dual coil Medtronic ICD implanted in 6/2019 due to her history of V-fib arrest.  Of pertinence is that on 2/14/2025 she was admitted to [facility] for lead extraction of right ventricular defibrillator lead, removal of ICD pulse generator.  Insertion of new right ventricular defibrillator lead and insertion of new right atrial lead.  Implantation of new MRI compatible dual-chamber ICD.  She was discharged home and doing well until the day of admission.  She had impedance alarms overnight and came into the ED because she was concerned about the pacemaker.  She denies any shortness of breath states she has mild chest discomfort.  There is been no fevers no chills, the site of the device in the anterior chest wall is clean and healing well     At this time it seems she has retraction of her right ventricular lead slightly.  She will likely need lead revision or removal and reimplantation during this admission.  She is now admitted for further evaluation management.  Cardiology consulted and appreciate their input    Assessment/Plan:      Current Principal Problem:

## 2025-03-04 NOTE — PLAN OF CARE
Problem: Chronic Conditions and Co-morbidities  Goal: Patient's chronic conditions and co-morbidity symptoms are monitored and maintained or improved  Outcome: Not Progressing     Problem: Discharge Planning  Goal: Discharge to home or other facility with appropriate resources  Outcome: Not Progressing     Problem: Pain  Goal: Verbalizes/displays adequate comfort level or baseline comfort level  Outcome: Not Progressing

## 2025-03-05 ENCOUNTER — ANESTHESIA (OUTPATIENT)
Dept: CARDIAC CATH/INVASIVE PROCEDURES | Age: 40
End: 2025-03-05
Payer: COMMERCIAL

## 2025-03-05 ENCOUNTER — PROCEDURE VISIT (OUTPATIENT)
Dept: CARDIOLOGY CLINIC | Age: 40
End: 2025-03-05

## 2025-03-05 ENCOUNTER — NURSE ONLY (OUTPATIENT)
Dept: CARDIOLOGY CLINIC | Age: 40
End: 2025-03-05

## 2025-03-05 ENCOUNTER — ANESTHESIA EVENT (OUTPATIENT)
Dept: CARDIAC CATH/INVASIVE PROCEDURES | Age: 40
End: 2025-03-05
Payer: COMMERCIAL

## 2025-03-05 DIAGNOSIS — I47.20 VT (VENTRICULAR TACHYCARDIA) (HCC): ICD-10-CM

## 2025-03-05 DIAGNOSIS — Z95.810 ICD (IMPLANTABLE CARDIOVERTER-DEFIBRILLATOR) IN PLACE: Primary | ICD-10-CM

## 2025-03-05 DIAGNOSIS — Z95.810 ICD (IMPLANTABLE CARDIOVERTER-DEFIBRILLATOR), DUAL, IN SITU: Primary | ICD-10-CM

## 2025-03-05 LAB
ANION GAP SERPL CALCULATED.3IONS-SCNC: 11 MMOL/L (ref 3–16)
BUN SERPL-MCNC: 5 MG/DL (ref 7–20)
CALCIUM SERPL-MCNC: 9 MG/DL (ref 8.3–10.6)
CHLORIDE SERPL-SCNC: 103 MMOL/L (ref 99–110)
CO2 SERPL-SCNC: 24 MMOL/L (ref 21–32)
CREAT SERPL-MCNC: 0.4 MG/DL (ref 0.6–1.1)
DEPRECATED RDW RBC AUTO: 12.2 % (ref 12.4–15.4)
GFR SERPLBLD CREATININE-BSD FMLA CKD-EPI: >90 ML/MIN/{1.73_M2}
GLUCOSE SERPL-MCNC: 96 MG/DL (ref 70–99)
HCG SERPL QL: NEGATIVE
HCT VFR BLD AUTO: 34 % (ref 36–48)
HGB BLD-MCNC: 11.5 G/DL (ref 12–16)
MCH RBC QN AUTO: 29.5 PG (ref 26–34)
MCHC RBC AUTO-ENTMCNC: 33.9 G/DL (ref 31–36)
MCV RBC AUTO: 86.9 FL (ref 80–100)
PLATELET # BLD AUTO: 339 K/UL (ref 135–450)
PMV BLD AUTO: 7.7 FL (ref 5–10.5)
POTASSIUM SERPL-SCNC: 4.3 MMOL/L (ref 3.5–5.1)
RBC # BLD AUTO: 3.91 M/UL (ref 4–5.2)
SODIUM SERPL-SCNC: 138 MMOL/L (ref 136–145)
WBC # BLD AUTO: 9.2 K/UL (ref 4–11)

## 2025-03-05 PROCEDURE — 2709999900 HC NON-CHARGEABLE SUPPLY: Performed by: INTERNAL MEDICINE

## 2025-03-05 PROCEDURE — 33215 REPOSITION PACING-DEFIB LEAD: CPT | Performed by: INTERNAL MEDICINE

## 2025-03-05 PROCEDURE — 6370000000 HC RX 637 (ALT 250 FOR IP): Performed by: INTERNAL MEDICINE

## 2025-03-05 PROCEDURE — 2720000010 HC SURG SUPPLY STERILE: Performed by: INTERNAL MEDICINE

## 2025-03-05 PROCEDURE — 7100000000 HC PACU RECOVERY - FIRST 15 MIN: Performed by: INTERNAL MEDICINE

## 2025-03-05 PROCEDURE — 6360000002 HC RX W HCPCS: Performed by: STUDENT IN AN ORGANIZED HEALTH CARE EDUCATION/TRAINING PROGRAM

## 2025-03-05 PROCEDURE — 2580000003 HC RX 258: Performed by: INTERNAL MEDICINE

## 2025-03-05 PROCEDURE — 2500000003 HC RX 250 WO HCPCS: Performed by: INTERNAL MEDICINE

## 2025-03-05 PROCEDURE — 36415 COLL VENOUS BLD VENIPUNCTURE: CPT

## 2025-03-05 PROCEDURE — 4B02XSZ MEASUREMENT OF CARDIAC PACEMAKER, EXTERNAL APPROACH: ICD-10-PCS | Performed by: INTERNAL MEDICINE

## 2025-03-05 PROCEDURE — 02WA0MZ REVISION OF CARDIAC LEAD IN HEART, OPEN APPROACH: ICD-10-PCS | Performed by: INTERNAL MEDICINE

## 2025-03-05 PROCEDURE — 84703 CHORIONIC GONADOTROPIN ASSAY: CPT

## 2025-03-05 PROCEDURE — 99232 SBSQ HOSP IP/OBS MODERATE 35: CPT

## 2025-03-05 PROCEDURE — 6360000002 HC RX W HCPCS: Performed by: NURSE ANESTHETIST, CERTIFIED REGISTERED

## 2025-03-05 PROCEDURE — 2500000003 HC RX 250 WO HCPCS: Performed by: NURSE ANESTHETIST, CERTIFIED REGISTERED

## 2025-03-05 PROCEDURE — C1889 IMPLANT/INSERT DEVICE, NOC: HCPCS | Performed by: INTERNAL MEDICINE

## 2025-03-05 PROCEDURE — 6370000000 HC RX 637 (ALT 250 FOR IP): Performed by: STUDENT IN AN ORGANIZED HEALTH CARE EDUCATION/TRAINING PROGRAM

## 2025-03-05 PROCEDURE — 94761 N-INVAS EAR/PLS OXIMETRY MLT: CPT

## 2025-03-05 PROCEDURE — 3700000001 HC ADD 15 MINUTES (ANESTHESIA): Performed by: INTERNAL MEDICINE

## 2025-03-05 PROCEDURE — 85027 COMPLETE CBC AUTOMATED: CPT

## 2025-03-05 PROCEDURE — 0KCH0ZZ EXTIRPATION OF MATTER FROM RIGHT THORAX MUSCLE, OPEN APPROACH: ICD-10-PCS | Performed by: INTERNAL MEDICINE

## 2025-03-05 PROCEDURE — 2580000003 HC RX 258: Performed by: NURSE ANESTHETIST, CERTIFIED REGISTERED

## 2025-03-05 PROCEDURE — 2700000000 HC OXYGEN THERAPY PER DAY

## 2025-03-05 PROCEDURE — 7100000001 HC PACU RECOVERY - ADDTL 15 MIN: Performed by: INTERNAL MEDICINE

## 2025-03-05 PROCEDURE — 3700000000 HC ANESTHESIA ATTENDED CARE: Performed by: INTERNAL MEDICINE

## 2025-03-05 PROCEDURE — 2060000000 HC ICU INTERMEDIATE R&B

## 2025-03-05 PROCEDURE — 80048 BASIC METABOLIC PNL TOTAL CA: CPT

## 2025-03-05 RX ORDER — EPHEDRINE SULFATE 50 MG/ML
INJECTION INTRAVENOUS
Status: DISCONTINUED | OUTPATIENT
Start: 2025-03-05 | End: 2025-03-05 | Stop reason: SDUPTHER

## 2025-03-05 RX ORDER — OXYCODONE HYDROCHLORIDE 5 MG/1
10 TABLET ORAL EVERY 4 HOURS PRN
Status: DISCONTINUED | OUTPATIENT
Start: 2025-03-05 | End: 2025-03-05 | Stop reason: SDUPTHER

## 2025-03-05 RX ORDER — ONDANSETRON 2 MG/ML
4 INJECTION INTRAMUSCULAR; INTRAVENOUS
Status: DISCONTINUED | OUTPATIENT
Start: 2025-03-05 | End: 2025-03-05 | Stop reason: HOSPADM

## 2025-03-05 RX ORDER — SODIUM CHLORIDE 9 MG/ML
INJECTION, SOLUTION INTRAVENOUS PRN
Status: DISCONTINUED | OUTPATIENT
Start: 2025-03-05 | End: 2025-03-05 | Stop reason: HOSPADM

## 2025-03-05 RX ORDER — MIDAZOLAM HYDROCHLORIDE 1 MG/ML
INJECTION, SOLUTION INTRAMUSCULAR; INTRAVENOUS
Status: DISCONTINUED | OUTPATIENT
Start: 2025-03-05 | End: 2025-03-05 | Stop reason: SDUPTHER

## 2025-03-05 RX ORDER — DOXYCYCLINE HYCLATE 100 MG
100 TABLET ORAL EVERY 12 HOURS SCHEDULED
Status: DISCONTINUED | OUTPATIENT
Start: 2025-03-05 | End: 2025-03-07 | Stop reason: HOSPADM

## 2025-03-05 RX ORDER — FENTANYL CITRATE 50 UG/ML
INJECTION, SOLUTION INTRAMUSCULAR; INTRAVENOUS
Status: DISCONTINUED | OUTPATIENT
Start: 2025-03-05 | End: 2025-03-05 | Stop reason: SDUPTHER

## 2025-03-05 RX ORDER — OXYCODONE HYDROCHLORIDE 5 MG/1
10 TABLET ORAL PRN
Status: DISCONTINUED | OUTPATIENT
Start: 2025-03-05 | End: 2025-03-05 | Stop reason: HOSPADM

## 2025-03-05 RX ORDER — NALOXONE HYDROCHLORIDE 0.4 MG/ML
INJECTION, SOLUTION INTRAMUSCULAR; INTRAVENOUS; SUBCUTANEOUS PRN
Status: DISCONTINUED | OUTPATIENT
Start: 2025-03-05 | End: 2025-03-05 | Stop reason: HOSPADM

## 2025-03-05 RX ORDER — DEXAMETHASONE SODIUM PHOSPHATE 4 MG/ML
INJECTION, SOLUTION INTRA-ARTICULAR; INTRALESIONAL; INTRAMUSCULAR; INTRAVENOUS; SOFT TISSUE
Status: DISCONTINUED | OUTPATIENT
Start: 2025-03-05 | End: 2025-03-05 | Stop reason: SDUPTHER

## 2025-03-05 RX ORDER — DIPHENHYDRAMINE HYDROCHLORIDE 50 MG/ML
12.5 INJECTION INTRAMUSCULAR; INTRAVENOUS
Status: DISCONTINUED | OUTPATIENT
Start: 2025-03-05 | End: 2025-03-05 | Stop reason: HOSPADM

## 2025-03-05 RX ORDER — LABETALOL HYDROCHLORIDE 5 MG/ML
5 INJECTION, SOLUTION INTRAVENOUS EVERY 10 MIN PRN
Status: DISCONTINUED | OUTPATIENT
Start: 2025-03-05 | End: 2025-03-05 | Stop reason: HOSPADM

## 2025-03-05 RX ORDER — KETOROLAC TROMETHAMINE 30 MG/ML
INJECTION, SOLUTION INTRAMUSCULAR; INTRAVENOUS
Status: DISCONTINUED | OUTPATIENT
Start: 2025-03-05 | End: 2025-03-05 | Stop reason: SDUPTHER

## 2025-03-05 RX ORDER — SODIUM CHLORIDE 0.9 % (FLUSH) 0.9 %
5-40 SYRINGE (ML) INJECTION PRN
Status: DISCONTINUED | OUTPATIENT
Start: 2025-03-05 | End: 2025-03-05 | Stop reason: HOSPADM

## 2025-03-05 RX ORDER — LIDOCAINE HYDROCHLORIDE 20 MG/ML
INJECTION, SOLUTION EPIDURAL; INFILTRATION; INTRACAUDAL; PERINEURAL
Status: DISCONTINUED | OUTPATIENT
Start: 2025-03-05 | End: 2025-03-05 | Stop reason: SDUPTHER

## 2025-03-05 RX ORDER — VANCOMYCIN HYDROCHLORIDE 1 G/20ML
INJECTION, POWDER, LYOPHILIZED, FOR SOLUTION INTRAVENOUS
Status: DISCONTINUED | OUTPATIENT
Start: 2025-03-05 | End: 2025-03-05 | Stop reason: SDUPTHER

## 2025-03-05 RX ORDER — OXYCODONE HYDROCHLORIDE 5 MG/1
5 TABLET ORAL PRN
Status: DISCONTINUED | OUTPATIENT
Start: 2025-03-05 | End: 2025-03-05 | Stop reason: HOSPADM

## 2025-03-05 RX ORDER — OXYCODONE HYDROCHLORIDE 5 MG/1
5 TABLET ORAL EVERY 4 HOURS PRN
Status: DISCONTINUED | OUTPATIENT
Start: 2025-03-05 | End: 2025-03-05 | Stop reason: SDUPTHER

## 2025-03-05 RX ORDER — LIDOCAINE HYDROCHLORIDE AND EPINEPHRINE 5; 5 MG/ML; UG/ML
INJECTION, SOLUTION INFILTRATION; PERINEURAL PRN
Status: DISCONTINUED | OUTPATIENT
Start: 2025-03-05 | End: 2025-03-05 | Stop reason: HOSPADM

## 2025-03-05 RX ORDER — SODIUM CHLORIDE 9 MG/ML
INJECTION, SOLUTION INTRAVENOUS PRN
Status: DISCONTINUED | OUTPATIENT
Start: 2025-03-05 | End: 2025-03-07 | Stop reason: HOSPADM

## 2025-03-05 RX ORDER — ONDANSETRON 2 MG/ML
INJECTION INTRAMUSCULAR; INTRAVENOUS
Status: DISCONTINUED | OUTPATIENT
Start: 2025-03-05 | End: 2025-03-05 | Stop reason: SDUPTHER

## 2025-03-05 RX ORDER — PHENYLEPHRINE HCL IN 0.9% NACL 1 MG/10 ML
SYRINGE (ML) INTRAVENOUS
Status: DISCONTINUED | OUTPATIENT
Start: 2025-03-05 | End: 2025-03-05 | Stop reason: SDUPTHER

## 2025-03-05 RX ORDER — SODIUM CHLORIDE 0.9 % (FLUSH) 0.9 %
5-40 SYRINGE (ML) INJECTION EVERY 12 HOURS SCHEDULED
Status: DISCONTINUED | OUTPATIENT
Start: 2025-03-05 | End: 2025-03-07 | Stop reason: HOSPADM

## 2025-03-05 RX ORDER — SODIUM CHLORIDE 0.9 % (FLUSH) 0.9 %
5-40 SYRINGE (ML) INJECTION PRN
Status: DISCONTINUED | OUTPATIENT
Start: 2025-03-05 | End: 2025-03-07 | Stop reason: HOSPADM

## 2025-03-05 RX ORDER — SODIUM CHLORIDE 0.9 % (FLUSH) 0.9 %
5-40 SYRINGE (ML) INJECTION EVERY 12 HOURS SCHEDULED
Status: DISCONTINUED | OUTPATIENT
Start: 2025-03-05 | End: 2025-03-05 | Stop reason: HOSPADM

## 2025-03-05 RX ORDER — MEPERIDINE HYDROCHLORIDE 50 MG/ML
12.5 INJECTION INTRAMUSCULAR; INTRAVENOUS; SUBCUTANEOUS EVERY 5 MIN PRN
Status: DISCONTINUED | OUTPATIENT
Start: 2025-03-05 | End: 2025-03-05 | Stop reason: HOSPADM

## 2025-03-05 RX ORDER — KETAMINE HYDROCHLORIDE 50 MG/ML
INJECTION, SOLUTION INTRAMUSCULAR; INTRAVENOUS
Status: DISCONTINUED | OUTPATIENT
Start: 2025-03-05 | End: 2025-03-05 | Stop reason: SDUPTHER

## 2025-03-05 RX ORDER — PROPOFOL 10 MG/ML
INJECTION, EMULSION INTRAVENOUS
Status: DISCONTINUED | OUTPATIENT
Start: 2025-03-05 | End: 2025-03-05 | Stop reason: SDUPTHER

## 2025-03-05 RX ADMIN — PROPOFOL 50 MG: 10 INJECTION, EMULSION INTRAVENOUS at 11:41

## 2025-03-05 RX ADMIN — SODIUM CHLORIDE: 9 INJECTION, SOLUTION INTRAVENOUS at 12:05

## 2025-03-05 RX ADMIN — Medication 200 MCG: at 11:56

## 2025-03-05 RX ADMIN — Medication 10 ML: at 08:26

## 2025-03-05 RX ADMIN — Medication 200 MCG: at 11:50

## 2025-03-05 RX ADMIN — SODIUM CHLORIDE, PRESERVATIVE FREE 10 ML: 5 INJECTION INTRAVENOUS at 08:28

## 2025-03-05 RX ADMIN — ATORVASTATIN CALCIUM 20 MG: 10 TABLET, FILM COATED ORAL at 08:25

## 2025-03-05 RX ADMIN — ACETAMINOPHEN 1000 MG: 500 TABLET ORAL at 15:14

## 2025-03-05 RX ADMIN — DEXMEDETOMIDINE HYDROCHLORIDE 2 MCG: 100 INJECTION, SOLUTION INTRAVENOUS at 12:02

## 2025-03-05 RX ADMIN — KETOROLAC TROMETHAMINE 15 MG: 30 INJECTION, SOLUTION INTRAMUSCULAR; INTRAVENOUS at 12:41

## 2025-03-05 RX ADMIN — CLONAZEPAM 0.5 MG: 0.5 TABLET ORAL at 08:25

## 2025-03-05 RX ADMIN — OLANZAPINE 15 MG: 5 TABLET, FILM COATED ORAL at 22:04

## 2025-03-05 RX ADMIN — EPHEDRINE SULFATE 10 MG: 50 INJECTION INTRAVENOUS at 11:29

## 2025-03-05 RX ADMIN — COLCHICINE 0.6 MG: 0.6 TABLET, FILM COATED ORAL at 22:05

## 2025-03-05 RX ADMIN — Medication 200 MCG: at 11:16

## 2025-03-05 RX ADMIN — Medication 200 MCG: at 11:37

## 2025-03-05 RX ADMIN — MIDAZOLAM 2 MG: 1 INJECTION INTRAMUSCULAR; INTRAVENOUS at 10:56

## 2025-03-05 RX ADMIN — HYDROMORPHONE HYDROCHLORIDE 1 MG: 1 INJECTION, SOLUTION INTRAMUSCULAR; INTRAVENOUS; SUBCUTANEOUS at 08:27

## 2025-03-05 RX ADMIN — CLONAZEPAM 0.5 MG: 0.5 TABLET ORAL at 18:01

## 2025-03-05 RX ADMIN — ACETAMINOPHEN 1000 MG: 500 TABLET ORAL at 22:04

## 2025-03-05 RX ADMIN — LISINOPRIL 2.5 MG: 5 TABLET ORAL at 08:26

## 2025-03-05 RX ADMIN — Medication 200 MCG: at 11:05

## 2025-03-05 RX ADMIN — KETAMINE HYDROCHLORIDE 50 MG: 50 INJECTION INTRAMUSCULAR; INTRAVENOUS at 10:59

## 2025-03-05 RX ADMIN — DOXYCYCLINE HYCLATE 100 MG: 100 TABLET, COATED ORAL at 22:05

## 2025-03-05 RX ADMIN — HYDROMORPHONE HYDROCHLORIDE 1 MG: 1 INJECTION, SOLUTION INTRAMUSCULAR; INTRAVENOUS; SUBCUTANEOUS at 19:02

## 2025-03-05 RX ADMIN — PROPOFOL 50 MG: 10 INJECTION, EMULSION INTRAVENOUS at 12:39

## 2025-03-05 RX ADMIN — SPIRONOLACTONE 25 MG: 25 TABLET ORAL at 08:25

## 2025-03-05 RX ADMIN — HYDROMORPHONE HYDROCHLORIDE 1 MG: 1 INJECTION, SOLUTION INTRAMUSCULAR; INTRAVENOUS; SUBCUTANEOUS at 04:26

## 2025-03-05 RX ADMIN — ONDANSETRON 4 MG: 2 INJECTION INTRAMUSCULAR; INTRAVENOUS at 12:01

## 2025-03-05 RX ADMIN — HYDROMORPHONE HYDROCHLORIDE 1 MG: 1 INJECTION, SOLUTION INTRAMUSCULAR; INTRAVENOUS; SUBCUTANEOUS at 15:15

## 2025-03-05 RX ADMIN — FENTANYL CITRATE 50 MCG: 50 INJECTION, SOLUTION INTRAMUSCULAR; INTRAVENOUS at 11:42

## 2025-03-05 RX ADMIN — ACETAMINOPHEN 1000 MG: 500 TABLET ORAL at 06:57

## 2025-03-05 RX ADMIN — FENTANYL CITRATE 50 MCG: 50 INJECTION, SOLUTION INTRAMUSCULAR; INTRAVENOUS at 12:39

## 2025-03-05 RX ADMIN — DEXMEDETOMIDINE HYDROCHLORIDE 2 MCG: 100 INJECTION, SOLUTION INTRAVENOUS at 12:39

## 2025-03-05 RX ADMIN — OLANZAPINE 7.5 MG: 5 TABLET, FILM COATED ORAL at 08:26

## 2025-03-05 RX ADMIN — PROPOFOL 200 MG: 10 INJECTION, EMULSION INTRAVENOUS at 10:59

## 2025-03-05 RX ADMIN — LIDOCAINE HYDROCHLORIDE 100 MG: 20 INJECTION, SOLUTION EPIDURAL; INFILTRATION; INTRACAUDAL; PERINEURAL at 10:59

## 2025-03-05 RX ADMIN — GABAPENTIN 600 MG: 300 CAPSULE ORAL at 22:05

## 2025-03-05 RX ADMIN — COLCHICINE 0.6 MG: 0.6 TABLET, FILM COATED ORAL at 08:25

## 2025-03-05 RX ADMIN — Medication 200 MCG: at 11:10

## 2025-03-05 RX ADMIN — HYDROMORPHONE HYDROCHLORIDE 1 MG: 1 INJECTION, SOLUTION INTRAMUSCULAR; INTRAVENOUS; SUBCUTANEOUS at 01:33

## 2025-03-05 RX ADMIN — DEXAMETHASONE SODIUM PHOSPHATE 8 MG: 4 INJECTION, SOLUTION INTRAMUSCULAR; INTRAVENOUS at 10:59

## 2025-03-05 RX ADMIN — HYDROMORPHONE HYDROCHLORIDE 1 MG: 1 INJECTION, SOLUTION INTRAMUSCULAR; INTRAVENOUS; SUBCUTANEOUS at 22:05

## 2025-03-05 RX ADMIN — FENTANYL CITRATE 100 MCG: 50 INJECTION, SOLUTION INTRAMUSCULAR; INTRAVENOUS at 10:57

## 2025-03-05 RX ADMIN — Medication 200 MCG: at 11:29

## 2025-03-05 RX ADMIN — DEXMEDETOMIDINE HYDROCHLORIDE 4 MCG: 100 INJECTION, SOLUTION INTRAVENOUS at 12:58

## 2025-03-05 RX ADMIN — GABAPENTIN 600 MG: 300 CAPSULE ORAL at 15:15

## 2025-03-05 RX ADMIN — GABAPENTIN 600 MG: 300 CAPSULE ORAL at 08:26

## 2025-03-05 RX ADMIN — VANCOMYCIN HYDROCHLORIDE 1400 MG: 1 INJECTION, POWDER, LYOPHILIZED, FOR SOLUTION INTRAVENOUS at 11:09

## 2025-03-05 RX ADMIN — SODIUM CHLORIDE, PRESERVATIVE FREE 10 ML: 5 INJECTION INTRAVENOUS at 22:12

## 2025-03-05 RX ADMIN — METOPROLOL SUCCINATE 50 MG: 50 TABLET, FILM COATED, EXTENDED RELEASE ORAL at 08:26

## 2025-03-05 RX ADMIN — EPHEDRINE SULFATE 10 MG: 50 INJECTION INTRAVENOUS at 11:22

## 2025-03-05 ASSESSMENT — PAIN SCALES - GENERAL
PAINLEVEL_OUTOF10: 10
PAINLEVEL_OUTOF10: 6
PAINLEVEL_OUTOF10: 5
PAINLEVEL_OUTOF10: 5
PAINLEVEL_OUTOF10: 10
PAINLEVEL_OUTOF10: 5
PAINLEVEL_OUTOF10: 8
PAINLEVEL_OUTOF10: 10
PAINLEVEL_OUTOF10: 8
PAINLEVEL_OUTOF10: 9
PAINLEVEL_OUTOF10: 7
PAINLEVEL_OUTOF10: 10
PAINLEVEL_OUTOF10: 9
PAINLEVEL_OUTOF10: 9
PAINLEVEL_OUTOF10: 8
PAINLEVEL_OUTOF10: 7

## 2025-03-05 ASSESSMENT — PAIN DESCRIPTION - DESCRIPTORS
DESCRIPTORS: ACHING
DESCRIPTORS: CRUSHING
DESCRIPTORS: ACHING

## 2025-03-05 ASSESSMENT — PAIN DESCRIPTION - LOCATION
LOCATION: CHEST

## 2025-03-05 ASSESSMENT — PAIN DESCRIPTION - ORIENTATION
ORIENTATION: RIGHT;UPPER
ORIENTATION: RIGHT
ORIENTATION: RIGHT;UPPER
ORIENTATION: RIGHT
ORIENTATION: RIGHT
ORIENTATION: RIGHT;OUTER
ORIENTATION: RIGHT;UPPER

## 2025-03-05 ASSESSMENT — ENCOUNTER SYMPTOMS: SHORTNESS OF BREATH: 1

## 2025-03-05 ASSESSMENT — PAIN SCALES - WONG BAKER: WONGBAKER_NUMERICALRESPONSE: HURTS A LITTLE BIT

## 2025-03-05 NOTE — PROGRESS NOTES
[facility] Medicine Progress Note  V 1.6      Date of Admission: 2/27/2025    Hospital Day: 7      Chief Admission Complaint:   Pacemaker/defibrillator fired 2 times on the morning of admission     Subjective: Patient denies new symptoms today.    Presenting Admission History:       39 y.o. 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  She has a remote history of rhabdomyosarcoma of left lower extremity.  This was at age 1 she was hospitalized at [facility] for more than 1 year for treatment of this.  She underwent surgery, chemotherapy and radiation therapy.     She did have a right sided dual coil Medtronic ICD implanted in 6/2019 due to her history of V-fib arrest.  Of pertinence is that on 2/14/2025 she was admitted to [facility] for lead extraction of right ventricular defibrillator lead, removal of ICD pulse generator.  Insertion of new right ventricular defibrillator lead and insertion of new right atrial lead.  Implantation of new MRI compatible dual-chamber ICD.  She was discharged home and doing well until the day of admission.  She had impedance alarms overnight and came into the ED because she was concerned about the pacemaker.  She denies any shortness of breath states she has mild chest discomfort.  There is been no fevers no chills, the site of the device in the anterior chest wall is clean and healing well     At this time it seems she has retraction of her right ventricular lead slightly.  She will likely need lead revision or removal and reimplantation during this admission.  She is now admitted for further evaluation management.  Cardiology consulted and appreciate their input    Assessment/Plan:      Current Principal Problem:  Malfunction of implantable defibrillator ventricular (ICD) lead    Malfunctioning

## 2025-03-05 NOTE — CARE COORDINATION
Patient had her lead replacement today.  Spoke with patient and patient mother at bedside.  Revisited the idea of home care but they are not interested in home care at this time.   They are, however, interested in some resources for food pantries as they are on a set income.   These resources were provided to patient and mother.  Please notify CM should any additional needs arise.

## 2025-03-05 NOTE — PROGRESS NOTES
Patient arrived to PACU bay 2, phase one initiated. Placed on bedside monitor, VSS. Report obtained from OR RN and anesthesia. Patient on O2 via nasal cannula at 4L. See flowsheets for assessment.  Warm blankets applied. Side rails in place, will monitor patient closely. Right upper chest pacemaker incision site w/ dressing CDI, safeguard in place.

## 2025-03-05 NOTE — PROGRESS NOTES
[facility]     Electrophysiology                                     Progress Note    Admission date:  2025    Reason for follow up visit: Device alarm for RV lead impedance change and higher threshold    HPI/CC: Rubi Robb was admitted on 2025 with device alarm for RV lead impedance change and higher threshold who had RV lead extraction and implantation of new RV lead and new RA lead on 2025.  2025 patient underwent RV lead revision along with pocket revision.  Device check revealed low impedance of RV lead.  3/3 CXR--RV lead pointing more inferiorly--plan for revision 3/5/2025    Rhythm has been SR    Subjective: Patient seen in prehold of Cath Lab.  Patient is tearful.  Right upper chest painful with movement.  Plan for RV lead revision today    Vitals:  Blood pressure 101/75, pulse (!) 111, temperature 97.1 °F (36.2 °C), resp. rate 22, height 1.524 m (5'), weight 88 kg (194 lb 1.6 oz), last menstrual period 2019, SpO2 97%.  Temp  Av.6 °F (36.4 °C)  Min: 96.9 °F (36.1 °C)  Max: 98.2 °F (36.8 °C)  Pulse  Av.6  Min: 69  Max: 123  BP  Min: 88/59  Max: 111/92  SpO2  Av.5 %  Min: 94 %  Max: 99 %    24 hour I/O    Intake/Output Summary (Last 24 hours) at 3/5/2025 1406  Last data filed at 3/5/2025 1317  Gross per 24 hour   Intake 1560 ml   Output 100 ml   Net 1460 ml     Current Facility-Administered Medications   Medication Dose Route Frequency Provider Last Rate Last Admin    acetaminophen (TYLENOL) tablet 1,000 mg  1,000 mg Oral 3 times per day Rogelio Aguilera DO   1,000 mg at 25 0657    oxyCODONE (ROXICODONE) immediate release tablet 5 mg  5 mg Oral Q4H PRN Rogelio Aguilera DO        Or    oxyCODONE (ROXICODONE) immediate release tablet 10 mg  10 mg Oral Q4H PRN Rogelio Aguilera DO   10 mg at 25 1638    HYDROmorphone HCl PF (DILAUDID) injection 1 mg  1 mg IntraVENous Q3H PRN Rogelio Aguilera DO   1 mg at 25 0871    magnesium sulfate 1000  flush 0.9 % injection 5-40 mL  5-40 mL IntraVENous 2 times per day Shanna Peralta MD   10 mL at 03/05/25 0828    sodium chloride flush 0.9 % injection 5-40 mL  5-40 mL IntraVENous PRN Shanna Peralta MD        ondansetron (ZOFRAN-ODT) disintegrating tablet 4 mg  4 mg Oral Q8H PRN Shanna Peralta MD        Or    ondansetron (ZOFRAN) injection 4 mg  4 mg IntraVENous Q6H PRN Shanna Peralta MD        polyethylene glycol (GLYCOLAX) packet 17 g  17 g Oral Daily PRN Shanna Peralta MD        [Held by provider] enoxaparin (LOVENOX) injection 40 mg  40 mg SubCUTAneous BID Shanna Peralta MD           Objective:     Telemetry monitor: Telemetry independently reviewed and interpreted by me today and shows: normal sinus rhythm     Physical Exam:  Constitutional and general appearance: alert, cooperative, no distress, and appears stated age  HEENT: PERRL, no cervical lymphadenopathy. No masses palpable. Normal oral mucosa  Respiratory:  Normal excursion and expansion without use of accessory muscles  Resp auscultation: Normal breath sounds without wheezing, rhonchi, and rales  Cardiovascular:  The apical impulse is not displaced  Heart tones are crisp and normal. regular S1 and S2.  Jugular venous pulsation Normal  The carotid upstroke is normal in amplitude and contour without delay or bruit  Peripheral pulses are symmetrical and full   Abdomen:  No masses or tenderness  Bowel sounds present  Extremities:   No cyanosis or clubbing   No lower extremity edema   Skin: warm and dry  Neurological:  Alert and oriented  Moves all extremities well  No abnormalities of mood, affect, memory, mentation, or behavior are noted    Data  EKG 2/28/2025  Sinus tachycardia rate 109    JUAN 2/14/2025    Left Ventricle: Normal left ventricular systolic function with a visually estimated EF of 55 - 60%.    Left Atrium: No left atrial appendage thrombus noted. Left atrium is dilated.    Right Atrium: Right atrium is mildly dilated.    Image quality is

## 2025-03-05 NOTE — PLAN OF CARE
Problem: Chronic Conditions and Co-morbidities  Goal: Patient's chronic conditions and co-morbidity symptoms are monitored and maintained or improved  3/5/2025 1702 by Tara Sin RN  Outcome: Progressing  3/5/2025 0630 by Lulú Myles RN  Outcome: Not Progressing     Problem: Discharge Planning  Goal: Discharge to home or other facility with appropriate resources  3/5/2025 1702 by Tara Sin RN  Outcome: Progressing  3/5/2025 0630 by Lulú Myles RN  Outcome: Not Progressing     Problem: Pain  Goal: Verbalizes/displays adequate comfort level or baseline comfort level  3/5/2025 1702 by Tara Sin RN  Outcome: Progressing  3/5/2025 0630 by Lulú Myles RN  Outcome: Not Progressing     Problem: Safety - Adult  Goal: Free from fall injury  3/5/2025 1702 by Tara Sin RN  Outcome: Progressing  3/5/2025 0630 by Lulú Myles RN  Outcome: Progressing     Problem: Chronic Conditions and Co-morbidities  Goal: Patient's chronic conditions and co-morbidity symptoms are monitored and maintained or improved  3/5/2025 1702 by Tara Sin RN  Outcome: Progressing  3/5/2025 0630 by Lulú Myles RN  Outcome: Not Progressing     Problem: Discharge Planning  Goal: Discharge to home or other facility with appropriate resources  3/5/2025 1702 by Tara Sin RN  Outcome: Progressing  3/5/2025 0630 by Lulú Myles RN  Outcome: Not Progressing     Problem: Pain  Goal: Verbalizes/displays adequate comfort level or baseline comfort level  3/5/2025 1702 by Tara Sin RN  Outcome: Progressing  3/5/2025 0630 by Lulú Myles RN  Outcome: Not Progressing

## 2025-03-05 NOTE — ANESTHESIA POSTPROCEDURE EVALUATION
Department of Anesthesiology  Postprocedure Note    Patient: Rubi Robb  MRN: [id number]  YOB: 1985  Date of evaluation: 3/5/2025    Procedure Summary       Date: 03/05/25 Room / Location: [facility] CATH/EP LAB  3 / [facility] CARDIAC CATH LAB    Anesthesia Start: 1056 Anesthesia Stop: 1318    Procedure: Lead reposition Diagnosis:       Arrhythmia      (Arrhythmia [I49.9])    Providers: AMARILIS Purcell Jr., MD Responsible Provider: Fred Barnes MD    Anesthesia Type: MAC ASA Status: 3            Anesthesia Type: No value filed.    Samantha Phase I: Samantha Score: 9    Samantha Phase II:      Anesthesia Post Evaluation    Patient location during evaluation: PACU  Patient participation: complete - patient participated  Level of consciousness: awake and alert  Airway patency: patent  Nausea & Vomiting: no vomiting and no nausea  Cardiovascular status: hemodynamically stable and blood pressure returned to baseline  Respiratory status: acceptable  Hydration status: euvolemic  Comments: --------------------            03/05/25               1430     --------------------   BP:       105/71     Pulse:   (!) 111     Resp:       22       Temp:                SpO2:      96%      --------------------    Pain management: adequate    No notable events documented.

## 2025-03-05 NOTE — ANESTHESIA PRE PROCEDURE
Department of Anesthesiology  Preprocedure Note       Name:  Rubi Robb   Age:  39 y.o.  :  1985                                          MRN:  [id number]         Date:  3/5/2025      Surgeon: Surgeon(s):  AMARILIS Purcell Jr., MD    Procedure: Procedure(s):  Lead reposition    Medications prior to admission:   Prior to Admission medications    Medication Sig Start Date End Date Taking? Authorizing Provider   metoprolol succinate (TOPROL XL) 50 MG extended release tablet Take 1 tablet by mouth every morning 3/1/25  Yes Alayna Eli MD   colchicine (COLCRYS) 0.6 MG tablet Take 1 tablet by mouth 2 times daily for 5 days 3/1/25 3/6/25 Yes Alayna Eli MD   HYDROcodone-acetaminophen (LORCET) 5-325 MG per tablet Take 1 tablet by mouth every 6 hours as needed for Pain for up to 30 days. Intended supply: 3 days. Take lowest dose possible to manage pain Max Daily Amount: 4 tablets 2/25/25 3/27/25 Yes Damaso Barnard MD   Magnesium Oxide (MAGNESIUM-OXIDE) 250 MG TABS tablet Take 1 tablet by mouth daily 2/15/25  Yes Nick Carson APRN - CNP   OLANZapine (ZYPREXA) 15 MG tablet Take 1 tablet by mouth nightly  Patient taking differently: Take 1 tablet by mouth daily 25  Yes Damaso Barnard MD   gabapentin (NEURONTIN) 600 MG tablet Take 1 tablet by mouth 3 times daily for 90 days. 25 Yes Damaso Barnard MD   OLANZapine (ZYPREXA) 7.5 MG tablet Take 1 tablet by mouth every morning 25  Yes Damaso Barnard MD   clonazePAM (KLONOPIN) 0.5 MG tablet Take 1 tablet by mouth 2 times daily as needed for Anxiety for up to 90 days. Max Daily Amount: 1 mg 25 Yes Damaso Barnard MD   spironolactone (ALDACTONE) 25 MG tablet Take 1 tablet by mouth daily 24  Yes Damaso Barnard MD   lisinopril (PRINIVIL;ZESTRIL) 2.5 MG tablet Take 1 tablet by mouth in the morning and at bedtime TAKE ONE TABLET BY MOUTH DAILY 3/19/24  Yes Seth Estevez MD   atorvastatin

## 2025-03-05 NOTE — PLAN OF CARE
Patient underwent right sided device revision.  No immediate complications.  Full note to come.    Fernie Purcell MD  Cardiac Electrophysiology  [facility]  [phone] [facility]

## 2025-03-05 NOTE — PROGRESS NOTES
RV lead revision #2.    Changes This Session   VF Detection Off On  VT Monitor Off Monitor  Template Evaluated Mar/03/2025, 10:10:05 AM Mar/05/2025, 12:36:16 PM  Template Collected Mar/03/2025, 10:10:05 AM Mar/05/2025, 12:36:16 PM  Lower Rate 50 bpm 40 bpm  Atrial Acute Phase Remaining 71 d 90 d  RV Acute Phase Remaining 71 d 90 d  1RV Lead Integrity Alert Off On  1RV Lead Noise Alert Off On  1RV Pacing Lead Impedance Alert Off On  1RV Defib Lead Impedance Alert Off On  1VF Detection OFF, 3+ VF Rx Off Alert Off On

## 2025-03-06 ENCOUNTER — APPOINTMENT (OUTPATIENT)
Dept: GENERAL RADIOLOGY | Age: 40
End: 2025-03-06
Payer: COMMERCIAL

## 2025-03-06 ENCOUNTER — PROCEDURE VISIT (OUTPATIENT)
Dept: CARDIOLOGY CLINIC | Age: 40
End: 2025-03-06

## 2025-03-06 DIAGNOSIS — I47.20 VT (VENTRICULAR TACHYCARDIA) (HCC): ICD-10-CM

## 2025-03-06 DIAGNOSIS — Z95.810 ICD (IMPLANTABLE CARDIOVERTER-DEFIBRILLATOR), DUAL, IN SITU: Primary | ICD-10-CM

## 2025-03-06 LAB
ANION GAP SERPL CALCULATED.3IONS-SCNC: 12 MMOL/L (ref 3–16)
BUN SERPL-MCNC: 6 MG/DL (ref 7–20)
CALCIUM SERPL-MCNC: 8.9 MG/DL (ref 8.3–10.6)
CHLORIDE SERPL-SCNC: 104 MMOL/L (ref 99–110)
CO2 SERPL-SCNC: 22 MMOL/L (ref 21–32)
CREAT SERPL-MCNC: 0.5 MG/DL (ref 0.6–1.1)
DEPRECATED RDW RBC AUTO: 12.1 % (ref 12.4–15.4)
GFR SERPLBLD CREATININE-BSD FMLA CKD-EPI: >90 ML/MIN/{1.73_M2}
GLUCOSE SERPL-MCNC: 125 MG/DL (ref 70–99)
HCT VFR BLD AUTO: 32.6 % (ref 36–48)
HGB BLD-MCNC: 11 G/DL (ref 12–16)
MAGNESIUM SERPL-MCNC: 1.48 MG/DL (ref 1.8–2.4)
MCH RBC QN AUTO: 29.6 PG (ref 26–34)
MCHC RBC AUTO-ENTMCNC: 33.6 G/DL (ref 31–36)
MCV RBC AUTO: 88 FL (ref 80–100)
PLATELET # BLD AUTO: 301 K/UL (ref 135–450)
PMV BLD AUTO: 8 FL (ref 5–10.5)
POTASSIUM SERPL-SCNC: 4.1 MMOL/L (ref 3.5–5.1)
RBC # BLD AUTO: 3.7 M/UL (ref 4–5.2)
SODIUM SERPL-SCNC: 138 MMOL/L (ref 136–145)
WBC # BLD AUTO: 16 K/UL (ref 4–11)

## 2025-03-06 PROCEDURE — 83735 ASSAY OF MAGNESIUM: CPT

## 2025-03-06 PROCEDURE — 99232 SBSQ HOSP IP/OBS MODERATE 35: CPT

## 2025-03-06 PROCEDURE — 6360000002 HC RX W HCPCS: Performed by: STUDENT IN AN ORGANIZED HEALTH CARE EDUCATION/TRAINING PROGRAM

## 2025-03-06 PROCEDURE — 6370000000 HC RX 637 (ALT 250 FOR IP): Performed by: INTERNAL MEDICINE

## 2025-03-06 PROCEDURE — 71046 X-RAY EXAM CHEST 2 VIEWS: CPT

## 2025-03-06 PROCEDURE — 2060000000 HC ICU INTERMEDIATE R&B

## 2025-03-06 PROCEDURE — 2500000003 HC RX 250 WO HCPCS: Performed by: INTERNAL MEDICINE

## 2025-03-06 PROCEDURE — 80048 BASIC METABOLIC PNL TOTAL CA: CPT

## 2025-03-06 PROCEDURE — 6370000000 HC RX 637 (ALT 250 FOR IP): Performed by: STUDENT IN AN ORGANIZED HEALTH CARE EDUCATION/TRAINING PROGRAM

## 2025-03-06 PROCEDURE — 85027 COMPLETE CBC AUTOMATED: CPT

## 2025-03-06 PROCEDURE — 36415 COLL VENOUS BLD VENIPUNCTURE: CPT

## 2025-03-06 RX ORDER — DOXYCYCLINE HYCLATE 100 MG
100 TABLET ORAL EVERY 12 HOURS SCHEDULED
Qty: 10 TABLET | Refills: 0 | Status: SHIPPED | OUTPATIENT
Start: 2025-03-06 | End: 2025-03-07

## 2025-03-06 RX ORDER — LIDOCAINE 4 G/G
1 PATCH TOPICAL DAILY
Status: DISCONTINUED | OUTPATIENT
Start: 2025-03-06 | End: 2025-03-07 | Stop reason: HOSPADM

## 2025-03-06 RX ADMIN — GABAPENTIN 600 MG: 300 CAPSULE ORAL at 14:18

## 2025-03-06 RX ADMIN — SODIUM CHLORIDE, PRESERVATIVE FREE 10 ML: 5 INJECTION INTRAVENOUS at 09:13

## 2025-03-06 RX ADMIN — ENOXAPARIN SODIUM 40 MG: 100 INJECTION SUBCUTANEOUS at 19:53

## 2025-03-06 RX ADMIN — DOXYCYCLINE HYCLATE 100 MG: 100 TABLET, COATED ORAL at 19:52

## 2025-03-06 RX ADMIN — OLANZAPINE 15 MG: 5 TABLET, FILM COATED ORAL at 19:52

## 2025-03-06 RX ADMIN — OLANZAPINE 7.5 MG: 5 TABLET, FILM COATED ORAL at 09:08

## 2025-03-06 RX ADMIN — LISINOPRIL 2.5 MG: 5 TABLET ORAL at 09:08

## 2025-03-06 RX ADMIN — SODIUM CHLORIDE, PRESERVATIVE FREE 10 ML: 5 INJECTION INTRAVENOUS at 09:12

## 2025-03-06 RX ADMIN — METOPROLOL SUCCINATE 50 MG: 50 TABLET, FILM COATED, EXTENDED RELEASE ORAL at 09:08

## 2025-03-06 RX ADMIN — CLONAZEPAM 0.5 MG: 0.5 TABLET ORAL at 19:23

## 2025-03-06 RX ADMIN — SPIRONOLACTONE 25 MG: 25 TABLET ORAL at 09:08

## 2025-03-06 RX ADMIN — OXYCODONE 10 MG: 5 TABLET ORAL at 19:53

## 2025-03-06 RX ADMIN — DOXYCYCLINE HYCLATE 100 MG: 100 TABLET, COATED ORAL at 09:07

## 2025-03-06 RX ADMIN — OXYCODONE 10 MG: 5 TABLET ORAL at 07:51

## 2025-03-06 RX ADMIN — HYDROMORPHONE HYDROCHLORIDE 0.5 MG: 1 INJECTION, SOLUTION INTRAMUSCULAR; INTRAVENOUS; SUBCUTANEOUS at 16:16

## 2025-03-06 RX ADMIN — ACETAMINOPHEN 1000 MG: 500 TABLET ORAL at 22:35

## 2025-03-06 RX ADMIN — COLCHICINE 0.6 MG: 0.6 TABLET, FILM COATED ORAL at 19:52

## 2025-03-06 RX ADMIN — ATORVASTATIN CALCIUM 20 MG: 10 TABLET, FILM COATED ORAL at 09:07

## 2025-03-06 RX ADMIN — COLCHICINE 0.6 MG: 0.6 TABLET, FILM COATED ORAL at 09:08

## 2025-03-06 RX ADMIN — ACETAMINOPHEN 1000 MG: 500 TABLET ORAL at 06:43

## 2025-03-06 RX ADMIN — HYDROMORPHONE HYDROCHLORIDE 1 MG: 1 INJECTION, SOLUTION INTRAMUSCULAR; INTRAVENOUS; SUBCUTANEOUS at 06:46

## 2025-03-06 RX ADMIN — GABAPENTIN 600 MG: 300 CAPSULE ORAL at 09:07

## 2025-03-06 RX ADMIN — SODIUM CHLORIDE, PRESERVATIVE FREE 10 ML: 5 INJECTION INTRAVENOUS at 19:59

## 2025-03-06 RX ADMIN — GABAPENTIN 600 MG: 300 CAPSULE ORAL at 19:52

## 2025-03-06 RX ADMIN — ACETAMINOPHEN 1000 MG: 500 TABLET ORAL at 14:18

## 2025-03-06 RX ADMIN — HYDROMORPHONE HYDROCHLORIDE 0.5 MG: 1 INJECTION, SOLUTION INTRAMUSCULAR; INTRAVENOUS; SUBCUTANEOUS at 11:53

## 2025-03-06 ASSESSMENT — PAIN DESCRIPTION - LOCATION
LOCATION: CHEST
LOCATION: INCISION

## 2025-03-06 ASSESSMENT — PAIN SCALES - GENERAL
PAINLEVEL_OUTOF10: 8
PAINLEVEL_OUTOF10: 8
PAINLEVEL_OUTOF10: 7
PAINLEVEL_OUTOF10: 8
PAINLEVEL_OUTOF10: 8

## 2025-03-06 ASSESSMENT — PAIN - FUNCTIONAL ASSESSMENT: PAIN_FUNCTIONAL_ASSESSMENT: ACTIVITIES ARE NOT PREVENTED

## 2025-03-06 ASSESSMENT — PAIN DESCRIPTION - ORIENTATION
ORIENTATION: RIGHT;UPPER
ORIENTATION: RIGHT

## 2025-03-06 ASSESSMENT — PAIN DESCRIPTION - DESCRIPTORS: DESCRIPTORS: ACHING

## 2025-03-06 NOTE — PLAN OF CARE
Problem: Pain  Goal: Verbalizes/displays adequate comfort level or baseline comfort level  3/6/2025 0614 by Lulú Myles, RN  Outcome: Not Progressing  3/5/2025 1702 by Tara Sin, RN  Outcome: Progressing

## 2025-03-06 NOTE — PROGRESS NOTES
[facility]     Electrophysiology                                     Progress Note    Admission date:  2025    Reason for follow up visit: Device alarm for RV lead impedance change and higher threshold    HPI/CC: Rubi Robb was admitted on 2025 with device alarm for RV lead impedance change and higher threshold who had RV lead extraction and implantation of new RV lead and new RA lead on 2025.  2025 patient underwent RV lead revision along with pocket revision.  Device check revealed low impedance of RV lead.  3/3 CXR--RV lead pointing more inferiorly--plan for revision 3/5/2025.     Rhythm has been SR    Subjective: Patient sitting up in bed.  Patient has tenderness to right upper chest device site area.  Dressing removed.  No bleeding or hematoma noted.  Steri-Strips dry and intact.        Vitals:  Blood pressure 100/71, pulse 97, temperature 98.4 °F (36.9 °C), temperature source Oral, resp. rate 18, height 1.524 m (5'), weight 68.7 kg (151 lb 6.4 oz), last menstrual period 2019, SpO2 98%.  Temp  Av.2 °F (36.8 °C)  Min: 97.9 °F (36.6 °C)  Max: 98.4 °F (36.9 °C)  Pulse  Av.7  Min: 97  Max: 122  BP  Min: 85/61  Max: 108/66  SpO2  Av.9 %  Min: 92 %  Max: 98 %    24 hour I/O    Intake/Output Summary (Last 24 hours) at 3/6/2025 1423  Last data filed at 3/6/2025 0400  Gross per 24 hour   Intake 220 ml   Output 200 ml   Net 20 ml     Current Facility-Administered Medications   Medication Dose Route Frequency Provider Last Rate Last Admin    HYDROmorphone (DILAUDID) injection 0.5 mg  0.5 mg IntraVENous Q4H PRN Rogelio Aguilera DO   0.5 mg at 25 1153    lidocaine 4 % external patch 1 patch  1 patch TransDERmal Daily Rogelio Aguilera DO        sodium chloride flush 0.9 % injection 5-40 mL  5-40 mL IntraVENous 2 times per day AMARILIS Purcell Jr., MD   10 mL at 25 0913    sodium chloride flush 0.9 % injection 5-40 mL  5-40 mL IntraVENous PRN AMARILIS Purcell

## 2025-03-06 NOTE — PROGRESS NOTES
Comprehensive Nutrition Assessment    Type and Reason for Visit:  Initial, LOS    Nutrition Recommendations/Plan:   Continue cardiac diet.  Encourage PO intakes.   Ensure once/day - prefers chocolate.   Monitor pertinent labs, bowel habits, weight, N/V, supplement acceptance, clinical progression.       Malnutrition Assessment:  Malnutrition Status:  At risk for malnutrition (03/06/25 1423)    Context:  Acute Illness     Findings of the 6 clinical characteristics of malnutrition:  Energy Intake:  Mild decrease in energy intake  Weight Loss:  Mild weight loss (intentional weight loss per pt)     Body Fat Loss:  No body fat loss     Muscle Mass Loss:  No muscle mass loss    Fluid Accumulation:  No fluid accumulation     Strength:  Not Performed    Nutrition Assessment:    Patient seen due to LOS. Admitted for new right ventricular defibrillator. PMHx of Hyperlipidemia, HTN, NICM, CHF, VF arrest anxiety disorder is prescribed benzodiazepines, depression, chronic pain. Currently on cardiac diet. Patient seen resting in bed. Reports her appetite is okay, was much better PTA. Consuming 1-100% of meals per flowsheets (mainly 26-50%). Eats well at home, states her mother cooks meals often for her. Eats fruit daily. Reports she has been trying to get back to her UBW of 125lbs. States she gained weight from a medication she started taking a few years ago. Encouraged pt to consume small, frequent meals. Patient agreeable to trial Ensure ONS once/day. Denies any N/V. Pt with no nutritional questions at this time. Will continue to monitor.    Nutrition Related Findings:    Glucose 125-130. LBM 3/4. Wound Type: Surgical Incision       Current Nutrition Intake & Therapies:    Average Meal Intake: 1-25%, 26-50%, 51-75%, %  Average Supplements Intake: None Ordered  ADULT DIET; Regular; Low Fat/Low Chol/High Fiber/2 gm Na    Anthropometric Measures:  Height: 152.4 cm (5')  Ideal Body Weight (IBW): 100 lbs (45 kg)

## 2025-03-06 NOTE — PROGRESS NOTES
[facility] Medicine Progress Note  V 1.6      Date of Admission: 2/27/2025    Hospital Day: 8      Chief Admission Complaint:   Pacemaker/defibrillator fired 2 times on the morning of admission     Subjective: I was planning to discharge patient however patient still reports being in significant amount of pain on the right shoulder.  Reports it to be sharp.  She reports this is at the sites of the procedure.  Denies any other symptoms.    Presenting Admission History:       39 y.o. 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  She has a remote history of rhabdomyosarcoma of left lower extremity.  This was at age 1 she was hospitalized at [facility] for more than 1 year for treatment of this.  She underwent surgery, chemotherapy and radiation therapy.     She did have a right sided dual coil Medtronic ICD implanted in 6/2019 due to her history of V-fib arrest.  Of pertinence is that on 2/14/2025 she was admitted to [facility] for lead extraction of right ventricular defibrillator lead, removal of ICD pulse generator.  Insertion of new right ventricular defibrillator lead and insertion of new right atrial lead.  Implantation of new MRI compatible dual-chamber ICD.  She was discharged home and doing well until the day of admission.  She had impedance alarms overnight and came into the ED because she was concerned about the pacemaker.  She denies any shortness of breath states she has mild chest discomfort.  There is been no fevers no chills, the site of the device in the anterior chest wall is clean and healing well     At this time it seems she has retraction of her right ventricular lead slightly.  She will likely need lead revision or removal and reimplantation during this admission.  She is now admitted for further evaluation

## 2025-03-07 ENCOUNTER — APPOINTMENT (OUTPATIENT)
Dept: GENERAL RADIOLOGY | Age: 40
End: 2025-03-07
Payer: COMMERCIAL

## 2025-03-07 VITALS
BODY MASS INDEX: 28.74 KG/M2 | HEART RATE: 95 BPM | OXYGEN SATURATION: 96 % | TEMPERATURE: 98.1 F | SYSTOLIC BLOOD PRESSURE: 95 MMHG | HEIGHT: 60 IN | DIASTOLIC BLOOD PRESSURE: 66 MMHG | RESPIRATION RATE: 18 BRPM | WEIGHT: 146.4 LBS

## 2025-03-07 PROCEDURE — 6370000000 HC RX 637 (ALT 250 FOR IP): Performed by: INTERNAL MEDICINE

## 2025-03-07 PROCEDURE — 2500000003 HC RX 250 WO HCPCS: Performed by: INTERNAL MEDICINE

## 2025-03-07 PROCEDURE — 99232 SBSQ HOSP IP/OBS MODERATE 35: CPT

## 2025-03-07 PROCEDURE — 71046 X-RAY EXAM CHEST 2 VIEWS: CPT

## 2025-03-07 PROCEDURE — 6360000002 HC RX W HCPCS: Performed by: STUDENT IN AN ORGANIZED HEALTH CARE EDUCATION/TRAINING PROGRAM

## 2025-03-07 PROCEDURE — 6370000000 HC RX 637 (ALT 250 FOR IP): Performed by: STUDENT IN AN ORGANIZED HEALTH CARE EDUCATION/TRAINING PROGRAM

## 2025-03-07 RX ORDER — DOXYCYCLINE HYCLATE 100 MG
100 TABLET ORAL EVERY 12 HOURS SCHEDULED
Qty: 26 TABLET | Refills: 0 | Status: SHIPPED | OUTPATIENT
Start: 2025-03-07 | End: 2025-03-20

## 2025-03-07 RX ORDER — MAGNESIUM SULFATE IN WATER 40 MG/ML
4000 INJECTION, SOLUTION INTRAVENOUS ONCE
Status: DISCONTINUED | OUTPATIENT
Start: 2025-03-07 | End: 2025-03-07 | Stop reason: HOSPADM

## 2025-03-07 RX ORDER — OXYCODONE HYDROCHLORIDE 5 MG/1
5 TABLET ORAL EVERY 6 HOURS PRN
Qty: 12 TABLET | Refills: 0 | Status: SHIPPED | OUTPATIENT
Start: 2025-03-07 | End: 2025-03-10

## 2025-03-07 RX ADMIN — ACETAMINOPHEN 1000 MG: 500 TABLET ORAL at 05:05

## 2025-03-07 RX ADMIN — GABAPENTIN 600 MG: 300 CAPSULE ORAL at 09:08

## 2025-03-07 RX ADMIN — LISINOPRIL 2.5 MG: 5 TABLET ORAL at 09:08

## 2025-03-07 RX ADMIN — COLCHICINE 0.6 MG: 0.6 TABLET, FILM COATED ORAL at 09:10

## 2025-03-07 RX ADMIN — OXYCODONE HYDROCHLORIDE 5 MG: 5 TABLET ORAL at 09:09

## 2025-03-07 RX ADMIN — DOXYCYCLINE HYCLATE 100 MG: 100 TABLET, COATED ORAL at 09:08

## 2025-03-07 RX ADMIN — OXYCODONE 10 MG: 5 TABLET ORAL at 05:04

## 2025-03-07 RX ADMIN — SPIRONOLACTONE 25 MG: 25 TABLET ORAL at 09:09

## 2025-03-07 RX ADMIN — ATORVASTATIN CALCIUM 20 MG: 10 TABLET, FILM COATED ORAL at 09:09

## 2025-03-07 RX ADMIN — OLANZAPINE 7.5 MG: 5 TABLET, FILM COATED ORAL at 09:08

## 2025-03-07 RX ADMIN — SODIUM CHLORIDE, PRESERVATIVE FREE 10 ML: 5 INJECTION INTRAVENOUS at 09:13

## 2025-03-07 RX ADMIN — METOPROLOL SUCCINATE 50 MG: 50 TABLET, FILM COATED, EXTENDED RELEASE ORAL at 09:08

## 2025-03-07 ASSESSMENT — PAIN SCALES - GENERAL
PAINLEVEL_OUTOF10: 7
PAINLEVEL_OUTOF10: 6

## 2025-03-07 ASSESSMENT — PAIN - FUNCTIONAL ASSESSMENT: PAIN_FUNCTIONAL_ASSESSMENT: ACTIVITIES ARE NOT PREVENTED

## 2025-03-07 ASSESSMENT — PAIN DESCRIPTION - LOCATION: LOCATION: INCISION

## 2025-03-07 ASSESSMENT — PAIN DESCRIPTION - ORIENTATION: ORIENTATION: RIGHT

## 2025-03-07 ASSESSMENT — PAIN DESCRIPTION - DESCRIPTORS: DESCRIPTORS: ACHING

## 2025-03-07 NOTE — DISCHARGE SUMMARY
out pneumothorax Reason for Exam: Pacemaker placement and rule out pneumothorax FINDINGS: Interval removal of the ICD and placement of a pacemaker in the right chest. No confluent infiltrate, effusion, or pneumothorax identified.  Cardiac and mediastinal silhouettes are within normal limits. No acute osseous abnormality identified.  The     Interval placement of a pacemaker in the right chest. No pneumothorax identified.     Electrophysiology procedure    Result Date: 2/14/2025  Images from the original result were not included. [facility]    Electrophysiology Procedure Note   Date of Procedure: 2/14/2025 Patient's Name: Rubi Robb YOB: 1985 Medical Record Number: [id number] Procedure Performed by: Doug Ahmadi MD and Dr Jess Gunderson MD Procedures performed: Lead extraction of the right ventricular defibrillator lead Removal of ICD pulse generator Insertion of a new right ventricular defibrillator lead under fluoroscopy Insertion of a new right atrial lead under fluoroscopy Implantation of a new MRI compatible dual chamber ICD Relocation of pocket with submuscular position Insertion of right femoral venous central line Insertion of right femoral arterial line Electronic analysis of lead and device Ultrasound for venous and arterial access Insertion of Bridge balloon Indication of the procedure: Rubi Robb is 39 y.o. female   with Hx of VF and lead dislodgement. She has SVT and therefore needs atrial lead Details of procedure: The patient was brought to the electrophysiology laboratory in stable condition. The patient was in a fasting and non-sedated state. The risks, benefits and alternatives of the lead extraction were discussed with patient and his family members. The risks including, but not limited to, the risks of bleeding, infection, radiation exposure, injury to vascular, cardiac and surrounding structures (including pneumothorax), stroke, cardiac perforation, tamponade,  DO

## 2025-03-07 NOTE — PROGRESS NOTES
Pt DC home and mother was waiting downstairs for pt. PT educated on post op care and appointments. PIVs removed,Tele box removed and CMU notified.

## 2025-03-07 NOTE — PROGRESS NOTES
[facility]     Electrophysiology                                     Progress Note    Admission date:  2025    Reason for follow up visit: Device alarm for RV lead impedance change and higher threshold    HPI/CC: Rubi Robb was admitted on 2025 with device alarm for RV lead impedance change and higher threshold who had RV lead extraction and implantation of new RV lead and new RA lead on 2025.  2025 patient underwent RV lead revision along with pocket revision.  Device check revealed low impedance of RV lead.  3/3 CXR--RV lead pointing more inferiorly--plan for revision 3/5/2025.     Rhythm has been SR    Subjective: Patient sitting up in bed.  Patient has tenderness to right upper chest device site area. .  No bleeding or hematoma noted.  Steri-Strips dry and intact.        Vitals:  Blood pressure 95/66, pulse 95, temperature 98.1 °F (36.7 °C), temperature source Oral, resp. rate 18, height 1.524 m (5'), weight 66.4 kg (146 lb 6.4 oz), last menstrual period 2019, SpO2 96%.  Temp  Av.8 °F (36.6 °C)  Min: 97.3 °F (36.3 °C)  Max: 98.1 °F (36.7 °C)  Pulse  Av.8  Min: 79  Max: 100  BP  Min: 94/66  Max: 99/67  SpO2  Av.2 %  Min: 94 %  Max: 98 %    24 hour I/O    Intake/Output Summary (Last 24 hours) at 3/7/2025 1127  Last data filed at 3/7/2025 0458  Gross per 24 hour   Intake 240 ml   Output 200 ml   Net 40 ml     Current Facility-Administered Medications   Medication Dose Route Frequency Provider Last Rate Last Admin    magnesium sulfate 4000 mg in 100 mL IVPB premix  4,000 mg IntraVENous Once Rogelio Aguilera DO        HYDROmorphone (DILAUDID) injection 0.5 mg  0.5 mg IntraVENous Q4H PRN Rogelio Aguilera DO   0.5 mg at 25 1616    lidocaine 4 % external patch 1 patch  1 patch TransDERmal Daily Rogelio Aguilera DO        sodium chloride flush 0.9 % injection 5-40 mL  5-40 mL IntraVENous 2 times per day AMARILIS Purcell Jr., MD   10 mL at 25 0939         PT/ INR   Lab Results   Component Value Date/Time    INR 0.94 02/14/2025 06:50 AM    INR 1.02 01/19/2023 03:33 AM    INR 1.18 06/09/2019 09:34 AM    PROTIME 12.7 02/14/2025 06:50 AM    PROTIME 13.4 06/09/2019 09:34 AM    PROTIME 14.8 05/26/2019 10:21 AM     PTT No components found for: \"PTT\"   Lab Results   Component Value Date/Time    MG 1.48 03/06/2025 04:14 AM      Lab Results   Component Value Date/Time    TSH 1.250 08/16/2022 02:33 PM       Assessment:  Nonischemic cardiomyopathy    -2019 [employer] DC ICD; 2/14/2025 new RA & RV lead; 2/28/2025 revision of RV lead   & pocket revision  -RV lead and pocket revision 3/5/2025  VF arrest  HFrEF  NSVT  Hyperlipidemia  Hypertension    Plan:   Continue Toprol-XL 50 mg daily  Continue lisinopril 2.5 mg daily  Continue Aldactone 25 mg daily  Continue Lipitor 20 mg daily  Continue colchicine 0.6 mg daily  Continue doxycycline 100 mg twice daily X 2 weeks  CXR this morning reviewed--no abnormality  Device check repeated this morning and reviewed--no abnormality  Procedure discussed with patient  Continue to monitor on telemetry  Daily labs replace electrolytes as needed  Will follow-up in device clinic in 1 week--3/13/2025  Will need CXR PA and LAT in 1 month--discussed with patient  Discussed plan at length with patient    Seen outside global device for nonischemic cardiomyopathy, HFrEF and NSVT    Kayleigh Osman APRN-CNP  [facility]  [phone]

## 2025-03-07 NOTE — CARE COORDINATION
Discharge order noted.    Spoke with patient at bedside.   She is eager to get home.  She states her mother will take her home.   They have received multiple low income resources.   They deny any other needs from .

## 2025-03-07 NOTE — PLAN OF CARE
Problem: Chronic Conditions and Co-morbidities  Goal: Patient's chronic conditions and co-morbidity symptoms are monitored and maintained or improved  Outcome: Progressing    Problem: Discharge Planning  Goal: Discharge to home or other facility with appropriate resources  Outcome: Progressing     Problem: Pain  Goal: Verbalizes/displays adequate comfort level or baseline comfort level  Outcome: Progressing   Pt will be satisfied with pain control. Pt uses numeric pain rating scale with reassessments after pain med administration. Will continue to monitor progression throughout shift.     Problem: Safety - Adult  Goal: Free from fall injury  Outcome: Progressing   Pt will remain free from falls throughout hospital stay. Fall precautions in place, bed alarm on, bed in lowest position with wheels locked and side rails 2/4 up. Room door open and hourly rounding completed. Will continue to monitor throughout shift.     Problem: Nutrition Deficit:  Goal: Optimize nutritional status  Outcome: Progressing     Problem: ABCDS Injury Assessment  Goal: Absence of physical injury  Outcome: Progressing

## 2025-03-09 LAB — ECHO BSA: 1.75 M2

## 2025-03-10 ENCOUNTER — TELEPHONE (OUTPATIENT)
Dept: CARDIOLOGY CLINIC | Age: 40
End: 2025-03-10

## 2025-03-10 LAB — ECHO BSA: 1.75 M2

## 2025-03-10 NOTE — TELEPHONE ENCOUNTER
Pt called office and stated that [facility] did defibrillator procedure on pt. Pt has a device check appt on 03/13/25 with EP. Pt would like to know if her sutures will be taken out then or when that needs to be scheduled. Pt said she will keep them dry and clean until she hears otherwise. Please advise:[phone]

## 2025-03-10 NOTE — TELEPHONE ENCOUNTER
I called and spoke with pt and let her know not to touch sutures and device specialist will access once seen in office.

## 2025-03-11 ENCOUNTER — TELEPHONE (OUTPATIENT)
Dept: FAMILY MEDICINE CLINIC | Age: 40
End: 2025-03-11

## 2025-03-11 NOTE — TELEPHONE ENCOUNTER
Care Transitions Initial Follow Up Call    Outreach made within 2 business days of discharge: Yes    Patient: Rubi Robb Patient : 1985   MRN: [id number]  Reason for Admission: malfunction of Defibrillator   Discharge Date: 3/7/25       Spoke with: Rubi    Discharge department/facility: home     TCM Interactive Patient Contact:  Was patient able to fill all prescriptions: Yes  Was patient instructed to bring all medications to the follow-up visit: Yes  Is patient taking all medications as directed in the discharge summary? Yes  Does patient understand their discharge instructions: Yes  Does patient have questions or concerns that need addressed prior to 7-14 day follow up office visit: no    Additional needs identified to be addressed with provider  No needs identified             Scheduled appointment with PCP within 7-14 days    Follow Up  Future Appointments   Date Time Provider Department Center   3/13/2025 10:00 AM SCHEDULE, [facility] DEVICE CHECK [facility]   3/25/2025 11:00 AM Damaso Barnard MD [facility]   2025  8:30 AM SCHEDULE, [facility] DEVICE CHECK [facility]   2025  8:30 AM Kayleigh Osman APRN - CNP [facility]       MIKE ACOSTA LPN

## 2025-03-13 ENCOUNTER — NURSE ONLY (OUTPATIENT)
Dept: CARDIOLOGY CLINIC | Age: 40
End: 2025-03-13

## 2025-03-13 DIAGNOSIS — I47.20 VENTRICULAR TACHYCARDIA (HCC): ICD-10-CM

## 2025-03-13 DIAGNOSIS — Z95.810 ICD (IMPLANTABLE CARDIOVERTER-DEFIBRILLATOR) IN PLACE: Primary | ICD-10-CM

## 2025-03-13 DIAGNOSIS — I42.0 DILATED CARDIOMYOPATHY (HCC): ICD-10-CM

## 2025-03-13 NOTE — PROGRESS NOTES
Incision is closed, clean, and dry with all dressings/steri strips removed. Site left open to the air. Incision well approximated. No s/s of infection.    Patient education was provided about site care, device functionality, in home monitoring, and any other patient questions and/or concerns were addressed. Aftercare and remote monitoring literature was provided. Patient is to call with any signs or symptoms of infection. Patient voices understanding.

## 2025-03-13 NOTE — PATIENT INSTRUCTIONS
New Cardiac Device Implant (Pacemaker and/or Defibrillator) Post Op Instructions  Bathe with water indirectly hitting the incision site. Water and soap may run over the incision site. Do not scrub. Pat dry with a clean towel after bathing.   Leave incision open to the air; do not apply any dressings, ointments, or bandages to the area. Do not apply lotion, perfume/cologne, or powders to the area until it is completely healed.   Any scabbing or skin glue that is noted will fall off within 1-2 weeks after the post op appointment.   If any oozing, bleeding, or pus drainage occurs, please call the office immediately at [phone].       Patient has movement restrictions in place until 8 weeks post op (to the day of implant) unless otherwise instructed by physician.   Patient may not lift the device side arm above shoulder height.   Do not far reach or stretch across body or behind body with effected side.   Do not use this arm for pushing, pulling, or lifting body.   Do not use cane on the effected side.   Patient may not lift anything heavier than a gallon of milk with the associated arm.     Appointments to expect going forward:  Post operatively the patient will have had a 1-week post op check and a 3 month follow up with NP/MD and the device clinic.       Remote Monitoring Instructions    Within 2-3 weeks of your device being implanted, you will receive a call from the  of your device. Please answer this call as it is to set up remote monitoring for your device. Once you receive your in-home monitor, please follow the instructions provided to sync the home monitor to your implanted device. Once you have paired your home monitor to your implanted device, keep your monitor plugged in within 6 feet of where you sleep. Your monitor will routinely check in with your device during sleep hours and transmit any urgent events to the Device Clinic for review.     Please do not send additional routine

## 2025-03-17 ENCOUNTER — TELEPHONE (OUTPATIENT)
Dept: CARDIOLOGY CLINIC | Age: 40
End: 2025-03-17

## 2025-03-17 NOTE — TELEPHONE ENCOUNTER
Pt called back stated [employer] faxed over report. Pt was advised we have to wait for [facility] and [facility] to review it and will give pt a call once reviewed. Pt v/u

## 2025-03-17 NOTE — TELEPHONE ENCOUNTER
Please let patient now, no arrhythmias.  No shocks.  I can call her tomorrow at lunch time to discuss if that would be helpful.

## 2025-03-17 NOTE — TELEPHONE ENCOUNTER
No shocks noted.  SVT: VT/VF Rx Withheld-25 events all on 3/17/2025. V rates 136-143 bpm and longest 7 min.    See MURJ report under cardiology tab once EP reviews.

## 2025-03-17 NOTE — TELEPHONE ENCOUNTER
Pt called office, she just had a pacemaker put in, she is very worried about pacemaker and well-being. Pt said she was shocked by her pacemaker last night, she said it hurt badly and did it multiple times. Pt called the hospital, they told her to come in but patient did not have enough gas money to make it to the hospital. Pt would like a call back as soon as someone can, about what happened to her last night because she said she is freaking out and is extremely worried. Please advise. Thank you!

## 2025-03-17 NOTE — TELEPHONE ENCOUNTER
Spoke with patient- She does not know how to send transmission. Gave [employer] # to have them walk through sending transmission. Patient states she was shocked 2-3 times while sitting on the  drinking water last night. States had been feeling like she has the flu the last few days and is still feeling that way after being shocked by ICD overnight. Informed patient we recommend ED visit as she is still feeling poorly and has been shocked multiple time. Patient states she will go if she starts feeling worse or gets shocked again.     Forwarding to [facility] device tech, to watch for transmission.     TIFFANY BURNETT.

## 2025-03-17 NOTE — TELEPHONE ENCOUNTER
Spoke with patient and relayed EM's message. Patient states she knows her device shocked her, despite findings. States she has been having midsternal chest pain that radiates to her R arm and R arm numbness. Reiterated to patient that the device findings were normal, however her symptoms she is actively describing should be evaluated in the Emergency Room. She v/u.       EM BURNETT.

## 2025-03-18 ENCOUNTER — APPOINTMENT (OUTPATIENT)
Dept: GENERAL RADIOLOGY | Age: 40
DRG: 207 | End: 2025-03-18
Payer: COMMERCIAL

## 2025-03-18 ENCOUNTER — APPOINTMENT (OUTPATIENT)
Dept: CT IMAGING | Age: 40
DRG: 207 | End: 2025-03-18
Payer: COMMERCIAL

## 2025-03-18 ENCOUNTER — TELEPHONE (OUTPATIENT)
Dept: CARDIOLOGY CLINIC | Age: 40
End: 2025-03-18

## 2025-03-18 ENCOUNTER — HOSPITAL ENCOUNTER (INPATIENT)
Age: 40
LOS: 2 days | Discharge: HOME | DRG: 207 | End: 2025-03-20
Attending: EMERGENCY MEDICINE | Admitting: INTERNAL MEDICINE
Payer: COMMERCIAL

## 2025-03-18 DIAGNOSIS — R07.9 CHEST PAIN, UNSPECIFIED TYPE: Primary | ICD-10-CM

## 2025-03-18 PROBLEM — E83.42 HYPOMAGNESEMIA: Status: ACTIVE | Noted: 2025-03-18

## 2025-03-18 LAB
ALBUMIN SERPL-MCNC: 3.3 G/DL (ref 3.4–5)
ALBUMIN SERPL-MCNC: 3.5 G/DL (ref 3.4–5)
ALBUMIN/GLOB SERPL: 1.1 {RATIO} (ref 1.1–2.2)
ALP SERPL-CCNC: 124 U/L (ref 40–129)
ALT SERPL-CCNC: 52 U/L (ref 10–40)
ANION GAP SERPL CALCULATED.3IONS-SCNC: 11 MMOL/L (ref 3–16)
ANION GAP SERPL CALCULATED.3IONS-SCNC: 12 MMOL/L (ref 3–16)
AST SERPL-CCNC: 47 U/L (ref 15–37)
BASOPHILS # BLD: 0 K/UL (ref 0–0.2)
BASOPHILS NFR BLD: 0 %
BILIRUB SERPL-MCNC: 0.9 MG/DL (ref 0–1)
BILIRUB UR QL STRIP.AUTO: NEGATIVE
BUN SERPL-MCNC: 6 MG/DL (ref 7–20)
BUN SERPL-MCNC: 8 MG/DL (ref 7–20)
CALCIUM SERPL-MCNC: 8.3 MG/DL (ref 8.3–10.6)
CALCIUM SERPL-MCNC: 9.1 MG/DL (ref 8.3–10.6)
CHLORIDE SERPL-SCNC: 95 MMOL/L (ref 99–110)
CHLORIDE SERPL-SCNC: 97 MMOL/L (ref 99–110)
CLARITY UR: CLEAR
CO2 SERPL-SCNC: 24 MMOL/L (ref 21–32)
CO2 SERPL-SCNC: 24 MMOL/L (ref 21–32)
COLOR UR: YELLOW
CREAT SERPL-MCNC: 0.4 MG/DL (ref 0.6–1.1)
CREAT SERPL-MCNC: 0.5 MG/DL (ref 0.6–1.1)
CRP SERPL-MCNC: 223 MG/L (ref 0–5.1)
DEPRECATED RDW RBC AUTO: 12.9 % (ref 12.4–15.4)
EOSINOPHIL # BLD: 0 K/UL (ref 0–0.6)
EOSINOPHIL NFR BLD: 0 %
ERYTHROCYTE [SEDIMENTATION RATE] IN BLOOD BY WESTERGREN METHOD: 57 MM/HR (ref 0–20)
FLUAV RNA RESP QL NAA+PROBE: NOT DETECTED
FLUBV RNA RESP QL NAA+PROBE: NOT DETECTED
GFR SERPLBLD CREATININE-BSD FMLA CKD-EPI: >90 ML/MIN/{1.73_M2}
GFR SERPLBLD CREATININE-BSD FMLA CKD-EPI: >90 ML/MIN/{1.73_M2}
GLUCOSE SERPL-MCNC: 101 MG/DL (ref 70–99)
GLUCOSE SERPL-MCNC: 109 MG/DL (ref 70–99)
GLUCOSE UR STRIP.AUTO-MCNC: NEGATIVE MG/DL
HCT VFR BLD AUTO: 33.5 % (ref 36–48)
HGB BLD-MCNC: 11.1 G/DL (ref 12–16)
HGB UR QL STRIP.AUTO: NEGATIVE
KETONES UR STRIP.AUTO-MCNC: NEGATIVE MG/DL
LACTATE BLDV-SCNC: 0.7 MMOL/L (ref 0.4–2)
LEUKOCYTE ESTERASE UR QL STRIP.AUTO: NEGATIVE
LYMPHOCYTES # BLD: 2.2 K/UL (ref 1–5.1)
LYMPHOCYTES NFR BLD: 9 %
MAGNESIUM SERPL-MCNC: 1.03 MG/DL (ref 1.8–2.4)
MAGNESIUM SERPL-MCNC: 2.29 MG/DL (ref 1.8–2.4)
MCH RBC QN AUTO: 29.5 PG (ref 26–34)
MCHC RBC AUTO-ENTMCNC: 33.1 G/DL (ref 31–36)
MCV RBC AUTO: 88.9 FL (ref 80–100)
MONOCYTES # BLD: 1.2 K/UL (ref 0–1.3)
MONOCYTES NFR BLD: 5 %
NEUTROPHILS # BLD: 20.7 K/UL (ref 1.7–7.7)
NEUTROPHILS NFR BLD: 80 %
NEUTS BAND NFR BLD MANUAL: 6 % (ref 0–7)
NITRITE UR QL STRIP.AUTO: NEGATIVE
PH UR STRIP.AUTO: 6 [PH] (ref 5–8)
PHOSPHATE SERPL-MCNC: 3.2 MG/DL (ref 2.5–4.9)
PLATELET # BLD AUTO: 227 K/UL (ref 135–450)
PLATELET BLD QL SMEAR: ADEQUATE
PMV BLD AUTO: 8.4 FL (ref 5–10.5)
POTASSIUM SERPL-SCNC: 3.3 MMOL/L (ref 3.5–5.1)
POTASSIUM SERPL-SCNC: 3.5 MMOL/L (ref 3.5–5.1)
PROCALCITONIN SERPL IA-MCNC: 2.99 NG/ML (ref 0–0.15)
PROT SERPL-MCNC: 6.7 G/DL (ref 6.4–8.2)
PROT UR STRIP.AUTO-MCNC: NEGATIVE MG/DL
RBC # BLD AUTO: 3.77 M/UL (ref 4–5.2)
RBC MORPH BLD: NORMAL
REASON FOR REJECTION: NORMAL
REJECTED TEST: NORMAL
SARS-COV-2 RNA RESP QL NAA+PROBE: NOT DETECTED
SLIDE REVIEW: ABNORMAL
SODIUM SERPL-SCNC: 131 MMOL/L (ref 136–145)
SODIUM SERPL-SCNC: 132 MMOL/L (ref 136–145)
SP GR UR STRIP.AUTO: 1.01 (ref 1–1.03)
TROPONIN, HIGH SENSITIVITY: 10 NG/L (ref 0–14)
TROPONIN, HIGH SENSITIVITY: 13 NG/L (ref 0–14)
TROPONIN, HIGH SENSITIVITY: 13 NG/L (ref 0–14)
UA COMPLETE W REFLEX CULTURE PNL UR: NORMAL
UA DIPSTICK W REFLEX MICRO PNL UR: NORMAL
URN SPEC COLLECT METH UR: NORMAL
UROBILINOGEN UR STRIP-ACNC: 0.2 E.U./DL
WBC # BLD AUTO: 24.1 K/UL (ref 4–11)

## 2025-03-18 PROCEDURE — 87636 SARSCOV2 & INF A&B AMP PRB: CPT

## 2025-03-18 PROCEDURE — 6370000000 HC RX 637 (ALT 250 FOR IP): Performed by: INTERNAL MEDICINE

## 2025-03-18 PROCEDURE — 6360000004 HC RX CONTRAST MEDICATION: Performed by: NURSE PRACTITIONER

## 2025-03-18 PROCEDURE — 86141 C-REACTIVE PROTEIN HS: CPT

## 2025-03-18 PROCEDURE — 99285 EMERGENCY DEPT VISIT HI MDM: CPT

## 2025-03-18 PROCEDURE — 93005 ELECTROCARDIOGRAM TRACING: CPT | Performed by: EMERGENCY MEDICINE

## 2025-03-18 PROCEDURE — 86140 C-REACTIVE PROTEIN: CPT

## 2025-03-18 PROCEDURE — 85025 COMPLETE CBC W/AUTO DIFF WBC: CPT

## 2025-03-18 PROCEDURE — 84484 ASSAY OF TROPONIN QUANT: CPT

## 2025-03-18 PROCEDURE — 6360000002 HC RX W HCPCS: Performed by: INTERNAL MEDICINE

## 2025-03-18 PROCEDURE — 85652 RBC SED RATE AUTOMATED: CPT

## 2025-03-18 PROCEDURE — 2580000003 HC RX 258: Performed by: INTERNAL MEDICINE

## 2025-03-18 PROCEDURE — 2060000000 HC ICU INTERMEDIATE R&B

## 2025-03-18 PROCEDURE — 71045 X-RAY EXAM CHEST 1 VIEW: CPT

## 2025-03-18 PROCEDURE — 80053 COMPREHEN METABOLIC PANEL: CPT

## 2025-03-18 PROCEDURE — 71260 CT THORAX DX C+: CPT

## 2025-03-18 PROCEDURE — 96365 THER/PROPH/DIAG IV INF INIT: CPT

## 2025-03-18 PROCEDURE — 99223 1ST HOSP IP/OBS HIGH 75: CPT | Performed by: INTERNAL MEDICINE

## 2025-03-18 PROCEDURE — 83605 ASSAY OF LACTIC ACID: CPT

## 2025-03-18 PROCEDURE — 6360000002 HC RX W HCPCS: Performed by: NURSE PRACTITIONER

## 2025-03-18 PROCEDURE — 2500000003 HC RX 250 WO HCPCS: Performed by: INTERNAL MEDICINE

## 2025-03-18 PROCEDURE — 36415 COLL VENOUS BLD VENIPUNCTURE: CPT

## 2025-03-18 PROCEDURE — 2580000003 HC RX 258: Performed by: NURSE PRACTITIONER

## 2025-03-18 PROCEDURE — 84145 PROCALCITONIN (PCT): CPT

## 2025-03-18 PROCEDURE — 81003 URINALYSIS AUTO W/O SCOPE: CPT

## 2025-03-18 PROCEDURE — 87040 BLOOD CULTURE FOR BACTERIA: CPT

## 2025-03-18 PROCEDURE — 83735 ASSAY OF MAGNESIUM: CPT

## 2025-03-18 PROCEDURE — 96375 TX/PRO/DX INJ NEW DRUG ADDON: CPT

## 2025-03-18 RX ORDER — ONDANSETRON 2 MG/ML
4 INJECTION INTRAMUSCULAR; INTRAVENOUS EVERY 6 HOURS PRN
Status: DISCONTINUED | OUTPATIENT
Start: 2025-03-18 | End: 2025-03-20 | Stop reason: HOSPADM

## 2025-03-18 RX ORDER — KETOROLAC TROMETHAMINE 30 MG/ML
30 INJECTION, SOLUTION INTRAMUSCULAR; INTRAVENOUS ONCE
Status: COMPLETED | OUTPATIENT
Start: 2025-03-18 | End: 2025-03-18

## 2025-03-18 RX ORDER — SODIUM CHLORIDE 9 MG/ML
INJECTION, SOLUTION INTRAVENOUS PRN
Status: DISCONTINUED | OUTPATIENT
Start: 2025-03-18 | End: 2025-03-20 | Stop reason: HOSPADM

## 2025-03-18 RX ORDER — ONDANSETRON 4 MG/1
4 TABLET, ORALLY DISINTEGRATING ORAL EVERY 8 HOURS PRN
Status: DISCONTINUED | OUTPATIENT
Start: 2025-03-18 | End: 2025-03-20 | Stop reason: HOSPADM

## 2025-03-18 RX ORDER — MORPHINE SULFATE 4 MG/ML
4 INJECTION, SOLUTION INTRAMUSCULAR; INTRAVENOUS
Refills: 0 | Status: DISCONTINUED | OUTPATIENT
Start: 2025-03-18 | End: 2025-03-18

## 2025-03-18 RX ORDER — SPIRONOLACTONE 25 MG/1
25 TABLET ORAL DAILY
Status: DISCONTINUED | OUTPATIENT
Start: 2025-03-19 | End: 2025-03-20 | Stop reason: HOSPADM

## 2025-03-18 RX ORDER — MAGNESIUM SULFATE IN WATER 40 MG/ML
2000 INJECTION, SOLUTION INTRAVENOUS ONCE
Status: COMPLETED | OUTPATIENT
Start: 2025-03-18 | End: 2025-03-18

## 2025-03-18 RX ORDER — SODIUM CHLORIDE 0.9 % (FLUSH) 0.9 %
5-40 SYRINGE (ML) INJECTION PRN
Status: DISCONTINUED | OUTPATIENT
Start: 2025-03-18 | End: 2025-03-20 | Stop reason: HOSPADM

## 2025-03-18 RX ORDER — GABAPENTIN 300 MG/1
600 CAPSULE ORAL 3 TIMES DAILY
Status: DISCONTINUED | OUTPATIENT
Start: 2025-03-18 | End: 2025-03-20 | Stop reason: HOSPADM

## 2025-03-18 RX ORDER — COLCHICINE 0.6 MG/1
0.6 TABLET ORAL ONCE
Status: COMPLETED | OUTPATIENT
Start: 2025-03-18 | End: 2025-03-18

## 2025-03-18 RX ORDER — 0.9 % SODIUM CHLORIDE 0.9 %
1000 INTRAVENOUS SOLUTION INTRAVENOUS ONCE
Status: COMPLETED | OUTPATIENT
Start: 2025-03-18 | End: 2025-03-18

## 2025-03-18 RX ORDER — POTASSIUM CHLORIDE 7.45 MG/ML
10 INJECTION INTRAVENOUS PRN
Status: DISCONTINUED | OUTPATIENT
Start: 2025-03-18 | End: 2025-03-19

## 2025-03-18 RX ORDER — LISINOPRIL 2.5 MG/1
2.5 TABLET ORAL DAILY
Status: DISCONTINUED | OUTPATIENT
Start: 2025-03-19 | End: 2025-03-20 | Stop reason: HOSPADM

## 2025-03-18 RX ORDER — IOPAMIDOL 755 MG/ML
75 INJECTION, SOLUTION INTRAVASCULAR
Status: COMPLETED | OUTPATIENT
Start: 2025-03-18 | End: 2025-03-18

## 2025-03-18 RX ORDER — ACETAMINOPHEN 325 MG/1
650 TABLET ORAL EVERY 6 HOURS PRN
Status: DISCONTINUED | OUTPATIENT
Start: 2025-03-18 | End: 2025-03-20 | Stop reason: HOSPADM

## 2025-03-18 RX ORDER — KETOROLAC TROMETHAMINE 30 MG/ML
30 INJECTION, SOLUTION INTRAMUSCULAR; INTRAVENOUS EVERY 6 HOURS PRN
Status: DISCONTINUED | OUTPATIENT
Start: 2025-03-18 | End: 2025-03-20 | Stop reason: HOSPADM

## 2025-03-18 RX ORDER — POLYETHYLENE GLYCOL 3350 17 G/17G
17 POWDER, FOR SOLUTION ORAL DAILY PRN
Status: DISCONTINUED | OUTPATIENT
Start: 2025-03-18 | End: 2025-03-20 | Stop reason: HOSPADM

## 2025-03-18 RX ORDER — COLCHICINE 0.6 MG/1
0.6 TABLET ORAL DAILY
Status: DISCONTINUED | OUTPATIENT
Start: 2025-03-19 | End: 2025-03-20 | Stop reason: HOSPADM

## 2025-03-18 RX ORDER — HYDROCODONE BITARTRATE AND ACETAMINOPHEN 5; 325 MG/1; MG/1
1 TABLET ORAL EVERY 6 HOURS PRN
Status: DISCONTINUED | OUTPATIENT
Start: 2025-03-18 | End: 2025-03-20 | Stop reason: HOSPADM

## 2025-03-18 RX ORDER — ENOXAPARIN SODIUM 100 MG/ML
40 INJECTION SUBCUTANEOUS DAILY
Status: DISCONTINUED | OUTPATIENT
Start: 2025-03-18 | End: 2025-03-20 | Stop reason: HOSPADM

## 2025-03-18 RX ORDER — ATORVASTATIN CALCIUM 10 MG/1
20 TABLET, FILM COATED ORAL DAILY
Status: DISCONTINUED | OUTPATIENT
Start: 2025-03-19 | End: 2025-03-20 | Stop reason: HOSPADM

## 2025-03-18 RX ORDER — OLANZAPINE 5 MG/1
7.5 TABLET ORAL EVERY MORNING
Status: DISCONTINUED | OUTPATIENT
Start: 2025-03-19 | End: 2025-03-20 | Stop reason: HOSPADM

## 2025-03-18 RX ORDER — METOPROLOL SUCCINATE 50 MG/1
50 TABLET, EXTENDED RELEASE ORAL EVERY MORNING
Status: DISCONTINUED | OUTPATIENT
Start: 2025-03-19 | End: 2025-03-20 | Stop reason: HOSPADM

## 2025-03-18 RX ORDER — ACETAMINOPHEN 650 MG/1
650 SUPPOSITORY RECTAL EVERY 6 HOURS PRN
Status: DISCONTINUED | OUTPATIENT
Start: 2025-03-18 | End: 2025-03-20 | Stop reason: HOSPADM

## 2025-03-18 RX ORDER — MAGNESIUM SULFATE IN WATER 40 MG/ML
2000 INJECTION, SOLUTION INTRAVENOUS PRN
Status: DISCONTINUED | OUTPATIENT
Start: 2025-03-18 | End: 2025-03-19

## 2025-03-18 RX ORDER — CLONAZEPAM 0.5 MG/1
0.5 TABLET ORAL 2 TIMES DAILY PRN
Status: DISCONTINUED | OUTPATIENT
Start: 2025-03-18 | End: 2025-03-20 | Stop reason: HOSPADM

## 2025-03-18 RX ORDER — OLANZAPINE 5 MG/1
15 TABLET ORAL NIGHTLY
Status: DISCONTINUED | OUTPATIENT
Start: 2025-03-18 | End: 2025-03-20 | Stop reason: HOSPADM

## 2025-03-18 RX ORDER — SODIUM CHLORIDE 0.9 % (FLUSH) 0.9 %
5-40 SYRINGE (ML) INJECTION EVERY 12 HOURS SCHEDULED
Status: DISCONTINUED | OUTPATIENT
Start: 2025-03-18 | End: 2025-03-20 | Stop reason: HOSPADM

## 2025-03-18 RX ORDER — LANOLIN ALCOHOL/MO/W.PET/CERES
400 CREAM (GRAM) TOPICAL DAILY
Status: DISCONTINUED | OUTPATIENT
Start: 2025-03-19 | End: 2025-03-20 | Stop reason: HOSPADM

## 2025-03-18 RX ORDER — POTASSIUM CHLORIDE 1500 MG/1
40 TABLET, EXTENDED RELEASE ORAL PRN
Status: DISCONTINUED | OUTPATIENT
Start: 2025-03-18 | End: 2025-03-19

## 2025-03-18 RX ADMIN — SODIUM CHLORIDE: 0.9 INJECTION, SOLUTION INTRAVENOUS at 16:57

## 2025-03-18 RX ADMIN — GABAPENTIN 600 MG: 300 CAPSULE ORAL at 17:23

## 2025-03-18 RX ADMIN — KETOROLAC TROMETHAMINE 30 MG: 30 INJECTION, SOLUTION INTRAMUSCULAR at 11:51

## 2025-03-18 RX ADMIN — HYDROMORPHONE HYDROCHLORIDE 0.25 MG: 0.5 INJECTION, SOLUTION INTRAMUSCULAR; INTRAVENOUS; SUBCUTANEOUS at 16:49

## 2025-03-18 RX ADMIN — MAGNESIUM SULFATE HEPTAHYDRATE 2000 MG: 40 INJECTION, SOLUTION INTRAVENOUS at 16:58

## 2025-03-18 RX ADMIN — IOPAMIDOL 75 ML: 755 INJECTION, SOLUTION INTRAVENOUS at 13:27

## 2025-03-18 RX ADMIN — MORPHINE SULFATE 4 MG: 4 INJECTION, SOLUTION INTRAMUSCULAR; INTRAVENOUS at 12:58

## 2025-03-18 RX ADMIN — MAGNESIUM SULFATE HEPTAHYDRATE 2000 MG: 40 INJECTION, SOLUTION INTRAVENOUS at 14:13

## 2025-03-18 RX ADMIN — SODIUM CHLORIDE, PRESERVATIVE FREE 10 ML: 5 INJECTION INTRAVENOUS at 20:26

## 2025-03-18 RX ADMIN — HYDROCODONE BITARTRATE AND ACETAMINOPHEN 1 TABLET: 5; 325 TABLET ORAL at 18:32

## 2025-03-18 RX ADMIN — SODIUM CHLORIDE 1000 ML: 0.9 INJECTION, SOLUTION INTRAVENOUS at 14:11

## 2025-03-18 RX ADMIN — OLANZAPINE 15 MG: 5 TABLET, FILM COATED ORAL at 20:26

## 2025-03-18 RX ADMIN — GABAPENTIN 600 MG: 300 CAPSULE ORAL at 20:25

## 2025-03-18 RX ADMIN — COLCHICINE 0.6 MG: 0.6 TABLET ORAL at 16:51

## 2025-03-18 RX ADMIN — ENOXAPARIN SODIUM 40 MG: 100 INJECTION SUBCUTANEOUS at 17:22

## 2025-03-18 RX ADMIN — CLONAZEPAM 0.5 MG: 0.5 TABLET ORAL at 21:32

## 2025-03-18 ASSESSMENT — PAIN DESCRIPTION - LOCATION
LOCATION: CHEST

## 2025-03-18 ASSESSMENT — PAIN SCALES - GENERAL
PAINLEVEL_OUTOF10: 7
PAINLEVEL_OUTOF10: 8
PAINLEVEL_OUTOF10: 6
PAINLEVEL_OUTOF10: 9
PAINLEVEL_OUTOF10: 8

## 2025-03-18 ASSESSMENT — PAIN - FUNCTIONAL ASSESSMENT
PAIN_FUNCTIONAL_ASSESSMENT: 0-10
PAIN_FUNCTIONAL_ASSESSMENT: 0-10

## 2025-03-18 ASSESSMENT — PAIN DESCRIPTION - DESCRIPTORS: DESCRIPTORS: ACHING

## 2025-03-18 NOTE — TELEPHONE ENCOUNTER
I spoke with the patient and she states she feels like she has an active infection in her body. I advised patient to report to the ED due to elevated temp post op. LEX STRICKLAND.

## 2025-03-18 NOTE — TELEPHONE ENCOUNTER
Pt's mother called stating that the pt has not been well since her procedure. Her defibrillator went off on 3/16/25. Pt now has a 101 degree fever. Pt would like to know if she needs to go to ED. Please advice.

## 2025-03-18 NOTE — H&P
05/23/2019 05:00 AM   USX8EFO 38.3 05/23/2019 05:00 AM   EAT2XXI 30.5 05/23/2019 05:00 AM   BEART 7.2 05/23/2019 05:00 AM   W5UUYWOK 97.4 05/23/2019 05:00 AM  Lactic Acid:     Lab Results Component Value Date/Time   LACTA 1.4 01/18/2023 01:38 AM  Cardiac Enzymes:     Lab Results Component Value Date   CKTOTAL 116 05/23/2019   TROPONINI <0.01 07/07/2020   Imaging results impression onlyXR CHEST 1 VIEW  Result Date: 2/27/2025 No evidence of acute cardiopulmonary process. Electronically signed by MD Torsten Cota  XR CHEST LATERAL  Result Date: 2/27/2025 Negative lateral chest x-ray Electronically signed by Deanna Encinas  XR CHEST PORTABLE  Result Date: 2/27/2025 Right subclavian pacemaker/AICD with leads projecting over the right atrium and right ventricle. The position of the right atrial lead appears changed compared to the prior study with a medial orientation. Electronically signed by Roshan Maldonado MD  XR CHEST 1 VIEW Final Result   No evidence of acute cardiopulmonary process.    Electronically signed by MD Torsten Cota   XR CHEST LATERAL Final Result   Negative lateral chest x-ray         Electronically signed by Deanna Encinas   XR CHEST PORTABLE Final Result   Right subclavian pacemaker/AICD with leads projecting over the right atrium and right ventricle. The position of the right atrial lead appears changed compared to the prior study with a medial orientation.   Electronically signed by Roshan Maldonado MD   XR CHEST PORTABLE    (Results Pending)   LAST EKG      Results for orders placed or performed during the hospital encounter of 02/27/25 EKG 12 Lead   Collection Time: 02/27/25  6:43 AM Result Value Ref Range   Ventricular Rate 87 BPM   Atrial Rate 87 BPM   P-R Interval 170 ms   QRS Duration 80 ms   Q-T Interval 378 ms   QTc Calculation (Bazett) 454 ms   P Axis 66 degrees   R Axis 49 degrees   T Axis 65 degrees   Diagnosis         Normal sinus rhythmNonspecific T wave abnormalityAbnormal ECGWhen compared

## 2025-03-18 NOTE — ED NOTES
@2264 paged cardiology per Charles Lange CNP   RE: EP- dr mathur---chest pain  @8614 Dr. Mathur called back and spoke with Charles Lange CNP   
Patients blood pressure is 81/52, provider notified.  
Provider notified of patients blood pressure, 87/59.  
Tele 303 @ [phone]  
see MAR.    Pertinent or High Risk Medications/Drips: No.    Pending Blood Product Administration: no    Abnormal labs:   Abnormal Labs Reviewed   CBC WITH AUTO DIFFERENTIAL - Abnormal; Notable for the following components:       Result Value    WBC 24.1 (*)     RBC 3.77 (*)     Hemoglobin 11.1 (*)     Hematocrit 33.5 (*)     Neutrophils Absolute 20.7 (*)     All other components within normal limits    Narrative:     CALL  Keys  [facility] tel. [phone],  Rejected Test Name/Called to:AMANDA/TRPCharo/Jazmín Deshpande RN, 03/18/2025  11:40, by CHRISTIE   COMPREHENSIVE METABOLIC PANEL W/ REFLEX TO MG FOR LOW K - Abnormal; Notable for the following components:    Sodium 131 (*)     Chloride 95 (*)     Glucose 109 (*)     BUN 6 (*)     Creatinine 0.5 (*)     ALT 52 (*)     AST 47 (*)     All other components within normal limits   MAGNESIUM - Abnormal; Notable for the following components:    Magnesium 1.03 (*)     All other components within normal limits     Critical values: no  Intervention for critical value(s):     Abnormal Imaging: yes,  CT scan            You may also review the ED PT Care Timeline found under the Summary Tab, ED Encounter Summary, Timeline Reports, ED Patient Care Timeline.     Recommendation    Pending orders/Uncompleted orders to hand off:      Additional Comments:  If any further questions, please call Sending RN at [phone]

## 2025-03-19 ENCOUNTER — APPOINTMENT (OUTPATIENT)
Age: 40
DRG: 207 | End: 2025-03-19
Attending: INTERNAL MEDICINE
Payer: COMMERCIAL

## 2025-03-19 LAB
ANION GAP SERPL CALCULATED.3IONS-SCNC: 10 MMOL/L (ref 3–16)
BASOPHILS # BLD: 0 K/UL (ref 0–0.2)
BASOPHILS NFR BLD: 0.4 %
BUN SERPL-MCNC: 8 MG/DL (ref 7–20)
CALCIUM SERPL-MCNC: 8.6 MG/DL (ref 8.3–10.6)
CHLORIDE SERPL-SCNC: 98 MMOL/L (ref 99–110)
CO2 SERPL-SCNC: 24 MMOL/L (ref 21–32)
CREAT SERPL-MCNC: 0.4 MG/DL (ref 0.6–1.1)
CRP SERPL HS-MCNC: 273 MG/L (ref 0.16–3)
DEPRECATED RDW RBC AUTO: 12.7 % (ref 12.4–15.4)
ECHO BSA: 1.77 M2
ECHO EST RA PRESSURE: 3 MMHG
ECHO LV EF PHYSICIAN: 53 %
ECHO LV FRACTIONAL SHORTENING: 29 % (ref 28–44)
ECHO LV INTERNAL DIMENSION DIASTOLE INDEX: 2.81 CM/M2
ECHO LV INTERNAL DIMENSION DIASTOLIC: 4.8 CM (ref 3.9–5.3)
ECHO LV INTERNAL DIMENSION SYSTOLIC INDEX: 1.99 CM/M2
ECHO LV INTERNAL DIMENSION SYSTOLIC: 3.4 CM
ECHO LV IVSD: 0.9 CM (ref 0.6–0.9)
ECHO LV MASS 2D: 137.1 G (ref 67–162)
ECHO LV MASS INDEX 2D: 80.2 G/M2 (ref 43–95)
ECHO LV POSTERIOR WALL DIASTOLIC: 0.8 CM (ref 0.6–0.9)
ECHO LV RELATIVE WALL THICKNESS RATIO: 0.33
ECHO RIGHT VENTRICULAR SYSTOLIC PRESSURE (RVSP): 22 MMHG
ECHO TV REGURGITANT MAX VELOCITY: 2.19 M/S
ECHO TV REGURGITANT PEAK GRADIENT: 19 MMHG
EKG ATRIAL RATE: 119 BPM
EKG DIAGNOSIS: NORMAL
EKG P AXIS: 48 DEGREES
EKG P-R INTERVAL: 144 MS
EKG Q-T INTERVAL: 314 MS
EKG QRS DURATION: 78 MS
EKG QTC CALCULATION (BAZETT): 441 MS
EKG R AXIS: 9 DEGREES
EKG T AXIS: 56 DEGREES
EKG VENTRICULAR RATE: 119 BPM
EOSINOPHIL # BLD: 0.2 K/UL (ref 0–0.6)
EOSINOPHIL NFR BLD: 1.4 %
GFR SERPLBLD CREATININE-BSD FMLA CKD-EPI: >90 ML/MIN/{1.73_M2}
GLUCOSE SERPL-MCNC: 90 MG/DL (ref 70–99)
HCT VFR BLD AUTO: 28.2 % (ref 36–48)
HGB BLD-MCNC: 9.6 G/DL (ref 12–16)
LYMPHOCYTES # BLD: 2.2 K/UL (ref 1–5.1)
LYMPHOCYTES NFR BLD: 20 %
MAGNESIUM SERPL-MCNC: 2.08 MG/DL (ref 1.8–2.4)
MCH RBC QN AUTO: 29.8 PG (ref 26–34)
MCHC RBC AUTO-ENTMCNC: 34 G/DL (ref 31–36)
MCV RBC AUTO: 87.8 FL (ref 80–100)
MONOCYTES # BLD: 1.2 K/UL (ref 0–1.3)
MONOCYTES NFR BLD: 10.8 %
NEUTROPHILS # BLD: 7.5 K/UL (ref 1.7–7.7)
NEUTROPHILS NFR BLD: 67.4 %
PLATELET # BLD AUTO: 177 K/UL (ref 135–450)
PMV BLD AUTO: 8.3 FL (ref 5–10.5)
POTASSIUM SERPL-SCNC: 3.1 MMOL/L (ref 3.5–5.1)
RBC # BLD AUTO: 3.21 M/UL (ref 4–5.2)
SODIUM SERPL-SCNC: 132 MMOL/L (ref 136–145)
WBC # BLD AUTO: 11.1 K/UL (ref 4–11)

## 2025-03-19 PROCEDURE — 6370000000 HC RX 637 (ALT 250 FOR IP): Performed by: INTERNAL MEDICINE

## 2025-03-19 PROCEDURE — 85025 COMPLETE CBC W/AUTO DIFF WBC: CPT

## 2025-03-19 PROCEDURE — 93321 DOPPLER ECHO F-UP/LMTD STD: CPT

## 2025-03-19 PROCEDURE — 36415 COLL VENOUS BLD VENIPUNCTURE: CPT

## 2025-03-19 PROCEDURE — 2500000003 HC RX 250 WO HCPCS: Performed by: INTERNAL MEDICINE

## 2025-03-19 PROCEDURE — 80048 BASIC METABOLIC PNL TOTAL CA: CPT

## 2025-03-19 PROCEDURE — 83735 ASSAY OF MAGNESIUM: CPT

## 2025-03-19 PROCEDURE — 93010 ELECTROCARDIOGRAM REPORT: CPT | Performed by: INTERNAL MEDICINE

## 2025-03-19 PROCEDURE — 2060000000 HC ICU INTERMEDIATE R&B

## 2025-03-19 PROCEDURE — 99233 SBSQ HOSP IP/OBS HIGH 50: CPT | Performed by: INTERNAL MEDICINE

## 2025-03-19 PROCEDURE — 6360000002 HC RX W HCPCS: Performed by: INTERNAL MEDICINE

## 2025-03-19 RX ORDER — POTASSIUM CHLORIDE 1500 MG/1
40 TABLET, EXTENDED RELEASE ORAL 3 TIMES DAILY
Status: COMPLETED | OUTPATIENT
Start: 2025-03-19 | End: 2025-03-19

## 2025-03-19 RX ADMIN — OLANZAPINE 15 MG: 5 TABLET, FILM COATED ORAL at 21:04

## 2025-03-19 RX ADMIN — ATORVASTATIN CALCIUM 20 MG: 10 TABLET, FILM COATED ORAL at 08:33

## 2025-03-19 RX ADMIN — Medication 400 MG: at 08:33

## 2025-03-19 RX ADMIN — SODIUM CHLORIDE, PRESERVATIVE FREE 10 ML: 5 INJECTION INTRAVENOUS at 21:04

## 2025-03-19 RX ADMIN — LISINOPRIL 2.5 MG: 2.5 TABLET ORAL at 08:33

## 2025-03-19 RX ADMIN — COLCHICINE 0.6 MG: 0.6 TABLET ORAL at 08:33

## 2025-03-19 RX ADMIN — CLONAZEPAM 0.5 MG: 0.5 TABLET ORAL at 08:34

## 2025-03-19 RX ADMIN — HYDROCODONE BITARTRATE AND ACETAMINOPHEN 1 TABLET: 5; 325 TABLET ORAL at 08:33

## 2025-03-19 RX ADMIN — GABAPENTIN 600 MG: 300 CAPSULE ORAL at 14:04

## 2025-03-19 RX ADMIN — HYDROCODONE BITARTRATE AND ACETAMINOPHEN 1 TABLET: 5; 325 TABLET ORAL at 21:04

## 2025-03-19 RX ADMIN — SPIRONOLACTONE 25 MG: 25 TABLET, FILM COATED ORAL at 08:32

## 2025-03-19 RX ADMIN — HYDROCODONE BITARTRATE AND ACETAMINOPHEN 1 TABLET: 5; 325 TABLET ORAL at 15:02

## 2025-03-19 RX ADMIN — HYDROCODONE BITARTRATE AND ACETAMINOPHEN 1 TABLET: 5; 325 TABLET ORAL at 00:35

## 2025-03-19 RX ADMIN — ENOXAPARIN SODIUM 40 MG: 100 INJECTION SUBCUTANEOUS at 08:34

## 2025-03-19 RX ADMIN — OLANZAPINE 7.5 MG: 5 TABLET, FILM COATED ORAL at 08:32

## 2025-03-19 RX ADMIN — METOPROLOL SUCCINATE 50 MG: 50 TABLET, EXTENDED RELEASE ORAL at 08:33

## 2025-03-19 RX ADMIN — GABAPENTIN 600 MG: 300 CAPSULE ORAL at 08:33

## 2025-03-19 RX ADMIN — SODIUM CHLORIDE, PRESERVATIVE FREE 10 ML: 5 INJECTION INTRAVENOUS at 08:38

## 2025-03-19 RX ADMIN — POTASSIUM CHLORIDE 40 MEQ: 1500 TABLET, EXTENDED RELEASE ORAL at 11:32

## 2025-03-19 RX ADMIN — GABAPENTIN 600 MG: 300 CAPSULE ORAL at 21:04

## 2025-03-19 RX ADMIN — POTASSIUM CHLORIDE 40 MEQ: 1500 TABLET, EXTENDED RELEASE ORAL at 14:04

## 2025-03-19 RX ADMIN — CLONAZEPAM 0.5 MG: 0.5 TABLET ORAL at 16:39

## 2025-03-19 ASSESSMENT — PAIN DESCRIPTION - DESCRIPTORS
DESCRIPTORS: ACHING

## 2025-03-19 ASSESSMENT — PAIN SCALES - GENERAL
PAINLEVEL_OUTOF10: 8
PAINLEVEL_OUTOF10: 8
PAINLEVEL_OUTOF10: 6
PAINLEVEL_OUTOF10: 7
PAINLEVEL_OUTOF10: 0
PAINLEVEL_OUTOF10: 6
PAINLEVEL_OUTOF10: 7
PAINLEVEL_OUTOF10: 5
PAINLEVEL_OUTOF10: 7
PAINLEVEL_OUTOF10: 2

## 2025-03-19 ASSESSMENT — PAIN DESCRIPTION - LOCATION
LOCATION: GENERALIZED
LOCATION: GENERALIZED
LOCATION: CHEST
LOCATION: CHEST

## 2025-03-19 ASSESSMENT — PAIN DESCRIPTION - PAIN TYPE: TYPE: CHRONIC PAIN

## 2025-03-19 ASSESSMENT — PAIN - FUNCTIONAL ASSESSMENT
PAIN_FUNCTIONAL_ASSESSMENT: ACTIVITIES ARE NOT PREVENTED
PAIN_FUNCTIONAL_ASSESSMENT: PREVENTS OR INTERFERES SOME ACTIVE ACTIVITIES AND ADLS
PAIN_FUNCTIONAL_ASSESSMENT: ACTIVITIES ARE NOT PREVENTED

## 2025-03-19 NOTE — ED PROVIDER NOTES
Eleno Irizarry II, DO, am the primary clinician of record.   I personally saw the patient and independently provided 0 minutes of non-concurrent critical care out of the total shared critical care time excluding separately billable procedures.    This chart was generated in part by using Dragon Dictation system and may contain errors related to that system including errors in grammar, punctuation, and spelling, as well as words and phrases that may be inappropriate. If there are any questions or concerns please feel free to contact the dictating provider for clarification.     Eleno Irizarry II, DO   [employer]       Eleno Irizarry II, DO  03/18/25 1538    
Ultrasound and MRI are read by the radiologist. Plain radiographic images are visualized and preliminarily interpreted by the ED Provider with the below findings:        Interpretation per the Radiologist below, if available at the time of this note:    CT CHEST PULMONARY EMBOLISM W CONTRAST   Final Result      1.  No acute pulmonary embolism.   2.  Fluid collection within the soft tissues anterior to the right pacemaker   generator pack, sterility is indeterminate.   3.  Trace right pleural effusion and bibasilar atelectasis.               Electronically signed by Yon Hemphill      XR CHEST PORTABLE   Final Result   No acute abnormality. No significant change.      Electronically signed by Antony Cesar        CT CHEST PULMONARY EMBOLISM W CONTRAST  Result Date: 3/18/2025  EXAM: CT CHEST PULMONARY EMBOLUS PROTOCOL. INDICATION: Chest pain, tachycardia COMPARISON: Chest radiograph 3/18/2025 TECHNIQUE: Axial CT imaging obtained through the chest using CT pulmonary angiogram protocol. Axial images, multiplanar reformatted images and maximum intensity projection images were reviewed. Departmental dose lowering techniques for CT include automated exposure control, adjustment of the mA and/or kV according to patient size, and use of iterative reconstruction technique. IV Contrast Media and volume: 80 cc Omnipaque 350. FINDINGS: DIAGNOSTIC QUALITY: Adequate. PULMONARY EMBOLI: None. RIGHT HEART STRAIN: None. LUNGS AND AIRWAYS: Airways are patent. Dependent atelectasis in the bilateral lung bases. No suspicious pulmonary nodules. PLEURAL / PERICARDIAL SPACES: Trace right pleural effusion HEART / GREAT VESSELS: The heart is normal in size. The aorta and main pulmonary artery are normal in caliber. ADENOPATHY: None. CHEST WALL / LOWER NECK: Pacemaker generator pack in the right chest wall with leads terminating in the right atrium and right ventricle. 6.4 x 2.8 cm fluid collection anterior and superior to the generator pack.

## 2025-03-19 NOTE — CARE COORDINATION
Case Management Assessment  Initial Evaluation    Date/Time of Evaluation: 3/19/2025 12:05 PM  Assessment Completed by: Uzma Low RN    If patient is discharged prior to next notation, then this note serves as note for discharge by case management.    Patient Name: Rubi Robb                   YOB: 1985  Diagnosis: Hypomagnesemia [E83.42]  Chest pain, unspecified type [R07.9]                   Date / Time: 3/18/2025 10:42 AM    Patient Admission Status: Inpatient   Readmission Risk (Low < 19, Mod (19-27), High > 27): Readmission Risk Score: 21.9    Current PCP: Damaso Barnard MD  PCP verified by CM? (P) Yes    Chart Reviewed: Yes      History Provided by: (P) Patient  Patient Orientation: (P) Alert and Oriented    Patient Cognition: (P) Alert    Hospitalization in the last 30 days (Readmission):  Yes    If yes, Readmission Assessment in CM Navigator will be completed.    Advance Directives:      Code Status: Full Code   Patient's Primary Decision Maker is: (P) Legal Next of Kin    Primary Decision Maker: Jorje Robb - Parent - [phone]    Discharge Planning:    Patient lives with: (P) Parent Type of Home: (P) Apartment  Primary Care Giver: (P) Self  Patient Support Systems include: (P) Parent   Current Financial resources: (P) Medicaid  Current community resources: (P) None  Current services prior to admission: (P) Durable Medical Equipment            Current DME: (P) Cane            Type of Home Care services:  (P) None    ADLS  Prior functional level: (P) Independent in ADLs/IADLs  Current functional level: (P) Independent in ADLs/IADLs    PT AM-PAC:   /24  OT AM-PAC:   /24    Family can provide assistance at DC: (P) No  Would you like Case Management to discuss the discharge plan with any other family members/significant others, and if so, who? (P) Yes  Plans to Return to Present Housing: (P) Yes  Other Identified Issues/Barriers to RETURNING to current housing: no barrier  Potential

## 2025-03-20 VITALS
BODY MASS INDEX: 30.06 KG/M2 | SYSTOLIC BLOOD PRESSURE: 94 MMHG | OXYGEN SATURATION: 98 % | HEART RATE: 76 BPM | RESPIRATION RATE: 18 BRPM | TEMPERATURE: 98.2 F | HEIGHT: 60 IN | WEIGHT: 153.1 LBS | DIASTOLIC BLOOD PRESSURE: 65 MMHG

## 2025-03-20 LAB
ALBUMIN SERPL-MCNC: 3 G/DL (ref 3.4–5)
ANION GAP SERPL CALCULATED.3IONS-SCNC: 10 MMOL/L (ref 3–16)
BUN SERPL-MCNC: 5 MG/DL (ref 7–20)
CALCIUM SERPL-MCNC: 9 MG/DL (ref 8.3–10.6)
CHLORIDE SERPL-SCNC: 105 MMOL/L (ref 99–110)
CO2 SERPL-SCNC: 23 MMOL/L (ref 21–32)
CREAT SERPL-MCNC: 0.4 MG/DL (ref 0.6–1.1)
DEPRECATED RDW RBC AUTO: 13 % (ref 12.4–15.4)
GFR SERPLBLD CREATININE-BSD FMLA CKD-EPI: >90 ML/MIN/{1.73_M2}
GLUCOSE SERPL-MCNC: 87 MG/DL (ref 70–99)
HCT VFR BLD AUTO: 29.1 % (ref 36–48)
HGB BLD-MCNC: 9.8 G/DL (ref 12–16)
MAGNESIUM SERPL-MCNC: 1.6 MG/DL (ref 1.8–2.4)
MCH RBC QN AUTO: 29.6 PG (ref 26–34)
MCHC RBC AUTO-ENTMCNC: 33.5 G/DL (ref 31–36)
MCV RBC AUTO: 88.3 FL (ref 80–100)
PHOSPHATE SERPL-MCNC: 3.6 MG/DL (ref 2.5–4.9)
PLATELET # BLD AUTO: 222 K/UL (ref 135–450)
PMV BLD AUTO: 8.3 FL (ref 5–10.5)
POTASSIUM SERPL-SCNC: 4 MMOL/L (ref 3.5–5.1)
RBC # BLD AUTO: 3.3 M/UL (ref 4–5.2)
SODIUM SERPL-SCNC: 138 MMOL/L (ref 136–145)
WBC # BLD AUTO: 6.8 K/UL (ref 4–11)

## 2025-03-20 PROCEDURE — 85027 COMPLETE CBC AUTOMATED: CPT

## 2025-03-20 PROCEDURE — 6370000000 HC RX 637 (ALT 250 FOR IP): Performed by: INTERNAL MEDICINE

## 2025-03-20 PROCEDURE — 80069 RENAL FUNCTION PANEL: CPT

## 2025-03-20 PROCEDURE — 6360000002 HC RX W HCPCS: Performed by: INTERNAL MEDICINE

## 2025-03-20 PROCEDURE — 83735 ASSAY OF MAGNESIUM: CPT

## 2025-03-20 PROCEDURE — 36415 COLL VENOUS BLD VENIPUNCTURE: CPT

## 2025-03-20 PROCEDURE — 2500000003 HC RX 250 WO HCPCS: Performed by: INTERNAL MEDICINE

## 2025-03-20 PROCEDURE — 2580000003 HC RX 258: Performed by: INTERNAL MEDICINE

## 2025-03-20 RX ORDER — LANOLIN ALCOHOL/MO/W.PET/CERES
400 CREAM (GRAM) TOPICAL ONCE
Status: COMPLETED | OUTPATIENT
Start: 2025-03-20 | End: 2025-03-20

## 2025-03-20 RX ORDER — COLCHICINE 0.6 MG/1
0.6 TABLET ORAL DAILY
Qty: 30 TABLET | Refills: 0 | Status: SHIPPED | OUTPATIENT
Start: 2025-03-21 | End: 2025-04-20

## 2025-03-20 RX ORDER — MAGNESIUM SULFATE IN WATER 40 MG/ML
2000 INJECTION, SOLUTION INTRAVENOUS ONCE
Status: COMPLETED | OUTPATIENT
Start: 2025-03-20 | End: 2025-03-20

## 2025-03-20 RX ADMIN — OLANZAPINE 7.5 MG: 5 TABLET, FILM COATED ORAL at 09:15

## 2025-03-20 RX ADMIN — SODIUM CHLORIDE, PRESERVATIVE FREE 10 ML: 5 INJECTION INTRAVENOUS at 09:23

## 2025-03-20 RX ADMIN — MAGNESIUM SULFATE HEPTAHYDRATE 2000 MG: 40 INJECTION, SOLUTION INTRAVENOUS at 12:16

## 2025-03-20 RX ADMIN — COLCHICINE 0.6 MG: 0.6 TABLET ORAL at 09:21

## 2025-03-20 RX ADMIN — METOPROLOL SUCCINATE 50 MG: 50 TABLET, EXTENDED RELEASE ORAL at 09:21

## 2025-03-20 RX ADMIN — ATORVASTATIN CALCIUM 20 MG: 10 TABLET, FILM COATED ORAL at 09:21

## 2025-03-20 RX ADMIN — HYDROCODONE BITARTRATE AND ACETAMINOPHEN 1 TABLET: 5; 325 TABLET ORAL at 03:15

## 2025-03-20 RX ADMIN — ENOXAPARIN SODIUM 40 MG: 100 INJECTION SUBCUTANEOUS at 09:15

## 2025-03-20 RX ADMIN — SPIRONOLACTONE 25 MG: 25 TABLET, FILM COATED ORAL at 09:15

## 2025-03-20 RX ADMIN — GABAPENTIN 600 MG: 300 CAPSULE ORAL at 09:21

## 2025-03-20 RX ADMIN — GABAPENTIN 600 MG: 300 CAPSULE ORAL at 14:02

## 2025-03-20 RX ADMIN — CLONAZEPAM 0.5 MG: 0.5 TABLET ORAL at 12:53

## 2025-03-20 RX ADMIN — Medication 400 MG: at 09:21

## 2025-03-20 RX ADMIN — LISINOPRIL 2.5 MG: 2.5 TABLET ORAL at 09:15

## 2025-03-20 RX ADMIN — SODIUM CHLORIDE: 0.9 INJECTION, SOLUTION INTRAVENOUS at 12:15

## 2025-03-20 RX ADMIN — HYDROCODONE BITARTRATE AND ACETAMINOPHEN 1 TABLET: 5; 325 TABLET ORAL at 09:48

## 2025-03-20 RX ADMIN — Medication 400 MG: at 14:01

## 2025-03-20 ASSESSMENT — PAIN SCALES - GENERAL
PAINLEVEL_OUTOF10: 6
PAINLEVEL_OUTOF10: 6
PAINLEVEL_OUTOF10: 8
PAINLEVEL_OUTOF10: 0
PAINLEVEL_OUTOF10: 8

## 2025-03-20 NOTE — PLAN OF CARE
Problem: Chronic Conditions and Co-morbidities  Goal: Patient's chronic conditions and co-morbidity symptoms are monitored and maintained or improved  3/19/2025 1947 by Melquiades Rowell RN  Outcome: Progressing  3/19/2025 1754 by Francie Baugh RN  Outcome: Progressing     Problem: Discharge Planning  Goal: Discharge to home or other facility with appropriate resources  3/19/2025 1947 by Melquiades Rowell RN  Outcome: Progressing  3/19/2025 1754 by Francie Baugh RN  Outcome: Progressing     Problem: Pain  Goal: Verbalizes/displays adequate comfort level or baseline comfort level  3/19/2025 1947 by Melquiades Rowell RN  Outcome: Progressing  3/19/2025 1754 by Francie Baugh RN  Outcome: Progressing     Problem: Safety - Adult  Goal: Free from fall injury  3/19/2025 1947 by Melquiades Rowell RN  Outcome: Progressing  3/19/2025 1754 by Francie Baugh RN  Outcome: Progressing     
  Problem: Chronic Conditions and Co-morbidities  Goal: Patient's chronic conditions and co-morbidity symptoms are monitored and maintained or improved  Outcome: Progressing     Problem: Discharge Planning  Goal: Discharge to home or other facility with appropriate resources  Outcome: Progressing     Problem: Pain  Goal: Verbalizes/displays adequate comfort level or baseline comfort level  Outcome: Progressing     
  Problem: Chronic Conditions and Co-morbidities  Goal: Patient's chronic conditions and co-morbidity symptoms are monitored and maintained or improved  Outcome: Progressing     Problem: Discharge Planning  Goal: Discharge to home or other facility with appropriate resources  Outcome: Progressing     Problem: Pain  Goal: Verbalizes/displays adequate comfort level or baseline comfort level  Outcome: Progressing     Problem: Safety - Adult  Goal: Free from fall injury  Outcome: Progressing     
  Problem: Chronic Conditions and Co-morbidities  Goal: Patient's chronic conditions and co-morbidity symptoms are monitored and maintained or improved  Outcome: Progressing     Problem: Discharge Planning  Goal: Discharge to home or other facility with appropriate resources  Outcome: Progressing     Problem: Pain  Goal: Verbalizes/displays adequate comfort level or baseline comfort level  Outcome: Progressing     Problem: Safety - Adult  Goal: Free from fall injury  Outcome: Progressing     
Patient admitted to room 423 from ER.  Patient oriented to room, call light, bed rails, phone, lights and bathroom.  Patient instructed about the schedule of the day including: vital sign frequency, lab draws, possible tests, frequency of MD and staff rounds, including RN/MD rounding together at bedside, daily weights, and I &O's.  Patient instructed about prescribed diet, how to use 8MENU, and television.  Telemetry box  in place, patient aware of placement and reason.  Bed locked, in lowest position, side rails up 2/4, call light within reach.      
Patient discharge completed. Discharge information included information on diagnosis including signs and symptoms, complications and when to seek medical attention. Information on new medications also provided included use for the medication, side effects and when to call the doctor. Patient verbalized understanding of all discharge information. Patient escorted out by staff with all documented belongings. Home with family.  IV removed without incident  Tele removed, CMU notified    
Patient seen.  Full note to come.  Pleuritic chest pain, suspect pleurisy.  Will give another dose of ketorolac and a a dose of dilaudid.  Will give colchicine with hopes this helps pain.  Will get echocardiogram tomorrow for pericardial effusion and RV lead evaluation.    Fernie Purcell MD  Cardiac Electrophysiology  [facility]  [phone] [place] Office   
n/a

## 2025-03-20 NOTE — PROGRESS NOTES
Electrophysiology Progress Note     Admit Date: 3/18/2025     Reason for follow up: Pericarditis.    Interval History:   - Patient seen and examined.   - Clinical notes reviewed.   - Telemetry reviewed.  HR has improved.  - No new complaints today.   - No major events overnight.   - Chest pain improved but still present.    Physical Examination:  Vitals:    25 0903   BP:    Pulse:    Resp: 18   Temp:    SpO2:         Intake/Output Summary (Last 24 hours) at 3/19/2025 1131  Last data filed at 3/19/2025 0447  Gross per 24 hour   Intake 240 ml   Output 800 ml   Net -560 ml     In: 240 [P.O.:240]  Out: 800    Wt Readings from Last 3 Encounters:   25 73.9 kg (163 lb)   25 66.4 kg (146 lb 6.4 oz)   02/15/25 74.2 kg (163 lb 9.3 oz)     Temp  Av °F (36.7 °C)  Min: 97.6 °F (36.4 °C)  Max: 98.3 °F (36.8 °C)  Pulse  Av.7  Min: 89  Max: 109  BP  Min: 81/52  Max: 112/52  SpO2  Av.3 %  Min: 94 %  Max: 97 %    Telemetry: Sinus rhythm   Constitutional: Alert. Oriented to person, place, and time. No distress.   Head: Normocephalic and atraumatic.   Mouth/Throat: Lips appear moist. Oropharynx is clear and moist.  Cardiovascular: Normal rate, regular rhythm. Normal S1&S2.   Pulmonary/Chest: Bilateral respiratory sounds present. No respiratory accessory muscle use.  No wheezes, No rhonchi.    Abdominal: Soft. Normal bowel sounds present. No distension, No tenderness. No splenomegaly.  Musculoskeletal: No tenderness. No edema    Neurological: Alert and oriented. Cranial nerve II-XII grossly intact.  Skin: Skin is warm and dry. No rash, lesions, ulcerations noted.  Psychiatric: No anxiety nor agitation.    Labs, diagnostic and imaging results reviewed.   Reviewed.   Recent Labs     25  1144 25  2103 25  0439   * 132* 132*   K 3.5 3.3* 3.1*   CL 95* 97* 98*   CO2  24   PHOS  --  3.2  --    BUN 6* 8 8   CREATININE 0.5* 0.4* 0.4*     Recent Labs     25  1108 
  [facility] Medicine Progress Note  V 1.6      Date of Admission: 3/18/2025    Hospital Day: 3      Chief Admission Complaint:  \"chest pain\"    Subjective:  no new c/o    Presenting Admission History:       \"39 y.o. female who presented to [facility] with chest pain. Pt notes that for the past 1-2 days, she noted ongoing chest pain, which began around Sunday evening. She was sitting on the couch and noted sharp pains(substernal) and radiating into rt arm.  She denies palpitations. It would last up to 1 hour but then nearly subside.  She was worried that her defibrillator was shocking her.  -she notes now some sob and chest soreness  -she has been eating w/o issues and denies any n/v/diarrhea  -No fever at home(but noted temp of 100.3 here)  -No urine symtpoms  -Sees EP\"    Assessment/Plan:      Current Principal Problem:  Hypomagnesemia      Pericarditis - presented as Chest Pain.  CTPA negative for PE.  EP consulted and appreciated - started on Colchicine 19 March.      Leukocytosis - w/ no obvious infection noted but CTPA did note fluid pocket around pacer.  Patient did have slight fever of 100.3 in ER.  Cardiology felt ABX not warranted.      HypoKalemia - etiology clinically unable to determine.  Followed serial Potassium, personally reviewed and documented in this note. Replaced PRN - ordered PO 19 March.      HypoMagnesemia - etiology clinically unable to determine.  Followed serial Magnesium, personally reviewed and documented in this note. Replaced PRN - ordered IV 20 March.        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  Controlled on home medication regimen - continued.      HyperLipidemia - 
Discharge order in since 11 a.m. AVS not reconciled. I have not spoke with attending.   Waiting AVS completion. Pt has the second of two single doses of potassium scheduled for 1400. Will cont to alessandra.    1130-Note in from cardio, new med for six months and pos d/d tomorrow. 1214- I sent olu the noted poc. He noted aware at 1215    1445 cm called and stated olu messaged her that pt was not going today. She requested clarification of pt.     1514-olu aware of need for clarification.   
Pt resting in bed. Waiting for an update of stay or go  
124     Recent Labs     03/18/25  1153   LACTA 0.7       Urine Cultures:   Lab Results   Component Value Date/Time    LABURIN No growth at 18-36 hours 05/17/2019 11:52 AM     Blood Cultures:   Lab Results   Component Value Date/Time    BC  01/18/2023 01:07 AM     No growth 24 hours. No growth 48 hours. No growth at 5 days    BC  01/18/2023 01:07 AM     No growth 24 hours. No growth 48 hours. No growth at 5 days     Lab Results   Component Value Date/Time    BLOODCULT2 No growth after 5 days of incubation. 11/06/2019 12:41 PM     Organism:   Lab Results   Component Value Date/Time    ORG Staphylococcus aureus 05/16/2019 11:50 AM    ORG Candida albicans 05/16/2019 11:50 AM         Wesley Milligan MD

## 2025-03-21 ENCOUNTER — TELEPHONE (OUTPATIENT)
Dept: FAMILY MEDICINE CLINIC | Age: 40
End: 2025-03-21

## 2025-03-21 NOTE — DISCHARGE SUMMARY
NICM, CHFrEF of 40%, syncope, sarcoma, bipolar disorder, who had RV lead extraction and implantation of new RV lead and new RA lead on 2/14/2025..  She presented to the emergency room with chest discomfort on 2/27/2025.  Echo did not any pericardial effusion.  Chest x-ray that was done showed retraction of the lead.  She does report that she moves a lot at night-especially her upper extremities.  Device check showed low impedance high pressure of the RV lead.   Past Medical History Past Medical History    Past Medical History: Diagnosis Date  Acute on chronic systolic congestive heart failure (HCC)    Arthritis    Bipolar disorder (HCC)    Cancer (HCC)     rhabdomyosarcoma  Cardiac arrest (HCC) 05/2019   VF  Cardiac arrest with ventricular fibrillation (HCC)    Cardiomyopathy (HCC) 05/2019   EF= 40% with global hypokinesis  COPD (chronic obstructive pulmonary disease) (HCC)    Depression    Family history of early CAD    Hepatitis C    Hyperlipidemia    Paranoia (psychosis) (HCC)    Pneumonia due to infectious organism    Rhabdomyosarcoma (HCC)    Scoliosis    Smoker    VF (ventricular fibrillation) (HCC)      Family History Family History     Family History Problem Relation Age of Onset  Emphysema Mother    Mental Illness Mother    Substance Abuse Mother    Stroke Mother    Heart Attack Father    Mental Illness Father    Arthritis Father    Heart Disease Father    High Blood Pressure Father    High Cholesterol Father      LABS Last 3 BMP:    Recent Labs   02/27/25 0618 02/28/25 0408  136 K 3.7 3.2* CL 99 101 CO2 26 25 BUN 6* 6* CREATININE 0.4* 0.4* GLUCOSE 140* 93 CALCIUM 10.0 9.3  Last 3 CMP:    Recent Labs   02/27/25 0618 02/28/25 0408  136 K 3.7 3.2* CL 99 101 CO2 26 25 BUN 6* 6* CREATININE 0.4* 0.4* GLUCOSE 140* 93 CALCIUM 10.0 9.3 BILITOT 0.4  --  ALKPHOS 115  --  AST 49*  --  ALT 45*  --   Last 3 CK, CKMB, Troponin: No results for input(s): \"CKTOTAL\", \"CKMB\", \"TROPONINI\" in the last 72 hours.

## 2025-03-21 NOTE — TELEPHONE ENCOUNTER
Care Transitions Initial Follow Up Call    Outreach made within 2 business days of discharge: Yes    Patient: Rubi Robb Patient : 1985   MRN: [id number]  Reason for Admission: Hypomagnesia/ Chest pain   Discharge Date: 3/20/25       Spoke with: Rubi     Discharge department/facility: Home    TCM Interactive Patient Contact:  Was patient able to fill all prescriptions: Yes  Was patient instructed to bring all medications to the follow-up visit: Yes  Is patient taking all medications as directed in the discharge summary? Yes  Does patient understand their discharge instructions: Yes  Does patient have questions or concerns that need addressed prior to 7-14 day follow up office visit: no    Additional needs identified to be addressed with provider  No needs identified             Scheduled appointment with PCP within 7-14 days    Follow Up  Future Appointments   Date Time Provider Department Center   3/25/2025 11:00 AM [facility]   2025  8:30 AM SCHEDULE, MARILU DEVICE CHECK [facility]   2025  8:30 AM Kayleigh Osman, APRN - CNP [facility]       MIKE ACOSTA LPN

## 2025-03-22 LAB
BACTERIA BLD CULT ORG #2: NORMAL
BACTERIA BLD CULT: NORMAL

## 2025-03-24 ENCOUNTER — RESULTS FOLLOW-UP (OUTPATIENT)
Dept: INPATIENT UNIT | Age: 40
End: 2025-03-24

## 2025-03-25 ENCOUNTER — OFFICE VISIT (OUTPATIENT)
Dept: FAMILY MEDICINE CLINIC | Age: 40
End: 2025-03-25
Payer: COMMERCIAL

## 2025-03-25 VITALS
SYSTOLIC BLOOD PRESSURE: 120 MMHG | BODY MASS INDEX: 29.53 KG/M2 | DIASTOLIC BLOOD PRESSURE: 62 MMHG | HEIGHT: 60 IN | WEIGHT: 150.4 LBS | OXYGEN SATURATION: 97 % | HEART RATE: 111 BPM

## 2025-03-25 DIAGNOSIS — F41.1 GENERALIZED ANXIETY DISORDER: ICD-10-CM

## 2025-03-25 DIAGNOSIS — F41.0 PANIC ATTACKS: ICD-10-CM

## 2025-03-25 DIAGNOSIS — F39 MOOD DISORDER: ICD-10-CM

## 2025-03-25 DIAGNOSIS — G89.3 CHRONIC PAIN AFTER CANCER TREATMENT: ICD-10-CM

## 2025-03-25 DIAGNOSIS — Z12.31 ENCOUNTER FOR SCREENING MAMMOGRAM FOR MALIGNANT NEOPLASM OF BREAST: ICD-10-CM

## 2025-03-25 DIAGNOSIS — Z01.419 WELL WOMAN EXAM: ICD-10-CM

## 2025-03-25 DIAGNOSIS — Z00.00 WELL ADULT EXAM: Primary | ICD-10-CM

## 2025-03-25 DIAGNOSIS — F31.9 BIPOLAR AFFECTIVE DISORDER, REMISSION STATUS UNSPECIFIED (HCC): ICD-10-CM

## 2025-03-25 PROCEDURE — 99396 PREV VISIT EST AGE 40-64: CPT | Performed by: STUDENT IN AN ORGANIZED HEALTH CARE EDUCATION/TRAINING PROGRAM

## 2025-03-25 PROCEDURE — 99214 OFFICE O/P EST MOD 30 MIN: CPT | Performed by: STUDENT IN AN ORGANIZED HEALTH CARE EDUCATION/TRAINING PROGRAM

## 2025-03-25 RX ORDER — HYDROCODONE BITARTRATE AND ACETAMINOPHEN 5; 325 MG/1; MG/1
1 TABLET ORAL EVERY 6 HOURS PRN
Qty: 120 TABLET | Refills: 0 | Status: SHIPPED | OUTPATIENT
Start: 2025-03-25 | End: 2025-04-24

## 2025-03-25 NOTE — PROGRESS NOTES
[facility]  Establish care visit   3/25/2025    Rubi Robb (:  1985) is a 39 y.o. female, here to establish care.    Chief Complaint   Patient presents with    Annual Exam        ASSESSMENT/ PLAN  1. Well adult exam  General wellness exam. Reviewed chart for past hx and updated today. Counseled on age appropriate health guidance and discussed screening recommendations. Vaccinations reviewed and discussed. All questions answered.  - Comprehensive Metabolic Panel; Future  - Magnesium; Future  - Lipid, Fasting; Future  - Hemoglobin A1C; Future    2. Well woman exam  - Marcela Gavin, DO, Gynecology, [facility]    3. Encounter for screening mammogram for malignant neoplasm of breast  - [facility] DIGITAL SCREEN BILATERAL; Future    4. Chronic pain after cancer treatment  Chronic.  Stable.  On hydrocodone.  Needs refill.  Will provide at this time.  - HYDROcodone-acetaminophen (LORCET) 5-325 MG per tablet; Take 1 tablet by mouth every 6 hours as needed for Pain for up to 30 days. Intended supply: 3 days. Take lowest dose possible to manage pain Max Daily Amount: 4 tablets  Dispense: 120 tablet; Refill: 0    5. Mood disorder  6. Generalized anxiety disorder  7. Panic attacks  All 3 of these disorders are chronic, stable on medication.  On clonazepam.  Does not need refill.  Continue at this time.    8. Bipolar affective disorder, remission status unspecified (HCC)  Chronic.  Stable.  On Zyprexa.  Does not need a refill.  Continue at this time.       No follow-ups on file.    HPI    Patient is a 39-year-old female, who presents to clinic for a well adult visit.  Patient does follow intermittent fasting diet, she is on vitamin supplements and is exercising portion control restrictions.  She states that she has lost some weight doing these restrictions.  She is limited in her ability to exercise.  She is up-to-date on her childhood vaccines other than her tetanus booster

## 2025-04-14 DIAGNOSIS — I42.0 DILATED CARDIOMYOPATHY (HCC): ICD-10-CM

## 2025-04-14 DIAGNOSIS — F39 MOOD DISORDER: ICD-10-CM

## 2025-04-14 DIAGNOSIS — I50.22 CHRONIC SYSTOLIC CONGESTIVE HEART FAILURE (HCC): ICD-10-CM

## 2025-04-14 RX ORDER — GABAPENTIN 600 MG/1
600 TABLET ORAL 3 TIMES DAILY
Qty: 270 TABLET | Refills: 0 | Status: SHIPPED | OUTPATIENT
Start: 2025-04-14 | End: 2025-07-13

## 2025-04-14 RX ORDER — LISINOPRIL 2.5 MG/1
2.5 TABLET ORAL 2 TIMES DAILY
Qty: 180 TABLET | Refills: 0 | Status: SHIPPED | OUTPATIENT
Start: 2025-04-14

## 2025-04-14 RX ORDER — SPIRONOLACTONE 25 MG/1
25 TABLET ORAL DAILY
Qty: 90 TABLET | Refills: 0 | Status: SHIPPED | OUTPATIENT
Start: 2025-04-14

## 2025-04-14 RX ORDER — ATORVASTATIN CALCIUM 20 MG/1
TABLET, FILM COATED ORAL
Qty: 90 TABLET | Refills: 0 | Status: SHIPPED | OUTPATIENT
Start: 2025-04-14

## 2025-04-14 NOTE — TELEPHONE ENCOUNTER
lisinopril (PRINIVIL;ZESTRIL) 2.5 MG tablet [    atorvastatin (LIPITOR) 20 MG tablet [    spironolactone (ALDACTONE) 25 MG tablet

## 2025-04-14 NOTE — TELEPHONE ENCOUNTER
Last Office Visit  -  3/25/25  Next Office Visit  -  7/2/25    Last Filled  -  1/8/25  Last UDS -    Contract -

## 2025-04-24 DIAGNOSIS — G89.3 CHRONIC PAIN AFTER CANCER TREATMENT: ICD-10-CM

## 2025-04-24 RX ORDER — HYDROCODONE BITARTRATE AND ACETAMINOPHEN 5; 325 MG/1; MG/1
1 TABLET ORAL EVERY 6 HOURS PRN
Qty: 120 TABLET | Refills: 0 | Status: SHIPPED | OUTPATIENT
Start: 2025-04-24 | End: 2025-05-24

## 2025-04-24 NOTE — TELEPHONE ENCOUNTER
Last Office Visit  -  03/25/2025  Next Office Visit  -  07/02/2025    Last Filled  -  03/25/2025  Last UDS -    Contract -

## 2025-05-01 DIAGNOSIS — F41.0 PANIC ATTACKS: ICD-10-CM

## 2025-05-01 DIAGNOSIS — F41.1 GENERALIZED ANXIETY DISORDER: ICD-10-CM

## 2025-05-01 RX ORDER — CLONAZEPAM 0.5 MG/1
0.5 TABLET ORAL 2 TIMES DAILY PRN
Qty: 60 TABLET | Refills: 1 | Status: SHIPPED | OUTPATIENT
Start: 2025-05-01 | End: 2025-07-30

## 2025-05-01 NOTE — TELEPHONE ENCOUNTER
Last Office Visit  -  03/25/2025  Next Office Visit  -  07/02/2025    Last Filled  -  01/02/2025  Last UDS -    Contract -

## 2025-05-20 NOTE — PROGRESS NOTES
Pulmonary & Critical Care Medicine ICU Progress Note  CC:Acute respiratory failure with hypoxemia    Events of Last 24 hours: Levophed at 6. Agitated at times. She is on fentanyl and propofol. PEEP down to 8    Invasive Lines:   IV Line: LIJ 5/14    MV:  5/14  Vent Mode: AC/VC Rate Set: 18 bmp/Vt Ordered: 410 mL/ /FiO2 : 50 %  Recent Labs     05/19/19  0530 05/19/19  1559   PHART 7.471* 7.507*   AXW2SMO 41.4 40.6   PO2ART 77.7 64.7*       IV:   dexmedetomidine (PRECEDEX) IV infusion Stopped (05/19/19 1559)    midazolam Stopped (05/18/19 1005)    norepinephrine 6 mcg/kg/min (05/19/19 1527)    sodium chloride 75 mL/hr at 05/18/19 0216    propofol 50 mcg/kg/min (05/20/19 0458)    dextrose      fentaNYL (SUBLIMAZE) infusion 200 mcg/hr (05/20/19 0051)       EXAM:  /65   Pulse 115   Temp 98.9 °F (37.2 °C) (Core)   Resp 23   Ht 5' (1.524 m)   Wt 195 lb 14.4 oz (88.9 kg)   SpO2 99%   BMI 38.26 kg/m²  on vent  Tmax: 99.8  CVP: CVP (Mean): 11 mmHg    Intake/Output Summary (Last 24 hours) at 5/20/2019 0612  Last data filed at 5/20/2019 0100  Gross per 24 hour   Intake 3898 ml   Output 7035 ml   Net -3137 ml     General: No distress. Normocephalic, atraumatic. Eyes: PERRL. No sclera icterus. No conjunctival injection. ENT: No discharge. Pharynx clear. Neck: Trachea midline. Normal thyroid. Resp: No accessory muscle use. No crackles. No wheezing. + rhonchi. No dullness on percussion. CV: Regular rate. Regular rhythm. No mumur or rub. No edema. GI: Non-tender. Non-distended. No masses. No organmegaly. Normal bowel sounds. No hernia. Skin: Warm and dry. No nodule on exposed extremities. No rash on exposed extremities. Lymph: No cervical LAD. No supraclavicular LAD. M/S: No cyanosis. No joint deformity. No clubbing.    Neuro:  Somnolent, not moving extremities      Medications:   acetaZOLAMIDE  500 mg Intravenous Daily    furosemide  40 mg Intravenous BID    OLANZapine  15 mg Oral BID    Received cardiac clearance for fluoroscopically guided procedure on spine (RIGHT stellate Ganglion Block on 6/3/25. Requesting to hold Plavix for 7 days and Aspirin for 3 days.    showed : dense right sided pneumonia; left side a little more clear    Assessment:  · Acute respiratory failure with hypoxemia  · ARDS  · V fib arrest  · Aspiration pneumonia  · Hx of paroxysmal Afib  · Shock  · Cardiomyopathy EF 25%  · Hx of polysubstance abuse  · Hepatitis C      Plan:  Mechanical ventilation per my orders. The ventilator was adjusted by me at the bedside for unstable, life threatening respiratory failure. Target tidal volume to 4-6 ml/kg IBW. Target plateau pressures <10 cm H2O  Goal to pursue conservative fluid strategy; Goal to keep patient I/Os even if possible  HOB greater than 30 degrees at all times  Daily SBT once FIO2<60% and PEEP = 5, patient arousable, hemodynamically stable  IV Sedation with propofol, targeted RASS -2  Abx: Zosyn D6, Doxy D5- change to CTX   levophed gtt adjust as needed for MAP <65   · F/u bronch results  · SSI  · Lasix 40 mg iv bid  · Tube feed at goal  · ICU care: lovenox, pepcid and bactroban    Critical care time spent reviewing labs/films, examining patient, collaborating with other physicians but excluding procedures for life threatening organ failure is 32 minutes.

## 2025-05-27 DIAGNOSIS — G89.3 CHRONIC PAIN AFTER CANCER TREATMENT: ICD-10-CM

## 2025-05-27 RX ORDER — METOPROLOL SUCCINATE 50 MG/1
50 TABLET, EXTENDED RELEASE ORAL EVERY MORNING
Qty: 30 TABLET | Refills: 3 | Status: SHIPPED | OUTPATIENT
Start: 2025-05-27

## 2025-05-27 RX ORDER — HYDROCODONE BITARTRATE AND ACETAMINOPHEN 5; 325 MG/1; MG/1
1 TABLET ORAL EVERY 6 HOURS PRN
Qty: 120 TABLET | Refills: 0 | Status: SHIPPED | OUTPATIENT
Start: 2025-05-27 | End: 2025-06-26

## 2025-05-30 ENCOUNTER — TELEPHONE (OUTPATIENT)
Dept: CARDIOLOGY CLINIC | Age: 40
End: 2025-05-30

## 2025-05-30 NOTE — TELEPHONE ENCOUNTER
Sonya Najera Ep1 minute ago (2:09 PM)     LR  Last carelink received 3/29/2025. She needs to send a transmission.

## 2025-05-30 NOTE — TELEPHONE ENCOUNTER
Pt called stating she has pain right in the middle of her breast.  The pain get worse when she crosses her arms, breathes deeply, or bends over.  Pt pain is an 8 and she states she is currently on pain medication(HYDROcodone-acetaminophen (LORCET) 5-325 MG per tablet).  Pt was advised to send a transmission and to go to the ED.  Pt was unable to get an accurate bp reading and was slightly panicking.  Advised pt to not drive herself to the er.

## 2025-05-30 NOTE — TELEPHONE ENCOUNTER
I spoke with the patient and she states that she sent a transmission around 30 minutes ago. I had the patient attempt to send a transmission again while she was on the phone. Please see if transmission received.     Of note, the patient reports having two episodes of palpitations with her heart rate as high as the 220's. She states that these episodes have made her feel near-syncopal and only last a few minutes in duration. She states that these episodes take hours to recover from.

## 2025-06-02 NOTE — PROGRESS NOTES
[facility]   Electrophysiology Outpatient Note              Date:  June 4, 2025  Patient name: Rubi Robb  YOB: 1985    Primary Care physician: Damaso Barnard MD    HISTORY OF PRESENT ILLNESS: The patient is a 39 y.o.  female with a history of nonischemic cardiomyopathy, [employer] DC ICD, new RA & RV lead , revision of RV lead 2/28/2025, RV and pocket revision 3/5/2025, VF arrest, NSVT, hyperlipidemia, hypertension, HFrEF, sarcoma, bipolar    Patient follows with Dr. Purcell in EP clinic.  Patient had VF arrest in 2019.  Echocardiogram revealed EF 25-30%.  6/2019 patient underwent single chamber ICD implantation requiring lead revision.  Patient was admitted 2/27/2025 with device alarm for RV lead impedance change and higher threshold.  Of note patient underwent RV lead extraction and implantation of new RV lead with new RA lead 2/14/2025.  2/28/2025 patient underwent RV lead revision along with pocket revision.  Device check revealed low impedance of RV lead.  3/5/2025 RV lead revision.  3/19/2025 patient was admitted with chest pain with deep breaths and was diagnosed with acute pericarditis.  Echo revealed no pericardial effusion    Today she is being seen for history of ventricular fibrillation and ICD management.  Patient is status post new RA and RV lead and pocket revision earlier this year.  Device check reveals AP 0.7%,  less than 0.1%, no events recorded since May 26.  ECG shows SR with a HR of 77.  Her device site was evaluated.  Incision is healed and well-approximated.  Patient denies chest pain, shortness breath and palpitations.  Patient states she is feeling much better from when I last saw her in the hospital.  She is taking her medications as prescribed.  She is no longer taking the colchicine.  Patient did have 5 episodes of NSVT -too high rate noted on 4/7 and 5/1.  SVT rate up to 146 on May 26.  Patient was called by  at that time.  No

## 2025-06-02 NOTE — TELEPHONE ENCOUNTER
I spoke with patient.  Pain improving.  She suspects MSK.  Will call with recurrence.  Thanks team.

## 2025-06-02 NOTE — TELEPHONE ENCOUNTER
Pt sent manual carelink  5/30 and 5/31/2025.    Last device check 3/21/2025.     Most recent events recorded 5/23, 5/25 and 5/26. (Nsvt/svt noted)  5 NSVT events  2 HIGH RATE-NS  (noted 5/1, 4/7) V rates 279-2963 bpm.  SVT w/ V rates as high as 146 bpm, longest 6 min.    She takes Toprol xl and Mag-ox.  EF 50 % 3/2025.    See MURJ report under cardiology tab once EP reviews.

## 2025-06-04 ENCOUNTER — CLINICAL SUPPORT (OUTPATIENT)
Dept: CARDIOLOGY CLINIC | Age: 40
End: 2025-06-04

## 2025-06-04 ENCOUNTER — OFFICE VISIT (OUTPATIENT)
Dept: CARDIOLOGY CLINIC | Age: 40
End: 2025-06-04
Payer: COMMERCIAL

## 2025-06-04 VITALS
HEIGHT: 60 IN | OXYGEN SATURATION: 97 % | DIASTOLIC BLOOD PRESSURE: 62 MMHG | BODY MASS INDEX: 29.26 KG/M2 | HEART RATE: 77 BPM | WEIGHT: 149.03 LBS | SYSTOLIC BLOOD PRESSURE: 120 MMHG

## 2025-06-04 DIAGNOSIS — E78.2 MIXED HYPERLIPIDEMIA: ICD-10-CM

## 2025-06-04 DIAGNOSIS — I49.01 VF (VENTRICULAR FIBRILLATION) (HCC): ICD-10-CM

## 2025-06-04 DIAGNOSIS — I47.20 VT (VENTRICULAR TACHYCARDIA) (HCC): Primary | ICD-10-CM

## 2025-06-04 DIAGNOSIS — I42.8 NONISCHEMIC CARDIOMYOPATHY (HCC): ICD-10-CM

## 2025-06-04 DIAGNOSIS — I47.29 NSVT (NONSUSTAINED VENTRICULAR TACHYCARDIA) (HCC): ICD-10-CM

## 2025-06-04 DIAGNOSIS — I47.20 VT (VENTRICULAR TACHYCARDIA) (HCC): ICD-10-CM

## 2025-06-04 DIAGNOSIS — I47.10 SVT (SUPRAVENTRICULAR TACHYCARDIA): ICD-10-CM

## 2025-06-04 DIAGNOSIS — I10 PRIMARY HYPERTENSION: ICD-10-CM

## 2025-06-04 DIAGNOSIS — Z95.810 ICD (IMPLANTABLE CARDIOVERTER-DEFIBRILLATOR) IN PLACE: Primary | ICD-10-CM

## 2025-06-04 DIAGNOSIS — I47.20 VENTRICULAR TACHYCARDIA (HCC): ICD-10-CM

## 2025-06-04 PROCEDURE — 93000 ELECTROCARDIOGRAM COMPLETE: CPT

## 2025-06-04 PROCEDURE — 99214 OFFICE O/P EST MOD 30 MIN: CPT

## 2025-06-04 PROCEDURE — 3074F SYST BP LT 130 MM HG: CPT

## 2025-06-04 PROCEDURE — 3078F DIAST BP <80 MM HG: CPT

## 2025-06-04 PROCEDURE — G2211 COMPLEX E/M VISIT ADD ON: HCPCS

## 2025-06-04 NOTE — PATIENT INSTRUCTIONS
Continue Toprol-XL 50 mg daily  Continue lisinopril 2.5 mg twice daily  Continue Aldactone 25 mg daily  Continue Lipitor 20 mg daily  Continue with every 3-month remote device check    Follow up in 6 months with Dr Purcell

## 2025-06-26 DIAGNOSIS — G89.3 CHRONIC PAIN AFTER CANCER TREATMENT: ICD-10-CM

## 2025-06-26 RX ORDER — HYDROCODONE BITARTRATE AND ACETAMINOPHEN 5; 325 MG/1; MG/1
1 TABLET ORAL EVERY 6 HOURS PRN
Qty: 120 TABLET | Refills: 0 | Status: SHIPPED | OUTPATIENT
Start: 2025-06-26 | End: 2025-07-26

## 2025-06-26 NOTE — TELEPHONE ENCOUNTER
Patient is requesting a rx for HYDROcodone-acetaminophen (LORCET) 5-325 MG per tablet .    Last seen 3/25/2025    Next visit   Future Appointments   Date Time Provider Department Center   7/2/2025 11:00 AM Damaso Barnard MD [facility] ECC DEP     Please use [facility] in [place]

## 2025-06-26 NOTE — TELEPHONE ENCOUNTER
Last Office Visit  -  03/25/2025  Next Office Visit  -  07/02/2025    Last Filled  -  05/27/2025  Last UDS -    Contract -

## 2025-07-01 DIAGNOSIS — F41.1 GENERALIZED ANXIETY DISORDER: ICD-10-CM

## 2025-07-01 DIAGNOSIS — F41.0 PANIC ATTACKS: ICD-10-CM

## 2025-07-01 RX ORDER — OLANZAPINE 7.5 MG/1
7.5 TABLET, FILM COATED ORAL EVERY MORNING
Qty: 90 TABLET | Refills: 1 | Status: SHIPPED | OUTPATIENT
Start: 2025-07-01

## 2025-07-01 RX ORDER — OLANZAPINE 15 MG/1
15 TABLET, FILM COATED ORAL NIGHTLY
Qty: 90 TABLET | Refills: 1 | Status: SHIPPED | OUTPATIENT
Start: 2025-07-01

## 2025-07-01 RX ORDER — CLONAZEPAM 0.5 MG/1
0.5 TABLET ORAL 2 TIMES DAILY PRN
Qty: 60 TABLET | Refills: 1 | Status: SHIPPED | OUTPATIENT
Start: 2025-07-01 | End: 2025-09-29

## 2025-07-01 NOTE — TELEPHONE ENCOUNTER
Last Office Visit  -  03/25/2025  Next Office Visit  -  07/02/2025    Last Filled  -  05/01/2025  Last UDS -    Contract -

## 2025-07-01 NOTE — TELEPHONE ENCOUNTER
Patient is requesting a rx for   OLANZapine (ZYPREXA) 7.5 MG tablet     OLANZapine (ZYPREXA) 15 MG tablet      clonazePAM (KLONOPIN) 0.5 MG tablet     [facility] [address] [place] [address] JONATHAN     Last seen 3/25/2025   Next visit   Future Appointments   Date Time Provider Department Center   7/2/2025 11:00 AM Damaso Barnard MD [facility] ECC DEP

## 2025-07-02 ENCOUNTER — OFFICE VISIT (OUTPATIENT)
Dept: FAMILY MEDICINE CLINIC | Age: 40
End: 2025-07-02
Payer: COMMERCIAL

## 2025-07-02 VITALS
DIASTOLIC BLOOD PRESSURE: 60 MMHG | SYSTOLIC BLOOD PRESSURE: 128 MMHG | BODY MASS INDEX: 29.06 KG/M2 | WEIGHT: 148 LBS | HEART RATE: 83 BPM | OXYGEN SATURATION: 98 % | HEIGHT: 60 IN

## 2025-07-02 DIAGNOSIS — F41.0 PANIC ATTACKS: ICD-10-CM

## 2025-07-02 DIAGNOSIS — G89.3 CHRONIC PAIN AFTER CANCER TREATMENT: ICD-10-CM

## 2025-07-02 DIAGNOSIS — F39 MOOD DISORDER: ICD-10-CM

## 2025-07-02 DIAGNOSIS — F41.1 GENERALIZED ANXIETY DISORDER: Primary | ICD-10-CM

## 2025-07-02 PROCEDURE — 99214 OFFICE O/P EST MOD 30 MIN: CPT | Performed by: STUDENT IN AN ORGANIZED HEALTH CARE EDUCATION/TRAINING PROGRAM

## 2025-07-02 RX ORDER — GABAPENTIN 600 MG/1
600 TABLET ORAL 3 TIMES DAILY
Qty: 270 TABLET | Refills: 0 | Status: SHIPPED | OUTPATIENT
Start: 2025-07-02 | End: 2025-09-30

## 2025-07-02 NOTE — PROGRESS NOTES
Rubi Robb (:  1985) is a 39 y.o. female,Established patient, here for evaluation of the following chief complaint(s):  Follow-up and Medication Refill         Assessment & Plan  Generalized anxiety disorder   Chronic, at goal (stable), continue current treatment plan         Panic attacks   Chronic, at goal (stable), continue current treatment plan         Chronic pain after cancer treatment   Chronic, at goal (stable), continue current treatment plan         Mood disorder   Chronic, at goal (stable), continue current treatment plan           No follow-ups on file.       Subjective   HPI  Patient is a 39-year-old female, who presents to clinic for interval follow-up for chronic condition management.    Patient has chronic pain disorder this is secondary to chronic pain from cancer treatment.  She is on hydrocodone 5-3 25 3 times daily.  This medication works well for her and she does not report any side effects.  She does not request a refill of this medication at this time.    Patient has chronic mood disorder with chronic generalized anxiety disorder and panic attacks as well.  She is on both Klonopin and gabapentin for this.  These medications work well for her and she does not report any side effects.  She does not request a refill of these medications at this time.    Review of Systems   All other systems reviewed and are negative.         Objective   Vitals:    25 1049   BP: 128/60   Pulse: 83   SpO2: 98%   Weight: 67.1 kg (148 lb)   Height: 1.524 m (5')       Physical Exam  Vitals reviewed.   Constitutional:       General: She is not in acute distress.     Appearance: Normal appearance. She is not ill-appearing, toxic-appearing or diaphoretic.   HENT:      Mouth/Throat:      Mouth: Mucous membranes are moist.   Eyes:      General: No scleral icterus.     Conjunctiva/sclera: Conjunctivae normal.   Cardiovascular:      Rate and Rhythm: Normal rate.   Pulmonary:      Effort: Pulmonary

## 2025-07-02 NOTE — TELEPHONE ENCOUNTER
Last Office Visit  -  07/02/2025  Next Office Visit  -  n/a    Last Filled  -  04/14/2025  Last UDS -    Contract -

## 2025-07-17 DIAGNOSIS — F39 MOOD DISORDER: ICD-10-CM

## 2025-07-17 RX ORDER — GABAPENTIN 600 MG/1
600 TABLET ORAL 3 TIMES DAILY
Qty: 270 TABLET | Refills: 0 | Status: SHIPPED | OUTPATIENT
Start: 2025-07-17 | End: 2025-10-15

## 2025-07-17 NOTE — TELEPHONE ENCOUNTER
Last Office Visit  -  07/02/2025  Next Office Visit  -  n/a    Last Filled  -  07/02/2025  Last UDS -    Contract -

## 2025-07-17 NOTE — TELEPHONE ENCOUNTER
[phone]   Patient is requesting a rx for   gabapentin (NEURONTIN) 600 MG tablet      [facility] [address] [facility], [place] - [address]     Last seen 7/2/2025   Next visit No future appointments.

## 2025-07-23 PROCEDURE — 93297 REM INTERROG DEV EVAL ICPMS: CPT | Performed by: NURSE PRACTITIONER

## 2025-07-25 DIAGNOSIS — G89.3 CHRONIC PAIN AFTER CANCER TREATMENT: ICD-10-CM

## 2025-07-25 RX ORDER — HYDROCODONE BITARTRATE AND ACETAMINOPHEN 5; 325 MG/1; MG/1
1 TABLET ORAL EVERY 6 HOURS PRN
Qty: 120 TABLET | Refills: 0 | Status: SHIPPED | OUTPATIENT
Start: 2025-07-25 | End: 2025-08-24

## 2025-07-28 DIAGNOSIS — I50.22 CHRONIC SYSTOLIC CONGESTIVE HEART FAILURE (HCC): ICD-10-CM

## 2025-07-28 RX ORDER — SPIRONOLACTONE 25 MG/1
25 TABLET ORAL DAILY
Qty: 90 TABLET | Refills: 0 | Status: SHIPPED | OUTPATIENT
Start: 2025-07-28

## 2025-08-05 RX ORDER — ATORVASTATIN CALCIUM 20 MG/1
TABLET, FILM COATED ORAL
Qty: 90 TABLET | Refills: 0 | Status: SHIPPED | OUTPATIENT
Start: 2025-08-05

## 2025-08-17 DIAGNOSIS — I42.0 DILATED CARDIOMYOPATHY (HCC): ICD-10-CM

## 2025-08-17 DIAGNOSIS — I50.22 CHRONIC SYSTOLIC CONGESTIVE HEART FAILURE (HCC): ICD-10-CM

## 2025-08-17 RX ORDER — LISINOPRIL 2.5 MG/1
2.5 TABLET ORAL 2 TIMES DAILY
Qty: 180 TABLET | Refills: 0 | Status: SHIPPED | OUTPATIENT
Start: 2025-08-17

## 2025-08-20 RX ORDER — METOPROLOL SUCCINATE 50 MG/1
50 TABLET, EXTENDED RELEASE ORAL EVERY MORNING
Qty: 30 TABLET | Refills: 3 | Status: SHIPPED | OUTPATIENT
Start: 2025-08-20

## 2025-08-25 PROCEDURE — 93297 REM INTERROG DEV EVAL ICPMS: CPT | Performed by: NURSE PRACTITIONER

## 2025-08-26 DIAGNOSIS — G89.3 CHRONIC PAIN AFTER CANCER TREATMENT: ICD-10-CM

## 2025-08-26 RX ORDER — HYDROCODONE BITARTRATE AND ACETAMINOPHEN 5; 325 MG/1; MG/1
1 TABLET ORAL EVERY 6 HOURS PRN
Qty: 120 TABLET | Refills: 0 | Status: SHIPPED | OUTPATIENT
Start: 2025-08-26 | End: 2025-09-25

## 2025-09-02 DIAGNOSIS — F41.0 PANIC ATTACKS: ICD-10-CM

## 2025-09-02 DIAGNOSIS — F41.1 GENERALIZED ANXIETY DISORDER: ICD-10-CM

## 2025-09-02 RX ORDER — CLONAZEPAM 0.5 MG/1
0.5 TABLET ORAL 2 TIMES DAILY PRN
Qty: 60 TABLET | Refills: 1 | Status: SHIPPED | OUTPATIENT
Start: 2025-09-02 | End: 2025-12-01

## (undated) DEVICE — SYRINGE MED 50ML LUERSLIP TIP

## (undated) DEVICE — PAD, DEFIB, ADULT, RADIOTRANS, PHYSIO: Brand: MEDLINE

## (undated) DEVICE — SUTURE VICRYL SZ 4-0 L18IN ABSRB UD L19MM PS-2 3/8 CIR PRIM J496H

## (undated) DEVICE — INTRODUCER SHTH L13CM OD7FR SH ORNG HUB SEAMLESS SAFSHTH

## (undated) DEVICE — STRIP,CLOSURE,WOUND,MEDI-STRIP,1/2X4: Brand: MEDLINE

## (undated) DEVICE — PML 72 ADULT FLL-MLL PG: Brand: NAMIC

## (undated) DEVICE — COVER LT HNDL UNIV FOR LNG HNDL KOVER 1 PER PK

## (undated) DEVICE — IMMOBILIZER SHOULDER SLING SWATHE DLX

## (undated) DEVICE — TOOL ROT LD HELIX

## (undated) DEVICE — LEAD CUTTER: Brand: LEAD CUTTER

## (undated) DEVICE — SINGLE USE SUCTION VALVE MAJ-209: Brand: SINGLE USE SUCTION VALVE (STERILE)

## (undated) DEVICE — ELECTROSURGERY PENCIL ADAPTOR: Brand: PENADAPT

## (undated) DEVICE — Device

## (undated) DEVICE — KIT WRNCH CARD W/ NO2 TORQ RATCH WHT HEX FOR CARD PACEMKR

## (undated) DEVICE — PACEMAKER PACK: Brand: MEDLINE INDUSTRIES, INC.

## (undated) DEVICE — ELECTRODE PT RET AD L9FT HI MOIST COND ADH HYDRGEL CORDED

## (undated) DEVICE — Device: Brand: NOMOLINE™ LH ADULT NASAL CO2 CANNULA WITH O2 4M

## (undated) DEVICE — TIDISHIELD ZERO GRAVITY COVERS CLEAR POLYETHYLENE STERILE FITS ZERO GRAVITY 20 PER CASE: Brand: TIDISHIELD

## (undated) DEVICE — SYRINGE MED 10ML SLIP TIP BLNT FILL AND LUERLOCK DISP

## (undated) DEVICE — EXTRACTOR STPL L10CM REMV SKIN CLSR STPL SQUEEZE HNDL PROX

## (undated) DEVICE — STAPLER SKIN H3.9MM WIRE DIA0.58MM CRWN 6.9MM 35 STPL FIX

## (undated) DEVICE — PERCUTANEOUS ENTRY THINWALL NEEDLE  ONE-PART: Brand: COOK

## (undated) DEVICE — Device: Brand: LLD EZ LEAD LOCKING DEVICE

## (undated) DEVICE — SHEET,DRAPE,40X58,STERILE: Brand: MEDLINE

## (undated) DEVICE — SUTURE VICRYL + SZ 3-0 L18IN ABSRB UD SH 1/2 CIR TAPERCUT NDL VCP864D

## (undated) DEVICE — DEVICE COMPR 17 CM 120 CC SAFEGUARD FOCUS LF

## (undated) DEVICE — KIT CATH 5FR L91CM GWIRE INTRO SHTH SET SYR STPCOCK BRDG

## (undated) DEVICE — CATHETER EP 6FR L120CM 5MM SPC 1MM BND QPLR TORQ CTRL

## (undated) DEVICE — Device: Brand: TIGHTRAIL ROTATING DILATOR SHEATH

## (undated) DEVICE — ELECTRODE ECG MONITR FOAM TEAR DROP ADLT RED

## (undated) DEVICE — AMD ANTIMICROBIAL NON-ADHERENT PAD,0.2% POLYHEXAMETHYLENE BIGUANIDE HCI (PHMB): Brand: TELFA

## (undated) DEVICE — BULLDOG LEAD EXTENDER: Brand: BULLDOG

## (undated) DEVICE — PLASMABLADE PS200-040 4.0: Brand: PLASMABLADE™

## (undated) DEVICE — SUTURE ETHIBOND EXCEL SZ 1 L30IN NONABSORBABLE GRN L36MM CT-1 X425H

## (undated) DEVICE — RADIFOCUS GLIDEWIRE: Brand: GLIDEWIRE

## (undated) DEVICE — Device: Brand: VISISHEATH DILATOR SHEATH

## (undated) DEVICE — CONMED SCOPE SAVER BITE BLOCK, 20X27 MM: Brand: SCOPE SAVER

## (undated) DEVICE — SINGLE USE BIOPSY VALVE MAJ-210: Brand: SINGLE USE BIOPSY VALVE (STERILE)

## (undated) DEVICE — KIT MICROPUNCTURE MINISTICK MAX  4FR X 10CM STIFF .018 NITINOL ECHOGENIC NEEDLE 2.75IN

## (undated) DEVICE — SUTURE VICRYL SZ 3-0 L27IN ABSRB VLT L26MM SH 1/2 CIR J316H

## (undated) DEVICE — SUTURE ETHIBOND EXCEL SZ 0 L18IN NONABSORBABLE GRN L26MM CT-2 CX27D

## (undated) DEVICE — SUTURE VICRYL SZ 4-0 L27IN ABSRB UD L26MM SH 1/2 CIR J415H

## (undated) DEVICE — PROBE COVER KIT: Brand: MEDLINE INDUSTRIES, INC.

## (undated) DEVICE — PINNACLE INTRODUCER SHEATH: Brand: PINNACLE

## (undated) DEVICE — RESTRAINT EXT ANK WRST LT BLU FOAM 2 STRP SIDE BCKL HK AND

## (undated) DEVICE — BRIDGE OCCLUSION CATHETER - 80 MM LENGTH BALLOON: Brand: BRIDGE™ OCCLUSION BALLOON

## (undated) DEVICE — MASIMOSET LNCS ADTX SPO2 ADULT PULSE OXIMETER ADHESIVE SENSOR: Brand: MASIMOSET® LNCS® ADTX SPO2 ADULT PULSE OXIMETER ADHESIVE SENSOR

## (undated) DEVICE — LLD ACCESSORY KIT: Brand: LLD ACCESSORY KIT

## (undated) DEVICE — Device: Brand: GLIDELIGHT LASER SHEATH

## (undated) DEVICE — SUTURE VICRYL + SZ 2-0 L27IN ABSRB CLR CT-1 1/2 CIR TAPERCUT VCP259H

## (undated) DEVICE — GUIDEWIRE VASC ANGLED 035X150 M00146151B17

## (undated) DEVICE — 3M™ STERI-DRAPE™ INSTRUMENT POUCH 1018: Brand: STERI-DRAPE™